# Patient Record
Sex: MALE | Race: WHITE | HISPANIC OR LATINO | Employment: OTHER | URBAN - METROPOLITAN AREA
[De-identification: names, ages, dates, MRNs, and addresses within clinical notes are randomized per-mention and may not be internally consistent; named-entity substitution may affect disease eponyms.]

---

## 2018-07-12 ENCOUNTER — RECORDS - HEALTHEAST (OUTPATIENT)
Dept: LAB | Facility: CLINIC | Age: 68
End: 2018-07-12

## 2018-07-13 LAB — INR PPP: 1.88 (ref 0.9–1.1)

## 2018-07-15 ENCOUNTER — RECORDS - HEALTHEAST (OUTPATIENT)
Dept: LAB | Facility: CLINIC | Age: 68
End: 2018-07-15

## 2018-07-16 LAB — INR PPP: 2.09 (ref 0.9–1.1)

## 2018-07-20 ENCOUNTER — RECORDS - HEALTHEAST (OUTPATIENT)
Dept: LAB | Facility: CLINIC | Age: 68
End: 2018-07-20

## 2018-07-20 LAB — INR PPP: 2.71 (ref 0.9–1.1)

## 2018-07-22 ENCOUNTER — RECORDS - HEALTHEAST (OUTPATIENT)
Dept: LAB | Facility: CLINIC | Age: 68
End: 2018-07-22

## 2018-07-23 LAB — INR PPP: 2.56 (ref 0.9–1.1)

## 2018-07-26 ENCOUNTER — RECORDS - HEALTHEAST (OUTPATIENT)
Dept: LAB | Facility: CLINIC | Age: 68
End: 2018-07-26

## 2018-07-26 LAB — INR PPP: 2.05 (ref 0.9–1.1)

## 2018-10-23 ENCOUNTER — TELEPHONE (OUTPATIENT)
Dept: DERMATOLOGY | Facility: CLINIC | Age: 68
End: 2018-10-23

## 2018-10-23 NOTE — TELEPHONE ENCOUNTER
I attempted to call Fer to get him scheduled for a appointment. The number that is listed is incorrect and home number does not work.    CANDIS PriceA

## 2018-10-23 NOTE — TELEPHONE ENCOUNTER
[facility] Call Center    Phone Message    May a detailed message be left on voicemail: yes    Reason for Call: Other: Pt and His wife have scapies and need to be seen as soon as possible. First available is 11/1/18 and PT's wife states they need to be seen sooner because the dsymptoms are quite severe.  Please call Pt's wife Ailin back.      Action Taken: Message routed to:  [facility] ([facility] DERM

## 2018-10-29 ENCOUNTER — PATIENT OUTREACH (OUTPATIENT)
Dept: CARE COORDINATION | Facility: CLINIC | Age: 68
End: 2018-10-29

## 2018-10-31 ENCOUNTER — OFFICE VISIT (OUTPATIENT)
Dept: DERMATOLOGY | Facility: CLINIC | Age: 68
End: 2018-10-31
Payer: MEDICARE

## 2018-10-31 DIAGNOSIS — L21.9 DERMATITIS, SEBORRHEIC: ICD-10-CM

## 2018-10-31 DIAGNOSIS — R21 RASH: Primary | ICD-10-CM

## 2018-10-31 PROCEDURE — 87186 SC STD MICRODIL/AGAR DIL: CPT | Performed by: PHYSICIAN ASSISTANT

## 2018-10-31 PROCEDURE — 87070 CULTURE OTHR SPECIMN AEROBIC: CPT | Performed by: PHYSICIAN ASSISTANT

## 2018-10-31 PROCEDURE — 87077 CULTURE AEROBIC IDENTIFY: CPT | Performed by: PHYSICIAN ASSISTANT

## 2018-10-31 PROCEDURE — 88305 TISSUE EXAM BY PATHOLOGIST: CPT | Mod: TC | Performed by: PHYSICIAN ASSISTANT

## 2018-10-31 RX ORDER — LIDOCAINE HYDROCHLORIDE AND EPINEPHRINE 10; 10 MG/ML; UG/ML
1 INJECTION, SOLUTION INFILTRATION; PERINEURAL ONCE
Qty: 1 ML | Refills: 0 | OUTPATIENT
Start: 2018-10-31 | End: 2018-10-31

## 2018-10-31 ASSESSMENT — PAIN SCALES - GENERAL
PAINLEVEL: NO PAIN (0)
PAINLEVEL: NO PAIN (0)

## 2018-10-31 NOTE — NURSING NOTE
Dermatology Rooming Note    Fer Latham's goals for this visit include:   Chief Complaint   Patient presents with     Derm Problem     Fer is here today to be seen for possible scabbies.      DENISE Granados

## 2018-10-31 NOTE — NURSING NOTE
Lidocaine-epinephrine 1-1:612515 % injection   1mL once for one use, starting 10/31/2018 ending 10/31/2018,  2mL disp, R-0, injection  Injected by DENISE Granados

## 2018-10-31 NOTE — MR AVS SNAPSHOT
After Visit Summary   10/31/2018    Fer Latham    MRN: [id number]           Patient Information     Date Of Birth          1950        Visit Information        Provider Department      10/31/2018 8:00 AM Nohemi Blanc PA-C [facility] Dermatology        Today's Diagnoses     Rash    -  1      Care Instructions    Wound Care After a Biopsy    What is a skin biopsy?  A skin biopsy allows the doctor to examine a very small piece of tissue under the microscope to determine the diagnosis and the best treatment for the skin condition. A local anesthetic (numbing medicine)  is injected with a very small needle into the skin area to be tested. A small piece of skin is taken from the area. Sometimes a suture (stitch) is used.     What are the risks of a skin biopsy?  I will experience scar, bleeding, swelling, pain, crusting and redness. I may experience incomplete removal or recurrence. Risks of this procedure are excessive bleeding, bruising, infection, nerve damage, numbness, thick (hypertrophic or keloidal) scar and non-diagnostic biopsy.    How should I care for my wound for the first 24 hours?    Keep the wound dry and covered for 24 hours    If it bleeds, hold direct pressure on the area for 15 minutes. If bleeding does not stop then go to the emergency room    Avoid strenuous exercise the first 1-2 days or as your doctor instructs you    How should I care for the wound after 24 hours?    After 24 hours, remove the bandage    You may bathe or shower as normal    If you had a scalp biopsy, you can shampoo as usual and can use shower water to clean the biopsy site daily    Clean the wound twice a day with gentle soap and water    Do not scrub, be gentle    Apply white petroleum/Vaseline after cleaning the wound with a cotton swab or a clean finger, and keep the site covered with a Bandaid /bandage. Bandages are not necessary with a scalp biopsy    If you are unable to cover the site with a  Bandaid /bandage, re-apply ointment 2-3 times a day to keep the site moist. Moisture will help with healing    Avoid strenuous activity for first 1-2 days    Avoid lakes, rivers, pools, and oceans until the stitches are removed or the site is healed    How do I clean my wound?    Wash hands thoroughly with soap or use hand  before all wound care    Clean the wound with gentle soap and water    Apply white petroleum/Vaseline  to wound after it is clean    Replace the Bandaid /bandage to keep the wound covered for the first few days or as instructed by your doctor    If you had a scalp biopsy, warm shower water to the area on a daily basis should suffice    What should I use to clean my wound?     Cotton-tipped applicators (Qtips )    White petroleum jelly (Vaseline ). Use a clean new container and use Q-tips to apply.    Bandaids   as needed    Gentle soap     How should I care for my wound long term?    Do not get your wound dirty    Keep up with wound care for one week or until the area is healed.    A small scab will form and fall off by itself when the area is completely healed. The area will be red and will become pink in color as it heals. Sun protection is very important for how your scar will turn out. Sunscreen with an SPF 30 or greater is recommended once the area is healed.    If you have stitches, stitches need to be removed in 10-14 days. You may return to our clinic for this or you may have it done locally at your doctor s office.    You should have some soreness but it should be mild and slowly go away over several days. Talk to your doctor about using tylenol for pain,    When should I call my doctor?  If you have increased:     Pain or swelling    Pus or drainage (clear or slightly yellow drainage is ok)    Temperature over 100F    Spreading redness or warmth around wound    When will I hear about my results?  The biopsy results can take 2-3 weeks to come back. The clinic will call you with  the results, send you a Nuvile[employer] message, or have you schedule a follow-up clinic or phone time to discuss the results. Contact our clinics if you do not hear from us in 3 weeks.     Who should I call with questions?    [facility]: [phone]     [facility]: [phone]    For urgent needs outside of business hours call the [facility] at [phone] and ask for the dermatology resident on call            Follow-ups after your visit        Who to contact     Please call your clinic at [phone] to:    Ask questions about your health    Make or cancel appointments    Discuss your medicines    Learn about your test results    Speak to your doctor            Additional Information About Your Visit        MyChart Information     [employer] is an electronic gateway that provides easy, online access to your medical records. With [employer], you can request a clinic appointment, read your test results, renew a prescription or communicate with your care team.     To sign up for [employer] visit the website at [email or web address]   You will be asked to enter the access code listed below, as well as some personal information. Please follow the directions to create your username and password.     Your access code is: [email or web address]  Expires: 2019  6:30 AM     Your access code will  in 90 days. If you need help or a new code, please contact your [facility] or call [phone] for assistance.        Care EveryWhere ID     This is your Care EveryWhere ID. This could be used by other organizations to access your [place] medical records  [id number]         Blood Pressure from Last 3 Encounters:   02/22/15 141/83   13 117/67    Weight from Last 3 Encounters:   13 (!) 154.2 kg (340 lb)              Today, you had the following     No orders found for display       Primary Care Provider Office Phone # Fat  #    Isrrael Ornelas [phone]       [facility] [address] [facility] [address]        Equal Access to Services     TREMAINE PEDRO : Hadii aad ku hadjaswant [facility], wamichda luqadaha, qaybta kaalmada dillon, amaris [place] laChivodarwin urbina. So [facility] [phone].    ATENCIÓN: Si habla español, tiene a scherer disposición servicios gratuitos de asistencia lingüística. Llame al [phone].    We comply with applicable federal civil rights laws and [place] laws. We do not discriminate on the basis of race, color, national origin, age, disability, sex, sexual orientation, or gender identity.            Thank you!     Thank you for choosing [facility] DERMATOLOGY  for your care. Our goal is always to provide you with excellent care. Hearing back from our patients is one way we can continue to improve our services. Please take a few minutes to complete the written survey that you may receive in the mail after your visit with us. Thank you!             Your Updated Medication List - Protect others around you: Learn how to safely use, store and throw away your medicines at www.disposemymeds.org.          This list is accurate as of 10/31/18  8:35 AM.  Always use your most recent med list.                   Brand Name Dispense Instructions for use Diagnosis    atenolol 50 MG tablet    TENORMIN     Take 50 mg by mouth daily.        diltiazem 240 MG 24 hr capsule      Take 240 mg by mouth daily.        glipiZIDE 5 MG 24 hr tablet    GLUCOTROL XL     Take 5 mg by mouth 2 times daily.        metFORMIN 1000 MG tablet    GLUCOPHAGE     Take 1,000 mg by mouth 2 times daily (with meals).        PRADAXA ANTICOAGULANT 150 MG capsule   Generic drug:  dabigatran ANTICOAGULANT     60 capsule    Take 1 capsule by mouth 2 times daily. Store in original 's bottle or blister pack; use within 120 days of opening.

## 2018-10-31 NOTE — LETTER
10/31/2018       RE: Fer Latham  [address]  [address]  [place] [address]     Dear Colleague,    Thank you for referring your patient, Fer Latham, to the [facility] DERMATOLOGY at [facility]. Please see a copy of my visit note below.    [facility] Dermatology Note      Dermatology Problem List:  1.History of diabetes  2.History of ESRD-2/2 sepsis- on dialysis currently - was hospitalized for nearly 2mo from June 2018-August 2018 and then discharged to an after care facility  3.Rash - s/p bx 10/31/18  - ddx: bacterial folliculitis, superinfected dermatitis vs arthropod bite (scabies) vs itching 2/2 renal disease which he was scratching and then subsequently got an infection  -bacterial culture pending  -bleach baths  4. Seb derm- scalp        Encounter Date: Oct 31, 2018    CC:  Chief Complaint   Patient presents with     Derm Problem     Fer is here today to be seen for possible scabbies.          History of Present Illness:  Mr. Fer Latham is a 68 year old male who is new to the dermatology clinic that is here for an evaluation for what he thinks is scabies. Today the patient is accompanied by his wife who is also experience similar symptoms. The patient reports that he was admitted for the hospital June 12, for sepsis and subsequently developed kidney failure for which he is now on dialysis. He was at [facility] for 51 days, and was discharged mid August 2018. The patient states that this rash started at the after care clinic following his discharge. It started on his trunk and legs. The patient has had this rash for roughly 2 months. The patient states that this rash is very itchy and he has been scratching intensely. It seems to be more itchy after bathing. The patient states that it is affecting his right lower leg and left thigh, (it was previously on his right thigh), abdomen, lower back, and arms. He reports that  his head feels itchy, but that he is unable to shower because of his dialysis port -so the patient can only take sponge baths which his wife has been givng him several times per week. Patient states that he has went to the [facility] dermatology clinic and he was prescribed gabapentin at first, but he only took 100 mg for ~ 8 days. He thinks this helped a bit with the itching, but by no means got rid of the rash. Two weeks after the gabapentin, he was given permethrin and he did complete 2 doses spaced 7 days apart (his wife also completed this treatment). Then after two weeks he was prescribed ivermectin (wife as well). Neither the permethrin or ivermectin changed his rash or itching.. He has also been using benadryl spray to treat his itching. This helps temporarily. The patient denies painful, itching, tingling or bleeding lesions unless otherwise noted.    Past Medical History:   Patient Active Problem List   Diagnosis     Knee pain     Other postprocedural status(V45.89)     Edema     Past Medical History:   Diagnosis Date     Arrhythmia     atrial fib     Arthritis     gout     Diabetes mellitus (H)     type 2     Past Surgical History:   Procedure Laterality Date     ARTHROSCOPY KNEE  6/27/2013    Procedure: ARTHROSCOPY KNEE;  Right Knee Arthroscopy, Debridment Of Medial Meniscal Tear.  ;  Surgeon: Tomás Wong MD;  Location:  OR     BACK SURGERY         Social History:   reports that he has quit smoking. He has never used smokeless tobacco. He reports that he drinks alcohol. He reports that he does not use illicit drugs.Present with wife today.     Family History:  History reviewed. No pertinent family history.    Medications:  Current Outpatient Prescriptions   Medication Sig Dispense Refill     atenolol (TENORMIN) 50 MG tablet Take 50 mg by mouth daily.       dabigatran ANTICOAGULANT (PRADAXA ANTICOAGULANT) 150 MG CAPS BLISTER PACK Take 1 capsule by mouth 2 times daily. Store in original  's bottle or blister pack; use within 120 days of opening. 60 capsule      diltiazem 240 MG 24 hr ER capsule Take 240 mg by mouth daily.       glipiZIDE (GLUCOTROL XL) 5 MG 24 hr tablet Take 5 mg by mouth 2 times daily.       metFORMIN (GLUCOPHAGE) 1000 MG tablet Take 1,000 mg by mouth 2 times daily (with meals).         Allergies   Allergen Reactions     Sulfa Drugs Hives       Review of Systems:  -Constitutional: The patient denies fatigue, fevers, chills, unintended weight loss, and night sweats.  -Skin: As above in HPI. No additional skin concerns.  -Heme/Lymph: no concerning bumps, no bleeding or bruising problems     Physical exam:  Vitals: There were no vitals taken for this visit.  GEN: This is a well developed, well-nourished male in no acute distress, in a pleasant mood. Patient is in a wheelchair.   SKIN: Total skin excluding the undergarment areas was performed. The exam included the head/face, neck, both arms, chest, back, abdomen, both legs, digits and/or nails.   -scalp with mild erythema, dandruff and scale throughout  -left anterior thigh, right lower leg, abdomen, chest, bilateral arms and lower back: numerous erythematous macules several of which are excoriated and crusted intermixed with PIH from previously healing lesions. There are linear excoriation marks on the legs and lower back as well  -face, neck, upper back and feet are spared   -No other lesions of concern on areas examined.     Impression/Plan:  1. Seborrheic dermatitis - scalp    Will plan to address at subsequent visit - will need to start ketoconazole shampoo - but because he cannot bathe at this time it will be difficult to utilize - will bring this up again when he comes back    We will clinically monitor this area.    2. Rash - ddx: bacterial folliculitis vs superinfected dermatitis vs scabies (less favored) vs itching 2/2 renal disease which he was scratching and then subsequently got an infection    Bacterial  culture was obtained - will treat pending results    Punch biopsy: After discussion of benefits and risks including but not limited to bleeding/bruising, pain/swelling, infection, scar, incomplete removal, nerve damage/numbness, recurrence, and non-diagnostic biopsy, written consent, verbal consent and photographs were obtained. Time-out was performed. The area was cleaned with isopropyl alcohol. 0.5mL of 1% lidocaine with 1:100,000 epinephrine was injected to obtain adequate anesthesia of the lesion on the right lower back. A 4 mm punch biopsy was performed.  4-0 prolene sutures were utilized to approximate the epidermal edges.  White petroleum jelly/VaselineTM and a bandage was applied to the wound.  Explicit verbal and written wound care instructions were provided.  The patient left the Dermatology Clinic in good condition. The patient was counseled to follow up for suture removal in approximately 10-14 days.    We will wait to prescribe antibiotics pending results     Start bleach baths, handout was provided  - wife will have to do this via a sponge bath if possible as he cannot be submerged in water due to port for dialysis     Hold Ivory soap     Start Cetaphil soap    Start cerave moisturizer BID, especially after bathing       CC Dr. Bey on close of this encounter.  Follow-up in 2 weeks, earlier for new or changing lesions.       Staff Involved:    Scribe Disclosure  I, Katarzyna Spencer, am serving as a scribe to document services personally performed by Nohemi Blanc PA-C, based on data collection and the provider's statements to me.     Provider Disclosure:   The documentation recorded by the scribe accurately reflects the services I personally performed and the decisions made by me.    All risks, benefits and alternatives were discussed with patient.  Patient is in agreement and understands the assessment and plan.  All questions were answered.    Nohemi Blanc PA-C  [facility]  [facility]: Phone: [phone], Fax: [phone]

## 2018-10-31 NOTE — PROGRESS NOTES
Formerly [facility] Dermatology Note      Dermatology Problem List:  1.History of diabetes  2.History of ESRD-2/2 sepsis- on dialysis currently - was hospitalized for nearly 2mo from June 2018-August 2018 and then discharged to an after care facility  3.Rash - s/p bx 10/31/18  - ddx: bacterial folliculitis, superinfected dermatitis vs arthropod bite (scabies) vs itching 2/2 renal disease which he was scratching and then subsequently got an infection  -bacterial culture pending  -bleach baths  4. Seb derm- scalp        Encounter Date: Oct 31, 2018    CC:  Chief Complaint   Patient presents with     Derm Problem     Fer is here today to be seen for possible scabbies.          History of Present Illness:  Mr. Fer Latham is a 68 year old male who is new to the dermatology clinic that is here for an evaluation for what he thinks is scabies. Today the patient is accompanied by his wife who is also experience similar symptoms. The patient reports that he was admitted for the hospital June 12, for sepsis and subsequently developed kidney failure for which he is now on dialysis. He was at [facility] for 51 days, and was discharged mid August 2018. The patient states that this rash started at the after care clinic following his discharge. It started on his trunk and legs. The patient has had this rash for roughly 2 months. The patient states that this rash is very itchy and he has been scratching intensely. It seems to be more itchy after bathing. The patient states that it is affecting his right lower leg and left thigh, (it was previously on his right thigh), abdomen, lower back, and arms. He reports that his head feels itchy, but that he is unable to shower because of his dialysis port -so the patient can only take sponge baths which his wife has been givng him several times per week. Patient states that he has went to the [facility] dermatology clinic and he was prescribed gabapentin  at first, but he only took 100 mg for ~ 8 days. He thinks this helped a bit with the itching, but by no means got rid of the rash. Two weeks after the gabapentin, he was given permethrin and he did complete 2 doses spaced 7 days apart (his wife also completed this treatment). Then after two weeks he was prescribed ivermectin (wife as well). Neither the permethrin or ivermectin changed his rash or itching.. He has also been using benadryl spray to treat his itching. This helps temporarily. The patient denies painful, itching, tingling or bleeding lesions unless otherwise noted.    Past Medical History:   Patient Active Problem List   Diagnosis     Knee pain     Other postprocedural status(V45.89)     Edema     Past Medical History:   Diagnosis Date     Arrhythmia     atrial fib     Arthritis     gout     Diabetes mellitus (H)     type 2     Past Surgical History:   Procedure Laterality Date     ARTHROSCOPY KNEE  6/27/2013    Procedure: ARTHROSCOPY KNEE;  Right Knee Arthroscopy, Debridment Of Medial Meniscal Tear.  ;  Surgeon: Tomás Wong MD;  Location:  OR     [facility] SURGERY         Social History:   reports that he has quit smoking. He has never used smokeless tobacco. He reports that he drinks alcohol. He reports that he does not use illicit drugs.Present with wife today.     Family History:  History reviewed. No pertinent family history.    Medications:  Current Outpatient Prescriptions   Medication Sig Dispense Refill     atenolol (TENORMIN) 50 MG tablet Take 50 mg by mouth daily.       dabigatran ANTICOAGULANT (PRADAXA ANTICOAGULANT) 150 MG CAPS BLISTER PACK Take 1 capsule by mouth 2 times daily. Store in original 's bottle or blister pack; use within 120 days of opening. 60 capsule      diltiazem 240 MG 24 hr ER capsule Take 240 mg by mouth daily.       glipiZIDE (GLUCOTROL XL) 5 MG 24 hr tablet Take 5 mg by mouth 2 times daily.       metFORMIN (GLUCOPHAGE) 1000 MG tablet Take 1,000 mg by  mouth 2 times daily (with meals).         Allergies   Allergen Reactions     Sulfa Drugs Hives       Review of Systems:  -Constitutional: The patient denies fatigue, fevers, chills, unintended weight loss, and night sweats.  -Skin: As above in HPI. No additional skin concerns.  -Heme/Lymph: no concerning bumps, no bleeding or bruising problems     Physical exam:  Vitals: There were no vitals taken for this visit.  GEN: This is a well developed, well-nourished male in no acute distress, in a pleasant mood. Patient is in a wheelchair.   SKIN: Total skin excluding the undergarment areas was performed. The exam included the head/face, neck, both arms, chest, back, abdomen, both legs, digits and/or nails.   -scalp with mild erythema, dandruff and scale throughout  -left anterior thigh, right lower leg, abdomen, chest, bilateral arms and lower back: numerous erythematous macules several of which are excoriated and crusted intermixed with PIH from previously healing lesions. There are linear excoriation marks on the legs and lower back as well  -face, neck, upper back and feet are spared   -No other lesions of concern on areas examined.     Impression/Plan:  1. Seborrheic dermatitis - scalp    Will plan to address at subsequent visit - will need to start ketoconazole shampoo - but because he cannot bathe at this time it will be difficult to utilize - will bring this up again when he comes back    We will clinically monitor this area.    2. Rash - ddx: bacterial folliculitis vs superinfected dermatitis vs scabies (less favored) vs itching 2/2 renal disease which he was scratching and then subsequently got an infection    Bacterial culture was obtained - will treat pending results    Punch biopsy: After discussion of benefits and risks including but not limited to bleeding/bruising, pain/swelling, infection, scar, incomplete removal, nerve damage/numbness, recurrence, and non-diagnostic biopsy, written consent, verbal  consent and photographs were obtained. Time-out was performed. The area was cleaned with isopropyl alcohol. 0.5mL of 1% lidocaine with 1:100,000 epinephrine was injected to obtain adequate anesthesia of the lesion on the right lower back. A 4 mm punch biopsy was performed.  4-0 prolene sutures were utilized to approximate the epidermal edges.  White petroleum jelly/VaselineTM and a bandage was applied to the wound.  Explicit verbal and written wound care instructions were provided.  The patient left the Dermatology Clinic in good condition. The patient was counseled to follow up for suture removal in approximately 10-14 days.    We will wait to prescribe antibiotics pending results     Start bleach baths, handout was provided  - wife will have to do this via a sponge bath if possible as he cannot be submerged in water due to port for dialysis     Hold Ivory soap     Start Cetaphil soap    Start cerave moisturizer BID, especially after bathing       CC Dr. Bey on close of this encounter.  Follow-up in 2 weeks, earlier for new or changing lesions.       Staff Involved:    Scribe Disclosure  I, Katarzyna Spencer, am serving as a scribe to document services personally performed by Nohemi Blanc PA-C, based on data collection and the provider's statements to me.     Provider Disclosure:   The documentation recorded by the scribe accurately reflects the services I personally performed and the decisions made by me.    All risks, benefits and alternatives were discussed with patient.  Patient is in agreement and understands the assessment and plan.  All questions were answered.    Nohemi Blanc PA-C  [facility]: Phone: [phone], Fax: [phone]

## 2018-11-01 LAB — COPATH REPORT: NORMAL

## 2018-11-02 LAB
BACTERIA SPEC CULT: ABNORMAL
BACTERIA SPEC CULT: ABNORMAL
Lab: ABNORMAL
SPECIMEN SOURCE: ABNORMAL

## 2018-11-06 DIAGNOSIS — A49.02 MRSA (METHICILLIN RESISTANT STAPHYLOCOCCUS AUREUS) INFECTION: Primary | ICD-10-CM

## 2018-11-06 RX ORDER — DOXYCYCLINE 100 MG/1
100 CAPSULE ORAL 2 TIMES DAILY
Qty: 20 CAPSULE | Refills: 0 | Status: ON HOLD | OUTPATIENT
Start: 2018-11-06 | End: 2019-05-10

## 2018-12-17 ENCOUNTER — PRE VISIT (OUTPATIENT)
Dept: UROLOGY | Facility: CLINIC | Age: 68
End: 2018-12-17

## 2018-12-17 NOTE — PROGRESS NOTES
Reason for visit: UTI     Relevant information: seen at [facility] for phimosis and buried penis.    Records/imaging/labs: all records available    Pt called: no need for a call    Rooming: regular

## 2019-02-01 ENCOUNTER — TRANSFERRED RECORDS (OUTPATIENT)
Dept: HEALTH INFORMATION MANAGEMENT | Facility: CLINIC | Age: 69
End: 2019-02-01

## 2019-02-08 ENCOUNTER — TRANSFERRED RECORDS (OUTPATIENT)
Dept: HEALTH INFORMATION MANAGEMENT | Facility: CLINIC | Age: 69
End: 2019-02-08

## 2019-02-22 ENCOUNTER — PRE VISIT (OUTPATIENT)
Dept: UROLOGY | Facility: CLINIC | Age: 69
End: 2019-02-22

## 2019-05-10 ENCOUNTER — HOSPITAL ENCOUNTER (INPATIENT)
Facility: CLINIC | Age: 69
LOS: 8 days | Discharge: HOME HEALTH | DRG: 871 | End: 2019-05-18
Attending: EMERGENCY MEDICINE | Admitting: INTERNAL MEDICINE
Payer: MEDICARE

## 2019-05-10 ENCOUNTER — APPOINTMENT (OUTPATIENT)
Dept: GENERAL RADIOLOGY | Facility: CLINIC | Age: 69
DRG: 871 | End: 2019-05-10
Attending: EMERGENCY MEDICINE
Payer: MEDICARE

## 2019-05-10 DIAGNOSIS — N18.6 ESRD (END STAGE RENAL DISEASE) ON DIALYSIS (H): Primary | ICD-10-CM

## 2019-05-10 DIAGNOSIS — R53.1 WEAKNESS: ICD-10-CM

## 2019-05-10 DIAGNOSIS — N39.0 URINARY TRACT INFECTION WITH HEMATURIA, SITE UNSPECIFIED: ICD-10-CM

## 2019-05-10 DIAGNOSIS — R31.9 HEMATURIA SYNDROME: ICD-10-CM

## 2019-05-10 DIAGNOSIS — R31.9 URINARY TRACT INFECTION WITH HEMATURIA, SITE UNSPECIFIED: ICD-10-CM

## 2019-05-10 DIAGNOSIS — Z99.2 ESRD (END STAGE RENAL DISEASE) ON DIALYSIS (H): Primary | ICD-10-CM

## 2019-05-10 DIAGNOSIS — M62.81 MUSCLE WEAKNESS (GENERALIZED): ICD-10-CM

## 2019-05-10 DIAGNOSIS — F10.10 ALCOHOL ABUSE: ICD-10-CM

## 2019-05-10 DIAGNOSIS — M62.81 GENERALIZED MUSCLE WEAKNESS: ICD-10-CM

## 2019-05-10 DIAGNOSIS — M25.569 ACUTE KNEE PAIN, UNSPECIFIED LATERALITY: ICD-10-CM

## 2019-05-10 DIAGNOSIS — N39.0 URINARY TRACT INFECTION WITHOUT HEMATURIA, SITE UNSPECIFIED: ICD-10-CM

## 2019-05-10 LAB
ALBUMIN UR-MCNC: 100 MG/DL
ANION GAP SERPL CALCULATED.3IONS-SCNC: 12 MMOL/L (ref 3–14)
ANION GAP SERPL CALCULATED.3IONS-SCNC: 13 MMOL/L (ref 3–14)
APPEARANCE UR: ABNORMAL
APTT PPP: 28 SEC (ref 22–37)
BACTERIA #/AREA URNS HPF: ABNORMAL /HPF
BASOPHILS # BLD AUTO: 0 10E9/L (ref 0–0.2)
BASOPHILS NFR BLD AUTO: 0.5 %
BILIRUB UR QL STRIP: NEGATIVE
BUN SERPL-MCNC: 26 MG/DL (ref 7–30)
BUN SERPL-MCNC: 27 MG/DL (ref 7–30)
CALCIUM SERPL-MCNC: 8.6 MG/DL (ref 8.5–10.1)
CALCIUM SERPL-MCNC: 8.7 MG/DL (ref 8.5–10.1)
CHLORIDE SERPL-SCNC: 97 MMOL/L (ref 94–109)
CHLORIDE SERPL-SCNC: 98 MMOL/L (ref 94–109)
CK SERPL-CCNC: NORMAL U/L (ref 30–300)
CK SERPL-CCNC: NORMAL U/L (ref 30–300)
CO2 SERPL-SCNC: 20 MMOL/L (ref 20–32)
CO2 SERPL-SCNC: 22 MMOL/L (ref 20–32)
COLOR UR AUTO: YELLOW
CREAT SERPL-MCNC: 3.63 MG/DL (ref 0.66–1.25)
CREAT SERPL-MCNC: 3.63 MG/DL (ref 0.66–1.25)
DIFFERENTIAL METHOD BLD: ABNORMAL
EOSINOPHIL # BLD AUTO: 0 10E9/L (ref 0–0.7)
EOSINOPHIL NFR BLD AUTO: 0 %
ERYTHROCYTE [DISTWIDTH] IN BLOOD BY AUTOMATED COUNT: 13.9 % (ref 10–15)
FOLATE SERPL-MCNC: NORMAL NG/ML
GFR SERPL CREATININE-BSD FRML MDRD: 16 ML/MIN/{1.73_M2}
GFR SERPL CREATININE-BSD FRML MDRD: 16 ML/MIN/{1.73_M2}
GLUCOSE BLDC GLUCOMTR-MCNC: 121 MG/DL (ref 70–99)
GLUCOSE BLDC GLUCOMTR-MCNC: 72 MG/DL (ref 70–99)
GLUCOSE SERPL-MCNC: 106 MG/DL (ref 70–99)
GLUCOSE SERPL-MCNC: 99 MG/DL (ref 70–99)
GLUCOSE UR STRIP-MCNC: NEGATIVE MG/DL
HBA1C MFR BLD: 5.8 % (ref 0–5.6)
HCT VFR BLD AUTO: 33.7 % (ref 40–53)
HGB BLD-MCNC: 11.4 G/DL (ref 13.3–17.7)
HGB UR QL STRIP: ABNORMAL
IMM GRANULOCYTES # BLD: 0 10E9/L (ref 0–0.4)
IMM GRANULOCYTES NFR BLD: 0.4 %
INR PPP: 1.14 (ref 0.86–1.14)
INTERPRETATION ECG - MUSE: NORMAL
KETONES UR STRIP-MCNC: NEGATIVE MG/DL
LACTATE BLD-SCNC: 1.3 MMOL/L (ref 0.7–2)
LACTATE BLD-SCNC: 2.5 MMOL/L (ref 0.7–2)
LEUKOCYTE ESTERASE UR QL STRIP: ABNORMAL
LYMPHOCYTES # BLD AUTO: 0.6 10E9/L (ref 0.8–5.3)
LYMPHOCYTES NFR BLD AUTO: 8.1 %
MCH RBC QN AUTO: 34.2 PG (ref 26.5–33)
MCHC RBC AUTO-ENTMCNC: 33.8 G/DL (ref 31.5–36.5)
MCV RBC AUTO: 101 FL (ref 78–100)
MONOCYTES # BLD AUTO: 0.6 10E9/L (ref 0–1.3)
MONOCYTES NFR BLD AUTO: 8 %
MUCOUS THREADS #/AREA URNS LPF: PRESENT /LPF
NEUTROPHILS # BLD AUTO: 6.2 10E9/L (ref 1.6–8.3)
NEUTROPHILS NFR BLD AUTO: 83 %
NITRATE UR QL: NEGATIVE
NRBC # BLD AUTO: 0 10*3/UL
NRBC BLD AUTO-RTO: 0 /100
PH UR STRIP: 5.5 PH (ref 5–7)
PLATELET # BLD AUTO: 153 10E9/L (ref 150–450)
POTASSIUM SERPL-SCNC: 4 MMOL/L (ref 3.4–5.3)
POTASSIUM SERPL-SCNC: 5.6 MMOL/L (ref 3.4–5.3)
RBC # BLD AUTO: 3.33 10E12/L (ref 4.4–5.9)
RBC #/AREA URNS AUTO: 13 /HPF (ref 0–2)
SODIUM SERPL-SCNC: 130 MMOL/L (ref 133–144)
SODIUM SERPL-SCNC: 132 MMOL/L (ref 133–144)
SOURCE: ABNORMAL
SP GR UR STRIP: 1.02 (ref 1–1.03)
SQUAMOUS #/AREA URNS AUTO: 3 /HPF (ref 0–1)
TRANS CELLS #/AREA URNS HPF: 2 /HPF (ref 0–1)
TROPONIN I SERPL-MCNC: 0.04 UG/L (ref 0–0.04)
UROBILINOGEN UR STRIP-MCNC: 2 MG/DL (ref 0–2)
VIT B12 SERPL-MCNC: 340 PG/ML (ref 193–986)
WBC # BLD AUTO: 7.5 10E9/L (ref 4–11)
WBC #/AREA URNS AUTO: >182 /HPF (ref 0–5)
WBC CLUMPS #/AREA URNS HPF: PRESENT /HPF

## 2019-05-10 PROCEDURE — 99285 EMERGENCY DEPT VISIT HI MDM: CPT | Mod: 25 | Performed by: EMERGENCY MEDICINE

## 2019-05-10 PROCEDURE — 82746 ASSAY OF FOLIC ACID SERUM: CPT | Performed by: EMERGENCY MEDICINE

## 2019-05-10 PROCEDURE — 93005 ELECTROCARDIOGRAM TRACING: CPT | Performed by: EMERGENCY MEDICINE

## 2019-05-10 PROCEDURE — 85610 PROTHROMBIN TIME: CPT | Performed by: EMERGENCY MEDICINE

## 2019-05-10 PROCEDURE — 25000132 ZZH RX MED GY IP 250 OP 250 PS 637: Performed by: EMERGENCY MEDICINE

## 2019-05-10 PROCEDURE — 87040 BLOOD CULTURE FOR BACTERIA: CPT | Performed by: EMERGENCY MEDICINE

## 2019-05-10 PROCEDURE — 83036 HEMOGLOBIN GLYCOSYLATED A1C: CPT | Performed by: EMERGENCY MEDICINE

## 2019-05-10 PROCEDURE — 80048 BASIC METABOLIC PNL TOTAL CA: CPT | Performed by: EMERGENCY MEDICINE

## 2019-05-10 PROCEDURE — 96361 HYDRATE IV INFUSION ADD-ON: CPT | Performed by: EMERGENCY MEDICINE

## 2019-05-10 PROCEDURE — 96365 THER/PROPH/DIAG IV INF INIT: CPT | Performed by: EMERGENCY MEDICINE

## 2019-05-10 PROCEDURE — 25000132 ZZH RX MED GY IP 250 OP 250 PS 637: Performed by: HOSPITALIST

## 2019-05-10 PROCEDURE — 82550 ASSAY OF CK (CPK): CPT | Performed by: EMERGENCY MEDICINE

## 2019-05-10 PROCEDURE — HZ2ZZZZ DETOXIFICATION SERVICES FOR SUBSTANCE ABUSE TREATMENT: ICD-10-PCS | Performed by: INTERNAL MEDICINE

## 2019-05-10 PROCEDURE — 83605 ASSAY OF LACTIC ACID: CPT | Performed by: EMERGENCY MEDICINE

## 2019-05-10 PROCEDURE — 87086 URINE CULTURE/COLONY COUNT: CPT | Performed by: EMERGENCY MEDICINE

## 2019-05-10 PROCEDURE — A9270 NON-COVERED ITEM OR SERVICE: HCPCS | Performed by: EMERGENCY MEDICINE

## 2019-05-10 PROCEDURE — 85730 THROMBOPLASTIN TIME PARTIAL: CPT | Performed by: EMERGENCY MEDICINE

## 2019-05-10 PROCEDURE — 83605 ASSAY OF LACTIC ACID: CPT | Performed by: HOSPITALIST

## 2019-05-10 PROCEDURE — 25000131 ZZH RX MED GY IP 250 OP 636 PS 637: Performed by: HOSPITALIST

## 2019-05-10 PROCEDURE — 00000146 ZZHCL STATISTIC GLUCOSE BY METER IP

## 2019-05-10 PROCEDURE — 25000128 H RX IP 250 OP 636: Performed by: EMERGENCY MEDICINE

## 2019-05-10 PROCEDURE — A9270 NON-COVERED ITEM OR SERVICE: HCPCS | Performed by: HOSPITALIST

## 2019-05-10 PROCEDURE — 85025 COMPLETE CBC W/AUTO DIFF WBC: CPT | Performed by: EMERGENCY MEDICINE

## 2019-05-10 PROCEDURE — 93010 ELECTROCARDIOGRAM REPORT: CPT | Mod: Z6 | Performed by: EMERGENCY MEDICINE

## 2019-05-10 PROCEDURE — 71046 X-RAY EXAM CHEST 2 VIEWS: CPT

## 2019-05-10 PROCEDURE — 99223 1ST HOSP IP/OBS HIGH 75: CPT | Mod: GC | Performed by: INTERNAL MEDICINE

## 2019-05-10 PROCEDURE — 82607 VITAMIN B-12: CPT | Performed by: EMERGENCY MEDICINE

## 2019-05-10 PROCEDURE — 36415 COLL VENOUS BLD VENIPUNCTURE: CPT | Performed by: HOSPITALIST

## 2019-05-10 PROCEDURE — 81003 URINALYSIS AUTO W/O SCOPE: CPT | Performed by: EMERGENCY MEDICINE

## 2019-05-10 PROCEDURE — 25000128 H RX IP 250 OP 636: Performed by: HOSPITALIST

## 2019-05-10 PROCEDURE — 12000001 ZZH R&B MED SURG/OB UMMC

## 2019-05-10 PROCEDURE — 84484 ASSAY OF TROPONIN QUANT: CPT | Performed by: EMERGENCY MEDICINE

## 2019-05-10 RX ORDER — AMOXICILLIN 250 MG
2 CAPSULE ORAL 2 TIMES DAILY PRN
Status: DISCONTINUED | OUTPATIENT
Start: 2019-05-10 | End: 2019-05-18 | Stop reason: HOSPADM

## 2019-05-10 RX ORDER — LANOLIN ALCOHOL/MO/W.PET/CERES
100 CREAM (GRAM) TOPICAL DAILY
Status: COMPLETED | OUTPATIENT
Start: 2019-05-10 | End: 2019-05-14

## 2019-05-10 RX ORDER — HEPARIN SODIUM 1000 [USP'U]/ML
5000 INJECTION, SOLUTION INTRAVENOUS; SUBCUTANEOUS 2 TIMES DAILY
Status: DISCONTINUED | OUTPATIENT
Start: 2019-05-10 | End: 2019-05-10

## 2019-05-10 RX ORDER — PIPERACILLIN SODIUM, TAZOBACTAM SODIUM 3; .375 G/15ML; G/15ML
3.38 INJECTION, POWDER, LYOPHILIZED, FOR SOLUTION INTRAVENOUS ONCE
Status: COMPLETED | OUTPATIENT
Start: 2019-05-10 | End: 2019-05-10

## 2019-05-10 RX ORDER — SERTRALINE HYDROCHLORIDE 25 MG/1
25 TABLET, FILM COATED ORAL DAILY
Status: DISCONTINUED | OUTPATIENT
Start: 2019-05-11 | End: 2019-05-11

## 2019-05-10 RX ORDER — AMOXICILLIN 250 MG
1 CAPSULE ORAL 2 TIMES DAILY PRN
Status: DISCONTINUED | OUTPATIENT
Start: 2019-05-10 | End: 2019-05-18 | Stop reason: HOSPADM

## 2019-05-10 RX ORDER — ONDANSETRON 4 MG/1
4 TABLET, ORALLY DISINTEGRATING ORAL EVERY 6 HOURS PRN
Status: DISCONTINUED | OUTPATIENT
Start: 2019-05-10 | End: 2019-05-18 | Stop reason: HOSPADM

## 2019-05-10 RX ORDER — ACETAMINOPHEN 325 MG/1
325-650 TABLET ORAL EVERY 6 HOURS PRN
Status: DISCONTINUED | OUTPATIENT
Start: 2019-05-10 | End: 2019-05-18 | Stop reason: HOSPADM

## 2019-05-10 RX ORDER — NICOTINE POLACRILEX 4 MG
15-30 LOZENGE BUCCAL
Status: DISCONTINUED | OUTPATIENT
Start: 2019-05-10 | End: 2019-05-18 | Stop reason: HOSPADM

## 2019-05-10 RX ORDER — TRAMADOL HYDROCHLORIDE 50 MG/1
50 TABLET ORAL ONCE
Status: COMPLETED | OUTPATIENT
Start: 2019-05-10 | End: 2019-05-10

## 2019-05-10 RX ORDER — INSULIN GLARGINE 100 [IU]/ML
10 INJECTION, SOLUTION SUBCUTANEOUS DAILY
Status: ON HOLD | COMMUNITY
End: 2019-05-16

## 2019-05-10 RX ORDER — PIPERACILLIN SODIUM, TAZOBACTAM SODIUM 2; .25 G/10ML; G/10ML
2.25 INJECTION, POWDER, LYOPHILIZED, FOR SOLUTION INTRAVENOUS EVERY 8 HOURS
Status: DISCONTINUED | OUTPATIENT
Start: 2019-05-10 | End: 2019-05-11

## 2019-05-10 RX ORDER — FOLIC ACID 1 MG/1
1 TABLET ORAL DAILY
Status: DISCONTINUED | OUTPATIENT
Start: 2019-05-10 | End: 2019-05-18 | Stop reason: HOSPADM

## 2019-05-10 RX ORDER — DEXTROSE MONOHYDRATE 25 G/50ML
25-50 INJECTION, SOLUTION INTRAVENOUS
Status: DISCONTINUED | OUTPATIENT
Start: 2019-05-10 | End: 2019-05-18 | Stop reason: HOSPADM

## 2019-05-10 RX ORDER — SERTRALINE HYDROCHLORIDE 25 MG/1
25 TABLET, FILM COATED ORAL DAILY
Status: ON HOLD | COMMUNITY
End: 2019-05-16

## 2019-05-10 RX ORDER — ASPIRIN 81 MG/1
81 TABLET ORAL DAILY
Status: DISCONTINUED | OUTPATIENT
Start: 2019-05-11 | End: 2019-05-18 | Stop reason: HOSPADM

## 2019-05-10 RX ORDER — HEPARIN SODIUM 5000 [USP'U]/.5ML
5000 INJECTION, SOLUTION INTRAVENOUS; SUBCUTANEOUS EVERY 12 HOURS
Status: DISCONTINUED | OUTPATIENT
Start: 2019-05-10 | End: 2019-05-18 | Stop reason: HOSPADM

## 2019-05-10 RX ORDER — DIAZEPAM 5 MG
5 TABLET ORAL EVERY 12 HOURS PRN
Status: ON HOLD | COMMUNITY
End: 2019-05-16

## 2019-05-10 RX ORDER — NALOXONE HYDROCHLORIDE 0.4 MG/ML
.1-.4 INJECTION, SOLUTION INTRAMUSCULAR; INTRAVENOUS; SUBCUTANEOUS
Status: DISCONTINUED | OUTPATIENT
Start: 2019-05-10 | End: 2019-05-18 | Stop reason: HOSPADM

## 2019-05-10 RX ORDER — ONDANSETRON 2 MG/ML
4 INJECTION INTRAMUSCULAR; INTRAVENOUS EVERY 6 HOURS PRN
Status: DISCONTINUED | OUTPATIENT
Start: 2019-05-10 | End: 2019-05-18 | Stop reason: HOSPADM

## 2019-05-10 RX ORDER — ACETAMINOPHEN 325 MG/1
975 TABLET ORAL ONCE
Status: COMPLETED | OUTPATIENT
Start: 2019-05-10 | End: 2019-05-10

## 2019-05-10 RX ADMIN — HEPARIN SODIUM 5000 UNITS: 5000 INJECTION, SOLUTION INTRAVENOUS; SUBCUTANEOUS at 16:10

## 2019-05-10 RX ADMIN — Medication 100 MG: at 16:10

## 2019-05-10 RX ADMIN — PIPERACILLIN AND TAZOBACTAM 3.38 G: 3; .375 INJECTION, POWDER, FOR SOLUTION INTRAVENOUS at 11:37

## 2019-05-10 RX ADMIN — ACETAMINOPHEN 975 MG: 325 TABLET, FILM COATED ORAL at 11:02

## 2019-05-10 RX ADMIN — TRAMADOL HYDROCHLORIDE 50 MG: 50 TABLET, COATED ORAL at 12:18

## 2019-05-10 RX ADMIN — SODIUM CHLORIDE 500 ML: 9 INJECTION, SOLUTION INTRAVENOUS at 12:19

## 2019-05-10 RX ADMIN — INSULIN GLARGINE 5 UNITS: 100 INJECTION, SOLUTION SUBCUTANEOUS at 21:45

## 2019-05-10 RX ADMIN — FOLIC ACID 1 MG: 1 TABLET ORAL at 16:10

## 2019-05-10 RX ADMIN — PIPERACILLIN SODIUM,TAZOBACTAM SODIUM 2.25 G: 2; .25 INJECTION, POWDER, FOR SOLUTION INTRAVENOUS at 19:10

## 2019-05-10 RX ADMIN — ACETAMINOPHEN 650 MG: 325 TABLET, FILM COATED ORAL at 16:16

## 2019-05-10 ASSESSMENT — MIFFLIN-ST. JEOR
SCORE: 2092.14
SCORE: 2139.77

## 2019-05-10 ASSESSMENT — ENCOUNTER SYMPTOMS
WEAKNESS: 1
BACK PAIN: 1
COUGH: 1
COLOR CHANGE: 1

## 2019-05-10 ASSESSMENT — ACTIVITIES OF DAILY LIVING (ADL)
ADLS_ACUITY_SCORE: 21
ADLS_ACUITY_SCORE: 22

## 2019-05-10 NOTE — PHARMACY-ADMISSION MEDICATION HISTORY
Admission medication history for the May 10, 2019 admission is complete.     Interview sources:  patient, [facility] [phone]    Reliability of source: good    Changes made to PTA medication list (reason)  Added:   - aspirin 81 mg by mouth daily (per patient)  - cholecalciferol 100 units by mouth daily (per patient)  - Basaglar 100 units/mL inject 10 units subcutaneously daily (per patient, [facility])  - sertraline 25 mg by mouth daily (per [facility])  Deleted (not taking, per pt):   - atenolol 50 mg daily  - Pradaxa 150 mg daily  - diltiazem 240 mg ER daily  - doxycycline 100 mg twice daily  - glipizide 5 mg XL once daily  - metformin 1000 mg twice daily  Changed: N/A    Additional medication history information:   - Per [facility], patient was previously taking citalopram, but switched to sertraline 25 mg daily (filled 4/30/19).    Per [place] :  - diazepam 5 mg tabs #40 for 20 days supply filled 4/30/19.  - diazepam 5 mg tabs #30 for 15 days supply filled 3/27/19.    Prior to Admission medications    Medication Sig Last Dose Taking? Auth Provider   aspirin 81 MG EC tablet Take 81 mg by mouth daily 5/9/2019 Yes Unknown, Entered By History   cholecalciferol 1000 units TABS Take 1 tablet by mouth daily 5/9/2019 Yes Unknown, Entered By History   insulin glargine (BASAGLAR KWIKPEN) 100 UNIT/ML pen Inject 10 Units Subcutaneous daily 5/9/2019 Yes Unknown, Entered By History   sertraline (ZOLOFT) 25 MG tablet Take 25 mg by mouth daily 5/9/2019 Yes Unknown, Entered By History       Time spent: 30 minutes    Medication history completed by:   Jackson Lindsay, PharmD  PGY-1 Pharmacy Practice Resident

## 2019-05-10 NOTE — LETTER
Health Information Management Services               Recipient:  Delisa Rosales        Sender:  LANETTE Do, [facility]  5th Floor   Phone [phone]          Date: May 16, 2019  Patient Name:  Fer Latham  Routing Message:    AVS from hospitalization        The documents accompanying this notice contain confidential information belonging to the sender.  This information is intended only for the use of the individual or entity named above.  The authorized recipient of this information is prohibited from disclosing this information to any other party and is required to destroy the information after its stated need has been fulfilled, unless otherwise required by state law.      If you are not the intended recipient, you are hereby notified that any disclosure, copy, distribution or action taken in reliance on the contents of these documents is strictly prohibited.  If you have received this document in error, please return it by fax to [phone] with a note on the cover sheet explaining why you are returning it (e.g. not your patient, not your provider, etc.).  If you need further assistance, please call [place] [facility]cription at [phone].  Documents may also be returned by mail to [employer], , [address], [place], [place] [address].

## 2019-05-10 NOTE — PLAN OF CARE
Patient admitted to unit 5A around 1445. VSS on RA. A/Ox4. Uses A1-2 + walker. Here for weakness + fall and UTI. Started on IV Zosyn. Voids small amounts of cloudy, nicole urine in urinal. Patient is on HD MWF. Did not receive dialysis today so will go tomorrow AM instead. Reports last BM was 5/8. Large hematoma on R hip/buttock noted that patient says is from fall. Bruising on R arm and scratches on R leg. Skin favian + blotchy with cool extremities. Numbness+tingling in BLEs at baseline d/t neuropathy. BG 72 before dinner. Juice given with dinner. RPIV SL. Tylenol given x1 for back and shoulder pain. PT/OT consult placed for tomorrow. Scored 2 on CIWA. Continue to monitor and follow POC.    Diane Costa RN on 5/10/2019 at 5:45 PM

## 2019-05-10 NOTE — PROVIDER NOTIFICATION
Provider notified of hypotension. BP 83/39. Re-check 95/42. HR 75. O2 Sat 100% on RA. Denies dizziness or SOB. Will continue to monitor.    Diane Costa RN on 5/10/2019 at 6:46 PM

## 2019-05-10 NOTE — ED NOTES
Bed: ED07  Expected date:   Expected time:   Means of arrival:   Comments:  H414 77 M fall, weakness, yellow

## 2019-05-10 NOTE — PROGRESS NOTES
"Social Work: Assessment with Discharge Plan    Patient Name:  Fer Latham  :  1950  Age:  68 year old  MRN:  [id number]  Risk/Complexity Score:     Completed assessment with:  patient    Presenting Information   Reason for Referral:  Discharge plan and Potential need for community services upon discharge  Date of Intake:  May 10, 2019  Referral Source:  Physician, Dr. Kolton Powell.  Pt states that he needs more help at home.  Decision Maker:  Self  Alternate Decision Maker:  Per [facility] next of kin policy, pt's alternate decision-maker would be his wife, Ailin.  Informed patient.  Health Care Directive:  Provided education  Living Situation:  Apartment.  Pt lives with his wife, Ailin.  Previous Functional Status:  Independent but pt states he walks with a cane and his wife has to bring him food from the kitchen.  He then states she helps him with \"many things and I'm ashamed of it\".  Patient and family understanding of hospitalization:  Pt did not comment when writer talked about pt being admitted to the hospital.  Cultural/Language/Spiritual Considerations:  Religion per speedy  Adjustment to Illness:  Pt states he is struggling with \"just existing\" and states his quality of life is poor since suffering from sepsis a year ago.  He states that his kidneys have recently failed and he now does dialysis otherwise spends his time at home watching TV.    Physical Health  Reason for Admission:    1. Urinary tract infection with hematuria, site unspecified    2. Generalized muscle weakness      Services Needed/Recommended:  Other:  would benefit from in-patient PT and OT consults to help determine discharge planning    Mental Health/Chemical Dependency  Diagnosis:  Depression and anxiety.  Pt states his mood is 'low' however denies suicidal ideation.  He feels his quality of life is poor.  Pt tells writer that he is thinking about seeing his wife's 'psychiatrist', though then questioned if this professional is a " "psychiatrist or psychologist.  SW informed pt that he would benefit from talk therapy in which pt thinks this professional does.  He was recently started on Valium by his PCP.  Informed pt that he can request a Psychiatry consult when in the hospital if needed.  We briefly discussed pt's alcohol use.  When asked if it is problematic, pt states \"I'd like to think it's not...\" Later states \"I probably should stop\".   Reminded pt that alcohol is a depressant and that it can worsen his depression symptoms.  Support/Services in Place:  Sought mental health care from his PCP recently.  Considering talk therapy.  Services Needed/Recommended:  Psychiatry, psychotherapy.    Support System  Significant relationship at present time:  Wife, Ailin.  Family of origin is available for support:  Yes, though pt's wife has her own medical problems.  He states she has problems with her foot and is starting out-pt therapy.  Other support available:  Pt has an adult son who lives in [place], [place], but is currently on a 40-day cruise around the world.  He states his son is busy with his own life.  Both pt and his wife use [employer] for transportation.  Gaps in support system:  No  Patient is caregiver to:  None     Provider Information   Primary Care Physician:  Isrrael Ornelas   [phone]   Clinic:  [facility] [address] [facility] [address]      :  None.    Financial   Income Source:  Social Security and a small pension.  He describes himself as retired though admits to being retired and fired in the same week.  This was quite upsetting to patient.  Pt's wife is on Social Security Disability.  Financial Concerns:  He states their finances are 'low' but that they can afford something if they really want it.  Insurance:    Payor/Plan Subscriber Name Rel Member # Group #   MEDICARE - MEDICARE MARIA E REESE  [id number]       ATTN CLAIMS, [address]   COMMERCIAL - MARIA E LOPEZ  [id number]       " [facility] CLAIM DIV,  [id number]       Discharge Plan   Patient and family discharge goal:  Did not discuss.  Would benefit from a PT consult while hospitalized to help determine discharge planning.  Provided education on discharge plan:  YES    A list of Medicare Certified Facilities was provided to the patient and/or family to encourage patient choice. Patient's choices for facility are:  No, SNF not indicated at this time.    Barriers to discharge:  Medical stability.    Discharge Recommendations   Anticipated Disposition:  to be determined.      Additional comments   SW consulted to assess pt's home needs.  Pt is going to be admitted to the hospital.    SW met with pt, introduced myself and reason for this visit.  Pt states he is looking for 'exercises to help my legs get stronger'.  He reports several falls in the past few months and unable to get up on his own.  When asked who helps him up, pt did not answer directly.  SW informed him that he may see a PT while hospitalized that will help determine discharge planning.  We briefly discussed therapy in the out-pt setting, in home care and TCU.    Pt's next greatest concern is his mental health and depressed mood.  He talks openly about his mood so seems like he would benefit from talk therapy.  I told him this and he will consider.  Lots of supportive counseling provided throughout the assessment.  We also talked briefly about his substance use.  Pt could benefit from further discussions about his use; it may also be beneficial to collaborate with pt's wife about pt's drinking.      SARMAD will be available to follow if needed.    GARRY Mauro  Social Work Services  Emergency Department   [phone] phone  [phone] pager  On-call pager, [phone], 1600 to midnight

## 2019-05-10 NOTE — ED NOTES
[facility], [place]   ED Nurse to Floor Handoff     Fer Latham is a 68 year old male who speaks English and lives with a spouse,  in a home  They arrived in the ED by car from home    ED Chief Complaint: Generalized Weakness    ED Dx;   Final diagnoses:   Urinary tract infection with hematuria, site unspecified   Generalized muscle weakness         Needed?: No    Allergies:   Allergies   Allergen Reactions     Cephalexin      Ciprofloxacin      Diagnostic X-Ray Materials      Sulfa Drugs Hives   .  Past Medical Hx:   Past Medical History:   Diagnosis Date     Anemia      Arrhythmia     atrial fib     Arthritis 1990's    gout     BPH (benign prostatic hyperplasia)      Depression      Diabetes mellitus (H)     Type 2  IDDM     Diverticulosis      ESRD (end stage renal disease) on dialysis (H)      Hematuria      HTN (hypertension)      HTN (hypertension)      IgA nephropathy      MI, old      Pneumonia      Rhabdomyolysis       Baseline Mental status: WDL  Current Mental Status changes: at basesline    Infection present or suspected this encounter: yes urinary  Sepsis suspected: No  Isolation type: Contact     Activity level - Baseline/Home:  Stand with Assist  Activity Level - Current:   Stand with Assist, walker    Bariatric equipment needed?: No    In the ED these meds were given:   Medications   acetaminophen (TYLENOL) tablet 975 mg (975 mg Oral Given 5/10/19 1102)   piperacillin-tazobactam (ZOSYN) 3.375 g vial to attach to  mL bag (3.375 g Intravenous New Bag 5/10/19 1137)   traMADol (ULTRAM) tablet 50 mg (50 mg Oral Given 5/10/19 1218)   0.9% sodium chloride BOLUS (500 mLs Intravenous New Bag 5/10/19 1219)       Drips running?  No    Home pump  No    Current LDAs  Peripheral IV 05/10/19 (Active)   Site Assessment WDL 5/10/2019  9:32 AM   Line Status Saline locked 5/10/2019  9:32 AM   Phlebitis Scale 0-->no symptoms 5/10/2019  9:32 AM   Infiltration Scale 0  5/10/2019  9:32 AM   Number of days: 0       Labs results:   Labs Ordered and Resulted from Time of ED Arrival Up to the Time of Departure from the ED   CBC WITH PLATELETS DIFFERENTIAL - Abnormal; Notable for the following components:       Result Value    RBC Count 3.33 (*)     Hemoglobin 11.4 (*)     Hematocrit 33.7 (*)      (*)     MCH 34.2 (*)     Absolute Lymphocytes 0.6 (*)     All other components within normal limits   BASIC METABOLIC PANEL - Abnormal; Notable for the following components:    Sodium 130 (*)     Potassium 5.6 (*)     Glucose 106 (*)     Creatinine 3.63 (*)     GFR Estimate 16 (*)     GFR Estimate If Black 19 (*)     All other components within normal limits   UA MACROSCOPIC WITH REFLEX TO MICRO AND CULTURE - Abnormal; Notable for the following components:    Blood Urine Moderate (*)     Protein Albumin Urine 100 (*)     Leukocyte Esterase Urine Large (*)     RBC Urine 13 (*)     WBC Urine 1,073 (*)     WBC Clumps Present (*)     Bacteria Urine Few (*)     Squamous Epithelial /HPF Urine 3 (*)     Transitional Epi 2 (*)     Mucous Urine Present (*)     All other components within normal limits   BASIC METABOLIC PANEL - Abnormal; Notable for the following components:    Sodium 132 (*)     Creatinine 3.63 (*)     GFR Estimate 16 (*)     GFR Estimate If Black 19 (*)     All other components within normal limits   LACTIC ACID WHOLE BLOOD - Abnormal; Notable for the following components:    Lactic Acid 2.5 (*)     All other components within normal limits   INR   PARTIAL THROMBOPLASTIN TIME   TROPONIN I   CK TOTAL   CK TOTAL   PERIPHERAL IV CATHETER   URINE CULTURE AEROBIC BACTERIAL   BLOOD CULTURE   BLOOD CULTURE       Imaging Studies:   Recent Results (from the past 24 hour(s))   XR Chest 2 Views    Narrative    Exam:  Chest X-ray 5/10/2019 10:37 AM    History: cough    Comparison: 2/22/2015    Findings: AP and lateral views of the chest. Persistent elevation of  the right hemidiaphragm,  "unchanged from 2/22/2015. Cardiac size within  normal limits. Pulmonary vasculature is distinct. No pleural effusion  or pneumothorax. No focal pulmonary opacities. No acute bony  abnormalities.      Impression    Impression: No acute cardiopulmonary abnormalities. Elevation of the  right hemidiaphragm, unchanged from 2/22/2015.    I have personally reviewed the examination and initial interpretation  and I agree with the findings.    JOSUÉ CANADA MD       Recent vital signs:   /54   Pulse 85   Temp 98.1  F (36.7  C) (Oral)   Resp 16   Ht 1.88 m (6' 2\")   Wt 124.7 kg (275 lb)   SpO2 100%   BMI 35.31 kg/m      [place] Coma Scale Score: 14 (05/10/19 0935)       Cardiac Rhythm: Normal Sinus  Pt needs tele? See epic orders  Skin/wound Issues: None    Code Status: no code on file    Pain control: good    Nausea control: fair    Abnormal labs/tests/findings requiring intervention: see epic    Family present during ED course? No   Family Comments/Social Situation comments: needs dialysis    Tasks needing completion: pt needs dialysis     Keren Kenyon RN  asc -- [place] [facility]  [phone] [place] ED  [phone] [facility] ED      "

## 2019-05-10 NOTE — H&P
[facility], [place]    History and Physical - [facility] 4 Service        Date of Admission:  5/10/2019    Assessment & Plan   Fer Latham is a 68 year old male with h/o ESRD on HD MWF chronic afib not on anticoagulation d/t prior refusal, DM2, HTN, depression, alcoholism, anemia of chronic disease, BPH, CAD, and h/o rhabdomyolysis who presents to the ED via EMS for fall, generalized weakness, and UTI.    #Sepsis 2/2 urinary source  #UTI  #Lactic acidosis  #BPH  Given patient reported history of multiple previous UTIs, generalized non-focal weakness, and dirty UA in the setting of a BPH dx, a UTI seems the most likely dx at this time. Started on pip-tazo in ED. Lactate 2.5 in ED with some decreased mental status.  - Continue pip-tazo, f/u UCx for sensitivities  - Repeat lactate, s/p 500ml IVNS in ED    #Generalized weakness  #Falls  Uses walker at baseline, very weak and unsteady with multiple previous falls. Progression likely 2/2 UTI.  - PT/OT, will likely benefit from TCU  - Falls risk  - CK check given time down following fall  - Tylenol and topicals prn for pain from fall (shoulder, back, arm), do not suspect fracture    #ESRD on HD MWF  - Nephrology consulted, no acute need for HD, will run on 5/11    #Chronic afib not on anticoagulation  Rate controlled, though not on any rate-controlling meds currently per patient.  - Pharmacy med rec re: current meds - prior med list includes diltiazem and atenolol, patient states not taking either  - Discuss warfarin with patient and pharmacy, had previously been on dabigatran but can't use DOAC d/t ESRD    #Alcohol dependence  4 drinks nightly on average, no h/o withdrawal or seizures. Fall may have been 2/2 intoxication as well.  - [facility] protocol  - Thiamine/folate supplementation    #DM2, last A1c 6.0% on 3/7/19  - Continue pta lantus 10U at bedtime at decreased dose of 5U  - Low dose sliding scale insulin, qid BG checks, hypoglycemia  protocol  - Prior med list includes metformin and glipizide, patient states not taking    #Depression  - Sertraline and diazepam on prior med list but patient states not taking. Sertraline last filled 4/30/19, will restart at 25mg qday. Avoid BZDs given some confusion/slowed mentation on presentation.    #HTN  Normotensive on admission. Not on antihypertensive medications at this time.    #Anemia of chronic disease  #Macrocytosis  Stable, 11.4, on admission. Monitor.  - B12/folate pending    #CAD  H/o MI per chart review. Troponin 0.035 (not elevated) on admission and no CP.  - Says he's on ASA 81 qday, will continue       Diet: Renal  Fluids: No mIVF  DVT Prophylaxis: Heparin SQ  Brown Catheter: not present  Code Status: FULL    Disposition Plan   Expected discharge: 4 - 7 days, recommended to TBD pending PT/OT once antibiotic plan established and safe disposition plan/ TCU bed available.  Entered: Teo Ambrose MD 05/10/2019, 2:21 PM       The patient's care was discussed with the Attending Physician, Dr. Ayala and Patient.    Teo Ambrose MD  [address], [place]  Pager: [phone]  Please see sticky note for cross cover information  ______________________________________________________________________    Chief Complaint   Generalized weakness, fall    History is obtained from the patient    History of Present Illness   Fer Latham is a 68 year old male with h/o ESRD on HD MWF chronic afib not on anticoagulation d/t prior refusal, DM2, HTN, depression, anemia of chronic disease, BPH, CAD, and h/o rhabdomyolysis who presents to the ED via EMS for fall and unable to get up for 4 hours.    He has developed progressive generalized weakness, upper and lower, symmetric, over the past week or so. He especially thinks his upper extremities are weaker. Chronically feels R leg is a bit weaker d/t prior meniscal injury. He had a fall last 1 week ago, slipped down from his  "living room chair and struggled to get up. Then again the evening pta, when he slipped out of bed and spent the next 4 hours lying on his side on the floor to weak to rise. He denies LOC or hitting his head. He had 4 whiskey drinks last night, about his average number, and did not feel inebriation contributed to his fall. He denies h/o withdrawal or seizures. He lives with his wife, though she was down the [place] of their apartment building with a friend. He uses a walker at baseline.    He has had numerous previous UTIs, with symptoms, and has taken antibiotics for them with relief. He voids 2-3x/day. However, at this time he denies any dysuria/hematuria, suprapubic or flank pain, or other urinary symptoms. He denies F/C. No CP, SOB, abdominal pain, N/V/D/C, melena or brbpr. Has been eating well.    The only meds he takes are a baby aspirin, vitamin d, and basal insulin. He does not take anticoagulation for his afib for fear of getting his blood \"too thin\". He denies h/o stroke.    ED course:  Afebrile and HDS, SBPs 90-110s, HR wnl.  Labs, including CBC/BMP, largely wnl aside from Cr 3.63 in the setting of ESRD, and lactic acid of 2.5.  He was started on iv zosyn and given 500cc IVF.    Review of Systems    The 10 point Review of Systems is negative other than noted in the HPI or here.     Past Medical History    I have reviewed this patient's medical history and updated it with pertinent information if needed.   Past Medical History:   Diagnosis Date     Anemia      Arrhythmia     atrial fib     Arthritis 1990's    gout     BPH (benign prostatic hyperplasia)      Depression      Diabetes mellitus (H)     Type 2  IDDM     Diverticulosis      ESRD (end stage renal disease) on dialysis (H)      Hematuria      HTN (hypertension)      HTN (hypertension)      IgA nephropathy      MI, old      Pneumonia      Rhabdomyolysis        Past Surgical History   I have reviewed this patient's surgical history and updated it with " pertinent information if needed.  Past Surgical History:   Procedure Laterality Date     ABDOMEN SURGERY       APPENDECTOMY  1970's     ARTHROSCOPY KNEE  6/27/2013    Procedure: ARTHROSCOPY KNEE;  Right Knee Arthroscopy, Debridment Of Medial Meniscal Tear.  ;  Surgeon: Tomás Wong MD;  [facility]     BACK SURGERY  1999    L5 S1 decompression     BIOPSY  2017    skin     COLONOSCOPY  2013    [facility][1]     CORONARY ANGIOGRAPHY ADULT ORDER  2018     EYE SURGERY Bilateral 2004    Lens replacement     VASCULAR SURGERY        Social History   I have reviewed this patient's social history and updated it with pertinent information if needed. Fer Latham  reports that he has quit smoking. He has never used smokeless tobacco. He reports that he drinks alcohol. He reports that he does not use drugs.  Quit smoking 35yrs ago  EtOH average 4 liquor drinks daily  Denies ilicits    Family History   I have reviewed this patient's family history and updated it with pertinent information if needed.   No family history on file. Denies h/o kidney disease    Prior to Admission Medications   Prior to Admission Medications   Prescriptions Last Dose Informant Patient Reported? Taking?   atenolol (TENORMIN) 50 MG tablet   Yes No   Sig: Take 50 mg by mouth daily.   dabigatran ANTICOAGULANT (PRADAXA ANTICOAGULANT) 150 MG CAPS BLISTER PACK   Yes No   Sig: Take 1 capsule by mouth 2 times daily. Store in original 's bottle or blister pack; use within 120 days of opening.   diltiazem 240 MG 24 hr ER capsule   Yes No   Sig: Take 240 mg by mouth daily.   doxycycline monohydrate 100 MG capsule   No No   Sig: Take 1 capsule (100 mg) by mouth 2 times daily   glipiZIDE (GLUCOTROL XL) 5 MG 24 hr tablet   Yes No   Sig: Take 5 mg by mouth 2 times daily.   metFORMIN (GLUCOPHAGE) 1000 MG tablet   Yes No   Sig: Take 1,000 mg by mouth 2 times daily (with meals).      Facility-Administered Medications: None     Allergies   Allergies    Allergen Reactions     Cephalexin      Ciprofloxacin      Diagnostic X-Ray Materials      Sulfa Drugs Hives       Physical Exam   Vital Signs: Temp: 98.1  F (36.7  C) Temp src: Oral BP: 113/66 Pulse: 77 Heart Rate: 94 Resp: 16 SpO2: 99 % O2 Device: None (Room air)    Weight: 275 lbs 0 oz    Gen: Elderly male lying flat in bed, appears somewhat unkempt, NAD  HEENT: NCAT, MMM, PERRL, anicteric  Neck: Supple, no JVD  Pulm: CTAB without wheezes or crackles, normal WOB on RA  CV: Irregularly irregular rhythm, rate regular, S1/S2, no m/r/g  ABD: Soft, ntnd, normal BS, no appreciable organomegaly  EXT: No LE edema, cool extremities, 2+ peripheral pulses b/l UE/LE  Skin: No rashes, lesions, or ulcerations  NEURO: AOx2 (person, place, year, month - not day/date), nonfocal  Psych: Appropriate    Data   Data reviewed today: I reviewed all medications, new labs and imaging results over the last 24 hours. I personally reviewed the EKG tracing showing afib with normal HR and no acute ST-T changes.    Recent Labs   Lab 05/10/19  1108 05/10/19  0955   WBC  --  7.5   HGB  --  11.4*   MCV  --  101*   PLT  --  153   INR  --  1.14   * 130*   POTASSIUM 4.0 5.6*   CHLORIDE 98 97   CO2 22 20   BUN 26 27   CR 3.63* 3.63*   ANIONGAP 12 13   COLUMBA 8.6 8.7   GLC 99 106*   TROPI  --  0.035     Color Urine (no units)   Date Value   05/10/2019 Yellow     Appearance Urine (no units)   Date Value   05/10/2019 Cloudy     Glucose Urine (mg/dL)   Date Value   05/10/2019 Negative     Bilirubin Urine (no units)   Date Value   05/10/2019 Negative     Ketones Urine (mg/dL)   Date Value   05/10/2019 Negative     Specific Gravity Urine (no units)   Date Value   05/10/2019 1.019     pH Urine (pH)   Date Value   05/10/2019 5.5     Protein Albumin Urine (mg/dL)   Date Value   05/10/2019 100 (A)     Nitrite Urine (no units)   Date Value   05/10/2019 Negative     Leukocyte Esterase Urine (no units)   Date Value   05/10/2019 Large (A)       Recent Results  (from the past 24 hour(s))   XR Chest 2 Views    Narrative    Exam:  Chest X-ray 5/10/2019 10:37 AM    History: cough    Comparison: 2/22/2015    Findings: AP and lateral views of the chest. Persistent elevation of  the right hemidiaphragm, unchanged from 2/22/2015. Cardiac size within  normal limits. Pulmonary vasculature is distinct. No pleural effusion  or pneumothorax. No focal pulmonary opacities. No acute bony  abnormalities.      Impression    Impression: No acute cardiopulmonary abnormalities. Elevation of the  right hemidiaphragm, unchanged from 2/22/2015.    I have personally reviewed the examination and initial interpretation  and I agree with the findings.    JOSUÉ CANADA MD

## 2019-05-10 NOTE — CONSULTS
Nephrology Initial Consult  May 10, 2019      Fer Latham MRN:[id number] YOB: 1950  Date of Admission:5/10/2019  Primary care provider: Isrrael Ornelas  Requesting physician: Teo Hernandez    ASSESSMENT AND RECOMMENDATIONS:   68 year old man with ESKD secondary to IgA nephropathy that additionally has multiple medical problems and on dialysis since a sepsis episode last year.  He is currently hospitalized with weakness and found to have a UTI and lactic acidosis.    1. End Stage Kidney Disease - the patient dialyzes at [facility] on MWF with Dr. Izaguirre.  He has expressed interest to change dialysis providers or clinics.  He has no indication for dialysis today and would recommend avoiding dialysis in the setting of hypotension and lactic acidosis.  -- Will schedule HD for tomorrow, 3.5 hours and 2-3 Kg UF.  .5 kg. Access is left AVF.  -- Recommend case management discussion with patient for change in dialysis provider or center if possible     2. Volume status / Blood pressure - patient is hypotensive with volume overload on exam.  Likely secondary to sepsis like presentation.  Will monitor for improvement in bp with abx.  If patient has improved bp will aim for closer to EDW of 125.5 kg.    3. Acid base - patient with mild lactic acidosis , likely type a with lower blood pressure readings.  Will monitor for resolution of lactic acidosis with treatment of uti, presumed sepsis.    4. Hyponatremia-  Hypervolemic related to excessive po fluid intake.  Recommend 2 L fluid restriction and will correct with dialysis.    5. Anemia - patient has an adequate hemoglobin and does not need MECCA at this time.    6. Macrocytosis - the patient has a mildly elevated MCV and is notably not on b complex vitamin.    -- I recommend checking folic acid, b12  -- I recommend starting daily b complex vitamin    7. CKD Bone mineral disease - I recommend checking a PTH, vitamin d screen, and serum phosphorus  level.    Recommendations were communicated to primary team via directly    Viral Alvaro Salguero MD   [phone]    REASON FOR CONSULT: History of end-stage kidney disease    HISTORY OF PRESENT ILLNESS:  Admitting provider and nursing notes reviewed  Fer Latham is a 68 year old man with history of hypertension, atrial fibrillation, diabetes type 2, that presents for evaluation for weakness.  The patient notes weakness this morning after falling off of his couch.  In the ED he had an elevated lactate and urinalysis concerning for infection and has been started on antibiotics.  The patient denies any complaints of dysuria, frequency, fevers.  The patient has a history of sepsis and was hospitalized a year ago at [facility].  At that time he was started on hemodialysis and remains on dialysis since then follow with Dr. Roverto Izaguirre.  The sepsis was secondary to pneumonia last year.  At this time the patient denies cough, shortness of breath, chest pain.  He does note difficulties with dialysis and feeling weaker towards the end of the week, or when additional fluid was pulled off.  He is noncompliant with several medications.  At this time he notes just taking aspirin and vitamin D.  He also voices issues with his dialysis unit.    PAST MEDICAL HISTORY:  Reviewed with patient on 05/10/2019     Past Medical History:   Diagnosis Date     Anemia      Arrhythmia     atrial fib     Arthritis 1990's    gout     BPH (benign prostatic hyperplasia)      Depression      Diabetes mellitus (H)     Type 2  IDDM     Diverticulosis      ESRD (end stage renal disease) on dialysis (H)      Hematuria      HTN (hypertension)      HTN (hypertension)      IgA nephropathy      MI, old      Pneumonia      Rhabdomyolysis        Past Surgical History:   Procedure Laterality Date     ABDOMEN SURGERY       APPENDECTOMY  1970's     ARTHROSCOPY KNEE  6/27/2013    Procedure: ARTHROSCOPY KNEE;  Right Knee Arthroscopy, Debridment Of Medial  Meniscal Tear.  ;  Surgeon: Tomás Wong MD;  [facility]     BACK SURGERY  1999    L5 S1 decompression     BIOPSY  2017    skin     COLONOSCOPY  2013    [facility]     CORONARY ANGIOGRAPHY ADULT ORDER  2018     EYE SURGERY Bilateral 2004    Lens replacement     VASCULAR SURGERY          MEDICATIONS:  PTA Meds  Prior to Admission medications    Medication Sig Last Dose Taking? Auth Provider   atenolol (TENORMIN) 50 MG tablet Take 50 mg by mouth daily.   Reported, Patient   dabigatran ANTICOAGULANT (PRADAXA ANTICOAGULANT) 150 MG CAPS BLISTER PACK Take 1 capsule by mouth 2 times daily. Store in original 's bottle or blister pack; use within 120 days of opening.   Tomás Wong MD   diltiazem 240 MG 24 hr ER capsule Take 240 mg by mouth daily.   Reported, Patient   doxycycline monohydrate 100 MG capsule Take 1 capsule (100 mg) by mouth 2 times daily   Nohemi Blanc PA-C   glipiZIDE (GLUCOTROL XL) 5 MG 24 hr tablet Take 5 mg by mouth 2 times daily.   Reported, Patient   metFORMIN (GLUCOPHAGE) 1000 MG tablet Take 1,000 mg by mouth 2 times daily (with meals).   Reported, Patient      Current Meds    Infusion Meds      ALLERGIES:    Allergies   Allergen Reactions     Cephalexin      Ciprofloxacin      Diagnostic X-Ray Materials      Sulfa Drugs Hives       REVIEW OF SYSTEMS:  A comprehensive of systems was negative except as noted above.    SOCIAL HISTORY:   Social History     Socioeconomic History     Marital status:      Spouse name: Not on file     Number of children: Not on file     Years of education: Not on file     Highest education level: Not on file   Occupational History     Not on file   Social Needs     Financial resource strain: Not on file     Food insecurity:     Worry: Not on file     Inability: Not on file     Transportation needs:     Medical: Not on file     Non-medical: Not on file   Tobacco Use     Smoking status: Former Smoker     Smokeless tobacco: Never Used      "Tobacco comment: quit 35 years ago   Substance and Sexual Activity     Alcohol use: Yes     Comment: 1 to 2 drinks a day (Occasional)     Drug use: No     Sexual activity: Not on file   Lifestyle     Physical activity:     Days per week: Not on file     Minutes per session: Not on file     Stress: Not on file   Relationships     Social connections:     Talks on phone: Not on file     Gets together: Not on file     Attends [employer] service: Not on file     Active member of club or organization: Not on file     Attends meetings of clubs or organizations: Not on file     Relationship status: Not on file     Intimate partner violence:     Fear of current or ex partner: Not on file     Emotionally abused: Not on file     Physically abused: Not on file     Forced sexual activity: Not on file   Other Topics Concern     Parent/sibling w/ CABG, MI or angioplasty before 65F 55M? Not Asked   Social History Narrative     Not on file     Reviewed with patient     FAMILY MEDICAL HISTORY:   Family History   Problem Relation Age of Onset     Cancer Father      Reviewed with patient     PHYSICAL EXAM:   Temp  Av.1  F (36.2  C)  Min: 96  F (35.6  C)  Max: 98.1  F (36.7  C)      Pulse  Av.8  Min: 72  Max: 89 Resp  Av  Min: 16  Max: 16  SpO2  Av.8 %  Min: 88 %  Max: 100 %       /53   Pulse 77   Temp 96  F (35.6  C) (Oral)   Resp 16   Ht 1.88 m (6' 2\")   Wt 129.5 kg (285 lb 8 oz)   SpO2 99%   BMI 36.66 kg/m     Date 05/10/19 07 - 19 0659   Shift 7443-2149 6875-7138 2409-3619 24 Hour Total   INTAKE   I.V. 100   100   Shift Total(mL/kg) 100(0.77)   100(0.77)   OUTPUT   Shift Total(mL/kg)       Weight (kg) 129.5 129.5 129.5 129.5      Admit Weight: 124.7 kg (275 lb)     GENERAL APPEARANCE: no distress,  awake  EYES: no scleral icterus, pupils equal  Endo: no goiter, no moon facies  Lymphatics: no cervical or supraclavicular LAD  Pulmonary: lungs clear to auscultation with equal breath sounds " bilaterally, no clubbing  CV: regular rhythm, normal rate, no rub   - JVD none   - Edema 1+  GI: soft, nontender, normal bowel sounds  MS: no evidence of inflammation in joints, no muscle tenderness  : no ferguson  SKIN: no rash, warm, dry, no cyanosis  NEURO: face symmetric, no asterixis     LABS:   CMP  Recent Labs   Lab 05/10/19  1108 05/10/19  0955   * 130*   POTASSIUM 4.0 5.6*   CHLORIDE 98 97   CO2 22 20   ANIONGAP 12 13   GLC 99 106*   BUN 26 27   CR 3.63* 3.63*   GFRESTIMATED 16* 16*   GFRESTBLACK 19* 19*   COLUMBA 8.6 8.7     CBC  Recent Labs   Lab 05/10/19  0955   HGB 11.4*   WBC 7.5   RBC 3.33*   HCT 33.7*   *   MCH 34.2*   MCHC 33.8   RDW 13.9        INR  Recent Labs   Lab 05/10/19  0955   INR 1.14   PTT 28     ABGNo lab results found in last 7 days.   URINE STUDIES  Recent Labs   Lab Test 05/10/19  1038 02/22/15  2152   COLOR Yellow Light Yellow   APPEARANCE Cloudy Clear   URINEGLC Negative Negative   URINEBILI Negative Negative   URINEKETONE Negative Negative   SG 1.019 1.008   UBLD Moderate* Small*   URINEPH 5.5 5.5   PROTEIN 100* 100*   NITRITE Negative Negative   LEUKEST Large* Negative   RBCU 13* 4*   WBCU >182* <1     No lab results found.  PTH  No lab results found.  IRON STUDIES  No lab results found.    IMAGING:  All imaging studies reviewed by me.     Viral Alvaro Salguero MD

## 2019-05-10 NOTE — ED PROVIDER NOTES
"    [facility] ([facility])  5/10/19   History     Chief Complaint   Patient presents with     Generalized Weakness     The history is provided by the patient and medical records.     Fer Latham is a 68 year old male who has a PMHx of atrial fibrillation, DM II (Basaglar 20 units daily), hypertension, depression (recently started Zoloft 25 mg daily), CKD on hemodialysis MWF who presents to the Emergency Department via EMS for evaluation of generalized weakness. The patient reports that he was drinking whiskey last night (4 drinks).  He states that he went to bed but slid off the bed onto the floor and was unable to get off the floor for multiple hours last night.  He denies hitting his head.  He states that he feels tired due to poor sleep from last night.  This morning the patient called EMS as he felt weak and unable to get off the floor.  EMS noted right forearm bruising.  The patient reports some soreness and back pain with movement due to laying on the floor for several hours.  The patient uses a walker to ambulate at baseline.  He does report ongoing weakness in his legs.  The patient reports that he has a chronic cough and is urinating normally.  The patient denies suicidal ideations but reports depression at \"the horrible change in the quality of my life since I had sepsis and pneumonia last year.\"  He states that this is led to his kidney failure and now states \"I feel like my life is over\" due to reduced quality of life.  He states that he lives with his wife at home.  He receives dialysis at [facility] and was scheduled for dialysis at noon today.    I have reviewed the Medications, Allergies, Past Medical and Surgical History, and Social History in the [employer] system.    Past Medical History:   Diagnosis Date     Anemia      Arrhythmia     atrial fib     Arthritis 1990's    gout     BPH (benign prostatic hyperplasia)      Depression      Diabetes mellitus (H)     Type 2  IDDM "     Diverticulosis      ESRD (end stage renal disease) on dialysis (H)      Hematuria      HTN (hypertension)      HTN (hypertension)      IgA nephropathy      MI, old      Pneumonia      Rhabdomyolysis        Past Surgical History:   Procedure Laterality Date     ABDOMEN SURGERY       APPENDECTOMY  1970's     ARTHROSCOPY KNEE  6/27/2013    Procedure: ARTHROSCOPY KNEE;  Right Knee Arthroscopy, Debridment Of Medial Meniscal Tear.  ;  Surgeon: Tomás Wong MD;  Location: [facility]     BACK SURGERY  1999    L5 S1 decompression     BIOPSY  2017    skin     COLONOSCOPY  2013    [facility]     CORONARY ANGIOGRAPHY ADULT ORDER  2018     EYE SURGERY Bilateral 2004    Lens replacement     VASCULAR SURGERY         No family history on file.    Social History     Tobacco Use     Smoking status: Former Smoker     Smokeless tobacco: Never Used     Tobacco comment: quit 35 years ago   Substance Use Topics     Alcohol use: Yes     Comment: 1 to 2 drinks a day (Occasional)       Current Facility-Administered Medications   Medication     0.9% sodium chloride BOLUS     piperacillin-tazobactam (ZOSYN) 3.375 g vial to attach to  mL bag     Current Outpatient Medications   Medication     atenolol (TENORMIN) 50 MG tablet     dabigatran ANTICOAGULANT (PRADAXA ANTICOAGULANT) 150 MG CAPS BLISTER PACK     diltiazem 240 MG 24 hr ER capsule     doxycycline monohydrate 100 MG capsule     glipiZIDE (GLUCOTROL XL) 5 MG 24 hr tablet     metFORMIN (GLUCOPHAGE) 1000 MG tablet        Allergies   Allergen Reactions     Cephalexin      Ciprofloxacin      Diagnostic X-Ray Materials      Sulfa Drugs Hives        Review of Systems   Respiratory: Positive for cough (chronic).    Musculoskeletal: Positive for back pain.   Skin: Positive for color change (R forearm bruising).   Neurological: Positive for weakness. Negative for syncope.   All other systems reviewed and are negative.    Physical Exam   BP: 117/72  Pulse: 89  Heart Rate: 94  Temp: 98.1  F  "(36.7  C)  Resp: 16  Height: 188 cm (6' 2\")  Weight: 124.7 kg (275 lb)  SpO2: 97 %      Physical Exam   Constitutional: No distress.   HENT:   Head: Atraumatic.   Mouth/Throat: Oropharynx is clear and moist. No oropharyngeal exudate.   Eyes: Pupils are equal, round, and reactive to light. No scleral icterus.   Cardiovascular: Normal heart sounds.   Irregularly irregular   Pulmonary/Chest: Breath sounds normal. No respiratory distress.   Abdominal: Soft. He exhibits no distension. There is no tenderness.   Musculoskeletal: He exhibits no edema or tenderness.   Left forearm contusion without tenderness   Neurological: He is alert.   Skin: Skin is warm. He is not diaphoretic.   Psychiatric: He has a normal mood and affect. His behavior is normal.       ED Course   9:41 AM  The patient was seen and examined by       Procedures             EKG Interpretation:      Interpreted by Marc Powell  Time reviewed: 1000  Symptoms at time of EKG: Weakness  Rhythm: Atrial fibrillation  Rate: normal 81  Axis: normal  Ectopy: none  Conduction: normal  ST Segments/ T Waves: No ST-T wave changes  Q Waves: none  Comparison to prior: Unchanged    Clinical Impression: Atrial fibrillation          The patient has signs of Severe Sepsis as evidenced by:    1. 2 SIRS criteria, AND  2. Suspected infection, AND   3. Organ dysfunction: Lactic Acid > 1.9    Time severe sepsis diagnosis confirmed = 1122 as this was the time when Lactate resulted, and the level was > 1.9      3 Hour Severe Sepsis Bundle Completion:  1. Initial Lactic Acid Result:   Recent Labs   Lab Test 05/10/19  1122   LACT 2.5*     2. Blood Cultures before Antibiotics: Yes  3. Broad Spectrum Antibiotics Administered: Yes     Anti-infectives (From now, onward)    Start     Dose/Rate Route Frequency Ordered Stop    05/10/19 1114  piperacillin-tazobactam (ZOSYN) 3.375 g vial to attach to  mL bag      3.375 g  over 1 Hours Intravenous ONCE 05/10/19 1119          4. " Full 30mL/kg bolus not administered due to ESRD  Ideal body weight: 82.2 kg (181 lb 3.5 oz)  Adjusted ideal body weight: 99.2 kg (218 lb 11.7 oz)            Results for orders placed or performed during the hospital encounter of 05/10/19   XR Chest 2 Views    Narrative    Exam:  Chest X-ray 5/10/2019 10:37 AM    History: cough    Comparison: 2/22/2015    Findings: AP and lateral views of the chest. Persistent elevation of  the right hemidiaphragm, unchanged from 2/22/2015. Cardiac size within  normal limits. Pulmonary vasculature is distinct. No pleural effusion  or pneumothorax. No focal pulmonary opacities. No acute bony  abnormalities.      Impression    Impression: No acute cardiopulmonary abnormalities. Elevation of the  right hemidiaphragm, unchanged from 2/22/2015.    I have personally reviewed the examination and initial interpretation  and I agree with the findings.    JOSUÉ CANADA MD   CBC with platelets differential   Result Value Ref Range    WBC 7.5 4.0 - 11.0 10e9/L    RBC Count 3.33 (L) 4.4 - 5.9 10e12/L    Hemoglobin 11.4 (L) 13.3 - 17.7 g/dL    Hematocrit 33.7 (L) 40.0 - 53.0 %     (H) 78 - 100 fl    MCH 34.2 (H) 26.5 - 33.0 pg    MCHC 33.8 31.5 - 36.5 g/dL    RDW 13.9 10.0 - 15.0 %    Platelet Count 153 150 - 450 10e9/L    Diff Method Automated Method     % Neutrophils 83.0 %    % Lymphocytes 8.1 %    % Monocytes 8.0 %    % Eosinophils 0.0 %    % Basophils 0.5 %    % Immature Granulocytes 0.4 %    Nucleated RBCs 0 0 /100    Absolute Neutrophil 6.2 1.6 - 8.3 10e9/L    Absolute Lymphocytes 0.6 (L) 0.8 - 5.3 10e9/L    Absolute Monocytes 0.6 0.0 - 1.3 10e9/L    Absolute Eosinophils 0.0 0.0 - 0.7 10e9/L    Absolute Basophils 0.0 0.0 - 0.2 10e9/L    Abs Immature Granulocytes 0.0 0 - 0.4 10e9/L    Absolute Nucleated RBC 0.0    INR   Result Value Ref Range    INR 1.14 0.86 - 1.14   Partial thromboplastin time   Result Value Ref Range    PTT 28 22 - 37 sec   Basic metabolic panel   Result Value Ref  Range    Sodium 130 (L) 133 - 144 mmol/L    Potassium 5.6 (H) 3.4 - 5.3 mmol/L    Chloride 97 94 - 109 mmol/L    Carbon Dioxide 20 20 - 32 mmol/L    Anion Gap 13 3 - 14 mmol/L    Glucose 106 (H) 70 - 99 mg/dL    Urea Nitrogen 27 7 - 30 mg/dL    Creatinine 3.63 (H) 0.66 - 1.25 mg/dL    GFR Estimate 16 (L) >60 mL/min/[1.73_m2]    GFR Estimate If Black 19 (L) >60 mL/min/[1.73_m2]    Calcium 8.7 8.5 - 10.1 mg/dL   Troponin I   Result Value Ref Range    Troponin I ES 0.035 0.000 - 0.045 ug/L   UA reflex to Microscopic and Culture   Result Value Ref Range    Color Urine Yellow     Appearance Urine Cloudy     Glucose Urine Negative NEG^Negative mg/dL    Bilirubin Urine Negative NEG^Negative    Ketones Urine Negative NEG^Negative mg/dL    Specific Gravity Urine 1.019 1.003 - 1.035    Blood Urine Moderate (A) NEG^Negative    pH Urine 5.5 5.0 - 7.0 pH    Protein Albumin Urine 100 (A) NEG^Negative mg/dL    Urobilinogen mg/dL 2.0 0.0 - 2.0 mg/dL    Nitrite Urine Negative NEG^Negative    Leukocyte Esterase Urine Large (A) NEG^Negative    Source Midstream Urine     RBC Urine 13 (H) 0 - 2 /HPF    WBC Urine 1,073 (H) 0 - 5 /HPF    WBC Clumps Present (A) NEG^Negative /HPF    Bacteria Urine Few (A) NEG^Negative /HPF    Squamous Epithelial /HPF Urine 3 (H) 0 - 1 /HPF    Transitional Epi 2 (H) 0 - 1 /HPF    Mucous Urine Present (A) NEG^Negative /LPF   CK total   Result Value Ref Range    CK Total Canceled, Test credited 30 - 300 U/L   Basic metabolic panel   Result Value Ref Range    Sodium 132 (L) 133 - 144 mmol/L    Potassium 4.0 3.4 - 5.3 mmol/L    Chloride 98 94 - 109 mmol/L    Carbon Dioxide 22 20 - 32 mmol/L    Anion Gap 12 3 - 14 mmol/L    Glucose 99 70 - 99 mg/dL    Urea Nitrogen 26 7 - 30 mg/dL    Creatinine 3.63 (H) 0.66 - 1.25 mg/dL    GFR Estimate 16 (L) >60 mL/min/[1.73_m2]    GFR Estimate If Black 19 (L) >60 mL/min/[1.73_m2]    Calcium 8.6 8.5 - 10.1 mg/dL   Lactic acid whole blood   Result Value Ref Range    Lactic Acid  2.5 (H) 0.7 - 2.0 mmol/L   EKG 12-lead, tracing only   Result Value Ref Range    Interpretation ECG Click View Image link to view waveform and result        Consults  Other (Comment): Responded (05/10/19 8612)    Assessments & Plan (with Medical Decision Making)   68-year-old male presents to us with a chief complaint of weakness.  He reported drinking alcohol last night and then overnight he slid out from his bed was able to get up from the floor.  He denied any specific pain related to the fall.  Differential includes but not limited to anemia, weakness, coronary artery disease with ACS, sepsis, UTI.  EKG was unchanged from his prior A. fib.  His rate is normal.  Patient has a slight elevation his troponin at 0.035.  This remains in the normal range and given patient's end-stage renal disease is not unexpected.  Patient is not having any chest pain or shortness of breath suggestive of active acute coronary syndrome.  Patient's BMP is normal with exception of expected elevation in creatinine.  Initial potassium was elevated however this was likely hemolyzed and the repeat was 4.0.  Urine is significant for an obvious urinary tract infection.  This is likely what is contributing to the patient's weakness.  He was given a small bolus of 500 cc IV fluid given his renal failure.  He was also given Zosyn for antibiotics.  We will admit the patient to the internal medicine service.  Nephrology was consulted and will follow with the patient for possible dialysis however given his labs I do not expect need to do this today.    I have reviewed the nursing notes.    I have reviewed the findings, diagnosis, plan and need for follow up with the patient.       Medication List      There are no discharge medications for this visit.         Final diagnoses:   Urinary tract infection with hematuria, site unspecified   Generalized muscle weakness     Arian KEITH, am serving as a trained medical scribe to document services  personally performed by Marc Powell DO, based on the provider's statements to me.   I, Marc Powell DO, was physically present and have reviewed and verified the accuracy of this note documented by Arian Rolle.     5/10/2019   [facility], [place], EMERGENCY DEPARTMENT     Marc Powell DO  05/10/19 2502

## 2019-05-10 NOTE — LETTER
Transition Communication Hand-off for Care Transitions to Next Level of Care Provider    Name: Fer Latham  : 1950  MRN #: [id number]  Primary Care Provider: Isrrael Ornelas     Primary Clinic: [facility] [address] [facility] [address]     Reason for Hospitalization:  Generalized muscle weakness [M62.81]  Urinary tract infection with hematuria, site unspecified [N39.0, R31.9]  Admit Date/Time: 5/10/2019  9:31 AM  Discharge Date: 19  Payor Source: Payor: MEDICARE / Plan: MEDICARE / Product Type: Medicare /     Readmission Assessment Measure (CANDI) Risk Score/category: Average    Reason for Communication Hand-off Referral: Multiple providers/specialties    Discharge Plan:  TCU:  [facility]   [address] [place] [address]   [phone]  Fax [phone]    [facility]  [address] [place] [address]   [phone]  Fax [phone]     Concern for non-adherence with plan of care:   Y/N No  Discharge Needs Assessment:  Needs      Most Recent Value   Equipment Currently Used at Home  shower chair, wheelchair, manual, walker, standard          Already enrolled in Tele-monitoring program and name of program:  Unknown  Follow-up specialty is recommended: Yes    Follow-up plan:  No future appointments.    Any outstanding tests or procedures:              Key Recommendations:      LANETTE Do, AVELINOSW  5th Floor   Pager [phone]  Phone [phone]      AVS/Discharge Summary is the source of truth; this is a helpful guide for improved communication of patient story

## 2019-05-10 NOTE — ED TRIAGE NOTES
"Pt BIBA with c/o generalized weakness. Denies pain to EMS or other complaints. Reports drinking last night, unknown if he fell but reports he slid off the bed onto the floor and was unable to up off the floor so called EMS. Pt is no acute distress.     Was scheduled for dialysis today at 12pm.    L arm fistula present. R forearm new bruising noted.     EMS:   115/64, HR 90, 12 lead SD/SR 90s, Sats 100% RA, Blood sugar 127, PIV started, no meds given    ED arrival:    Pt reports back pain with movement due to laying on floor for several hours. Pt denies SI but reports depression at \"the horrible change in the quality of my life since I had sepsis last year.\" Pt uses assistive cane and reports several falls in past year.   Pt reports need for more help at home and MD consulting SW.   "

## 2019-05-10 NOTE — LETTER
Health Information Management Services               Recipient:    Admissions - [facility]      Sender:    KANDY Boucher  Ph: [phone]      Date: May 16, 2019  Patient Name:  Fer Latham  Routing Message:      discharge orders        The documents accompanying this notice contain confidential information belonging to the sender.  This information is intended only for the use of the individual or entity named above.  The authorized recipient of this information is prohibited from disclosing this information to any other party and is required to destroy the information after its stated need has been fulfilled, unless otherwise required by state law.      If you are not the intended recipient, you are hereby notified that any disclosure, copy, distribution or action taken in reliance on the contents of these documents is strictly prohibited.  If you have received this document in error, please return it by fax to [phone] with a note on the cover sheet explaining why you are returning it (e.g. not your patient, not your provider, etc.).  If you need further assistance, please call [employer] Centralized Transcription at [phone].  Documents may also be returned by mail to [employer], , [address], [place] [address].

## 2019-05-11 ENCOUNTER — APPOINTMENT (OUTPATIENT)
Dept: OCCUPATIONAL THERAPY | Facility: CLINIC | Age: 69
DRG: 871 | End: 2019-05-11
Attending: HOSPITALIST
Payer: MEDICARE

## 2019-05-11 ENCOUNTER — APPOINTMENT (OUTPATIENT)
Dept: PHYSICAL THERAPY | Facility: CLINIC | Age: 69
DRG: 871 | End: 2019-05-11
Attending: HOSPITALIST
Payer: MEDICARE

## 2019-05-11 LAB
ANION GAP SERPL CALCULATED.3IONS-SCNC: 7 MMOL/L (ref 3–14)
BACTERIA SPEC CULT: NORMAL
BUN SERPL-MCNC: 22 MG/DL (ref 7–30)
CALCIUM SERPL-MCNC: 7.9 MG/DL (ref 8.5–10.1)
CHLORIDE SERPL-SCNC: 97 MMOL/L (ref 94–109)
CK SERPL-CCNC: 1952 U/L (ref 30–300)
CO2 SERPL-SCNC: 28 MMOL/L (ref 20–32)
CREAT SERPL-MCNC: 2.69 MG/DL (ref 0.66–1.25)
ERYTHROCYTE [DISTWIDTH] IN BLOOD BY AUTOMATED COUNT: 13.7 % (ref 10–15)
GFR SERPL CREATININE-BSD FRML MDRD: 23 ML/MIN/{1.73_M2}
GLUCOSE BLDC GLUCOMTR-MCNC: 194 MG/DL (ref 70–99)
GLUCOSE BLDC GLUCOMTR-MCNC: 76 MG/DL (ref 70–99)
GLUCOSE BLDC GLUCOMTR-MCNC: 78 MG/DL (ref 70–99)
GLUCOSE BLDC GLUCOMTR-MCNC: 91 MG/DL (ref 70–99)
GLUCOSE SERPL-MCNC: 117 MG/DL (ref 70–99)
HCT VFR BLD AUTO: 32 % (ref 40–53)
HGB BLD-MCNC: 10.6 G/DL (ref 13.3–17.7)
Lab: NORMAL
MAGNESIUM SERPL-MCNC: 1.7 MG/DL (ref 1.6–2.3)
MAGNESIUM SERPL-MCNC: 1.7 MG/DL (ref 1.6–2.3)
MCH RBC QN AUTO: 34.4 PG (ref 26.5–33)
MCHC RBC AUTO-ENTMCNC: 33.1 G/DL (ref 31.5–36.5)
MCV RBC AUTO: 104 FL (ref 78–100)
PHOSPHATE SERPL-MCNC: 2.3 MG/DL (ref 2.5–4.5)
PHOSPHATE SERPL-MCNC: 2.6 MG/DL (ref 2.5–4.5)
PLATELET # BLD AUTO: 168 10E9/L (ref 150–450)
POTASSIUM SERPL-SCNC: 3.1 MMOL/L (ref 3.4–5.3)
POTASSIUM SERPL-SCNC: 3.1 MMOL/L (ref 3.4–5.3)
RBC # BLD AUTO: 3.08 10E12/L (ref 4.4–5.9)
SODIUM SERPL-SCNC: 131 MMOL/L (ref 133–144)
SPECIMEN SOURCE: NORMAL
WBC # BLD AUTO: 5.8 10E9/L (ref 4–11)

## 2019-05-11 PROCEDURE — A9270 NON-COVERED ITEM OR SERVICE: HCPCS | Performed by: HOSPITALIST

## 2019-05-11 PROCEDURE — 84100 ASSAY OF PHOSPHORUS: CPT | Performed by: HOSPITALIST

## 2019-05-11 PROCEDURE — 83735 ASSAY OF MAGNESIUM: CPT | Performed by: HOSPITALIST

## 2019-05-11 PROCEDURE — 5A1D70Z PERFORMANCE OF URINARY FILTRATION, INTERMITTENT, LESS THAN 6 HOURS PER DAY: ICD-10-PCS | Performed by: INTERNAL MEDICINE

## 2019-05-11 PROCEDURE — 97165 OT EVAL LOW COMPLEX 30 MIN: CPT | Mod: GO

## 2019-05-11 PROCEDURE — 36415 COLL VENOUS BLD VENIPUNCTURE: CPT | Performed by: INTERNAL MEDICINE

## 2019-05-11 PROCEDURE — 00000146 ZZHCL STATISTIC GLUCOSE BY METER IP

## 2019-05-11 PROCEDURE — 90937 HEMODIALYSIS REPEATED EVAL: CPT

## 2019-05-11 PROCEDURE — 84132 ASSAY OF SERUM POTASSIUM: CPT | Performed by: INTERNAL MEDICINE

## 2019-05-11 PROCEDURE — 25000128 H RX IP 250 OP 636: Performed by: HOSPITALIST

## 2019-05-11 PROCEDURE — 25000128 H RX IP 250 OP 636: Performed by: INTERNAL MEDICINE

## 2019-05-11 PROCEDURE — 80048 BASIC METABOLIC PNL TOTAL CA: CPT | Performed by: HOSPITALIST

## 2019-05-11 PROCEDURE — 97162 PT EVAL MOD COMPLEX 30 MIN: CPT | Mod: GP

## 2019-05-11 PROCEDURE — 99232 SBSQ HOSP IP/OBS MODERATE 35: CPT | Mod: GC | Performed by: INTERNAL MEDICINE

## 2019-05-11 PROCEDURE — 25000131 ZZH RX MED GY IP 250 OP 636 PS 637: Performed by: HOSPITALIST

## 2019-05-11 PROCEDURE — 25000132 ZZH RX MED GY IP 250 OP 250 PS 637: Performed by: HOSPITALIST

## 2019-05-11 PROCEDURE — 82550 ASSAY OF CK (CPK): CPT | Performed by: HOSPITALIST

## 2019-05-11 PROCEDURE — 85027 COMPLETE CBC AUTOMATED: CPT | Performed by: INTERNAL MEDICINE

## 2019-05-11 PROCEDURE — G0499 HEPB SCREEN HIGH RISK INDIV: HCPCS | Performed by: HOSPITALIST

## 2019-05-11 PROCEDURE — 86706 HEP B SURFACE ANTIBODY: CPT | Performed by: HOSPITALIST

## 2019-05-11 PROCEDURE — 97530 THERAPEUTIC ACTIVITIES: CPT | Mod: GP

## 2019-05-11 PROCEDURE — 84100 ASSAY OF PHOSPHORUS: CPT | Performed by: INTERNAL MEDICINE

## 2019-05-11 PROCEDURE — 12000001 ZZH R&B MED SURG/OB UMMC

## 2019-05-11 PROCEDURE — 97535 SELF CARE MNGMENT TRAINING: CPT | Mod: GO

## 2019-05-11 PROCEDURE — 83735 ASSAY OF MAGNESIUM: CPT | Performed by: INTERNAL MEDICINE

## 2019-05-11 RX ORDER — LIDOCAINE HYDROCHLORIDE 10 MG/ML
INJECTION, SOLUTION EPIDURAL; INFILTRATION; INTRACAUDAL; PERINEURAL
Status: DISCONTINUED
Start: 2019-05-11 | End: 2019-05-11 | Stop reason: HOSPADM

## 2019-05-11 RX ORDER — LIDOCAINE 40 MG/G
CREAM TOPICAL
Status: DISCONTINUED | OUTPATIENT
Start: 2019-05-11 | End: 2019-05-18 | Stop reason: HOSPADM

## 2019-05-11 RX ORDER — POTASSIUM CHLORIDE 750 MG/1
40 TABLET, EXTENDED RELEASE ORAL ONCE
Status: COMPLETED | OUTPATIENT
Start: 2019-05-11 | End: 2019-05-11

## 2019-05-11 RX ORDER — MAGNESIUM SULFATE 1 G/100ML
1 INJECTION INTRAVENOUS ONCE
Status: COMPLETED | OUTPATIENT
Start: 2019-05-11 | End: 2019-05-11

## 2019-05-11 RX ADMIN — POTASSIUM CHLORIDE 40 MEQ: 750 TABLET, EXTENDED RELEASE ORAL at 14:34

## 2019-05-11 RX ADMIN — HEPARIN SODIUM 5000 UNITS: 5000 INJECTION, SOLUTION INTRAVENOUS; SUBCUTANEOUS at 15:30

## 2019-05-11 RX ADMIN — PIPERACILLIN SODIUM,TAZOBACTAM SODIUM 2.25 G: 2; .25 INJECTION, POWDER, FOR SOLUTION INTRAVENOUS at 11:33

## 2019-05-11 RX ADMIN — PIPERACILLIN SODIUM,TAZOBACTAM SODIUM 2.25 G: 2; .25 INJECTION, POWDER, FOR SOLUTION INTRAVENOUS at 02:29

## 2019-05-11 RX ADMIN — Medication: at 08:00

## 2019-05-11 RX ADMIN — SODIUM CHLORIDE 500 ML: 9 INJECTION, SOLUTION INTRAVENOUS at 11:34

## 2019-05-11 RX ADMIN — INSULIN ASPART 2 UNITS: 100 INJECTION, SOLUTION INTRAVENOUS; SUBCUTANEOUS at 16:48

## 2019-05-11 RX ADMIN — SODIUM CHLORIDE 250 ML: 9 INJECTION, SOLUTION INTRAVENOUS at 08:00

## 2019-05-11 RX ADMIN — HEPARIN SODIUM 5000 UNITS: 5000 INJECTION, SOLUTION INTRAVENOUS; SUBCUTANEOUS at 02:45

## 2019-05-11 RX ADMIN — FOLIC ACID 1 MG: 1 TABLET ORAL at 11:34

## 2019-05-11 RX ADMIN — MAGNESIUM SULFATE IN DEXTROSE 1 G: 10 INJECTION, SOLUTION INTRAVENOUS at 16:48

## 2019-05-11 RX ADMIN — ASPIRIN 81 MG: 81 TABLET, COATED ORAL at 11:34

## 2019-05-11 RX ADMIN — SODIUM CHLORIDE 300 ML: 9 INJECTION, SOLUTION INTRAVENOUS at 08:00

## 2019-05-11 RX ADMIN — Medication 100 MG: at 11:34

## 2019-05-11 ASSESSMENT — ACTIVITIES OF DAILY LIVING (ADL)
ADLS_ACUITY_SCORE: 21
ADLS_ACUITY_SCORE: 20
ADLS_ACUITY_SCORE: 20
ADLS_ACUITY_SCORE: 21

## 2019-05-11 ASSESSMENT — MIFFLIN-ST. JEOR: SCORE: 2137.5

## 2019-05-11 NOTE — PLAN OF CARE
Discharge Planner PT   Patient plan for discharge: Rehab  Current status: Mod/MaxAx2 for bed mobility for supine<>sit. Pt required CGA-minAx2 for STSx4 requires repeated cues to push with UE from chair vs FWW and to stand fully before reaching for walker. Performed weight shifting left<>[id number], standing [id number] with CGA at FWW. Required sitting break between sets 2/2 LE fatigue. Pt ambulates with FWW 2 ft to high back chair   Barriers to return to prior living situation: Requires A for transfers, ambulation, ADLs. Falls risk. Medical condition  Recommendations for discharge: TCU  Rationale for recommendations: Pt would benefit from skilled therapy to increase functional mobility, decrease falls risk before being able to discharge home safely.        Entered by: Ricky Simmons 05/11/2019 2:08 PM

## 2019-05-11 NOTE — PLAN OF CARE
Pt admitted for weakness and UTI. Pt A&Ox3, disoriented to time/ date. Pt up to the bathroom with the assist of two and a walker. Pt denied any pain. Tele a-fib. Pt on the CIWA and scored a 5. Pt had bruises on his body. HD tomorrow at 0800, fistula in the left arm. Bed alarm on for safety. Continue with plan of care.

## 2019-05-11 NOTE — PLAN OF CARE
Pt OX4, forgetful, VSS on RA. PT/OT to assess this afternoon. Received dialysis today, typically MWF schedule but missed run yesterday. K=3.1 and Mg=1.7 - MD ordered 1X replacement with recheck in am. Tele DC'ed. CIWA score 2. BG checked before meals/bedtime, no insulin coverage needed. Tolerating renal diet, fair appetite. No BM today, makes very little amt of urine. RPIV and AV fistula in place. Receiving IV abx for UTI. Will continue to monitor and follow POC.

## 2019-05-11 NOTE — PROGRESS NOTES
HEMODIALYSIS TREATMENT NOTE    Date: 5/11/2019  Time: 11:43 AM    Data:  Pre Wt:   129.3 kg  Desired Wt: 127.3 kg   Post Wt:    Weight gain:     Weight change:     Ultrafiltration - Post Run Net Total Removed (mL): 1800 mL  Ultrafiltration - Post Run Net Total Gain (mL): 0 mL  Vascular Access Status: Yes, secured and visible  Dialyzer Rinse: Heavy, Clotted  Total Blood Volume Processed: 55.2 L  Total Dialysis (Treatment) Time:   2 hours 50 minutes    Lab:   Yes - BMP, CBC w/platelets, Phosphorus, Hep B Surface Antigen & Antibody (per protocol)    Assessment/Interventions:  3 hour HD run via left forearm AVF on K4Ca3 bath. AVF cannulated with 16 gauge needles. Lidocaine administered prior to cannulation per patient preference. 350 BFR. Attempted to remove 2 L of fluid but ended treatment 10 minutes early d/t clotting, so only 1.8 L removed. In Afib intermittently throughout. Remained asymptomatic with all other VSS on room air. Slept for most of run. Hemostasis achieved in 3 minutes post-treatment. See MAR for medication details. Report given to primary RN on 5A.     Plan:    Further HD per renal team.

## 2019-05-11 NOTE — PLAN OF CARE
"Discharge Planner OT   Patient plan for discharge: none stated  Current status: OT henok completed, pt with fluctuating baseline information provided to therapist throughout session. Spouse reports via phone that she has concerns about patient \"having dementia\". Spouse also reports pt is primarily w/c level mobility. Will benefit from further clarification of PLOF. Currently pt requires A with ADLS and mobility, not safe for return to home  Barriers to return to prior living situation: need for assist, cognition, weakness, fall risk  Recommendations for discharge: TCU  Rationale for recommendations: ongoing skilled therapy for progression of IND with mobility and ADLS       Entered by: Sara Asif 05/11/2019 2:03 PM       "

## 2019-05-11 NOTE — PLAN OF CARE
"[id number]  Reason for admission:Weakness/UTI  Vitals: VSS on RA.   Activity:  A2 + walker.  Pain:Negligible ankle/leg pain, cannot move well independently in bed.   Neuro:  A/Ox3, disoriented to date. Forgetful at times. CIWAS 6, 5  Cardiac: Stable, Tele afib w BBB. extremities favian/blotchy with cool lower extremities.   Respiratory: Stable on RA   GI/: Oliguric, on HD, voids little amounts/time, intermittently incontinent.  Last BM per pt- 5/8 or \"a day or two ago\"   Diet: Renal/CHO counts.   Lines: RPIV SL.   Wounds: Hematoma on R hip, scattered bruising noted.   Labs/imaging: Lactic 1.3, Cr 3.63, Na 132,     New changes this shift: Pt called frequently to be repositioned. 0200 BG 76-CHO offered but declined by pt.     Plan: Patient usually HD MWF, missed run 5/10 so plan for 0800 run 5/11. PT/OT consult in place. Cont IV Antbx.  Continue to monitor and follow POC.          "

## 2019-05-11 NOTE — PROGRESS NOTES
[facility], [place]    Progress Note - X[facility] 4 Service        Date of Admission:  5/10/2019    Assessment & Plan   Fer Latham is a 68 year old male with h/o ESRD on HD MWF chronic afib not on anticoagulation d/t prior refusal, DM2, HTN, depression, alcoholism, anemia of chronic disease, BPH, CAD, and h/o rhabdomyolysis who presents to the ED via EMS for fall, generalized weakness, and UTI.     #Sepsis 2/2 urinary source  #UTI  #Lactic acidosis  #BPH  #Acute encephalopathy  Given patient reported history of multiple previous UTIs, generalized non-focal weakness, and dirty UA in the setting of a BPH dx, a UTI seems the most likely dx at this time. Started on pip-tazo in ED. Lactate 2.5 in ED with some decreased mental status. Repeat lactate 1.3 after 500ml IV NS in ED. Mental status improved morning after admission.  - Continue pip-tazo, f/u UCx for sensitivities     #Rhabdomyolysis  CK elevated to 1952 on 5/11 (prior samples cancelled x2). Received 500ml IV NS in ED. In setting of fall with prolonged (4h) immobilization Dialysis not effective therapy and he makes urine so able to give IVF as needed.  - Additional IVF, repeat CK in a.m.    #Generalized weakness  #Falls  Uses walker at baseline, very weak and unsteady with multiple previous falls. Acute on chronic progression likely 2/2 UTI.  - PT/OT, will likely benefit from TCU  - Falls risk  - Tylenol and topicals prn for pain from fall (shoulder, back, arm), do not suspect fracture     #ESRD on HD MWF  - Nephrology consulted, run on 5/11, then continue pta MWF schedule    #Chronic afib not on anticoagulation  Rate controlled, though not on any rate-controlling meds currently per patient.  - Not on any rate/rhythm controlling meds (prior med list includes diltiazem and atenolol, neither of which patient is taking)  - Discuss warfarin with patient and pharmacy, had previously been on dabigatran but can't use DOAC d/t ESRD, will  wait on what he and his wife decide before initiating.     #Alcohol dependence  4 drinks nightly on average, no h/o withdrawal or seizures. Fall may have been 2/2 intoxication as well.  - CIWA protocol  - Thiamine/folate supplementation     #DM2, last A1c 6.0% on 3/7/19  A1c 5.8% on admission.  - Hold pta lantus 10U at bedtime - had started at decreased dose of 5U 5/10 p.m. but BGs running low  - Low dose sliding scale insulin, qid BG checks, hypoglycemia protocol  - Prior med list includes metformin and glipizide, patient states not taking     #Depression  - Sertraline and diazepam on prior med list but patient states not taking. Sertraline last filled 4/30/19 but he never started. Was worried about long side effect list, including death. Avoid BZDs given some confusion/slowed mentation on presentation.     #HTN  Normotensive but with transient hypotension overnight on HD1. Not on antihypertensive medications at this time. Monitor. IVF as above.     #Anemia of chronic disease  #Macrocytosis  Stable, 10-11s. Monitor.  - B12/folate wnl    #CAD  H/o MI per chart review. Troponin 0.035 (not elevated) on admission and no CP.  - Will continue pta ASA 81 qday per patient report       Diet: Renal Diet (non-dialysis)    Fluids: No mIVF  Lines: PIV  DVT Prophylaxis: Heparin SQ  Brown Catheter: not present  Code Status: Full Code      Disposition Plan   Expected discharge: 2 - 3 days, recommended to TBD penting PT/OT once antibiotic plan established and safe disposition plan/ TCU bed available.  Entered: Teo Ambrose MD 05/11/2019, 6:35 AM       The patient's care was discussed with the Attending Physician, Dr. Ayala, Bedside Nurse and Patient.    Teo Ambrose MD  [place] [id number] Service  [facility], [place]  Pager: [phone]  Please see sticky note for cross cover information  ______________________________________________________________________    Interval History   Hypotensive to SBP 80s  yesterday evening, improved with recheck, no IVF given. Otherwise uneventful. Highest CIWA 5. Continued to be disoriented to day/date but otherwise unchanged per review of notes. Plan for HD this a.m. Much more clear this morning, quicker with responses. Feels better, still some R shoulder pain from fall, tylenol with minimal improvement. Denies CP, SOB, abdominal pain, dysuria/hematuria, N/V/D, focal neuro deficits.     Data reviewed today: I reviewed all medications, new labs and imaging results over the last 24 hours. I personally reviewed no images or EKG's today.    Physical Exam   Vital Signs: Temp: 97  F (36.1  C) Temp src: Axillary BP: 111/65 Pulse: 100 Heart Rate: 94 Resp: 16 SpO2: 100 % O2 Device: None (Room air)    Weight: 285 lbs 8 oz    Gen: Elderly male sitting up in bed receiving dialysis, appears comfortable, NAD  HEENT: NCAT, anicteric  Neck: Supple, no JVD  Pulm: CTAB without wheezes or crackles in ant/lat lung fields, normal WOB on RA  CV: Irregularly irregular rhythm, rate regular, S1/S2, no m/r/g  ABD: Soft, ntnd, normal BS, no organomegaly  EXT: No LE edema, wwp, 2+ peripheral pulses  Skin: No new skin lesions  NEURO: AOx2-3 (person, place, year, month - not day/date but only off by 1 day which is improved from admission), nonfocal  Psych: Appropriate, pleasant    Data   Recent Labs   Lab 05/11/19  0813 05/11/19  0810 05/10/19  1108 05/10/19  0955   WBC 5.8  --   --  7.5   HGB 10.6*  --   --  11.4*   *  --   --  101*     --   --  153   INR  --   --   --  1.14   NA  --  131* 132* 130*   POTASSIUM  --  3.1* 4.0 5.6*   CHLORIDE  --  97 98 97   CO2  --  28 22 20   BUN  --  22 26 27   CR  --  2.69* 3.63* 3.63*   ANIONGAP  --  7 12 13   COLUMBA  --  7.9* 8.6 8.7   GLC  --  117* 99 106*   TROPI  --   --   --  0.035

## 2019-05-11 NOTE — PROGRESS NOTES
05/11/19 1325   Quick Adds   Quick Adds Student Supervision-Eval Only   Student Supervision-Eval Only    Student Supervision Direct supervision provided;Direct patient contact provided;Therapy services provided with the co-signing licensed therapist guiding and directing the services, and providing the skilled judgement and assessment throughout the session   Living Environment   Lives With spouse   Living Arrangements apartment   Home Accessibility wheelchair accessible   Transportation Anticipated health plan transportation   Living Environment Comment lives in apt with elevator, spouse home during the day    Self-Care   Usual Activity Tolerance moderate   Current Activity Tolerance fair   Regular Exercise No   Equipment Currently Used at Home shower chair;wheelchair, manual;walker, standard   Activity/Exercise/Self-Care Comment pt not accurate historian at this time, reports he is typically IND with ADLS, uses walker in home for short distances ~5 ft, uses wc in apt for longer distances   Functional Level Prior   Ambulation 1-->assistive equipment   Transferring 1-->assistive equipment   Toileting 1-->assistive equipment   Bathing 1-->assistive equipment   Communication 0-->understands/communicates without difficulty   Cognition 1 - attention or memory deficits   Fall history within last six months yes   Number of times patient has fallen within last six months 5   Which of the above functional risks had a recent onset or change? ambulation;transferring;toileting;bathing;dressing;fall history;cognition   General Information   Onset of Illness/Injury or Date of Surgery - Date 05/10/19   Referring Physician Teo Ambrose MD   Patient/Family Goals Statement Return home, regain strength   Pertinent History of Current Problem (include personal factors and/or comorbidities that impact the POC) Fer Latham is a 68 year old male with h/o ESRD on HD MWF chronic afib not on anticoagulation d/t prior refusal,  DM2, HTN, depression, alcoholism, anemia of chronic disease, BPH, CAD, and h/o rhabdomyolysis who presents to the ED via EMS for fall, generalized weakness, and UTI.   Precautions/Limitations fall precautions   General Observations Pt is agreeable to therapy, but not motivated. Pt is worried about weakness/fatigue, does not trust LE strength   Cognitive Status Examination   Orientation orientation to person, place and time   Level of Consciousness lethargic/somnolent;alert   Follows Commands and Answers Questions 100% of the time   Personal Safety and Judgment intact   Memory impaired  (Pt has difficulty reporting circumstances of previous falls)   Pain Assessment   Patient Currently in Pain No   Posture    Posture Forward head position;Protracted shoulders   Range of Motion (ROM)   ROM Comment WFL   Strength   Strength Comments WFL  (Pt experiences fatigue quickly in weightbearing)   Bed Mobility   Bed Mobility Comments Mod/maxAx2 for supine<>sit. Pt able to scoot to EOB with SBA once feet off the bed.    Transfer Skills   Transfer Comments STS with Og-CGAx2 to FWW. Pt needs repeated cueing to push with UE from chair vs FWW and to stand fully before reaching for walker   Gait   Gait Comments Unable to assess   Balance   Balance Comments Pt unsteady when standing with walker. Heavy reliance on UE for support on walker   Sensory Examination   Sensory Perception Comments WFL   Coordination   Coordination Comments Not assessed   General Therapy Interventions   Planned Therapy Interventions home program guidelines;risk factor education;progressive activity/exercise;wheelchair management/propulsion training;stretching;strengthening;ROM;neuromuscular re-education;gait training;ADL retraining;IADL retraining;balance training;bed mobility training;fine motor coordination training   Clinical Impression   Criteria for Skilled Therapeutic Intervention yes, treatment indicated   PT Diagnosis impaired functional mobility  "  Influenced by the following impairments Deconditioning, weakness 2/2 infection, cognition impairments   Functional limitations due to impairments Transfers, ambulation require assistance. Unable to fulfill ADLs, IADLs   Clinical Presentation Evolving/Changing   Clinical Presentation Rationale clinical reasoning   Clinical Decision Making (Complexity) Moderate complexity   Therapy Frequency` 5 times/week   Predicted Duration of Therapy Intervention (days/wks) 1 week   Anticipated Equipment Needs at Discharge front wheeled walker   Anticipated Discharge Disposition Transitional Care Facility   Risk & Benefits of therapy have been explained Yes   Patient, Family & other staff in agreement with plan of care Yes   Clinical Impression Comments Pt would benefit from skilled therapy to increase functional mobility, decrease falls risk before being able to discharge home safely.    [facility] Novel [employer] TM \"6 Clicks\"   2016, Trustees of [facility], under license to [employer].  All rights reserved.   6 Clicks Short Forms Basic Mobility Inpatient Short Form   [facility] AM-PAC  \"6 Clicks\" V.2 Basic Mobility Inpatient Short Form   1. Turning from your back to your side while in a flat bed without using bedrails? 3 - A Little   2. Moving from lying on your back to sitting on the side of a flat bed without using bedrails? 2 - A Lot   3. Moving to and from a bed to a chair (including a wheelchair)? 2 - A Lot   4. Standing up from a chair using your arms (e.g., wheelchair, or bedside chair)? 3 - A Little   5. To walk in hospital room? 2 - A Lot   6. Climbing 3-5 steps with a railing? 1 - Total   Basic Mobility Raw Score (Score out of 24.Lower scores equate to lower levels of function) 13   Total Evaluation Time   Total Evaluation Time (Minutes) 5     "

## 2019-05-11 NOTE — PROGRESS NOTES
"   05/11/19 1200   Quick Adds   Type of Visit Initial Occupational Therapy Evaluation   Living Environment   Lives With spouse   Living Arrangements apartment   Home Accessibility wheelchair accessible   Transportation Anticipated health plan transportation   Living Environment Comment lives in apt with elevator, spouse home during the day    Self-Care   Usual Activity Tolerance moderate   Current Activity Tolerance fair   Regular Exercise No   Equipment Currently Used at Home shower chair;wheelchair, manual;walker, standard   Activity/Exercise/Self-Care Comment pt not accurate historian at this time, reports he is typically IND with ADLS, uses walker in home for short distances ~5 ft, uses wc in apt for longer distances   Functional Level   Ambulation 1-->assistive equipment   Transferring 1-->assistive equipment   Toileting 1-->assistive equipment   Bathing 1-->assistive equipment   Dressing 0-->independent   Eating 0-->independent   Communication 0-->understands/communicates without difficulty   Swallowing 0-->swallows foods/liquids without difficulty   Cognition 1 - attention or memory deficits   Fall history within last six months yes   Number of times patient has fallen within last six months 5   Which of the above functional risks had a recent onset or change? ambulation;transferring;toileting;bathing;dressing;fall history;cognition   Prior Functional Level Comment pt and spouse report conflicting levels, pt reports various levels of ind throughout session. wife reports pt w/c user primarily, pt reports walking and IND ADLS       Present no   General Information   Onset of Illness/Injury or Date of Surgery - Date 05/10/19   Referring Physician Teo Ambrose   Patient/Family Goals Statement \"to figure this out\"   Additional Occupational Profile Info/Pertinent History of Current Problem Fer Latham is a 68 year old male with h/o ESRD on HD MWF chronic afib not on anticoagulation d/t " "prior refusal, DM2, HTN, depression, alcoholism, anemia of chronic disease, BPH, CAD, and h/o rhabdomyolysis who presents to the ED via EMS for fall, generalized weakness, and UTI.   Precautions/Limitations fall precautions   Weight-Bearing Status - LUE full weight-bearing   Weight-Bearing Status - RUE full weight-bearing   Weight-Bearing Status - LLE full weight-bearing   Weight-Bearing Status - RLE full weight-bearing   General Observations up with assist   Cognitive Status Examination   Orientation person;place   Level of Consciousness alert   Follows Commands (Cognition) follows one step commands   Memory impaired   Attention Distractible during evaluation   Executive Function Cognitive flexibility impaired;Planning ability impaired;Self awareness/monitoring impaired   Cognitive Comment to be further assessed   Visual Perception   Visual Perception No deficits were identified   Sensory Examination   Sensory Quick Adds No deficits were identified   Pain Assessment   Patient Currently in Pain Yes, see Vital Sign flowsheet   Integumentary/Edema   Integumentary/Edema no deficits were identifed   Posture   Posture forward head position   Range of Motion (ROM)   ROM Quick Adds No deficits were identified  (B UE WFL)   Coordination   Upper Extremity Coordination No deficits were identified   Transfer Skill: Sit to Stand   Level of [place]: Sit/Stand minimum assist (75% patients effort)   Upper Body Dressing   Level of [place]: Dress Upper Body minimum assist (75% patients effort)   Lower Body Dressing   Level of [place]: Dress Lower Body maximum assist (25% patients effort)   Grooming   Level of [place]: Grooming minimum assist (75% patients effort)   Eating/Self Feeding   Level of [place]: Eating independent   Instrumental Activities of Daily Living (IADL)   Previous Responsibilities   (unknown)   IADL Comments pt reports IND, then states \"sometimes I guess she helps\"   Activities of Daily " "Living Analysis   Impairments Contributing to Impaired Activities of Daily Living balance impaired;cognition impaired;pain;strength decreased   General Therapy Interventions   Planned Therapy Interventions ADL retraining;IADL retraining;balance training;cognition;strengthening;transfer training;progressive activity/exercise   Clinical Impression   Criteria for Skilled Therapeutic Interventions Met yes, treatment indicated   OT Diagnosis ADL deficits   Influenced by the following impairments weakness, fall risk, cognition   Assessment of Occupational Performance 1-3 Performance Deficits   Identified Performance Deficits dressing, toileting, transfers to toilet   Clinical Decision Making (Complexity) Low complexity   Therapy Frequency 5 times/wk   Predicted Duration of Therapy Intervention (days/wks) 1 week   Anticipated Discharge Disposition Transitional Care Facility   Risks and Benefits of Treatment have been explained. Yes   Patient, Family & other staff in agreement with plan of care Yes   [facility] TM \"6 Clicks\"   2016, Trustees of [facility], under license to [employer].  All rights reserved.   6 Clicks Short Forms Daily Activity Inpatient Short Form   [facility]  \"6 Clicks\" Daily Activity Inpatient Short Form   1. Putting on and taking off regular lower body clothing? 2 - A Lot   2. Bathing (including washing, rinsing, drying)? 2 - A Lot   3. Toileting, which includes using toilet, bedpan or urinal? 2 - A Lot   4. Putting on and taking off regular upper body clothing? 2 - A Lot   5. Taking care of personal grooming such as brushing teeth? 3 - A Little   6. Eating meals? 4 - None   Daily Activity Raw Score (Score out of 24.Lower scores equate to lower levels of function) 15   Total Evaluation Time   Total Evaluation Time (Minutes) 10     "

## 2019-05-11 NOTE — PLAN OF CARE
OT/5A: OT eval orders received, pt at dialysis- will reattempt as able or reschedule to tomorrow.

## 2019-05-12 LAB
ANION GAP SERPL CALCULATED.3IONS-SCNC: 9 MMOL/L (ref 3–14)
BUN SERPL-MCNC: 34 MG/DL (ref 7–30)
CALCIUM SERPL-MCNC: 9 MG/DL (ref 8.5–10.1)
CHLORIDE SERPL-SCNC: 97 MMOL/L (ref 94–109)
CK SERPL-CCNC: 903 U/L (ref 30–300)
CO2 SERPL-SCNC: 26 MMOL/L (ref 20–32)
CREAT SERPL-MCNC: 3.46 MG/DL (ref 0.66–1.25)
ERYTHROCYTE [DISTWIDTH] IN BLOOD BY AUTOMATED COUNT: 13.8 % (ref 10–15)
GFR SERPL CREATININE-BSD FRML MDRD: 17 ML/MIN/{1.73_M2}
GLUCOSE BLDC GLUCOMTR-MCNC: 119 MG/DL (ref 70–99)
GLUCOSE BLDC GLUCOMTR-MCNC: 135 MG/DL (ref 70–99)
GLUCOSE BLDC GLUCOMTR-MCNC: 164 MG/DL (ref 70–99)
GLUCOSE BLDC GLUCOMTR-MCNC: 56 MG/DL (ref 70–99)
GLUCOSE BLDC GLUCOMTR-MCNC: 75 MG/DL (ref 70–99)
GLUCOSE BLDC GLUCOMTR-MCNC: 90 MG/DL (ref 70–99)
GLUCOSE BLDC GLUCOMTR-MCNC: 92 MG/DL (ref 70–99)
GLUCOSE SERPL-MCNC: 94 MG/DL (ref 70–99)
HCT VFR BLD AUTO: 33.3 % (ref 40–53)
HGB BLD-MCNC: 10.9 G/DL (ref 13.3–17.7)
MAGNESIUM SERPL-MCNC: 2.3 MG/DL (ref 1.6–2.3)
MCH RBC QN AUTO: 34.7 PG (ref 26.5–33)
MCHC RBC AUTO-ENTMCNC: 32.7 G/DL (ref 31.5–36.5)
MCV RBC AUTO: 106 FL (ref 78–100)
PLATELET # BLD AUTO: 169 10E9/L (ref 150–450)
POTASSIUM SERPL-SCNC: 3.3 MMOL/L (ref 3.4–5.3)
RBC # BLD AUTO: 3.14 10E12/L (ref 4.4–5.9)
SODIUM SERPL-SCNC: 132 MMOL/L (ref 133–144)
WBC # BLD AUTO: 5.6 10E9/L (ref 4–11)

## 2019-05-12 PROCEDURE — 12000001 ZZH R&B MED SURG/OB UMMC

## 2019-05-12 PROCEDURE — 80048 BASIC METABOLIC PNL TOTAL CA: CPT | Performed by: HOSPITALIST

## 2019-05-12 PROCEDURE — 25000128 H RX IP 250 OP 636: Performed by: HOSPITALIST

## 2019-05-12 PROCEDURE — 25000132 ZZH RX MED GY IP 250 OP 250 PS 637: Performed by: HOSPITALIST

## 2019-05-12 PROCEDURE — A9270 NON-COVERED ITEM OR SERVICE: HCPCS | Performed by: HOSPITALIST

## 2019-05-12 PROCEDURE — 85027 COMPLETE CBC AUTOMATED: CPT | Performed by: HOSPITALIST

## 2019-05-12 PROCEDURE — 00000146 ZZHCL STATISTIC GLUCOSE BY METER IP

## 2019-05-12 PROCEDURE — 83735 ASSAY OF MAGNESIUM: CPT | Performed by: HOSPITALIST

## 2019-05-12 PROCEDURE — 99233 SBSQ HOSP IP/OBS HIGH 50: CPT | Performed by: INTERNAL MEDICINE

## 2019-05-12 PROCEDURE — 36415 COLL VENOUS BLD VENIPUNCTURE: CPT | Performed by: HOSPITALIST

## 2019-05-12 PROCEDURE — 82550 ASSAY OF CK (CPK): CPT | Performed by: HOSPITALIST

## 2019-05-12 RX ADMIN — Medication 100 MG: at 08:45

## 2019-05-12 RX ADMIN — HEPARIN SODIUM 5000 UNITS: 5000 INJECTION, SOLUTION INTRAVENOUS; SUBCUTANEOUS at 16:25

## 2019-05-12 RX ADMIN — ASPIRIN 81 MG: 81 TABLET, COATED ORAL at 08:45

## 2019-05-12 RX ADMIN — HEPARIN SODIUM 5000 UNITS: 5000 INJECTION, SOLUTION INTRAVENOUS; SUBCUTANEOUS at 04:40

## 2019-05-12 RX ADMIN — FOLIC ACID 1 MG: 1 TABLET ORAL at 08:45

## 2019-05-12 ASSESSMENT — ACTIVITIES OF DAILY LIVING (ADL)
ADLS_ACUITY_SCORE: 20

## 2019-05-12 NOTE — PLAN OF CARE
[id number]  Reason for admission:Weakness/UTI  Vitals: VSS on RA.   Activity:  A2 + walker.  Pain:Negligible pain, cannot move well independently in bed.   Neuro:  A/Ox3, disoriented to date. Forgetful at times. CIWAS 1, 2  Cardiac: Stable, hx of a fib. favian/blotchy cool lower extremities.   Respiratory: Stable on RA   GI/: Oliguric, on HD, voids little amounts/time, intermittently incontinent.  Last BM 5/11  Diet: Renal/CHO counts.   Lines: RPIV SL.   Wounds: Hematoma on R hip, scattered bruising noted.   Labs/imaging:  K 3.1, CK total 1952, Cr 2.69 Na 131,      New changes this shift: Last HD 5/11, 1.8L off. IV Antbx discont bruce shift, awaiting plan  0200 BG 75.  Soft BP at 0615 MD aware.      Plan: Cont HD MWF. PT/OT following, rec TCU.  Continue to monitor and follow POC.

## 2019-05-12 NOTE — PLAN OF CARE
Pt admitted for weakness and UTI. Pt A&Ox4. Pt up to the bathroom with the assist of one-two and a walker. Pt had HD today, and only voided small amounts of urine. Fistula in the left arm. Pt denied any pain. Pt on the CIWA and scored 1, no intervention needed. Pt had bruises on his body. Magnesium replaced for 1.7. Continue with plan of care.

## 2019-05-12 NOTE — PLAN OF CARE
Last BP 98/53. Alert and Oriented. Denies any pain or nausea. BG this morning 56, was given apple juice and rechecked after 15 minutes Bg 90 then pt ate his breakfast and refused his BG to be taken. Potassium 3.3, Informed M4 Dr Ayala. BG this eves 135. CIWA scoring 1.NO BM today. Voided 20 ml via urinal. Has PIV right arm. Left arm fistula. Turned and repositioned per request. Mult bruises all over his body. PT/OT per scheduled. Pt requesting pulse ox to be taken off. Notify cross cover. Plan to dialyzed tomorrow. Pt is able to make his needs known. Cont plan of care.

## 2019-05-12 NOTE — PROGRESS NOTES
Nephrology Progress Note  05/11/2019         Assessment & Recommendations:   Fer Latham is a 68 year old year old male  with ESKD secondary to IgA nephropathy that additionally has multiple medical problems and on dialysis since a sepsis episode last year.  He is currently hospitalized with weakness and found to have a UTI and lactic acidosis.     1. End Stage Kidney Disease - the patient dialyzes at [facility] ([place], [place]) on MWF with Dr. Izaguirre.  He has expressed interest to change dialysis providers or clinics.  He has no indication for dialysis today and would recommend avoiding dialysis in the setting of hypotension and lactic acidosis.  -- Will do HD today for 3.5 hours and 2-3 Kg UF as tolerated.  .5 kg. Access is left AVF. We will plan next HD on Monday     2. Volume status / Blood pressure - patient is hypotensive with volume overload on exam.  Likely secondary to sepsis like presentation.    -will aim UF for closer to EDW of 126 kg as tolerated     3. Acid base - patient presented with mild lactic acidosis , likely type a with lower blood pressure readings.  Resolved.     4. Electrolytes:  -mild hypokalemia of 3.1-we will run with K of 4  -Mild Hyponatremia-  Hypervolemic related to excessive PO fluid intake.    -Recommend 2 L fluid restriction and will correct with dialysis.     5. Anemia - patient has an adequate hemoglobin 10.6 and does not need MECCA at this time.     6. Macrocytosis - the patient has a mildly elevated MCV and is notably not on b complex vitamin.    - recommend checking folic acid, b12     7. CKD Bone mineral disease   -normal serum calcium  -mild hypophosphotemia of 2.3  -please check PTH, vitamin d  level.       Recommendations were communicated to primary team     Seen and discussed with Dr.Riad Leslie Avila MD   [phone]    Interval History :   Nursing and provider notes from last 24 hours reviewed.  In the last 24 hours Fer Latham had no acute issues. Patient  "was seen during hemodialysis.   Review of Systems:   I reviewed the following systems:  GI: improved appetite. no nausea or vomiting or diarrhea.   Neuro:  no confusion  Constitutional:  no fever or chills  CV: no dyspnea or edema.  no chest pain.    Physical Exam:   I/O last 3 completed shifts:  In: 480 [P.O.:480]  Out: 1900 [Urine:100; Other:1800]   BP 93/45 (BP Location: Right arm)   Pulse 69   Temp 96.8  F (36  C) (Oral)   Resp 16   Ht 1.88 m (6' 2\")   Wt 129.3 kg (285 lb)   SpO2 99%   BMI 36.59 kg/m       GENERAL APPEARANCE: NAD  EYES:  no scleral icterus, pupils equal  HENT: mouth without ulcers or lesions  PULM: lungs clear to auscultation bilaterally, equal air movement, no clubbing  CV: regular rhythm, normal rate, no rub     -JVD not present     -trace LE edema    GI: soft, non tender, not distended, bowel sounds are normoactive  INTEGUMENT: no cyanosis, no rash  NEURO:  AAOx4, no asterixis   Access LUE AVF is cannulated    Labs:   All labs reviewed by me  Electrolytes/Renal -   Recent Labs   Lab Test 05/11/19  1253 05/11/19  0810 05/10/19  1108 05/10/19  0955   NA  --  131* 132* 130*   POTASSIUM 3.1* 3.1* 4.0 5.6*   CHLORIDE  --  97 98 97   CO2  --  28 22 20   BUN  --  22 26 27   CR  --  2.69* 3.63* 3.63*   GLC  --  117* 99 106*   COLUMBA  --  7.9* 8.6 8.7   MAG 1.7 1.7  --   --    PHOS 2.6 2.3*  --   --        CBC -   Recent Labs   Lab Test 05/11/19  0813 05/10/19  0955 02/22/15  2151   WBC 5.8 7.5 7.2   HGB 10.6* 11.4* 12.6*    153 214       LFTs -   Recent Labs   Lab Test 02/22/15  2151   ALKPHOS 68   BILITOTAL 0.5   ALT 18   AST 17   PROTTOTAL 8.2   ALBUMIN 3.4       Iron Panel - No lab results found.      Imaging:  All imaging studies reviewed by me.     Current Medications:    aspirin  81 mg Oral Daily     folic acid  1 mg Oral Daily     heparin  5,000 Units Subcutaneous Q12H     insulin aspart  1-7 Units Subcutaneous TID AC     insulin aspart  1-5 Units Subcutaneous At Bedtime     vitamin " B1  100 mg Oral Daily       - MEDICATION INSTRUCTIONS -       Leslie Avila MD   Patient was seen and evaluated by me, Ken Bashir MD. I have reviewed the note and agree with the the plan of care as documented by the fellow.

## 2019-05-12 NOTE — PROGRESS NOTES
"Brief nephrology note.  Patient's chart including nursing notes, VS and labs were reviewed  Patient was last dialyzed  05/11. We attempted to remove 2 L of fluid but ended treatment 10 minutes early d/t clotting, so only 1.8 L removed.   Patient is lying in bed, c/o generalized weakness.  BP 98/53 (BP Location: Right arm)   Pulse 71   Temp 96.1  F (35.6  C) (Oral)   Resp 16   Ht 1.88 m (6' 2\")   Wt 129.3 kg (285 lb)   SpO2 98%   BMI 36.59 kg/m     GENERAL APPEARANCE: NAD  EYES:  no scleral icterus, pupils equal  HENT: mouth without ulcers or lesions  PULM: lungs clear to auscultation bilaterally, equal air movement, no clubbing  CV: regular rhythm, normal rate, no rub     -JVD not present     -trace LE edema    GI: soft, non tender, not distended, bowel sounds are normoactive  INTEGUMENT: no cyanosis, no rash  NEURO:  AAOx3, forgetful, no asterixis     A/P:Fer Latham is a 68 year old year old male  with ESKD secondary to IgA nephropathy .He is currently hospitalized with weakness and found to have a UTI and lactic acidosis.     End Stage Kidney Disease - the patient dialyzes at [facility] on [facility] with Dr. Izaguirre.  Access is left AVF. Last HD 05/11. No indications for HD today. We will plan next HD on Monday as per usual patient's schedule.   Patient was seen and discussed with DR.Riad Leslie Avila MD  Nephrology Fellow  [facility]  Department of Medicine  Division of Renal Disease and Hypertension  [phone]    Patient was seen and evaluated by me, Ken Bashir MD. I have reviewed the note and agree with the the plan of care as documented by the fellow.        "

## 2019-05-12 NOTE — PROGRESS NOTES
[facility], [facility] Progress Note - Hospitalist Service, Xavi Ortiz       Date of Admission:  5/10/2019  Assessment & Plan     Fer Latham is a 68 year old male with h/o ESRD on HD MWF chronic afib not on anticoagulation d/t prior refusal, DM2, HTN, depression, alcoholism, anemia of chronic disease, BPH, CAD, and h/o rhabdomyolysis who presents to the ED via EMS for fall, generalized weakness, and UTI.     #Sepsis concerning for urinary source  #abnormal UA  #Lactic acidosis  #BPH  #Acute Toxic metabolic encephalopathy (resolved)   Given patient reported history of multiple previous UTIs, generalized non-focal weakness, and dirty UA in the setting of a BPH dx, a UTI seemed the most likely dx upon discharge. Started on pip-tazo in ED. Lactate 2.5 in ED with some decreased mental status. Repeat lactate 1.3 after 500ml IV NS in ED. Mental status improved morning after admission. Possibly due to dehydration or infection (ruled out)  - Urine culture without significant growth: stopped pip-casie 5/11     #Rhabdomyolysis  CK elevated to 1952 on 5/11 (prior samples cancelled x2). Received 500ml IV NS in ED. In setting of fall with prolonged (4h) immobilization Dialysis not effective therapy and he makes urine so able to give IVF as needed.  - Additional IVF 5/11, repeat CK 5/12 900s  - continue to trend CPK off of IVF today    # Soft BP  He has no hx of hypertension. Since admisison, has been having BP reads sBP  without any symptoms.  - continue to monitor. Less likely sepsis.     #Generalized weakness  #Falls  Uses walker at baseline, very weak and unsteady with multiple previous falls. Acute on chronic progression likely 2/2 UTI. Other concerns include alcohol use, depression. Noted hypoglycemia today, thus r/o endocrine issues including hypothyroidism and significant AI.  - PT/OT rec TCU  - Falls risk  - Tylenol and topicals prn for pain from fall (shoulder, back, arm), do  not suspect fracture  - checking am cortisol and TSH     #ESRD on HD MWF  - Nephrology consulted, run on 5/11, then continue pta MWF schedule     #Chronic afib not on anticoagulation  Rate controlled, though not on any rate-controlling meds currently per patient.  - Not on any rate/rhythm controlling meds (prior med list includes diltiazem and atenolol, neither of which patient is taking)  - Discuss warfarin with patient and pharmacy, had previously been on dabigatran but can't use DOAC d/t ESRD, will wait on what he and his wife decide before initiating. Called wife 5/12, no answer so left message     #Alcohol dependence  4 drinks nightly on average, no h/o withdrawal or seizures. Fall may have been 2/2 intoxication as well.  - [facility] protocol  - Thiamine/folate supplementation     #DM2, last A1c 6.0% on 3/7/19  #hypoglycemia overnight  A1c 5.8% on admission.  - Hold pta lantus 10U at bedtime - had started at decreased dose of 5U 5/10 p.m. but BGs running low so held now. Last dose 5/10  - Low dose sliding scale insulin, qid BG checks, hypoglycemia protocol  - Prior med list includes metformin and glipizide, patient states not taking     #Depression  Sertraline and diazepam on prior med list but patient states not taking. Sertraline last filled 4/30/19 but he never started. Was worried about long side effect list, including death. Avoid BZDs given some confusion/slowed mentation on presentation. He feels that he is depressed because he is on dialysis and also very weak and unable to do activities that he used to enjoy.  - encouraging to start taking zoloft as depression maybe playing significant role here. Patient would like to get wife's input before starting.     #HTN  Normotensive but with transient hypotension overnight on HD1. Not on antihypertensive medications at this time. Monitor. IVF as above.     #Anemia of chronic disease  #Macrocytosis  Stable, 10-11s. Monitor.  - B12/folate wnl     #CAD  H/o MI per  chart review. Troponin 0.035 (not elevated) on admission and no CP.  - Will continue pta ASA 81 qday per patient report      Diet: Renal Diet (non-dialysis)    DVT Prophylaxis: Pneumatic Compression Devices  Brown Catheter: not present  Code Status: Full Code      Disposition Plan   Expected discharge: 2 - 3 days, recommended to transitional care unit once work up completed.  Entered: Emelyn Ayala MD 05/12/2019, 7:29 AM       The patient's care was discussed with the Bedside Nurse and Patient.    Emelyn Ayala MD  Hospitalist Service, [address], [place]  Pager: [phone]  Please see sticky note for cross cover information  ______________________________________________________________________    Interval History   Feeling OK, not much change, still feeling overall weak. Denied pain but having numbness in toes (unchanged)    Data reviewed today: I reviewed all medications, new labs and imaging results over the last 24 hours.    Physical Exam   Vital Signs: Temp: 95.9  F (35.5  C) Temp src: Oral BP: (!) 91/35 Pulse: 74 Heart Rate: 78 Resp: 18 SpO2: 100 % O2 Device: None (Room air)    Weight: 285 lbs 0 oz  General Appearance: Alert and oriented, flat affect  Respiratory: clear anteriorly bilaterally  Cardiovascular: regular rate, has irregularity  GI: soft  Skin: stasis dermatitis in lower legs, lower extremity strength 4/5 bilaterally.

## 2019-05-13 ENCOUNTER — APPOINTMENT (OUTPATIENT)
Dept: OCCUPATIONAL THERAPY | Facility: CLINIC | Age: 69
DRG: 871 | End: 2019-05-13
Payer: MEDICARE

## 2019-05-13 LAB
ANION GAP SERPL CALCULATED.3IONS-SCNC: 10 MMOL/L (ref 3–14)
BUN SERPL-MCNC: 37 MG/DL (ref 7–30)
CALCIUM SERPL-MCNC: 9.2 MG/DL (ref 8.5–10.1)
CHLORIDE SERPL-SCNC: 99 MMOL/L (ref 94–109)
CK SERPL-CCNC: 420 U/L (ref 30–300)
CO2 SERPL-SCNC: 25 MMOL/L (ref 20–32)
CORTIS SERPL-MCNC: 8.5 UG/DL (ref 4–22)
CREAT SERPL-MCNC: 3.29 MG/DL (ref 0.66–1.25)
GFR SERPL CREATININE-BSD FRML MDRD: 18 ML/MIN/{1.73_M2}
GLUCOSE BLDC GLUCOMTR-MCNC: 101 MG/DL (ref 70–99)
GLUCOSE BLDC GLUCOMTR-MCNC: 125 MG/DL (ref 70–99)
GLUCOSE BLDC GLUCOMTR-MCNC: 151 MG/DL (ref 70–99)
GLUCOSE BLDC GLUCOMTR-MCNC: 154 MG/DL (ref 70–99)
GLUCOSE BLDC GLUCOMTR-MCNC: 164 MG/DL (ref 70–99)
GLUCOSE SERPL-MCNC: 124 MG/DL (ref 70–99)
HBV SURFACE AB SERPL IA-ACNC: 197.29 M[IU]/ML
HBV SURFACE AG SERPL QL IA: NONREACTIVE
PLATELET # BLD AUTO: 173 10E9/L (ref 150–450)
POTASSIUM SERPL-SCNC: 4.1 MMOL/L (ref 3.4–5.3)
SODIUM SERPL-SCNC: 134 MMOL/L (ref 133–144)
TSH SERPL DL<=0.005 MIU/L-ACNC: 3.29 MU/L (ref 0.4–4)

## 2019-05-13 PROCEDURE — 25000128 H RX IP 250 OP 636: Performed by: INTERNAL MEDICINE

## 2019-05-13 PROCEDURE — 85049 AUTOMATED PLATELET COUNT: CPT | Performed by: HOSPITALIST

## 2019-05-13 PROCEDURE — 99232 SBSQ HOSP IP/OBS MODERATE 35: CPT | Mod: GC | Performed by: INTERNAL MEDICINE

## 2019-05-13 PROCEDURE — 25000125 ZZHC RX 250: Performed by: INTERNAL MEDICINE

## 2019-05-13 PROCEDURE — 90937 HEMODIALYSIS REPEATED EVAL: CPT

## 2019-05-13 PROCEDURE — 84443 ASSAY THYROID STIM HORMONE: CPT | Performed by: HOSPITALIST

## 2019-05-13 PROCEDURE — 97110 THERAPEUTIC EXERCISES: CPT | Mod: GO

## 2019-05-13 PROCEDURE — 12000001 ZZH R&B MED SURG/OB UMMC

## 2019-05-13 PROCEDURE — A9270 NON-COVERED ITEM OR SERVICE: HCPCS | Performed by: HOSPITALIST

## 2019-05-13 PROCEDURE — 97530 THERAPEUTIC ACTIVITIES: CPT | Mod: GO

## 2019-05-13 PROCEDURE — 00000146 ZZHCL STATISTIC GLUCOSE BY METER IP

## 2019-05-13 PROCEDURE — 25000128 H RX IP 250 OP 636: Performed by: HOSPITALIST

## 2019-05-13 PROCEDURE — 25000132 ZZH RX MED GY IP 250 OP 250 PS 637: Performed by: HOSPITALIST

## 2019-05-13 PROCEDURE — 36415 COLL VENOUS BLD VENIPUNCTURE: CPT | Performed by: HOSPITALIST

## 2019-05-13 PROCEDURE — 82533 TOTAL CORTISOL: CPT | Performed by: INTERNAL MEDICINE

## 2019-05-13 PROCEDURE — 80048 BASIC METABOLIC PNL TOTAL CA: CPT | Performed by: HOSPITALIST

## 2019-05-13 PROCEDURE — 82550 ASSAY OF CK (CPK): CPT | Performed by: HOSPITALIST

## 2019-05-13 RX ORDER — LIDOCAINE HYDROCHLORIDE 10 MG/ML
INJECTION, SOLUTION EPIDURAL; INFILTRATION; INTRACAUDAL; PERINEURAL
Status: DISCONTINUED
Start: 2019-05-13 | End: 2019-05-13 | Stop reason: HOSPADM

## 2019-05-13 RX ORDER — LANOLIN ALCOHOL/MO/W.PET/CERES
100 CREAM (GRAM) TOPICAL DAILY
Status: CANCELLED | DISCHARGE
Start: 2019-05-14

## 2019-05-13 RX ADMIN — INSULIN ASPART 1 UNITS: 100 INJECTION, SOLUTION INTRAVENOUS; SUBCUTANEOUS at 18:19

## 2019-05-13 RX ADMIN — Medication: at 09:32

## 2019-05-13 RX ADMIN — SODIUM CHLORIDE 300 ML: 9 INJECTION, SOLUTION INTRAVENOUS at 09:19

## 2019-05-13 RX ADMIN — ASPIRIN 81 MG: 81 TABLET, COATED ORAL at 07:49

## 2019-05-13 RX ADMIN — FOLIC ACID 1 MG: 1 TABLET ORAL at 07:49

## 2019-05-13 RX ADMIN — LIDOCAINE HYDROCHLORIDE 0.5 ML: 10 INJECTION, SOLUTION EPIDURAL; INFILTRATION; INTRACAUDAL; PERINEURAL at 09:31

## 2019-05-13 RX ADMIN — ACETAMINOPHEN 650 MG: 325 TABLET, FILM COATED ORAL at 17:16

## 2019-05-13 RX ADMIN — INSULIN ASPART 1 UNITS: 100 INJECTION, SOLUTION INTRAVENOUS; SUBCUTANEOUS at 14:39

## 2019-05-13 RX ADMIN — SODIUM CHLORIDE 250 ML: 9 INJECTION, SOLUTION INTRAVENOUS at 09:19

## 2019-05-13 RX ADMIN — HEPARIN SODIUM 5000 UNITS: 5000 INJECTION, SOLUTION INTRAVENOUS; SUBCUTANEOUS at 17:16

## 2019-05-13 RX ADMIN — Medication 100 MG: at 07:49

## 2019-05-13 RX ADMIN — HEPARIN SODIUM 5000 UNITS: 5000 INJECTION, SOLUTION INTRAVENOUS; SUBCUTANEOUS at 04:52

## 2019-05-13 ASSESSMENT — ACTIVITIES OF DAILY LIVING (ADL)
ADLS_ACUITY_SCORE: 22

## 2019-05-13 ASSESSMENT — MIFFLIN-ST. JEOR
SCORE: 2104.75
SCORE: 2115.73

## 2019-05-13 NOTE — PLAN OF CARE
Discharge Planner OT   Patient plan for discharge: rehab  Current status: Pt requires A for mobility and ADLs.   Barriers to return to prior living situation: need for assist, cognition, weakness, fall risk  Recommendations for discharge: Per plan established by the OT, the recommendation for dc location is TCU  Rationale for recommendations: ongoing skilled therapy for progression of IND with mobility and ADLS

## 2019-05-13 NOTE — PROGRESS NOTES
"Social Work Services Progress Note    Hospital Day: 4  Date of Initial Social Work Evaluation:  5/10/19  Collaborated with:  Primary team Xavi Ortiz, patient, pt's spouse Ailin (ph [phone])    Data:  Pt is a 68-year-old male admitted to [facility] 5/10/19. TCU is recommended at discharge. Pt is medically stable for discharge.     Intervention:  Pt's spouse requesting communication regarding TCU referrals. Spoke with pt's spouse via phone and she requests referral to [facility], and states she has \"her heart set on [facility]\" and is not open to considering other TCUs at this time. Met with pt. Pt agreeable with referral to [facility] facilities. Pt states he was at [facility] in the past and had to transfer out for dialysis and thus did not spend much time in the rehab facility. Pt requesting a TCU with dialysis on-site- explained that TCUs do not have dialysis on-site and pt will need to go to his regular dialysis center while in TCU. Pt seems confused regarding staying overnight in TCU and going to his regular outpatient dialysis center- explained multiple times. Pt to discuss plan further with his spouse.     Faxed referrals to:  1) [facility] (ph [phone])  2) [facility] (ph [phone])    Assessment:  Pursuing TCU placement    Plan:    Anticipated Disposition:  Facility:  TCU    Barriers to d/c plan:  Placement    Follow Up:  SW to follow for discharge planning    LANETTE Do, LGSW  5th Floor   Pager [phone]  Phone [phone]        "

## 2019-05-13 NOTE — PROGRESS NOTES
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  He is currently hospitalized with weakness and found to have a UTI and lactic acidosis.     1. End Stage Kidney Disease   Dialyzes at [facility] on MWF with Dr. Izaguirre. He has expressed interest to change dialysis providers or clinics.  We will do HD today for 3.5hrs, 1L removal, .5kg, access is L AVF.   - Next HD Wednesday    2. Volume status / Blood pressure   Normotensive, not overloaded on exam. Will aim UF for closer to EDW of 126 kg as tolerated     3. Acid base   No issues noted     4. Electrolytes:  No acute issues     5. Anemia  Patient has an adequate hemoglobin 10.9 and does not need MECCA at this time.     6. Macrocytosis   Elevated MCV and is notably not on B complex vitamin.    - B12 normal, folate needs to be redrawn.     7. CKD Bone mineral disease   -normal serum calcium, normal phos. Get PTH, vitamin d level.     Recommendations were communicated to primary team     Seen and discussed with Dr. Rehana Rose MD   [phone]    Interval History :   Nursing and provider notes from last 24 hours reviewed.  Fer Latham was seen and examined during dialysis. He had no overnight issues.      Review of Systems:   I reviewed the following systems:  GI: improved appetite. no nausea or vomiting or diarrhea.   Neuro:  no confusion  Constitutional:  no fever or chills  CV: no dyspnea or edema.  no chest pain.    Physical Exam:   I/O last 3 completed shifts:  In: 890 [P.O.:890]  Out: 1165 [Urine:165; Other:1000]   /77   Pulse 71   Temp 95.7  F (35.4  C) (Oral)   Resp 16   Ht 1.88 m (6' 2\")   Wt 126 kg (277 lb 12.5 oz)   SpO2 95%   BMI 35.66 kg/m       GENERAL APPEARANCE: NAD  EYES:  no scleral icterus, pupils " equal  HENT: mouth without ulcers or lesions  PULM: lungs clear to auscultation bilaterally, equal air movement, no clubbing  CV: regular rhythm, normal rate, no rub     -JVD not present     -trace LE edema    GI: soft, non tender, not distended, bowel sounds are normoactive  INTEGUMENT: no cyanosis, no rash  NEURO:  AAOx4, no asterixis   Access LUE AVF is cannulated    Labs:   All labs reviewed by me  Electrolytes/Renal -   Recent Labs   Lab Test 05/13/19  0551 05/12/19  0728 05/11/19  1253 05/11/19  0810    132*  --  131*   POTASSIUM 4.1 3.3* 3.1* 3.1*   CHLORIDE 99 97  --  97   CO2 25 26  --  28   BUN 37* 34*  --  22   CR 3.29* 3.46*  --  2.69*   * 94  --  117*   COLUMBA 9.2 9.0  --  7.9*   MAG  --  2.3 1.7 1.7   PHOS  --   --  2.6 2.3*     CBC -   Recent Labs   Lab Test 05/13/19  0551 05/12/19  0728 05/11/19  0813 05/10/19  0955   WBC  --  5.6 5.8 7.5   HGB  --  10.9* 10.6* 11.4*    169 168 153     LFTs -   Recent Labs   Lab Test 02/22/15  2151   ALKPHOS 68   BILITOTAL 0.5   ALT 18   AST 17   PROTTOTAL 8.2   ALBUMIN 3.4     Iron Panel - No lab results found.    Imaging:  All imaging studies reviewed by me.     Current Medications:    aspirin  81 mg Oral Daily     folic acid  1 mg Oral Daily     heparin  5,000 Units Subcutaneous Q12H     insulin aspart  1-7 Units Subcutaneous TID AC     insulin aspart  1-5 Units Subcutaneous At Bedtime     vitamin B1  100 mg Oral Daily       - MEDICATION INSTRUCTIONS -       Mellisa Rose MD     I was present with the fellow during the history and exam.  I discussed the case with the fellow and agree with the findings as documented in the assessment and plan.  Astrid Kimball

## 2019-05-13 NOTE — PLAN OF CARE
Time 2088-9155     Reason for admission: Admitted with generalized weakness and a fall.    Vitals: VSS on RA.   Activity: Assist of 2 with walker. Pt shifting weight in bed.   Pain: Tylenol given for lower back pain.  Neuro: A&Ox4. Forgetful. Withdrawn  Cardiac: Chronic afib; not on tele.   Respiratory: Denies SOB. Lung sounds diminished   GI/: Oliguric. Pt on hemodialysis. No BM.   Diet: Renal diet. Good appetite   Lines: PIV saline locked. Dialysis AV fistula WNL   Wounds: Bruising on skin  Labs/imaging: , 151, and 154.Creat 3.29. Platelet 173      New changes this shift: Pt had dialysis this AM. Referrals sent to TCU. Worked with OT; plan for PT tomorrow 5/14. Scoring 1 on CIWA. Pt expressed frustration with how weak he is and not being able to get better.      Plan: Waiting for TCU placement.       Continue to monitor and follow POC

## 2019-05-13 NOTE — PROGRESS NOTES
HEMODIALYSIS TREATMENT NOTE    Date: 5/13/2019  Time: 1:17 PM    Data:  Pre Wt:   127.1  Desired Wt: 126 kg   Post Wt:  126  Weight gain:0   kg   Weight change: -1  kg  Ultrafiltration - Post Run Net Total Removed (mL): 1000 mL  Ultrafiltration - Post Run Net Total Gain (mL): 0 mL  Vascular Access Status: Yes, secured and visible  Dialyzer Rinse: Heavy  Total Blood Volume Processed: 77.7  Total Dialysis (Treatment) Time:3.5      Lab:   No    Interventions/Assessment: pt tolerated dialysis with no complications. Vitals signs stable through out. Goal removed. Fistula sites held by Tech. Handoff given to floor RN.       Plan:    Will continue to monitor and follow POC

## 2019-05-13 NOTE — PLAN OF CARE
"1900-0730  Reason for admission:Weakness/fall  Vitals: VSS on RA.   Activity:  A2 + walker.  Pain:Negligible pain, cannot move well in bed.   Neuro:  A/Ox3, disoriented to date. Forgetful at times. CIWAS 1, 2  Cardiac: Stable, hx of a fib. favian/blotchy cool lower extremities.   Respiratory: Stable on RA   GI/: Oliguric, on HD, voids little amounts/time. Last BM 5/12-formed.   Diet: Renal/CHO counts.   Lines: RPIV SL.   Wounds: Hematoma on R hip, scattered bruising noted.   Labs/imaging:  K 3.3, CK total 903 Cr 3.46 Na 132      New changes this shift:  BGs stable at 164, 125. Pt able to turn in bed better this shift. Pt declined shower but requested to clean up and change clothes \"later today\".   Plan: Plan for HD today 5/13. PT/OT following, nic for dc to TCU.  Continue to monitor and follow POC.    "

## 2019-05-13 NOTE — DISCHARGE SUMMARY
--Discharge delayed on 5/16 d/t lack of dialysis bed, see daily progress note from 5/16 for additional information--    [facility], [place]  Discharge Summary - Medicine & Pediatrics       Date of Admission:  5/10/2019  Date of Discharge:  5/16/2019  Discharging Provider: Teo Ambrose  Discharge Service: Xavi Ortiz    Discharge Diagnoses    Sepsis, uncertain etiology  Abnormal urinalysis  Lactic acidosis  BPH  Acute Toxic metabolic encephalopathy  Rhabdomyolysis  Generalized weakness  Falls  ESRD on HD MWF  Chronic afib not on anticoagulation  DM2, last A1c 6.0% on 3/7/19  Hypoglycemia overnight 5/12  Depression  Anemia of chronic disease  Macrocytosis  CAD  Alcohol dependence    Follow-ups Needed After Discharge    PCP within 2 weeks to discuss anticoagulation (warfarin) and depression (sertraline) - both refused during hospitalization    Discharge Disposition   Discharged to short-term care facility  Condition at discharge: Fair    Hospital Course   Fer Latham is a 68 year old male with h/o ESRD on HD MWF chronic afib not on anticoagulation d/t prior refusal, DM2, depression, alcoholism, anemia of chronic disease, BPH, CAD, and h/o rhabdomyolysis who presents to the ED via EMS for fall, generalized weakness, and UTI.     #Sepsis, uncertain etiology  #Abnormal urinalysis  #Lactic acidosis  #BPH  #Acute Toxic metabolic encephalopathy, resolved  Given patient reported history of multiple previous UTIs, generalized non-focal weakness, and dirty UA in the setting of a BPH dx, a UTI seemed the most likely dx upon discharge. Started on pip-tazo in ED. Lactate 2.5 in ED with some decreased mental status. Repeat lactate 1.3 after 500ml IV NS in ED. CT head negative for acute changes. Mental status improved morning after admission. Possibly due to dehydration or infection (ruled out) +/- chronic etoh use/acute intoxication.  - Urine culture without significant growth: stopped pip-tazo 5/11  - improved without abx     #Rhabdomyolysis, resolved  CK elevated to 1952 on 5/11 (prior samples cancelled x2). Received 500ml IV NS in ED. In setting of fall with prolonged (4h) immobilization Dialysis not effective therapy and he makes urine so able to give IVF as needed. Additional IVF 5/11, repeat CK 5/12 900s -> 420 5/13 without IVF.     #Soft BPs  He has no h/o HTN. Since admission, has been having SBP , asymptomatic. TSH/a.m. cortisol wnl. Tolerating dialysis. Avoid polypharmacy.     #Generalized weakness  #Falls  Uses walker at baseline, very weak and unsteady with multiple previous falls. Acute on chronic progression. Other concerns include alcohol use, depression. Noted hypoglycemia 5/12, thus r/o endocrine issues including hypothyroidism and significant AI. TSH/cortisol wnl.  - PT/OT rec TCU  - Falls risk  - Tylenol and topicals prn for pain from fall (shoulder, back, arm)     #ESRD on HD MWF  Nephrology consulted during admission, no acute indications for HD, continued pta MWF schedule.     #Chronic afib not on anticoagulation, rate controlled  - Not on any rate/rhythm controlling meds (prior med list includes diltiazem and atenolol, neither of which patient is taking)  - Discussed warfarin with patient and wife, they are concerned for bleeding risk and are refusing at this time. Not a DOAC candidate d/t ESRD. Will need to f/u with PCP to continue risk/benefit conversation.     #Alcohol dependence  4 drinks nightly on average, no h/o withdrawal or seizures. Fall may have been 2/2 intoxication as well. Scored consistently low on CIWA protocol and discontinued 5/14 with no ssx withdrawal  - Thiamine/folate supplementation started     #DM2, last A1c 6.0% on 3/7/19  #Hypoglycemia overnight 5/12  A1c 5.8% on admission.  - Hold pta lantus 10U at bedtime - had started at decreased dose of 5U 5/10 p.m. but BGs running low so held during admission. Last dose 5/10.   - Prior med list includes metformin and  glipizide, patient states not taking     #Depression  Sertraline and diazepam on prior med list but patient states not taking. Sertraline last filled 4/30/19 but he never started. Was worried about long side effect list, including death. Avoiding BZDs given some confusion/slowed mentation on presentation. He feels that he is depressed because he is on dialysis and also very weak and unable to do activities that he used to enjoy.  - Encouraged to start taking sertraline as depression maybe playing significant role here. He and wife declined. Address at PCP f/u.     #Anemia of chronic disease  #Macrocytosis  Stable, 10-11s. B12/folate wnl.     #CAD  H/o MI per chart review. Troponin 0.035 (not elevated) on admission and no CP.  - Continued pta ASA 81 qday    Consultations This Hospital Stay   NEPHROLOGY ESRD ADULT IP CONSULT  PHYSICAL THERAPY ADULT IP CONSULT  OCCUPATIONAL THERAPY ADULT IP CONSULT  SOCIAL WORK IP CONSULT  PHARMACY IP CONSULT  MEDICATION HISTORY IP PHARMACY CONSULT  PHYSICAL THERAPY ADULT IP CONSULT  OCCUPATIONAL THERAPY ADULT IP CONSULT    Code Status   Full Code       The patient was discussed with MD Alyse Sarmiento[address]  Pager: [phone]  ______________________________________________________________________    Physical Exam   Vital Signs: Temp: 95.7  F (35.4  C) Temp src: Oral BP: 107/77 Pulse: 71 Heart Rate: 72 Resp: 16 SpO2: 95 % O2 Device: None (Room air)    Weight: 277 lbs 12.47 oz    Gen: Elderly male, lying flat in bed resting, appears comfortable, NAD  HEENT: NCAT  Pulm: CTAB without wheezes or crackles, normal WOB on RA  CV: Regular rate, irregularly irregular rhythm, S1/S2, no m/r/g  ABD: Soft, ntnd, normal BS  EXT: No LE edema, wwp  NEURO: AOx3, nonfocal  Psych: Appropriate, flat affect      Primary Care Physician   Isrrael Ornelas    Discharge Orders      General info for SNF    Length of Stay Estimate: Short Term  Care: Estimated # of Days <30  Condition at Discharge: Improving  Level of care:skilled   Rehabilitation Potential: Fair  Admission H&P remains valid and up-to-date: Yes  Recent Chemotherapy: N/A  Use Nursing Home Standing Orders: Yes     Mantoux instructions    Give two-step Mantoux (PPD) Per Facility Policy Yes     Reason for your hospital stay    Acute metabolic encephalopathy  Weakness, falls  Alcohol dependence  Chronic non-valvular atrial fibrillation  ESRD on HD MWF     Activity - Up with assistive device     General info for SNF    Length of Stay Estimate: Short Term Care: Estimated # of Days <30  Condition at Discharge: Stable  Level of care:skilled   Rehabilitation Potential: Fair  Admission H&P remains valid and up-to-date: Yes  Recent Chemotherapy: N/A  Use Nursing Home Standing Orders: Yes     Mantoux instructions    Give two-step Mantoux (PPD) Per Facility Policy Yes     Reason for your hospital stay    Toxic metabolic encephalopathy  Abnormal urinalysis  ESRD on HD MWF     Additional Discharge Instructions    PCP followup 2-4 weeks - discuss anticoagulation (warfarin) and depression (sertraline) - both refused during admission     Activity - Up with assistive device     Follow Up and recommended labs and tests    Follow up with Nursing home physician within 1 week for routine hospital follow-up.  No follow up labs or test are needed.     Full Code     Physical Therapy Adult Consult    Evaluate and treat as clinically indicated.    Reason:  Continue cares initiated in hospital     Occupational Therapy Adult Consult    Evaluate and treat as clinically indicated.    Reason:  Continue cares initiated in hospital     Physical Therapy Adult Consult    Evaluate and treat as clinically indicated.    Reason:  Falls, generalized weakness, FTT     Occupational Therapy Adult Consult    Evaluate and treat as clinically indicated.    Reason:  Falls, weakness, FTT     Fall precautions     Fall precautions      Advance Diet as Tolerated    Follow this diet upon discharge: Orders Placed This Encounter      Renal Diet (non-dialysis)     Advance Diet as Tolerated    Follow this diet upon discharge: Orders Placed This Encounter      Renal Diet (non-dialysis)       Significant Results and Procedures    BMP s/f: Na 131 -> 134, K 3.1 -> 4.1, Cr 2.69 (on HD), Mg 1.7 -> 2.3, Phos 2.3 -> 2.6  CBC s/f: Hgb 10-11s, WBC wnl, plts wnl    Lactate 2.5 -> 1.3  CK 1952 -> 420  Cortisol a.m. 8.5  TSH 3.29    UA s/f: 100 protein, mod blood, lg LE, neg nitrate, 182 WBCs, 13 RBCs  UCx negative  BCx x2 ngtd    Results for orders placed or performed during the hospital encounter of 05/10/19   XR Chest 2 Views    Narrative    Exam:  Chest X-ray 5/10/2019 10:37 AM    History: cough    Comparison: 2/22/2015    Findings: AP and lateral views of the chest. Persistent elevation of  the right hemidiaphragm, unchanged from 2/22/2015. Cardiac size within  normal limits. Pulmonary vasculature is distinct. No pleural effusion  or pneumothorax. No focal pulmonary opacities. No acute bony  abnormalities.      Impression    Impression: No acute cardiopulmonary abnormalities. Elevation of the  right hemidiaphragm, unchanged from 2/22/2015.    I have personally reviewed the examination and initial interpretation  and I agree with the findings.    JOSUÉ CANADA MD       Discharge Medications   Current Discharge Medication List      START taking these medications    Details   acetaminophen (TYLENOL) 325 MG tablet Take 1-2 tablets (325-650 mg) by mouth every 6 hours as needed for mild pain    Associated Diagnoses: Acute knee pain, unspecified laterality      folic acid (FOLVITE) 1 MG tablet Take 1 tablet (1 mg) by mouth daily    Associated Diagnoses: Alcohol abuse      vitamin B1 (THIAMINE) 100 MG tablet Take 1 tablet (100 mg) by mouth daily  Qty: 90 tablet, Refills: 0    Associated Diagnoses: Alcohol abuse         CONTINUE these medications which have NOT  CHANGED    Details   aspirin 81 MG EC tablet Take 81 mg by mouth daily      cholecalciferol 1000 units TABS Take 1 tablet by mouth daily         STOP taking these medications       diazepam (VALIUM) 5 MG tablet Comments:   Reason for Stopping:         insulin glargine (BASAGLAR KWIKPEN) 100 UNIT/ML pen Comments:   Reason for Stopping:         sertraline (ZOLOFT) 25 MG tablet Comments:   Reason for Stopping:             Allergies   Allergies   Allergen Reactions     Cephalexin      Ciprofloxacin      Diagnostic X-Ray Materials      Sulfa Drugs Hives

## 2019-05-13 NOTE — PROGRESS NOTES
[facility] Progress Note - Hospitalist Service, Xavi 4       Date of Admission:  5/10/2019  Assessment & Plan     Fer Latham is a 68 year old male with h/o ESRD on HD MWF chronic afib not on anticoagulation d/t prior refusal, DM2, depression, alcoholism, anemia of chronic disease, BPH, CAD, and h/o rhabdomyolysis who presents to the ED via EMS for fall, generalized weakness, and UTI.     #Sepsis, uncertain etiology  #Abnormal urinalysis  #Lactic acidosis  #BPH  #Acute Toxic metabolic encephalopathy (resolved)   Given patient reported history of multiple previous UTIs, generalized non-focal weakness, and dirty UA in the setting of a BPH dx, a UTI seemed the most likely dx upon discharge. Started on pip-tazo in ED. Lactate 2.5 in ED with some decreased mental status. Repeat lactate 1.3 after 500ml IV NS in ED. Mental status improved morning after admission. Possibly due to dehydration or infection (ruled out) +/- chronic etoh use/acute intoxication.  - Urine culture without significant growth: stopped pip-tazo 5/11     #Rhabdomyolysis  CK elevated to 1952 on 5/11 (prior samples cancelled x2). Received 500ml IV NS in ED. In setting of fall with prolonged (4h) immobilization Dialysis not effective therapy and he makes urine so able to give IVF as needed.  - Additional IVF 5/11, repeat CK 5/12 900s -> 420 5/13 without IVF    # Soft BP  He has no hx of hypertension. Since admisison, has been having BP reads sBP  without any symptoms.  - continue to monitor. Unlikely sepsis. TSH/a.m. cortisol wnl.     #Generalized weakness  #Falls  Uses walker at baseline, very weak and unsteady with multiple previous falls. Acute on chronic progression. Other concerns include alcohol use, depression. Noted hypoglycemia 5/12, thus r/o endocrine issues including hypothyroidism and significant AI. TSH/cortisol wnl.  - PT/OT rec TCU  - Falls risk  - Tylenol and topicals prn for  pain from fall (shoulder, back, arm), do not suspect fracture     #ESRD on HD MWF  - Nephrology consulted, run on 5/11, then continue pta MWF schedule     #Chronic afib not on anticoagulation  Rate controlled, though not on any rate-controlling meds currently per patient.  - Not on any rate/rhythm controlling meds (prior med list includes diltiazem and atenolol, neither of which patient is taking)  - Discussed warfarin with patient and wife, they are concerned for bleeding risk and are refusing at this time. Not a DOAC candidate d/t ESRD. Will need to f/u with PCP to continue risk/benefit conversation.     #Alcohol dependence  4 drinks nightly on average, no h/o withdrawal or seizures. Fall may have been 2/2 intoxication as well.  - CIWA protocol  - Thiamine/folate supplementation started     #DM2, last A1c 6.0% on 3/7/19  #Hypoglycemia overnight 5/12  A1c 5.8% on admission.  - Hold pta lantus 10U at bedtime - had started at decreased dose of 5U 5/10 p.m. but BGs running low so held now. Last dose 5/10  - Low dose sliding scale insulin, qid BG checks, hypoglycemia protocol  - Prior med list includes metformin and glipizide, patient states not taking     #Depression  Sertraline and diazepam on prior med list but patient states not taking. Sertraline last filled 4/30/19 but he never started. Was worried about long side effect list, including death. Avoid BZDs given some confusion/slowed mentation on presentation. He feels that he is depressed because he is on dialysis and also very weak and unable to do activities that he used to enjoy.  - Encouraging to start taking zoloft as depression maybe playing significant role here. Patient would like to get wife's input before starting. Will need to address at f/u.     #Anemia of chronic disease  #Macrocytosis  Stable, 10-11s. Monitor.  - B12/folate wnl     #CAD  H/o MI per chart review. Troponin 0.035 (not elevated) on admission and no CP.  - Will continue pta ASA 81  qday      Diet: Renal Diet (non-dialysis)    DVT Prophylaxis: Pneumatic Compression Devices  Brown Catheter: not present  Code Status: Full Code      Disposition Plan   Expected discharge: ready today pending bed availability, recommended to transitional care unit once safe disposition plan/ TCU bed available.  Entered: Teo Ambrose MD 05/13/2019, 11:04 AM       The patient's care was discussed with the Attending Physician, Dr. Ayala, Bedside Nurse, Care Coordinator/ and Patient.    Teo Ambrose MD  [address], [place]  Pager: [phone]  Please see sticky note for cross cover information  ______________________________________________________________________    Interval History   Feels a bit better today, happy for TCU. No F/C, CP, SOB. Still weak.    Data reviewed today: I reviewed all medications, new labs and imaging results over the last 24 hours.    Physical Exam   Vital Signs: Temp: 97.7  F (36.5  C) Temp src: Oral BP: 91/58 Pulse: 71 Heart Rate: 63 Resp: 11 SpO2: 98 % O2 Device: None (Room air)    Weight: 280 lbs 3.2 oz    Gen: Elderly male, sitting up in bed, appears comfortable, NAD  HEENT: NCAT, anicteric  Pulm: CTAB without wheezes or crackles, normal WOB on RA  CV: Regular rate, irregularly irregular rhythm, S1/S2, no m/r/g  ABD: Soft, ntnd, normal BS  EXT: No LE edema, wwp  Skin: No new lesions  NEURO: AOx3, nonfocal  Psych: Appropriate, flat affect    Last Comprehensive Metabolic Panel:  Sodium   Date Value Ref Range Status   05/13/2019 134 133 - 144 mmol/L Final     Potassium   Date Value Ref Range Status   05/13/2019 4.1 3.4 - 5.3 mmol/L Final     Chloride   Date Value Ref Range Status   05/13/2019 99 94 - 109 mmol/L Final     Carbon Dioxide   Date Value Ref Range Status   05/13/2019 25 20 - 32 mmol/L Final     Anion Gap   Date Value Ref Range Status   05/13/2019 10 3 - 14 mmol/L Final     Glucose   Date Value Ref Range Status   05/13/2019 124 (H)  70 - 99 mg/dL Final     Urea Nitrogen   Date Value Ref Range Status   05/13/2019 37 (H) 7 - 30 mg/dL Final     Creatinine   Date Value Ref Range Status   05/13/2019 3.29 (H) 0.66 - 1.25 mg/dL Final     GFR Estimate   Date Value Ref Range Status   05/13/2019 18 (L) >60 mL/min/[1.73_m2] Final     Comment:     Non  GFR Calc  Starting 12/18/2018, serum creatinine based estimated GFR (eGFR) will be   calculated using the Chronic Kidney Disease Epidemiology Collaboration   (CKD-EPI) equation.       Calcium   Date Value Ref Range Status   05/13/2019 9.2 8.5 - 10.1 mg/dL Final     Bilirubin Total   Date Value Ref Range Status   02/22/2015 0.5 0.2 - 1.3 mg/dL Final     Alkaline Phosphatase   Date Value Ref Range Status   02/22/2015 68 40 - 150 U/L Final     ALT   Date Value Ref Range Status   02/22/2015 18 0 - 70 U/L Final     AST   Date Value Ref Range Status   02/22/2015 17 0 - 45 U/L Final     No new imaging

## 2019-05-14 ENCOUNTER — APPOINTMENT (OUTPATIENT)
Dept: OCCUPATIONAL THERAPY | Facility: CLINIC | Age: 69
DRG: 871 | End: 2019-05-14
Payer: MEDICARE

## 2019-05-14 LAB
GLUCOSE BLDC GLUCOMTR-MCNC: 105 MG/DL (ref 70–99)
GLUCOSE BLDC GLUCOMTR-MCNC: 142 MG/DL (ref 70–99)
GLUCOSE BLDC GLUCOMTR-MCNC: 146 MG/DL (ref 70–99)
GLUCOSE BLDC GLUCOMTR-MCNC: 152 MG/DL (ref 70–99)
GLUCOSE BLDC GLUCOMTR-MCNC: 175 MG/DL (ref 70–99)
PTH-INTACT SERPL-MCNC: 86 PG/ML (ref 18–80)

## 2019-05-14 PROCEDURE — 82306 VITAMIN D 25 HYDROXY: CPT | Performed by: HOSPITALIST

## 2019-05-14 PROCEDURE — 97110 THERAPEUTIC EXERCISES: CPT | Mod: GO

## 2019-05-14 PROCEDURE — 40000556 ZZH STATISTIC PERIPHERAL IV START W US GUIDANCE

## 2019-05-14 PROCEDURE — 25800030 ZZH RX IP 258 OP 636: Performed by: STUDENT IN AN ORGANIZED HEALTH CARE EDUCATION/TRAINING PROGRAM

## 2019-05-14 PROCEDURE — 83970 ASSAY OF PARATHORMONE: CPT | Performed by: HOSPITALIST

## 2019-05-14 PROCEDURE — 25000132 ZZH RX MED GY IP 250 OP 250 PS 637: Performed by: HOSPITALIST

## 2019-05-14 PROCEDURE — 36415 COLL VENOUS BLD VENIPUNCTURE: CPT | Performed by: HOSPITALIST

## 2019-05-14 PROCEDURE — 00000146 ZZHCL STATISTIC GLUCOSE BY METER IP

## 2019-05-14 PROCEDURE — 97535 SELF CARE MNGMENT TRAINING: CPT | Mod: GO

## 2019-05-14 PROCEDURE — 25000128 H RX IP 250 OP 636: Performed by: HOSPITALIST

## 2019-05-14 PROCEDURE — A9270 NON-COVERED ITEM OR SERVICE: HCPCS | Performed by: HOSPITALIST

## 2019-05-14 PROCEDURE — 12000001 ZZH R&B MED SURG/OB UMMC

## 2019-05-14 PROCEDURE — 99232 SBSQ HOSP IP/OBS MODERATE 35: CPT | Mod: GC | Performed by: INTERNAL MEDICINE

## 2019-05-14 RX ORDER — SERTRALINE HYDROCHLORIDE 25 MG/1
25 TABLET, FILM COATED ORAL DAILY
Status: DISCONTINUED | OUTPATIENT
Start: 2019-05-14 | End: 2019-05-14

## 2019-05-14 RX ORDER — POLYETHYLENE GLYCOL 3350 17 G/17G
17 POWDER, FOR SOLUTION ORAL ONCE
Status: COMPLETED | OUTPATIENT
Start: 2019-05-14 | End: 2019-05-14

## 2019-05-14 RX ORDER — LANOLIN ALCOHOL/MO/W.PET/CERES
100 CREAM (GRAM) TOPICAL DAILY
Status: DISCONTINUED | OUTPATIENT
Start: 2019-05-14 | End: 2019-05-18 | Stop reason: HOSPADM

## 2019-05-14 RX ADMIN — Medication 100 MG: at 20:22

## 2019-05-14 RX ADMIN — Medication 100 MG: at 08:43

## 2019-05-14 RX ADMIN — INSULIN ASPART 1 UNITS: 100 INJECTION, SOLUTION INTRAVENOUS; SUBCUTANEOUS at 18:09

## 2019-05-14 RX ADMIN — SERTRALINE HYDROCHLORIDE 25 MG: 25 TABLET ORAL at 08:43

## 2019-05-14 RX ADMIN — ASPIRIN 81 MG: 81 TABLET, COATED ORAL at 08:43

## 2019-05-14 RX ADMIN — SODIUM CHLORIDE, POTASSIUM CHLORIDE, SODIUM LACTATE AND CALCIUM CHLORIDE 125 ML: 600; 310; 30; 20 INJECTION, SOLUTION INTRAVENOUS at 03:07

## 2019-05-14 RX ADMIN — SENNOSIDES AND DOCUSATE SODIUM 2 TABLET: 8.6; 5 TABLET ORAL at 17:28

## 2019-05-14 RX ADMIN — HEPARIN SODIUM 5000 UNITS: 5000 INJECTION, SOLUTION INTRAVENOUS; SUBCUTANEOUS at 17:28

## 2019-05-14 RX ADMIN — INSULIN ASPART 1 UNITS: 100 INJECTION, SOLUTION INTRAVENOUS; SUBCUTANEOUS at 13:11

## 2019-05-14 RX ADMIN — SENNOSIDES AND DOCUSATE SODIUM 1 TABLET: 8.6; 5 TABLET ORAL at 12:58

## 2019-05-14 RX ADMIN — FOLIC ACID 1 MG: 1 TABLET ORAL at 08:43

## 2019-05-14 RX ADMIN — POLYETHYLENE GLYCOL 3350 17 G: 17 POWDER, FOR SOLUTION ORAL at 17:28

## 2019-05-14 ASSESSMENT — ACTIVITIES OF DAILY LIVING (ADL)
ADLS_ACUITY_SCORE: 20
ADLS_ACUITY_SCORE: 20
ADLS_ACUITY_SCORE: 22

## 2019-05-14 NOTE — PLAN OF CARE
"5A PT - Cancel. Pt refused therapy (in an agitated manner) stating that he had some paperwork to complete. Pt stated: \"not now.\" PT's schedule does not permit return in later PM. Will schedule forward.  "

## 2019-05-14 NOTE — PROGRESS NOTES
"Social Work Services Progress Note     Hospital Day: 5  Date of Initial Social Work Evaluation:  5/10/19  Collaborated with:  Primary team Xavi Ortiz, patient, pt's spouse Ailin (ph [phone])     Data:  Pt is a 68-year-old male admitted to [facility] 5/10/19. TCU is recommended at discharge. Pt is medically stable for discharge.      Intervention:  Pt declined at [facility] facilities. Met with pt and provided update. Pt disappointed he is declined from the [facility] facilities and distressed that one facility declined him d/t alcohol use. He states the medical team has not expressed that his alcohol use is a concern. Pt does not feel his alcohol use is an issue and is not currently interested in substance use treatment. Pt states his priority is to \"get back on his feet.\" Provided pt a list of TCU facilities near his home. Pt spent time looking at list and states he cannot make a decision regarding facility options. Pt requests SW return tomorrow so that he can discuss options with his spouse.     Received call from Kinga at [facility] stating that they are in process of transferring pt to [facility] for dialysis d/t pt request. Kinga states [facility] is waiting on a chest x-ray to set up pt at their location. Spoke with Tresa in admissions at [facility] (ph [phone] ext [phone]) who requests facesheet, H&P, and chest x-ray. Faxed information to [facility] with pt's consent.     Faxed referrals to:  1) [facility] [place] (ph [phone])- declined, no beds available   2) [facility] [place] (ph [phone])- declined d/t documentation of alcohol use     Assessment:  Pursuing TCU placement     Plan:    Anticipated Disposition:  Facility:  TCU    Barriers to d/c plan:  Placement    Follow Up:  SW to follow for discharge planning     LANETTE Do, AVELINOSW  5th Floor   Pager [phone]  Phone [phone]      "

## 2019-05-14 NOTE — PLAN OF CARE
VSS. Alert and Oriented . Denies any pain, nausea or emesis. Complained of constipation. Last BM 5/12. Given Senna per MD ord. Enc patient to ordered prune juice and increased fluid intake and act level. Refused to get up. BG'S 105,153. SEE sliding scale insulin ord. PT/OT per scheduled. TCU placement. Cont plan of care.

## 2019-05-14 NOTE — PLAN OF CARE
Pt A&O. VSS on RA except for soft BPs. Refused AM vitals.  125 mL bolus given. Pt gets irritated with cares. Up w A2 and walker. Denies pain. Scored 0 on CIWA. L fistula. No BM. Oliguric. R PIV-SL. BGs 164 and 142. Waiting for placement.

## 2019-05-14 NOTE — PROGRESS NOTES
[facility], [facility] Progress Note - Hospitalist Service, Xavi Ortiz       Date of Admission:  5/10/2019  Assessment & Plan     Fer Latham is a 68 year old male with h/o ESRD on HD MWF chronic afib not on anticoagulation d/t prior refusal, DM2, depression, alcoholism, anemia of chronic disease, BPH, CAD, and h/o rhabdomyolysis who presents to the ED via EMS for fall, generalized weakness, and UTI.     #Sepsis, uncertain etiology  #Abnormal urinalysis  #Lactic acidosis  #BPH  #Acute Toxic metabolic encephalopathy (resolved)   Given patient reported history of multiple previous UTIs, generalized non-focal weakness, and dirty UA in the setting of a BPH dx, a UTI seemed the most likely dx upon discharge. Started on pip-tazo in ED. Lactate 2.5 in ED with some decreased mental status. Repeat lactate 1.3 after 500ml IV NS in ED. Mental status improved morning after admission. Possibly due to dehydration or infection (ruled out) +/- chronic etoh use/acute intoxication.  - Urine culture without significant growth: stopped pip-tazo 5/11     #Rhabdomyolysis  CK elevated to 1952 on 5/11 (prior samples cancelled x2). Received 500ml IV NS in ED. In setting of fall with prolonged (4h) immobilization Dialysis not effective therapy and he makes urine so able to give IVF as needed.  - Additional IVF 5/11, repeat CK 5/12 900s -> 420 5/13 without IVF    # Soft BP  He has no hx of hypertension. Since admission, has been having BP reads sBP  without any symptoms.  - Continue to monitor. Unlikely sepsis. TSH/a.m. cortisol wnl.     #Generalized weakness  #Falls  Uses walker at baseline, very weak and unsteady with multiple previous falls. Acute on chronic progression. Other concerns include alcohol use, depression. Noted hypoglycemia 5/12, thus r/o endocrine issues including hypothyroidism and significant AI. TSH/cortisol wnl.  - PT/OT rec TCU  - Falls risk  - Tylenol and topicals prn for  pain from fall (shoulder, back, arm), do not suspect fracture     #ESRD on HD MWF  - Nephrology consulted, continue pta MWF schedule     #Chronic afib not on anticoagulation  Rate controlled, though not on any rate-controlling meds currently per patient.  - Not on any rate/rhythm controlling meds (prior med list includes diltiazem and atenolol, neither of which patient is taking)  - Discussed warfarin with patient and wife, they are concerned for bleeding risk and are refusing at this time. Not a DOAC candidate d/t ESRD. Will need to f/u with PCP to continue risk/benefit conversation.     #Alcohol dependence  4 drinks nightly on average, no h/o withdrawal or seizures. Fall may have been 2/2 intoxication as well.  - Discontinue CIWA protocol 5/14, no ssx withdrawal  - Thiamine/folate supplementation started     #DM2, last A1c 6.0% on 3/7/19  #Hypoglycemia overnight 5/12  A1c 5.8% on admission.  - Hold pta lantus 10U at bedtime - had started at decreased dose of 5U 5/10 p.m. but BGs running low so held now. Last dose 5/10  - Low dose sliding scale insulin, qid BG checks, hypoglycemia protocol  - Prior med list includes metformin and glipizide, patient states not taking     #Depression  Sertraline and diazepam on prior med list but patient states not taking. Sertraline last filled 4/30/19 but he never started. Was worried about long side effect list, including death. Avoid BZDs given some confusion/slowed mentation on presentation. He feels that he is depressed because he is on dialysis and also very weak and unable to do activities that he used to enjoy.  - Encouraging to start taking zoloft as depression maybe playing significant role here. Patient would like to get wife's input before starting. Will need to address at f/u.     #Anemia of chronic disease  #Macrocytosis  Stable, 10-11s. Monitor.  - B12/folate wnl     #CAD  H/o MI per chart review. Troponin 0.035 (not elevated) on admission and no CP.  - Will continue  pta ASA 81 qday      Diet: Renal Diet (non-dialysis)    DVT Prophylaxis: Pneumatic Compression Devices  Brown Catheter: not present  Code Status: Full Code      Disposition Plan   Expected discharge: ready today pending bed availability, recommended to transitional care unit once safe disposition plan/ TCU bed available.  Entered: Teo Ambrose MD 05/14/2019, 11:29 AM       The patient's care was discussed with the Attending Physician, Dr. Cameron, Bedside Nurse, Care Coordinator/ and Patient.    Teo Ambrose MD  [address]  Pager: [phone]  Please see sticky note for cross cover information  ______________________________________________________________________    Interval History   No acute events overnight, didn't sleep well, woken up by construction and staff. Awaiting discharge to TCU. Denies new complaints. No F/C, CP, SOB, abdominal pain, N/V/D.    Data reviewed today: I reviewed all medications, new labs and imaging results over the last 24 hours.    Physical Exam   Vital Signs: Temp: 96.3  F (35.7  C) Temp src: Oral BP: 93/51 Pulse: 70 Heart Rate: 70 Resp: 18 SpO2: 99 % O2 Device: None (Room air)    Weight: 277 lbs 12.47 oz    Gen: Elderly male, sitting up in bed, appears comfortable, NAD  HEENT: NCAT, anicteric  Pulm: CTAB without wheezes or crackles, normal WOB on RA  CV: Regular rate, irregularly irregular rhythm, S1/S2, no m/r/g  ABD: Soft, ntnd, normal BS  EXT: No LE edema, wwp  Skin: No new lesions  NEURO: AOx3, nonfocal  Psych: Appropriate, flat affect    Last Comprehensive Metabolic Panel:  Sodium   Date Value Ref Range Status   05/13/2019 134 133 - 144 mmol/L Final     Potassium   Date Value Ref Range Status   05/13/2019 4.1 3.4 - 5.3 mmol/L Final     Chloride   Date Value Ref Range Status   05/13/2019 99 94 - 109 mmol/L Final     Carbon Dioxide   Date Value Ref Range Status   05/13/2019 25 20 - 32 mmol/L Final     Anion Gap   Date Value  Ref Range Status   05/13/2019 10 3 - 14 mmol/L Final     Glucose   Date Value Ref Range Status   05/13/2019 124 (H) 70 - 99 mg/dL Final     Urea Nitrogen   Date Value Ref Range Status   05/13/2019 37 (H) 7 - 30 mg/dL Final     Creatinine   Date Value Ref Range Status   05/13/2019 3.29 (H) 0.66 - 1.25 mg/dL Final     GFR Estimate   Date Value Ref Range Status   05/13/2019 18 (L) >60 mL/min/[1.73_m2] Final     Comment:     Non  GFR Calc  Starting 12/18/2018, serum creatinine based estimated GFR (eGFR) will be   calculated using the Chronic Kidney Disease Epidemiology Collaboration   (CKD-EPI) equation.       Calcium   Date Value Ref Range Status   05/13/2019 9.2 8.5 - 10.1 mg/dL Final     Bilirubin Total   Date Value Ref Range Status   02/22/2015 0.5 0.2 - 1.3 mg/dL Final     Alkaline Phosphatase   Date Value Ref Range Status   02/22/2015 68 40 - 150 U/L Final     ALT   Date Value Ref Range Status   02/22/2015 18 0 - 70 U/L Final     AST   Date Value Ref Range Status   02/22/2015 17 0 - 45 U/L Final     No new imaging

## 2019-05-14 NOTE — PLAN OF CARE
OT 5A:  Discharge Planner OT   Patient plan for discharge: not stated this date  Current status: Pt declining sit<>stand this am for OOB.  Pt tolerates UB exercises well.  Pt expressing desire to shower but declines during session.  Barriers to return to prior living situation: medical status, weakness, cognition, unclear baseline, level of A for ADL/mobility  Recommendations for discharge: TCU  Rationale for recommendations: continued skilled therapy to maximize safety and I with ADL       Entered by: Elzbieta Duran 05/14/2019 4:07 PM

## 2019-05-15 ENCOUNTER — APPOINTMENT (OUTPATIENT)
Dept: PHYSICAL THERAPY | Facility: CLINIC | Age: 69
DRG: 871 | End: 2019-05-15
Payer: MEDICARE

## 2019-05-15 LAB
ANION GAP SERPL CALCULATED.3IONS-SCNC: 9 MMOL/L (ref 3–14)
BUN SERPL-MCNC: 38 MG/DL (ref 7–30)
CALCIUM SERPL-MCNC: 9 MG/DL (ref 8.5–10.1)
CHLORIDE SERPL-SCNC: 97 MMOL/L (ref 94–109)
CO2 SERPL-SCNC: 25 MMOL/L (ref 20–32)
CREAT SERPL-MCNC: 2.98 MG/DL (ref 0.66–1.25)
DEPRECATED CALCIDIOL+CALCIFEROL SERPL-MC: 66 UG/L (ref 20–75)
ERYTHROCYTE [DISTWIDTH] IN BLOOD BY AUTOMATED COUNT: 13.6 % (ref 10–15)
GFR SERPL CREATININE-BSD FRML MDRD: 21 ML/MIN/{1.73_M2}
GLUCOSE BLDC GLUCOMTR-MCNC: 105 MG/DL (ref 70–99)
GLUCOSE BLDC GLUCOMTR-MCNC: 132 MG/DL (ref 70–99)
GLUCOSE BLDC GLUCOMTR-MCNC: 135 MG/DL (ref 70–99)
GLUCOSE BLDC GLUCOMTR-MCNC: 194 MG/DL (ref 70–99)
GLUCOSE SERPL-MCNC: 192 MG/DL (ref 70–99)
HCT VFR BLD AUTO: 32 % (ref 40–53)
HGB BLD-MCNC: 10.9 G/DL (ref 13.3–17.7)
MCH RBC QN AUTO: 34.8 PG (ref 26.5–33)
MCHC RBC AUTO-ENTMCNC: 34.1 G/DL (ref 31.5–36.5)
MCV RBC AUTO: 102 FL (ref 78–100)
PLATELET # BLD AUTO: 172 10E9/L (ref 150–450)
POTASSIUM SERPL-SCNC: 3.8 MMOL/L (ref 3.4–5.3)
RBC # BLD AUTO: 3.13 10E12/L (ref 4.4–5.9)
SODIUM SERPL-SCNC: 130 MMOL/L (ref 133–144)
WBC # BLD AUTO: 5.1 10E9/L (ref 4–11)

## 2019-05-15 PROCEDURE — 00000146 ZZHCL STATISTIC GLUCOSE BY METER IP

## 2019-05-15 PROCEDURE — 25000132 ZZH RX MED GY IP 250 OP 250 PS 637: Performed by: HOSPITALIST

## 2019-05-15 PROCEDURE — 12000001 ZZH R&B MED SURG/OB UMMC

## 2019-05-15 PROCEDURE — 97530 THERAPEUTIC ACTIVITIES: CPT | Mod: GP

## 2019-05-15 PROCEDURE — 25000128 H RX IP 250 OP 636: Performed by: HOSPITALIST

## 2019-05-15 PROCEDURE — 25000128 H RX IP 250 OP 636: Performed by: STUDENT IN AN ORGANIZED HEALTH CARE EDUCATION/TRAINING PROGRAM

## 2019-05-15 PROCEDURE — 97110 THERAPEUTIC EXERCISES: CPT | Mod: GP

## 2019-05-15 PROCEDURE — 25000125 ZZHC RX 250: Performed by: STUDENT IN AN ORGANIZED HEALTH CARE EDUCATION/TRAINING PROGRAM

## 2019-05-15 PROCEDURE — 99232 SBSQ HOSP IP/OBS MODERATE 35: CPT | Mod: GC | Performed by: INTERNAL MEDICINE

## 2019-05-15 PROCEDURE — 80048 BASIC METABOLIC PNL TOTAL CA: CPT | Performed by: STUDENT IN AN ORGANIZED HEALTH CARE EDUCATION/TRAINING PROGRAM

## 2019-05-15 PROCEDURE — 97116 GAIT TRAINING THERAPY: CPT | Mod: GP

## 2019-05-15 PROCEDURE — 25000125 ZZHC RX 250

## 2019-05-15 PROCEDURE — 90937 HEMODIALYSIS REPEATED EVAL: CPT

## 2019-05-15 PROCEDURE — A9270 NON-COVERED ITEM OR SERVICE: HCPCS | Performed by: HOSPITALIST

## 2019-05-15 PROCEDURE — 85027 COMPLETE CBC AUTOMATED: CPT | Performed by: STUDENT IN AN ORGANIZED HEALTH CARE EDUCATION/TRAINING PROGRAM

## 2019-05-15 RX ORDER — LIDOCAINE HYDROCHLORIDE 10 MG/ML
INJECTION, SOLUTION EPIDURAL; INFILTRATION; INTRACAUDAL; PERINEURAL
Status: COMPLETED
Start: 2019-05-15 | End: 2019-05-15

## 2019-05-15 RX ADMIN — LIDOCAINE HYDROCHLORIDE 0.5 ML: 10 INJECTION, SOLUTION EPIDURAL; INFILTRATION; INTRACAUDAL; PERINEURAL at 11:45

## 2019-05-15 RX ADMIN — Medication 100 MG: at 07:48

## 2019-05-15 RX ADMIN — ASPIRIN 81 MG: 81 TABLET, COATED ORAL at 07:48

## 2019-05-15 RX ADMIN — SODIUM CHLORIDE 250 ML: 9 INJECTION, SOLUTION INTRAVENOUS at 11:45

## 2019-05-15 RX ADMIN — Medication: at 11:48

## 2019-05-15 RX ADMIN — FOLIC ACID 1 MG: 1 TABLET ORAL at 07:48

## 2019-05-15 RX ADMIN — HEPARIN SODIUM 5000 UNITS: 5000 INJECTION, SOLUTION INTRAVENOUS; SUBCUTANEOUS at 17:29

## 2019-05-15 RX ADMIN — SODIUM CHLORIDE 300 ML: 9 INJECTION, SOLUTION INTRAVENOUS at 11:45

## 2019-05-15 ASSESSMENT — ACTIVITIES OF DAILY LIVING (ADL)
ADLS_ACUITY_SCORE: 20

## 2019-05-15 ASSESSMENT — MIFFLIN-ST. JEOR
SCORE: 2121.17
SCORE: 2105.75
SCORE: 2102.75

## 2019-05-15 NOTE — PLAN OF CARE
BP 86/40, 87/40  on the right arm and pt complained of feeling dizziness with act level, informed Dr. Cameron and Dr. Ambrose.Will rechecked again later . Cont plan of care.

## 2019-05-15 NOTE — PLAN OF CARE
BP soft. MD aware. Pt is alert and oriented. Denies any pain or nausea. Up in chair with assist of one to transfer using walker. Bg 105. Report given to Christopher  Dialysis RN. Informed pt that he will be going to dialysis soon.Cont plan of care.

## 2019-05-15 NOTE — PROGRESS NOTES
[facility], [facility] Progress Note - Hospitalist Service, Xavi 4       Date of Admission:  5/10/2019  Assessment & Plan     Fer Latham is a 68 year old male with h/o ESRD on HD MWF chronic afib not on anticoagulation d/t prior refusal, DM2, depression, alcoholism, anemia of chronic disease, BPH, CAD, and h/o rhabdomyolysis who presents to the ED via EMS for fall, generalized weakness, and UTI.     #Sepsis, uncertain etiology  #Abnormal urinalysis  #Lactic acidosis  #BPH  #Acute Toxic metabolic encephalopathy, resolved  Given patient reported history of multiple previous UTIs, generalized non-focal weakness, and dirty UA in the setting of a BPH dx, a UTI seemed the most likely dx upon discharge. Started on pip-tazo in ED. Lactate 2.5 in ED with some decreased mental status. Repeat lactate 1.3 after 500ml IV NS in ED. Mental status improved morning after admission. Possibly due to dehydration or infection (ruled out) +/- chronic etoh use/acute intoxication.  - Urine culture without significant growth: stopped pip-tazo 5/11 - has improved without abx     #Rhabdomyolysis, resolved  CK elevated to 1952 on 5/11 (prior samples cancelled x2). Received 500ml IV NS in ED. In setting of fall with prolonged (4h) immobilization Dialysis not effective therapy and he makes urine so able to give IVF as needed.  - Additional IVF 5/11, repeat CK 5/12 900s -> 420 5/13 without IVF    # Soft BPs  He has no hx of hypertension. Since admission, has been having BP reads sBP  without any symptoms.  - Continue to monitor. Unlikely sepsis. TSH/a.m. cortisol wnl.      #Generalized weakness  #Falls  Uses walker at baseline, very weak and unsteady with multiple previous falls. Acute on chronic progression. Other concerns include alcohol use, depression. Noted hypoglycemia 5/12, thus r/o endocrine issues including hypothyroidism and significant AI. TSH/cortisol wnl.  - PT/OT rec TCU  - Falls  risk  - Tylenol and topicals prn for pain from fall (shoulder, back, arm), do not suspect fracture     #ESRD on HD MWF  - Nephrology consulted, continue pta MWF schedule     #Chronic afib not on anticoagulation  Rate controlled, though not on any rate-controlling meds currently per patient.  - Not on any rate/rhythm controlling meds (prior med list includes diltiazem and atenolol, neither of which patient is taking)  - Discussed warfarin with patient and wife, they are concerned for bleeding risk and are refusing at this time. Not a DOAC candidate d/t ESRD. Will need to f/u with PCP to continue risk/benefit conversation.     #Alcohol dependence  4 drinks nightly on average, no h/o withdrawal or seizures. Fall may have been 2/2 intoxication as well.  - Discontinue CIWA protocol 5/14, no ssx withdrawal  - Thiamine/folate supplementation started     #DM2, last A1c 6.0% on 3/7/19  #Hypoglycemia overnight 5/12  A1c 5.8% on admission.  - Hold pta lantus 10U at bedtime - had started at decreased dose of 5U 5/10 p.m. but BGs running low so held now. Last dose 5/10  - Low dose sliding scale insulin, qid BG checks, hypoglycemia protocol  - Prior med list includes metformin and glipizide, patient states not taking     #Depression  Sertraline and diazepam on prior med list but patient states not taking. Sertraline last filled 4/30/19 but he never started. Was worried about long side effect list, including death. Avoid BZDs given some confusion/slowed mentation on presentation. He feels that he is depressed because he is on dialysis and also very weak and unable to do activities that he used to enjoy.  - Encouraged to start taking zoloft as depression maybe playing significant role here. Patient would like to get wife's input before starting. Will need to address at f/u.     #Anemia of chronic disease  #Macrocytosis  Stable, 10-11s. Monitor.  - B12/folate wnl     #CAD  H/o MI per chart review. Troponin 0.035 (not elevated) on  admission and no CP.  - Will continue pta ASA 81 qday      Diet: Renal Diet (non-dialysis)    DVT Prophylaxis: Pneumatic Compression Devices  Brown Catheter: not present  Code Status: Full Code      Disposition Plan   Expected discharge: ready today pending bed availability, recommended to transitional care unit once safe disposition plan/ TCU bed available.  Entered: Teo Ambrose MD 05/15/2019, 11:50 AM       The patient's care was discussed with the Attending Physician, Dr. Cameron, Bedside Nurse, Care Coordinator/ and Patient.    Teo Ambrose MD  [address], [place]  Pager: [phone]  Please see sticky note for cross cover information  ______________________________________________________________________    Interval History   No acute events overnight. Feeling a bit better this morning. Some soft BPs this morning when working with PT (SBP 80s), felt tired. Improved and asx when sitting in [place]. Otherwise feeling well, and better than admission. No F/C, CP, SOB, abdominal pain, N/V, dysuria.    Data reviewed today: I reviewed all medications, new labs and imaging results over the last 24 hours.    Physical Exam   Vital Signs: Temp: 97.6  F (36.4  C) Temp src: Oral BP: 115/78 Pulse: 87 Heart Rate: 64 Resp: 16 SpO2: 98 % O2 Device: None (Room air)    Weight: 277 lbs 5.42 oz    Gen: Elderly male, sitting up in bedside chair, appears comfortable, NAD  HEENT: NCAT, anicteric  Pulm: CTAB without wheezes or crackles, normal WOB on RA  CV: Regular rate, irregularly irregular rhythm, S1/S2, no m/r/g  ABD: Soft, ntnd, normal BS  EXT: No LE edema, wwp  Skin: No new lesions  NEURO: AOx3, nonfocal  Psych: Appropriate, more participative today with more affect    Last Comprehensive Metabolic Panel:  Sodium   Date Value Ref Range Status   05/13/2019 134 133 - 144 mmol/L Final     Potassium   Date Value Ref Range Status   05/13/2019 4.1 3.4 - 5.3 mmol/L Final     Chloride    Date Value Ref Range Status   05/13/2019 99 94 - 109 mmol/L Final     Carbon Dioxide   Date Value Ref Range Status   05/13/2019 25 20 - 32 mmol/L Final     Anion Gap   Date Value Ref Range Status   05/13/2019 10 3 - 14 mmol/L Final     Glucose   Date Value Ref Range Status   05/13/2019 124 (H) 70 - 99 mg/dL Final     Urea Nitrogen   Date Value Ref Range Status   05/13/2019 37 (H) 7 - 30 mg/dL Final     Creatinine   Date Value Ref Range Status   05/13/2019 3.29 (H) 0.66 - 1.25 mg/dL Final     GFR Estimate   Date Value Ref Range Status   05/13/2019 18 (L) >60 mL/min/[1.73_m2] Final     Comment:     Non  GFR Calc  Starting 12/18/2018, serum creatinine based estimated GFR (eGFR) will be   calculated using the Chronic Kidney Disease Epidemiology Collaboration   (CKD-EPI) equation.       Calcium   Date Value Ref Range Status   05/13/2019 9.2 8.5 - 10.1 mg/dL Final     Bilirubin Total   Date Value Ref Range Status   02/22/2015 0.5 0.2 - 1.3 mg/dL Final     Alkaline Phosphatase   Date Value Ref Range Status   02/22/2015 68 40 - 150 U/L Final     ALT   Date Value Ref Range Status   02/22/2015 18 0 - 70 U/L Final     AST   Date Value Ref Range Status   02/22/2015 17 0 - 45 U/L Final     No new imaging

## 2019-05-15 NOTE — PROGRESS NOTES
HEMODIALYSIS TREATMENT NOTE    Date: 5/15/2019  Time: 3:47 PM    Data:  Pre Wt:   125.8 kg  Desired Wt: 125 kg   Post Wt:    Weight gain:      Weight change:     Ultrafiltration - Post Run Net Total Removed (mL): 200 mL  Ultrafiltration - Post Run Net Total Gain (mL): 0 mL  Vascular Access Status: Yes, secured and visible  Dialyzer Rinse: Light, Streaked  Total Blood Volume Processed: 72.1 L  Total Dialysis (Treatment) Time:   3.5 hours    Lab:   Yes - BMP & CBC w/platelets    Assessment/Interventions:  3.5 hours of HD via left forearm AVF on K3Ca2.25 bath. 400 BFR. Orders to keep SBP>100. UF goal set for 0.8 L net in attempt to reach desired post-run weight of 125 kg but reduced when SBP dropped into low 90s. In Afib intermittently throughout. Patient remained asymptomatic with all other VSS on room air. 0.2 L total removed. Denied any pain. Slept for most of run. Hemostasis achieved in 5 minutes post-treatment. See MAR for medication details. Report given to primary RN on 5A.     Plan:    Further HD per renal team.

## 2019-05-15 NOTE — PROGRESS NOTES
Social Work Services Progress Note     Hospital Day: 6  Date of Initial Social Work Evaluation:  5/10/19  Collaborated with:  Primary team Xavi Ortiz, TCU facilities     Data:  Pt is a 68-year-old male admitted to [facility] 5/10/19. TCU is recommended at discharge. Pt is medically stable for discharge.      Intervention:  Pt spoke with spouse and requests referrals to  TCU as 1st choice as well as [facility] and [facility].      Faxed referrals to:  1) [facility] (ph [phone])- declined, no beds available   2) [facility] (ph [phone])- declined d/t documentation of alcohol use  [facility] Roselia states pt could go to  TCU if shows that he will participate in nursing assessments and therapies  4) [facility] (ph [phone])  5) [facility] (ph [phone])     Assessment:  Pursuing TCU placement     Plan:    Anticipated Disposition:  Facility:  TCU    Barriers to d/c plan:  Placement    Follow Up:  SW to follow for discharge planning     LANETTE Do, SW  5th Floor   Pager [phone]  Phone [phone]    Addendum 11:52am: Pt declined at  TCU d/t refusing some nursing assessments and therapy sessions. Pt accepted at [facility]- no bed available today but can accept pt tomorrow. Left VM for [facility]. Updated pt. Updated pt's spouse per pt request. Pt's spouse unsure if she prefers [facility] or [facility]. Discussed pt's dialysis transportation and pt's spouse states she sets up pt's [employer] rides to/from dialysis and can continue to do so while in TCU ([facility] requests pt or spouse set up rides). Pt's spouse hoping pt can transfer to [facility], explained that this will likely not be set up and confirmed prior to discharge to pt will need to return to [facility].    Addendum 3:23pm: Received call from [facility] who would like to do on-site assessment before deciding if they will  accept pt. Discussed with pt's spouse and she would like pt to d/c to [facility]. Planning for discharge tomorrow. Updated pt, pt agreeable with plan.

## 2019-05-15 NOTE — PLAN OF CARE
Pt A&O. VSS on RA. Refused full assessment. Denies pain. L arm fistula. R PIV-SL. No BM. Plan for dialysis today. Waiting for TCU placement.

## 2019-05-15 NOTE — PLAN OF CARE
Pt. A/Ox4, VSS on RA.  Denies pain/discomfort.  Pt calling appropriately for assistance. 1 Large BM this evening.  Pt oliguric and on HD.  Pt mood labile, but cooperative.  Thiamine iniated this evening.  Pt tolerated PO intake of medication. PIV currently SL. PT to work w/ pt, and possible TCU placement in the future. Nursing will continue to monitor and follow POC.

## 2019-05-15 NOTE — PLAN OF CARE
PT 5A: Up with Ax1 using FWW and gait belt.     Discharge Planner PT   Patient plan for discharge: Rehab  Current status: Engaged pt in bed mobility at min-mod A, sit <> stand transfers at [facility] to min A pending surface height + FWW, gait with FWW for ~15ft x 2, and LE strengthening exercises. Pt limited most today by hypotension and dizziness.   Barriers to return to prior living situation: medical status  Recommendations for discharge: TCU  Rationale for recommendations: Pt is below his baseline for mobility. Pt reporting his baseline is gait household and some community distances with FWW. He reports generalized weakness since hospitalization a few months ago. He is motivated to participate and regain his strength and IND. Pt is limited by impairments to balance, strength, and functional endurance and would benefit from rehab to return to community at [facility].        Entered by: Adeola Georges 05/15/2019 10:12 AM

## 2019-05-16 ENCOUNTER — DOCUMENTATION ONLY (OUTPATIENT)
Dept: TRANSPLANT | Facility: CLINIC | Age: 69
End: 2019-05-16

## 2019-05-16 LAB
BACTERIA SPEC CULT: NO GROWTH
BACTERIA SPEC CULT: NO GROWTH
GLUCOSE BLDC GLUCOMTR-MCNC: 107 MG/DL (ref 70–99)
GLUCOSE BLDC GLUCOMTR-MCNC: 109 MG/DL (ref 70–99)
GLUCOSE BLDC GLUCOMTR-MCNC: 136 MG/DL (ref 70–99)
GLUCOSE BLDC GLUCOMTR-MCNC: 151 MG/DL (ref 70–99)
GLUCOSE BLDC GLUCOMTR-MCNC: 177 MG/DL (ref 70–99)
Lab: NORMAL
Lab: NORMAL
PLATELET # BLD AUTO: 169 10E9/L (ref 150–450)
SPECIMEN SOURCE: NORMAL
SPECIMEN SOURCE: NORMAL

## 2019-05-16 PROCEDURE — 25000128 H RX IP 250 OP 636: Performed by: HOSPITALIST

## 2019-05-16 PROCEDURE — 25000132 ZZH RX MED GY IP 250 OP 250 PS 637: Performed by: HOSPITALIST

## 2019-05-16 PROCEDURE — 99233 SBSQ HOSP IP/OBS HIGH 50: CPT | Mod: GC | Performed by: INTERNAL MEDICINE

## 2019-05-16 PROCEDURE — A9270 NON-COVERED ITEM OR SERVICE: HCPCS | Performed by: HOSPITALIST

## 2019-05-16 PROCEDURE — 85049 AUTOMATED PLATELET COUNT: CPT | Performed by: HOSPITALIST

## 2019-05-16 PROCEDURE — 36415 COLL VENOUS BLD VENIPUNCTURE: CPT | Performed by: HOSPITALIST

## 2019-05-16 PROCEDURE — 99222 1ST HOSP IP/OBS MODERATE 55: CPT | Performed by: PSYCHIATRY & NEUROLOGY

## 2019-05-16 PROCEDURE — 25000131 ZZH RX MED GY IP 250 OP 636 PS 637: Performed by: HOSPITALIST

## 2019-05-16 PROCEDURE — 12000001 ZZH R&B MED SURG/OB UMMC

## 2019-05-16 PROCEDURE — 00000146 ZZHCL STATISTIC GLUCOSE BY METER IP

## 2019-05-16 RX ORDER — FOLIC ACID 1 MG/1
1 TABLET ORAL DAILY
DISCHARGE
Start: 2019-05-16 | End: 2019-05-17

## 2019-05-16 RX ORDER — LANOLIN ALCOHOL/MO/W.PET/CERES
100 CREAM (GRAM) TOPICAL DAILY
Qty: 90 TABLET | Refills: 0 | DISCHARGE
Start: 2019-05-16 | End: 2019-05-17

## 2019-05-16 RX ORDER — ACETAMINOPHEN 325 MG/1
325-650 TABLET ORAL EVERY 6 HOURS PRN
DISCHARGE
Start: 2019-05-16 | End: 2019-05-17

## 2019-05-16 RX ADMIN — FOLIC ACID 1 MG: 1 TABLET ORAL at 09:01

## 2019-05-16 RX ADMIN — HEPARIN SODIUM 5000 UNITS: 5000 INJECTION, SOLUTION INTRAVENOUS; SUBCUTANEOUS at 18:47

## 2019-05-16 RX ADMIN — ASPIRIN 81 MG: 81 TABLET, COATED ORAL at 09:01

## 2019-05-16 RX ADMIN — HEPARIN SODIUM 5000 UNITS: 5000 INJECTION, SOLUTION INTRAVENOUS; SUBCUTANEOUS at 05:14

## 2019-05-16 RX ADMIN — INSULIN ASPART 1 UNITS: 100 INJECTION, SOLUTION INTRAVENOUS; SUBCUTANEOUS at 18:46

## 2019-05-16 RX ADMIN — Medication 100 MG: at 09:01

## 2019-05-16 ASSESSMENT — ACTIVITIES OF DAILY LIVING (ADL)
ADLS_ACUITY_SCORE: 20

## 2019-05-16 NOTE — PROGRESS NOTES
Closed pre kidney transplant referral due to patient request for this. Generated letter - routed to .

## 2019-05-16 NOTE — PROGRESS NOTES
[facility], [facility] Progress Note - Hospitalist Service, Xavi 4       Date of Admission:  5/10/2019  Assessment & Plan     Fer Latham is a 68 year old male with h/o ESRD on HD MWF chronic afib not on anticoagulation d/t prior refusal, DM2, depression, alcoholism, anemia of chronic disease, BPH, CAD, and h/o rhabdomyolysis who presents to the ED via EMS for fall, generalized weakness, and UTI.     #Sepsis, uncertain etiology  #Abnormal urinalysis  #Lactic acidosis  #BPH  #Acute Toxic metabolic encephalopathy, resolved  Given patient reported history of multiple previous UTIs, generalized non-focal weakness, and dirty UA in the setting of a BPH dx, a UTI seemed the most likely dx upon discharge. Started on pip-tazo in ED. Lactate 2.5 in ED with some decreased mental status. Repeat lactate 1.3 after 500ml IV NS in ED. CT head negative for acute changes. Mental status improved morning after admission. Possibly due to dehydration or infection (ruled out) +/- chronic etoh use/acute intoxication.  - Urine culture without significant growth: stopped pip-tazo 5/11 - improved without abx     #Rhabdomyolysis, resolved  CK elevated to 1952 on 5/11 (prior samples cancelled x2). Received 500ml IV NS in ED. In setting of fall with prolonged (4h) immobilization Dialysis not effective therapy and he makes urine so able to give IVF as needed. Additional IVF 5/11, repeat CK 5/12 900s -> 420 5/13 without IVF.     #Soft BPs  He has no h/o HTN. Since admission, has been having SBP , asymptomatic. TSH/a.m. cortisol wnl. Tolerating dialysis. Avoid polypharmacy.     #Generalized weakness  #Falls  Uses walker at baseline, very weak and unsteady with multiple previous falls. Acute on chronic progression. Other concerns include alcohol use, depression. Noted hypoglycemia 5/12, thus r/o endocrine issues including hypothyroidism and significant AI. TSH/cortisol wnl.  - PT/OT rec TCU  - Falls  risk  - Tylenol and topicals prn for pain from fall (shoulder, back, arm)     #ESRD on HD MWF  Nephrology consulted during admission, no acute indications for HD, continued pta MWF schedule.  - Awaiting new outpatient dialysis center, likely [facility], no new patients until 5/21 for TThS schedule, will dialyze here next likely 5/18 in anticipation of schedule change     #Chronic afib not on anticoagulation, rate controlled  - Not on any rate/rhythm controlling meds (prior med list includes diltiazem and atenolol, neither of which patient is taking)  - Discussed warfarin with patient and wife, they are concerned for bleeding risk and are refusing at this time. Not a DOAC candidate d/t ESRD. Will need to f/u with PCP to continue risk/benefit conversation.     #Alcohol dependence  4 drinks nightly on average, no h/o withdrawal or seizures. Fall may have been 2/2 intoxication as well. Scored consistently low on CIWA protocol and discontinued 5/14 with no ssx withdrawal  - Thiamine/folate supplementation started     #DM2, last A1c 6.0% on 3/7/19  #Hypoglycemia overnight 5/12  A1c 5.8% on admission.  - Hold pta lantus 10U at bedtime - had started at decreased dose of 5U 5/10 p.m. but BGs running low so held during admission. Last dose 5/10.   - Prior med list includes metformin and glipizide, patient states not taking     #Depression  Sertraline and diazepam on prior med list but patient states not taking. Sertraline last filled 4/30/19 but he never started. Was worried about long side effect list, including death. Avoiding BZDs given some confusion/slowed mentation on presentation. He feels that he is depressed because he is on dialysis and also very weak and unable to do activities that he used to enjoy.  - Encouraged to start taking sertraline as depression maybe playing significant role here. He and wife declined. Address at PCP f/u.     #Anemia of chronic disease  #Macrocytosis  Stable, 10-11s. B12/folate  wnl.     #CAD  H/o MI per chart review. Troponin 0.035 (not elevated) on admission and no CP.  - Continued pta ASA 81 qday      Diet: Renal Diet (non-dialysis)  Advance Diet as Tolerated  Advance Diet as Tolerated    DVT Prophylaxis: Pneumatic Compression Devices  Brown Catheter: not present  Code Status: Full Code      Disposition Plan   Expected discharge: 4 - 7 days, recommended to transitional care unit once safe disposition plan/ TCU bed available - need to arrange outpatient dialysis center as he is unable to dialyze at prior place. May get HD on 5/18 then discharge with plans for new [facility] center dialysis to start on or around 5/21.  Entered: Teo Ambrose MD 05/16/2019, 1:11 PM       The patient's care was discussed with the Attending Physician, Dr. Cameron, Bedside Nurse, Care Coordinator/ and Patient.    Teo Ambrose MD  [address], [place]  Pager: [phone]  Please see sticky note for cross cover information  ______________________________________________________________________    Interval History   No acute events overnight. Feeling a bit better this morning. Some soft BPs this morning when working with PT (SBP 80s), felt tired. Improved and asx when sitting in [place]. Otherwise feeling well, and better than admission. No F/C, CP, SOB, abdominal pain, N/V, dysuria.    Data reviewed today: I reviewed all medications, new labs and imaging results over the last 24 hours.    Physical Exam   Vital Signs: Temp: 97.5  F (36.4  C) Temp src: Oral BP: 97/62   Heart Rate: 77 Resp: 18 SpO2: 100 % O2 Device: None (Room air)    Weight: 277 lbs 5.42 oz    Gen: Elderly male, sitting up in bedside chair, appears comfortable, NAD  HEENT: NCAT, anicteric  Pulm: CTAB without wheezes or crackles, normal WOB on RA  CV: Regular rate, irregularly irregular rhythm, S1/S2, no m/r/g  ABD: Soft, ntnd, normal BS  EXT: No LE edema, wwp  Skin: No new lesions  NEURO: AOx3,  nonfocal  Psych: Appropriate, more participative today with more affect    Last Comprehensive Metabolic Panel:  Sodium   Date Value Ref Range Status   05/15/2019 130 (L) 133 - 144 mmol/L Final     Potassium   Date Value Ref Range Status   05/15/2019 3.8 3.4 - 5.3 mmol/L Final     Chloride   Date Value Ref Range Status   05/15/2019 97 94 - 109 mmol/L Final     Carbon Dioxide   Date Value Ref Range Status   05/15/2019 25 20 - 32 mmol/L Final     Anion Gap   Date Value Ref Range Status   05/15/2019 9 3 - 14 mmol/L Final     Glucose   Date Value Ref Range Status   05/15/2019 192 (H) 70 - 99 mg/dL Final     Urea Nitrogen   Date Value Ref Range Status   05/15/2019 38 (H) 7 - 30 mg/dL Final     Creatinine   Date Value Ref Range Status   05/15/2019 2.98 (H) 0.66 - 1.25 mg/dL Final     GFR Estimate   Date Value Ref Range Status   05/15/2019 21 (L) >60 mL/min/[1.73_m2] Final     Comment:     Non  GFR Calc  Starting 12/18/2018, serum creatinine based estimated GFR (eGFR) will be   calculated using the Chronic Kidney Disease Epidemiology Collaboration   (CKD-EPI) equation.       Calcium   Date Value Ref Range Status   05/15/2019 9.0 8.5 - 10.1 mg/dL Final     Bilirubin Total   Date Value Ref Range Status   02/22/2015 0.5 0.2 - 1.3 mg/dL Final     Alkaline Phosphatase   Date Value Ref Range Status   02/22/2015 68 40 - 150 U/L Final     ALT   Date Value Ref Range Status   02/22/2015 18 0 - 70 U/L Final     AST   Date Value Ref Range Status   02/22/2015 17 0 - 45 U/L Final     No new imaging

## 2019-05-16 NOTE — PLAN OF CARE
"Time 2049-0568     Reason for admission: Generalized muscle weakness, UTI    Vitals: VSS on RA, except soft BPs    BP 97/62 (BP Location: Right arm)   Pulse 87   Temp 97.5  F (36.4  C) (Oral)   Resp 18   Ht 1.88 m (6' 2\")   Wt 125.8 kg (277 lb 5.4 oz)   SpO2 100%   BMI 35.61 kg/m      Activity: A1 + walker  Pain: Denies  Neuro: A&Ox4  Cardiac: WDL  Respiratory: WDL on RA  GI/: Voiding spontaneously with urinal. No BM on shift. Denies nausea.  Diet: Renal diet, tolerating well  Lines: PIV, fistula  New this shift: Pt rested most of shift. Pt eager to discharge and discouraged that it is likely not to happen today. Pt withdrawn and states that he is sad; primary noted; psych consult ordered.    Plan: Discharge to TCU when outpt dialysis set up.      Continue to monitor and follow POC  "

## 2019-05-16 NOTE — PROGRESS NOTES
Social Work Services Progress Note     Hospital Day: 7  Date of Initial Social Work Evaluation:  5/10/19  Collaborated with:  Primary team Xavi Ortiz, pt, pt's spouse, [facility], [facility], [facility]     Data:  Pt is a 68-year-old male admitted to [facility] 5/10/19. TCU is recommended at discharge. Pt is medically stable for discharge.      Intervention: Pt was scheduled to discharge today at 3pm to [facility]. Pt's spouse displeased with discharge plan and not in agreement with pt returning to [facility] for dialysis at discharge. Cancelled discharge for today as no safe dialysis plan in plan. Working with [facility] to transfer pt to a [facility] center.     Updated [facility] admissions- they will need to assess bed availability when pt ready for discharge.     Faxed referrals to:  1) [facility] (ph [phone])- declined, no beds available   2) [facility] (ph [phone])- declined d/t documentation of alcohol use  3) [facility]- rehab liaison Roselia states pt could go to  TCU if shows that he will participate in nursing assessments and therapies  4) [facility] (ph [phone])- would need to do on-site assessment  5) [facility] (ph [phone])- accepted pending bed availability     Assessment:  Pursuing dialysis center transfer     Plan:    Anticipated Disposition:  Facility:  TCU    Barriers to d/c plan:  Placement- dialysis center issues    Follow Up:  SW to follow for discharge planning     LANETTE Do, LGSW  5th Floor   Pager [phone]  Phone [phone]    Addendum 3:53pm: Spoke with Tresa with [facility] admissions (ph [phone], ext [phone]), who states that the medical director at [facility] has declined pt. Tresa unsure of reason why and advised SW to call the center directly. Called [facility] (ph [phone]) and was directed to a different number (ph [phone]) and did not ever reach a party who could answer question. Tresa now  pursuing placement at [facility] on a T/Th/Sat schedule. Updated pt.

## 2019-05-16 NOTE — CONSULTS
"Consult Date:  05/16/2019      PSYCHIATRY CONSULTATION      REQUESTING SOURCE:  Dr. Marc Lr.      IDENTIFYING DATA:  This is a 68-year-old man seen today for psychiatric consultation regarding treatment for depression.      HISTORY OF PRESENT ILLNESS:  Mr. Latham has a history of end-stage renal disease on hemodialysis, along with chronic atrial fibrillation, type 2 diabetes, hypertension, and a number of other medical conditions. He presented with a fall, and was unable to get up for almost 4 hours.  He identifies that he was quite healthy up until about a year ago when he collapsed and ended up admitted to [facility] for 3-4 months.  He feels that since that with loss of his health, his life has really fallen apart.  He feels essentially worthless.  Associated with this, he does have a depressed mood, some loss of interest in usual activities, intermittent sleep difficulties, feelings of hopelessness at times, but he is not suicidal and has no passive death wish.  He has some worry about his health, but no panic attacks.  He does not have any significant mood cycling.  No manic symptoms and no history of delirium symptoms        PAST PSYCHIATRIC AND CHEMICAL USE HISTORY:  He has been prescribed Zoloft but had elected not to take it because his wife said after reading patient information, she was concerned that, \"it would kill me.\"  He does not actually have a problem with taking the medication.  No psychotherapy but he is open to this.        In chart, as mentioned, he is a heavy drinker and he had 4 cocktails the night before admission.  However, during this admission, he did not score significantly on the CIWA protocol.  When asked about alcohol use, he stated, \"Why does everybody asked me about that?\"  Admitted to drinking on a regular basis but would not otherwise discuss it.  Apparently, he has not been in chemical dependency treatment.  He quit smoking cigarettes many years.  No use of " cannabis, etc.       PAST MEDICAL HISTORY AND SURGICAL HISTORY:  Reviewed and are summarized in admission note from Dr. Ambrose for 05/10.        ALLERGIES:  CEPHALEXIN, CIPRO AND SULFA DRUGS.      REVIEW OF SYSTEMS:  A 10-point review of systems  today is negative except for some feelings of generalized malaise and weakness        CURRENT PSYCHIATRIC MEDICATIONS:  None.      FAMILY HISTORY:  He said was positive for some depression and substance use.      SOCIAL HISTORY:  He is a native of [place], I believe.  He has been in [place] for a number of years.  He has worked as a  and his wife was working for [employer].  They have been  for over 45 years.  They have a grown child but no grandchildren.  They live in [place].  He says he oes collects books and has some other hobbies, but he feels pretty worthless and inactive at this point due to his health problems.      MENTAL STATUS EXAMINATION:  Sitting up in his bedside chair, has a long finn and his hair is a bit unkempt.  He is pleasant, cooperative during my interview, but clearly has a restricted affect.  Moves both his upper extremities without difficulties.  Speech is fluent but soft.  Thought process directed.  Associations tight.  Denies delusions or hallucinations.  His mood is slightly depressed.  He is oriented to place, day of the week but not certain of the date.  Recent and remote memory has been attention span and concentration.  Slightly impaired.  Use of language, fund of knowledge grossly within normal limits.  Insight and judgment fair.      VITAL SIGNS:  Blood pressure is 97/59, pulse 88, temperature 97.2.      DIAGNOSES:  Depression secondary to a general medical condition.      ASSESSMENT:  I think he would benefit from some antidepressants but unfortunately his wife is has weighed in against him taking them.  She also complicated his discharge by making some threats to his dialysis center So at this point, his  disposition is a bit uncertain.  He personally is open to taking antidepressants, but he would like to discuss it further with his wife.  He has had a lot of losses related to his health, so I think he would benefit from some psychotherapy.      RECOMMENDATIONS:     1.  If he agrees to it, I would start him on Zoloft, either 25-50 mg with the aim to increase to 100 mg as tolerated at minimum.   2.  He is to speak with his primary care provider through [facility] about being set up for some psychotherapy.   3.  Reconsult Psychiatry as needed.         NATALIE CASTANEDA MD             D: 2019   T: 2019   MT: ROMARIO      Name:     MARIA E REESE   MRN:      [id number]        Account:       [id number]   :      1950           Consult Date:  2019      Document: [id number]

## 2019-05-16 NOTE — PLAN OF CARE
Patient alert ans oriented. Vitals stable on room air except for soft BP's 90/47. Agitated at times. Patient denies, no nausea or vomiting.. BG's 135 and 107.  Slept between cares.

## 2019-05-16 NOTE — PROGRESS NOTES
Nephrology Progress Note  05/16/2019         Assessment & Recommendations:   Fer Latham is a 68 year old year old male  with ESKD secondary to IgA nephropathy that additionally has multiple medical problems and on dialysis since a sepsis episode last year.  He is currently hospitalized with weakness and found to have a UTI and lactic acidosis.     1. End Stage Kidney Disease   Dialyzes at [facility] on MWF with Dr. Izaguirre. He has expressed interest to change dialysis providers or clinics.    - We will do HD MWF for 3.5hrs, .5kg, access is L AVF.   - Next HD Friday  - Plan to discharge to [facility], working on [place] location  - Unsafe discharge to [facility], see social work note for details.    2. Volume status / Blood pressure   Normotensive, not overloaded on exam. Will aim UF for closer to EDW of 125.5 kg as tolerated     3. Acid base   No issues noted     4. Electrolytes:  No acute issues     5. Anemia  Patient has an adequate hemoglobin 10.9 and does not need MECCA at this time.     6. Macrocytosis   Elevated MCV and is notably not on B complex vitamin.    - B12 normal, folate needs to be redrawn.     7. CKD Bone mineral disease   -normal serum calcium, normal phos. PTH 86, vitamin d 66     Recommendations were communicated to primary team     Seen and discussed with Dr. Rehana Rose MD   [phone]    Interval History :   Nursing and provider notes from last 24 hours reviewed.    Fer Latham was seen and examined this moring. He had no overnight issues.  Discharge cancelled due to unsafe conditions. Will plan for [facility] outpatient dialysis    Review of Systems:   I reviewed the following systems:  GI: improved appetite. no nausea or vomiting or diarrhea.   Neuro:  no confusion  Constitutional:  no fever or chills  CV: no dyspnea or edema.  no chest pain.    Physical Exam:   I/O last 3 completed shifts:  In: 240 [P.O.:240]  Out: 275 [Urine:75; Other:200]   BP 92/55 (BP  "Location: Right arm)   Pulse 87   Temp 97.2  F (36.2  C) (Oral)   Resp 18   Ht 1.88 m (6' 2\")   Wt 125.8 kg (277 lb 5.4 oz)   SpO2 98%   BMI 35.61 kg/m       GENERAL APPEARANCE: NAD  EYES:  no scleral icterus, pupils equal  HENT: mouth without ulcers or lesions  PULM: lungs clear to auscultation bilaterally, equal air movement, no clubbing  CV: regular rhythm, normal rate, no rub     -JVD not present     -trace LE edema    GI: soft, non tender, not distended, bowel sounds are normoactive  INTEGUMENT: no cyanosis, no rash  NEURO:  AAOx4, no asterixis   Access LUE AVF is cannulated    Labs:   All labs reviewed by me  Electrolytes/Renal -   Recent Labs   Lab Test 05/15/19  1143 05/13/19  0551 05/12/19  0728 05/11/19  1253 05/11/19  0810   * 134 132*  --  131*   POTASSIUM 3.8 4.1 3.3* 3.1* 3.1*   CHLORIDE 97 99 97  --  97   CO2 25 25 26  --  28   BUN 38* 37* 34*  --  22   CR 2.98* 3.29* 3.46*  --  2.69*   * 124* 94  --  117*   COLUMBA 9.0 9.2 9.0  --  7.9*   MAG  --   --  2.3 1.7 1.7   PHOS  --   --   --  2.6 2.3*     CBC -   Recent Labs   Lab Test 05/16/19  0611 05/15/19  1113 05/13/19  0551 05/12/19  0728 05/11/19  0813   WBC  --  5.1  --  5.6 5.8   HGB  --  10.9*  --  10.9* 10.6*    172 173 169 168     LFTs -   Recent Labs   Lab Test 02/22/15  2151   ALKPHOS 68   BILITOTAL 0.5   ALT 18   AST 17   PROTTOTAL 8.2   ALBUMIN 3.4     Iron Panel - No lab results found.    Imaging:  All imaging studies reviewed by me.     Current Medications:    aspirin  81 mg Oral Daily     folic acid  1 mg Oral Daily     heparin  5,000 Units Subcutaneous Q12H     insulin aspart  1-7 Units Subcutaneous TID AC     insulin aspart  1-5 Units Subcutaneous At Bedtime     vitamin B1  100 mg Oral Daily       - MEDICATION INSTRUCTIONS -       Mellisa Rose MD     I was present with the fellow during the history and exam.  I discussed the case with the fellow and agree with the findings as documented in the assessment and " plan.  Astrid Kimball

## 2019-05-16 NOTE — CONSULTS
Patient seen and chart reviewed. Note dictated (initial)   He remains ambivalent about taking antidepressant, but Zoloft in his situation is a good choice. Start either at 25 or 50 mg, but should go up to at least 100 mg   Psychotherapy suggested; to talk to his PCP about a referral for this.   Re-consult psychiatry as needed.

## 2019-05-16 NOTE — LETTER
05/16/19  Fer Latham  [address]  [address]  [facility] [address]      Dear Fer,    You were successfully registered at our Transplant Program on 02/13/2019 as a pre kidney transplant candidate. We will not be proceeding with scheduling a pre-kidney transplant evaluation at this time per your request. Please feel free to call our Office in the future if you would like to schedule a pre-kidney transplant evaluation.     For any questions, please contact the Transplant Office at [phone].      Sincerely,      Solid Organ Transplant  [facility]    Enclosure: UNOS Letter     CC's: Isrrael Ornelas (PCP), Nohemi Blanc PA-C, Roverto Izaguirre MD, [facility] [place]

## 2019-05-16 NOTE — PROGRESS NOTES
Social Work Services Discharge Note      Patient Name:  Fer Latham     Anticipated Discharge Date:  5/16/19    Discharge Disposition:   TCU:  [facility]   [address] [place] [address]   [phone]  Fax [phone]    [facility]  [address] [place] [address]    [phone]  Fax [phone]    Following MD:  Facility assignment     Pre-Admission Screening (PAS) online form has been completed.  The Level of Care (LOC) is:  Determined  Confirmation Code is:  [id number]  Patient/caregiver informed of referral to [facility] for Pre-Admission Screening for skilled nursing facility (SNF) placement and to expect a phone call post discharge from SNF.     Additional Services/Equipment Arranged:  [employer] (ph [phone]) wc van arranged for 3pm.     Pt's spouse to arrange rides to and from dialysis with [employer].      Patient / Family response to discharge plan:  Pt and pt's spouse in agreement with plan.      Persons notified of above discharge plan:  Pt, pt's spouse Ailin (ph [phone]- left VM, awaiting return call), Kinga- nurse manager at [facility] (ph [phone]), Susan- admissions at [facility] (ph [phone]), primary team Xavi Ortiz, COREY Cloud, Nephrologist Dr. Rose    Staff Discharge Instructions:  RN to call report to [phone].  SW to fax discharge orders and signed hard scripts for any controlled substances.  Please print a packet and send with patient.     CTS Handoff completed:  YES    LANETTE Do, LGSW  5th Floor   Pager [phone]  Phone [phone]

## 2019-05-16 NOTE — PROGRESS NOTES
Brief Nephrology Note    Patient was seen and examined on dialysis. No acute concerns. Tolerating well.    Physical Exam   Constitutional: He is oriented to person, place, and time. He appears well-developed. No distress.   HENT:   Head: Normocephalic and atraumatic.   Mouth/Throat: Oropharynx is clear and moist. No oropharyngeal exudate.   Eyes: Pupils are equal, round, and reactive to light. Conjunctivae and EOM are normal. No scleral icterus.   Neck: Neck supple. No JVD present.   Cardiovascular: Normal rate, regular rhythm, normal heart sounds and intact distal pulses.   No murmur heard.  Pulmonary/Chest: Effort normal and breath sounds normal. No respiratory distress. He has no wheezes.   Abdominal: Soft. Bowel sounds are normal. He exhibits no distension. There is no tenderness.   Musculoskeletal: Normal range of motion. He exhibits no edema.   Neurological: He is alert and oriented to person, place, and time.   Skin: Skin is warm. No erythema.   Psychiatric: He has a normal mood and affect.     Plan  [facility]    Mellisa Rose  10:09 AM    I was present with the fellow during the history and exam.  I discussed the case with the fellow and agree with the findings as documented in the assessment and plan.  Astrid Kimball

## 2019-05-16 NOTE — DISCHARGE SUMMARY
Division of Renal Diseases and HTN  Phone: [phone]  Fax: [phone]    Fer Latham  MRN: [id number]  YOB: 1950    [facility]: [facility]  Primary Nephrologist:     Date of Admission: 5/10/2019  Date of Discharge: 5/16/2019  Discharge Diagnosis: End Stage Kidney Disease    [   ] New initiation, new dialysis orders will be faxed.    [ X ] Resume all previous dialysis orders with exception as noted below    New Orders (if not applicable put NA):  Estimated Dry Weight  126kg   Dialysis Duration  3.5hrs   Dialysis Access  Left AVF   Antibiotics (dose per dialysis, end date) None           Labs to be drawn at dialysis Per protocol     Other major changes to dialysis prescription (e.g. Dialysate bath, heparin, blood flow rate, etc)   /   No heparin   Medication changes (also fax the unit a copy of the discharge summary)              Current Discharge Medication List      CONTINUE these medications which have NOT CHANGED    Details   aspirin 81 MG EC tablet Take 81 mg by mouth daily      cholecalciferol 1000 units TABS Take 1 tablet by mouth daily      diazepam (VALIUM) 5 MG tablet Take 5 mg by mouth every 12 hours as needed      insulin glargine (BASAGLAR KWIKPEN) 100 UNIT/ML pen Inject 10 Units Subcutaneous daily      sertraline (ZOLOFT) 25 MG tablet Take 25 mg by mouth daily              Mellisa Rose  Nephrology Fellow    [facility] Osvaldo Kimball

## 2019-05-17 ENCOUNTER — APPOINTMENT (OUTPATIENT)
Dept: PHYSICAL THERAPY | Facility: CLINIC | Age: 69
DRG: 871 | End: 2019-05-17
Payer: MEDICARE

## 2019-05-17 LAB
ALBUMIN SERPL-MCNC: 2.9 G/DL (ref 3.4–5)
ANION GAP SERPL CALCULATED.3IONS-SCNC: 7 MMOL/L (ref 3–14)
BUN SERPL-MCNC: 35 MG/DL (ref 7–30)
CALCIUM SERPL-MCNC: 9 MG/DL (ref 8.5–10.1)
CHLORIDE SERPL-SCNC: 96 MMOL/L (ref 94–109)
CO2 SERPL-SCNC: 28 MMOL/L (ref 20–32)
CREAT SERPL-MCNC: 3.1 MG/DL (ref 0.66–1.25)
ERYTHROCYTE [DISTWIDTH] IN BLOOD BY AUTOMATED COUNT: 13.3 % (ref 10–15)
GFR SERPL CREATININE-BSD FRML MDRD: 20 ML/MIN/{1.73_M2}
GLUCOSE BLDC GLUCOMTR-MCNC: 115 MG/DL (ref 70–99)
GLUCOSE BLDC GLUCOMTR-MCNC: 156 MG/DL (ref 70–99)
GLUCOSE BLDC GLUCOMTR-MCNC: 164 MG/DL (ref 70–99)
GLUCOSE BLDC GLUCOMTR-MCNC: 168 MG/DL (ref 70–99)
GLUCOSE BLDC GLUCOMTR-MCNC: 181 MG/DL (ref 70–99)
GLUCOSE SERPL-MCNC: 159 MG/DL (ref 70–99)
HCT VFR BLD AUTO: 34.5 % (ref 40–53)
HGB BLD-MCNC: 11.3 G/DL (ref 13.3–17.7)
MCH RBC QN AUTO: 34.8 PG (ref 26.5–33)
MCHC RBC AUTO-ENTMCNC: 32.8 G/DL (ref 31.5–36.5)
MCV RBC AUTO: 106 FL (ref 78–100)
PHOSPHATE SERPL-MCNC: 3 MG/DL (ref 2.5–4.5)
PLATELET # BLD AUTO: 185 10E9/L (ref 150–450)
POTASSIUM SERPL-SCNC: 3.8 MMOL/L (ref 3.4–5.3)
RBC # BLD AUTO: 3.25 10E12/L (ref 4.4–5.9)
SODIUM SERPL-SCNC: 131 MMOL/L (ref 133–144)
WBC # BLD AUTO: 5.1 10E9/L (ref 4–11)

## 2019-05-17 PROCEDURE — 80069 RENAL FUNCTION PANEL: CPT | Performed by: STUDENT IN AN ORGANIZED HEALTH CARE EDUCATION/TRAINING PROGRAM

## 2019-05-17 PROCEDURE — 00000146 ZZHCL STATISTIC GLUCOSE BY METER IP

## 2019-05-17 PROCEDURE — 25000132 ZZH RX MED GY IP 250 OP 250 PS 637: Performed by: HOSPITALIST

## 2019-05-17 PROCEDURE — 36415 COLL VENOUS BLD VENIPUNCTURE: CPT | Performed by: STUDENT IN AN ORGANIZED HEALTH CARE EDUCATION/TRAINING PROGRAM

## 2019-05-17 PROCEDURE — 25000128 H RX IP 250 OP 636: Performed by: STUDENT IN AN ORGANIZED HEALTH CARE EDUCATION/TRAINING PROGRAM

## 2019-05-17 PROCEDURE — 99232 SBSQ HOSP IP/OBS MODERATE 35: CPT | Mod: GC | Performed by: INTERNAL MEDICINE

## 2019-05-17 PROCEDURE — 85027 COMPLETE CBC AUTOMATED: CPT | Performed by: STUDENT IN AN ORGANIZED HEALTH CARE EDUCATION/TRAINING PROGRAM

## 2019-05-17 PROCEDURE — 12000001 ZZH R&B MED SURG/OB UMMC

## 2019-05-17 PROCEDURE — 25000125 ZZHC RX 250: Performed by: STUDENT IN AN ORGANIZED HEALTH CARE EDUCATION/TRAINING PROGRAM

## 2019-05-17 PROCEDURE — 97116 GAIT TRAINING THERAPY: CPT | Mod: GP

## 2019-05-17 PROCEDURE — A9270 NON-COVERED ITEM OR SERVICE: HCPCS | Performed by: HOSPITALIST

## 2019-05-17 PROCEDURE — 90937 HEMODIALYSIS REPEATED EVAL: CPT

## 2019-05-17 PROCEDURE — 97530 THERAPEUTIC ACTIVITIES: CPT | Mod: GP

## 2019-05-17 PROCEDURE — 25000128 H RX IP 250 OP 636: Performed by: HOSPITALIST

## 2019-05-17 PROCEDURE — 97110 THERAPEUTIC EXERCISES: CPT | Mod: GP

## 2019-05-17 RX ORDER — FOLIC ACID 1 MG/1
1 TABLET ORAL DAILY
Qty: 90 TABLET | Refills: 0 | Status: SHIPPED | OUTPATIENT
Start: 2019-05-17 | End: 2019-09-11

## 2019-05-17 RX ORDER — LANOLIN ALCOHOL/MO/W.PET/CERES
100 CREAM (GRAM) TOPICAL DAILY
Qty: 90 TABLET | Refills: 0 | Status: SHIPPED | OUTPATIENT
Start: 2019-05-17 | End: 2019-09-11

## 2019-05-17 RX ORDER — LIDOCAINE HYDROCHLORIDE 10 MG/ML
INJECTION, SOLUTION EPIDURAL; INFILTRATION; INTRACAUDAL; PERINEURAL
Status: DISPENSED
Start: 2019-05-17 | End: 2019-05-18

## 2019-05-17 RX ADMIN — HEPARIN SODIUM 5000 UNITS: 5000 INJECTION, SOLUTION INTRAVENOUS; SUBCUTANEOUS at 08:50

## 2019-05-17 RX ADMIN — Medication: at 16:27

## 2019-05-17 RX ADMIN — LIDOCAINE HYDROCHLORIDE 0.5 ML: 10 INJECTION, SOLUTION EPIDURAL; INFILTRATION; INTRACAUDAL; PERINEURAL at 13:57

## 2019-05-17 RX ADMIN — SODIUM CHLORIDE 300 ML: 9 INJECTION, SOLUTION INTRAVENOUS at 13:54

## 2019-05-17 RX ADMIN — INSULIN ASPART 1 UNITS: 100 INJECTION, SOLUTION INTRAVENOUS; SUBCUTANEOUS at 18:50

## 2019-05-17 RX ADMIN — FOLIC ACID 1 MG: 1 TABLET ORAL at 08:50

## 2019-05-17 RX ADMIN — INSULIN ASPART 1 UNITS: 100 INJECTION, SOLUTION INTRAVENOUS; SUBCUTANEOUS at 14:04

## 2019-05-17 RX ADMIN — Medication 100 MG: at 08:50

## 2019-05-17 RX ADMIN — SODIUM CHLORIDE 250 ML: 9 INJECTION, SOLUTION INTRAVENOUS at 13:55

## 2019-05-17 RX ADMIN — HEPARIN SODIUM 5000 UNITS: 5000 INJECTION, SOLUTION INTRAVENOUS; SUBCUTANEOUS at 18:51

## 2019-05-17 RX ADMIN — ASPIRIN 81 MG: 81 TABLET, COATED ORAL at 08:50

## 2019-05-17 ASSESSMENT — ACTIVITIES OF DAILY LIVING (ADL)
ADLS_ACUITY_SCORE: 21
ADLS_ACUITY_SCORE: 21
ADLS_ACUITY_SCORE: 20
ADLS_ACUITY_SCORE: 21

## 2019-05-17 ASSESSMENT — MIFFLIN-ST. JEOR
SCORE: 2090.75
SCORE: 2128.43

## 2019-05-17 NOTE — PLAN OF CARE
At morning bedside report, pt wanted to be left alone to sleep. Pt called nursing staffs before 9 am. Med given. VSS with BP values being on the soft side. Pt ordered breakfast and currently working with PT. Per dialysis nurse, pt will be taken to dialysis today around 1030. Pt has been alert and oriented, cooperative but can be agitated at times.

## 2019-05-17 NOTE — PROGRESS NOTES
Brief Care Coordination Note:     Writer met with patient and SW at the bedside. Patient has stated that he would like to go home tonight. Patient initially stated that he would take the bus home w/his spouse, RNCC and SW do not believe that is a safe plan and voiced concern. Patient then stated that his wife will bring his wheel chair and they will take the bus or a cab home. Patient stated he is just sitting here waiting for TCU bed and when he gets there the only thing they will do is walk him in the halls. Writer and SW encouraged patient to call his wife to discuss the discharge plan and confirm that she was in agreement. Patient attempted to call his wife, a voicemail was left. Patient stated that he had spoken with his wife earlier in the day and that they had a plan to get him home.     Patient has requested a referral be sent to [facility] for home care services, order placed for RN/PT/OT at this time, F2F done. SW has confirmed that OP HD has been arranged at [facility] 12:30pm, must arrive at 12pm on the first day (Monday 5/20).     Writer updated patient's bedside nurse of potential discharge to home. Medicine team aware, they have attempted to contact patient's wife as well, no .     Liya Thomas, COREYCC, BSN    [facility]    Medicine Group  [address] [place] [id number]    [email or web address] by [email or web address]    Office: [phone] Pager: [phone]  To contact weekend RNCC, dial [phone] and enter pager number [phone] at prompt. This pager can not be contacted by text page or outside line.

## 2019-05-17 NOTE — PLAN OF CARE
5A: Cxl - Per RN, pt wanting to sleep in this AM due to difficult night, pt at dialysis later today.

## 2019-05-17 NOTE — PROGRESS NOTES
[facility] Progress Note - Hospitalist Service, Xavi Ortiz       Date of Admission:  5/10/2019  Assessment & Plan     Fer Latham is a 68 year old male with h/o ESRD on HD MWF chronic afib not on anticoagulation d/t prior refusal, DM2, depression, alcoholism, anemia of chronic disease, BPH, CAD, and h/o rhabdomyolysis who presents to the ED via EMS for fall, generalized weakness, and UTI.     #Sepsis, uncertain etiology, resolved  #Abnormal urinalysis  #Lactic acidosis  #BPH  #Acute Toxic metabolic encephalopathy, resolved  Given patient reported history of multiple previous UTIs, generalized non-focal weakness, and dirty UA in the setting of a BPH dx, a UTI seemed the most likely dx upon discharge. Started on pip-tazo in ED. Lactate 2.5 in ED with some decreased mental status. Repeat lactate 1.3 after 500ml IV NS in ED. CT head negative for acute changes. Mental status improved morning after admission. Possibly due to dehydration or infection (ruled out) +/- chronic etoh use/acute intoxication.  - Urine culture without significant growth: stopped pip-tazo 5/11 - improved without abx     #Rhabdomyolysis, resolved  CK elevated to 1952 on 5/11 (prior samples cancelled x2). Received 500ml IV NS in ED. In setting of fall with prolonged (4h) immobilization Dialysis not effective therapy and he makes urine so able to give IVF as needed. Additional IVF 5/11, repeat CK 5/12 900s -> 420 5/13 without IVF.     #Soft BPs, ongoing  He has no h/o HTN. Since admission, has been having SBP , asymptomatic. TSH/a.m. cortisol wnl. Tolerating dialysis. Avoiding polypharmacy.     #Generalized weakness  #Falls  Uses walker at baseline, very weak and unsteady with multiple previous falls. Acute on chronic progression. Other concerns include alcohol use, depression. Noted hypoglycemia 5/12, thus r/o endocrine issues including hypothyroidism and significant AI. TSH/cortisol wnl.  -  PT/OT rec TCU  - Falls risk  - Tylenol and topicals prn for pain from fall (shoulder, back, arm)     #ESRD on HD MWF  Nephrology consulted during admission, no acute indications for HD, continued pta MWF schedule.  - Awaiting new outpatient dialysis center, likely [facility], no new patients until 5/21 for TThS schedule, will continue to dialyze here until discharge plan established     #Chronic afib not on anticoagulation, rate controlled  - Not on any rate/rhythm controlling meds (prior med list includes diltiazem and atenolol, neither of which patient is taking)  - Discussed warfarin with patient and wife, they are concerned for bleeding risk and are refusing at this time. Not a DOAC candidate d/t ESRD. Will need to f/u with PCP to continue risk/benefit conversation.     #Alcohol dependence  4 drinks nightly on average, no h/o withdrawal or seizures. Fall may have been 2/2 intoxication as well. Scored consistently low on CIWA protocol and discontinued 5/14 with no ssx withdrawal  - Thiamine/folate supplementation started     #DM2, last A1c 6.0% on 3/7/19  #Hypoglycemia overnight 5/12  A1c 5.8% on admission.  - Hold pta lantus 10U at bedtime - had started at decreased dose of 5U 5/10 p.m. but BGs running low so held during admission. Last dose 5/10 and BG has been well controlled.  - Prior med list includes metformin and glipizide, patient states not taking     #Depression  Sertraline and diazepam on prior med list but patient states not taking. Sertraline last filled 4/30/19 but he never started. Was worried about long side effect list, including death. Avoiding BZDs given some confusion/slowed mentation on presentation. He feels that he is depressed because he is on dialysis and also very weak and unable to do activities that he used to enjoy.  - Psych consulted on 5/16 due to his worsening mood after failed discharge and interactions with wife and staff. He appreciated the consult, however is not interested in  starting any medications at this time.  - Encouraged to start taking sertraline as depression maybe playing significant role here. He continues to decline. Address at PCP f/u.     #Anemia of chronic disease  #Macrocytosis  Stable, 10-11s. B12/folate wnl.     #CAD  H/o MI per chart review. Troponin 0.035 (not elevated) on admission and no CP.  - Continued pta ASA 81 qday      Diet: Renal Diet (non-dialysis)  Advance Diet as Tolerated  Advance Diet as Tolerated    DVT Prophylaxis: Pneumatic Compression Devices  Brown Catheter: not present  Code Status: Full Code      Disposition Plan   Expected discharge: 4 - 7 days, recommended to transitional care unit once safe disposition plan/ TCU bed available - need to arrange outpatient dialysis center as he is unable to dialyze at prior place, also secure TCU bed.  Entered: Teo Ambrose MD 05/17/2019, 6:35 AM       The patient's care was discussed with the Attending Physician, Dr. Cameron, Bedside Nurse, Care Coordinator/ and Patient.    Teo Ambrose MD  [address], [place]  Pager: [phone]  Please see sticky note for cross cover information  ______________________________________________________________________    Interval History   No acute events overnight. Feels the same. Not happy with the situation re: no dialysis unit and concerned about losing TCU bed due to delay. Denies new or concerning symptoms, no CP, SOB, abdominal pain, F/C. Eating/drinking well, voiding and adequate BMs.    Data reviewed today: I reviewed all medications, new labs and imaging results over the last 24 hours.    Physical Exam   Vital Signs: Temp: 97.4  F (36.3  C) Temp src: Oral BP: 105/44   Heart Rate: 70 Resp: 18 SpO2: 100 % O2 Device: None (Room air)    Weight: 277 lbs 5.42 oz    Gen: Elderly male, sitting up in bedside chair, appears comfortable, NAD  HEENT: NCAT  Pulm: CTAB without wheezes or crackles, normal WOB on RA  CV: Regular rate,  irregularly irregular rhythm, S1/S2, no m/r/g  ABD: Soft, ntnd, normal BS  EXT: No LE edema, wwp  NEURO: AOx3, nonfocal  Psych: Depressed mood and affect    No new labs/imaging.

## 2019-05-17 NOTE — PLAN OF CARE
but pt is being transported to dialysis. Will send insulin pen with for dialysis nurse to cover insulin when pt orders lunch.

## 2019-05-17 NOTE — PROGRESS NOTES
Nephrology Progress Note  05/17/2019         Assessment & Recommendations:   Fer Latham is a 68 year old year old male  with ESKD secondary to IgA nephropathy that additionally has multiple medical problems and on dialysis since a sepsis episode last year.  He is currently hospitalized with weakness and found to have a UTI and lactic acidosis.     1. End Stage Kidney Disease   Dialyzes at [facility] on MWF with Dr. Izaguirre. He has expressed interest to change dialysis providers or clinics.    - We will do HD MWF for 3.5hrs, .5kg, access is L AVF.   - HD today, tolerating well.  - Plan to discharge to "[facility] working on [facility] location  - [facility], see social work note for details.    2. Volume status / Blood pressure   Normotensive, not overloaded on exam. Will aim UF for closer to EDW of 125.5 kg as tolerated     3. Acid base   No issues noted     4. Electrolytes:  No acute issues     5. Anemia  Patient has an adequate hemoglobin 11.3 and does not need MECCA at this time.     6. Macrocytosis   Elevated MCV and is notably not on B complex vitamin.    - B12 normal, folate needs to be redrawn.     7. CKD Bone mineral disease   -normal serum calcium, normal phos. PTH 86, vitamin d 66     Recommendations were communicated to primary team     Seen and discussed with Dr. Rehana Rose MD   [phone]    Interval History :   Nursing and provider notes from last 24 hours reviewed.    Fer Latham was seen and examined this morning on hemodialysis. He had no overnight issues. Plan for discharge over the weekend.     Review of Systems:   I reviewed the following systems:  GI: improved appetite. no nausea or vomiting or diarrhea.   Neuro:  no confusion  Constitutional:  no fever or chills  CV: no dyspnea or edema.  no chest pain.    Physical Exam:   I/O last 3 completed shifts:  In: -   Out: 100 [Urine:100]   BP 94/52   Pulse 87   Temp 97.5  F (36.4  C) (Oral)   Resp  "18   Ht 1.88 m (6' 2\")   Wt 128.4 kg (283 lb)   SpO2 92%   BMI 36.34 kg/m       GENERAL APPEARANCE: NAD  EYES:  no scleral icterus, pupils equal  HENT: mouth without ulcers or lesions  PULM: lungs clear to auscultation bilaterally, equal air movement, no clubbing  CV: regular rhythm, normal rate, no rub     -JVD not present     -trace LE edema    GI: soft, non tender, not distended, bowel sounds are normoactive  INTEGUMENT: no cyanosis, no rash  NEURO:  AAOx4, no asterixis   Access LUE AVF is cannulated    Labs:   All labs reviewed by me  Electrolytes/Renal -   Recent Labs   Lab Test 05/17/19  1231 05/15/19  1143 05/13/19  0551 05/12/19  0728 05/11/19  1253 05/11/19  0810   * 130* 134 132*  --  131*   POTASSIUM 3.8 3.8 4.1 3.3* 3.1* 3.1*   CHLORIDE 96 97 99 97  --  97   CO2 28 25 25 26  --  28   BUN 35* 38* 37* 34*  --  22   CR 3.10* 2.98* 3.29* 3.46*  --  2.69*   * 192* 124* 94  --  117*   COLUMBA 9.0 9.0 9.2 9.0  --  7.9*   MAG  --   --   --  2.3 1.7 1.7   PHOS 3.0  --   --   --  2.6 2.3*     CBC -   Recent Labs   Lab Test 05/17/19  1231 05/16/19  0611 05/15/19  1113  05/12/19  0728   WBC 5.1  --  5.1  --  5.6   HGB 11.3*  --  10.9*  --  10.9*    169 172   < > 169    < > = values in this interval not displayed.     LFTs -   Recent Labs   Lab Test 05/17/19  1231 02/22/15  2151   ALKPHOS  --  68   BILITOTAL  --  0.5   ALT  --  18   AST  --  17   PROTTOTAL  --  8.2   ALBUMIN 2.9* 3.4     Iron Panel - No lab results found.    Imaging:  All imaging studies reviewed by me.     Current Medications:    lidocaine (PF)         aspirin  81 mg Oral Daily     folic acid  1 mg Oral Daily     gelatin absorbable  1 each Topical During Hemodialysis (from stock)     heparin  5,000 Units Subcutaneous Q12H     insulin aspart  1-7 Units Subcutaneous TID AC     insulin aspart  1-5 Units Subcutaneous At Bedtime     - MEDICATION INSTRUCTIONS -   Does not apply Once     vitamin B1  100 mg Oral Daily       - MEDICATION " INSTRUCTIONS -       - MEDICATION INSTRUCTIONS -       Mellisa Rose MD

## 2019-05-17 NOTE — PLAN OF CARE
PT 5A: Up with Ax1, please assist pt to ambulate short distances 3x/day with FWW and gait belt.     Discharge Planner PT   Patient plan for discharge: Frustrated with his discharge planning - reporting he might as well go home if he is just going to sit around waiting for his discharge to TCU  Current status: Engaged pt in bed mobility at [facility], sit <> stand transfers at [facility] progressing to SBA today from multiple surface heights, gait with FWW for ~120ft at [facility], and standing B LE strengthening exercises.   Barriers to return to prior living situation: medical status, recent falls  Recommendations for discharge: TCU  Rationale for recommendations: Pt is below his baseline and would benefit from continued rehab to return to PLOF. Pt with history of falls and would continue to be at an increased fall risk if he were to return home. He would benefit from strengthening, endurance training, and balance training.        Entered by: Adeola Georges 05/17/2019 10:00 AM

## 2019-05-17 NOTE — PROGRESS NOTES
"Social Work Services Progress Note     Hospital Day: 8  Date of Initial Social Work Evaluation:  5/10/19  Collaborated with:  Primary team Xavi Ortiz, pt, [facility]     Data:  Pt is a 68-year-old male admitted to [facility] 5/10/19. TCU is recommended at discharge. Pt is medically stable for discharge.      Intervention: Spoke with Gregg at [facility] dialysis- they state they cannot accept pt d/t staffing issues. New HD placement pending at [facility].    Pt requested to meet. Pt continues to be upset about discharge plan. Pt upset that he is \"just sitting here\" and states he wants to go home. Explained to pt that if he wants to go home rather than to TCU that is his choice, but that he currently does not have a dialysis center. Pt states he will plan to do dialysis on an \"emergency basis.\" Explained to pt that that is not an appropriate plan. Explained to pt that he could try to [facility] things out with [facility] if he would like to return there, otherwise placement at [facility] is being pursued and it is unknown how long it will take for that to be set up. Encouraged pt to discuss alternative discharge plans with his spouse- he states he has, and that he will not tell SW what his spouse is thinking about his plan because SW \"knows too much already.\" He states he and his spouse want him to \"get out of here.\" Explained to pt that SW working with [facility] on new HD placement and will update pt if there are updates.    Received call from [facility] stating that they had to give up pt's bed and now do not have a bed available. Will need to see if a bed is available when pt's dialysis center confirmed.     Faxed referrals to:  1) [facility] (ph [phone])- declined, no beds available   2) [facility] (ph [phone])- declined d/t documentation of alcohol use  3) [facility]- [facility] states pt could go to  TCU if shows that he will participate in nursing assessments and therapies  4) [facility] (ph [phone])- would need to do on-site assessment  5) [facility] (ph [phone])- clinically accepted but no beds currently available     Assessment:  Pursuing dialysis center transfer     Plan:    Anticipated Disposition:  Facility:  TCU    Barriers to d/c plan:  Placement- dialysis center issues    Follow Up:  SW to follow for discharge planning     LANETTE Do, LGSW  5th Floor   Pager [phone]  Phone [phone]    Addendum 1:36pm: Left VM for Tresa at [facility] regarding status of referral.    Addendum 3:31pm: Tresa from [facility] confirms that pt has a start date of Monday, 5/20 at [facility] at 12:30 pm (must arrive at noon on first day). Confirmed with [facility] that they do not anticipate a bed being open this weekend or Monday.    Met with pt and RNCC to discuss discharge plan. Updated pt on [facility] acceptance and provided print out of location and schedule. Discussed that if pt wants to d/c to TCU he will need to choose additional facilities for referral and more referrals need to be made. Also discussed option of returning home with home care. Pt stated he would like to discharge home tonight. Pt initially stated he would take the bus home but SW and RNCC do not feel this would be safe. Offered to set up [facility] ride and pt declined, stating that his wife will bring his wheelchair to the hospital and they will set up a cab ride home. Pt called his wife from dialysis but she did not answer. Pt states he walked by himself in his room today and is safe to d/c home. Updated primary team on pt's plan and they will discuss with pt. Encouraged primary team to update pt's spouse as well- they called with no answer. Updated nephrology that pt will likely be started at [facility] on Monday.    Pt not returning to [facility] for dialysis d/t concerning statements made by pt's spouse to SW yesterday- [facility] was notified of these statements  yesterday per duty to kiesha.     Pt's new dialysis schedule is as follows:  [facility]- MWF 12:30pm  [address] [place] [address]  Phone: [phone]  Fax: [phone]    Confirmed with [facility] that pt has start date Monday. Faxed updated dialysis notes from hospitalization to [facility].

## 2019-05-17 NOTE — PLAN OF CARE
Pt alert and oriented. On room air. Ongoing soft BP since admission. Asymptomatic. AVF on left arm. C/D/I. Oliguric. Refusing full skin assessment and cares -- has flat affect with sarcastic tone in communication. Refused q4h vitals and BG checks. No acute events noted overnight. Plan is to have hemodialysis inpatient until appropriate facility can continue with pts hemodialysis schedule. Will continue to monitor as noted and follow plan of care.        Gelacio Vincent RN [phone]

## 2019-05-17 NOTE — PLAN OF CARE
"Time: 3484-0106  Reason for admission/Dx:   Generalized muscle weakness [M62.81]  Urinary tract infection with hematuria, site unspecified [N39.0, R31.9]     [Vitals]: /43   Pulse 87   Temp 97.4  F (36.3  C) (Oral)   Resp 18   Ht 1.88 m (6' 2\")   Wt 125.8 kg (277 lb 5.4 oz)   SpO2 100%   BMI 35.61 kg/m    [Labs/Lactic]: Cr 2.98   [BG]:   Glucose Values Bedside Glucose (mg/dl )  GLUCOSE   Latest Ref Rng & Units - 70 - 99 mg/dL   5/16/2019 -- 136(H)   5/16/2019 -- 177(H)   Some recent data might be hidden   Pt had insulin coverage at dinner.  No bedtime insulin       [Electrolytes]:   Potassium (mmol/L)   Date Value   05/15/2019 3.8     Magnesium (mg/dL)   Date Value   05/12/2019 2.3     Phosphorus (mg/dL)   Date Value   05/11/2019 2.6          [Cardiac]: WDL  [Pain/PRN/s]: WDL, denies pain    [Respiratory]: WDL  [Lines]:  R PIV SL   [Infusions]: N/A    [Neuros]: Pt A/Ox4, but withdrawn w/ labile mood.         [GI]:Orders Placed This Encounter      Renal Diet (non-dialysis)      Advance Diet as Tolerated      Advance Diet as Tolerated    -Tolerated PO intake with fair appetite.  -Had one large BM this evening.    [Activity]: Pt up w/ assist 1 w/walker    []: Pt has L arm fistula for dialysis. Pt oliguric and on MWF dialysis schedule.       [Skin/Wounds]: WDL           Shift changes: Pt frustrated d/t delay in discharge today.  Psychiatry consult completed, and pt to possible start on zoloft in AM.  Pt also frustrated w/ lack of updates on dialysis schedule. Pt isn't sure if he will have dialysis tomorrow or on Saturday or both.  Crosscover notified, and writer called dialysis. Pt currently not on schedule for dialysis tomorrow.   Plan: Plan for team to follow up w/ further discharge plans.    "

## 2019-05-18 ENCOUNTER — APPOINTMENT (OUTPATIENT)
Dept: PHYSICAL THERAPY | Facility: CLINIC | Age: 69
DRG: 871 | End: 2019-05-18
Payer: MEDICARE

## 2019-05-18 VITALS
WEIGHT: 274.69 LBS | BODY MASS INDEX: 35.25 KG/M2 | SYSTOLIC BLOOD PRESSURE: 86 MMHG | HEIGHT: 74 IN | OXYGEN SATURATION: 97 % | TEMPERATURE: 97.5 F | HEART RATE: 87 BPM | DIASTOLIC BLOOD PRESSURE: 45 MMHG | RESPIRATION RATE: 16 BRPM

## 2019-05-18 LAB — GLUCOSE BLDC GLUCOMTR-MCNC: 118 MG/DL (ref 70–99)

## 2019-05-18 PROCEDURE — 99239 HOSP IP/OBS DSCHRG MGMT >30: CPT | Performed by: INTERNAL MEDICINE

## 2019-05-18 PROCEDURE — 97116 GAIT TRAINING THERAPY: CPT | Mod: GP

## 2019-05-18 PROCEDURE — 25000128 H RX IP 250 OP 636: Performed by: HOSPITALIST

## 2019-05-18 PROCEDURE — 25000132 ZZH RX MED GY IP 250 OP 250 PS 637: Performed by: HOSPITALIST

## 2019-05-18 PROCEDURE — 97110 THERAPEUTIC EXERCISES: CPT | Mod: GP

## 2019-05-18 PROCEDURE — 00000146 ZZHCL STATISTIC GLUCOSE BY METER IP

## 2019-05-18 PROCEDURE — A9270 NON-COVERED ITEM OR SERVICE: HCPCS | Performed by: HOSPITALIST

## 2019-05-18 PROCEDURE — 97530 THERAPEUTIC ACTIVITIES: CPT | Mod: GP

## 2019-05-18 RX ADMIN — Medication 100 MG: at 09:07

## 2019-05-18 RX ADMIN — ASPIRIN 81 MG: 81 TABLET, COATED ORAL at 09:07

## 2019-05-18 RX ADMIN — HEPARIN SODIUM 5000 UNITS: 5000 INJECTION, SOLUTION INTRAVENOUS; SUBCUTANEOUS at 09:07

## 2019-05-18 RX ADMIN — FOLIC ACID 1 MG: 1 TABLET ORAL at 09:07

## 2019-05-18 ASSESSMENT — ACTIVITIES OF DAILY LIVING (ADL)
ADLS_ACUITY_SCORE: 21

## 2019-05-18 NOTE — DISCHARGE SUMMARY
[facility], [place]  Discharge Summary - Medicine & Pediatrics       Date of Admission:  5/10/2019  Date of Discharge:  5/18/2019  Discharging Provider: Kolton Cameron MD  Discharge Service: Xavi Ortiz    Discharge Diagnoses   Sepsis, uncertain etiology  Abnormal urinalysis  Lactic acidosis  BPH  Acute Toxic metabolic encephalopathy  Rhabdomyolysis  Generalized weakness  Falls  ESRD on HD MWF  Chronic afib not on anticoagulation  DM2, last A1c 6.0% on 3/7/19  Hypoglycemia overnight 5/12  Depression  Anemia of chronic disease  Macrocytosis  CAD  Alcohol dependence    Follow-ups Needed After Discharge   Follow-up Appointments     Adult [facility] Follow-up and recommended labs and tests      Follow up with primary care provider, Isrrael Ornelas, within 7 days for   hospital follow- up, discuss initiation of warfarin for afib, and starting   sertraline (recommended to restart by Psych).  No follow up labs or test   are needed.      Appointments on [place] and/or [facility] (with [facility] or [facility]   provider or service). Call [phone] if you haven't heard regarding   these appointments within 7 days of discharge.             Unresulted Labs Ordered in the Past 30 Days of this Admission     No orders found from 3/11/2019 to 5/11/2019.      These results will be followed up by NA    Discharge Disposition   Discharged to home  Condition at discharge: Stable    Hospital Course   Fer Latham is a 68 year old male with h/o ESRD on HD MWF chronic afib not on anticoagulation d/t prior refusal, DM2, depression, alcoholism, anemia of chronic disease, BPH, CAD, and h/o rhabdomyolysis who presents to the ED via EMS for fall, generalized weakness, and UTI.    # Deconditioning and Weakness  Patient evaluated by Physical and Occupational Therapy who recommended TCU stay. Patient was referred to TCUs and initially accepted to [facility]. Unfortunately, on the day of discharge, patient's wife refused to  "take patient to [facility] and made threats against the dialysis center. [facility] made aware. Patient's dialysis center was transitioned to [facility], however, a bed was not available at that time. Bed at [facility] was lost. Throughout hospital stay had multiple conversations with Fer about TCU recommendation. Patient was upset about extended wait for a bed and decided to discharge home. Then writer called patient's wife Ailin who stated she was concerned he would not be safe at home and that he was not strong enough for home. I told her he could await bed availability in the hospital and her response was \"You can't keep him there. Who is going to pay for that?\". I then informed her Home PT and OT were ordered and she also stated \"I won't pay for that\". Discussed options, home vs TCU, with Fer and Fer ultimately decided on discharge home. Hospital Care Coordinator will set patient up with cab voucher for home.      #Sepsis, uncertain etiology  #Abnormal urinalysis  #Lactic acidosis  #BPH  #Acute Toxic metabolic encephalopathy, resolved  Given patient reported history of multiple previous UTIs, generalized non-focal weakness, and dirty UA in the setting of a BPH dx, a UTI seemed the most likely dx upon discharge. Started on pip-tazo in ED. Lactate 2.5 in ED with some decreased mental status. Repeat lactate 1.3 after 500ml IV NS in ED. CT head negative for acute changes. Mental status improved morning after admission. Possibly due to dehydration or infection (ruled out) +/- chronic etoh use/acute intoxication.  - Urine culture without significant growth: stopped pip-tazo 5/11 - improved without abx     #Rhabdomyolysis, resolved  CK elevated to 1952 on 5/11 (prior samples cancelled x2). Received 500ml IV NS in ED. In setting of fall with prolonged (4h) immobilization Dialysis not effective therapy and he makes urine so able to give IVF as needed. Additional IVF 5/11, repeat CK 5/12 900s -> 420 " 5/13 without IVF.     #Soft BPs  He has no h/o HTN. Since admission, has been having SBP , asymptomatic. TSH/a.m. cortisol wnl. Tolerating dialysis. Avoid polypharmacy.     #Generalized weakness  #Falls  Uses walker at baseline, very weak and unsteady with multiple previous falls. Acute on chronic progression. Other concerns include alcohol use, depression. Noted hypoglycemia 5/12, thus r/o endocrine issues including hypothyroidism and significant AI. TSH/cortisol wnl.  - PT/OT rec TCU (see above for discussion)  - Falls risk  - Tylenol and topicals prn for pain from fall (shoulder, back, arm)     #ESRD on HD MWF  Nephrology consulted during admission, no acute indications for HD, continued pta MWF schedule. Initially dialyzed at [facility] but transitioned to [facility] on MWF.     #Chronic afib not on anticoagulation, rate controlled  - Not on any rate/rhythm controlling meds (prior med list includes diltiazem and atenolol, neither of which patient is taking)  - Discussed warfarin with patient and wife, they are concerned for bleeding risk and are refusing at this time. Not a DOAC candidate d/t ESRD. Will need to f/u with PCP to continue risk/benefit conversation.     #Alcohol dependence  4 drinks nightly on average, no h/o withdrawal or seizures. Fall may have been 2/2 intoxication as well. Scored consistently low on CIWA protocol and discontinued 5/14 with no ssx withdrawal  - Thiamine/folate supplementation started     #DM2, last A1c 6.0% on 3/7/19  #Hypoglycemia overnight 5/12  A1c 5.8% on admission.  - Hold pta lantus 10U at bedtime - had started at decreased dose of 5U 5/10 p.m. but BGs running low so held during admission. Last dose 5/10.   - Prior med list includes metformin and glipizide, patient states not taking     #Depression  Sertraline and diazepam on prior med list but patient states not taking. Sertraline last filled 4/30/19 but he never started. Was worried about long side effect  list, including death. Avoiding BZDs given some confusion/slowed mentation on presentation. He feels that he is depressed because he is on dialysis and also very weak and unable to do activities that he used to enjoy.  - Encouraged to start taking sertraline as depression maybe playing significant role here. He and wife declined. Address at PCP f/u.     #Anemia of chronic disease  #Macrocytosis  Stable, 10-11s. B12/folate wnl.     #CAD  H/o MI per chart review. Troponin 0.035 (not elevated) on admission and no CP.  - Continued pta ASA 81 qday    Consultations This Hospital Stay   NEPHROLOGY ESRD ADULT IP CONSULT  PHYSICAL THERAPY ADULT IP CONSULT  OCCUPATIONAL THERAPY ADULT IP CONSULT  SOCIAL WORK IP CONSULT  PHARMACY IP CONSULT  MEDICATION HISTORY IP PHARMACY CONSULT  PHYSICAL THERAPY ADULT IP CONSULT  OCCUPATIONAL THERAPY ADULT IP CONSULT  VASCULAR ACCESS CARE ADULT IP CONSULT  PHYSICAL THERAPY ADULT IP CONSULT  OCCUPATIONAL THERAPY ADULT IP CONSULT  PSYCHIATRY IP CONSULT    Code Status   Full Code     I, Marc Cameron, saw and evaluated this patient prior to discharge.  I personally reviewed vital signs, medications, labs and imaging.    I personally spent 45 minutes on discharge activities.      Marc Cameron MD  [address]  Pager: [phone]  ______________________________________________________________________    Physical Exam   Vital Signs: Temp: 97.5  F (36.4  C) Temp src: Oral BP: (!) 86/45   Heart Rate: 78 Resp: 16 SpO2: 97 % O2 Device: None (Room air)    Weight: 274 lbs 11.09 oz   General: AAOx3, well appearing man in NAD  Skin: no rashes or bruising  CV: RRR, normal S1S2, no murmur  Resp: CTAB, no wheeze, rhonchi   Abd: Soft, non-tender, non distended, BS+, no masses appreciated  Extremities: warm and well perfused, mild edema        Primary Care Physician   Isrrael Ornelas    Discharge Orders      Home Care PT Referral for Hospital Discharge       Home Care OT Referral for Hospital Discharge      Home care nursing referral      Activity - Up with assistive device     Additional Discharge Instructions    PCP followup 2-4 weeks - discuss anticoagulation (warfarin) and depression (sertraline) - both refused during admission     Activity - Up with assistive device     Adult [facility] Follow-up and recommended labs and tests    Follow up with primary care provider, Isrrael Ornelas, within 7 days for hospital follow- up, discuss initiation of warfarin for afib, and starting sertraline (recommended to restart by Psych).  No follow up labs or test are needed.      Appointments on [place] and/or [facility] (with [facility] or [facility] provider or service). Call [phone] if you haven't heard regarding these appointments within 7 days of discharge.     Activity    Your activity upon discharge: activity as tolerated     Discharge Instructions    You were admitted with concern for sepsis from a bacterial infection, thought urinary tract infection. However, the culture did not grow an organism and you continued to improve despite stopping antibiotics. Your symptoms, weakness, and falls were likely secondary to deconditioning, poor oral intake, and contribution from alcohol use. You also had something called mild rhabdomyolysis from your time down on the ground. Throughout your time in the hospital you improved in strength and your mentation returned back to baseline. It was recommended by Physical and Occupational Therapy that you go to a TCU for rehab prior to returning home. You declined this, however, so we will assist with setting up home cares, home PT and home OT. Your dialysis center was changed and your next session will be Monday, 5/20/19.     Reason for your hospital stay    You were hospitalized for altered mental status and sepsis. You were started on antibiotics and your condition improved. TCu was recommended at discharge but you declined.     Full Code      Fall precautions     Fall precautions     Diet    Follow this diet upon discharge: Orders Placed This Encounter      Renal Diet (non-dialysis)      Advance Diet as Tolerated      Advance Diet as Tolerated       Significant Results and Procedures   Most Recent 3 CBC's:  Recent Labs   Lab Test 05/17/19  1231 05/16/19  0611 05/15/19  1113  05/12/19  0728   WBC 5.1  --  5.1  --  5.6   HGB 11.3*  --  10.9*  --  10.9*   *  --  102*  --  106*    169 172   < > 169    < > = values in this interval not displayed.     Most Recent 3 BMP's:  Recent Labs   Lab Test 05/17/19  1231 05/15/19  1143 05/13/19  0551   * 130* 134   POTASSIUM 3.8 3.8 4.1   CHLORIDE 96 97 99   CO2 28 25 25   BUN 35* 38* 37*   CR 3.10* 2.98* 3.29*   ANIONGAP 7 9 10   COLUMBA 9.0 9.0 9.2   * 192* 124*       Discharge Medications   Current Discharge Medication List      START taking these medications    Details   folic acid (FOLVITE) 1 MG tablet Take 1 tablet (1 mg) by mouth daily  Qty: 90 tablet, Refills: 0    Associated Diagnoses: Alcohol abuse      vitamin B1 (THIAMINE) 100 MG tablet Take 1 tablet (100 mg) by mouth daily  Qty: 90 tablet, Refills: 0    Associated Diagnoses: Alcohol abuse         CONTINUE these medications which have NOT CHANGED    Details   aspirin 81 MG EC tablet Take 81 mg by mouth daily      cholecalciferol 1000 units TABS Take 1 tablet by mouth daily         STOP taking these medications       diazepam (VALIUM) 5 MG tablet Comments:   Reason for Stopping:         insulin glargine (BASAGLAR KWIKPEN) 100 UNIT/ML pen Comments:   Reason for Stopping:         sertraline (ZOLOFT) 25 MG tablet Comments:   Reason for Stopping:             Allergies   Allergies   Allergen Reactions     Cephalexin      Ciprofloxacin      Diagnostic X-Ray Materials      Sulfa Drugs Hives

## 2019-05-18 NOTE — PLAN OF CARE
"Time 1098-7191     Reason for admission: Generalized muscle weakness, UTI     Vitals: VSS on RA, except soft BPs     BP 99/41 (BP Location: Right arm)   Pulse 87   Temp 96.9  F (36.1  C) (Oral)   Resp 16   Ht 1.88 m (6' 2\")   Wt 124.6 kg (274 lb 11.1 oz)   SpO2 98%   BMI 35.27 kg/m       Activity: A1 + walker  Pain: Denies  Neuro: A&Ox4  Cardiac: WDL  Respiratory: WDL on RA  GI/: Voiding spontaneously with urinal. No BM on shift. Denies nausea.  Diet: Renal diet, tolerating well  Lines: PIV, fistula  New this shift: Pt in dialysis until 1700. Rested most of shift. Pt was possibly going to discharge home during shift however this RN spoke with pt's wife via phone at pt's request; states she is not able to pick pt up. Pt and spouse requesting to talk with care coordinator re: plan for discharge.      Plan: Discharge when stable, possibly TCU or home.      Continue to monitor and follow POC  "

## 2019-05-18 NOTE — PLAN OF CARE
Time: 9270-5934     Reason for admission: Generalized weakness, UTI  Vitals: VSS on RA, pt refused 0200 vitals   Activity: Assistx1 with walker   Pain: Denies   Neuro:  A&Ox4, calls appropriately   Cardiac: WDL  Respiratory: WDL  GI/: Voiding spontaneously without difficulty. No BM this shift.   Diet:  Renal diet   Lines: PIV saline locked, L fistula   Labs: Pt refused 0200 BG check      Plan: Discharge to TCU once bed available      Continue to monitor and follow POC

## 2019-05-18 NOTE — PLAN OF CARE
Discharge Planner PT 5A  Patient plan for discharge: home vs TCU  Current status: pt with flat affect throughout much of session; needs encouragement for initiation of all mobility. Min A to complete bed mobility with HOB elevated; sit <> Stand transfers with CGA up to FWW; ambulates x ~100' with FWW and CGA; declines additional gait 2/2 fatigue. VSS on RA.   Barriers to return to prior living situation: mobility status, safety concerns, weakness, fatigue  Recommendations for discharge: TCU  Rationale for recommendations: to improve functional strength and activity tolerance       Entered by: Emiliano Dave 05/18/2019 11:09 AM

## 2019-05-18 NOTE — PROGRESS NOTES
Vitals stable,blood glucose stable,patient seen by provider,unable to find  opening at TCU this weekend,pt would be discharging home,discharge order written,discharge order and instructions  explained to patient.Discharge medications explained to patient,discharge ,medication obtained from pharmacy and delivered to patient.Follow up appointment explained to patient.Patient verbalized understanding of informations given,denied questions.Care coordinator arranged for taxi ride home for patient.[facility] at 1500[facility] .

## 2019-05-19 ENCOUNTER — PATIENT OUTREACH (OUTPATIENT)
Dept: CARE COORDINATION | Facility: CLINIC | Age: 69
End: 2019-05-19

## 2019-05-19 NOTE — PLAN OF CARE
Physical Therapy Discharge Summary    Reason for therapy discharge:    Discharged to recommended to TCU however pt discharged to home with HHPT/OT     Progress towards therapy goal(s). See goals on Care Plan in [facility] electronic health record for goal details.  Goals not met.  Barriers to achieving goals:   discharge from facility.    Therapy recommendation(s):    Continued therapy is recommended.  Rationale/Recommendations:  to improve functional strength and safety with mobility.

## 2019-06-06 ENCOUNTER — APPOINTMENT (OUTPATIENT)
Dept: PHYSICAL THERAPY | Facility: CLINIC | Age: 69
End: 2019-06-06
Attending: NURSE PRACTITIONER
Payer: MEDICARE

## 2019-06-06 ENCOUNTER — HOSPITAL ENCOUNTER (OUTPATIENT)
Facility: CLINIC | Age: 69
Setting detail: OBSERVATION
Discharge: HOME | End: 2019-06-06
Attending: EMERGENCY MEDICINE | Admitting: EMERGENCY MEDICINE
Payer: MEDICARE

## 2019-06-06 ENCOUNTER — RECORDS - HEALTHEAST (OUTPATIENT)
Dept: LAB | Facility: CLINIC | Age: 69
End: 2019-06-06

## 2019-06-06 VITALS
RESPIRATION RATE: 16 BRPM | HEART RATE: 68 BPM | SYSTOLIC BLOOD PRESSURE: 106 MMHG | TEMPERATURE: 99 F | HEIGHT: 74 IN | DIASTOLIC BLOOD PRESSURE: 49 MMHG | WEIGHT: 284.39 LBS | OXYGEN SATURATION: 97 % | BODY MASS INDEX: 36.5 KG/M2

## 2019-06-06 DIAGNOSIS — N18.6 END STAGE RENAL DISEASE (H): ICD-10-CM

## 2019-06-06 DIAGNOSIS — I48.91 ATRIAL FIBRILLATION, UNSPECIFIED TYPE (H): ICD-10-CM

## 2019-06-06 DIAGNOSIS — I12.0 MALIGNANT HYPERTENSIVE KIDNEY DISEASE WITH CHRONIC KIDNEY DISEASE STAGE V OR END STAGE RENAL DISEASE (H): ICD-10-CM

## 2019-06-06 DIAGNOSIS — Z99.2 ENCOUNTER FOR EXTRACORPOREAL DIALYSIS (H): ICD-10-CM

## 2019-06-06 DIAGNOSIS — R53.1 WEAKNESS: ICD-10-CM

## 2019-06-06 DIAGNOSIS — E11.22 TYPE 2 DIABETES MELLITUS WITH CHRONIC KIDNEY DISEASE, WITHOUT LONG-TERM CURRENT USE OF INSULIN, UNSPECIFIED CKD STAGE (H): ICD-10-CM

## 2019-06-06 LAB
ALBUMIN SERPL-MCNC: 2.8 G/DL (ref 3.4–5)
ALBUMIN UR-MCNC: 30 MG/DL
ALP SERPL-CCNC: 81 U/L (ref 40–150)
ALT SERPL W P-5'-P-CCNC: 9 U/L (ref 0–70)
ANION GAP SERPL CALCULATED.3IONS-SCNC: 11 MMOL/L (ref 3–14)
APPEARANCE UR: ABNORMAL
AST SERPL W P-5'-P-CCNC: 19 U/L (ref 0–45)
BASOPHILS # BLD AUTO: 0.1 10E9/L (ref 0–0.2)
BASOPHILS NFR BLD AUTO: 0.9 %
BILIRUB SERPL-MCNC: 0.5 MG/DL (ref 0.2–1.3)
BILIRUB UR QL STRIP: NEGATIVE
BUN SERPL-MCNC: 23 MG/DL (ref 7–30)
CALCIUM SERPL-MCNC: 8.4 MG/DL (ref 8.5–10.1)
CHLORIDE SERPL-SCNC: 103 MMOL/L (ref 94–109)
CO2 SERPL-SCNC: 20 MMOL/L (ref 20–32)
COLOR UR AUTO: YELLOW
CREAT SERPL-MCNC: 2.47 MG/DL (ref 0.66–1.25)
DIFFERENTIAL METHOD BLD: ABNORMAL
EOSINOPHIL # BLD AUTO: 0.1 10E9/L (ref 0–0.7)
EOSINOPHIL NFR BLD AUTO: 0.8 %
ERYTHROCYTE [DISTWIDTH] IN BLOOD BY AUTOMATED COUNT: 13.3 % (ref 10–15)
GFR SERPL CREATININE-BSD FRML MDRD: 26 ML/MIN/{1.73_M2}
GLUCOSE BLDC GLUCOMTR-MCNC: 135 MG/DL (ref 70–99)
GLUCOSE BLDC GLUCOMTR-MCNC: 77 MG/DL (ref 70–99)
GLUCOSE BLDC GLUCOMTR-MCNC: 83 MG/DL (ref 70–99)
GLUCOSE SERPL-MCNC: 98 MG/DL (ref 70–99)
GLUCOSE UR STRIP-MCNC: NEGATIVE MG/DL
HCT VFR BLD AUTO: 30.5 % (ref 40–53)
HGB BLD-MCNC: 10.1 G/DL (ref 13.3–17.7)
HGB UR QL STRIP: NEGATIVE
IMM GRANULOCYTES # BLD: 0 10E9/L (ref 0–0.4)
IMM GRANULOCYTES NFR BLD: 0.3 %
INTERPRETATION ECG - MUSE: NORMAL
KETONES UR STRIP-MCNC: NEGATIVE MG/DL
LEUKOCYTE ESTERASE UR QL STRIP: ABNORMAL
LYMPHOCYTES # BLD AUTO: 0.7 10E9/L (ref 0.8–5.3)
LYMPHOCYTES NFR BLD AUTO: 9.1 %
MCH RBC QN AUTO: 34 PG (ref 26.5–33)
MCHC RBC AUTO-ENTMCNC: 33.1 G/DL (ref 31.5–36.5)
MCV RBC AUTO: 103 FL (ref 78–100)
MONOCYTES # BLD AUTO: 0.5 10E9/L (ref 0–1.3)
MONOCYTES NFR BLD AUTO: 6.4 %
NEUTROPHILS # BLD AUTO: 6.1 10E9/L (ref 1.6–8.3)
NEUTROPHILS NFR BLD AUTO: 82.5 %
NITRATE UR QL: NEGATIVE
NRBC # BLD AUTO: 0 10*3/UL
NRBC BLD AUTO-RTO: 0 /100
PH UR STRIP: 5 PH (ref 5–7)
PLATELET # BLD AUTO: 183 10E9/L (ref 150–450)
POTASSIUM SERPL-SCNC: 3.5 MMOL/L (ref 3.4–5.3)
PROT SERPL-MCNC: 7.1 G/DL (ref 6.8–8.8)
RBC # BLD AUTO: 2.97 10E12/L (ref 4.4–5.9)
RBC #/AREA URNS AUTO: <1 /HPF (ref 0–2)
SODIUM SERPL-SCNC: 134 MMOL/L (ref 133–144)
SOURCE: ABNORMAL
SP GR UR STRIP: 1.01 (ref 1–1.03)
SQUAMOUS #/AREA URNS AUTO: 8 /HPF (ref 0–1)
TRANS CELLS #/AREA URNS HPF: <1 /HPF (ref 0–1)
UROBILINOGEN UR STRIP-MCNC: NORMAL MG/DL (ref 0–2)
WBC # BLD AUTO: 7.4 10E9/L (ref 4–11)
WBC #/AREA URNS AUTO: 18 /HPF (ref 0–5)

## 2019-06-06 PROCEDURE — 40000894 ZZH STATISTIC OT IP EVAL DEFER

## 2019-06-06 PROCEDURE — 00000146 ZZHCL STATISTIC GLUCOSE BY METER IP

## 2019-06-06 PROCEDURE — A9270 NON-COVERED ITEM OR SERVICE: HCPCS | Mod: GY | Performed by: NURSE PRACTITIONER

## 2019-06-06 PROCEDURE — 87086 URINE CULTURE/COLONY COUNT: CPT | Performed by: EMERGENCY MEDICINE

## 2019-06-06 PROCEDURE — 25000132 ZZH RX MED GY IP 250 OP 250 PS 637: Mod: GY | Performed by: NURSE PRACTITIONER

## 2019-06-06 PROCEDURE — 97161 PT EVAL LOW COMPLEX 20 MIN: CPT | Mod: GP

## 2019-06-06 PROCEDURE — 81003 URINALYSIS AUTO W/O SCOPE: CPT | Performed by: EMERGENCY MEDICINE

## 2019-06-06 PROCEDURE — 93010 ELECTROCARDIOGRAM REPORT: CPT | Mod: Z6 | Performed by: EMERGENCY MEDICINE

## 2019-06-06 PROCEDURE — 85025 COMPLETE CBC W/AUTO DIFF WBC: CPT | Performed by: EMERGENCY MEDICINE

## 2019-06-06 PROCEDURE — 80053 COMPREHEN METABOLIC PANEL: CPT | Performed by: EMERGENCY MEDICINE

## 2019-06-06 PROCEDURE — 93005 ELECTROCARDIOGRAM TRACING: CPT | Performed by: EMERGENCY MEDICINE

## 2019-06-06 PROCEDURE — 99285 EMERGENCY DEPT VISIT HI MDM: CPT | Performed by: EMERGENCY MEDICINE

## 2019-06-06 PROCEDURE — G0378 HOSPITAL OBSERVATION PER HR: HCPCS

## 2019-06-06 PROCEDURE — 99235 HOSP IP/OBS SAME DATE MOD 70: CPT | Mod: 25 | Performed by: NURSE PRACTITIONER

## 2019-06-06 RX ORDER — ONDANSETRON 4 MG/1
4 TABLET, ORALLY DISINTEGRATING ORAL EVERY 6 HOURS PRN
Status: DISCONTINUED | OUTPATIENT
Start: 2019-06-06 | End: 2019-06-06 | Stop reason: HOSPADM

## 2019-06-06 RX ORDER — NALOXONE HYDROCHLORIDE 0.4 MG/ML
.1-.4 INJECTION, SOLUTION INTRAMUSCULAR; INTRAVENOUS; SUBCUTANEOUS
Status: DISCONTINUED | OUTPATIENT
Start: 2019-06-06 | End: 2019-06-06 | Stop reason: HOSPADM

## 2019-06-06 RX ORDER — DEXTROSE MONOHYDRATE 25 G/50ML
25-50 INJECTION, SOLUTION INTRAVENOUS
Status: DISCONTINUED | OUTPATIENT
Start: 2019-06-06 | End: 2019-06-06 | Stop reason: HOSPADM

## 2019-06-06 RX ORDER — ACETAMINOPHEN 325 MG/1
650 TABLET ORAL EVERY 4 HOURS PRN
Status: DISCONTINUED | OUTPATIENT
Start: 2019-06-06 | End: 2019-06-06 | Stop reason: HOSPADM

## 2019-06-06 RX ORDER — ASPIRIN 81 MG/1
81 TABLET ORAL DAILY
Status: DISCONTINUED | OUTPATIENT
Start: 2019-06-06 | End: 2019-06-06 | Stop reason: HOSPADM

## 2019-06-06 RX ORDER — ACETAMINOPHEN 650 MG/1
650 SUPPOSITORY RECTAL EVERY 4 HOURS PRN
Status: DISCONTINUED | OUTPATIENT
Start: 2019-06-06 | End: 2019-06-06 | Stop reason: HOSPADM

## 2019-06-06 RX ORDER — FOLIC ACID 1 MG/1
1 TABLET ORAL DAILY
Status: DISCONTINUED | OUTPATIENT
Start: 2019-06-06 | End: 2019-06-06 | Stop reason: HOSPADM

## 2019-06-06 RX ORDER — LIDOCAINE 40 MG/G
CREAM TOPICAL
Status: DISCONTINUED | OUTPATIENT
Start: 2019-06-06 | End: 2019-06-06 | Stop reason: HOSPADM

## 2019-06-06 RX ORDER — LANOLIN ALCOHOL/MO/W.PET/CERES
100 CREAM (GRAM) TOPICAL DAILY
Status: DISCONTINUED | OUTPATIENT
Start: 2019-06-06 | End: 2019-06-06 | Stop reason: HOSPADM

## 2019-06-06 RX ORDER — NICOTINE POLACRILEX 4 MG
15-30 LOZENGE BUCCAL
Status: DISCONTINUED | OUTPATIENT
Start: 2019-06-06 | End: 2019-06-06 | Stop reason: HOSPADM

## 2019-06-06 RX ORDER — ONDANSETRON 2 MG/ML
4 INJECTION INTRAMUSCULAR; INTRAVENOUS EVERY 6 HOURS PRN
Status: DISCONTINUED | OUTPATIENT
Start: 2019-06-06 | End: 2019-06-06 | Stop reason: HOSPADM

## 2019-06-06 RX ADMIN — ASPIRIN 81 MG: 81 TABLET, COATED ORAL at 09:00

## 2019-06-06 RX ADMIN — MELATONIN 1000 UNITS: at 09:01

## 2019-06-06 RX ADMIN — Medication 100 MG: at 09:01

## 2019-06-06 RX ADMIN — FOLIC ACID 1 MG: 1 TABLET ORAL at 09:00

## 2019-06-06 ASSESSMENT — ENCOUNTER SYMPTOMS
CONFUSION: 0
WEAKNESS: 1
HEADACHES: 0
SHORTNESS OF BREATH: 0
VOMITING: 0
DIFFICULTY URINATING: 0
COUGH: 0
EYE REDNESS: 0
NECK STIFFNESS: 0
DIARRHEA: 0
ABDOMINAL PAIN: 0
ARTHRALGIAS: 0
COLOR CHANGE: 0
NAUSEA: 0
FEVER: 0

## 2019-06-06 ASSESSMENT — MIFFLIN-ST. JEOR: SCORE: 2129.75

## 2019-06-06 NOTE — PLAN OF CARE
"/61 (BP Location: Right arm)   Pulse 68   Temp 98.4  F (36.9  C) (Oral)   Resp 16   Ht 1.88 m (6' 2\")   Wt 129 kg (284 lb 6.3 oz)   SpO2 100%   BMI 36.51 kg/m       PRIOR TO DISCHARGE    Comments: -diagnostic tests and consults completed and resulted : No  -vital signs normal or at patient baseline : Yes  -safe disposition plan has been identified : No  Nurse to notify provider when observation goals have been met and patient is ready for discharge.        "

## 2019-06-06 NOTE — PROGRESS NOTES
Writer gave report to Vianey [facility] nurse, pt's transport will come at 1545 per report. He is all dressed, clothes packed(Red T shirt and black underwear) and ready to go.

## 2019-06-06 NOTE — ED PROVIDER NOTES
"  History     Chief Complaint   Patient presents with     Fall     HPI  Fer Latham is a 69 year old male with a history of ESRD on hemodialysis (M/W/F), chronic atrial fibrillation, DM-2, depression, alcoholism, CAD, rhabdomyolysis who presents to the Emergency Department today for evaluation following a fall and generalized weakness.  Per chart review, the patient was admitted from 5/10-5/18 for fall, generalized weakness and a UTI.    Today, the patient reports that he was walking to his bedroom when he did not fell to the ground.  He describes this as a \"melted\" to the ground.  He denies any loss of consciousness and denies hitting his head.  He did not sustain any injuries from the fall.  The patient reports that his walking has been affected due to sepsis in the past. The patient does use a walker to get around his house and uses a wheelchair for longer distances. The patient denies having any fever, cough, cold, chest pain, abdominal pain, nausea, vomiting, diarrhea, or shortness of breath.  Patient c/o of generalized weakness that has been getting worse. No other medical complaints.     I have reviewed the Medications, Allergies, Past Medical and Surgical History, and Social History in the [employer] system.    Past Medical History:   Diagnosis Date     Anemia      Arrhythmia     atrial fib     Arthritis 1990's    gout     BPH (benign prostatic hyperplasia)      Depression      Diabetes mellitus (H)     Type 2  IDDM     Diverticulosis      ESRD (end stage renal disease) on dialysis (H)      Hematuria      HTN (hypertension)      HTN (hypertension)      IgA nephropathy      MI, old      Pneumonia      Rhabdomyolysis      Weakness 5/10/2019     Past Surgical History:   Procedure Laterality Date     ABDOMEN SURGERY       APPENDECTOMY  1970's     ARTHROSCOPY KNEE  6/27/2013    Procedure: ARTHROSCOPY KNEE;  Right Knee Arthroscopy, Debridment Of Medial Meniscal Tear.  ;  Surgeon: Tomás Wong MD;  [facility] " [facility]     BACK SURGERY  1999    L5 S1 decompression     BIOPSY  2017    skin     COLONOSCOPY  2013    [facility]     CORONARY ANGIOGRAPHY ADULT ORDER  2018     EYE SURGERY Bilateral 2004    Lens replacement     VASCULAR SURGERY       Family History   Problem Relation Age of Onset     Cancer Father      Social History     Tobacco Use     Smoking status: Former Smoker     Smokeless tobacco: Never Used     Tobacco comment: quit 35 years ago   Substance Use Topics     Alcohol use: Yes     Comment: 1 to 2 drinks a day (Occasional)     No current facility-administered medications for this encounter.      Current Outpatient Medications   Medication     aspirin 81 MG EC tablet     cholecalciferol 1000 units TABS     folic acid (FOLVITE) 1 MG tablet     vitamin B1 (THIAMINE) 100 MG tablet     Allergies   Allergen Reactions     Cephalexin      Ciprofloxacin      Diagnostic X-Ray Materials      Sulfa Drugs Hives      Review of Systems   Constitutional: Negative for fever.   HENT: Negative for congestion.    Eyes: Negative for redness.   Respiratory: Negative for cough and shortness of breath.    Cardiovascular: Negative for chest pain.   Gastrointestinal: Negative for abdominal pain, diarrhea, nausea and vomiting.   Genitourinary: Negative for difficulty urinating.   Musculoskeletal: Negative for arthralgias and neck stiffness.   Skin: Negative for color change.   Neurological: Positive for weakness. Negative for syncope and headaches.   Psychiatric/Behavioral: Negative for confusion.     Physical Exam   BP: 103/61  Pulse: 72  Temp: 97.9  F (36.6  C)  Resp: 16  Height: 188 cm (6' 2\")  SpO2: 98 %    Physical Exam   Constitutional: He is oriented to person, place, and time.   Appears weak but otherwise no distress   HENT:   Head: Normocephalic and atraumatic.   Mouth/Throat: Oropharynx is clear and moist.   Eyes: Conjunctivae are normal.   Neck: Normal range of motion. Neck supple.   Cardiovascular: Normal rate and normal heart " sounds.   Atrial fibrillation   Pulmonary/Chest: Effort normal and breath sounds normal. No respiratory distress.   Abdominal: Soft. There is no tenderness.   Musculoskeletal: He exhibits no tenderness or deformity.   Neurological: He is alert and oriented to person, place, and time. No cranial nerve deficit.   Skin: Skin is warm and dry. No rash noted.   Psychiatric: He has a normal mood and affect. His behavior is normal.       ED Course   2:00 AM  The patient was seen and examined by Dr. Juarez in Room 06.       Procedures             EKG Interpretation:      Interpreted by Samra Juarez  Time reviewed: 0143  Symptoms at time of EKG: weakness   Rhythm: atrial fibrillation - controlled  Rate: Normal  Axis: Normal  Ectopy: none  Conduction: normal  ST Segments/ T Waves: No acute ischemic changes  Comparison to prior: Unchanged    Clinical Impression: atrial fibrillation (chronic, rate controlled) with no acute ischemic change    .  Results for orders placed or performed during the hospital encounter of 06/06/19   CBC with platelets differential   Result Value Ref Range    WBC 7.4 4.0 - 11.0 10e9/L    RBC Count 2.97 (L) 4.4 - 5.9 10e12/L    Hemoglobin 10.1 (L) 13.3 - 17.7 g/dL    Hematocrit 30.5 (L) 40.0 - 53.0 %     (H) 78 - 100 fl    MCH 34.0 (H) 26.5 - 33.0 pg    MCHC 33.1 31.5 - 36.5 g/dL    RDW 13.3 10.0 - 15.0 %    Platelet Count 183 150 - 450 10e9/L    Diff Method Automated Method     % Neutrophils 82.5 %    % Lymphocytes 9.1 %    % Monocytes 6.4 %    % Eosinophils 0.8 %    % Basophils 0.9 %    % Immature Granulocytes 0.3 %    Nucleated RBCs 0 0 /100    Absolute Neutrophil 6.1 1.6 - 8.3 10e9/L    Absolute Lymphocytes 0.7 (L) 0.8 - 5.3 10e9/L    Absolute Monocytes 0.5 0.0 - 1.3 10e9/L    Absolute Eosinophils 0.1 0.0 - 0.7 10e9/L    Absolute Basophils 0.1 0.0 - 0.2 10e9/L    Abs Immature Granulocytes 0.0 0 - 0.4 10e9/L    Absolute Nucleated RBC 0.0    Comprehensive metabolic panel   Result Value  Ref Range    Sodium 134 133 - 144 mmol/L    Potassium 3.5 3.4 - 5.3 mmol/L    Chloride 103 94 - 109 mmol/L    Carbon Dioxide 20 20 - 32 mmol/L    Anion Gap 11 3 - 14 mmol/L    Glucose 98 70 - 99 mg/dL    Urea Nitrogen 23 7 - 30 mg/dL    Creatinine 2.47 (H) 0.66 - 1.25 mg/dL    GFR Estimate 26 (L) >60 mL/min/[1.73_m2]    GFR Estimate If Black 30 (L) >60 mL/min/[1.73_m2]    Calcium 8.4 (L) 8.5 - 10.1 mg/dL    Bilirubin Total 0.5 0.2 - 1.3 mg/dL    Albumin 2.8 (L) 3.4 - 5.0 g/dL    Protein Total 7.1 6.8 - 8.8 g/dL    Alkaline Phosphatase 81 40 - 150 U/L    ALT 9 0 - 70 U/L    AST 19 0 - 45 U/L   EKG 12-lead, tracing only   Result Value Ref Range    Interpretation ECG Click View Image link to view waveform and result        Assessments & Plan (with Medical Decision Making)   Fer Latham is a 69 year old male with a history of ESRD on hemodialysis (M/W/F), chronic atrial fibrillation, DM-2, depression, alcoholism, CAD, rhabdomyolysis who presents to the Emergency Department today for generalized weakness.  Upon arrival patient appears fatigued, weak but is otherwise in no distress.  Patient reports worsening weakness with an episode this evening in which he melted to the ground.  Patient denies any falling, reports he did not obtain any injuries or hit his head.  I reviewed EKG which demonstrates atrial fibrillation rate controlled with ventricular rate of 72 bpm with no acute ischemic change, I reviewed comprehensive labs which are unremarkable with white blood cell count 7.4, hemoglobin 10.1, no acute electrolyte abnormality, creatinine 2.47 which is near patient's baseline, patient completed his entire run of hemodialysis yesterday (M,W,F).  Patient has no signs of traumatic injuries, no signs of acute infection.  I discussed results with patient and discussed with him option to discharge home versus coming in for further evaluation of his increasing weakness and plan for higher level care such as a transitional  care unit or assisted living.  Patient would prefer to come in for weakness as it is difficult for his wife to help take care of him at home.  Would be interested in placement.  I discussed with nurse practitioner and in the observation unit and will plan on ED observation admission for generalized weakness and likely placement.  Patient understands and agrees to the plan.    I have reviewed the nursing notes.    I have reviewed the findings, diagnosis, plan and need for follow up with the patient.     Medication List      There are no discharge medications for this visit.       Final diagnoses:   Weakness   Rik KEITH, am serving as a trained medical scribe to document services personally performed by Leticia Juarez MD, based on the provider's statements to me.   Leticia KEITH MD, was physically present and have reviewed and verified the accuracy of this note documented by Rik Tapia.     6/6/2019   [facility], [place], EMERGENCY DEPARTMENT     Samra Juarez MD  06/06/19 0352

## 2019-06-06 NOTE — ED TRIAGE NOTES
"Patient BIBA. \"Feeling more weak than normal\" and fell when trying to get into to bed. Hx afib, sepsis, and dialysis. BS 98.  "

## 2019-06-06 NOTE — PLAN OF CARE
"/49 (BP Location: Right arm)   Pulse 68   Temp 99  F (37.2  C) (Oral)   Resp 16   Ht 1.88 m (6' 2\")   Wt 129 kg (284 lb 6.3 oz)   SpO2 97%   BMI 36.51 kg/m      PRIOR TO DISCHARGE    Comments: -diagnostic tests and consults completed and resulted : Yes.  -vital signs normal or at patient baseline : Yes  -safe disposition plan has been identified : Yes, to a TCU at 1545.  Nurse to notify provider when observation goals have been met and patient is ready for discharge.       "

## 2019-06-06 NOTE — PROGRESS NOTES
Pt arrived from ED via cart and denied pain and nausea.He was asking for his shoes and socks but didn't come with them and called ED and thy said he didn't come to hospital with them. On isolation for MRSA.Had last dialysis Wednesday. Lungs clear.

## 2019-06-06 NOTE — LETTER
Health Information Management Services               Recipient:          Sender:          Date: June 6, 2019  Patient Name:  Fer Latham  Routing Message:            The documents accompanying this notice contain confidential information belonging to the sender.  This information is intended only for the use of the individual or entity named above.  The authorized recipient of this information is prohibited from disclosing this information to any other party and is required to destroy the information after its stated need has been fulfilled, unless otherwise required by state law.      If you are not the intended recipient, you are hereby notified that any disclosure, copy, distribution or action taken in reliance on the contents of these documents is strictly prohibited.  If you have received this document in error, please return it by fax to [phone] with a note on the cover sheet explaining why you are returning it (e.g. not your patient, not your provider, etc.).  If you need further assistance, please call [place] Centralized Transcription at [phone].  Documents may also be returned by mail to [place], , [facility] [address], [place], [place] [address].

## 2019-06-06 NOTE — H&P
[facility], [place]    History and Physical - ED Observation       Date of Admission:  6/6/2019    Assessment & Plan   Fer Latham is a 69 year old Fer Latham is a 69 year old male with a history of ESRD on hemodialysis (M/W/F), chronic atrial fibrillation, DM-2, depression, alcoholism, CAD, rhabdomyolysis who presents to the Emergency Department today for generalized weakness.Pt was recently admitted 5/10-5/18 for fall, generalized weakness, and a UTI.     1. Generalized Weakness: Pt reports worsening weakness since his discharge to home on 5/18 with an episode tonight of melting to the ground. He denies falling, or any injuries or hitting his head. In the ED, /61, HR 72, Temp 97.9, RR 16, SPO2 98% on RA. EKG shows atrial fibrillation- rate controlled with no acute ischemic changes. Labs were drawn and fairly unremarkable- Na 134, K+ 3.5, Cr 2.47, BUN 23. Creatinine is near his baseline. WBC 7.4, Hgb 10.1, hematocrit 30.5. Pt has no signs of acute infection or traumatic injury. He does still make minimal urine but only once per day or so, so UA has not yet been collected. Pt would like to be evaluated for a possible TCU placement, which was offered at the time of his discharge but he opted to go home rather than wait for a bed. Plan for admission to ED Observation for evaluation by PT/OT and placement.   -Admit to ED Observation  -VS Q4hrs  -UA pending, pt makes very little urine  -PT/OT  -SW for placement  -Walker ordered    2. Afib, rate controlled but not on anticoagulation as pt refused.  -Telemetry    3. DM2: Pt was hypoglycemic during last admission even on only 5 units, half of his old Lantus dose. Pt not taking anything.   -Moderate carb diet  -Glucose checks 4x daily    4. ESRD on hemodialysis. Pt got full run of dialysis at  [facility] during his F dialysis on Wednesday.    Chronic Medical Problems  #Depression: Pt has declined sertraline and diazepam. Admits to  "being depressed about being on hemodialysis and doesn't enjoy things he once used to, but declines medications.   #Anemia of chronic disease: Baseline 10-11, today Hgb 10.1  #CAD: Hx of MI per chart review. Continue daily ASA 81mg daily  #Alcohol dependence: 4 drinks/night on average, no hx of withdrawal or seizures. Thiamine and folate supplement started on discharge but pt has not been taking.        Diet: Moderate carbohydrate  DVT Prophylaxis: Ambulatory with no VTE prophylaxis indicated  Brown Catheter: not present  Code Status: full    Disposition Plan   Expected discharge: Today, recommended to transitional care unit once safe disposition plan/ TCU bed available.  Entered: Rosalinda Dee CNP 06/06/2019, 4:06 AM     The patient's care was discussed with the ED MD, Samra Juarez.    Rosalinda Dee CNP  [facility], [place]  ED Observation, 6D   Ascom: [id number]  ______________________________________________________________________    Chief Complaint   Weakness    History is obtained from the patient    History of Present Illness   Per ED,\"Fer Latham is a 69 year old male with a history of ESRD on hemodialysis (M/W/F), chronic atrial fibrillation, DM-2, depression, alcoholism, CAD, rhabdomyolysis who presents to the Emergency Department today for evaluation following a fall and generalized weakness.  Per chart review, the patient was admitted from 5/10-5/18 for fall, generalized weakness and a UTI.     Today, the patient reports that he was walking to his bedroom when he did not fell to the ground.  He describes this as a \"melted\" to the ground.  He denies any loss of consciousness and denies hitting his head.  He did not sustain any injuries from the fall.  The patient reports that his walking has been affected due to sepsis in the past. The patient does use a walker to get around his house and uses a wheelchair for longer distances. The patient denies having any fever, cough, cold, " "chest pain, abdominal pain, nausea, vomiting, diarrhea, or shortness of breath.  Patient c/o of generalized weakness that has been getting worse. No other medical complaints.\"    Review of Systems    The 10 point Review of Systems is negative other than noted in the HPI or here. Weakness    Past Medical History    I have reviewed this patient's medical history and updated it with pertinent information if needed.   Past Medical History:   Diagnosis Date     Anemia      Arrhythmia     atrial fib     Arthritis 1990's    gout     BPH (benign prostatic hyperplasia)      Depression      Diabetes mellitus (H)     Type 2  IDDM     Diverticulosis      ESRD (end stage renal disease) on dialysis (H)      Hematuria      HTN (hypertension)      HTN (hypertension)      IgA nephropathy      MI, old      Pneumonia      Rhabdomyolysis      Weakness 5/10/2019       Past Surgical History   I have reviewed this patient's surgical history and updated it with pertinent information if needed.  Past Surgical History:   Procedure Laterality Date     ABDOMEN SURGERY       APPENDECTOMY  1970's     ARTHROSCOPY KNEE  6/27/2013    Procedure: ARTHROSCOPY KNEE;  Right Knee Arthroscopy, Debridment Of Medial Meniscal Tear.  ;  Surgeon: Tomás Wong MD;  [facility]     BACK SURGERY  1999    L5 S1 decompression     BIOPSY  2017    skin     COLONOSCOPY  2013    [facility]     CORONARY ANGIOGRAPHY ADULT ORDER  2018     EYE SURGERY Bilateral 2004    Lens replacement     VASCULAR SURGERY         Social History   I have reviewed this patient's social history and updated it with pertinent information if needed.  Social History     Tobacco Use     Smoking status: Former Smoker     Smokeless tobacco: Never Used     Tobacco comment: quit 35 years ago   Substance Use Topics     Alcohol use: Yes     Comment: 1 to 2 drinks a day (Occasional)     Drug use: No       Family History   I have reviewed this patient's family history and updated it with pertinent " information if needed.   Family History   Problem Relation Age of Onset     Cancer Father        Prior to Admission Medications   Prior to Admission Medications   Prescriptions Last Dose Informant Patient Reported? Taking?   aspirin 81 MG EC tablet   Yes No   Sig: Take 81 mg by mouth daily   cholecalciferol 1000 units TABS   Yes No   Sig: Take 1 tablet by mouth daily   folic acid (FOLVITE) 1 MG tablet   No No   Sig: Take 1 tablet (1 mg) by mouth daily   vitamin B1 (THIAMINE) 100 MG tablet   No No   Sig: Take 1 tablet (100 mg) by mouth daily      Facility-Administered Medications: None     Allergies   Allergies   Allergen Reactions     Cephalexin      Ciprofloxacin      Diagnostic X-Ray Materials      Sulfa Drugs Hives       Physical Exam   Vital Signs: Temp: 97.9  F (36.6  C) Temp src: Oral BP: 102/62 Pulse: 69   Resp: 16 SpO2: 99 % O2 Device: None (Room air)    Weight: 0 lbs 0 oz    Constitutional: awake, alert, cooperative, no apparent distress, and appears stated age and weak  Eyes: Lids and lashes normal, pupils equal, round and reactive to light, extra ocular muscles intact, sclera clear, conjunctiva normal  ENT: Normocephalic, without obvious abnormality, atraumatic, sinuses nontender on palpation, external ears without lesions, oral pharynx with moist mucous membranes, tonsils without erythema or exudates, gums normal and good dentition.  Respiratory: No increased work of breathing, good air exchange, clear to auscultation bilaterally, no crackles or wheezing  Cardiovascular: Normal apical impulse, regular rate and rhythm, normal S1 and S2, no S3 or S4, and no murmur noted  GI: No scars, normal bowel sounds, soft, non-distended, non-tender, no masses palpated, no hepatosplenomegally  Skin: normal skin color, texture, turgor  Musculoskeletal: There is no redness, warmth, or swelling of the joints.  Full range of motion noted.  Motor strength is 5 out of 5 all extremities bilaterally.  Tone is  normal.  Neurologic: Awake, alert, oriented to name, place and time.  Cranial nerves II-XII are grossly intact.  Motor is 5 out of 5 bilaterally.  Cerebellar finger to nose, heel to shin intact.  Sensory is intact.  Babinski down going, Romberg negative, and gait is normal.  Neuropsychiatric: General: normal, calm and normal eye contact  Level of consciousness: alert / normal  Orientation: oriented to self, place, time and situation    Data   Data reviewed today: I reviewed all medications, new labs and imaging results over the last 24 hours. I personally reviewed the EKG tracing showing afib, stable without acute changes.    Recent Labs   Lab 06/06/19  0136   WBC 7.4   HGB 10.1*   *         POTASSIUM 3.5   CHLORIDE 103   CO2 20   BUN 23   CR 2.47*   ANIONGAP 11   COLUMBA 8.4*   GLC 98   ALBUMIN 2.8*   PROTTOTAL 7.1   BILITOTAL 0.5   ALKPHOS 81   ALT 9   AST 19

## 2019-06-06 NOTE — PLAN OF CARE
" Observation goals PRIOR TO DISCHARGE     Comments: -diagnostic tests and consults completed and resulted : No  -vital signs normal or at patient baseline : Yes  -safe disposition plan has been identified : No  Nurse to notify provider when observation goals have been met and patient is ready for discharge./86 (BP Location: Left arm)   Pulse 68   Temp 98.6  F (37  C) (Oral)   Resp 16   Ht 1.88 m (6' 2\")   Wt 129 kg (284 lb 6.3 oz)   SpO2 99%   BMI 36.51 kg/m   RA.        "

## 2019-06-06 NOTE — CONSULTS
Social Work: Assessment with Discharge Plan    Patient Name:  Fer Latham  :  1950  Age:  69 year old  MRN:  [id number]  Risk/Complexity Score:   N/A  Completed assessment with:  Chart review, medical team, therapy, pt interview at bedside, pt wife via phone, admissions liaison     Presenting Information   Reason for Referral:  Discharge plan  Date of Intake:  2019  Referral Source:  Physician  Decision Maker:  Self   Alternate Decision Maker:  Pt wife JUAN LUIS Parisi PH: [phone]  Health Care Directive:  Health Care Directive Agent (if patient not able to make decisions)- NO HCD in EMR  Living Situation:  House/apt with pt wife  Previous Functional Status:  Pt requires assistance. Did not discuss with pt in detail. Pt used walker and cane for ambulation. Pt has hx of falls. Pt wife unable to provide a lot of assistance as well due to 2 knee replacements and ankle surgery. No other assistance from family/friends.   Patient and family understanding of hospitalization:  Pt and pt wife focused on observation status. When speaking with pt wife, SWer expressed 'he came to the hospital to go to a rehab facility' and pt wife expressed 'don't put words in my mouth'. Pt and pt wife appear to have limited insight RE: safe discharge plans due to other stressors. Pt was able to acknowledge what caused pt to be admitted this admission. Pt also frustrated that the EMS brought pt without pants of shoes.    Cultural/Language/Spiritual Considerations:  Mu-ism shawn, english-speaking   Adjustment to Illness:  Pt expressed that the recent falls and increased weakness at home is 'new'. Pt appears to be in a low mood. Documented that pt has depression and ETOH use. Per SW note, pt has expressed increased depression due to need for HD and feeling hopeless. Pt has been encouraged to follow up with psychiatrist or counseling.     Physical Health  Reason for Admission:    1. Weakness      Services Needed/Recommended:   TCU    Mental Health/Chemical Dependency  Diagnosis:  Depression and ETOH use    Support/Services in Place:  No services or support in place at this time   Services Needed/Recommended:  See above, pt recommended to follow up with psychiatrist or mental health services in the community.     Support System  Significant relationship at present time:  Pt lives in a home with his wife. Wife requires min assistance as well.   Family of origin is available for support:  Pt has a son, per previous SW note, pt son is on a cruise traveling the world but isn't very involved or able to support. When asked, pt denied any additional assistance at home from any friends or family.    Other support available:  See above  Gaps in support system:  Possible CM at HD center but pt would benefit from more connection to services in the community (physical and emotional)   Patient is caregiver to:  None     Provider Information   Primary Care Physician:  Isrrael Ornelas   [phone]   Clinic:  [facility] [address] [facility]*      :  Unknown     Financial   Income Source:  nursing home, small pension and fixed income, no financial support    Financial Concerns:  Small pension, pt and pt wife expressed that HD has causes many financial problems. SWer inquired with pt wife over the phone if they have looked into applying for MA. Pt wife appeared frustrated by this questions and expressed that they don't qualify.   Insurance:    Payor/Plan Subscriber Name Rel Member # Group #   MEDICARE - MEDICARE MARIA E REESE  [id number]       ATT[employer] CLAIMS, [address]   COMMERCIAL - MARIA E LOPEZ  [id number]       [facility] CLAIM DIV, [address]       Discharge Plan   Patient and family discharge goal:  TCU due to pt not being safe or having assistance at home   Provided education on discharge plan:  YES  Patient agreeable to discharge plan:  YES  A list of Medicare Certified Facilities was provided  to the patient and/or family to encourage patient choice. Patient's choices for facility are:  See below   [facility] [place] --PH: [phone], F: [phone]  [facility] --PH: [phone], F: [phone]  [facility]-- PH: [phone], F: [phone]  [facility]-- PH: [phone], F: [phone]  [facility]-- PH: [phone], F: [phone]  [facility]-- PH: [phone], F: [phone]  [facility]-- PH: [phone], F: [phone]  [facility] --PH: [phone], F: [phone]  Will NH provide Skilled rehabilitation or complex medical:  YES  General information regarding anticipated insurance coverage and possible out of pocket cost was discussed. Patient and patient's family are aware patient may incur the cost of transportation to the facility, pending insurance payment: YES- pt met inpt criteria during previous admission 5/10-5/18  Barriers to discharge:  Bed acceptance and TCU placement     Discharge Recommendations   Anticipated Disposition:  Facility:  [facility]  Transportation Needs:  Medical:  Wheelchair  Name of Transportation Company and Phone: [employer]. Pt notified of OOP cost, pt agreeable. Pt wife also notified and agreeable. Scheduled for 2:45pm TODAY 6/6.      Additional comments   A&P-Fer Latham is a 69 year old Fer Latham is a 69 year old male with a history of ESRD on hemodialysis (M/W/F), chronic atrial fibrillation, DM-2, depression, alcoholism, CAD, rhabdomyolysis who presents to the Emergency Department today for generalized weakness.Pt was recently admitted 5/10-5/18 for fall, generalized weakness, and a UTI.   -------------------------------------------    Pt readmitted after recent discharge from [facility] on 5/18. SWer consulted to address and assist with TCU placement. Per SW note from previous admission, pt was only agreeable to TCU referrals to  [facility], [facility], [facility] and [facility]. Per chart review, pt met inpt criteria from previous admissions and has TCU coverage under Medicare at this time.     SWer met with pt at bedside to discuss referrals. Upon entering the room pt appeared frustrated and irritated. Pt expressed that he just received the notification that he was under obs and stated that he does not have the money to pay OOP for hospitalization. SWer offered support and validation. SWer discussed TCU coverage and working on placement ASAP to reduce the potential OOP cost. Pt expressed agreement. SWer offered pt a list of TCUs. Pt requested SWer fax referral to the same TCUs as last admission. SWer left pt with list and suggested pt select alternatives.     Shortly after leaving pt room, Charge RN notified SWer that pt wife is angry on the phone about pt being under obs and requested pt leave AMA. SWer called pt wife Ailin Ph: [phone], pt wife immediately expressed her frustration and demanded that pt leave AMA. SWer discussed discharge option (HHC) if pt decides to leave AMA. Pt wife then stated that the pt is not safe to return home. SWer and wife discussed at length about discharge options for home with HHC vs TCU, pt wife ultimately agreeable to SWer pursuing TCU placement.     SWer went back to visit with pt and reiterate discussion from earlier. SWer suggested that if pt is adament on D/Cing today that SWer faxes additional referrals. Pt in agreement. Pt denied preference. SWer faxed additional referrals (see below) via Epic.     SWer received call from Vianey Harris PH: [phone], F: [phone] and spoke with Day in admissions. Pt is clinically and financially accepted with a private room available TODAY. SWer called pt's HD center ([facility] PH: [phone]) and confirmed pt is on MWF schedule at 12:30 chair time. HD information passed along to admissions  at TCU. SWer met with pt at bedside to updated. Pt agreeable to discharge plan. SWer discussed OOP cost for HE w/c gloria, pt expressed understanding and agreement. USMAN castro PH: [phone] scheduled for 3:45pm today.  SWer called and updated pt wife, pt wife expressed appreciation and agreement with plan. Medical team, RN, admissions at TCU. pt and pt wife all updated on final discharge plans. PAS screen completed ([id number]) and orders faxed to the facility. No additional concerns or SW needs.     TCU Referrals:   [facility]-- PH: [phone], F: [phone]  [facility] -Still reviewing, requesting PT notes  [facility] NOT [facility]  [facility]-- Can NOT accept due to bed availability   [facility]-- PH: [phone], F: [phone]  [facility]-- PH: [phone], F: [phone]  [facility] --PH: [phone], F: [phone]    Audrey Fischer, [facility], [facility]  Acute Care Inpatient Clinical   6D-Observation Unit  Phone: [phone]  I   Pager: [phone]

## 2019-06-06 NOTE — PLAN OF CARE
PT 6D: Evaluation completed.   Discharge Planner PT   Patient plan for discharge: Does not feel strong enough for home  Current status: Limited by proximal LE weakness, impaired balance, significant fatigue/endurance deficits.  Amb x15ft with FWW, CGA then became too fatigued to walk further, also endorsed RLE instability at this time.  Difficulty rising sit<stand.  Barriers to return to prior living situation: Difficulty ambulating  Recommendations for discharge: TCU  Rationale for recommendations: Pt failed home discharge after last hospital admission when TCU had been recommended.  Pt is at falls risk, and needing increased caregiver assist.  Will benefit from skilled PT/OT in TCU setting to increase strength, balance, gait safety. ADL independence.  PT to sign off.  The discharge plan is now established, and no further services are indicated while this patient is on observation status.         Entered by: Shamika Diaz 06/06/2019 11:31 AM

## 2019-06-06 NOTE — ED NOTES
[facility] [place]   ED Nurse to Floor Handoff     Fer Latham is a 69 year old male who speaks English and lives with a spouse,  in a home  They arrived in the ED by ambulance from home    ED Chief Complaint: Fall    ED Dx;   Final diagnoses:   Weakness         Needed?: No    Allergies:   Allergies   Allergen Reactions     Cephalexin      Ciprofloxacin      Diagnostic X-Ray Materials      Sulfa Drugs Hives   .  Past Medical Hx:   Past Medical History:   Diagnosis Date     Anemia      Arrhythmia     atrial fib     Arthritis 1990's    gout     BPH (benign prostatic hyperplasia)      Depression      Diabetes mellitus (H)     Type 2  IDDM     Diverticulosis      ESRD (end stage renal disease) on dialysis (H)      Hematuria      HTN (hypertension)      HTN (hypertension)      IgA nephropathy      MI, old      Pneumonia      Rhabdomyolysis      Weakness 5/10/2019      Baseline Mental status: WDL  Current Mental Status changes: at basesline    Infection present or suspected this encounter: no  Sepsis suspected: No  Isolation type: Contact     Activity level - Baseline/Home:  Independent  Activity Level - Current:   Stand with Assist    Bariatric equipment needed?: No    In the ED these meds were given: Medications - No data to display    Drips running?  No    Home pump  No    Current LDAs  Peripheral IV 05/14/19 Right Upper forearm (Active)   Number of days: 23       Hemodialysis Vascular Access Arteriovenous fistula Left Forearm (Active)   Number of days:        Labs results:   Labs Ordered and Resulted from Time of ED Arrival Up to the Time of Departure from the ED   CBC WITH PLATELETS DIFFERENTIAL - Abnormal; Notable for the following components:       Result Value    RBC Count 2.97 (*)     Hemoglobin 10.1 (*)     Hematocrit 30.5 (*)      (*)     MCH 34.0 (*)     Absolute Lymphocytes 0.7 (*)     All other components within normal limits   COMPREHENSIVE METABOLIC PANEL  "- Abnormal; Notable for the following components:    Creatinine 2.47 (*)     GFR Estimate 26 (*)     GFR Estimate If Black 30 (*)     Calcium 8.4 (*)     Albumin 2.8 (*)     All other components within normal limits   UA MACROSCOPIC WITH REFLEX TO MICRO AND CULTURE       Imaging Studies: No results found for this or any previous visit (from the past 24 hour(s)).    Recent vital signs:   /61   Pulse 72   Temp 97.9  F (36.6  C) (Oral)   Resp 16   Ht 1.88 m (6' 2\")   SpO2 98%   BMI 35.27 kg/m      Jorge A Coma Scale Score: 15 (06/06/19 0141)       Cardiac Rhythm: Normal Sinus  Pt needs tele? No  Skin/wound Issues: pre-existing abrasion to right foot, missing toenail on left toe.    Code Status: Full Code    Pain control: pt had none    Nausea control: pt had none    Abnormal labs/tests/findings requiring intervention: see epic    Family present during ED course? No   Family Comments/Social Situation comments: none    Tasks needing completion: None    Nena Lopez, RN    [phone] [facility]    "

## 2019-06-06 NOTE — PLAN OF CARE
OT/6D:  Defer - OT orders received and acknowledged.  For utilization purposes, OT is not indicated during IP hospital admission.  PT performed evaluation and determined pt to be appropriate for TCU.  Pt and family in agreement with plan.  Per chart review and discussion with interdisciplinary team, OT agrees with this assessment.  Pt has been accepted to TCU and tentatively scheduled to discharge today.  OT to complete orders.  Please re-order should new needs arise.  Thank you for the referral.

## 2019-06-06 NOTE — DISCHARGE SUMMARY
Discharge Summary    Fer Latham MRN# [id number]   YOB: 1950 Age: 69 year old     Date of Admission:  6/6/2019  Date of Discharge:  6/6/2019  Admitting Physician:  Samra Juarez MD  Discharge Physician:  Michaela Pineda  Discharging Service:  Emergency Medicine     Primary Provider: Isrrael Ornelas          Discharge Diagnosis:     Weakness    * No resolved hospital problems. *               Discharge Disposition:   Discharged to TCU           Condition on Discharge:   Discharge condition: Stable   Code status on discharge: Full Code           Procedures:   No procedures performed during this admission          Discharge Medications:     Current Discharge Medication List      CONTINUE these medications which have NOT CHANGED    Details   aspirin 81 MG EC tablet Take 81 mg by mouth daily      cholecalciferol 1000 units TABS Take 1 tablet by mouth daily      folic acid (FOLVITE) 1 MG tablet Take 1 tablet (1 mg) by mouth daily  Qty: 90 tablet, Refills: 0    Associated Diagnoses: Alcohol abuse      vitamin B1 (THIAMINE) 100 MG tablet Take 1 tablet (100 mg) by mouth daily  Qty: 90 tablet, Refills: 0    Associated Diagnoses: Alcohol abuse                   Consultations:   No consultations were requested during this admission             Brief History of Illness:   Please see detailed H&P from 6/6/19, in brief: Fer Latham is a 69 year old Fer Latham is a 69 year old male with a history of ESRD on hemodialysis (M/W/F), chronic atrial fibrillation, DM-2, depression, alcoholism, CAD, rhabdomyolysis who presents to the Emergency Department today for generalized weakness.Pt was recently admitted 5/10-5/18 for fall, generalized weakness, and a UTI.           Hospital Course:   1. Generalized Weakness: Pt reports worsening weakness since his discharge to home on 5/18 with an episode tonight of melting to the ground. He denies falling, or any injuries or hitting his head. In the ED, BP  "103/61, HR 72, Temp 97.9, RR 16, SPO2 98% on RA. EKG shows atrial fibrillation- rate controlled with no acute ischemic changes. Labs were drawn and fairly unremarkable- Na 134, K+ 3.5, Cr 2.47, BUN 23. Creatinine is near his baseline. WBC 7.4, Hgb 10.1, hematocrit 30.5. Pt has no signs of acute infection or traumatic injury. He does still make minimal urine but only once per day or so, so UA has not yet been collected. Pt would like to be evaluated for a possible TCU placement, which was offered at the time of his discharge but he opted to go home rather than wait for a bed. Plan for admission to ED Observation for evaluation by PT/OT and placement. Pt consulted and recommended TCU, this was arranged this social work. UA showed 18 WBCs, large LE, nitrite negative.  UC pending.  Will await UC prior to treating as last UC had no growth.  He has a history of MRSA, but no active MRSA at this time.     2. Afib, rate controlled but not on anticoagulation as pt refused.       3. DM2: Pt was hypoglycemic during last admission even on only 5 units, half of his old Lantus dose. Pt not taking anything currently.  Glucose .  -Glucose checks 4x daily at TCU     4. ESRD on hemodialysis. Pt got full run of dialysis at [facility] during his MWF dialysis on Wednesday.     Chronic Medical Problems  #Depression: Pt has declined sertraline and diazepam. Admits to being depressed about being on hemodialysis and doesn't enjoy things he once used to, but declines medications.   #Anemia of chronic disease: Baseline 10-11, today Hgb 10.1  #CAD: Hx of MI per chart review. Continue daily ASA 81mg daily  #Alcohol dependence: 4 drinks/night on average, no hx of withdrawal or seizures. cotninue PTA Thiamine and folate supplement                 Final Day of Progress before Discharge:       Physical Exam:  Blood pressure 106/49, pulse 68, temperature 99  F (37.2  C), temperature source Oral, resp. rate 16, height 1.88 m (6' 2\"), weight 129 kg " "(284 lb 6.3 oz), SpO2 97 %.    EXAM:  Exam:  Constitutional: healthy, alert and no distress  Cardiovascular: No lifts, heaves, or thrills. RRR. No murmurs, clicks gallops or rub  Respiratory: Good diaphragmatic excursion. Lungs clear  Gastrointestinal: Abdomen soft, non-tender. BS normal. No masses, organomegaly  Musculoskeletal: extremities normal- no gross deformities noted, gait normal and normal muscle tone  Skin: no suspicious lesions or rashes  Neurologic: Gait normal. Alert and oriented.   Psychiatric: mentation appears normal and affect normal/bright    /49 (BP Location: Right arm)   Pulse 68   Temp 99  F (37.2  C) (Oral)   Resp 16   Ht 1.88 m (6' 2\")   Wt 129 kg (284 lb 6.3 oz)   SpO2 97%   BMI 36.51 kg/m               Data:  All laboratory data reviewed             Significant Results:     Results for orders placed or performed during the hospital encounter of 06/06/19   CBC with platelets differential   Result Value Ref Range    WBC 7.4 4.0 - 11.0 10e9/L    RBC Count 2.97 (L) 4.4 - 5.9 10e12/L    Hemoglobin 10.1 (L) 13.3 - 17.7 g/dL    Hematocrit 30.5 (L) 40.0 - 53.0 %     (H) 78 - 100 fl    MCH 34.0 (H) 26.5 - 33.0 pg    MCHC 33.1 31.5 - 36.5 g/dL    RDW 13.3 10.0 - 15.0 %    Platelet Count 183 150 - 450 10e9/L    Diff Method Automated Method     % Neutrophils 82.5 %    % Lymphocytes 9.1 %    % Monocytes 6.4 %    % Eosinophils 0.8 %    % Basophils 0.9 %    % Immature Granulocytes 0.3 %    Nucleated RBCs 0 0 /100    Absolute Neutrophil 6.1 1.6 - 8.3 10e9/L    Absolute Lymphocytes 0.7 (L) 0.8 - 5.3 10e9/L    Absolute Monocytes 0.5 0.0 - 1.3 10e9/L    Absolute Eosinophils 0.1 0.0 - 0.7 10e9/L    Absolute Basophils 0.1 0.0 - 0.2 10e9/L    Abs Immature Granulocytes 0.0 0 - 0.4 10e9/L    Absolute Nucleated RBC 0.0    Comprehensive metabolic panel   Result Value Ref Range    Sodium 134 133 - 144 mmol/L    Potassium 3.5 3.4 - 5.3 mmol/L    Chloride 103 94 - 109 mmol/L    Carbon Dioxide 20 20 " - 32 mmol/L    Anion Gap 11 3 - 14 mmol/L    Glucose 98 70 - 99 mg/dL    Urea Nitrogen 23 7 - 30 mg/dL    Creatinine 2.47 (H) 0.66 - 1.25 mg/dL    GFR Estimate 26 (L) >60 mL/min/[1.73_m2]    GFR Estimate If Black 30 (L) >60 mL/min/[1.73_m2]    Calcium 8.4 (L) 8.5 - 10.1 mg/dL    Bilirubin Total 0.5 0.2 - 1.3 mg/dL    Albumin 2.8 (L) 3.4 - 5.0 g/dL    Protein Total 7.1 6.8 - 8.8 g/dL    Alkaline Phosphatase 81 40 - 150 U/L    ALT 9 0 - 70 U/L    AST 19 0 - 45 U/L   UA reflex to Microscopic and Culture   Result Value Ref Range    Color Urine Yellow     Appearance Urine Slightly Cloudy     Glucose Urine Negative NEG^Negative mg/dL    Bilirubin Urine Negative NEG^Negative    Ketones Urine Negative NEG^Negative mg/dL    Specific Gravity Urine 1.014 1.003 - 1.035    Blood Urine Negative NEG^Negative    pH Urine 5.0 5.0 - 7.0 pH    Protein Albumin Urine 30 (A) NEG^Negative mg/dL    Urobilinogen mg/dL Normal 0.0 - 2.0 mg/dL    Nitrite Urine Negative NEG^Negative    Leukocyte Esterase Urine Large (A) NEG^Negative    Source Midstream Urine     WBC Urine 18 (H) 0 - 5 /HPF    RBC Urine <1 0 - 2 /HPF    Squamous Epithelial /HPF Urine 8 (H) 0 - 1 /HPF    Transitional Epi <1 0 - 1 /HPF   Glucose by meter   Result Value Ref Range    Glucose 83 70 - 99 mg/dL   Glucose by meter   Result Value Ref Range    Glucose 77 70 - 99 mg/dL   Glucose by meter   Result Value Ref Range    Glucose 135 (H) 70 - 99 mg/dL   EKG 12-lead, tracing only   Result Value Ref Range    Interpretation ECG Click View Image link to view waveform and result    Social Work IP Consult    Narrative    Audrey Fischer, LICSW     2019  1:03 PM  Social Work: Assessment with Discharge Plan    Patient Name:  Fer Latham  :  1950  Age:  69 year old  MRN:  [id number]  Risk/Complexity Score:   N/A  Completed assessment with:  Chart review, medical team, therapy,   pt interview at bedside, pt wife via phone, admissions liaison     Presenting Information    Reason for Referral:  Discharge plan  Date of Intake:  June 6, 2019  Referral Source:  Physician  Decision Maker:  Self   Alternate Decision Maker:  Pt wife JUAN LUIS Parisi PH: [phone]  Health Care Directive:  Health Care Directive Agent (if patient   not able to make decisions)- NO HCD in EMR  Living Situation:  House/apt with pt wife  Previous Functional Status:  Pt requires assistance. Did not   discuss with pt in detail. Pt used walker and cane for   ambulation. Pt has hx of falls. Pt wife unable to provide a lot   of assistance as well due to 2 knee replacements and ankle   surgery. No other assistance from family/friends.   Patient and family understanding of hospitalization:  Pt and pt   wife focused on observation status. When speaking with pt wife,   SWer expressed 'he came to the hospital to go to a rehab   facility' and pt wife expressed 'don't put words in my mouth'. Pt   and pt wife appear to have limited insight RE: safe discharge   plans due to other stressors. Pt was able to acknowledge what   caused pt to be admitted this admission. Pt also frustrated that   the EMS brought pt without pants of shoes.    Cultural/Language/Spiritual Considerations:  Rastafari shawn,   english-speaking   Adjustment to Illness:  Pt expressed that the recent falls and   increased weakness at home is 'new'. Pt appears to be in a low   mood. Documented that pt has depression and ETOH use. Per SW   note, pt has expressed increased depression due to need for HD   and feeling hopeless. Pt has been encouraged to follow up with   psychiatrist or counseling.     Physical Health  Reason for Admission:    1. Weakness      Services Needed/Recommended:  TCU    Mental Health/Chemical Dependency  Diagnosis:  Depression and ETOH use    Support/Services in Place:  No services or support in place at   this time   Services Needed/Recommended:  See above, pt recommended to follow   up with psychiatrist or mental health services in the  community.     Support System  Significant relationship at present time:  Pt lives in a home   with his wife. Wife requires min assistance as well.   Family of origin is available for support:  Pt has a son, per   previous SW note, pt son is on a cruise traveling the world but   isn't very involved or able to support. When asked, pt denied any   additional assistance at home from any friends or family.    Other support available:  See above  Gaps in support system:  Possible CM at HD center but pt would   benefit from more connection to services in the community   (physical and emotional)   Patient is caregiver to:  None     Provider Information   Primary Care Physician:  Isrrael Ornelas   [phone]   Clinic:  [facility] [address] [facility]*      :  Unknown     Financial   Income Source:  detention, small pension and fixed income, no   financial support    Financial Concerns:  Small pension, pt and pt wife expressed that   HD has causes many financial problems. SWer inquired with pt wife   over the phone if they have looked into applying for MA. Pt wife   appeared frustrated by this questions and expressed that they   don't qualify.   Insurance:    Payor/Plan Subscriber Name Rel Member # Group #   MEDICARE - MEDICARE MARIA E REESE  [id number]       ATTN CLAIMS, [address]   COMMERCIAL - TEETEE MARIA E REESE  [id number]       [facility] CLAIM DIV, [address]       Discharge Plan Patient and family discharge goal:  TCU due to pt   not being safe or having assistance at home   Provided education on discharge plan:  YES  Patient agreeable to discharge plan:  YES  A list of Medicare Certified Facilities was provided to the   patient and/or family to encourage patient choice. Patient's   choices for facility are:  See below   [facility] --PH: [phone], F: [phone]  [facility] --PH: [phone], F: [phone]  [facility]  [place]-- PH: [phone], F: [phone]  [facility] TCU-- PH: [phone], F: [phone]  [facility]-- PH: [phone], F: [phone]  [facility]-- PH: [phone], F: [phone]  [facility]-- PH: [phone], F: [phone]  [facility] --PH: [phone], F: [phone]  Will NH provide Skilled rehabilitation or complex medical:  YES  General information regarding anticipated insurance coverage and   possible out of pocket cost was discussed. Patient and patient's   family are aware patient may incur the cost of transportation to   the facility, pending insurance payment: YES- pt met inpt   criteria during previous admission 5/10-5/18  Barriers to discharge:  Bed acceptance and TCU placement     Discharge Recommendations Anticipated Disposition:  Facility:    [facility]  Transportation Needs:  Medical:  Wheelchair  Name of Transportation Company and Phone: [employer]. Pt   notified of OOP cost, pt agreeable. Pt wife also notified and   agreeable. Scheduled for 2:45pm TODAY 6/6.      Additional comments   A&P-Fer Latham is a 69 year old Fer Latham is a 69 year   old male with a history of ESRD on hemodialysis (M/W/F), chronic   atrial fibrillation, DM-2, depression, alcoholism,   CAD, rhabdomyolysis who presents to the Emergency Department   today for generalized weakness.Pt was recently admitted 5/10-5/18   for fall, generalized weakness, and a UTI.   -------------------------------------------    Pt readmitted after recent discharge from [facility] on 5/18. SWer   consulted to address and assist with TCU placement. Per SW note   from previous admission, pt was only agreeable to TCU referrals   to [facility], [facility]   TCU and [facility]. Per chart review, pt met inpt   criteria from previous admissions and has TCU coverage under   Medicare at this  time.     SWer met with pt at bedside to discuss referrals. Upon entering   the room pt appeared frustrated and irritated. Pt expressed that   he just received the notification that he was under obs and   stated that he does not have the money to pay OOP for   hospitalization. SWer offered support and validation. SWer   discussed TCU coverage and working on placement ASAP to reduce   the potential OOP cost. Pt expressed agreement. SWer offered pt a   list of TCUs. Pt requested SWer fax referral to the same TCUs as   last admission. SWer left pt with list and suggested pt select   alternatives.     Shortly after leaving pt room, Charge RN notified SWer that pt   wife is angry on the phone about pt being under obs and requested   pt leave AMA. SWer called pt wife Ailin Ph: [phone], pt wife   immediately expressed her frustration and demanded that pt leave   AMA. SWer discussed discharge option (HHC) if pt decides to leave   AMA. Pt wife then stated that the pt is not safe to return home.   SWer and wife discussed at length about discharge options for   home with HHC vs TCU, pt wife ultimately agreeable to SWer   pursuing TCU placement.     SWer went back to visit with pt and reiterate discussion from   earlier. SWer suggested that if pt is adament on D/Cing today   that SWer faxes additional referrals. Pt in agreement. Pt denied   preference. SWer faxed additional referrals (see below) via Epic.       SWer received call from [facility] PH: [phone],   F: [phone] and spoke with Day in admissions. Pt is   clinically and financially accepted with a private room available   TODAY. SWer called pt's HD center ([facility] PH: [phone]) and confirmed pt is on MWF schedule at 12:30 chair time. HD   information passed along to admissions at TCU. SWer met with pt   at bedside to updated. Pt agreeable to discharge plan. SWer   discussed OOP cost for HE w/c van, pt expressed understanding  and   agreement. USMAN gloria PH: [phone] scheduled for 3:45pm today.    SWer called and updated pt wife, pt wife expressed appreciation   and agreement with plan. Medical team, RN, admissions at TCU. pt   and pt wife all updated on final discharge plans. PAS screen   completed ([id number]) and orders faxed to the facility. No   additional concerns or SW needs.     TCU Referrals:   [facility]-- PH: [phone], F: [phone]  [facility] -Still reviewing, requesting PT notes  [facility]  [facility] NOT [facility] due to [facility]   availability   [facility]-- PH: [phone], F: [phone]  [facility]-- PH: [phone], F: [phone]  [facility] --PH: [phone], F: [phone]    Audrey Fischer, [facility], [facility]  Acute Care Inpatient Clinical   6D-Observation Unit  Phone: [phone]  I   Pager: [phone]         Urine Culture Aerobic Bacterial   Result Value Ref Range    Specimen Description Unspecified Urine     Special Requests Specimen received in preservative     Culture Micro PENDING       No results found for this or any previous visit (from the past 48 hour(s)).             Pending Results:   Unresulted Labs Ordered in the Past 30 Days of this Admission     Date and Time Order Name Status Description    6/6/2019 0840 Urine Culture Aerobic Bacterial Preliminary                   Discharge Instructions and Follow-Up:     Discharge Procedure Orders   General info for SNF   Order Comments: Length of Stay Estimate: Short Term Care: Estimated # of Days <30  Condition at Discharge: Stable  Level of care:skilled   Rehabilitation Potential: Good  Admission H&P remains valid and up-to-date: Yes  Recent Chemotherapy: N/A  Use Nursing Home Standing Orders: Yes     Mantoux instructions   Order Comments: Give two-step Mantoux (PPD) Per Facility Policy Yes     Reason for your  hospital stay   Order Comments: Weakness, falls at home. PT consulted and recommended discharge to TCU, SW arranged this.  Please continue to have dialysis M, W, F.     Glucose monitor nursing POCT   Order Comments: Before meals and at bedtime     Daily weights   Order Comments: Call Provider for weight gain of more than 2 pounds per day or 5 pounds per week.     Follow Up and recommended labs and tests   Order Comments: Follow up with Nursing home physician.  No follow up labs or test are needed.     Activity - Up with nursing assistance     Order Specific Question Answer Comments   Is discharge order? Yes      Full Code     Order Specific Question Answer Comments   Code status determined by: Discussion with patient/legal decision maker      Physical Therapy Adult Consult   Order Comments: Evaluate and treat as clinically indicated.    Reason:  weakness     Occupational Therapy Adult Consult   Order Comments: Evaluate and treat as clinically indicated.    Reason:  weakness     Contact Isolation     Fall precautions     Advance Diet as Tolerated   Order Comments: Follow this diet upon discharge: Orders Placed This Encounter  Regular diet     Order Specific Question Answer Comments   Is discharge order? Yes           Attestation:  Darlene Ferrer.  APRN, CNP

## 2019-06-06 NOTE — PROGRESS NOTES
06/06/19 1118   Quick Adds   Type of Visit Initial PT Evaluation   Living Environment   Lives With spouse   Living Arrangements apartment   Home Accessibility wheelchair accessible   Transportation Anticipated health plan transportation  ([employer])   Living Environment Comment pt has dialysis m/w/f   Self-Care   Usual Activity Tolerance moderate   Current Activity Tolerance fair   Regular Exercise No   Equipment Currently Used at Home shower chair;wheelchair, manual;walker, standard   Functional Level Prior   Ambulation 1-->assistive equipment   Transferring 1-->assistive equipment   Toileting 1-->assistive equipment   Bathing 1-->assistive equipment   Communication 0-->understands/communicates without difficulty   Swallowing 0-->swallows foods/liquids without difficulty   Cognition 0 - no cognition issues reported   Fall history within last six months yes   Number of times patient has fallen within last six months 2   Prior Functional Level Comment Fell when running 1 walker into another in the kitchen, also fell out of bed.  Pt cannot get up from floor without assist.   General Information   Onset of Illness/Injury or Date of Surgery - Date 06/06/19   Referring Physician Rosalinda Dee CNP   Patient/Family Goals Statement get stronger, feels very weak   Pertinent History of Current Problem (include personal factors and/or comorbidities that impact the POC)  69 year old male with a history of ESRD on hemodialysis (M/W/F), chronic atrial fibrillation, DM-2, depression, alcoholism, CAD, rhabdomyolysis who presents to the Emergency Department today for generalized weakness.Pt was recently admitted 5/10-5/18 for fall, generalized weakness, and a UTI.    Precautions/Limitations fall precautions   Cognitive Status Examination   Orientation orientation to person, place and time   Level of Consciousness alert   Follows Commands and Answers Questions 100% of the time   Personal Safety and Judgment impaired    Memory impaired   Cognitive Comment Pt had difficulty entering the full phone number to call his wife, forgot last few digits; difficulty problem solving bed mobility on the side of bed that is atypical for him to use   Pain Assessment   Patient Currently in Pain No   Integumentary/Edema   Integumentary/Edema Comments bruising R patella, L upper shin.  scrapes on B shins.  skin changes B lower LE from chronic venous insufficency   Posture    Posture Forward head position;Protracted shoulders   Range of Motion (ROM)   ROM Quick Adds No deficits were identified   Strength   Strength Comments generalized proximal weakness.  must use UE's for sit<stand   Bed Mobility   Bed Mobility Comments requires bed rail and increased time for sit<>supine   Transfer Skills   Transfer Comments heavy UE use sit<stand with FWW   Gait   Gait Comments x15ft total. pt then reports significant fatigue and refuses further distance.  FWW, CGA, short step lengths, kyphosis.  pt reports RLE instability in gait   Balance   Balance Comments impaired static and dynamic.  trunk sway without UE support in standing with normal JACKI   Clinical Impression   Criteria for Skilled Therapeutic Intervention evaluation only   PT Diagnosis impaired functional mobility   Influenced by the following impairments strength, balance, posture, integument   Functional limitations due to impairments transfers, gait, ADL   Clinical Presentation Stable/Uncomplicated   Clinical Presentation Rationale clinical judgment   Clinical Decision Making (Complexity) Low complexity   Anticipated Discharge Disposition Transitional Care Facility   Risk & Benefits of therapy have been explained Yes   Patient, Family & other staff in agreement with plan of care Yes   Clinical Impression Comments Pt failed home discharge after last hospital admission when TCU had been recommended.  Pt is at falls risk, and needing increased caregiver assist.  Will benefit from skilled PT/OT in TCU  "setting to increase strength, balance, gait safety. ADL independence.   [facility] AM-PAC  \"6 Clicks\" V.2 Basic Mobility Inpatient Short Form   1. Turning from your back to your side while in a flat bed without using bedrails? 4 - None   2. Moving from lying on your back to sitting on the side of a flat bed without using bedrails? 4 - None   3. Moving to and from a bed to a chair (including a wheelchair)? 4 - None   4. Standing up from a chair using your arms (e.g., wheelchair, or bedside chair)? 4 - None   5. To walk in hospital room? 3 - A Little   6. Climbing 3-5 steps with a railing? 2 - A Lot   Basic Mobility Raw Score (Score out of 24.Lower scores equate to lower levels of function) 21   Total Evaluation Time   Total Evaluation Time (Minutes) 15     "

## 2019-06-07 LAB
BACTERIA SPEC CULT: NORMAL
Lab: NORMAL
SPECIMEN SOURCE: NORMAL

## 2019-06-10 LAB
GAMMA INTERFERON BACKGROUND BLD IA-ACNC: 0.03 IU/ML
M TB IFN-G BLD-IMP: NEGATIVE
MITOGEN IGNF BCKGRD COR BLD-ACNC: 0 IU/ML
MITOGEN IGNF BCKGRD COR BLD-ACNC: 0 IU/ML
QTF INTERPRETATION: NORMAL
QTF MITOGEN - NIL: 8.59 IU/ML

## 2019-06-18 ENCOUNTER — RECORDS - HEALTHEAST (OUTPATIENT)
Dept: LAB | Facility: CLINIC | Age: 69
End: 2019-06-18

## 2019-06-18 LAB
ANION GAP SERPL CALCULATED.3IONS-SCNC: 9 MMOL/L (ref 5–18)
BUN SERPL-MCNC: 27 MG/DL (ref 8–22)
CALCIUM SERPL-MCNC: 9.6 MG/DL (ref 8.5–10.5)
CHLORIDE BLD-SCNC: 104 MMOL/L (ref 98–107)
CO2 SERPL-SCNC: 23 MMOL/L (ref 22–31)
CREAT SERPL-MCNC: 2.92 MG/DL (ref 0.7–1.3)
ERYTHROCYTE [DISTWIDTH] IN BLOOD BY AUTOMATED COUNT: 13 % (ref 11–14.5)
GFR SERPL CREATININE-BSD FRML MDRD: 22 ML/MIN/1.73M2
GLUCOSE BLD-MCNC: 90 MG/DL (ref 70–125)
HCT VFR BLD AUTO: 34.4 % (ref 40–54)
HGB BLD-MCNC: 11.3 G/DL (ref 14–18)
MCH RBC QN AUTO: 34.3 PG (ref 27–34)
MCHC RBC AUTO-ENTMCNC: 32.8 G/DL (ref 32–36)
MCV RBC AUTO: 105 FL (ref 80–100)
PLATELET # BLD AUTO: 232 THOU/UL (ref 140–440)
PMV BLD AUTO: 10.2 FL (ref 8.5–12.5)
POTASSIUM BLD-SCNC: 4.3 MMOL/L (ref 3.5–5)
RBC # BLD AUTO: 3.29 MILL/UL (ref 4.4–6.2)
SODIUM SERPL-SCNC: 136 MMOL/L (ref 136–145)
WBC: 5.2 THOU/UL (ref 4–11)

## 2019-09-10 ENCOUNTER — APPOINTMENT (OUTPATIENT)
Dept: GENERAL RADIOLOGY | Facility: CLINIC | Age: 69
End: 2019-09-10
Attending: EMERGENCY MEDICINE
Payer: MEDICARE

## 2019-09-10 ENCOUNTER — APPOINTMENT (OUTPATIENT)
Dept: CT IMAGING | Facility: CLINIC | Age: 69
End: 2019-09-10
Attending: EMERGENCY MEDICINE
Payer: MEDICARE

## 2019-09-10 ENCOUNTER — HOSPITAL ENCOUNTER (OUTPATIENT)
Facility: CLINIC | Age: 69
Setting detail: OBSERVATION
Discharge: HOME | End: 2019-09-13
Attending: EMERGENCY MEDICINE | Admitting: INTERNAL MEDICINE
Payer: MEDICARE

## 2019-09-10 DIAGNOSIS — F10.129 ALCOHOL ABUSE WITH INTOXICATION (H): ICD-10-CM

## 2019-09-10 DIAGNOSIS — E87.6 HYPOKALEMIA: ICD-10-CM

## 2019-09-10 DIAGNOSIS — M62.81 GENERALIZED MUSCLE WEAKNESS: ICD-10-CM

## 2019-09-10 DIAGNOSIS — N39.0 URINARY TRACT INFECTION WITHOUT HEMATURIA, SITE UNSPECIFIED: ICD-10-CM

## 2019-09-10 DIAGNOSIS — N30.01 ACUTE CYSTITIS WITH HEMATURIA: ICD-10-CM

## 2019-09-10 DIAGNOSIS — R41.82 ALTERED MENTAL STATUS, UNSPECIFIED ALTERED MENTAL STATUS TYPE: ICD-10-CM

## 2019-09-10 DIAGNOSIS — I48.20 CHRONIC ATRIAL FIBRILLATION (H): ICD-10-CM

## 2019-09-10 LAB
ALBUMIN SERPL-MCNC: 3 G/DL (ref 3.4–5)
ALCOHOL BREATH TEST: 0.05 (ref 0–0.01)
ALP SERPL-CCNC: 67 U/L (ref 40–150)
ALT SERPL W P-5'-P-CCNC: 10 U/L (ref 0–70)
AMMONIA PLAS-SCNC: 27 UMOL/L (ref 10–50)
ANION GAP SERPL CALCULATED.3IONS-SCNC: 14 MMOL/L (ref 3–14)
APAP SERPL-MCNC: <2 MG/L (ref 10–20)
APTT PPP: 30 SEC (ref 22–37)
AST SERPL W P-5'-P-CCNC: 14 U/L (ref 0–45)
BASOPHILS # BLD AUTO: 0.1 10E9/L (ref 0–0.2)
BASOPHILS NFR BLD AUTO: 1.2 %
BILIRUB SERPL-MCNC: 0.2 MG/DL (ref 0.2–1.3)
BUN SERPL-MCNC: 21 MG/DL (ref 7–30)
CA-I BLD-SCNC: 4.6 MG/DL (ref 4.4–5.2)
CALCIUM SERPL-MCNC: 8.1 MG/DL (ref 8.5–10.1)
CHLORIDE SERPL-SCNC: 101 MMOL/L (ref 94–109)
CO2 BLDCOV-SCNC: 18 MMOL/L (ref 21–28)
CO2 BLDCOV-SCNC: 19 MMOL/L (ref 21–28)
CO2 SERPL-SCNC: 20 MMOL/L (ref 20–32)
CREAT SERPL-MCNC: 2.46 MG/DL (ref 0.66–1.25)
DIFFERENTIAL METHOD BLD: ABNORMAL
EOSINOPHIL # BLD AUTO: 0.2 10E9/L (ref 0–0.7)
EOSINOPHIL NFR BLD AUTO: 3.9 %
ERYTHROCYTE [DISTWIDTH] IN BLOOD BY AUTOMATED COUNT: 14.2 % (ref 10–15)
ETHANOL SERPL-MCNC: 0.2 G/DL
GFR SERPL CREATININE-BSD FRML MDRD: 26 ML/MIN/{1.73_M2}
GLUCOSE BLD-MCNC: 102 MG/DL (ref 70–99)
GLUCOSE SERPL-MCNC: 98 MG/DL (ref 70–99)
HCT VFR BLD AUTO: 28.8 % (ref 40–53)
HCT VFR BLD CALC: 29 %PCV (ref 40–53)
HGB BLD CALC-MCNC: 9.9 G/DL (ref 13.3–17.7)
HGB BLD-MCNC: 9.7 G/DL (ref 13.3–17.7)
IMM GRANULOCYTES # BLD: 0 10E9/L (ref 0–0.4)
IMM GRANULOCYTES NFR BLD: 0.4 %
INR PPP: 1.21 (ref 0.86–1.14)
LACTATE BLD-SCNC: 1.9 MMOL/L (ref 0.7–2)
LACTATE BLD-SCNC: 2 MMOL/L (ref 0.7–2.1)
LIPASE SERPL-CCNC: 315 U/L (ref 73–393)
LYMPHOCYTES # BLD AUTO: 1.4 10E9/L (ref 0.8–5.3)
LYMPHOCYTES NFR BLD AUTO: 26.9 %
MCH RBC QN AUTO: 33 PG (ref 26.5–33)
MCHC RBC AUTO-ENTMCNC: 33.7 G/DL (ref 31.5–36.5)
MCV RBC AUTO: 98 FL (ref 78–100)
MONOCYTES # BLD AUTO: 0.5 10E9/L (ref 0–1.3)
MONOCYTES NFR BLD AUTO: 10 %
NEUTROPHILS # BLD AUTO: 2.9 10E9/L (ref 1.6–8.3)
NEUTROPHILS NFR BLD AUTO: 57.6 %
NRBC # BLD AUTO: 0 10*3/UL
NRBC BLD AUTO-RTO: 0 /100
PCO2 BLDV: 30 MM HG (ref 40–50)
PCO2 BLDV: 32 MM HG (ref 40–50)
PH BLDV: 7.38 PH (ref 7.32–7.43)
PH BLDV: 7.38 PH (ref 7.32–7.43)
PLATELET # BLD AUTO: 166 10E9/L (ref 150–450)
PO2 BLDV: 94 MM HG (ref 25–47)
PO2 BLDV: 98 MM HG (ref 25–47)
POTASSIUM BLD-SCNC: 3.2 MMOL/L (ref 3.4–5.3)
POTASSIUM SERPL-SCNC: 3.2 MMOL/L (ref 3.4–5.3)
PROT SERPL-MCNC: 7.2 G/DL (ref 6.8–8.8)
RBC # BLD AUTO: 2.94 10E12/L (ref 4.4–5.9)
SALICYLATES SERPL-MCNC: <2 MG/DL
SAO2 % BLDV FROM PO2: 97 %
SAO2 % BLDV FROM PO2: 98 %
SODIUM BLD-SCNC: 133 MMOL/L (ref 133–144)
SODIUM SERPL-SCNC: 134 MMOL/L (ref 133–144)
WBC # BLD AUTO: 5.1 10E9/L (ref 4–11)

## 2019-09-10 PROCEDURE — 99285 EMERGENCY DEPT VISIT HI MDM: CPT | Mod: 25 | Performed by: EMERGENCY MEDICINE

## 2019-09-10 PROCEDURE — 25000128 H RX IP 250 OP 636: Performed by: EMERGENCY MEDICINE

## 2019-09-10 PROCEDURE — 40000498 ZZHCL STATISTIC POTASSIUM ED POCT

## 2019-09-10 PROCEDURE — 85730 THROMBOPLASTIN TIME PARTIAL: CPT | Performed by: EMERGENCY MEDICINE

## 2019-09-10 PROCEDURE — 82803 BLOOD GASES ANY COMBINATION: CPT

## 2019-09-10 PROCEDURE — 93005 ELECTROCARDIOGRAM TRACING: CPT | Performed by: EMERGENCY MEDICINE

## 2019-09-10 PROCEDURE — 80320 DRUG SCREEN QUANTALCOHOLS: CPT | Performed by: EMERGENCY MEDICINE

## 2019-09-10 PROCEDURE — 82140 ASSAY OF AMMONIA: CPT | Performed by: EMERGENCY MEDICINE

## 2019-09-10 PROCEDURE — 83921 ORGANIC ACID SINGLE QUANT: CPT | Performed by: EMERGENCY MEDICINE

## 2019-09-10 PROCEDURE — 83605 ASSAY OF LACTIC ACID: CPT | Performed by: EMERGENCY MEDICINE

## 2019-09-10 PROCEDURE — 83690 ASSAY OF LIPASE: CPT | Performed by: EMERGENCY MEDICINE

## 2019-09-10 PROCEDURE — 83605 ASSAY OF LACTIC ACID: CPT

## 2019-09-10 PROCEDURE — 82330 ASSAY OF CALCIUM: CPT

## 2019-09-10 PROCEDURE — 80053 COMPREHEN METABOLIC PANEL: CPT | Performed by: EMERGENCY MEDICINE

## 2019-09-10 PROCEDURE — 93010 ELECTROCARDIOGRAM REPORT: CPT | Mod: Z6 | Performed by: EMERGENCY MEDICINE

## 2019-09-10 PROCEDURE — 85025 COMPLETE CBC W/AUTO DIFF WBC: CPT | Performed by: EMERGENCY MEDICINE

## 2019-09-10 PROCEDURE — 40000502 ZZHCL STATISTIC GLUCOSE ED POCT

## 2019-09-10 PROCEDURE — 40000501 ZZHCL STATISTIC HEMATOCRIT ED POCT

## 2019-09-10 PROCEDURE — 96361 HYDRATE IV INFUSION ADD-ON: CPT | Performed by: EMERGENCY MEDICINE

## 2019-09-10 PROCEDURE — 87040 BLOOD CULTURE FOR BACTERIA: CPT | Performed by: EMERGENCY MEDICINE

## 2019-09-10 PROCEDURE — 85610 PROTHROMBIN TIME: CPT | Performed by: EMERGENCY MEDICINE

## 2019-09-10 PROCEDURE — 80329 ANALGESICS NON-OPIOID 1 OR 2: CPT | Mod: 91 | Performed by: EMERGENCY MEDICINE

## 2019-09-10 PROCEDURE — 82075 ASSAY OF BREATH ETHANOL: CPT | Performed by: EMERGENCY MEDICINE

## 2019-09-10 PROCEDURE — 40000497 ZZHCL STATISTIC SODIUM ED POCT

## 2019-09-10 PROCEDURE — 70450 CT HEAD/BRAIN W/O DYE: CPT

## 2019-09-10 PROCEDURE — 82607 VITAMIN B-12: CPT | Performed by: EMERGENCY MEDICINE

## 2019-09-10 PROCEDURE — 84425 ASSAY OF VITAMIN B-1: CPT | Performed by: EMERGENCY MEDICINE

## 2019-09-10 PROCEDURE — 80329 ANALGESICS NON-OPIOID 1 OR 2: CPT | Performed by: EMERGENCY MEDICINE

## 2019-09-10 PROCEDURE — 71046 X-RAY EXAM CHEST 2 VIEWS: CPT

## 2019-09-10 RX ADMIN — SODIUM CHLORIDE 250 ML: 9 INJECTION, SOLUTION INTRAVENOUS at 22:04

## 2019-09-11 ENCOUNTER — APPOINTMENT (OUTPATIENT)
Dept: PHYSICAL THERAPY | Facility: CLINIC | Age: 69
End: 2019-09-11
Attending: PHYSICIAN ASSISTANT
Payer: MEDICARE

## 2019-09-11 LAB
ALBUMIN UR-MCNC: 30 MG/DL
AMPHETAMINES UR QL SCN: NEGATIVE
ANION GAP SERPL CALCULATED.3IONS-SCNC: 9 MMOL/L (ref 3–14)
APPEARANCE UR: ABNORMAL
BACTERIA #/AREA URNS HPF: ABNORMAL /HPF
BARBITURATES UR QL: NEGATIVE
BENZODIAZ UR QL: POSITIVE
BILIRUB UR QL STRIP: NEGATIVE
BUN SERPL-MCNC: 26 MG/DL (ref 7–30)
CALCIUM SERPL-MCNC: 8.3 MG/DL (ref 8.5–10.1)
CANNABINOIDS UR QL SCN: NEGATIVE
CHLORIDE SERPL-SCNC: 104 MMOL/L (ref 94–109)
CO2 SERPL-SCNC: 22 MMOL/L (ref 20–32)
COCAINE UR QL: NEGATIVE
COLOR UR AUTO: YELLOW
CREAT SERPL-MCNC: 2.63 MG/DL (ref 0.66–1.25)
ETHANOL SERPL-MCNC: 0.07 G/DL
ETHANOL SERPL-MCNC: 0.14 G/DL
ETHANOL UR QL SCN: POSITIVE
GFR SERPL CREATININE-BSD FRML MDRD: 24 ML/MIN/{1.73_M2}
GLUCOSE BLDC GLUCOMTR-MCNC: 128 MG/DL (ref 70–99)
GLUCOSE BLDC GLUCOMTR-MCNC: 98 MG/DL (ref 70–99)
GLUCOSE SERPL-MCNC: 134 MG/DL (ref 70–99)
GLUCOSE UR STRIP-MCNC: NEGATIVE MG/DL
HGB UR QL STRIP: ABNORMAL
HYALINE CASTS #/AREA URNS LPF: 7 /LPF (ref 0–2)
INTERPRETATION ECG - MUSE: NORMAL
KETONES UR STRIP-MCNC: NEGATIVE MG/DL
LEUKOCYTE ESTERASE UR QL STRIP: ABNORMAL
NITRATE UR QL: NEGATIVE
OPIATES UR QL SCN: NEGATIVE
PH UR STRIP: 5.5 PH (ref 5–7)
POTASSIUM SERPL-SCNC: 3.8 MMOL/L (ref 3.4–5.3)
POTASSIUM SERPL-SCNC: 3.8 MMOL/L (ref 3.4–5.3)
RBC #/AREA URNS AUTO: 9 /HPF (ref 0–2)
SODIUM SERPL-SCNC: 135 MMOL/L (ref 133–144)
SOURCE: ABNORMAL
SP GR UR STRIP: 1.01 (ref 1–1.03)
SQUAMOUS #/AREA URNS AUTO: 10 /HPF (ref 0–1)
UROBILINOGEN UR STRIP-MCNC: NORMAL MG/DL (ref 0–2)
VIT B12 SERPL-MCNC: 334 PG/ML (ref 193–986)
WBC #/AREA URNS AUTO: 250 /HPF (ref 0–5)
WBC CLUMPS #/AREA URNS HPF: PRESENT /HPF

## 2019-09-11 PROCEDURE — 80048 BASIC METABOLIC PNL TOTAL CA: CPT | Performed by: PHYSICIAN ASSISTANT

## 2019-09-11 PROCEDURE — 96365 THER/PROPH/DIAG IV INF INIT: CPT | Performed by: EMERGENCY MEDICINE

## 2019-09-11 PROCEDURE — 36415 COLL VENOUS BLD VENIPUNCTURE: CPT | Performed by: PHYSICIAN ASSISTANT

## 2019-09-11 PROCEDURE — 25000132 ZZH RX MED GY IP 250 OP 250 PS 637: Mod: GY | Performed by: EMERGENCY MEDICINE

## 2019-09-11 PROCEDURE — 96361 HYDRATE IV INFUSION ADD-ON: CPT

## 2019-09-11 PROCEDURE — 00000146 ZZHCL STATISTIC GLUCOSE BY METER IP

## 2019-09-11 PROCEDURE — 25000125 ZZHC RX 250: Performed by: EMERGENCY MEDICINE

## 2019-09-11 PROCEDURE — 84132 ASSAY OF SERUM POTASSIUM: CPT | Performed by: PHYSICIAN ASSISTANT

## 2019-09-11 PROCEDURE — 81003 URINALYSIS AUTO W/O SCOPE: CPT | Mod: XU | Performed by: EMERGENCY MEDICINE

## 2019-09-11 PROCEDURE — 99220 ZZC INITIAL OBSERVATION CARE,LEVL III: CPT | Mod: Z6 | Performed by: INTERNAL MEDICINE

## 2019-09-11 PROCEDURE — 25800030 ZZH RX IP 258 OP 636: Performed by: PHYSICIAN ASSISTANT

## 2019-09-11 PROCEDURE — 25000128 H RX IP 250 OP 636: Performed by: EMERGENCY MEDICINE

## 2019-09-11 PROCEDURE — 96375 TX/PRO/DX INJ NEW DRUG ADDON: CPT | Performed by: EMERGENCY MEDICINE

## 2019-09-11 PROCEDURE — 25000132 ZZH RX MED GY IP 250 OP 250 PS 637: Mod: GY | Performed by: PHYSICIAN ASSISTANT

## 2019-09-11 PROCEDURE — 80320 DRUG SCREEN QUANTALCOHOLS: CPT | Performed by: EMERGENCY MEDICINE

## 2019-09-11 PROCEDURE — 87086 URINE CULTURE/COLONY COUNT: CPT | Performed by: EMERGENCY MEDICINE

## 2019-09-11 PROCEDURE — G0378 HOSPITAL OBSERVATION PER HR: HCPCS

## 2019-09-11 PROCEDURE — 80307 DRUG TEST PRSMV CHEM ANLYZR: CPT | Performed by: EMERGENCY MEDICINE

## 2019-09-11 PROCEDURE — 97161 PT EVAL LOW COMPLEX 20 MIN: CPT | Mod: GP

## 2019-09-11 PROCEDURE — 97530 THERAPEUTIC ACTIVITIES: CPT | Mod: GP

## 2019-09-11 RX ORDER — THIAMINE HYDROCHLORIDE 100 MG/ML
100 INJECTION, SOLUTION INTRAMUSCULAR; INTRAVENOUS ONCE
Status: COMPLETED | OUTPATIENT
Start: 2019-09-11 | End: 2019-09-11

## 2019-09-11 RX ORDER — ONDANSETRON 4 MG/1
4 TABLET, ORALLY DISINTEGRATING ORAL EVERY 6 HOURS PRN
Status: DISCONTINUED | OUTPATIENT
Start: 2019-09-11 | End: 2019-09-14 | Stop reason: HOSPADM

## 2019-09-11 RX ORDER — DIAZEPAM 5 MG
5 TABLET ORAL EVERY 12 HOURS PRN
Status: DISCONTINUED | OUTPATIENT
Start: 2019-09-11 | End: 2019-09-14 | Stop reason: HOSPADM

## 2019-09-11 RX ORDER — POTASSIUM CHLORIDE 29.8 MG/ML
20 INJECTION INTRAVENOUS
Status: DISCONTINUED | OUTPATIENT
Start: 2019-09-11 | End: 2019-09-14 | Stop reason: HOSPADM

## 2019-09-11 RX ORDER — POTASSIUM CHLORIDE 7.45 MG/ML
10 INJECTION INTRAVENOUS
Status: DISCONTINUED | OUTPATIENT
Start: 2019-09-11 | End: 2019-09-14 | Stop reason: HOSPADM

## 2019-09-11 RX ORDER — POTASSIUM CHLORIDE 750 MG/1
20-40 TABLET, EXTENDED RELEASE ORAL
Status: DISCONTINUED | OUTPATIENT
Start: 2019-09-11 | End: 2019-09-14 | Stop reason: HOSPADM

## 2019-09-11 RX ORDER — ACETAMINOPHEN 325 MG/1
650 TABLET ORAL EVERY 4 HOURS PRN
Status: DISCONTINUED | OUTPATIENT
Start: 2019-09-11 | End: 2019-09-14 | Stop reason: HOSPADM

## 2019-09-11 RX ORDER — SODIUM CHLORIDE 9 MG/ML
INJECTION, SOLUTION INTRAVENOUS CONTINUOUS
Status: DISCONTINUED | OUTPATIENT
Start: 2019-09-11 | End: 2019-09-12

## 2019-09-11 RX ORDER — POTASSIUM CL/LIDO/0.9 % NACL 10MEQ/0.1L
10 INTRAVENOUS SOLUTION, PIGGYBACK (ML) INTRAVENOUS
Status: DISCONTINUED | OUTPATIENT
Start: 2019-09-11 | End: 2019-09-14 | Stop reason: HOSPADM

## 2019-09-11 RX ORDER — CEFTRIAXONE 1 G/1
1 INJECTION, POWDER, FOR SOLUTION INTRAMUSCULAR; INTRAVENOUS ONCE
Status: COMPLETED | OUTPATIENT
Start: 2019-09-12 | End: 2019-09-12

## 2019-09-11 RX ORDER — NALOXONE HYDROCHLORIDE 0.4 MG/ML
.1-.4 INJECTION, SOLUTION INTRAMUSCULAR; INTRAVENOUS; SUBCUTANEOUS
Status: DISCONTINUED | OUTPATIENT
Start: 2019-09-11 | End: 2019-09-14 | Stop reason: HOSPADM

## 2019-09-11 RX ORDER — POTASSIUM CHLORIDE 1.5 G/1.58G
40 POWDER, FOR SOLUTION ORAL ONCE
Status: COMPLETED | OUTPATIENT
Start: 2019-09-11 | End: 2019-09-11

## 2019-09-11 RX ORDER — DIAZEPAM 5 MG
5 TABLET ORAL EVERY 12 HOURS PRN
Status: ON HOLD | COMMUNITY
End: 2022-02-08

## 2019-09-11 RX ORDER — CEFTRIAXONE 1 G/1
1 INJECTION, POWDER, FOR SOLUTION INTRAMUSCULAR; INTRAVENOUS ONCE
Status: COMPLETED | OUTPATIENT
Start: 2019-09-11 | End: 2019-09-11

## 2019-09-11 RX ORDER — POTASSIUM CHLORIDE 1.5 G/1.58G
20-40 POWDER, FOR SOLUTION ORAL
Status: DISCONTINUED | OUTPATIENT
Start: 2019-09-11 | End: 2019-09-14 | Stop reason: HOSPADM

## 2019-09-11 RX ORDER — FOLIC ACID 5 MG/ML
1 INJECTION, SOLUTION INTRAMUSCULAR; INTRAVENOUS; SUBCUTANEOUS ONCE
Status: COMPLETED | OUTPATIENT
Start: 2019-09-11 | End: 2019-09-11

## 2019-09-11 RX ORDER — ONDANSETRON 2 MG/ML
4 INJECTION INTRAMUSCULAR; INTRAVENOUS EVERY 6 HOURS PRN
Status: DISCONTINUED | OUTPATIENT
Start: 2019-09-11 | End: 2019-09-14 | Stop reason: HOSPADM

## 2019-09-11 RX ADMIN — MELATONIN 2000 UNITS: at 20:00

## 2019-09-11 RX ADMIN — SODIUM CHLORIDE: 9 INJECTION, SOLUTION INTRAVENOUS at 16:02

## 2019-09-11 RX ADMIN — ASPIRIN 325 MG: 325 TABLET, DELAYED RELEASE ORAL at 20:00

## 2019-09-11 RX ADMIN — POTASSIUM CHLORIDE 20 MEQ: 750 TABLET, EXTENDED RELEASE ORAL at 17:09

## 2019-09-11 RX ADMIN — CEFTRIAXONE 1 G: 1 INJECTION, POWDER, FOR SOLUTION INTRAMUSCULAR; INTRAVENOUS at 07:01

## 2019-09-11 RX ADMIN — POTASSIUM CHLORIDE 40 MEQ: 1.5 POWDER, FOR SOLUTION ORAL at 10:21

## 2019-09-11 RX ADMIN — DIAZEPAM 5 MG: 5 TABLET ORAL at 22:51

## 2019-09-11 RX ADMIN — FOLIC ACID 1 MG: 5 INJECTION, SOLUTION INTRAMUSCULAR; INTRAVENOUS; SUBCUTANEOUS at 06:02

## 2019-09-11 RX ADMIN — THIAMINE HYDROCHLORIDE 100 MG: 100 INJECTION, SOLUTION INTRAMUSCULAR; INTRAVENOUS at 06:02

## 2019-09-11 ASSESSMENT — ENCOUNTER SYMPTOMS
ACTIVITY IMPAIRMENT: NORMAL
DIETARY ISSUES: ADEQUATE INTAKE
NO PATIENT REPORTED PAIN: 1
WEAKNESS: 1

## 2019-09-11 NOTE — ED NOTES
Emergency Department Patient Sign-out       Brief HPI:  This is a 69 year old male signed out to me by Dr. Juarez .  See initial ED Provider note for details of the presentation.          Significant Events prior to my assuming care: Laboratory studies and CT head were performed.  Patient was given intravenous ceftriaxone for urinary tract infection.  Neurology consult was requested for questionable normal pressure hydrocephalus.      Exam:   Patient Vitals for the past 24 hrs:   BP Temp Temp src Pulse Heart Rate Resp SpO2   09/11/19 0145 106/70 -- -- 71 -- -- 100 %   09/11/19 0130 99/63 -- -- 79 -- -- 97 %   09/11/19 0115 106/68 -- -- 80 -- -- 100 %   09/11/19 0100 104/58 -- -- 66 -- -- 98 %   09/11/19 0045 106/63 -- -- 74 -- -- 100 %   09/11/19 0030 108/68 -- -- 71 -- -- (!) 89 %   09/10/19 2300 123/71 -- -- 56 56 11 --   09/10/19 2245 107/66 -- -- 58 65 9 --   09/10/19 2230 112/65 -- -- 63 -- -- --   09/10/19 2200 110/73 -- -- 63 56 17 100 %   09/10/19 2133 (!) 114/102 97.1  F (36.2  C) Oral -- 67 14 --           ED RESULTS:   Results for orders placed or performed during the hospital encounter of 09/10/19 (from the past 24 hour(s))   Blood culture     Status: None (Preliminary result)    Collection Time: 09/10/19  9:35 PM   Result Value Ref Range    Specimen Description Blood RARM     Special Requests Aerobic and anaerobic bottles received     Culture Micro No growth after 7 hours    Blood culture     Status: None (Preliminary result)    Collection Time: 09/10/19  9:35 PM   Result Value Ref Range    Specimen Description Blood LARM     Special Requests Aerobic and anaerobic bottles received     Culture Micro No growth after 8 hours    CBC with platelets differential     Status: Abnormal    Collection Time: 09/10/19  9:36 PM   Result Value Ref Range    WBC 5.1 4.0 - 11.0 10e9/L    RBC Count 2.94 (L) 4.4 - 5.9 10e12/L    Hemoglobin 9.7 (L) 13.3 - 17.7 g/dL    Hematocrit 28.8 (L) 40.0 - 53.0 %    MCV 98 78 -  100 fl    MCH 33.0 26.5 - 33.0 pg    MCHC 33.7 31.5 - 36.5 g/dL    RDW 14.2 10.0 - 15.0 %    Platelet Count 166 150 - 450 10e9/L    Diff Method Automated Method     % Neutrophils 57.6 %    % Lymphocytes 26.9 %    % Monocytes 10.0 %    % Eosinophils 3.9 %    % Basophils 1.2 %    % Immature Granulocytes 0.4 %    Nucleated RBCs 0 0 /100    Absolute Neutrophil 2.9 1.6 - 8.3 10e9/L    Absolute Lymphocytes 1.4 0.8 - 5.3 10e9/L    Absolute Monocytes 0.5 0.0 - 1.3 10e9/L    Absolute Eosinophils 0.2 0.0 - 0.7 10e9/L    Absolute Basophils 0.1 0.0 - 0.2 10e9/L    Abs Immature Granulocytes 0.0 0 - 0.4 10e9/L    Absolute Nucleated RBC 0.0    Comprehensive metabolic panel     Status: Abnormal    Collection Time: 09/10/19  9:36 PM   Result Value Ref Range    Sodium 134 133 - 144 mmol/L    Potassium 3.2 (L) 3.4 - 5.3 mmol/L    Chloride 101 94 - 109 mmol/L    Carbon Dioxide 20 20 - 32 mmol/L    Anion Gap 14 3 - 14 mmol/L    Glucose 98 70 - 99 mg/dL    Urea Nitrogen 21 7 - 30 mg/dL    Creatinine 2.46 (H) 0.66 - 1.25 mg/dL    GFR Estimate 26 (L) >60 mL/min/[1.73_m2]    GFR Estimate If Black 30 (L) >60 mL/min/[1.73_m2]    Calcium 8.1 (L) 8.5 - 10.1 mg/dL    Bilirubin Total 0.2 0.2 - 1.3 mg/dL    Albumin 3.0 (L) 3.4 - 5.0 g/dL    Protein Total 7.2 6.8 - 8.8 g/dL    Alkaline Phosphatase 67 40 - 150 U/L    ALT 10 0 - 70 U/L    AST 14 0 - 45 U/L   INR     Status: Abnormal    Collection Time: 09/10/19  9:36 PM   Result Value Ref Range    INR 1.21 (H) 0.86 - 1.14   Lipase     Status: None    Collection Time: 09/10/19  9:36 PM   Result Value Ref Range    Lipase 315 73 - 393 U/L   PTT     Status: None    Collection Time: 09/10/19  9:36 PM   Result Value Ref Range    PTT 30 22 - 37 sec   Alcohol ethyl     Status: Abnormal    Collection Time: 09/10/19  9:36 PM   Result Value Ref Range    Ethanol g/dL 0.20 (H) <0.01 g/dL   Acetaminophen level     Status: None    Collection Time: 09/10/19  9:36 PM   Result Value Ref Range    Acetaminophen Level <2  mg/L   Salicylate level     Status: None    Collection Time: 09/10/19  9:36 PM   Result Value Ref Range    Salicylate Level <2 mg/dL   ISTAT gases lactate murtaza POCT     Status: Abnormal    Collection Time: 09/10/19  9:41 PM   Result Value Ref Range    Ph Venous 7.38 7.32 - 7.43 pH    PCO2 Venous 30 (L) 40 - 50 mm Hg    PO2 Venous 94 (H) 25 - 47 mm Hg    Bicarbonate Venous 18 (L) 21 - 28 mmol/L    O2 Sat Venous 97 %    Lactic Acid 2.0 0.7 - 2.1 mmol/L   EKG 12 lead     Status: None    Collection Time: 09/10/19  9:45 PM   Result Value Ref Range    Interpretation ECG Click View Image link to view waveform and result    ISTAT gases elec ica gluc murtaza POCT     Status: Abnormal    Collection Time: 09/10/19  9:46 PM   Result Value Ref Range    Ph Venous 7.38 7.32 - 7.43 pH    PCO2 Venous 32 (L) 40 - 50 mm Hg    PO2 Venous 98 (H) 25 - 47 mm Hg    Bicarbonate Venous 19 (L) 21 - 28 mmol/L    O2 Sat Venous 98 %    Sodium 133 133 - 144 mmol/L    Potassium 3.2 (L) 3.4 - 5.3 mmol/L    Glucose 102 (H) 70 - 99 mg/dL    Calcium Ionized 4.6 4.4 - 5.2 mg/dL    Hemoglobin 9.9 (L) 13.3 - 17.7 g/dL    Hematocrit - POCT 29 (L) 40.0 - 53.0 %PCV   Ammonia     Status: None    Collection Time: 09/10/19  9:50 PM   Result Value Ref Range    Ammonia 27 10 - 50 umol/L   Lactic acid whole blood     Status: None    Collection Time: 09/10/19  9:50 PM   Result Value Ref Range    Lactic Acid 1.9 0.7 - 2.0 mmol/L   Alcohol breath test POCT     Status: Abnormal    Collection Time: 09/10/19 10:00 PM   Result Value Ref Range    Alcohol Breath Test 0.05 (A) 0.00 - 0.01   XR Chest 2 Views     Status: None (Preliminary result)    Collection Time: 09/10/19 10:24 PM    Narrative    EXAM: XR CHEST 2 VW  9/10/2019 10:24 PM      HISTORY: AMS    COMPARISON: 5/10/2019    FINDINGS: AP and lateral radiograph of the chest. Right IJ central  line tip projects over the low SVC. The trachea is midline. The  cardiac silhouette is enlarged. Mild right hemidiaphragm  elevation. No  pleural effusion or pneumothorax. Patchy left basilar opacities,  better visualized on lateral radiograph. Upper abdomen is  unremarkable.       Impression    IMPRESSION: Patchy left basilar opacity suggesting atelectasis.     Blood culture     Status: None (Preliminary result)    Collection Time: 09/10/19 10:39 PM   Result Value Ref Range    Specimen Description Blood Left Arm     Special Requests Received in aerobic bottle only     Culture Micro No growth after 7 hours    Head CT w/o contrast     Status: None    Collection Time: 09/10/19 10:43 PM    Narrative    CT HEAD W/O CONTRAST 9/10/2019 10:43 PM    Provided History: AMS    Comparison: None available.    Technique: Using multidetector thin collimation helical acquisition  technique, axial, coronal and sagittal CT images from the skull base  to the vertex were obtained without intravenous contrast.     Findings:    No intracranial hemorrhage, mass effect, or midline shift. Moderate  global cerebral volume loss exvacuodilatation of the ventricles are  system. No crowding of the sulci of the high convexities.  Extensive  periventricular and subcortical hypoattenuation. No definite acute  loss of the gray-white differentiation. The basal cisterns are patent.    Mild polypoid mucosal thickening in the left maxillary sinus. Muscle  air cells are clear.      Impression    Impression:  1. No acute intracranial pathology.  2. Moderate generalized cerebral volume loss and chronic small vessel  ischemic disease. Ventricular dilatation is thought to be secondary to  volume loss.  3. Prominent white matter hypoattenuation, nonspecific but     I have personally reviewed the examination and initial interpretation  and I agree with the findings.    MANJEET CRAMER MD   Alcohol     Status: Abnormal    Collection Time: 09/11/19  1:28 AM   Result Value Ref Range    Ethanol g/dL 0.14 (H) <0.01 g/dL   UA reflex to Microscopic and Culture     Status: Abnormal     Collection Time: 09/11/19  4:46 AM   Result Value Ref Range    Color Urine Yellow     Appearance Urine Slightly Cloudy     Glucose Urine Negative NEG^Negative mg/dL    Bilirubin Urine Negative NEG^Negative    Ketones Urine Negative NEG^Negative mg/dL    Specific Gravity Urine 1.014 1.003 - 1.035    Blood Urine Moderate (A) NEG^Negative    pH Urine 5.5 5.0 - 7.0 pH    Protein Albumin Urine 30 (A) NEG^Negative mg/dL    Urobilinogen mg/dL Normal 0.0 - 2.0 mg/dL    Nitrite Urine Negative NEG^Negative    Leukocyte Esterase Urine Large (A) NEG^Negative    Source Midstream Urine     RBC Urine 9 (H) 0 - 2 /HPF    WBC Urine 250 (H) 0 - 5 /HPF    WBC Clumps Present (A) NEG^Negative /HPF    Bacteria Urine Few (A) NEG^Negative /HPF    Squamous Epithelial /HPF Urine 10 (H) 0 - 1 /HPF    Hyaline Casts 7 (H) 0 - 2 /LPF   Drug abuse screen 6 urine (chem dep)     Status: Abnormal    Collection Time: 09/11/19  4:46 AM   Result Value Ref Range    Amphetamine Qual Urine Negative NEG^Negative    Barbiturates Qual Urine Negative NEG^Negative    Benzodiazepine Qual Urine Positive (A) NEG^Negative    Cannabinoids Qual Urine Negative NEG^Negative    Cocaine Qual Urine Negative NEG^Negative    Ethanol Qual Urine Positive (A) NEG^Negative    Opiates Qualitative Urine Negative NEG^Negative   Urine Culture Aerobic Bacterial     Status: None (Preliminary result)    Collection Time: 09/11/19  4:46 AM   Result Value Ref Range    Specimen Description Unspecified Urine     Special Requests Specimen received in preservative     Culture Micro PENDING    Alcohol ethyl     Status: Abnormal    Collection Time: 09/11/19  5:54 AM   Result Value Ref Range    Ethanol g/dL 0.07 (H) <0.01 g/dL       ED MEDICATIONS:   Medications   potassium chloride (KLOR-CON) Packet 40 mEq (has no administration in time range)   0.9% sodium chloride BOLUS (0 mLs Intravenous Stopped 9/11/19 0135)   folic acid injection 1 mg (1 mg Intravenous Given 9/11/19 0602)   thiamine  (B-1) injection 100 mg (100 mg Intravenous Given 9/11/19 0602)   cefTRIAXone (ROCEPHIN) 1 g vial to attach to  mL bag for ADULTS or NS 50 mL bag for PEDS (0 g Intravenous Stopped 9/11/19 0814)         Impression:    ICD-10-CM    1. Altered mental status, unspecified altered mental status type R41.82 UA reflex to Microscopic and Culture     Drug abuse screen 6 urine (chem dep)     Blood culture     Blood culture     Blood culture     Alcohol ethyl     Urine Culture Aerobic Bacterial     Urine Culture Aerobic Bacterial   2. Generalized muscle weakness M62.81    3. Acute cystitis with hematuria N30.01    4. Hypokalemia E87.6        Plan:    Pending studies include neurology consult and disposition.      Patient was seen in eval by neurology team and they do not believe the patient has normal pressure hydrocephalus or any other acute neurologic issue.  I reevaluated the patient I do not believe that he is safe for discharge home.  He will be admitted to emergency department observation unit for PT/OT consultation and potential placement.  Social work services were informed as well.    MD Prabhjot Gore Nikola, MD  09/11/19 [id number]

## 2019-09-11 NOTE — ED NOTES
Bed: ED03  Expected date:   Expected time:   Means of arrival:   Comments:  Antoine 432 / 69 M / sepsis, confused, ETA 2127

## 2019-09-11 NOTE — CONSULTS
"General acute hospital  Neurology Consultation    Patient Name:  Fer Latham  MRN:  [id number]    :  1950  Date of Service:  2019  Primary care provider:  Isrrael Ornelas      Neurology consultation service was asked to see Fer Latham by Dr. Zamarripa to evaluate large ventricles on CT.    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 Patient presents w/ a JESSENIA 0.2 and benzos in his urine; at time of initial neurology eval, patient had a JESSENIA 0.07, offered questionable/vague history, defers to wife for most answers. Wife provides collateral information, but history provided contradicts previous reports.    Patient and wife report him feeling in his normal state of health yesterday up until the evening. He was on the couch watching television, went up to go to the bathroom, got to the bathroom and felt unsteady - like \"couldn't control my body\"  And slid down the bathroom wall. Wife provides alternative history, she saw patient get up from the couchand he seemed to be taking small/short steps and having to balance using his walker and the wall. She followed him into the bathroom where he seemed to just zone out and look blankly at the wall, frozen. She said his name multiple times, ended up yelling at him without him responding. She looked at his face, thought he had good color but his eyes were wide and blank/staring, then he slumped back in slow motion against the bathroom wall and slid to the ground. At this time, wife asked if she should call 911 when he finally responded \"yes.\" EMS called, per their report he was reporting that he was about to \"lose it\" and potentially pass out. JESSENIA 0.2 upon arrival.     Related to this episode, patient denies headache, lightheadedness, dizziness, world spinning, focal weakness but he said his legs felt like they " "were melting/giving out. Wife denies any obvious focal weakness/asymmetries or subjective complaints. Denies new numbness/tingling, has chronic neuropathy of BLE. Patient and wife deny recent fevers, chills, night sweats, nausea, vomiting, chest pain, SOB, dysuria or other new symptom. No reported history of seizure. HE reports drinking a pint of rum and coke; wife says that he drinks 12oz hard liquor per day. They deny recent BZD use - more than a week initially but when told that he had BZDs in his urine, wife says that he might have taken one yesterday but he only takes them infrequently and never abuses them.     Related to his medical history, patient defers to wife to answer questions. Wife says that patient has never been the same since June 13, 2018 when he was admitted to [facility] for confusion, found to be septic. Patient went to rehabilitation afterwards and has been unable to walk without the aid of a walker or a wheelchair since. Has had multiple admissions related to generalized weakness, some of which related to UTI/concern for sepsis. Has previously presented with BLE weakness and similar \"melting\" sensation. Wife denies shuffling or magnetic gait; this current time was the first time that she had ever seen shortened steps. They deny freezing events (other than today). Wife says that prior to June 2018, he was still working, and independent; Since then, she notes that patient has had frequent periods of confusion, all of which she reports have been related to urinary tract infections or other infections; denies periods of confusion or altered mental status outside of this. They deny issues of urinary incontinence. No tremor, infrequent constipation, no hallucinations, chronically bad sense of smell.      Of note, patient has had multiple images over the years that have shown enlarged ventricles and diffuse cerebral volume loss.     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  - Has not driven since June 2018; prior to that, no tickets or accidents  - Does not do the cooking but eats unassisted  - Dresses and bathes himself  - Wife does the bills and chores around the house  - Wife manages medications, thinks he would forget / be unable to manage them on his own  - Wife is nervous to ever leave him alone because she thinks he couldn't survive / manage his own affairs.    ROS:  A 10-point ROS was performed. Negative, other than stated per HPI.     PMH  Past Medical History:   Diagnosis Date     Anemia      Arrhythmia     atrial fib     Arthritis 1990's    gout     BPH (benign prostatic hyperplasia)      Depression      Diabetes mellitus (H)     Type 2  IDDM     Diverticulosis      ESRD (end stage renal disease) on dialysis (H)      Hematuria      HTN (hypertension)      HTN (hypertension)      IgA nephropathy      MI, old      Pneumonia      Rhabdomyolysis      Weakness 5/10/2019       PSH  Past Surgical History:   Procedure Laterality Date     ABDOMEN SURGERY       APPENDECTOMY  1970's     ARTHROSCOPY KNEE  6/27/2013    Procedure: ARTHROSCOPY KNEE;  Right Knee Arthroscopy, Debridment Of Medial Meniscal Tear.  ;  Surgeon: Tomás Wong MD;  Location: [facility]     BACK SURGERY  1999    L5 S1 decompression     BIOPSY  2017    skin     COLONOSCOPY  2013    [facility]     CORONARY ANGIOGRAPHY ADULT ORDER  2018     EYE SURGERY Bilateral 2004    Lens replacement     VASCULAR SURGERY         Medications   Current Facility-Administered Medications   Medication     cefTRIAXone (ROCEPHIN) 1 g vial to attach to  mL bag for ADULTS or NS 50 mL bag for PEDS     Current Outpatient Medications   Medication     aspirin 81 MG EC tablet     cholecalciferol 1000 units TABS       Allergies  Allergies   Allergen Reactions     Cephalexin      Ciprofloxacin      Diagnostic X-Ray Materials      Sulfa Drugs Hives      Social History  Drinks 12oz hard liquor per day. Denies other recreational drug use.    Family History    Family History   Problem Relation Age of Onset     Cancer Father      Physical Examination   Vitals: /71   Pulse 56   Temp 97.1  F (36.2  C) (Oral)   Resp 11   SpO2 100%   General: Adult, in NAD, cooperative  HEENT: NC/AT, no scleral icterus, op pink and moist.  Cardiac: RRR no M.  Chest: CTAB, no increased work of breathing  Abdomen: S/NT/ND  Extremities: Trace-1+ LE edema bilaterally   Skin: No worrisome lesions   Psych: Flat affect  Neuro:  Mental status: Awake, oriented to place with prompting, knows [employer]'s president, not oriented to time (thinks it's 2001). Fund of knowledge is sufficient. Recent and remote memory appear intact. Speech is fluent, comprehension, naming and repetition intact. No dysarthria.  Cranial nerves: CN2-12 tested and no significant findings appreciated. PERRL. Eyes conjugate, EOMI without nystagmus, tongue/uvula midline.   Motor: Tone normal. 3+/5 left foot dorsifexion, 5/5 on right side. 3+/5 dorsiflexion on bilateral great toes. 5/5 strength throughout otherwise  Reflexes: 2+ Left and right patellar, no achilles reflexes elicited on either side. No ankle clonus. Toes down-going.  Sensory: Decreased sensation to light touch from feet to knees bilaterally, intact proprioception at bilateral great toes  Coordination: FNF w/ dysmetria   Gait: unable to stand without the assistance of his arms from hospital bed. Gait not further assessed as deemed unsafe    Investigations   CT head w/o contrast  Impression:  1. No acute intracranial pathology.  2. Moderate generalized cerebral volume loss and chronic small vessel  ischemic disease. Ventricular dilatation is thought to be secondary to  volume loss.  3. 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  Neurology consulted due to concern for normal pressure hydrocephalus, based on enlarged ventricles on CT.  Upon review of imaging, ventricular dilated Tatian is thought to be secondary to volume loss.  There was some concern about urinary incontinence but this was refuted upon further discussions with the wife.  Patient's encephalopathy, imbalance and ataxia noted on exam are likely multifactorial in etiology; acute intoxication, vitamin deficiency, cerebellar atrophy in the setting of alcohol misuse versus neurocognitive disorder.  Presentation is unlikely due to normal pressure hydrocephalus.     Recommendations  -No indication at this time to workup further for normal pressure hydrocephalus  -Treat underlying urinary tract infection  -Already received vitamin B-1 and folate in the ED  -Work-up for left foot drop may be completed as outpatient.  Unilateral nature argues against alcohol-induced nephropathy  -May follow up with general and/or behavioral neurology for further evaluation of cognitive concerns    Thank you for involving neurology in the care of Fer Latham.  Please do not hesitate to call with questions/concerns (consult pager [phone]).      Patient was seen and discussed with Dr. Llamas.      David Washington MD  Internal Medicine, PGY-2  [id number]

## 2019-09-11 NOTE — H&P
"[facility] ED Observation Admission Note    Chief Complaint   Patient presents with     Altered Mental Status       Assessment/Plan:  Fer Latham is a 69 year old male with a history of ESRD on hemodialysis (M/W/F), chronic atrial fibrillation, DM-2, depression, alcoholism, CAD, rhabdomyolysis who presents to the Emergency Department today for altered metal status.      1. Altered mental status: Per ED note, pt was brought into the ED by his wife after he was confused. Pt's wife reports that he had been at baseline health up until last evening. Reports he seemed to be \"zoned out\". At one point, patient went to bathroom and felt very week subsequently sliding down the bathroom wall and onto the floor. Reports no LOC or dizziness. Denies trauma to the head.  He was recently admitted in 6/2019 for a UTI with generalized weakness. He does have hx of recurrent UTI's. Visiting with him in the ED, pt is oriented to self and place but not to time. He does have hx of alcohol abuse. Alcohol level was elevated at 0.20 initially, recheck this morning at 0.07, most likely attributing to his episode of altered mental status. He admits to drinking a pint of rum and Coke earlier in the day.  He does not admit how often he drinks however wife states that he does typically drink a small amount daily. In the ED, /102, HR 67, Temp 97.1, RR 14, SPO2 100% on RA. EKG shows atrial fibrillation- rate controlled with no acute ischemic changes. Labs were drawn and fairly unremarkable- Na 134, K+ 3.2, Cr 2.47 in the setting of ESRD oh HD MWF.  BUN 21. Creatinine is near his baseline. WBC 5.1, Hgb 9.7 (at baseline), hematocrit 28.8. INR 1.21. PTT 30. Lactic acid 2.0. Improved to 1.9 after hydration. Lipase normal. Vitamin B 12 normal. UA shows large LE, 250 wbc's, 9 rbc's, few bacteria, and 10 Epi cells. UCx is pending. Ceftriaxone 1 g IV x 1 was given in the ED as well as vitamin B-1 and folate. CXR shows patchy left basilar opacity " "suggesting atelectasis. CT head obtained and no acute intracranial pathology. There was some suspicion of pressure hydrocephalus on CT. Neurosurgery consulted in the ED, no indication at this time to workup further for normal pressure hydrocephalus. Recommend treating underlying urinary tract infection. Plan is to admit the pt to ED obs for PT/OT and SW for potential placement to TCU. He was seen by PT. Recommend TCU. Referral sent to TCU today by SW. Potentially can discharge tomorrow pending TCU acceptance.    -Admit to ED Observation  -VS Q4hrs  -Rocephin 1 q 24 hr   - ml/hr  -Follow UC   -PT/OT  -SW for placement      2. Afib: rate controlled but not on anticoagulation as pt refused.  -Telemetry     3. DM2: Pt currently not on hyperglycemic agents. BG 90s-100s. Will closely monitor   -Moderate carb diet  -Glucose checks 4x daily  -Assess for hypoglycemic agent      4. ESRD on hemodialysis: Cr. 2.46 (at baseline) Pt got full run of dialysis at  [facility] during his MWF dialysis on Monday. Discussed with Nephrology who put him on their list to dialize today or tomorrow.   -Nephrology consult      Chronic Medical Problems  #Depression: Pt has been on sertraline in the past, currently denies taking it.  Reports taking diazepam PRN.     #Anemia of chronic disease: Baseline 10-11, today Hgb 9.7  #CAD: Hx of MI per chart review. Continue daily ASA 81mg daily        HPI:    Per ED note \"Fer Latham is a 69 year old male with history of end-stage renal disease on hemodialysis Friday, chronic atrial fibrillation, type 2 diabetes, IgA nephropathy, alcohol abuse, recurrent UTI who presents after an episode of altered mental status.  I did discuss the events with the patient's wife on the phone for further information on history.  Patient's wife states the patient had been at his baseline state of health until this evening when he appeared to be more \"out of it while watching TV\".  She states that he got up to go to " the bathroom and she followed him.  She states that in the bathroom he still appeared to be somewhat zoned out and was not responding to his name.  She states he did not lose consciousness or fall.  She denies any known recent falls.  She did not notice any focal weakness.  Patient denies any weakness, numbness, or tingling.  Wife states that baseline the patient typically is alert and oriented appropriately.  She does state however that for the past year he has been having intermittent episodes where he becomes confused.  She states that he also has intermittent incontinence.  She did not note this tonight.  She denies any prior history of seizures and did not note any seizure activity tonight.  He is on a baby aspirin only no other anticoagulation.  She states that at times he does have difficulty with his gait with a wide-based gait.  She states this has been going on for approximately a year and that tonight he was shuffling to the bathroom.  She states he has been in a wheelchair due to this gait for the past year.  She states he was recently admitted in June for a UTI with generalized weakness.  She states that no diagnosis has been known for these episodes.  She denies any prior history of stroke.  She states the patient was not recently ill and was not having any complaints prior to this episode tonight.  Here, the patient is oriented to self as well as place but not time or president.  He states he feels back at his baseline.  He states that in terms of the event earlier tonight he felt out of it.  He again denies any recent falls.  He denies any headache, nausea, vomiting, diarrhea, chest pain, shortness of breath, abdominal pain, cough, dysuria, hematuria, numbness, tingling, or weakness.  He admits to drinking a pint of rum and Coke today.  He does not admit how often he drinks however wife states that he does typically drink a small amount daily.  Denies any prior history of withdrawal symptoms or  "withdrawal seizures.  He denies any illicit drug use\".    In the ED, /102, HR 67, Temp 97.1, RR 14, SPO2 100% on RA. EKG shows atrial fibrillation- rate controlled with no acute ischemic changes. Labs were drawn and fairly unremarkable- Na 134, K+ 3.2, Cr 2.47 in the setting of ESRD oh HD MWF.  BUN 21. Creatinine is near his baseline. WBC 5.1, Hgb 9.7 (at baseline), hematocrit 28.8. INR 1.21. PTT 30. Lactic acid 2.0. Improved to 1.9 after hydration. Lipase normal. Vitamin B 12 normal. UA shows large LE, 250 wbc's, 9 rbc's, few bacteria, and 10 Epi cells. UCx is pending. Ceftriaxone 1 g IV x 1 was given in the ED as well as vitamin B-1 and folate. CXR shows patchy left basilar opacity suggesting atelectasis. CT head obtained and no acute intracranial pathology. There was some suspicion of pressure hydrocephalus on CT. Neurosurgery consulted in the ED, no indication at this time to workup further for normal pressure hydrocephalus. Recommend treating underlying urinary tract infection. Plan is to admit the pt to ED obs for PT/OT and SW for potential placement to TCU. He was seen by PT. Recommend TCU. Referral sent to TCU today by SW. Potentially can discharge tomorrow pending TCU acceptance.      On admission to the observation unit the patient was stable.     History:    Past Medical History:   Diagnosis Date     Anemia      Arrhythmia     atrial fib     Arthritis 1990's    gout     BPH (benign prostatic hyperplasia)      Depression      Diabetes mellitus (H)     Type 2  IDDM     Diverticulosis      ESRD (end stage renal disease) on dialysis (H)      Hematuria      HTN (hypertension)      HTN (hypertension)      IgA nephropathy      MI, old      Pneumonia      Rhabdomyolysis      Weakness 5/10/2019       Past Surgical History:   Procedure Laterality Date     ABDOMEN SURGERY       APPENDECTOMY  1970's     ARTHROSCOPY KNEE  6/27/2013    Procedure: ARTHROSCOPY KNEE;  Right Knee Arthroscopy, Debridment Of Medial " Meniscal Tear.  ;  Surgeon: Tomás Wong MD;  Location: [facility]     BACK SURGERY  1999    L5 S1 decompression     BIOPSY  2017    skin     COLONOSCOPY  2013    [facility]     CORONARY ANGIOGRAPHY ADULT ORDER  2018     EYE SURGERY Bilateral 2004    Lens replacement     VASCULAR SURGERY         Family History   Problem Relation Age of Onset     Cancer Father        Social History     Socioeconomic History     Marital status:      Spouse name: Not on file     Number of children: Not on file     Years of education: Not on file     Highest education level: Not on file   Occupational History     Not on file   Social Needs     Financial resource strain: Not on file     Food insecurity:     Worry: Not on file     Inability: Not on file     Transportation needs:     Medical: Not on file     Non-medical: Not on file   Tobacco Use     Smoking status: Former Smoker     Smokeless tobacco: Never Used     Tobacco comment: quit 35 years ago   Substance and Sexual Activity     Alcohol use: Yes     Comment: 1 to 2 drinks a day (Occasional)     Drug use: No     Sexual activity: Not on file   Lifestyle     Physical activity:     Days per week: Not on file     Minutes per session: Not on file     Stress: Not on file   Relationships     Social connections:     Talks on phone: Not on file     Gets together: Not on file     Attends Orthodox service: Not on file     Active member of club or organization: Not on file     Attends meetings of clubs or organizations: Not on file     Relationship status: Not on file     Intimate partner violence:     Fear of current or ex partner: Not on file     Emotionally abused: Not on file     Physically abused: Not on file     Forced sexual activity: Not on file   Other Topics Concern     Parent/sibling w/ CABG, MI or angioplasty before 65F 55M? Not Asked   Social History Narrative     Not on file         No current facility-administered medications on file prior to encounter.   Current Outpatient  Medications on File Prior to Encounter:  aspirin (ASA) 325 MG EC tablet Take 325 mg by mouth daily    cholecalciferol 1000 units TABS Take 2 tablets by mouth daily    diazepam (VALIUM) 5 MG tablet Take 5 mg by mouth every 12 hours as needed for anxiety       Data:    Results for orders placed or performed during the hospital encounter of 09/10/19   XR Chest 2 Views    Narrative    EXAM: XR CHEST 2 VW  9/10/2019 10:24 PM      HISTORY: AMS    COMPARISON: 5/10/2019    FINDINGS: AP and lateral radiograph of the chest. Right IJ central  line tip projects over the low SVC. The trachea is midline. The  cardiac silhouette is enlarged. Mild right hemidiaphragm elevation. No  pleural effusion or pneumothorax. Patchy left basilar opacities,  better visualized on lateral radiograph. Upper abdomen is  unremarkable.       Impression    IMPRESSION: Patchy left basilar opacity suggesting atelectasis.    I have personally reviewed the examination and initial interpretation  and I agree with the findings.    GETACHEW AYALA MD   Head CT w/o contrast    Narrative    CT HEAD W/O CONTRAST 9/10/2019 10:43 PM    Provided History: AMS    Comparison: None available.    Technique: Using multidetector thin collimation helical acquisition  technique, axial, coronal and sagittal CT images from the skull base  to the vertex were obtained without intravenous contrast.     Findings:    No intracranial hemorrhage, mass effect, or midline shift. Moderate  global cerebral volume loss exvacuodilatation of the ventricles are  system. No crowding of the sulci of the high convexities.  Extensive  periventricular and subcortical hypoattenuation. No definite acute  loss of the gray-white differentiation. The basal cisterns are patent.    Mild polypoid mucosal thickening in the left maxillary sinus. Muscle  air cells are clear.      Impression    Impression:  1. No acute intracranial pathology.  2. Moderate generalized cerebral volume loss and chronic small  vessel  ischemic disease. Ventricular dilatation is thought to be secondary to  volume loss.  3. Prominent white matter hypoattenuation, nonspecific but     I have personally reviewed the examination and initial interpretation  and I agree with the findings.    MANJEET CRAMER MD   CBC with platelets differential   Result Value Ref Range    WBC 5.1 4.0 - 11.0 10e9/L    RBC Count 2.94 (L) 4.4 - 5.9 10e12/L    Hemoglobin 9.7 (L) 13.3 - 17.7 g/dL    Hematocrit 28.8 (L) 40.0 - 53.0 %    MCV 98 78 - 100 fl    MCH 33.0 26.5 - 33.0 pg    MCHC 33.7 31.5 - 36.5 g/dL    RDW 14.2 10.0 - 15.0 %    Platelet Count 166 150 - 450 10e9/L    Diff Method Automated Method     % Neutrophils 57.6 %    % Lymphocytes 26.9 %    % Monocytes 10.0 %    % Eosinophils 3.9 %    % Basophils 1.2 %    % Immature Granulocytes 0.4 %    Nucleated RBCs 0 0 /100    Absolute Neutrophil 2.9 1.6 - 8.3 10e9/L    Absolute Lymphocytes 1.4 0.8 - 5.3 10e9/L    Absolute Monocytes 0.5 0.0 - 1.3 10e9/L    Absolute Eosinophils 0.2 0.0 - 0.7 10e9/L    Absolute Basophils 0.1 0.0 - 0.2 10e9/L    Abs Immature Granulocytes 0.0 0 - 0.4 10e9/L    Absolute Nucleated RBC 0.0    Comprehensive metabolic panel   Result Value Ref Range    Sodium 134 133 - 144 mmol/L    Potassium 3.2 (L) 3.4 - 5.3 mmol/L    Chloride 101 94 - 109 mmol/L    Carbon Dioxide 20 20 - 32 mmol/L    Anion Gap 14 3 - 14 mmol/L    Glucose 98 70 - 99 mg/dL    Urea Nitrogen 21 7 - 30 mg/dL    Creatinine 2.46 (H) 0.66 - 1.25 mg/dL    GFR Estimate 26 (L) >60 mL/min/[1.73_m2]    GFR Estimate If Black 30 (L) >60 mL/min/[1.73_m2]    Calcium 8.1 (L) 8.5 - 10.1 mg/dL    Bilirubin Total 0.2 0.2 - 1.3 mg/dL    Albumin 3.0 (L) 3.4 - 5.0 g/dL    Protein Total 7.2 6.8 - 8.8 g/dL    Alkaline Phosphatase 67 40 - 150 U/L    ALT 10 0 - 70 U/L    AST 14 0 - 45 U/L   UA reflex to Microscopic and Culture   Result Value Ref Range    Color Urine Yellow     Appearance Urine Slightly Cloudy     Glucose Urine Negative NEG^Negative  mg/dL    Bilirubin Urine Negative NEG^Negative    Ketones Urine Negative NEG^Negative mg/dL    Specific Gravity Urine 1.014 1.003 - 1.035    Blood Urine Moderate (A) NEG^Negative    pH Urine 5.5 5.0 - 7.0 pH    Protein Albumin Urine 30 (A) NEG^Negative mg/dL    Urobilinogen mg/dL Normal 0.0 - 2.0 mg/dL    Nitrite Urine Negative NEG^Negative    Leukocyte Esterase Urine Large (A) NEG^Negative    Source Midstream Urine     RBC Urine 9 (H) 0 - 2 /HPF    WBC Urine 250 (H) 0 - 5 /HPF    WBC Clumps Present (A) NEG^Negative /HPF    Bacteria Urine Few (A) NEG^Negative /HPF    Squamous Epithelial /HPF Urine 10 (H) 0 - 1 /HPF    Hyaline Casts 7 (H) 0 - 2 /LPF   INR   Result Value Ref Range    INR 1.21 (H) 0.86 - 1.14   Lipase   Result Value Ref Range    Lipase 315 73 - 393 U/L   PTT   Result Value Ref Range    PTT 30 22 - 37 sec   Ammonia   Result Value Ref Range    Ammonia 27 10 - 50 umol/L   Lactic acid whole blood   Result Value Ref Range    Lactic Acid 1.9 0.7 - 2.0 mmol/L   Drug abuse screen 6 urine (chem dep)   Result Value Ref Range    Amphetamine Qual Urine Negative NEG^Negative    Barbiturates Qual Urine Negative NEG^Negative    Benzodiazepine Qual Urine Positive (A) NEG^Negative    Cannabinoids Qual Urine Negative NEG^Negative    Cocaine Qual Urine Negative NEG^Negative    Ethanol Qual Urine Positive (A) NEG^Negative    Opiates Qualitative Urine Negative NEG^Negative   Alcohol ethyl   Result Value Ref Range    Ethanol g/dL 0.20 (H) <0.01 g/dL   Acetaminophen level   Result Value Ref Range    Acetaminophen Level <2 mg/L   Salicylate level   Result Value Ref Range    Salicylate Level <2 mg/dL   Alcohol   Result Value Ref Range    Ethanol g/dL 0.14 (H) <0.01 g/dL   Alcohol ethyl   Result Value Ref Range    Ethanol g/dL 0.07 (H) <0.01 g/dL   Vitamin B12   Result Value Ref Range    Vitamin B12 334 193 - 986 pg/mL   EKG 12 lead   Result Value Ref Range    Interpretation ECG Click View Image link to view waveform and result     Alcohol breath test POCT   Result Value Ref Range    Alcohol Breath Test 0.05 (A) 0.00 - 0.01   ISTAT gases elec ica gluc murtaza POCT   Result Value Ref Range    Ph Venous 7.38 7.32 - 7.43 pH    PCO2 Venous 32 (L) 40 - 50 mm Hg    PO2 Venous 98 (H) 25 - 47 mm Hg    Bicarbonate Venous 19 (L) 21 - 28 mmol/L    O2 Sat Venous 98 %    Sodium 133 133 - 144 mmol/L    Potassium 3.2 (L) 3.4 - 5.3 mmol/L    Glucose 102 (H) 70 - 99 mg/dL    Calcium Ionized 4.6 4.4 - 5.2 mg/dL    Hemoglobin 9.9 (L) 13.3 - 17.7 g/dL    Hematocrit - POCT 29 (L) 40.0 - 53.0 %PCV   ISTAT gases lactate murtaza POCT   Result Value Ref Range    Ph Venous 7.38 7.32 - 7.43 pH    PCO2 Venous 30 (L) 40 - 50 mm Hg    PO2 Venous 94 (H) 25 - 47 mm Hg    Bicarbonate Venous 18 (L) 21 - 28 mmol/L    O2 Sat Venous 97 %    Lactic Acid 2.0 0.7 - 2.1 mmol/L   Blood culture   Result Value Ref Range    Specimen Description Blood RARM     Special Requests Aerobic and anaerobic bottles received     Culture Micro No growth after 7 hours    Blood culture   Result Value Ref Range    Specimen Description Blood Left Arm     Special Requests Received in aerobic bottle only     Culture Micro No growth after 7 hours    Blood culture   Result Value Ref Range    Specimen Description Blood LARM     Special Requests Aerobic and anaerobic bottles received     Culture Micro No growth after 8 hours    Urine Culture Aerobic Bacterial   Result Value Ref Range    Specimen Description Unspecified Urine     Special Requests Specimen received in preservative     Culture Micro PENDING              EKG Interpretation:         Interpreted by Jacinta Arthur MD  Time reviewed: 2200  Symptoms at time of EKG: AMS   Rhythm: atrial fibrillation - controlled  Rate: Normal  Axis: Normal  Ectopy: none  Conduction: normal  ST Segments/ T Waves: No ST-T wave changes  Q Waves: none  Comparison to prior: Unchanged from 6/6/19     Clinical Impression: no acute changes    ROS:    REVIEW OF SYSTEMS:    General: fatigue   EYES: Negative for vision changes or eye problems   ENT: No problems with ears, nose or throat. No difficulty swallowing.   RESP: No coughing, wheezing or shortness of breath   CV: No chest pains or palpitations   GI: No nausea, vomiting, heartburn, abdominal pain, diarrhea, constipation or change in bowel habits   : No urinary frequency or dysuria, bladder or kidney problems   MUSCULOSKELETAL: No significant muscle or joint pains   NEUROLOGIC: No headaches, numbness, tingling, weakness, problems with balance or coordination   PSYCHIATRIC: No problems with anxiety, depression or mental health   HEME/IMMUNE/ALLERGY: No history of bleeding or clotting problems or anemia. No allergies or immune system problems   ENDOCRINE: No history of thyroid disease, diabetes or other endocrine disorders   SKIN: No rashes,worrisome lesions or skin problems      PCP: DENISE WORLEY  CARDIAC RISK: CAD, A. fib    10 point ROS negative other than the symptoms noted above.      Exam:    Vitals:  B/P: 102/55, T: 97.9, P: 60, R: 20    Physical Exam   Constitutional: Pt is oriented to person, place, and time.Pt appears well-developed and well-nourished.   HENT:   Head: Normocephalic and atraumatic.   Eyes: Conjunctivae are normal. Pupils are equal, round, and reactive to light.   Neck: Normal range of motion. Neck supple.   Cardiovascular: Normal rate, regular rhythm, normal heart sounds and intact distal pulses.    Pulmonary/Chest: Effort normal and breath sounds normal. No respiratory distress. Pt has no wheezes. Pt has no rales  Abdominal: Soft. Bowel sounds are normal. Pt exhibits no distension and no mass. No tenderness. Pt has no rebound and no guarding.   Musculoskeletal: Normal range of motion. Pt exhibits no edema.   Neurological: Pt is alert and oriented to person, place, and time. Normal reflexes.   Skin: Skin is warm and dry. No rash noted.   Psychiatric: Pt has a normal mood and affect. Behavior is normal.  Judgment and thought content normal.     Consults: Nephrology, Neurology   FEN: Regular   DVT prophylaxis: Aspirin and Early ambulation  Code Status: Full  Disposition: Stable vital signs. Patient return to baseline.  All labs/images reviewed    Signed:  Naomi Bui PA-C  September 11, 2019 at 2:28 PM

## 2019-09-11 NOTE — ED NOTES
[facility], [place]   ED Nurse to Floor Handoff     Fer Latham is a 69 year old male who speaks English and lives with a spouse,  in a home  They arrived in the ED by ambulance from home    ED Chief Complaint: Altered Mental Status    ED Dx;   Final diagnoses:   Altered mental status, unspecified altered mental status type   Generalized muscle weakness   Acute cystitis with hematuria   Hypokalemia         Needed?: No    Allergies:   Allergies   Allergen Reactions     Cephalexin      Ciprofloxacin      Diagnostic X-Ray Materials      Sulfa Drugs Hives   .  Past Medical Hx:   Past Medical History:   Diagnosis Date     Anemia      Arrhythmia     atrial fib     Arthritis 1990's    gout     BPH (benign prostatic hyperplasia)      Depression      Diabetes mellitus (H)     Type 2  IDDM     Diverticulosis      ESRD (end stage renal disease) on dialysis (H)      Hematuria      HTN (hypertension)      HTN (hypertension)      IgA nephropathy      MI, old      Pneumonia      Rhabdomyolysis      Weakness 5/10/2019      Baseline Mental status: Pt normally is alert and oriented x 4. Here in the ER, pt confused to date and time  Current Mental Status changes: Pt a little disoriented to time and date  Infection present or suspected this encounter: yes urinary  Sepsis suspected: No  Isolation type: Contact     Activity level - Baseline/Home:  Stand with Assist of 2  Activity Level - Current:   Stand with Assist of 2    Bariatric equipment needed?: No    In the ED these meds were given:   Medications   0.9% sodium chloride BOLUS (0 mLs Intravenous Stopped 9/11/19 0135)   folic acid injection 1 mg (1 mg Intravenous Given 9/11/19 0602)   thiamine (B-1) injection 100 mg (100 mg Intravenous Given 9/11/19 0602)   cefTRIAXone (ROCEPHIN) 1 g vial to attach to  mL bag for ADULTS or NS 50 mL bag for PEDS (0 g Intravenous Stopped 9/11/19 0814)   potassium chloride (KLOR-CON) Packet 40 mEq (40  mEq Oral Given 9/11/19 1021)       Drips running?  No    Home pump  No    Current LDAs  Hemodialysis Vascular Access Arteriovenous fistula Left Forearm (Active)   Number of days:        Labs results:   Labs Ordered and Resulted from Time of ED Arrival Up to the Time of Departure from the ED   CBC WITH PLATELETS DIFFERENTIAL - Abnormal; Notable for the following components:       Result Value    RBC Count 2.94 (*)     Hemoglobin 9.7 (*)     Hematocrit 28.8 (*)     All other components within normal limits   COMPREHENSIVE METABOLIC PANEL - Abnormal; Notable for the following components:    Potassium 3.2 (*)     Creatinine 2.46 (*)     GFR Estimate 26 (*)     GFR Estimate If Black 30 (*)     Calcium 8.1 (*)     Albumin 3.0 (*)     All other components within normal limits   UA MACROSCOPIC WITH REFLEX TO MICRO AND CULTURE - Abnormal; Notable for the following components:    Blood Urine Moderate (*)     Protein Albumin Urine 30 (*)     Leukocyte Esterase Urine Large (*)     RBC Urine 9 (*)     WBC Urine 250 (*)     WBC Clumps Present (*)     Bacteria Urine Few (*)     Squamous Epithelial /HPF Urine 10 (*)     Hyaline Casts 7 (*)     All other components within normal limits   INR - Abnormal; Notable for the following components:    INR 1.21 (*)     All other components within normal limits   DRUG ABUSE SCREEN 6 CHEM DEP URINE ([facility]) - Abnormal; Notable for the following components:    Benzodiazepine Qual Urine Positive (*)     Ethanol Qual Urine Positive (*)     All other components within normal limits   ALCOHOL ETHYL - Abnormal; Notable for the following components:    Ethanol g/dL 0.20 (*)     All other components within normal limits   ALCOHOL ETHYL - Abnormal; Notable for the following components:    Ethanol g/dL 0.14 (*)     All other components within normal limits   ALCOHOL ETHYL - Abnormal; Notable for the following components:    Ethanol g/dL 0.07 (*)     All other components within normal limits   ALCOHOL  BREATH TEST POCT - Abnormal; Notable for the following components:    Alcohol Breath Test 0.05 (*)     All other components within normal limits   ISTAT GASES ELEC ICA GLUC YULIANA POCT - Abnormal; Notable for the following components:    PCO2 Venous 32 (*)     PO2 Venous 98 (*)     Bicarbonate Venous 19 (*)     Potassium 3.2 (*)     Glucose 102 (*)     Hemoglobin 9.9 (*)     Hematocrit - POCT 29 (*)     All other components within normal limits   ISTAT  GASES LACTATE YULIANA POCT - Abnormal; Notable for the following components:    PCO2 Venous 30 (*)     PO2 Venous 94 (*)     Bicarbonate Venous 18 (*)     All other components within normal limits   LIPASE   PARTIAL THROMBOPLASTIN TIME   AMMONIA   LACTIC ACID WHOLE BLOOD   ACETAMINOPHEN LEVEL   SALICYLATE LEVEL   METHYLMALONIC ACID   VITAMIN B12   VITAMIN B1 WHOLE BLOOD   PERIPHERAL IV CATHETER   ISTAT CG4 GASES LACTATE YULIANA NURSING POCT   ISTAT GAS OR ELECTROLYTE NURSING POCT   CARDIAC CONTINUOUS MONITORING   BLOOD CULTURE   BLOOD CULTURE   BLOOD CULTURE   URINE CULTURE AEROBIC BACTERIAL       Imaging Studies:   Recent Results (from the past 24 hour(s))   XR Chest 2 Views    Narrative    EXAM: XR CHEST 2 VW  9/10/2019 10:24 PM      HISTORY: AMS    COMPARISON: 5/10/2019    FINDINGS: AP and lateral radiograph of the chest. Right IJ central  line tip projects over the low SVC. The trachea is midline. The  cardiac silhouette is enlarged. Mild right hemidiaphragm elevation. No  pleural effusion or pneumothorax. Patchy left basilar opacities,  better visualized on lateral radiograph. Upper abdomen is  unremarkable.       Impression    IMPRESSION: Patchy left basilar opacity suggesting atelectasis.    I have personally reviewed the examination and initial interpretation  and I agree with the findings.    GETACHEW AYALA MD   Head CT w/o contrast    Narrative    CT HEAD W/O CONTRAST 9/10/2019 10:43 PM    Provided History: AMS    Comparison: None available.    Technique: Using  multidetector thin collimation helical acquisition  technique, axial, coronal and sagittal CT images from the skull base  to the vertex were obtained without intravenous contrast.     Findings:    No intracranial hemorrhage, mass effect, or midline shift. Moderate  global cerebral volume loss exvacuodilatation of the ventricles are  system. No crowding of the sulci of the high convexities.  Extensive  periventricular and subcortical hypoattenuation. No definite acute  loss of the gray-white differentiation. The basal cisterns are patent.    Mild polypoid mucosal thickening in the left maxillary sinus. Muscle  air cells are clear.      Impression    Impression:  1. No acute intracranial pathology.  2. Moderate generalized cerebral volume loss and chronic small vessel  ischemic disease. Ventricular dilatation is thought to be secondary to  volume loss.  3. Prominent white matter hypoattenuation, nonspecific but     I have personally reviewed the examination and initial interpretation  and I agree with the findings.    MANJEET CRAMER MD       Recent vital signs:   /55   Pulse 60   Temp 97.9  F (36.6  C) (Oral)   Resp 20   SpO2 99%     Jorge A Coma Scale Score: 14 (09/11/19 1015)  Assessment Qualifiers: no eye obstruction present    Cardiac Rhythm: Normal Sinus  Pt needs tele? No  Skin/wound Issues: None    Code Status: Full Code    Pain control: good    Nausea control: good    Abnormal labs/tests/findings requiring intervention: UTI    Family present during ED course? No   Family Comments/Social Situation comments: wife went home, but was with PT this AM    Tasks needing completion: None    Caity Owen, RN    [phone] [facility] ED

## 2019-09-11 NOTE — PHARMACY-ADMISSION MEDICATION HISTORY
Admission medication history interview status for the 9/10/2019 admission is complete. See Epic admission navigator for allergy information, pharmacy, prior to admission medications and immunization status.     Medication history interview sources:  Pt, Pt's wife Ailin, [facility] pharmacy, chart review, dispense history.     Changes made to PTA medication list (reason)  Added:  -diazepam    Deleted:   -folic acid: Pt does not want to take  -vitamin B1: Pt does not want to take    Changed:   -aspirin: Pt takes 325 mg daily, not 81 mg  -cholecalciferol: Pt takes 2000 units daily, not 1000 units    Additional medication history information (including reliability of information, actions taken by pharmacist):    -Pt is a poor historian and instructed writer to call his wife for medication information. Pt was able to confirm sulfa allergy (hives), but did not recall others listed in chart. Pt's wife knew all medications well, but was easily frustrated by questions. [facility] pharmacy was called to verify fill dates.    -diazepam: Per Ailin, Pt takes PRN but does not think the medication does anything. Per Discharge note on 5/18/19 this medication was to be discontinued. Per [facility] this medication was filled on 8/12/19.    -cyclobenzaprine: Ailin did not recognize this medication and does not think the patient is taking it so it was not added to the medication list. Per [facility], this medication was filled on 7/9/19 for a 30 day supply (Cyclobenzaprine 10 mg tab- take 1 tab before bed as needed).      Prior to Admission medications    Medication Sig Last Dose Taking? Auth Provider   aspirin (ASA) 325 MG EC tablet Take 325 mg by mouth daily  9/10/2019 at PM Yes Unknown, Entered By History   cholecalciferol 1000 units TABS Take 2 tablets by mouth daily  9/10/2019 at PM Yes Unknown, Entered By History   diazepam (VALIUM) 5 MG tablet Take 5 mg by mouth every 12 hours as needed for anxiety Past Month at Unknown time Yes Unknown, Entered By History        Medication history completed by:   Pina Hayes, Student Pharmacist  September 11, 2019

## 2019-09-11 NOTE — PLAN OF CARE
Discharge Planner PT   Patient plan for discharge: Unknown; pt doesn't respond when asked  Current status: Confused and disoriented. Follows 100% commands and is cooperative/calm during session. Assisted supine>sit with mod A and then sit<>stand x 3 with mod A/walker. Min A for bed>w/c>toilet>w/c pivot transfers. Cues for safety. Moderate weakness noted.  Barriers to return to prior living situation: Weakness/deconditioning, cognition  Recommendations for discharge: TCU; if pt refuses must have as much assist as possible at home and home RN/PT/OT  Rationale for recommendations: Current level of function       Entered by: Joseph Alfaro 09/11/2019 3:46 PM

## 2019-09-11 NOTE — ED TRIAGE NOTES
"Arrives via EMS, concern for confusion and possible sepsis. Per EMS pt felt like he \"was gonna lose it,\" and possibly pass out. No LOC per pt and wife. Oriented to self. Delayed responses, difficulty tracking in conversation. Dialyzes MWF.  "

## 2019-09-11 NOTE — PROGRESS NOTES
09/11/19 1500   Quick Adds   Type of Visit Initial PT Evaluation   Living Environment   Lives With spouse   Living Arrangements apartment   Home Accessibility   (chair lift to enter building)   Transportation Anticipated family or friend will provide   Living Environment Comment Pt lives with spouse. Unclear if they can provide assist or not.   Self-Care   Usual Activity Tolerance moderate   Current Activity Tolerance fair   Regular Exercise No   Equipment Currently Used at Home walker, rolling;wheelchair, manual   Activity/Exercise/Self-Care Comment Pt watches TV and is retired   Functional Level Prior   Ambulation 1-->assistive equipment   Transferring 1-->assistive equipment   Fall history within last six months yes   Number of times patient has fallen within last six months 5   Which of the above functional risks had a recent onset or change? ambulation;transferring   Prior Functional Level Comment Indep with mobility prior to admission.   General Information   Onset of Illness/Injury or Date of Surgery - Date 09/10/19   Patient/Family Goals Statement Needs to go to the bathroom   Pertinent History of Current Problem (include personal factors and/or comorbidities that impact the POC) 69 year old male h/o ESRD 2/2 IgA nephropathy on HD, alcohol abuse, recurrent UTIs who presents with episode of altered mental status. Patient presents w/ a JESSENIA 0.2 and benzos in his urine; at time of initial neurology eval, patient had a JESSENIA 0.07, offered questionable/vague history, defers to wife for most answers. Wife provides collateral information, but history provided contradicts previous reports.   Cognitive Status Examination   Orientation person   Level of Consciousness alert;lethargic/somnolent;confused   Follows Commands and Answers Questions 100% of the time   Personal Safety and Judgment impaired   Memory impaired   Pain Assessment   Patient Currently in Pain No   Integumentary/Edema   Integumentary/Edema no deficits  "were identifed   Posture    Posture Forward head position;Protracted shoulders;Kyphosis   Range of Motion (ROM)   ROM Comment Mildly limited AROM due to weakness   Strength   Strength Comments B LE strength grossly 4/5   Bed Mobility   Bed Mobility Comments Supine>sit: mod A   Transfer Skills   Transfer Comments Sit>stand: mod A up to standard walker   Gait   Gait Comments Not formally assessed this date.   Balance   Balance Comments CGA/SW for standing static/dynamic balance   Sensory Examination   Sensory Perception no deficits were identified   General Therapy Interventions   Planned Therapy Interventions balance training;bed mobility training;cognition;gait training;neuromuscular re-education;strengthening;stretching;transfer training;home program guidelines;progressive activity/exercise   Clinical Impression   Criteria for Skilled Therapeutic Intervention yes, treatment indicated   PT Diagnosis Impaired functional mobility   Influenced by the following impairments Weakness, cognition, poor posture   Functional limitations due to impairments Bed mobility, transfers, gait, balance, endurance   Clinical Presentation Stable/Uncomplicated   Clinical Presentation Rationale Clinical judgement   Clinical Decision Making (Complexity) Low complexity   Therapy Frequency 3x/week   Predicted Duration of Therapy Intervention (days/wks) 3 weeks   Anticipated Discharge Disposition Transitional Care Facility   Risk & Benefits of therapy have been explained Yes   Patient, Family & other staff in agreement with plan of care Yes   [facility] AM-PAC  \"6 Clicks\" V.2 Basic Mobility Inpatient Short Form   1. Turning from your back to your side while in a flat bed without using bedrails? 2 - A Lot   2. Moving from lying on your back to sitting on the side of a flat bed without using bedrails? 2 - A Lot   3. Moving to and from a bed to a chair (including a wheelchair)? 2 - A Lot   4. Standing up from a chair using your arms " (e.g., wheelchair, or bedside chair)? 2 - A Lot   5. To walk in hospital room? 2 - A Lot   6. Climbing 3-5 steps with a railing? 1 - Total   Basic Mobility Raw Score (Score out of 24.Lower scores equate to lower levels of function) 11   Total Evaluation Time   Total Evaluation Time (Minutes) 10

## 2019-09-11 NOTE — ED PROVIDER NOTES
"  History     Chief Complaint   Patient presents with     Altered Mental Status     HPI  Fer Latham is a 69 year old male with history of end-stage renal disease on hemodialysis Friday, chronic atrial fibrillation, type 2 diabetes, IgA nephropathy, alcohol abuse, recurrent UTI who presents after an episode of altered mental status.  I did discuss the events with the patient's wife on the phone for further information on history.  Patient's wife states the patient had been at his baseline state of health until this evening when he appeared to be more \"out of it while watching TV\".  She states that he got up to go to the bathroom and she followed him.  She states that in the bathroom he still appeared to be somewhat zoned out and was not responding to his name.  She states he did not lose consciousness or fall.  She denies any known recent falls.  She did not notice any focal weakness.  Patient denies any weakness, numbness, or tingling.  Wife states that baseline the patient typically is alert and oriented appropriately.  She does state however that for the past year he has been having intermittent episodes where he becomes confused.  She states that he also has intermittent incontinence.  She did not note this tonight.  She denies any prior history of seizures and did not note any seizure activity tonight.  He is on a baby aspirin only no other anticoagulation.  She states that at times he does have difficulty with his gait with a wide-based gait.  She states this has been going on for approximately a year and that tonight he was shuffling to the bathroom.  She states he has been in a wheelchair due to this gait for the past year.  She states he was recently admitted in June for a UTI with generalized weakness.  She states that no diagnosis has been known for these episodes.  She denies any prior history of stroke.  She states the patient was not recently ill and was not having any complaints prior to this " episode tonight.  Here, the patient is oriented to self as well as place but not time or president.  He states he feels back at his baseline.  He states that in terms of the event earlier tonight he felt out of it.  He again denies any recent falls.  He denies any headache, nausea, vomiting, diarrhea, chest pain, shortness of breath, abdominal pain, cough, dysuria, hematuria, numbness, tingling, or weakness.  He admits to drinking a pint of rum and Coke today.  He does not admit how often he drinks however wife states that he does typically drink a small amount daily.  Denies any prior history of withdrawal symptoms or withdrawal seizures.  He denies any illicit drug use.    I have reviewed the Medications, Allergies, Past Medical and Surgical History, and Social History in the Epic system.    Review of Systems  Review of systems is limited due to the patient's altered mental status and alcohol intoxication.  Physical Exam   BP: (!) 114/102  Pulse: 63  Heart Rate: 67  Temp: 97.1  F (36.2  C)  Resp: 14  SpO2: 100 %      Physical Exam  GEN:  Alert, well developed, no acute distress, mils of alcohol.  Slightly slowed verbal response.  No slurred speech.  HEENT:  PERRL, EOMI, Mucous membranes are moist.  No nystagmus noted.  Head is atraumatic.  No scalp hematoma.  No hemotympanum or septal hematoma bilaterally.  No intraoral trauma.  Cardio:  RRR, no murmur, radial and DP pulses equal bilaterally, dialysis catheter in place without surrounding erythema or drainage.  PULM:  Lungs clear, good air movement, no wheezes, rales   Abd:  Soft, normal bowel sounds, no focal tenderness  Back exam:  No CVA tenderness  Musculoskeletal:  normal range of motion, no lower extremity swelling or calf tenderness  Neuro: Patient is alert and oriented to self and place.  He is unable to state the day or the month and believes that it is 2001.  He also is unable to name the president.  He is appropriately answering questions otherwise.  No  slurred speech.  No facial droop noted.  Cranial nerves II through XII are intact bilaterally.  Sensation is intact light touch in all 4 extremities including the face bilaterally.  Strength is 5 out of 5 in all 4 extremities bilaterally.  Finger-to-nose intact bilaterally.  Vision is grossly normal.  GCS 14 due to confusion.  Skin:  Warm, dry, no rash    ED Course        Procedures             EKG Interpretation:      Interpreted by Jacinta Arthur MD  Time reviewed: 2200  Symptoms at time of EKG: AMS   Rhythm: atrial fibrillation - controlled  Rate: Normal  Axis: Normal  Ectopy: none  Conduction: normal  ST Segments/ T Waves: No ST-T wave changes  Q Waves: none  Comparison to prior: Unchanged from 6/6/19    Clinical Impression: no acute changes                Critical Care time:  none    The Lactic acid level is elevated due to alcohol use, at this time there is no sign of severe sepsis or septic shock. Formal lactic acid is within normal limits at 1.9.        Labs Ordered and Resulted from Time of ED Arrival Up to the Time of Departure from the ED   CBC WITH PLATELETS DIFFERENTIAL - Abnormal; Notable for the following components:       Result Value    RBC Count 2.94 (*)     Hemoglobin 9.7 (*)     Hematocrit 28.8 (*)     All other components within normal limits   COMPREHENSIVE METABOLIC PANEL - Abnormal; Notable for the following components:    Potassium 3.2 (*)     Creatinine 2.46 (*)     GFR Estimate 26 (*)     GFR Estimate If Black 30 (*)     Calcium 8.1 (*)     Albumin 3.0 (*)     All other components within normal limits   INR - Abnormal; Notable for the following components:    INR 1.21 (*)     All other components within normal limits   ALCOHOL ETHYL - Abnormal; Notable for the following components:    Ethanol g/dL 0.20 (*)     All other components within normal limits   ALCOHOL BREATH TEST POCT - Abnormal; Notable for the following components:    Alcohol Breath Test 0.05 (*)     All other components  within normal limits   ISTAT GASES ELEC ICA GLUC YULIANA POCT - Abnormal; Notable for the following components:    PCO2 Venous 32 (*)     PO2 Venous 98 (*)     Bicarbonate Venous 19 (*)     Potassium 3.2 (*)     Glucose 102 (*)     Hemoglobin 9.9 (*)     Hematocrit - POCT 29 (*)     All other components within normal limits   ISTAT  GASES LACTATE YULIANA POCT - Abnormal; Notable for the following components:    PCO2 Venous 30 (*)     PO2 Venous 94 (*)     Bicarbonate Venous 18 (*)     All other components within normal limits   LIPASE   PARTIAL THROMBOPLASTIN TIME   AMMONIA   LACTIC ACID WHOLE BLOOD   ACETAMINOPHEN LEVEL   SALICYLATE LEVEL   UA MACROSCOPIC WITH REFLEX TO MICRO AND CULTURE   DRUG ABUSE SCREEN 6 CHEM DEP URINE ([facility])   PERIPHERAL IV CATHETER   ISTAT CG4 GASES LACTATE YULIANA NURSING POCT   ISTAT GAS OR ELECTROLYTE NURSING POCT   CARDIAC CONTINUOUS MONITORING   BLOOD CULTURE   BLOOD CULTURE   BLOOD CULTURE     Head CT w/o contrast   Preliminary Result   Impression:   1. No evidence of acute intracranial pathology.   2. Enlarged ventricles are favored to be ex vacuo effect given   moderate generalized cerebral atrophy. Other etiologies such as normal   pressure hydrocephalus not excluded but felt less likely given lack of   secondary signs.   3. Prominent white matter hypoattenuation, nonspecific but suggesting   chronic small vessel ischemic disease.      XR Chest 2 Views   Preliminary Result   IMPRESSION: Patchy left basilar opacity suggesting atelectasis.                  Assessments & Plan (with Medical Decision Making)   On exam, patient overall is without acute distress.  His vital signs are within normal limits and he is afebrile.  He is denying any current symptoms.  EKG was obtained which revealed atrial fibrillation without evidence of acute ischemia and normal intervals.  He is in chronic atrial fibrillation and is rate controlled here.  I-STAT was obtained which revealed lactic acid of 2.  I did suspect  this was likely related to his alcohol use but we did repeat a formal lactic acid which was within normal limits at 1.9.  I have low suspicion for infection at this time as the patient is denying any sort of infectious symptoms.  Broad laboratory work-up was obtained.  Alcohol level was elevated at 0.20.  We did feel this may be attributing to his episode of altered mental status.  I did call his wife to further discuss the events tonight and she is confirming that he did not have any evidence of seizure activity and does not have any prior history of seizures.  She states he also has not had any prior history of alcohol withdrawal or alcohol withdrawal seizures and the patient also states that this is true.  She reports that for the past year he has been having these episodes where he becomes confused.  She states that he at times has gait difficulty with a wide-based gait and is in a wheelchair related to this.  She states he will intermittently have some urinary incontinence but did not have this tonight and this does not correlate with the confusion episodes necessarily.  As a result, we felt that seizure was less likely.  He does not have any intraoral trauma or bowel or bladder incontinence with this episode to suggest this at this time.  I-STAT potassium was 3.2.  Sodium was 133.  Formal CMP revealed a sodium of 134 with potassium 3.2.  His creatinine was 2.46 with a BUN of 21 and is due for dialysis later this morning.  LFTs were within normal limits.  With a normal white blood cell count of 5.1.  Hemoglobin is stable at 9.7.  Lipase was within normal limits.  Ammonia was also within normal limits.  Blood cultures were obtained however I have very low suspicion for infectious etiology as the patient has a normal white blood cell count, is afebrile, does not have any tachycardia or other vital sign abnormality, and has no infectious symptoms.  At this time UA is pending and wife does report he has had some  recurrent issues with UTIs.  Chest x-ray was obtained which did not reveal any evidence of pneumonia with only some mild atelectasis.  I did discuss this area with the radiology resident who confirmed that he felt this was likely atelectasis.  CT of the head was also obtained which did reveal some ventriculomegaly with some atrophy as well.  I spoke with the neurology resident about this due to his ongoing chronic symptoms suggestive of possible normal pressure hydrocephalus.  They did agree to come evaluate the patient but at this point we will wait to the patient is sober for reliable evaluation by the neurology team.  He has remained hemodynamically stable and without complaint.  He was given a 250 mL bolus of normal saline for some rehydration carefully as he is due for dialysis later this morning.  He was also given IV folate and IV thiamine.  At this time I do not see signs of Warnicke's encephalopathy or Korsakoff syndrome as he is not confabulating and does not have any ocular involvement.  He does not have any signs of focal neurologic deficit on my exam and I feel that CVA would be very unlikely to present this way.  UTI is still a possibility and the urine is pending and I did discuss this with my colleague who I transferred care to Dr. Juarez who will follow up on this result.  Patient currently is pending sobriety with last alcohol level of 0.14 at 1:30 AM.  After sobriety, the patient will be evaluated by the neurology team.  I did discuss the plan of admission if he did in fact have evidence of UTI on urinalysis.  The urine was significantly delayed due to the patient refusing catheterization low bladder scan volume.  Urine has now been elected and is pending.  Again, I have low suspicion for infection or sepsis as a cause of his confusion due to his normal white blood cell count, normal vital signs, lack of fever, and lack of infectious symptoms.  At this time, patient is pending sobriety and neuro  evaluation and my colleague Dr. Juarez will follow up neurology's recommendations and the urinalysis.  Patient was in agreement with the plan.  Patient's wife was also in agreement with the plan. Please see Dr. Juarez's note for further management, UA results, and final disposition. Patient was stable upon my last interaction.     I have reviewed the nursing notes.    I have reviewed the findings, diagnosis, plan and need for follow up with the patient.    New Prescriptions    No medications on file       Final diagnoses:   Altered mental status, unspecified altered mental status type       9/10/2019   [facility], [place], EMERGENCY DEPARTMENT     Jacinta Arthur MD  09/11/19 0511

## 2019-09-11 NOTE — PROGRESS NOTES
"ADDENDUM 2:52PM  Amber from [facility] stated that they were still open to review pt. [phone].     Social Work Services Progress Note    Hospital Day: 2  Date of Initial Social Work Evaluation:  Not completed  Collaborated with:  MD, PT    Data:  Pt is a 69 year old male with history of end-stage renal disease on hemodialysis Friday, chronic atrial fibrillation, type 2 diabetes, IgA nephropathy, alcohol abuse, recurrent UTI who presents after an episode of altered mental status.    SW consulted for TCU placement. PT consult in place. Per chart review, pt has stayed in other TCU settings. MD anticipates TCU will be the recommendation.    Intervention:  SW met with pt to discuss discharge planning. SW stated that a PT consult was in place and they would assess him for discharge recommendations. SW inquired about TCU preferences in anticipation that the recommendation will be TCU. Pt provided preferences (same as previous preferences in  notes)    [facility] [phone] -\" not appropriate\"  [facility] [phone] Requested PT notes and state that pt would have to agree to private $40/day fee (Amber stated that pt and wife not agreeable to this last time).   [facility] [phone]  [facility] [phone]    Per MD, pt will be TCU ready by this afternoon.     Pt has Medicare and AARP. Pt has not had a qualifying 3 night stay. SW unsure if pt will have insurance authorization for TCU. Pt stated that he is NOT comfortable or able to privately pay for a TCU if insurance does not cover it.     SW to Follow-up with MD, PT and facility referrals.     SW called wife (2:30PM) , Ailin [phone], to discuss TCU preferences. VM was left. ED SW contact was left, as well as the Obs SW desk number.     Assessment:  Pt stated that he was tired. Pt made appropriate eye contact, answered SW questions. Pt was able to conversate. SW anticipates that PT will recommend TCU. Unsure if PT " will have insurance coverage for TCU.    Plan:    Anticipated Disposition:  Facility:  TBD    Barriers to d/c plan:  Insurance authorization    Follow Up:  SW to continue following for TCU placement, pending PT recs.    RADHA Bentley  [facility]   Emergency Department  [phone]

## 2019-09-11 NOTE — PROGRESS NOTES
Fer Latham is a 69 year old male patient.  1. Altered mental status, unspecified altered mental status type    2. Generalized muscle weakness    3. Acute cystitis with hematuria    4. Hypokalemia      Past Medical History:   Diagnosis Date     Anemia      Arrhythmia     atrial fib     Arthritis 1990's    gout     BPH (benign prostatic hyperplasia)      Depression      Diabetes mellitus (H)     Type 2  IDDM     Diverticulosis      ESRD (end stage renal disease) on dialysis (H)      Hematuria      HTN (hypertension)      HTN (hypertension)      IgA nephropathy      MI, old      Pneumonia      Rhabdomyolysis      Weakness 5/10/2019     Current Outpatient Medications   Medication Sig Dispense Refill     aspirin 81 MG EC tablet Take 81 mg by mouth daily       cholecalciferol 1000 units TABS Take 1 tablet by mouth daily       Allergies   Allergen Reactions     Cephalexin      Ciprofloxacin      Diagnostic X-Ray Materials      Sulfa Drugs Hives     Active Problems:    * No active hospital problems. *    Blood pressure 118/68, pulse 60, temperature 97.9  F (36.6  C), temperature source Oral, resp. rate 16, SpO2 99 %.    Subjective:  Symptoms:  Stable.  He reports weakness.  (?confusion with ETOH).    Diet:  Adequate intake.    Activity level: Normal.    Pain:  He reports no pain.      Objective:  General Appearance:  Comfortable.    Vital signs: (most recent): Blood pressure 118/68, pulse 60, temperature 97.9  F (36.6  C), temperature source Oral, resp. rate 16, SpO2 99 %.  Vital signs are normal.    Output: Producing urine.    HEENT: Normal HEENT exam.    Lungs:  Normal effort and normal respiratory rate.  Breath sounds clear to auscultation.    Heart: Normal rate.  Irregular rhythm.  S1 normal and S2 normal.    Abdomen: Abdomen is soft.  Bowel sounds are normal.   There is no abdominal tenderness.     Extremities: Normal range of motion.    Pulses: Distal pulses are intact.    Neurological: Patient is alert.     Pupils:  Pupils are equal, round, and reactive to light.    Skin:  Warm.      Assessment:    Condition: In stable condition.  Improving.   (69 yom with ESRd/HD chronic a fib but no AC presents with confusion, noted to have UTI and ETOH-no h/o DT or Sz or shaking with withdraw per pt. Started rocephin for UTI, Neuro input appreciated-no CVA.  ).     Plan:   (Await PT SE input for possible TCU.).       Harjeet Ivy MD, MD  9/11/2019\    The pt was seen and examined by myself. The case was reviewed and the plan was discussed with the KELI.

## 2019-09-12 LAB
ANION GAP SERPL CALCULATED.3IONS-SCNC: 9 MMOL/L (ref 3–14)
BACTERIA SPEC CULT: NORMAL
BUN SERPL-MCNC: 26 MG/DL (ref 7–30)
CALCIUM SERPL-MCNC: 8.1 MG/DL (ref 8.5–10.1)
CHLORIDE SERPL-SCNC: 109 MMOL/L (ref 94–109)
CO2 SERPL-SCNC: 19 MMOL/L (ref 20–32)
CREAT SERPL-MCNC: 2.54 MG/DL (ref 0.66–1.25)
ERYTHROCYTE [DISTWIDTH] IN BLOOD BY AUTOMATED COUNT: 14.1 % (ref 10–15)
GFR SERPL CREATININE-BSD FRML MDRD: 25 ML/MIN/{1.73_M2}
GLUCOSE BLDC GLUCOMTR-MCNC: 129 MG/DL (ref 70–99)
GLUCOSE BLDC GLUCOMTR-MCNC: 85 MG/DL (ref 70–99)
GLUCOSE BLDC GLUCOMTR-MCNC: 90 MG/DL (ref 70–99)
GLUCOSE SERPL-MCNC: 90 MG/DL (ref 70–99)
HCT VFR BLD AUTO: 28.1 % (ref 40–53)
HGB BLD-MCNC: 9.1 G/DL (ref 13.3–17.7)
LACTATE BLD-SCNC: 1.5 MMOL/L (ref 0.7–2)
Lab: NORMAL
MCH RBC QN AUTO: 32.2 PG (ref 26.5–33)
MCHC RBC AUTO-ENTMCNC: 32.4 G/DL (ref 31.5–36.5)
MCV RBC AUTO: 99 FL (ref 78–100)
PLATELET # BLD AUTO: 149 10E9/L (ref 150–450)
POTASSIUM SERPL-SCNC: 3.7 MMOL/L (ref 3.4–5.3)
RBC # BLD AUTO: 2.83 10E12/L (ref 4.4–5.9)
SODIUM SERPL-SCNC: 137 MMOL/L (ref 133–144)
SPECIMEN SOURCE: NORMAL
WBC # BLD AUTO: 4.5 10E9/L (ref 4–11)

## 2019-09-12 PROCEDURE — 25000128 H RX IP 250 OP 636: Performed by: INTERNAL MEDICINE

## 2019-09-12 PROCEDURE — G0257 UNSCHED DIALYSIS ESRD PT HOS: HCPCS

## 2019-09-12 PROCEDURE — 83605 ASSAY OF LACTIC ACID: CPT | Performed by: NURSE PRACTITIONER

## 2019-09-12 PROCEDURE — 25800030 ZZH RX IP 258 OP 636: Performed by: PHYSICIAN ASSISTANT

## 2019-09-12 PROCEDURE — 25000132 ZZH RX MED GY IP 250 OP 250 PS 637: Mod: GY | Performed by: PHYSICIAN ASSISTANT

## 2019-09-12 PROCEDURE — 36415 COLL VENOUS BLD VENIPUNCTURE: CPT | Performed by: NURSE PRACTITIONER

## 2019-09-12 PROCEDURE — 25000128 H RX IP 250 OP 636: Performed by: PHYSICIAN ASSISTANT

## 2019-09-12 PROCEDURE — 00000146 ZZHCL STATISTIC GLUCOSE BY METER IP

## 2019-09-12 PROCEDURE — 85027 COMPLETE CBC AUTOMATED: CPT | Performed by: PHYSICIAN ASSISTANT

## 2019-09-12 PROCEDURE — 36415 COLL VENOUS BLD VENIPUNCTURE: CPT | Mod: 91 | Performed by: NURSE PRACTITIONER

## 2019-09-12 PROCEDURE — G0378 HOSPITAL OBSERVATION PER HR: HCPCS

## 2019-09-12 PROCEDURE — 36415 COLL VENOUS BLD VENIPUNCTURE: CPT | Performed by: PHYSICIAN ASSISTANT

## 2019-09-12 PROCEDURE — 90937 HEMODIALYSIS REPEATED EVAL: CPT

## 2019-09-12 PROCEDURE — 93010 ELECTROCARDIOGRAM REPORT: CPT | Performed by: INTERNAL MEDICINE

## 2019-09-12 PROCEDURE — 99224 ZZC SUBSEQUENT OBSERVATION CARE,LEVEL I: CPT | Mod: Z6 | Performed by: INTERNAL MEDICINE

## 2019-09-12 PROCEDURE — G0499 HEPB SCREEN HIGH RISK INDIV: HCPCS | Performed by: NURSE PRACTITIONER

## 2019-09-12 PROCEDURE — 93005 ELECTROCARDIOGRAM TRACING: CPT

## 2019-09-12 PROCEDURE — 96361 HYDRATE IV INFUSION ADD-ON: CPT

## 2019-09-12 PROCEDURE — 86706 HEP B SURFACE ANTIBODY: CPT | Performed by: NURSE PRACTITIONER

## 2019-09-12 PROCEDURE — 80048 BASIC METABOLIC PNL TOTAL CA: CPT | Performed by: PHYSICIAN ASSISTANT

## 2019-09-12 PROCEDURE — 96376 TX/PRO/DX INJ SAME DRUG ADON: CPT | Mod: 59

## 2019-09-12 RX ADMIN — SODIUM CHLORIDE 250 ML: 9 INJECTION, SOLUTION INTRAVENOUS at 11:55

## 2019-09-12 RX ADMIN — CEFTRIAXONE 1 G: 1 INJECTION, POWDER, FOR SOLUTION INTRAMUSCULAR; INTRAVENOUS at 06:50

## 2019-09-12 RX ADMIN — SODIUM CHLORIDE: 9 INJECTION, SOLUTION INTRAVENOUS at 09:25

## 2019-09-12 RX ADMIN — SODIUM CHLORIDE 300 ML: 9 INJECTION, SOLUTION INTRAVENOUS at 11:55

## 2019-09-12 RX ADMIN — ASPIRIN 325 MG: 325 TABLET, DELAYED RELEASE ORAL at 20:43

## 2019-09-12 RX ADMIN — ACETAMINOPHEN 650 MG: 325 TABLET, FILM COATED ORAL at 16:56

## 2019-09-12 RX ADMIN — MELATONIN 2000 UNITS: at 20:43

## 2019-09-12 ASSESSMENT — ENCOUNTER SYMPTOMS
NO PATIENT REPORTED PAIN: 1
DIETARY ISSUES: ADEQUATE INTAKE
ACTIVITY IMPAIRMENT: NORMAL

## 2019-09-12 NOTE — PROGRESS NOTES
[facility]  Daily Progress Note          Assessment & Plan:   Fer Latham is a 69 year old male with a history of ESRD on hemodialysis (M/W/F), chronic atrial fibrillation, DM-2, depression, alcoholism, CAD, rhabdomyolysis who presents to the Emergency Department today for altered metal status.      1. Altered mental status, UTI: Alcohol level was elevated at 0.20 in the ED, most likely attributing to his episode of altered mental status. He admits to drinking a pint of rum and Coke earlier in the day.  He does not admit how often he drinks however wife states that he does typically drink a small amount daily.  UA shows large LE, 250 wbc's, 9 rbc's, few bacteria, and 10 Epi cells. UCx is pending. Ceftriaxone 1 g IV x 1 was given in the ED as well as vitamin B-1 and folate. CXR shows patchy left basilar opacity suggesting atelectasis. CT head obtained and no acute intracranial pathology. There was some suspicion of pressure hydrocephalus on CT. Neurology consulted in the ED, no indication at this time to workup further for normal pressure hydrocephalus. Recommend treating underlying urinary tract infection. Plan is to admit the pt to ED obs for PT/OT and SW for potential placement to TCU. He was seen by PT. Recommend TCU. Referral sent to TCU today by SW. UC showing 10-50K mixed urogenital jerzy. VSS, afebrile today.  No leukocytosis.  -Rocephin 1 q 24 hr   -discontinue IVF  -SW for placement      2. Afib: rate controlled but not on anticoagulation as pt refused.  Today, while at HD he went into a possible aflutter with AVB with HR as low as 40 with PACs.  EP cards consulted, recs pending.  He did not have dialysis because of the bradycardia.   -EP consult  -Telemetry     3. DM2: Pt currently not on hyperglycemic agents. BG 90s-100s. Will closely monitor   -Moderate carb diet  -Glucose checks BID  -Assess for hypoglycemic agent      4. ESRD on hemodialysis: Cr. 2.46 (at  baseline) Pt got full run of dialysis at [facility] during his MWF dialysis on Monday. Attempted HD today, but this was  Not started due to bradycardia.   -Nephrology consult   -HD when HR stable     Chronic Medical Problems  #Depression: Pt has been on sertraline in the past, currently denies taking it.  Reports taking diazepam PRN.     #Anemia of chronic disease: Baseline 10-11, today Hgb 9.1.  #CAD: Hx of MI per chart review. Continue daily ASA 81mg daily         FEN: dialysis diet  Lines: PIV, tunneled right IJ  Prophylaxis: SCDs         Consults:   cardiology         Discharge Planning:   Pending cardiology consult and TCU placement        Interval History:   Fer is doing ok this am.  Reports some left knee pain, thinks he has arthritis.  No dysuria.  He reports that he makes urine-urinates 3-4 times per day.  Denies lightheadedness, chest pain, shortness of breath.    ROS:   Constitutional: No fevers/chills. Tolerating diet.   Cardiovascular: No chest pain or palpitations.   Respiratory: No cough or SOB.   GI: No abdominal pain. No N/V.      : No urinary complaints.   Musculoskeletal: +left knee pain         Physical Exam:   /89 (BP Location: Left arm)   Pulse 74   Temp 97.3  F (36.3  C) (Oral)   Resp 16   Wt 127.6 kg (281 lb 4.8 oz)   SpO2 100%   BMI 36.12 kg/m       GENERAL: Alert and oriented x 2. NAD.   HEENT: Anicteric sclera. Mucous membranes moist.   CV: RRR. S1, S2. No murmurs appreciated.   RESPIRATORY: Effort normal. Lungs CTAB with no wheezing, rales, rhonchi.   GI: Abdomen soft and non distended with normoactive bowel sounds present in all quadrants. No tenderness, rebound, guarding.   NEUROLOGICAL: No focal deficits. Moves all extremities.    EXTREMITIES: No peripheral edema. Intact bilateral pedal pulses.   SKIN: No jaundice. No rashes.     Medication list reviewed.   Labs and imaging were reviewed.     OCTAVIO Rogers, CNP  Ascom #[id number]

## 2019-09-12 NOTE — PLAN OF CARE
OT/6D: OT holding. See care plan below.     Discharge Planner OT   Patient plan for discharge: TCU  Current status: PT evaluating on 9/11 and recommending TCU. Per chart review, patient planning to pursue this. While patient likely has OT needs/cognitive deficits, these needs can be met at next level of care. As safe discharge plan has been established and patient is OBS status, OT will monitor peripherally and initiate should discharge disposition change.   Barriers to return to prior living situation: Medical Status, Weakness, Cognition   Recommendations for discharge: PT recommending TCU.   Rationale for recommendations: See above.        Entered by: Lianne Fletcher 09/12/2019 3:13 PM

## 2019-09-12 NOTE — PLAN OF CARE
Outpatient/Observation goals to be met before discharge home:     -diagnostic tests and consults completed and resulted -not met  -vital signs normal or at patient baseline -met  -tolerating oral intake to maintain hydration -met, IV fluids also running  -tolerating oral antibiotics or has plans for home infusion setup -not met, IV abx ordered  -returns to baseline functional status -not met  -safe disposition plan has been identified -in progress

## 2019-09-12 NOTE — PROVIDER NOTIFICATION
"KELI called re: Pt reporting increasing pain with urination and at rest described as \"stabbing\" and causing \"shaking.\"  Pt given PRN tylenol.         "

## 2019-09-12 NOTE — PROGRESS NOTES
Fer Latham is a 69 year old male patient.  1. Altered mental status, unspecified altered mental status type    2. Generalized muscle weakness    3. Acute cystitis with hematuria    4. Hypokalemia      Past Medical History:   Diagnosis Date     Anemia      Arrhythmia     atrial fib     Arthritis 1990's    gout     BPH (benign prostatic hyperplasia)      Depression      Diabetes mellitus (H)     Type 2  IDDM     Diverticulosis      ESRD (end stage renal disease) on dialysis (H)      Hematuria      HTN (hypertension)      HTN (hypertension)      IgA nephropathy      MI, old      Pneumonia      Rhabdomyolysis      Weakness 5/10/2019     No current outpatient medications on file.     Allergies   Allergen Reactions     Cephalexin      Ciprofloxacin      Diagnostic X-Ray Materials      Sulfa Drugs Hives     Active Problems:    UTI (urinary tract infection)    Blood pressure 124/84, pulse 97, temperature 98.3  F (36.8  C), temperature source Oral, resp. rate 16, SpO2 100 %.    Subjective:  Symptoms:  Improved.  (Confusion).    Diet:  Adequate intake.    Activity level: Normal.    Pain:  He reports no pain.      Objective:  General Appearance:  Comfortable.    Vital signs: (most recent): Blood pressure 124/84, pulse 97, temperature 98.3  F (36.8  C), temperature source Oral, resp. rate 16, SpO2 100 %.  Vital signs are normal.    HEENT: Normal HEENT exam.    Lungs:  Normal effort and normal respiratory rate.  Breath sounds clear to auscultation.    Heart: Normal rate.  Regular rhythm.  S1 normal and S2 normal.    Abdomen: Abdomen is soft.  Bowel sounds are normal.   There is no abdominal tenderness.     Extremities: Normal range of motion.    Pulses: Distal pulses are intact.    Neurological: Patient is alert.    Pupils:  Pupils are equal, round, and reactive to light.    Skin:  Warm.      Assessment:    Condition: In stable condition.  Improving.   (69 yom with ESRD HD chronic a fib not on AC ETOH presents with  confusion, noted to have UTI and ETOH.     Improving confusion with rocephin.    PT SW input appreciated.).     Plan:   (Awaiting TCU.).       Harjeet Ivy MD, MD  9/12/2019    The pt was seen and examined by myself. The case was reviewed and the plan was discussed with the KELI.

## 2019-09-12 NOTE — PLAN OF CARE
"Outpatient/Observation goals to be met before discharge home:     -diagnostic tests and consults completed and resulted -not met  -vital signs normal or at patient baseline -met  -tolerating oral intake to maintain hydration -met, IV fluids also running  -tolerating oral antibiotics or has plans for home infusion setup -not met, IV abx ordered  -returns to baseline functional status -not met  -safe disposition plan has been identified -in progress   -Pt oriented to self, place, and situation, disoriented to time. Denies pain, sleeping throughout the night. RN encouraged pt to call to use the bathroom or urinal, pt seemed frustrated stating \"I know\" \"Why are you asking me this\" when RN asked if pt needed to be changed. On tele, hr ranging from 60's-80's, occasionally has short runs up to the 140's. Not out of bed this shift. VSS. Will continue to monitor.         "

## 2019-09-12 NOTE — PROGRESS NOTES
RN tried calling pt's wife (Ailin) four times throughout the day. Nurse was unable to reach wife.

## 2019-09-12 NOTE — PROGRESS NOTES
Fer Latham is a 69 year old male patient.  1. Altered mental status, unspecified altered mental status type    2. Generalized muscle weakness    3. Acute cystitis with hematuria    4. Hypokalemia      Past Medical History:   Diagnosis Date     Anemia      Arrhythmia     atrial fib     Arthritis 1990's    gout     BPH (benign prostatic hyperplasia)      Depression      Diabetes mellitus (H)     Type 2  IDDM     Diverticulosis      ESRD (end stage renal disease) on dialysis (H)      Hematuria      HTN (hypertension)      HTN (hypertension)      IgA nephropathy      MI, old      Pneumonia      Rhabdomyolysis      Weakness 5/10/2019     No current outpatient medications on file.     Allergies   Allergen Reactions     Cephalexin      Ciprofloxacin      Diagnostic X-Ray Materials      Sulfa Drugs Hives     Active Problems:    UTI (urinary tract infection)    Blood pressure 113/72, pulse 71, temperature 98  F (36.7  C), temperature source Oral, resp. rate 20, SpO2 100 %.    Subjective:  Symptoms:  Improved.  (Confusion).    Diet:  Adequate intake.    Activity level: Normal.    Pain:  He reports no pain.      Objective:  General Appearance:  Comfortable.    Vital signs: (most recent): Blood pressure 113/72, pulse 71, temperature 98  F (36.7  C), temperature source Oral, resp. rate 20, SpO2 100 %.  Vital signs are normal.    Output: Producing urine.    HEENT: Normal HEENT exam.    Lungs:  Normal effort and normal respiratory rate.  Breath sounds clear to auscultation.    Heart: Normal rate.  Regular rhythm.  S1 normal and S2 normal.    Abdomen: Abdomen is soft.  Bowel sounds are normal.   There is no abdominal tenderness.     Extremities: Normal range of motion.    Pulses: Distal pulses are intact.    Neurological: Patient is alert.    Pupils:  Pupils are equal, round, and reactive to light.    Skin:  Warm.      Assessment:    Condition: In stable condition.  Improving.   (69 yom with ESRD/HD chronic a fib not on AC,  ETOH presents with  Confusion and noted to have UTI and ETOH-improving with rocephin.     PT SW input appreciated.).     Plan:   (Possible TCU in am.).       Harjeet Ivy MD, MD  9/11/2019    The pt was seen and examined by myself. The case was reviewed and the plan was discussed with the KELI.

## 2019-09-12 NOTE — PLAN OF CARE
Observation goals PRIOR TO DISCHARGE    Comments: -diagnostic tests and consults completed and resulted- no, consults pending.   -vital signs normal or at patient baseline- yes  /69   Pulse 71   Temp 98.5  F (36.9  C) (Oral)   Resp 18   SpO2 98%     -tolerating oral intake to maintain hydration- no, IVF running at 125 ml/hr   -tolerating oral antibiotics or has plans for home infusion setup- no   -returns to baseline functional status- pendining  -safe disposition plan has been identified- no, possible TCU.     Patient alert to self, place, and to situation. Disoriented to time and forgetful of some things. Denies any pain. Ambulating with walker and assist of two. Tolerating oral diet, no n/v. Will continue to monitor.         Nurse to notify provider when observation goals have been met and patient is ready for discharge.

## 2019-09-12 NOTE — PLAN OF CARE
Observation goals PRIOR TO DISCHARGE     Comments:      -diagnostic tests and consults completed and resulted: Not met  -vital signs normal or at patient baseline: not met, refer to previous note   -tolerating oral intake to maintain hydration: met, tolerating oral intake  -tolerating oral antibiotics or has plans for home infusion setup: not met   -returns to baseline functional status: not met  -safe disposition plan has been identified: Pending, possible TCU

## 2019-09-12 NOTE — PLAN OF CARE
Observation goals PRIOR TO DISCHARGE     Comments:     -diagnostic tests and consults completed and resulted: Not met  -vital signs normal or at patient baseline: met   -tolerating oral intake to maintain hydration: met tolerating oral intake, IVF infusing   -tolerating oral antibiotics or has plans for home infusion setup: not met   -returns to baseline functional status: not met  -safe disposition plan has been identified: Pending, possible TCU

## 2019-09-12 NOTE — CONSULTS
Nephrology Initial Consult  September 12, 2019      Fre Latham MRN:[id number] YOB: 1950  Date of Admission:9/10/2019  Primary care provider: Isrrael Ornelas  Requesting physician: No att. providers found    ASSESSMENT AND RECOMMENDATIONS:   Fer Latham is a 69 yr old male with PMH of ESRD, chronic afib, DMII, depression, alcoholism, and CAD, admitted with AMS found to likely be due to ETOH and UTI.    ESKD: dialyzes MWF at [facility] with Dr. Puente. Run time: 4 hrs. EDW: 124 kg. Access: tunneled RIJ (failed LUE AVF, hoping to switch to PD)  - Dialyzed today, shortened due to very low HR's; will likely attempt again tomorrow    Volume:  kg.  - UF to EDW    Chronic afib: per chart, pt declines AC  - HR varying between low 40's and low 50's; cardiology has been consulted  BP: usual pre HD pressures 110-120's, post pressures 100-110's, lowest pressure ~ 95. Does not appear to be on BP meds    Anemia: hgb 9.1 g/dL; recent labs with ferritin 57, iron 38, IS 13, hgb 10's. On epogen 6200 units per HD  - Continue epogen via HD    BMD: Ca 8.1, alb 3.0, (iCa 4.6); recent PTH 97; on cholecalciferol 2000 units qday; does not appear to be on phos binder    AMS: due to ETOH and possibly UTI    UTI: on ceftriaxone    Recommendations were communicated to primary team via this note and phone       TIFFANIE Conway  [phone]      REASON FOR CONSULT: ESKD and management of dialysis    HISTORY OF PRESENT ILLNESS:  Fer Latham is a 69 yr old male with PMH of ESRD, chronic afib, DMII, depression, alcoholism, and CAD, admitted with AMS found to likely be due to ETOH and UTI. Was brought to ED when spouse noticed him seeming 'off'. Head CT unremarkable. Blood alcohol level 0.2. UA with large LE, was given ceftriaxone in ED, urine cx pending. Was seen on dialysis today, pt is in chronic afib and HR's are upper 40's to lower 50's which I discussed with primary team. Pt's HR then dropped into low 40's  with more rhthym abnormalities so run was stopped and EKG ordered. Pt remains asymptomatic. Pt denies CP, SOB, chills, n/v.      PAST MEDICAL HISTORY:  Reviewed with patient on 09/12/2019     Past Medical History:   Diagnosis Date     Anemia      Arrhythmia     atrial fib     Arthritis 1990's    gout     BPH (benign prostatic hyperplasia)      Depression      Diabetes mellitus (H)     Type 2  IDDM     Diverticulosis      ESRD (end stage renal disease) on dialysis (H)      Hematuria      HTN (hypertension)      HTN (hypertension)      IgA nephropathy      MI, old      Pneumonia      Rhabdomyolysis      Weakness 5/10/2019       Past Surgical History:   Procedure Laterality Date     ABDOMEN SURGERY       APPENDECTOMY  1970's     ARTHROSCOPY KNEE  6/27/2013    Procedure: ARTHROSCOPY KNEE;  Right Knee Arthroscopy, Debridment Of Medial Meniscal Tear.  ;  Surgeon: Tomás Wong MD;  Location: [facility]     BACK SURGERY  1999    L5 S1 decompression     BIOPSY  2017    skin     COLONOSCOPY  2013    [facility]     CORONARY ANGIOGRAPHY ADULT ORDER  2018     EYE SURGERY Bilateral 2004    Lens replacement     VASCULAR SURGERY          MEDICATIONS:  PTA Meds  Prior to Admission medications    Medication Sig Last Dose Taking? Auth Provider   aspirin (ASA) 325 MG EC tablet Take 325 mg by mouth daily  9/10/2019 at PM Yes Unknown, Entered By History   cholecalciferol 1000 units TABS Take 2 tablets by mouth daily  9/10/2019 at PM Yes Unknown, Entered By History   diazepam (VALIUM) 5 MG tablet Take 5 mg by mouth every 12 hours as needed for anxiety Past Month at Unknown time Yes Unknown, Entered By History      Current Meds    aspirin  325 mg Oral QPM     cefTRIAXone  1 g Intravenous Once     vitamin D3  2,000 Units Oral QPM     Infusion Meds    sodium chloride 125 mL/hr at 09/11/19 1708       ALLERGIES:    Allergies   Allergen Reactions     Cephalexin      Ciprofloxacin      Diagnostic X-Ray Materials      Sulfa Drugs Hives       REVIEW  OF SYSTEMS:  A comprehensive of systems was negative except as noted above.    SOCIAL HISTORY:   Social History     Socioeconomic History     Marital status:      Spouse name: Not on file     Number of children: Not on file     Years of education: Not on file     Highest education level: Not on file   Occupational History     Not on file   Social Needs     Financial resource strain: Not on file     Food insecurity:     Worry: Not on file     Inability: Not on file     Transportation needs:     Medical: Not on file     Non-medical: Not on file   Tobacco Use     Smoking status: Former Smoker     Smokeless tobacco: Never Used     Tobacco comment: quit 35 years ago   Substance and Sexual Activity     Alcohol use: Yes     Comment: 1 to 2 drinks a day (Occasional)     Drug use: No     Sexual activity: Not on file   Lifestyle     Physical activity:     Days per week: Not on file     Minutes per session: Not on file     Stress: Not on file   Relationships     Social connections:     Talks on phone: Not on file     Gets together: Not on file     Attends Orthodoxy service: Not on file     Active member of club or organization: Not on file     Attends meetings of clubs or organizations: Not on file     Relationship status: Not on file     Intimate partner violence:     Fear of current or ex partner: Not on file     Emotionally abused: Not on file     Physically abused: Not on file     Forced sexual activity: Not on file   Other Topics Concern     Parent/sibling w/ CABG, MI or angioplasty before 65F 55M? Not Asked   Social History Narrative     Not on file     Reviewed with patient     FAMILY MEDICAL HISTORY:   Family History   Problem Relation Age of Onset     Cancer Father      Reviewed with patient     PHYSICAL EXAM:   Temp  Av  F (36.7  C)  Min: 97.1  F (36.2  C)  Max: 98.5  F (36.9  C)      Pulse  Av.3  Min: 56  Max: 97 Resp  Avg: 15.8  Min: 9  Max: 20  SpO2  Av.5 %  Min: 89 %  Max: 100 %       BP  124/84 (BP Location: Right arm)   Pulse 97   Temp 98.3  F (36.8  C) (Oral)   Resp 16   SpO2 100%       Admit       GENERAL APPEARANCE: drowsy, NAD  EYES: no scleral icterus, pupils equal  Pulmonary: CTA  CV: irregular rhythm and rate   - Edema trace  GI: soft, nontender, normal bowel sounds  MS: no evidence of inflammation in joints, no muscle tenderness  : no ferguson  SKIN: no rash, warm, dry, no cyanosis  NEURO: face symmetric    Access: tunneled RIJ    LABS:   CMP  Recent Labs   Lab 09/12/19  0559 09/11/19  1957 09/10/19  2146 09/10/19  2136    135 133 134   POTASSIUM 3.7 3.8  3.8 3.2* 3.2*   CHLORIDE 109 104  --  101   CO2 19* 22  --  20   ANIONGAP 9 9  --  14   GLC 90 134* 102* 98   BUN 26 26  --  21   CR 2.54* 2.63*  --  2.46*   GFRESTIMATED 25* 24*  --  26*   GFRESTBLACK 29* 28*  --  30*   COLUMBA 8.1* 8.3*  --  8.1*   PROTTOTAL  --   --   --  7.2   ALBUMIN  --   --   --  3.0*   BILITOTAL  --   --   --  0.2   ALKPHOS  --   --   --  67   AST  --   --   --  14   ALT  --   --   --  10     CBC  Recent Labs   Lab 09/12/19  0559 09/10/19  2146 09/10/19  2136   HGB 9.1* 9.9* 9.7*   WBC 4.5  --  5.1   RBC 2.83*  --  2.94*   HCT 28.1*  --  28.8*   MCV 99  --  98   MCH 32.2  --  33.0   MCHC 32.4  --  33.7   RDW 14.1  --  14.2   *  --  166     INR  Recent Labs   Lab 09/10/19  2136   INR 1.21*   PTT 30     ABGNo lab results found in last 7 days.   URINE STUDIES  Recent Labs   Lab Test 09/11/19  0446 06/06/19  0840 05/10/19  1038 02/22/15  2152   COLOR Yellow Yellow Yellow Light Yellow   APPEARANCE Slightly Cloudy Slightly Cloudy Cloudy Clear   URINEGLC Negative Negative Negative Negative   URINEBILI Negative Negative Negative Negative   URINEKETONE Negative Negative Negative Negative   SG 1.014 1.014 1.019 1.008   UBLD Moderate* Negative Moderate* Small*   URINEPH 5.5 5.0 5.5 5.5   PROTEIN 30* 30* 100* 100*   NITRITE Negative Negative Negative Negative   LEUKEST Large* Large* Large* Negative   RBCU 9* <1  13* 4*   WBCU 250* 18* >182* <1     No lab results found.  PTH  Recent Labs   Lab Test 05/14/19  0939   PTHI 86*     IRON STUDIES  No lab results found.    IMAGING:  Reviewed    Lizbeth Clements PA-C

## 2019-09-12 NOTE — PROGRESS NOTES
HEMODIALYSIS TREATMENT NOTE    Date: 9/12/2019  Time: 12:45 PM    Data:  Pre Wt:   127.6 kg  Desired Wt: 125.6 kg   Post Wt:  undetermined  Weight change:   undetermined  Ultrafiltration - Post Run Net Total Removed (mL):  -200  Vascular Access Status: unable to aspirate venous, but functioned with  with dialysis  Dialyzer Rinse:  clear  Total Blood Volume Processed: 6.24 L Liters  Total Dialysis (Treatment) Time:   15 minutes    Lab:   No    Interventions:  Upon arriving to dialysis was in A. Fib with rate of 55-65. Nephrolpgy PA aware. Upon initiating dialysis patient remained in A. Fib and HR decreased to low 40's, EKG showed possible PAC's. Nephrology notified. Changed to K4 bath, 2L O2 per NC, UF off, patient aroused. HR improved to 60's, patient asymptomatic. HR remained 40's-50's despite interventions. Spoke with primary nurse who reported lowest HR to be 62 this admission and no previous history of activity other than baseline A Fib. Patient rinsed back. HR increased to 48-64, no further activity other than remaining in A Fib. Primary notified, stat EKG ordered. Dressing changed, saline locked CVC. Hand off given to primary RN. Remained on EKG monitor until transportation arrived.      Plan:    Per renal team

## 2019-09-12 NOTE — PLAN OF CARE
-diagnostic tests and consults completed and resulted: No  -vital signs normal or at patient baseline: Yes  /51 (BP Location: Right arm)   Pulse 65   Temp 97.9  F (36.6  C) (Oral)   Resp 16   Wt 127.6 kg (281 lb 4.8 oz)   SpO2 97%   BMI 36.12 kg/m    -tolerating oral intake to maintain hydration: Yes  -tolerating oral antibiotics or has plans for home infusion setup: No  -returns to baseline functional status: No  -safe disposition plan has been identified: Pending, possible TCU placement

## 2019-09-12 NOTE — PROGRESS NOTES
Pt in a-fib with HR in low 40's frequent PAC's , unable to do dialysis at this moment. Provider aware and spoke with Dialysis RN.

## 2019-09-12 NOTE — CONSULTS
Electrophysiology Consultation Note   EP Attending: .   Reason for consultation: AF.   Provider requesting consultation: MsFreda Ferrer, [facility] ED Observation Service.  Date of Service: 9/12/2019      HPI:   Mr. Latham is a 69 year old male who has a past medical history significant for HTN, IGA nephropathy ESRD on HD, permanent AF (CHADSVASC 4), DM2, MI, CAD, alcoholism, and depression now admitted with AMS found to likely be due to ETOH and UTI. He was brought to ER when spouse noticed him seeming 'off'. Head CT with no acute intracranial pathology, some suspicion of normal pressure hydrocephalus. Blood alcohol level 0.2. UA with large LE, was given ceftriaxone in ER. His ECG shows AF with CVR and no acute ischemic changes. He was admitted to ED Obs for further evaluation. He went to HD run today and upon initiating dialysis patient HR decreased to low 40's, He was changed to K4 bath with UF off and patient was aroused. HR improved to 60's, but then remained 40's-50's and he was rinsed back. ECG showed AF with PVCs. He was asymptomatic throughout. Telemetry shows HR 50-60's no significant pauses/bradycardia. VSS. He is only on ASA.    Past Medical History:   Past Medical History:   Diagnosis Date     Anemia      Arrhythmia     atrial fib     Arthritis 1990's    gout     BPH (benign prostatic hyperplasia)      Depression      Diabetes mellitus (H)     Type 2  IDDM     Diverticulosis      ESRD (end stage renal disease) on dialysis (H)      Hematuria      HTN (hypertension)      HTN (hypertension)      IgA nephropathy      MI, old      Pneumonia      Rhabdomyolysis      Weakness 5/10/2019     Past Surgical History:   Past Surgical History:   Procedure Laterality Date     ABDOMEN SURGERY       APPENDECTOMY  1970's     ARTHROSCOPY KNEE  6/27/2013    Procedure: ARTHROSCOPY KNEE;  Right Knee Arthroscopy, Debridment Of Medial Meniscal Tear.  ;  Surgeon: Tomás Wong MD;  Location:  OR     BACK SURGERY  1999     L5 S1 decompression     BIOPSY  2017    skin     COLONOSCOPY  2013    [facility]     CORONARY ANGIOGRAPHY ADULT ORDER  2018     EYE SURGERY Bilateral 2004    Lens replacement     VASCULAR SURGERY       Allergies: Per MAR     Allergies   Allergen Reactions     Cephalexin      Ciprofloxacin      Diagnostic X-Ray Materials      Sulfa Drugs Hives     Medications:   Per MAR current outpatient cardiovascular medications include:   Medications Prior to Admission   Medication Sig Dispense Refill Last Dose     aspirin (ASA) 325 MG EC tablet Take 325 mg by mouth daily    9/10/2019 at PM     cholecalciferol 1000 units TABS Take 2 tablets by mouth daily    9/10/2019 at PM     diazepam (VALIUM) 5 MG tablet Take 5 mg by mouth every 12 hours as needed for anxiety   Past Month at Unknown time     No current outpatient medications on file.     Current Facility-Administered Medications   Medication Dose Route Frequency     aspirin  325 mg Oral QPM     gelatin absorbable  1 each Topical During Hemodialysis (from stock)     - MEDICATION INSTRUCTIONS -   Does not apply Once     vitamin D3  2,000 Units Oral QPM     Family History:   Family History   Problem Relation Age of Onset     Cancer Father      Social History:   Social History     Tobacco Use     Smoking status: Former Smoker     Smokeless tobacco: Never Used     Tobacco comment: quit 35 years ago   Substance Use Topics     Alcohol use: Yes     Comment: 1 to 2 drinks a day (Occasional)       ROS:   A comprehensive 10 point ROS was negative other than as mentioned in HPI.    Physical Examination:   VITALS: /66   Pulse 60   Temp (P) 97.3  F (36.3  C) (Oral)   Resp 16   Wt 127.6 kg (281 lb 4.8 oz)   SpO2 100%   BMI 36.12 kg/m    GENERAL APPEARANCE:  NAD   HEENT: NCAT, EOMI, MMM. PERRLA.   NECK: Supple.   CHEST: CTAB   CARDIOVASCULAR: irregularly irregular.   ABDOMEN: BS+, soft, NT, ND.   EXTREMITIES: No pedal edema. Distal pulses intact.   PSYCH: Normal affect.  SKIN: Warm  and dry.   Data:   Labs:  BMP  Recent Labs   Lab 19  0559 197 09/10/19  2146 09/10/19  2136    135 133 134   POTASSIUM 3.7 3.8  3.8 3.2* 3.2*   CHLORIDE 109 104  --  101   COLUMBA 8.1* 8.3*  --  8.1*   CO2 19* 22  --  20   BUN 26 26  --  21   CR 2.54* 2.63*  --  2.46*   GLC 90 134* 102* 98     CBC  Recent Labs   Lab 09/12/19  0559 09/10/19  2146 09/10/19  2136   WBC 4.5  --  5.1   RBC 2.83*  --  2.94*   HGB 9.1* 9.9* 9.7*   HCT 28.1*  --  28.8*   MCV 99  --  98   MCH 32.2  --  33.0   MCHC 32.4  --  33.7   RDW 14.1  --  14.2   *  --  166     INR  Recent Labs   Lab 09/10/19  2136   INR 1.21*     EKG:         Assessment:   Mr. Latham is a 69 year old male who has a past medical history significant for HTN, IGA nephropathy ESRD on HD, permanent AF (CHADSVASC 4), DM2, MI, CAD, alcoholism, and depression now admitted with AMS found to likely be due to ETOH and UTI. He was brought to ER when spouse noticed him seeming 'off'. Head CT with no acute intracranial pathology, some suspicion of normal pressure hydrocephalus. Blood alcohol level 0.2. UA with large LE, was given ceftriaxone in ER. His ECG shows AF with CVR and no acute ischemic changes. He was admitted to ED Obs for further evaluation. He went to HD run today and upon initiating dialysis patient HR decreased to low 40's, He was changed to K4 bath with UF off and patient was aroused. HR improved to 60's, but then remained 40's-50's and he was rinsed back. ECG showed AF with PVCs. He was asymptomatic throughout. Telemetry shows HR 50-60's no significant pauses/bradycardia. VSS. He is only on ASA.    EP Recommendations:  Permanent Atrial Fibrillation:   We discussed in detail with the patient management/treatment options for A.álvaro includin. Stroke Prophylaxis:  CHADSVASC=+age, +DM, +CAD, +HTN  4, corresponding to a 4.0% annual stroke / systemic emolism event rate. indicating need for long term oral anticoagulation.  Discussed  recommendation for anticoagulation in detail with him. He is only currently on ASA. He states anticoagulation has been discussed with him before and he does not want to be on blood thinners. He states he understands the risk.   2. Rate Control: Well rate controlled, does not require AVN agents at this time. No significant pauses or bradycardia. Lower HRs likely just from PVCs and are without concerns, can proceed with HD.   3. Rhythm Control: He is in permanent AF which is well rate controlled and asymptomatic. No compelling indications for rhythm control measures at this time.   4. Risk Factor Management: maintain tight BP control, and SAGE evaluation as indicated.    Recommend getting baseline echo.     The patient states understanding and is agreeable with plan.   Thank you for allowing us to participate in the care of this patient.     OCTAVIO Michel CNP  Electrophysiology Consult Service  Pager: [phone]

## 2019-09-13 ENCOUNTER — APPOINTMENT (OUTPATIENT)
Dept: CARDIOLOGY | Facility: CLINIC | Age: 69
End: 2019-09-13
Attending: NURSE PRACTITIONER
Payer: MEDICARE

## 2019-09-13 ENCOUNTER — APPOINTMENT (OUTPATIENT)
Dept: PHYSICAL THERAPY | Facility: CLINIC | Age: 69
End: 2019-09-13
Payer: MEDICARE

## 2019-09-13 VITALS
TEMPERATURE: 98.1 F | SYSTOLIC BLOOD PRESSURE: 126 MMHG | BODY MASS INDEX: 34.76 KG/M2 | WEIGHT: 270.73 LBS | OXYGEN SATURATION: 100 % | RESPIRATION RATE: 17 BRPM | HEART RATE: 58 BPM | DIASTOLIC BLOOD PRESSURE: 66 MMHG

## 2019-09-13 LAB
ANION GAP SERPL CALCULATED.3IONS-SCNC: 6 MMOL/L (ref 3–14)
BUN SERPL-MCNC: 25 MG/DL (ref 7–30)
CALCIUM SERPL-MCNC: 8.9 MG/DL (ref 8.5–10.1)
CHLORIDE SERPL-SCNC: 109 MMOL/L (ref 94–109)
CO2 SERPL-SCNC: 23 MMOL/L (ref 20–32)
CREAT SERPL-MCNC: 2.3 MG/DL (ref 0.66–1.25)
ERYTHROCYTE [DISTWIDTH] IN BLOOD BY AUTOMATED COUNT: 14.4 % (ref 10–15)
GFR SERPL CREATININE-BSD FRML MDRD: 28 ML/MIN/{1.73_M2}
GLUCOSE BLDC GLUCOMTR-MCNC: 81 MG/DL (ref 70–99)
GLUCOSE SERPL-MCNC: 114 MG/DL (ref 70–99)
HBV SURFACE AB SERPL IA-ACNC: 27.26 M[IU]/ML
HBV SURFACE AG SERPL QL IA: NONREACTIVE
HCT VFR BLD AUTO: 31.9 % (ref 40–53)
HGB BLD-MCNC: 10 G/DL (ref 13.3–17.7)
INTERPRETATION ECG - MUSE: NORMAL
MCH RBC QN AUTO: 32.1 PG (ref 26.5–33)
MCHC RBC AUTO-ENTMCNC: 31.3 G/DL (ref 31.5–36.5)
MCV RBC AUTO: 102 FL (ref 78–100)
PLATELET # BLD AUTO: 154 10E9/L (ref 150–450)
POTASSIUM SERPL-SCNC: 4.2 MMOL/L (ref 3.4–5.3)
RBC # BLD AUTO: 3.12 10E12/L (ref 4.4–5.9)
SODIUM SERPL-SCNC: 138 MMOL/L (ref 133–144)
VIT B1 BLD-MCNC: 74 NMOL/L (ref 70–180)
WBC # BLD AUTO: 4.3 10E9/L (ref 4–11)

## 2019-09-13 PROCEDURE — 97530 THERAPEUTIC ACTIVITIES: CPT | Mod: GP | Performed by: PHYSICAL THERAPIST

## 2019-09-13 PROCEDURE — 63400005 ZZH RX 634: Performed by: INTERNAL MEDICINE

## 2019-09-13 PROCEDURE — 25000132 ZZH RX MED GY IP 250 OP 250 PS 637: Mod: GY | Performed by: PHYSICIAN ASSISTANT

## 2019-09-13 PROCEDURE — 85027 COMPLETE CBC AUTOMATED: CPT | Performed by: INTERNAL MEDICINE

## 2019-09-13 PROCEDURE — 99217 ZZC OBSERVATION CARE DISCHARGE: CPT | Mod: Z6 | Performed by: EMERGENCY MEDICINE

## 2019-09-13 PROCEDURE — 36415 COLL VENOUS BLD VENIPUNCTURE: CPT | Mod: 91 | Performed by: INTERNAL MEDICINE

## 2019-09-13 PROCEDURE — 80048 BASIC METABOLIC PNL TOTAL CA: CPT | Performed by: INTERNAL MEDICINE

## 2019-09-13 PROCEDURE — 00000146 ZZHCL STATISTIC GLUCOSE BY METER IP

## 2019-09-13 PROCEDURE — 25000128 H RX IP 250 OP 636: Performed by: INTERNAL MEDICINE

## 2019-09-13 PROCEDURE — G0257 UNSCHED DIALYSIS ESRD PT HOS: HCPCS

## 2019-09-13 PROCEDURE — 90937 HEMODIALYSIS REPEATED EVAL: CPT

## 2019-09-13 PROCEDURE — G0378 HOSPITAL OBSERVATION PER HR: HCPCS

## 2019-09-13 PROCEDURE — 93306 TTE W/DOPPLER COMPLETE: CPT | Mod: 26 | Performed by: INTERNAL MEDICINE

## 2019-09-13 PROCEDURE — 93306 TTE W/DOPPLER COMPLETE: CPT

## 2019-09-13 RX ADMIN — Medication: at 15:10

## 2019-09-13 RX ADMIN — SODIUM CHLORIDE 250 ML: 9 INJECTION, SOLUTION INTRAVENOUS at 12:45

## 2019-09-13 RX ADMIN — ASPIRIN 325 MG: 325 TABLET, DELAYED RELEASE ORAL at 19:52

## 2019-09-13 RX ADMIN — SODIUM CHLORIDE 300 ML: 9 INJECTION, SOLUTION INTRAVENOUS at 12:45

## 2019-09-13 RX ADMIN — MELATONIN 2000 UNITS: at 19:52

## 2019-09-13 RX ADMIN — EPOETIN ALFA 6200 UNITS: 10000 SOLUTION INTRAVENOUS; SUBCUTANEOUS at 15:09

## 2019-09-13 RX ADMIN — ALTEPLASE 2 MG: 2.2 INJECTION, POWDER, LYOPHILIZED, FOR SOLUTION INTRAVENOUS at 16:57

## 2019-09-13 NOTE — PROGRESS NOTES
HEMODIALYSIS TREATMENT NOTE    Date: 9/13/2019  Time: 1650    Data:  Pre Wt: 122.8 kg   Desired Wt: 120.8 kg   Post Wt:  (unable to stand)  Weight change:     Ultrafiltration - Post Run Net Total Removed (mL): 1700 mL  Vascular Access Status: patent  Dialyzer Rinse: Light  Total Blood Volume Processed: 60 L  Total Dialysis (Treatment) Time: 4 hours     Lab: No    Assessment/Interventions: 4 hour HD shortened by 7 minutes 2/2 potential for system clotting. HD run  via right internal jugular tunneled catheter.  Blood flow decreased 2/2 alarms.  Blood flow 250 mL/min.  1.7 L removed.  Tolerated well.  Report called.       Plan:  Continue with plan of care.  Possible discharge this evening.

## 2019-09-13 NOTE — PLAN OF CARE
OT/6D: Attempting to check-in with patient, OOR upon attempt. Per chart review and collaboration with PT, patient to discharge home with home services today - TCU not an option. While patient likely does have OT needs, these can be met at next level of care. If patient discharges home, would benefit from home health occupational therapy services to facilitate increased ADL independence and safety/provide caregiver training.

## 2019-09-13 NOTE — PROGRESS NOTES
Fer Latham is a 69 year old male patient.  1. Altered mental status, unspecified altered mental status type    2. Generalized muscle weakness    3. Acute cystitis with hematuria    4. Hypokalemia      Past Medical History:   Diagnosis Date     Anemia      Arrhythmia     atrial fib     Arthritis 1990's    gout     BPH (benign prostatic hyperplasia)      Depression      Diabetes mellitus (H)     Type 2  IDDM     Diverticulosis      ESRD (end stage renal disease) on dialysis (H)      Hematuria      HTN (hypertension)      HTN (hypertension)      IgA nephropathy      MI, old      Pneumonia      Rhabdomyolysis      Weakness 5/10/2019     No current outpatient medications on file.     Allergies   Allergen Reactions     Cephalexin      Ciprofloxacin      Diagnostic X-Ray Materials      Sulfa Drugs Hives     Active Problems:    UTI (urinary tract infection)    Blood pressure 106/51, pulse 65, temperature 97.9  F (36.6  C), temperature source Oral, resp. rate 16, weight 127.6 kg (281 lb 4.8 oz), SpO2 97 %.    Subjective:  Symptoms:  Worsening.  (Somewhat more confused, wondering where his wife is).    Diet:  Adequate intake.    Activity level: Normal.    Pain:  He reports no pain.      Objective:  General Appearance:  Comfortable.    Vital signs: (most recent): Blood pressure 106/51, pulse 65, temperature 97.9  F (36.6  C), temperature source Oral, resp. rate 16, weight 127.6 kg (281 lb 4.8 oz), SpO2 97 %.  Vital signs are normal.    Output: Producing urine.    HEENT: Normal HEENT exam.    Lungs:  Normal effort and normal respiratory rate.  Breath sounds clear to auscultation.    Heart: Normal rate.  Regular rhythm.  S1 normal and S2 normal.    Abdomen: Abdomen is soft.  Bowel sounds are normal.   There is no abdominal tenderness.     Extremities: Normal range of motion.    Pulses: Distal pulses are intact.    Neurological: Patient is alert.    Pupils:  Pupils are equal, round, and reactive to light.    Skin:  Warm.       Assessment:    Condition: In stable condition.  Unchanged.   (69 yom with ESRD HD DM, chronic a fib on no AC, ETOH presents with confusion, found to have ETOH and UTI-on rocpehin.    Chavez episode noted, EP cards input appreciated, try HD again in am.).     Plan:   (Awaiting TCU.).       Harjeet Ivy MD, MD  9/12/2019    The pt was seen and examined by myself. The case was reviewed and the plan was discussed with the KELI.

## 2019-09-13 NOTE — PLAN OF CARE
OBSERVATION GOALS:    -diagnostic tests and consults completed and resulted - NOT MET, Echo scheduled for this AM.  -vital signs normal or at patient baseline - MET  -tolerating oral intake to maintain hydration - MET  -tolerating oral antibiotics or has plans for home infusion setup - NOT MET, no abx prescribed.  -returns to baseline functional status - NOT MET  -safe disposition plan has been identified - NOT MET

## 2019-09-13 NOTE — DISCHARGE SUMMARY
Discharge Summary    Fer Latham MRN# [id number]   YOB: 1950 Age: 69 year old     Date of Admission:  9/10/2019  Date of Discharge:  9/13/2019  Admitting Physician:  Harjeet Ivy MD  Discharge Physician:  Emergency Department Observation Unit  Discharging Service:  Internal Medicine     Primary Provider: Isrrael Ornelas          Discharge Diagnosis:   Altered mental status  Alcohol intoxication  Hemodialysis              Discharge Disposition:   Discharged to home           Condition on Discharge:   Discharge condition: Stable   Code status on discharge: Full Code           Procedures:   Imaging performed:   Head CT             Discharge Medications:     Current Discharge Medication List      CONTINUE these medications which have NOT CHANGED    Details   aspirin (ASA) 325 MG EC tablet Take 325 mg by mouth daily       cholecalciferol 1000 units TABS Take 2 tablets by mouth daily       diazepam (VALIUM) 5 MG tablet Take 5 mg by mouth every 12 hours as needed for anxiety                   Consultations:   Consultation during this admission received from cardiology EP and Nephrology              Brief History of Illness:   Fer Latham is a 69 year old male with a history of ESRD on hemodialysis (M/W/F), chronic atrial fibrillation, DM-2, depression, alcoholism, CAD, rhabdomyolysis who presents to the Emergency Department today for altered metal status.           Hospital Course:   1. Altered mental status, UTI: Alcohol level was elevated at 0.20 in the ED, most likely attributing to his episode of altered mental status. He admits to drinking a pint of rum and Coke earlier in the day.  He does not admit how often he drinks however wife states that he does typically drink a small amount daily.  UA shows large LE, 250 wbc's, 9 rbc's, few bacteria, and 10 Epi cells. He was started on Ceftriaxone. UC now showing 10-50K mixed urogenital jerzy. Ceftriaxone discontinued.  CXR shows patchy left basilar  "opacity suggesting atelectasis. CT head obtained with no acute intracranial pathology. There was some suspicion of pressure hydrocephalus.  Neurology consulted in the ED, no indication at this time to workup further for normal pressure hydrocephalus. He was seen by PT/OT. They both recommend TCU. SW was consulted for safe disposition. Unfortunately patient has no insurance coverage for TCU. Per SW and care coordinator, they discussed this with the patient and regarding other option including out of pocket payment for TCU and home care services including home PT and nursing visits. Patient declined the private pay option for financial reasons. He agreed to home care services. Care coordinator placed home care orders. Refer to care coordinator note for more details. This was notified to both patient and his wife. Patient's wife will be able to transport him home this evening after patient is done with his dialysis. She will be able to come between 6 and 7 p.m.     2. Afib: rate controlled but not on anticoagulation as pt refused.  Today, while at HD he went into a possible aflutter with AVB with HR as low as 40 with PACs.  EP cards consulted. He did not have dialysis because of the bradycardia. Per EP Consult,  well rate controlled, does not require AVN agents at this time. No significant pauses or bradycardia. Lower HRs likely just from PVCs and are without concerns, can proceed with HD.  Management: maintain tight BP control, and SAGE evaluation as indicated. They also recommended getting baseline echo. This was completed. Report as follow: \"Technically difficult study. Global and regional left ventricular function is probably normal with an EF of 55% (visual estimate, no IV contrast used to enhance endocardial border  definition\"      3. DM2: Pt currently not on hyperglycemic agents. BG 90s-100s. Will closely monitor   -Moderate carb diet  -Glucose checks BID  -Assess for hypoglycemic agent      4. ESRD on " hemodialysis: Cr. 2.46 (at baseline) Pt got full run of dialysis [facility] during his MWF dialysis on Monday. Attempted HD yesterday, but this was not started due to bradycardia. EP cardiology was consulted and cleared patient to proceed with HD today which was completed successfully. Next run on Monday 9/16 at [facility] with Dr. Puente.   -Continue with HD on MWF.       Chronic Medical Problems  #Depression: Pt has been on sertraline in the past, currently denies taking it.  Reports taking diazepam PRN.     #Anemia of chronic disease: Baseline 10-11, today Hgb 9.1.  #CAD: Hx of MI per chart review. Continue daily ASA 81mg daily                     Final Day of Progress before Discharge:       Physical Exam:  Blood pressure 126/66, pulse 58, temperature 98.1  F (36.7  C), temperature source Oral, resp. rate 17, weight 122.8 kg (270 lb 11.6 oz), SpO2 100 %.    EXAM:  Physical Exam   Constitutional: Pt is oriented to person, place, and time.Pt appears well-developed and well-nourished.   HENT:   Head: Normocephalic and atraumatic.   Eyes: Conjunctivae are normal. Pupils are equal, round, and reactive to light.   Neck: Normal range of motion. Neck supple.   Cardiovascular: Normal rate, regular rhythm, normal heart sounds and intact distal pulses.    Pulmonary/Chest: Effort normal and breath sounds normal. No respiratory distress. Pt has no wheezes. Pt has no rales  Abdominal: Soft. Bowel sounds are normal. Pt exhibits no distension and no mass. No tenderness. Pt has no rebound and no guarding.   Musculoskeletal: Normal range of motion. Pt exhibits no edema.   Neurological: Pt is alert and oriented to person, place, and time. Normal reflexes.   Skin: Skin is warm and dry. No rash noted.   Psychiatric: Pt has a normal mood and affect. Behavior is normal. Judgment and thought content normal.             Data:  All laboratory data reviewed             Significant Results:   None  Results for orders placed or  performed during the hospital encounter of 09/10/19   XR Chest 2 Views    Narrative    EXAM: XR CHEST 2 VW  9/10/2019 10:24 PM      HISTORY: AMS    COMPARISON: 5/10/2019    FINDINGS: AP and lateral radiograph of the chest. Right IJ central  line tip projects over the low SVC. The trachea is midline. The  cardiac silhouette is enlarged. Mild right hemidiaphragm elevation. No  pleural effusion or pneumothorax. Patchy left basilar opacities,  better visualized on lateral radiograph. Upper abdomen is  unremarkable.       Impression    IMPRESSION: Patchy left basilar opacity suggesting atelectasis.    I have personally reviewed the examination and initial interpretation  and I agree with the findings.    GETACHEW AYALA MD   Head CT w/o contrast    Narrative    CT HEAD W/O CONTRAST 9/10/2019 10:43 PM    Provided History: AMS    Comparison: None available.    Technique: Using multidetector thin collimation helical acquisition  technique, axial, coronal and sagittal CT images from the skull base  to the vertex were obtained without intravenous contrast.     Findings:    No intracranial hemorrhage, mass effect, or midline shift. Moderate  global cerebral volume loss exvacuodilatation of the ventricles are  system. No crowding of the sulci of the high convexities.  Extensive  periventricular and subcortical hypoattenuation. No definite acute  loss of the gray-white differentiation. The basal cisterns are patent.    Mild polypoid mucosal thickening in the left maxillary sinus. Muscle  air cells are clear.      Impression    Impression:  1. No acute intracranial pathology.  2. Moderate generalized cerebral volume loss and chronic small vessel  ischemic disease. Ventricular dilatation is thought to be secondary to  volume loss.  3. Prominent white matter hypoattenuation, nonspecific but     I have personally reviewed the examination and initial interpretation  and I agree with the findings.    MANJEET CRAMER MD   CBC with  platelets differential   Result Value Ref Range    WBC 5.1 4.0 - 11.0 10e9/L    RBC Count 2.94 (L) 4.4 - 5.9 10e12/L    Hemoglobin 9.7 (L) 13.3 - 17.7 g/dL    Hematocrit 28.8 (L) 40.0 - 53.0 %    MCV 98 78 - 100 fl    MCH 33.0 26.5 - 33.0 pg    MCHC 33.7 31.5 - 36.5 g/dL    RDW 14.2 10.0 - 15.0 %    Platelet Count 166 150 - 450 10e9/L    Diff Method Automated Method     % Neutrophils 57.6 %    % Lymphocytes 26.9 %    % Monocytes 10.0 %    % Eosinophils 3.9 %    % Basophils 1.2 %    % Immature Granulocytes 0.4 %    Nucleated RBCs 0 0 /100    Absolute Neutrophil 2.9 1.6 - 8.3 10e9/L    Absolute Lymphocytes 1.4 0.8 - 5.3 10e9/L    Absolute Monocytes 0.5 0.0 - 1.3 10e9/L    Absolute Eosinophils 0.2 0.0 - 0.7 10e9/L    Absolute Basophils 0.1 0.0 - 0.2 10e9/L    Abs Immature Granulocytes 0.0 0 - 0.4 10e9/L    Absolute Nucleated RBC 0.0    Comprehensive metabolic panel   Result Value Ref Range    Sodium 134 133 - 144 mmol/L    Potassium 3.2 (L) 3.4 - 5.3 mmol/L    Chloride 101 94 - 109 mmol/L    Carbon Dioxide 20 20 - 32 mmol/L    Anion Gap 14 3 - 14 mmol/L    Glucose 98 70 - 99 mg/dL    Urea Nitrogen 21 7 - 30 mg/dL    Creatinine 2.46 (H) 0.66 - 1.25 mg/dL    GFR Estimate 26 (L) >60 mL/min/[1.73_m2]    GFR Estimate If Black 30 (L) >60 mL/min/[1.73_m2]    Calcium 8.1 (L) 8.5 - 10.1 mg/dL    Bilirubin Total 0.2 0.2 - 1.3 mg/dL    Albumin 3.0 (L) 3.4 - 5.0 g/dL    Protein Total 7.2 6.8 - 8.8 g/dL    Alkaline Phosphatase 67 40 - 150 U/L    ALT 10 0 - 70 U/L    AST 14 0 - 45 U/L   UA reflex to Microscopic and Culture   Result Value Ref Range    Color Urine Yellow     Appearance Urine Slightly Cloudy     Glucose Urine Negative NEG^Negative mg/dL    Bilirubin Urine Negative NEG^Negative    Ketones Urine Negative NEG^Negative mg/dL    Specific Gravity Urine 1.014 1.003 - 1.035    Blood Urine Moderate (A) NEG^Negative    pH Urine 5.5 5.0 - 7.0 pH    Protein Albumin Urine 30 (A) NEG^Negative mg/dL    Urobilinogen mg/dL Normal 0.0 -  2.0 mg/dL    Nitrite Urine Negative NEG^Negative    Leukocyte Esterase Urine Large (A) NEG^Negative    Source Midstream Urine     RBC Urine 9 (H) 0 - 2 /HPF    WBC Urine 250 (H) 0 - 5 /HPF    WBC Clumps Present (A) NEG^Negative /HPF    Bacteria Urine Few (A) NEG^Negative /HPF    Squamous Epithelial /HPF Urine 10 (H) 0 - 1 /HPF    Hyaline Casts 7 (H) 0 - 2 /LPF   INR   Result Value Ref Range    INR 1.21 (H) 0.86 - 1.14   Lipase   Result Value Ref Range    Lipase 315 73 - 393 U/L   PTT   Result Value Ref Range    PTT 30 22 - 37 sec   Ammonia   Result Value Ref Range    Ammonia 27 10 - 50 umol/L   Lactic acid whole blood   Result Value Ref Range    Lactic Acid 1.9 0.7 - 2.0 mmol/L   Drug abuse screen 6 urine (chem dep)   Result Value Ref Range    Amphetamine Qual Urine Negative NEG^Negative    Barbiturates Qual Urine Negative NEG^Negative    Benzodiazepine Qual Urine Positive (A) NEG^Negative    Cannabinoids Qual Urine Negative NEG^Negative    Cocaine Qual Urine Negative NEG^Negative    Ethanol Qual Urine Positive (A) NEG^Negative    Opiates Qualitative Urine Negative NEG^Negative   Alcohol ethyl   Result Value Ref Range    Ethanol g/dL 0.20 (H) <0.01 g/dL   Acetaminophen level   Result Value Ref Range    Acetaminophen Level <2 mg/L   Salicylate level   Result Value Ref Range    Salicylate Level <2 mg/dL   Alcohol   Result Value Ref Range    Ethanol g/dL 0.14 (H) <0.01 g/dL   Alcohol ethyl   Result Value Ref Range    Ethanol g/dL 0.07 (H) <0.01 g/dL   Vitamin B12   Result Value Ref Range    Vitamin B12 334 193 - 986 pg/mL   Potassium   Result Value Ref Range    Potassium 3.8 3.4 - 5.3 mmol/L   Basic metabolic panel   Result Value Ref Range    Sodium 135 133 - 144 mmol/L    Potassium 3.8 3.4 - 5.3 mmol/L    Chloride 104 94 - 109 mmol/L    Carbon Dioxide 22 20 - 32 mmol/L    Anion Gap 9 3 - 14 mmol/L    Glucose 134 (H) 70 - 99 mg/dL    Urea Nitrogen 26 7 - 30 mg/dL    Creatinine 2.63 (H) 0.66 - 1.25 mg/dL    GFR Estimate  24 (L) >60 mL/min/[1.73_m2]    GFR Estimate If Black 28 (L) >60 mL/min/[1.73_m2]    Calcium 8.3 (L) 8.5 - 10.1 mg/dL   Glucose by meter   Result Value Ref Range    Glucose 98 70 - 99 mg/dL   Glucose by meter   Result Value Ref Range    Glucose 128 (H) 70 - 99 mg/dL   Glucose by meter   Result Value Ref Range    Glucose 85 70 - 99 mg/dL   Basic metabolic panel   Result Value Ref Range    Sodium 137 133 - 144 mmol/L    Potassium 3.7 3.4 - 5.3 mmol/L    Chloride 109 94 - 109 mmol/L    Carbon Dioxide 19 (L) 20 - 32 mmol/L    Anion Gap 9 3 - 14 mmol/L    Glucose 90 70 - 99 mg/dL    Urea Nitrogen 26 7 - 30 mg/dL    Creatinine 2.54 (H) 0.66 - 1.25 mg/dL    GFR Estimate 25 (L) >60 mL/min/[1.73_m2]    GFR Estimate If Black 29 (L) >60 mL/min/[1.73_m2]    Calcium 8.1 (L) 8.5 - 10.1 mg/dL   CBC with platelets   Result Value Ref Range    WBC 4.5 4.0 - 11.0 10e9/L    RBC Count 2.83 (L) 4.4 - 5.9 10e12/L    Hemoglobin 9.1 (L) 13.3 - 17.7 g/dL    Hematocrit 28.1 (L) 40.0 - 53.0 %    MCV 99 78 - 100 fl    MCH 32.2 26.5 - 33.0 pg    MCHC 32.4 31.5 - 36.5 g/dL    RDW 14.1 10.0 - 15.0 %    Platelet Count 149 (L) 150 - 450 10e9/L   Hepatitis B Surface Antibody   Result Value Ref Range    Hepatitis B Surface Antibody 27.26 (H) <8.00 m[IU]/mL   Hepatitis B surface antigen   Result Value Ref Range    Hep B Surface Agn Nonreactive NR^Nonreactive   Glucose by meter   Result Value Ref Range    Glucose 90 70 - 99 mg/dL   Lactic acid whole blood   Result Value Ref Range    Lactic Acid 1.5 0.7 - 2.0 mmol/L   Glucose by meter   Result Value Ref Range    Glucose 129 (H) 70 - 99 mg/dL   Glucose by meter   Result Value Ref Range    Glucose 81 70 - 99 mg/dL   CBC with platelets   Result Value Ref Range    WBC 4.3 4.0 - 11.0 10e9/L    RBC Count 3.12 (L) 4.4 - 5.9 10e12/L    Hemoglobin 10.0 (L) 13.3 - 17.7 g/dL    Hematocrit 31.9 (L) 40.0 - 53.0 %     (H) 78 - 100 fl    MCH 32.1 26.5 - 33.0 pg    MCHC 31.3 (L) 31.5 - 36.5 g/dL    RDW 14.4 10.0 -  15.0 %    Platelet Count 154 150 - 450 10e9/L   Basic metabolic panel   Result Value Ref Range    Sodium 138 133 - 144 mmol/L    Potassium 4.2 3.4 - 5.3 mmol/L    Chloride 109 94 - 109 mmol/L    Carbon Dioxide 23 20 - 32 mmol/L    Anion Gap 6 3 - 14 mmol/L    Glucose 114 (H) 70 - 99 mg/dL    Urea Nitrogen 25 7 - 30 mg/dL    Creatinine 2.30 (H) 0.66 - 1.25 mg/dL    GFR Estimate 28 (L) >60 mL/min/[1.73_m2]    GFR Estimate If Black 33 (L) >60 mL/min/[1.73_m2]    Calcium 8.9 8.5 - 10.1 mg/dL   EKG 12 lead   Result Value Ref Range    Interpretation ECG Click View Image link to view waveform and result    EKG 12-lead, tracing only   Result Value Ref Range    Interpretation ECG Click View Image link to view waveform and result    Cardiology Electrophysiology (EP) IP Consult: Patient to be seen: Routine within 24 hrs; Call back #: [id number]; afib with HR in low 40's, frequent PVCs and runs of 4 PVCs in a row, unable to do dialysis due to low HR; Consultant may enter orders: Yes...    Narrative    Leena Rainey APRN CNP     9/12/2019  9:26 PM    Electrophysiology Consultation Note   EP Attending: .   Reason for consultation: AF.   Provider requesting consultation: Ms. Nabil CNP ED Observation   Service.  Date of Service: 9/12/2019      HPI:   Mr. Latham is a 69 year old male who has a past medical history   significant for HTN, IGA nephropathy ESRD on HD, permanent AF   (CHADSVASC 4), DM2, MI, CAD, alcoholism, and depression now   admitted with AMS found to likely be due to ETOH and UTI. He was   brought to ER when spouse noticed him seeming 'off'. Head CT with   no acute intracranial pathology, some suspicion of normal   pressure hydrocephalus. Blood alcohol level 0.2. UA with large   LE, was given ceftriaxone in ER. His ECG shows AF with CVR and no   acute ischemic changes. He was admitted to ED Obs for further   evaluation. He went to HD run today and upon initiating dialysis   patient HR decreased to low  40's, He was changed to K4 bath with   UF off and patient was aroused. HR improved to 60's, but then   remained 40's-50's and he was rinsed back. ECG showed AF with   PVCs. He was asymptomatic throughout. Telemetry shows HR 50-60's   no significant pauses/bradycardia. VSS. He is only on ASA.    Past Medical History:   Past Medical History:   Diagnosis Date     Anemia      Arrhythmia     atrial fib     Arthritis 1990's    gout     BPH (benign prostatic hyperplasia)      Depression      Diabetes mellitus (H)     Type 2  IDDM     Diverticulosis      ESRD (end stage renal disease) on dialysis (H)      Hematuria      HTN (hypertension)      HTN (hypertension)      IgA nephropathy      MI, old      Pneumonia      Rhabdomyolysis      Weakness 5/10/2019     Past Surgical History:   Past Surgical History:   Procedure Laterality Date     ABDOMEN SURGERY       APPENDECTOMY  1970's     ARTHROSCOPY KNEE  6/27/2013    Procedure: ARTHROSCOPY KNEE;  Right Knee Arthroscopy, Debridment   Of Medial Meniscal Tear.  ;  Surgeon: Tomás Wong MD;    Location: [facility]     BACK SURGERY  1999    L5 S1 decompression     BIOPSY  2017    skin     COLONOSCOPY  2013    [facility]     CORONARY ANGIOGRAPHY ADULT ORDER  2018     EYE SURGERY Bilateral 2004    Lens replacement     VASCULAR SURGERY       Allergies: Per MAR     Allergies   Allergen Reactions     Cephalexin      Ciprofloxacin      Diagnostic X-Ray Materials      Sulfa Drugs Hives     Medications:   Per MAR current outpatient cardiovascular medications include:   Medications Prior to Admission   Medication Sig Dispense Refill Last Dose     aspirin (ASA) 325 MG EC tablet Take 325 mg by mouth daily      9/10/2019 at PM     cholecalciferol 1000 units TABS Take 2 tablets by mouth daily      9/10/2019 at PM     diazepam (VALIUM) 5 MG tablet Take 5 mg by mouth every 12 hours   as needed for anxiety   Past Month at Unknown time     No current outpatient medications on file.     Current  Facility-Administered Medications   Medication Dose Route Frequency     aspirin  325 mg Oral QPM     gelatin absorbable  1 each Topical During Hemodialysis (from   stock)     - MEDICATION INSTRUCTIONS -   Does not apply Once     vitamin D3  2,000 Units Oral QPM     Family History:   Family History   Problem Relation Age of Onset     Cancer Father      Social History:   Social History     Tobacco Use     Smoking status: Former Smoker     Smokeless tobacco: Never Used     Tobacco comment: quit 35 years ago   Substance Use Topics     Alcohol use: Yes     Comment: 1 to 2 drinks a day (Occasional)       ROS:   A comprehensive 10 point ROS was negative other than as mentioned   in HPI.    Physical Examination:   VITALS: /66   Pulse 60   Temp (P) 97.3  F (36.3  C)   (Oral)   Resp 16   Wt 127.6 kg (281 lb 4.8 oz)   SpO2 100%     BMI 36.12 kg/m    GENERAL APPEARANCE:  NAD   HEENT: NCAT, EOMI, MMM. PERRLA.   NECK: Supple.   CHEST: CTAB   CARDIOVASCULAR: irregularly irregular.   ABDOMEN: BS+, soft, NT, ND.   EXTREMITIES: No pedal edema. Distal pulses intact.   PSYCH: Normal affect.  SKIN: Warm and dry.   Data:   Labs:  BMP  Recent Labs   Lab 09/12/19  0559 09/11/19  1957 09/10/19  2146 09/10/19  2136    135 133 134   POTASSIUM 3.7 3.8  3.8 3.2* 3.2*   CHLORIDE 109 104  --  101   COLUMBA 8.1* 8.3*  --  8.1*   CO2 19* 22  --  20   BUN 26 26  --  21   CR 2.54* 2.63*  --  2.46*   GLC 90 134* 102* 98     CBC  Recent Labs   Lab 09/12/19  0559 09/10/19  2146 09/10/19  2136   WBC 4.5  --  5.1   RBC 2.83*  --  2.94*   HGB 9.1* 9.9* 9.7*   HCT 28.1*  --  28.8*   MCV 99  --  98   MCH 32.2  --  33.0   MCHC 32.4  --  33.7   RDW 14.1  --  14.2   *  --  166     INR  Recent Labs   Lab 09/10/19  2136   INR 1.21*     EKG:         Assessment:   Mr. Latham is a 69 year old male who has a past medical history   significant for HTN, IGA nephropathy ESRD on HD, permanent AF   (CHADSVASC 4), DM2, MI, CAD, alcoholism, and  depression now   admitted with AMS found to likely be due to ETOH and UTI. He was   brought to ER when spouse noticed him seeming 'off'. Head CT with   no acute intracranial pathology, some suspicion of normal   pressure hydrocephalus. Blood alcohol level 0.2. UA with large   LE, was given ceftriaxone in ER. His ECG shows AF with CVR and no   acute ischemic changes. He was admitted to ED Obs for further   evaluation. He went to HD run today and upon initiating dialysis   patient HR decreased to low 40's, He was changed to K4 bath with   UF off and patient was aroused. HR improved to 60's, but then   remained 40's-50's and he was rinsed back. ECG showed AF with   PVCs. He was asymptomatic throughout. Telemetry shows HR 50-60's   no significant pauses/bradycardia. VSS. He is only on ASA.    EP Recommendations:  Permanent Atrial Fibrillation:   We discussed in detail with the patient management/treatment   options for Nadya includin. Stroke Prophylaxis:  CHADSVASC=+age, +DM, +CAD, +HTN  4,   corresponding to a 4.0% annual stroke / systemic emolism event   rate. indicating need for long term oral anticoagulation.    Discussed recommendation for anticoagulation in detail with him.   He is only currently on ASA. He states anticoagulation has been   discussed with him before and he does not want to be on blood   thinners. He states he understands the risk.   2. Rate Control: Well rate controlled, does not require AVN   agents at this time. No significant pauses or bradycardia. Lower   HRs likely just from PVCs and are without concerns, can proceed   with HD.   3. Rhythm Control: He is in permanent AF which is well rate   controlled and asymptomatic. No compelling indications for rhythm   control measures at this time.   4. Risk Factor Management: maintain tight BP control, and SAGE   evaluation as indicated.    Recommend getting baseline echo.     The patient states understanding and is agreeable with plan.   Thank you  for allowing us to participate in the care of this   patient.     OCTAVIO Michel CNP  Electrophysiology Consult Service  Pager: [phone]       Alcohol breath test POCT   Result Value Ref Range    Alcohol Breath Test 0.05 (A) 0.00 - 0.01   ISTAT gases elec ica gluc murtaza POCT   Result Value Ref Range    Ph Venous 7.38 7.32 - 7.43 pH    PCO2 Venous 32 (L) 40 - 50 mm Hg    PO2 Venous 98 (H) 25 - 47 mm Hg    Bicarbonate Venous 19 (L) 21 - 28 mmol/L    O2 Sat Venous 98 %    Sodium 133 133 - 144 mmol/L    Potassium 3.2 (L) 3.4 - 5.3 mmol/L    Glucose 102 (H) 70 - 99 mg/dL    Calcium Ionized 4.6 4.4 - 5.2 mg/dL    Hemoglobin 9.9 (L) 13.3 - 17.7 g/dL    Hematocrit - POCT 29 (L) 40.0 - 53.0 %PCV   ISTAT gases lactate murtaza POCT   Result Value Ref Range    Ph Venous 7.38 7.32 - 7.43 pH    PCO2 Venous 30 (L) 40 - 50 mm Hg    PO2 Venous 94 (H) 25 - 47 mm Hg    Bicarbonate Venous 18 (L) 21 - 28 mmol/L    O2 Sat Venous 97 %    Lactic Acid 2.0 0.7 - 2.1 mmol/L   Echocardiogram Complete    Narrative    [id number]  [id number]  [id number]  [id number]^[id number]^R           [facility]  Echocardiography Laboratory  [address] [place] [id number]     Name: MARIA E REESE  MRN: [id number]  : 1950  Study Date: 2019 09:50 AM  Age: 69 yrs  Gender: Male  Patient Location: [facility]  Reason For Study: Afib  Ordering Physician: ARTI SYLVESTER  Performed By: Moe Ortiz RDCS     BSA: 2.5 m2  Height: 74 in  Weight: 281 lb  HR: 60  BP: 124/57 mmHg  _____________________________________________________________________________  __        Procedure  Complete Portable Echo Adult. Technically difficult study.  _____________________________________________________________________________  __        Interpretation Summary  Technically difficult study.  Global and regional left ventricular function is probably normal with an EF of  55% (visual estimate, no IV contrast used to enhance endocardial  border  definition).  The right ventricle is normal size.  Global right ventricular function is normal.  Severe biatrial enlargement is present.  No significant valvular abnormalities were noted.  No pericardial effusion is present.     Previous study not available for comparison.        _____________________________________________________________________________  __        Left Ventricle  Left ventricular size is normal. Left ventricular wall thickness cannot  evaluate. Global and regional left ventricular function is normal with an EF  of 55-60%. Diastolic function not assessed due to atrial fibrillation.     Right Ventricle  The right ventricle is normal size. Global right ventricular function is  normal.     Atria  Severe biatrial enlargement is present.     Mitral Valve  The mitral valve is normal. Trace mitral insufficiency is present.        Aortic Valve  The valve leaflets are not well visualized. No stenosis. Trace aortic  insufficiency is present.     Tricuspid Valve  The tricuspid valve is normal. Trace tricuspid insufficiency is present. The  right ventricular systolic pressure is approximated at 35.0 mmHg plus the  right atrial pressure.     Pulmonic Valve  The pulmonic valve is normal. Trace pulmonic insufficiency is present.     Vessels  The aorta root is normal. The inferior vena cava cannot be assessed.     Pericardium  No pericardial effusion is present.        Compared to Previous Study  Previous study not available for comparison.  _____________________________________________________________________________  __  MMode/2D Measurements & Calculations     IVSd: 0.90 cm  LVIDd: 5.2 cm  LVPWd: 0.94 cm  LV mass(C)d: 175.0 grams  LV mass(C)dI: 69.7 grams/m2  asc Aorta Diam: 3.4 cm  LVOT diam: 2.3 cm  LVOT area: 4.1 cm2  LA Volume Index (BP): 50.4 ml/m2  RWT: 0.36  TAPSE: 1.9 cm           Doppler Measurements & Calculations  MV E max jen: 101.2 cm/sec  MV A max jen: 29.2 cm/sec  MV E/A: 3.5  MV dec  slope: 669.2 cm/sec2  MV dec time: 0.15 sec  TV max P.0 mmHg  PA acc time: 0.10 sec  TR max jen: 295.8 cm/sec  TR max P.0 mmHg  Lateral E/e': 6.8     _____________________________________________________________________________  __           Report approved by: Adam ANGULO 2019 11:56 AM      Blood culture   Result Value Ref Range    Specimen Description Blood RARM     Special Requests Aerobic and anaerobic bottles received     Culture Micro No growth after 3 days      *Note: Due to a large number of results and/or encounters for the requested time period, some results have not been displayed. A complete set of results can be found in Results Review.      No results found for this or any previous visit (from the past 48 hour(s)).             Pending Results:   Unresulted Labs Ordered in the Past 30 Days of this Admission     Date and Time Order Name Status Description    9/10/2019 2215 Blood culture Preliminary     9/10/2019 2215 Blood culture Preliminary     9/10/2019 2136 Vitamin B1 whole blood In process     9/10/2019 2136 Methylmalonic Acid In process     9/10/2019 2135 Blood culture Preliminary                   Discharge Instructions and Follow-Up:     Discharge Procedure Orders   Home care nursing referral   Referral Priority: Routine Referral Type: Home Health Therapies & Aides   Number of Visits Requested: 1     Home Care PT Referral for Hospital Discharge   Referral Priority: Routine Referral Type: Home Health Therapies & Aides   Number of Visits Requested: 1     Home Care Social Service Referral for Hospital Discharge   Referral Priority: Routine   Number of Visits Requested: 1     MD face to face encounter   Order Comments: Documentation of Face to Face and Certification for Home Health Services    I certify that patient: Fer Latham is under my care and that I, or a nurse practitioner or physician's assistant working with me, had a face-to-face encounter that meets the physician  face-to-face encounter requirements with this patient on: September 13, 2019.    This encounter with the patient was in whole, or in part, for the following medical condition, which is the primary reason for home health care:ESRD dialysis dependent with frequent emergency department and hospitalizations likely related to ongoing alcohol use deconditioning and poor social situation .    I certify that, based on my findings, the following services are medically necessary home health services: Nursing, Physical Therapy and Social Work.    My clinical findings support the need for the above services because: Nurse is needed: To assess medications, home safety after changes in medications or other medical regimen.. and Physical Therapy Services are needed to assess and treat the following functional impairments:  Dependence, deconditioning and poor endurance/strength.    Further, I certify that my clinical findings support that this patient is homebound (i.e. absences from home require considerable and taxing effort and are for medical reasons or Jehovah's witness services or infrequently or of short duration when for other reasons) because: Requires assistance of another person or specialized equipment to access medical services because patient: Requires supervision of another for safe transfer...    Based on the above findings. I certify that this patient is confined to the home and needs intermittent skilled nursing care, physical therapy and/or speech therapy.  The patient is under my care, and I have initiated the establishment of the plan of care.  This patient will be followed by a physician who will periodically review the plan of care.  Physician/Provider to provide follow up care: Isrrael Ornelas    Attending hospital physician (the Medicare certified [facility] provider): Harjeet Ivy MD  Physician Signature: See electronic signature associated with these discharge orders.  Date: 9/13/2019          Attestation:  Darlene  Nabil, APRN, CNP

## 2019-09-13 NOTE — PLAN OF CARE
PT 6D OBS    Discharge Planner PT   Patient plan for discharge: home  Current status: patient required min assist for sit > stand with FWW.  Ambulated limited to 40' with FWW and min assist. Mobility limited by fatigue and impaired balance.   Barriers to return to prior living situation: assistance needed for safe mobility, falls risk.   Recommendations for discharge: TCU, however this is not an option due to insurance coverage. If patient discharged to home setting, recommend home PT.   Rationale for recommendations: dependence with functional mobility.        Entered by: Charli Vanegas 09/13/2019 12:45 PM

## 2019-09-13 NOTE — PROGRESS NOTES
Care Coordinator - Discharge Planning    Admission Date/Time:  9/10/2019  Attending MD:  Harjeet Ivy MD     Data  Chart reviewed, discussed with interdisciplinary team.   Patient was admitted for:   1. Altered mental status, unspecified altered mental status type    2. Generalized muscle weakness    3. Acute cystitis with hematuria    4. Hypokalemia         Assessment   Concerns with insurance coverage for discharge needs: Pt Medicare primary.  D/t observation status does not have a payor source for TCU placement.  Per SARMAD pt is not able to afford placement.  Current Living Situation: Patient lives with spouse.  Support System: Supportive  Services Involved: Dialysis Services  Transportation at Discharge: Possibly patient wife vs HE transport  Transportation to Medical Appointments:    - Name of caregiver: Self  Barriers to Discharge: Medical stability     Pt status reviewed during care team rounds.  Pt medically stable for discharge.  Concerns noted with pt functional status.  PT has reassessed and is recommending TCU however d/t lack of payor source pt has declined this recommendation.  Reviewed with pt home PT option.   Agreed to check and see if home care can send a PT out first vs having RN go out to see patient first.    SARMAD is working on talking with pt wife regarding pt situation.    Met with pt.  Per discussion with pt he would be open to having home health services.  Per pt he has had [facility] in the past.   Discussed discharge transportation options.  Pt would be open to [facility] transport home however he does not have his keys or wallet with him.   Agreed to try and connect with pt wife.       Email sent to [facility] inquiring about RN and PT services and if PT can see pt first.   Discharge orders updated.   VM left for patient wife.     Per [facility] Liaison, Angela [facility] home RN visit is required before home PT.  It is possible for home RN and PT to see the patient the same day.      Spoke with pt spouse  regarding anticipated plan.  Pt wife confirmed that they do not have the resources to pay privately for TCU.  She is able to transport the patient home this evening after pt is done with his dialysis.  Pt wife will be here between 6 and 7 p.m transport home.     1340: Reviewed above with TIFFANIE Salgado.  Per discussion with Naomi she has reached out to Medicine Team for review for possible transition to inpatient.     Reviewed with SARMAD Clark.  Recommending home health SW f/u.  Discharge orders updated.   Updated [facility]    Coordination of Care and Referrals: Provided patient/family with options for Dialysis Services.      Plan  Anticipated Discharge Date:  9/13/19  Anticipated Discharge Plan:  Home with home health care    Nancy Peters RN BSN, PHN RN Care Coordinator  Internal Medicine   [phone]  Pager: [phone]  Weekend RN Care Coordinator job code * * * [id number]  9/13/2019 1:31 PM

## 2019-09-13 NOTE — PROGRESS NOTES
Short Note:    I got paged to call back his wife, she is asking about missing hemodialysis earlier today.   I called the wife Mrs. Parisi and I explained to her that hemodialysis had to stop today due to severe low hear rate, bradycardia, and current labs show that patient does not need emergent or urgent hemodialysis now, plan to re-evaluate him tomorrow morning and to discuss with his team regarding his rhythm abnormalities, atrial fibrillation and if this issue has solved and no other contraindication for hemodialysis then we could run hemodialysis for him. Patient asked me a lot of questions including question not related to kidney point so I defer these questions to the patient's primary team tomorrow to be addressed with his wife.     Glory Olivas M.D.  Nephrology Fellow  Pager: [phone]

## 2019-09-13 NOTE — PLAN OF CARE
-diagnostic tests and consults completed and resulted: No  -vital signs normal or at patient baseline: Yes  /69 (BP Location: Right arm)   Pulse 65   Temp 98  F (36.7  C) (Oral)   Resp 16   Wt 127.6 kg (281 lb 4.8 oz)   SpO2 99%   BMI 36.12 kg/m    -tolerating oral intake to maintain hydration: Yes  -tolerating oral antibiotics or has plans for home infusion setup: No  -returns to baseline functional status: No  -safe disposition plan has been identified: Pending, possible TCU placement    Pt HR running 60s-80s with occasional HR in 130-140s, provider aware.

## 2019-09-13 NOTE — PROGRESS NOTES
Per EP Consult: Rate Control: Well rate controlled, does not require AVN agents at this time. No significant pauses or bradycardia. Lower HRs likely just from PVCs and are without concerns, can proceed with HD. Management: maintain tight BP control, and SAGE evaluation as indicated.  Recommend getting baseline echo. This is ordered.

## 2019-09-13 NOTE — PROGRESS NOTES
Social Work Services Progress Note    Hospital Day: 4  Date of Initial Social Work Evaluation:  Completed on last admission, 6/6/19  Collaborated with:  Observation provider    Data:  Writer is covering Pt and was informed yesterday that he has referrals to TCU and may soon be ready for discharge.     Intervention:  On further chart review, Pt does not meet Medicare criteria for TCU placement. Writer discussed this with provider this morning. Though he did get placed from observation status on his last admission in June '19, it was because he'd had a qualifying Medicare stay within the previous 30 days. On this current admission, Pt does not have any qualifying stay within the last 30 days.    Assessment:  Pt reportedly requires TCU. He lives with his wife who also requires some minimal assistance.    Plan:    Anticipated Disposition:  pending discussion of options    Barriers to d/c plan:  Observation status, lack of necessary supports    Follow Up:  SW will meet with Pt and wife to discuss their situation and options for discharge, including returning home or paying privately for TCU. Per history, Pt is not likely to be able to agree to pay privately.      Amber Johnston, [facility]  ICU   Pager: [phone]        Addendum, 12:10pm:    Writer met with Pt at bedside to discuss options for discharge. Pt was sitting up and working on dressing for dialysis. He was able to understand the situation and stated that he will not be able to pay privately for TCU. He was a little frustrated, but calm. He tried to call his wife three times, but she did not answer and he handed Writer the phone on the last try so that Writer could leave a message with contact information. Pt stated that he typically can get around the house on his own. He has a stair lift. He does not have a hospital bed or other DME. He is open to having home health RN and PT for re-evaluation of his home environment for safety and DME needs. If Pt  "goes home with home health, SW should be included in the referral for further life planning discussions with Pt and wife as his health and mobility have been challenging for some time now.      Addendum, 4:15pm:    Writer was informed that Pt is requesting to speak with SW. Writer met with Pt in dialysis. He requested a reminder about the plan for this evening, stating, \"I'm confused.\" Writer provided a reminder and clarified Pt's understanding and ongoing agreement with the plan. Pt appeared to have some anxiety about returning home and Writer spent some time in counseling about his anxieties. Pt reflected on the situation that he and his wife are in, with limited incomes, not qualifying for medical or county assistance, and both having ongoing medical needs. Writer explored options of moving to assisted living and having to pay down assets to qualify for assistance. Pt is worried about those options, but willing to explore further. Writer informed him that the home health SW will follow-up for further discussion.   "

## 2019-09-13 NOTE — PLAN OF CARE
OBSERVATION GOALS:     -diagnostic tests and consults completed and resulted - NOT MET, Echo scheduled for this AM.  -vital signs normal or at patient baseline - MET  -tolerating oral intake to maintain hydration - MET  -tolerating oral antibiotics or has plans for home infusion setup - N/A, no abx prescribed. Rocephin IV given x1 on 9/12.  -returns to baseline functional status - NOT MET  -safe disposition plan has been identified - NOT MET    VSS on RA. Afib HR ranging from 60-130s. KELI aware, echo is scheduled for today. Disoriented to time and situation, waxes and wanes. Forgetful. Tolerating dialysis diet. Incontinent of urine x1. Voiding in small amounts in urinal. Bladder scanned for 160 ml. Denies dysuria. L PIV SL. R CVC for HD. Up w/ AxTerri/MAYLIN/walker. Placement pending. Potential dialysis run today pending on nephrology's recommendations. Continue to monitor and follow POC.

## 2019-09-13 NOTE — PROGRESS NOTES
The patient was seen and examined by me and the case was discussed with the KELI. We are in agreement with the assessment and plan.  This is a 69-year-old gentleman who is dialysis dependent with frequent emergency department and hospitalizations likely related to ongoing alcohol use deconditioning and poor social situation.  This is hospital day for primarily because of lack of disposition.  At this point he needs 2 people to safely ambulate.  It does not appear that his wife is willing to take him back.  Social work will need to explore disposition to a TCU.  He has no active issues at this time other than his regular dialysis.    Past Medical History:   Diagnosis Date     Anemia      Arrhythmia     atrial fib     Arthritis 1990's    gout     BPH (benign prostatic hyperplasia)      Depression      Diabetes mellitus (H)     Type 2  IDDM     Diverticulosis      ESRD (end stage renal disease) on dialysis (H)      Hematuria      HTN (hypertension)      HTN (hypertension)      IgA nephropathy      MI, old      Pneumonia      Rhabdomyolysis      Weakness 5/10/2019     Social History     Socioeconomic History     Marital status:      Spouse name: Not on file     Number of children: Not on file     Years of education: Not on file     Highest education level: Not on file   Occupational History     Not on file   Social Needs     Financial resource strain: Not on file     Food insecurity:     Worry: Not on file     Inability: Not on file     Transportation needs:     Medical: Not on file     Non-medical: Not on file   Tobacco Use     Smoking status: Former Smoker     Smokeless tobacco: Never Used     Tobacco comment: quit 35 years ago   Substance and Sexual Activity     Alcohol use: Yes     Comment: 1 to 2 drinks a day (Occasional)     Drug use: No     Sexual activity: Not on file   Lifestyle     Physical activity:     Days per week: Not on file     Minutes per session: Not on file     Stress: Not on file    Relationships     Social connections:     Talks on phone: Not on file     Gets together: Not on file     Attends Yazdanism service: Not on file     Active member of club or organization: Not on file     Attends meetings of clubs or organizations: Not on file     Relationship status: Not on file     Intimate partner violence:     Fear of current or ex partner: Not on file     Emotionally abused: Not on file     Physically abused: Not on file     Forced sexual activity: Not on file   Other Topics Concern     Parent/sibling w/ CABG, MI or angioplasty before 65F 55M? Not Asked   Social History Narrative     Not on file     Physical exam:  General: Awake alert in no distress  HEENT oropharynx is clear and  Cardiac: Regular rate and rhythm no murmurs rubs gallops  Respiratory: Lungs are clear  Abdomen: Obese nontender  Neurologic: Cranial nerves II through XII are grossly intact.  I think he does have an element of delirium or dementia as he is slow and deliberate to answer questions.  Moves all extremities to command.    Assessment and plan: 69-year-old male here at this time essentially for disposition to a TCU.  He has been getting his dialysis.  No indication for antibiotics.  He is getting an echocardiogram today.  Social work will need to work on disposition.  Hopefully social work will meet with his wife to assess her ability to assist with discharge back home.

## 2019-09-14 ENCOUNTER — DOCUMENTATION ONLY (OUTPATIENT)
Dept: EMERGENCY MEDICINE | Facility: CLINIC | Age: 69
End: 2019-09-14

## 2019-09-14 NOTE — PROGRESS NOTES
"Pt's wife, Ailin Latham, has refused any home care services on pt's behalf.    This PT called the pt s home at 12:30 this afternoon (9/14/19) to let them know that the SN admission assessment could not be done until this evening so PT could not come out until early in the a.m. on 9/15/19.  The pt s wife, Ailin, answered and stated they did not want any PT.  She said, \"we've had them in here before and they had to weigh him and did 5 min. of PT and charged us $168. [They would not have gotten any bill from us for skilled care.]  Do not call again.  We do not want any of you in here.  I'm going to take him to a place I know he will get good care.\"  PT asked to speak to pt and she said, \"Why, he thinks the same as me.\"  PT asked if they did not want nursing and she said, \"Why would we want nursing?\"  PT said because he just got out of the hospital and she said, \"And now he's better, so why would we need them.  Do not call again, any of you!     The home care orders have been DCed and a [facility] report was made in case the pt s wife is neglecting the patient by not allowing him to receive the services he needs.  I am informing you and the PCP (Dr. Isrrael Ornelas) that Fer is not receiving the services that were recommended/ ordered.    Cruz Alexander PT  [phone]  [email or web address]  "

## 2019-09-14 NOTE — PLAN OF CARE
Physical Therapy Discharge Summary    Reason for therapy discharge:    Discharged to home with home therapy.    Progress towards therapy goal(s). See goals on Care Plan in [facility] electronic health record for goal details.  Goals partially met.  Barriers to achieving goals:   discharge from facility.    Therapy recommendation(s):    Continued therapy is recommended.  Rationale/Recommendations:  continue to work on transfers/gait/endurance.

## 2019-09-14 NOTE — PROGRESS NOTES
Discharge instruction reviewed.  Patient verbalized understanding. PIV removed, patient assisted in wheel chair and  Family awaiting at main lobby. Patient discharged

## 2019-09-14 NOTE — DISCHARGE SUMMARY
Dialysis Discharge Summary Brief    [facility]  Division of Nephrology  Nephrology Discharge Dialysis Orders  Ph: [phone]  Fax: [phone]    Fer Latham  MRN: [id number]  YOB: 1950    [facility] Unit: [facility]  Primary Nephrologist: Dr. Puente/Nadine Reyes NP    Date of Admission: 9/10/2019  Date of Discharge: 9/13/2019    Last dialyzed 9/13. Please page me at  with any questions. Thanks much. Lizbeth Clements PA-C    Fer Latham is a 69 yr old male with PMH of ESRD, chronic afib, DMII, depression, alcoholism, and CAD, admitted with AMS found to likely be due to ETOH and possible UTI.     ESKD: dialyzes MWF at [facility] with Dr. Puente. Run time: 4 hrs. EDW: 124 kg. Access: tunneled RIJ (failed LUE AVF, hoping to switch to PD)  - Last dialyzed 9/13     Volume:  kg.     Chronic afib: per chart, pt declines AC  - HR varying between low 40's and low 50's; cardiology consulted; EKG's reviewed and low HR's thought likely to be from PVC's. Pt continues to decline AC.    BP: usual pre HD pressures 110-120's, post pressures 100-110's, lowest pressure ~ 95.      Anemia: hgb 10.0 g/dL; recent labs with ferritin 57, iron 38, IS 13, hgb 10's. On epogen 6200 units per HD       BMD: Ca 8.9, alb 3.0, (iCa 4.6); recent PTH 97; on cholecalciferol 2000 units qday; does not appear to be on phos binder     AMS: due to ETOH and possibly UTI though normal mixed jerzy on UC    [x] Resume all previous dialysis orders with exception as noted below    New Orders (if not applicable put NA):  Estimated Dry Weight No change   Dialysis Duration    Dialysis Access    Antibiotics (dose per dialysis, end date)            Labs to be drawn at dialysis    Other major changes to dialysis prescription (e.g. Dialysate bath, heparin, blood flow rate, etc)      Medication changes (also fax the unit a copy of the discharge summary)              Name of physician  completing this form: Lzibeth Clements PA-C

## 2019-09-16 LAB
BACTERIA SPEC CULT: NO GROWTH
Lab: NORMAL
SPECIMEN SOURCE: NORMAL

## 2019-09-23 LAB — METHYLMALONATE SERPL-SCNC: 0.65 UMOL/L (ref 0–0.4)

## 2019-10-02 ENCOUNTER — HOSPITAL ENCOUNTER (INPATIENT)
Facility: CLINIC | Age: 69
LOS: 2 days | Discharge: HOME | DRG: 896 | End: 2019-10-04
Attending: EMERGENCY MEDICINE | Admitting: HOSPITALIST
Payer: MEDICARE

## 2019-10-02 ENCOUNTER — APPOINTMENT (OUTPATIENT)
Dept: CT IMAGING | Facility: CLINIC | Age: 69
DRG: 896 | End: 2019-10-02
Attending: HOSPITALIST
Payer: MEDICARE

## 2019-10-02 DIAGNOSIS — R53.1 WEAKNESS: ICD-10-CM

## 2019-10-02 DIAGNOSIS — E87.1 HYPONATREMIA: ICD-10-CM

## 2019-10-02 DIAGNOSIS — N18.6 END STAGE RENAL DISEASE (H): ICD-10-CM

## 2019-10-02 DIAGNOSIS — F10.929 ALCOHOLIC INTOXICATION WITH COMPLICATION (H): ICD-10-CM

## 2019-10-02 DIAGNOSIS — R82.90 ABNORMAL URINALYSIS: ICD-10-CM

## 2019-10-02 LAB
ALBUMIN SERPL-MCNC: 3.1 G/DL (ref 3.4–5)
ALBUMIN UR-MCNC: 100 MG/DL
ALP SERPL-CCNC: 82 U/L (ref 40–150)
ALT SERPL W P-5'-P-CCNC: 12 U/L (ref 0–70)
ANION GAP SERPL CALCULATED.3IONS-SCNC: 9 MMOL/L (ref 3–14)
APPEARANCE UR: ABNORMAL
AST SERPL W P-5'-P-CCNC: 18 U/L (ref 0–45)
BACTERIA #/AREA URNS HPF: ABNORMAL /HPF
BASOPHILS # BLD AUTO: 0.1 10E9/L (ref 0–0.2)
BASOPHILS NFR BLD AUTO: 1.5 %
BILIRUB SERPL-MCNC: 0.3 MG/DL (ref 0.2–1.3)
BILIRUB UR QL STRIP: NEGATIVE
BUN SERPL-MCNC: 21 MG/DL (ref 7–30)
CALCIUM SERPL-MCNC: 8.3 MG/DL (ref 8.5–10.1)
CHLORIDE SERPL-SCNC: 97 MMOL/L (ref 94–109)
CO2 SERPL-SCNC: 21 MMOL/L (ref 20–32)
COLOR UR AUTO: YELLOW
CREAT SERPL-MCNC: 2.23 MG/DL (ref 0.66–1.25)
DIFFERENTIAL METHOD BLD: ABNORMAL
EOSINOPHIL # BLD AUTO: 0.4 10E9/L (ref 0–0.7)
EOSINOPHIL NFR BLD AUTO: 8.8 %
ERYTHROCYTE [DISTWIDTH] IN BLOOD BY AUTOMATED COUNT: 13.3 % (ref 10–15)
ETHANOL SERPL-MCNC: 0.3 G/DL
GFR SERPL CREATININE-BSD FRML MDRD: 29 ML/MIN/{1.73_M2}
GLUCOSE BLDC GLUCOMTR-MCNC: 123 MG/DL (ref 70–99)
GLUCOSE SERPL-MCNC: 99 MG/DL (ref 70–99)
GLUCOSE UR STRIP-MCNC: NEGATIVE MG/DL
HCT VFR BLD AUTO: 33 % (ref 40–53)
HGB BLD-MCNC: 11.4 G/DL (ref 13.3–17.7)
HGB UR QL STRIP: ABNORMAL
HYALINE CASTS #/AREA URNS LPF: 16 /LPF (ref 0–2)
IMM GRANULOCYTES # BLD: 0 10E9/L (ref 0–0.4)
IMM GRANULOCYTES NFR BLD: 0.4 %
INTERPRETATION ECG - MUSE: NORMAL
KETONES UR STRIP-MCNC: NEGATIVE MG/DL
LEUKOCYTE ESTERASE UR QL STRIP: ABNORMAL
LYMPHOCYTES # BLD AUTO: 1.6 10E9/L (ref 0.8–5.3)
LYMPHOCYTES NFR BLD AUTO: 35.7 %
MCH RBC QN AUTO: 32.9 PG (ref 26.5–33)
MCHC RBC AUTO-ENTMCNC: 34.5 G/DL (ref 31.5–36.5)
MCV RBC AUTO: 95 FL (ref 78–100)
MONOCYTES # BLD AUTO: 0.5 10E9/L (ref 0–1.3)
MONOCYTES NFR BLD AUTO: 10.4 %
NEUTROPHILS # BLD AUTO: 2 10E9/L (ref 1.6–8.3)
NEUTROPHILS NFR BLD AUTO: 43.2 %
NITRATE UR QL: NEGATIVE
NRBC # BLD AUTO: 0 10*3/UL
NRBC BLD AUTO-RTO: 0 /100
PH UR STRIP: 5.5 PH (ref 5–7)
PLATELET # BLD AUTO: 171 10E9/L (ref 150–450)
POTASSIUM SERPL-SCNC: 3.1 MMOL/L (ref 3.4–5.3)
PROT SERPL-MCNC: 7.5 G/DL (ref 6.8–8.8)
RBC # BLD AUTO: 3.47 10E12/L (ref 4.4–5.9)
RBC #/AREA URNS AUTO: 7 /HPF (ref 0–2)
SODIUM SERPL-SCNC: 127 MMOL/L (ref 133–144)
SOURCE: ABNORMAL
SP GR UR STRIP: 1.01 (ref 1–1.03)
SQUAMOUS #/AREA URNS AUTO: 6 /HPF (ref 0–1)
UROBILINOGEN UR STRIP-MCNC: NORMAL MG/DL (ref 0–2)
WBC # BLD AUTO: 4.5 10E9/L (ref 4–11)
WBC #/AREA URNS AUTO: >182 /HPF (ref 0–5)
WBC CLUMPS #/AREA URNS HPF: PRESENT /HPF

## 2019-10-02 PROCEDURE — 85025 COMPLETE CBC W/AUTO DIFF WBC: CPT | Performed by: EMERGENCY MEDICINE

## 2019-10-02 PROCEDURE — 25000132 ZZH RX MED GY IP 250 OP 250 PS 637: Mod: GY | Performed by: HOSPITALIST

## 2019-10-02 PROCEDURE — 87186 SC STD MICRODIL/AGAR DIL: CPT | Performed by: EMERGENCY MEDICINE

## 2019-10-02 PROCEDURE — 70450 CT HEAD/BRAIN W/O DYE: CPT

## 2019-10-02 PROCEDURE — 25000125 ZZHC RX 250: Performed by: HOSPITALIST

## 2019-10-02 PROCEDURE — 00000146 ZZHCL STATISTIC GLUCOSE BY METER IP

## 2019-10-02 PROCEDURE — 80053 COMPREHEN METABOLIC PANEL: CPT | Performed by: EMERGENCY MEDICINE

## 2019-10-02 PROCEDURE — 93005 ELECTROCARDIOGRAM TRACING: CPT

## 2019-10-02 PROCEDURE — 99285 EMERGENCY DEPT VISIT HI MDM: CPT | Mod: 25

## 2019-10-02 PROCEDURE — 80320 DRUG SCREEN QUANTALCOHOLS: CPT | Performed by: EMERGENCY MEDICINE

## 2019-10-02 PROCEDURE — 87086 URINE CULTURE/COLONY COUNT: CPT | Performed by: EMERGENCY MEDICINE

## 2019-10-02 PROCEDURE — 25800030 ZZH RX IP 258 OP 636: Performed by: HOSPITALIST

## 2019-10-02 PROCEDURE — HZ2ZZZZ DETOXIFICATION SERVICES FOR SUBSTANCE ABUSE TREATMENT: ICD-10-PCS | Performed by: HOSPITALIST

## 2019-10-02 PROCEDURE — 87088 URINE BACTERIA CULTURE: CPT | Performed by: EMERGENCY MEDICINE

## 2019-10-02 PROCEDURE — 81001 URINALYSIS AUTO W/SCOPE: CPT | Performed by: EMERGENCY MEDICINE

## 2019-10-02 PROCEDURE — 25000132 ZZH RX MED GY IP 250 OP 250 PS 637: Mod: GY | Performed by: INTERNAL MEDICINE

## 2019-10-02 PROCEDURE — 99223 1ST HOSP IP/OBS HIGH 75: CPT | Mod: AI | Performed by: HOSPITALIST

## 2019-10-02 PROCEDURE — 12000000 ZZH R&B MED SURG/OB

## 2019-10-02 PROCEDURE — 25000128 H RX IP 250 OP 636: Performed by: HOSPITALIST

## 2019-10-02 RX ORDER — BISACODYL 10 MG
10 SUPPOSITORY, RECTAL RECTAL DAILY PRN
Status: DISCONTINUED | OUTPATIENT
Start: 2019-10-02 | End: 2019-10-04 | Stop reason: HOSPADM

## 2019-10-02 RX ORDER — AMOXICILLIN 250 MG
2 CAPSULE ORAL 2 TIMES DAILY PRN
Status: DISCONTINUED | OUTPATIENT
Start: 2019-10-02 | End: 2019-10-04 | Stop reason: HOSPADM

## 2019-10-02 RX ORDER — ACETAMINOPHEN 650 MG/1
650 SUPPOSITORY RECTAL EVERY 4 HOURS PRN
Status: DISCONTINUED | OUTPATIENT
Start: 2019-10-02 | End: 2019-10-04 | Stop reason: HOSPADM

## 2019-10-02 RX ORDER — ONDANSETRON 2 MG/ML
4 INJECTION INTRAMUSCULAR; INTRAVENOUS EVERY 6 HOURS PRN
Status: DISCONTINUED | OUTPATIENT
Start: 2019-10-02 | End: 2019-10-04 | Stop reason: HOSPADM

## 2019-10-02 RX ORDER — POTASSIUM CHLORIDE 1500 MG/1
40 TABLET, EXTENDED RELEASE ORAL ONCE
Status: COMPLETED | OUTPATIENT
Start: 2019-10-02 | End: 2019-10-02

## 2019-10-02 RX ORDER — POLYETHYLENE GLYCOL 3350 17 G/17G
17 POWDER, FOR SOLUTION ORAL DAILY PRN
Status: DISCONTINUED | OUTPATIENT
Start: 2019-10-02 | End: 2019-10-04 | Stop reason: HOSPADM

## 2019-10-02 RX ORDER — LABETALOL HYDROCHLORIDE 5 MG/ML
10 INJECTION, SOLUTION INTRAVENOUS
Status: DISCONTINUED | OUTPATIENT
Start: 2019-10-02 | End: 2019-10-04 | Stop reason: HOSPADM

## 2019-10-02 RX ORDER — LIDOCAINE 40 MG/G
CREAM TOPICAL
Status: DISCONTINUED | OUTPATIENT
Start: 2019-10-02 | End: 2019-10-04 | Stop reason: HOSPADM

## 2019-10-02 RX ORDER — ACETAMINOPHEN 325 MG/1
650 TABLET ORAL EVERY 4 HOURS PRN
Status: DISCONTINUED | OUTPATIENT
Start: 2019-10-02 | End: 2019-10-04 | Stop reason: HOSPADM

## 2019-10-02 RX ORDER — ONDANSETRON 4 MG/1
4 TABLET, ORALLY DISINTEGRATING ORAL EVERY 6 HOURS PRN
Status: DISCONTINUED | OUTPATIENT
Start: 2019-10-02 | End: 2019-10-04 | Stop reason: HOSPADM

## 2019-10-02 RX ORDER — METOPROLOL TARTRATE 1 MG/ML
2.5-5 INJECTION, SOLUTION INTRAVENOUS EVERY 4 HOURS PRN
Status: DISCONTINUED | OUTPATIENT
Start: 2019-10-02 | End: 2019-10-04 | Stop reason: HOSPADM

## 2019-10-02 RX ORDER — NALOXONE HYDROCHLORIDE 0.4 MG/ML
.1-.4 INJECTION, SOLUTION INTRAMUSCULAR; INTRAVENOUS; SUBCUTANEOUS
Status: DISCONTINUED | OUTPATIENT
Start: 2019-10-02 | End: 2019-10-04 | Stop reason: HOSPADM

## 2019-10-02 RX ORDER — FOLIC ACID 1 MG/1
1 TABLET ORAL DAILY
Status: DISCONTINUED | OUTPATIENT
Start: 2019-10-03 | End: 2019-10-04 | Stop reason: HOSPADM

## 2019-10-02 RX ORDER — LORAZEPAM 2 MG/ML
1-2 INJECTION INTRAMUSCULAR EVERY 30 MIN PRN
Status: DISCONTINUED | OUTPATIENT
Start: 2019-10-02 | End: 2019-10-04 | Stop reason: HOSPADM

## 2019-10-02 RX ORDER — AMOXICILLIN 250 MG
1 CAPSULE ORAL 2 TIMES DAILY PRN
Status: DISCONTINUED | OUTPATIENT
Start: 2019-10-02 | End: 2019-10-04 | Stop reason: HOSPADM

## 2019-10-02 RX ORDER — LORAZEPAM 1 MG/1
1-2 TABLET ORAL EVERY 30 MIN PRN
Status: DISCONTINUED | OUTPATIENT
Start: 2019-10-02 | End: 2019-10-04 | Stop reason: HOSPADM

## 2019-10-02 RX ORDER — MULTIPLE VITAMINS W/ MINERALS TAB 9MG-400MCG
1 TAB ORAL DAILY
Status: DISCONTINUED | OUTPATIENT
Start: 2019-10-03 | End: 2019-10-04 | Stop reason: HOSPADM

## 2019-10-02 RX ORDER — LANOLIN ALCOHOL/MO/W.PET/CERES
100 CREAM (GRAM) TOPICAL DAILY
Status: DISCONTINUED | OUTPATIENT
Start: 2019-10-03 | End: 2019-10-04 | Stop reason: HOSPADM

## 2019-10-02 RX ADMIN — ASPIRIN 325 MG: 325 TABLET, DELAYED RELEASE ORAL at 10:22

## 2019-10-02 RX ADMIN — FOLIC ACID: 5 INJECTION, SOLUTION INTRAMUSCULAR; INTRAVENOUS; SUBCUTANEOUS at 10:27

## 2019-10-02 RX ADMIN — POTASSIUM CHLORIDE 40 MEQ: 1500 TABLET, EXTENDED RELEASE ORAL at 10:22

## 2019-10-02 ASSESSMENT — ACTIVITIES OF DAILY LIVING (ADL)
WHICH_OF_THE_ABOVE_FUNCTIONAL_RISKS_HAD_A_RECENT_ONSET_OR_CHANGE?: AMBULATION
FALL_HISTORY_WITHIN_LAST_SIX_MONTHS: YES
DRESS: 2-->ASSISTIVE PERSON
ADLS_ACUITY_SCORE: 20
RETIRED_EATING: 0-->INDEPENDENT
ADLS_ACUITY_SCORE: 22
ADLS_ACUITY_SCORE: 22
BATHING: 2-->ASSISTIVE PERSON
TOILETING: 1-->ASSISTIVE EQUIPMENT
AMBULATION: 1-->ASSISTIVE EQUIPMENT
COGNITION: 0 - NO COGNITION ISSUES REPORTED
NUMBER_OF_TIMES_PATIENT_HAS_FALLEN_WITHIN_LAST_SIX_MONTHS: 3
SWALLOWING: 0-->SWALLOWS FOODS/LIQUIDS WITHOUT DIFFICULTY
RETIRED_COMMUNICATION: 0-->UNDERSTANDS/COMMUNICATES WITHOUT DIFFICULTY
TRANSFERRING: 1-->ASSISTIVE EQUIPMENT

## 2019-10-02 ASSESSMENT — MIFFLIN-ST. JEOR: SCORE: 2064.46

## 2019-10-02 NOTE — PLAN OF CARE
OT and PT:  Orders rec'd however ethanol level is 0.30. Patient agitated and uncooperative on admission per notes. Holding evals today to allow for medical progress.

## 2019-10-02 NOTE — ED NOTES
"[facility]  ED Nurse Handoff Report    ED Chief complaint: Fall and Generalized Weakness      ED Diagnosis:   Final diagnoses:   Weakness   Alcoholic intoxication with complication (H)   End stage renal disease (H)   Hyponatremia       Code Status: not addressed in ED     Allergies:   Allergies   Allergen Reactions     Cephalexin      Ciprofloxacin      Diagnostic X-Ray Materials      Sulfa Drugs Hives       Activity level - Baseline/Home:  Independent  Activity Level - Current:   Unable to Assess intoxicated     Patient's Preferred language: english    Needed?: No    Isolation: Yes  Infection: Not Applicable  MRSA  Bariatric?: No    Vital Signs:   Vitals:    10/02/19 0642 10/02/19 0728   BP: 102/65    Pulse: 56    Resp: 20    Temp:  98.1  F (36.7  C)   TempSrc:  Oral   SpO2: 99%    Weight: 122.5 kg (270 lb)    Height: 1.88 m (6' 2\")        Cardiac Rhythm: ,   Cardiac  Cardiac Rhythm: Sinus bradycardia    Pain level:      Is this patient confused?: Yes intoxicated   Does this patient have a guardian?  No         If yes, is there guardianship documents in the Epic \"Code/ACP\" activity?  No         Guardian Notified?  No  [place] - Suicide Severity Rating Scale Completed?  Yes  If yes, what color did the patient score?  White    Patient Report: Initial Complaint: fell in BR found on floor by wife, intoxicated, whiskey , dialysis pt due today   Focused Assessment: intoxicated, uncooperative with giving history or answering questions   Tests Performed: labs   Abnormal Results: see results   Treatments provided: awaiting results     Family Comments: wife here     OBS brochure/video discussed/provided to patient/family: N/A              Name of person given brochure if not patient: na               Relationship to patient: na    ED Medications: Medications - No data to display    Drips infusing?:  No    For the majority of the shift this patient was Yellow.   Interventions performed were " redirected, hold .    Severe Sepsis OR Septic Shock Diagnosis Present: No    To be done/followed up on inpatient unit:  unknown on hold     ED NURSE PHONE NUMBER: [phone]

## 2019-10-02 NOTE — ED PROVIDER NOTES
History     Chief Complaint:  Fall and Generalized Weakness    The history is provided by the patient and the EMS personnel.   History limited as patient is a poor historian, and supplement by electronic chart review.    Fer Latham is a 69 year old, type II diabetic male with end stage renal disease who is currently undergoing dialysis who presents to the emergency department today via EMS for evaluation of generalized weakness and after a fall. Per EMS, the patient fell this morning in his living room; his wife called EMS as she was unable to get him up. They report he smelled like alcohol and would not tell them how much he has had to drink. The patient undergoes dialysis on Mondays, Wednesdays, and Fridays; his last appointment was Monday which he attended (2 days ago). On examination the patient denies any new pain or headache. Per chart review- the patient was discharged on 09/13/2019 from the [facility] where he was stayed for three days for evaluation of an altered mental status, having been using alcohol at that time as well.  Disposition have been recommended to a TCU though he was instead sent home as there were funding issues.    Allergies:  Cephalexin  Ciprofloxacin  Diagnostic x-ray materials  Sulfa drugs    Medications:    Aspirin  Cholecalciferol  Diazepam    Past Medical History:    Anemia  Arrhythmia  Osteoarthritis   Rhabdomyolysis  Diverticulosis  Depression  Benign prostatic hyperplasia   Myocardia Infarction, old  End stage renal disease  Diabetes, type II  Hypertension  Nephropathy  Atrial flutter  Obesity  CKD, Stage III  Atrial fibrillation     Past Surgical History:    Abdominal surgery  Appendectomy  Arthroscopy, right knee  Atrial ablation  Decompression of L5-S1  Skin biopsy  Coronary angiography  Lens replacement, bilateral  Vascular surgery     Family History:    Cancer  Lung disease  Mesothelioma     Social History:  Smoking Status: Former Smoker  Smokeless  "Tobacco: Never Used  Alcohol Use: Positive  Drug Use: Negative    Marital Status:       Review of Systems   Unable to perform ROS: Mental status change       Physical Exam     Patient Vitals for the past 24 hrs:   BP Temp Temp src Pulse Heart Rate Resp SpO2 Height Weight   10/02/19 0728 -- 98.1  F (36.7  C) Oral -- -- -- -- -- --   10/02/19 0642 102/65 -- -- 56 56 20 99 % 1.88 m (6' 2\") 122.5 kg (270 lb)      Physical Exam  General: Male sitting upright in room 19  HENT: face nontender with full painless ROM mandible, no bony deformity, OP clear, no difficulty controlling secretions, skull nontender  Eyes: PERRL without proptosis  CV: slightly bradycardic with minimally irregular rhythm, cap refill normal in all extremities, R chest catheter in place  Resp: CTAB, normal effort, no crackles or wheezing  GI: abdomen soft,  nontender, no guarding  MSK:  Cervical spine:  no midline tenderness, FROM  Thoracic spine: no midline tenderness, no CVAT  Lumbar spine: no midline tenderness  Chest wall: nontender without crepitus  Pelvis stable  Extremities: nontender  Skin:   No abrasion  No ecchymosis  No laceration  Neuro: awake, alert, GCS 14 (confused about day), responds appropriately to commands,  equal, face symmetric, lifts both legs off bed without difficulty, no nuchal rigidity  Psych: cooperative      Emergency Department Course     ECG:  ECG taken at 0646, ECG read at 0650  Atrial fibrillation with slow ventricular response  Low voltage QRS  Rate 56 bpm. RI interval *. QRS duration 92. QT/QTc 428/413. P-R-T axes * 53 72.       Laboratory:  Laboratory findings were communicated with the patient and family who voiced understanding of the findings.    CBC: WNL (WBC 4.5, HGB 11.4, )    CMP: Sodium 127 (H) Potassium 3.1 (L), GFR estimate 29 (L), Calcium 8.3 (L), Albumin 3.1 (L). O/w WNL (Creatinine 2.23)    Alcohol ethyl: 0.30 (H)    UA with Microscopic: Cloudy, yellow urine with a small amount of " blood, protein albumin of 100, large Leukocyte esterase, WBC > 182, RBC of 7 (H), WBC clumps present, moderate bacteria present, Squamous Epithelial of 6 (H), and hyaline casts of 16 (H). O/w WNL.       Emergency Department Course:    0636 Nursing notes and vitals reviewed.    0646 An ECG was performed, results above.     0656 IV was inserted and blood was drawn for laboratory testing, results above.    I performed electronic chart review in [facility].  The patient was placed on continuous cardiac and pulse ox monitoring.    0726 The patient provided a urine sample here in the emergency department. This was sent for laboratory testing, findings above.    0747 I performed an exam of the patient as documented above.     0817 I spoke with Dr. Lindsay of the Hospitalist service from [facility] regarding patient's presentation, findings, and plan of care.        Findings and plan explained to the Patient who consents to admission. Discussed the patient with Dr. Lindsay, who will admit the patient to a Medical bed for further monitoring, evaluation, and treatment.     I personally reviewed the laboratory results with the Patient and answered all related questions prior to admission.    Impression & Plan      Medical Decision Making:  I think his presentation is most immediately related to his alcohol intoxication.  Fortunately, he has no signs or symptoms of serious injury.  He is not on blood thinners and has no external evidence of head trauma no headache, so I do not think that head CT is emergently indicated.  Similarly, no neck or spine discomfort, chest discomfort, abdominal discomfort, or other symptoms are worrisome exam findings to suggest the need for additional emergent work-up.  Urinalysis was sent by nurse; no focal urinary signs or symptoms and I note that his urinalysis has been abnormal in the past in the absence of infection, likely related to his oliguric state from end-stage renal disease.  Culture was sent  but I do not think he requires emergent antibiotics.  He does not have fever or leukocytosis.  I called his wife in an effort to obtain more information and coordinate his care but was only able to leave a message.  He is clearly unable to care for himself, and given his medical complexity including need for routine dialysis today, I think he should be hospitalized for further multidisciplinary care.  His living environment and level of assistance available can be reconsidered prior to hospital discharge.        Diagnosis:    ICD-10-CM    1. Weakness R53.1    2. Alcoholic intoxication with complication (H) F10.929    3. End stage renal disease (H) N18.6    4. Hyponatremia E87.1    5. Abnormal urinalysis R82.90        Disposition:  The patient is admitted into the care of Dr. Lindsay.     This record was created at least in part using electronic voice recognition software, so please excuse any typographical errors.     Scribe Disclosure:  I, Emelyn Andre, am serving as a scribe at 6:46 AM on 10/2/2019 to document services personally performed by Fili Loo, based on my observations and the provider's statements to me.    10/2/2019    EMERGENCY DEPARTMENT       Fili Loo MD  10/02/19 4622

## 2019-10-02 NOTE — PLAN OF CARE
DATE & TIME: 10/2/19 7-3pm   Cognitive Concerns/ Orientation : A&OX4   BEHAVIOR & AGGRESSION TOOL COLOR: Green  CIWA SCORE: 1/1   ABNL VS/O2: Vss, on RA 93%  MOBILITY: Sba/belt/walker  PAIN MANAGMENT: Denies  DIET: Low fat/no Caffeine  BOWEL/BLADDER: Frequency with urination/up multiple times.  No pain with urination.  ABNL LAB/BG: K+ 3.1/gave 40 meq of K per orders. Creat 2.23.  Has Dialysis M-W-F  DRAIN/DEVICES: Right chest port for dialysis  TELEMETRY RHYTHM: Afib with CVR  SKIN: Bruised  TESTS/PROCEDURES: Possible dialysis, waiting for nephrology to see  D/C DAY/GOALS/PLACE: Pending  OTHER IMPORTANT INFO: Has trace edema to LE.  Gets irritated if you ask to many questions.  Tolerating diet. Contact isolation for MRSA. Refused PCD's.

## 2019-10-02 NOTE — PHARMACY-ADMISSION MEDICATION HISTORY
Admission medication history interview status for the 10/2/2019  admission is complete. See EPIC admission navigator for prior to admission medications     Medication history source reliability:Moderate    Actions taken by pharmacist (provider contacted, etc): patient interview      Additional medication history information not noted on PTA med list : patient denies taking any insulin or depression medications    Medication reconciliation/reorder completed by provider prior to medication history? No    Time spent in this activity: hours - pt wasn't cooperative in the morning     Prior to Admission medications    Medication Sig Last Dose Taking? Auth Provider   aspirin (ASA) 325 MG EC tablet Take 325 mg by mouth daily  10/1/2019 at am Yes Unknown, Entered By History   cholecalciferol 1000 units TABS Take 2 tablets by mouth daily  10/1/2019 at am - 1 tab (1000 mg) Yes Unknown, Entered By History   diazepam (VALIUM) 5 MG tablet Take 5 mg by mouth every 12 hours as needed for anxiety takes ocassionally Yes Unknown, Entered By History

## 2019-10-02 NOTE — PROGRESS NOTES
Admission    Patient arrives to room 625 via cart from ER  Care plan note: Patient alert/orient X4, up with sba/belt/walker.  Lungs clear on RA.  Up frequently to bathroom/denies pain at urination, just frequency.  CIWA score 1, agitated at times.  Tele afib with CVR. , K+ 3.1/gave 40 Meq of K per orders.    Inpatient nursing criteria listed below were met:    PCD's Documented: NA  Skin issues/needs documented :NA  Isolation education started/completed Yes  Patient allergies verified with patient: Yes  Verified completion of [place] Risk Assessment Tool:  Yes  Verified completion of Guardianship screening tool: Yes  Fall Prevention: Care plan updated, Education given and documented Yes  Care Plan initiated: Yes  Home medications documented in belongings flowsheet: NA  Patient belongings documented in belongings flowsheet: Yes  Reminder note (belongings/ medications) placed in discharge instructions:Yes  Admission profile/ required documentation complete: Yes  Bedside Report Letter given and explained to patient NA

## 2019-10-02 NOTE — H&P
[facility]    History and Physical  Hospitalist       Date of Admission:  10/2/2019  Date of Service (when I saw the patient): 10/02/19    Assessment & Plan   Fer Latham is a 69 year old male who presents with weakness. Admitted for further evaluation and treatment.     Alcohol intoxication, physical deconditioning: Ethanol level is 0.30. Patient agitated and uncooperative on admission.   - IV MVT  - CIWA  - Consider psych eval.   - Consider CD consult.   - Consider head CT.   - Hold PTA Valium.   - PT, OT, SW, CM.     Hyponatremia, Hypokalemia: Sodium is 127 on admission.   - Monitor.   - Electrolyte replacement.   - Will need dialysis.     ESRD, on dialysis: Patient on regular dialysis schedule.   - Renal consulted.     Rule out urinary tract infection: U/a with squamous cells and large LE.   - UC pending.   - Rocephin empirically.     Cardiac, Chronic Afib: Patient previously declined anticoagulation per report.   - Monitor.   - PRN antihypertensives.   - PTA ASA 325mg    DM2: Patient with history of DM.   - Monitor.   - Consider ISS.     DVT Prophylaxis: Pneumatic Compression Devices    Code: Full Code    Disposition: Inpatient.    Dr. Mayank Lindsay D.O.  [facility] Hospitalist  Pager [phone]    Primary Care Physician   Isrrael Ornelas    Chief Complaint   Weakness.     History is obtained from the patient and medical records.     History of Present Illness   Fer Latham is a 69 year old male who presents with weakness. Admitted for further evaluation and treatment. Ethanol level is 0.30 on admission. Patient with acute alcohol intoxication therefore unable to obtain complete history of present illness or review of systems.     Past Medical History    I have reviewed this patient's medical history and updated it with pertinent information if needed.   Past Medical History:   Diagnosis Date     Anemia      Arrhythmia     atrial fib     Arthritis 1990's    gout     BPH (benign  prostatic hyperplasia)      Depression      Diabetes mellitus (H)     Type 2  IDDM     Diverticulosis      ESRD (end stage renal disease) on dialysis (H)      Hematuria      HTN (hypertension)      HTN (hypertension)      IgA nephropathy      MI, old      Pneumonia      Rhabdomyolysis      Weakness 5/10/2019       Past Surgical History   I have reviewed this patient's surgical history and updated it with pertinent information if needed.  Past Surgical History:   Procedure Laterality Date     ABDOMEN SURGERY       APPENDECTOMY  1970's     ARTHROSCOPY KNEE  6/27/2013    Procedure: ARTHROSCOPY KNEE;  Right Knee Arthroscopy, Debridment Of Medial Meniscal Tear.  ;  Surgeon: Tomás Wong MD;  [facility]     BACK SURGERY  1999    L5 S1 decompression     BIOPSY  2017    skin     COLONOSCOPY  2013    [facility]     CORONARY ANGIOGRAPHY ADULT ORDER  2018     EYE SURGERY Bilateral 2004    Lens replacement     VASCULAR SURGERY         Prior to Admission Medications   Prior to Admission Medications   Prescriptions Last Dose Informant Patient Reported? Taking?   aspirin (ASA) 325 MG EC tablet   Yes No   Sig: Take 325 mg by mouth daily    cholecalciferol 1000 units TABS   Yes No   Sig: Take 2 tablets by mouth daily    diazepam (VALIUM) 5 MG tablet   Yes No   Sig: Take 5 mg by mouth every 12 hours as needed for anxiety      Facility-Administered Medications: None     Allergies   Allergies   Allergen Reactions     Cephalexin      Ciprofloxacin      Diagnostic X-Ray Materials      Sulfa Drugs Hives       Social History   I have reviewed this patient's social history and updated it with pertinent information if needed. Fer MCCORMACK Yeison  reports that he has quit smoking. He has never used smokeless tobacco. He reports current alcohol use. He reports that he does not use drugs.    Family History   I have reviewed this patient's family history and updated it with pertinent information if needed.   Family History   Problem Relation  Age of Onset     Cancer Father        Review of Systems   The 10 point Review of Systems is negative other than noted in the HPI or here. Patient with acute alcohol intoxication therefore unable to obtain complete history of present illness or review of systems.     Physical Exam   Temp: 98.1  F (36.7  C) Temp src: Oral BP: 102/65 Pulse: 56 Heart Rate: 56 Resp: 20 SpO2: 99 %      Vital Signs with Ranges  Temp:  [98.1  F (36.7  C)] 98.1  F (36.7  C)  Pulse:  [56] 56  Heart Rate:  [56] 56  Resp:  [20] 20  BP: (102)/(65) 102/65  SpO2:  [99 %] 99 %  270 lbs 0 oz    GENERAL: Alert and confused. NAD. Conversational, agitated.   HEENT: Normocephalic. EOMI. No icterus or injection. Nares normal.   LUNGS: Clear to auscultation. No dyspnea at rest.   HEART: Extremities perfused.   ABDOMEN: Soft, nontender, and nondistended. Positive bowel sounds.   EXTREMITIES: No LE edema noted.   NEUROLOGIC: Moves extremities x4, follows commands.     Data   Data reviewed today:  I personally reviewed both laboratory and imaging data.   Recent Labs   Lab 10/02/19  0656   WBC 4.5   HGB 11.4*   MCV 95      *   POTASSIUM 3.1*   CHLORIDE 97   CO2 21   BUN 21   CR 2.23*   ANIONGAP 9   COLUMBA 8.3*   GLC 99   ALBUMIN 3.1*   PROTTOTAL 7.5   BILITOTAL 0.3   ALKPHOS 82   ALT 12   AST 18       No results found for this or any previous visit (from the past 24 hour(s)).

## 2019-10-02 NOTE — ED NOTES
Bed: ED19  Expected date: 10/2/19  Expected time: 6:35 AM  Means of arrival: Ambulance  Comments:  HEMS 414 69M weak, confused, intoxicated

## 2019-10-02 NOTE — CONSULTS
[facility]    Nephrology Consultation     Date of Admission:  10/2/2019    Assessment & Plan      Fer Latham is a 69 year old male with ESRD who was admitted on 10/2/2019.     1) ETOH Intoxication and Abuse    2) ESKD:  [facility]  R internal jugular CVC.  4 H.  kg.  I chose not to run him today as he was intoxicated and I also did not want to precipitate etoh withdrawal.      3) Anemia: HGB 11.  On EPO 6200 units.     4) MBD:  No IV Vit D    5) Hypokalemia - KCl given.      Plan:    HD tomorrow.      Los Lipscomb MD  [facility] - Nephrology  [phone]    Reason for Consult      I was asked to see the patient for ESKD.    Primary Care Physician      Isrrael Ornelas    Chief Complaint      Bored.     History is obtained from the patient and chart review.      History of Present Illness      Fer Latham is a 69 year old male with ESKD who presents with etoh intoxication.      He was admitted earlier today after presenting to ED with weakness and a fall.    His wife called EMS after he fell at home and could not get up.  He was clearly intoxicated.    In ED his etoh was 0.30.    He could not discharge home so he is admitted.      He has been on maintenance hemodialysis at the [facility] under Dr. Puente.    He runs MWF.  He did run on Monday.  I did not run him today as there was not urgency and he had an etoh level of 0.30.      Past Medical History   I have reviewed this patient's medical history and updated it with pertinent information if needed.   Past Medical History:   Diagnosis Date     Anemia      Arrhythmia     atrial fib     Arthritis 1990's    gout     BPH (benign prostatic hyperplasia)      Depression      Diabetes mellitus (H)     Type 2  IDDM     Diverticulosis      ESRD (end stage renal disease) on dialysis (H)      Hematuria      HTN (hypertension)      HTN (hypertension)      IgA nephropathy      MI, old      Pneumonia       Rhabdomyolysis      Weakness 5/10/2019       Past Surgical History   I have reviewed this patient's surgical history and updated it with pertinent information if needed.  Past Surgical History:   Procedure Laterality Date     ABDOMEN SURGERY       APPENDECTOMY  1970's     ARTHROSCOPY KNEE  6/27/2013    Procedure: ARTHROSCOPY KNEE;  Right Knee Arthroscopy, Debridment Of Medial Meniscal Tear.  ;  Surgeon: Tomás Wong MD;  [facility]     BACK SURGERY  1999    L5 S1 decompression     BIOPSY  2017    skin     COLONOSCOPY  2013    [facility]     CORONARY ANGIOGRAPHY ADULT ORDER  2018     EYE SURGERY Bilateral 2004    Lens replacement     VASCULAR SURGERY         Prior to Admission Medications   Prior to Admission Medications   Prescriptions Last Dose Informant Patient Reported? Taking?   aspirin (ASA) 325 MG EC tablet 10/1/2019 at am Self Yes Yes   Sig: Take 325 mg by mouth daily    cholecalciferol 1000 units TABS 10/1/2019 at am - 1 tab (1000 mg) Self Yes Yes   Sig: Take 2 tablets by mouth daily    diazepam (VALIUM) 5 MG tablet takes ocassionally Self Yes Yes   Sig: Take 5 mg by mouth every 12 hours as needed for anxiety      Facility-Administered Medications: None     Allergies   Allergies   Allergen Reactions     Cephalexin      Ciprofloxacin      Diagnostic X-Ray Materials      Sulfa Drugs Hives       Social History   I have reviewed this patient's social history and updated it with pertinent information if needed. Fer Latham  reports that he has quit smoking. He has never used smokeless tobacco. He reports current alcohol use. He reports that he does not use drugs.    Family History   I have reviewed this patient's family history and updated it with pertinent information if needed.   Family History   Problem Relation Age of Onset     Cancer Father        Review of Systems   The 10 point Review of Systems is negative other than noted in the HPI.    Physical Exam   Temp: 97.4  F (36.3  C) Temp src: Oral BP:  138/78 Pulse: 56 Heart Rate: 85 Resp: 18 SpO2: 98 % O2 Device: None (Room air)    Vital Signs with Ranges  Temp:  [97.4  F (36.3  C)-98.1  F (36.7  C)] 97.4  F (36.3  C)  Pulse:  [56] 56  Heart Rate:  [56-89] 85  Resp:  [18-20] 18  BP: (102-145)/(65-80) 138/78  SpO2:  [98 %-100 %] 98 %  270 lbs 0 oz    GENERAL: lying in bed, disheveled.    HEENT:  Normocephalic. No gross abnormalities.  Pupils equal.  MMM.  Dentition is poor.  CV: RRR, no murmurs, no clicks, gallops, or rubs, no edema, no carotid bruits  RESP: Clear bilaterally with good efforts, CVC R IJ  GI: Abdomen soft/nt/nd, BS normal. No masses, organomegaly  MUSCULOSKELETAL: extremities nl - no gross deformities noted  SKIN: no suspicious lesions or rashes, dry to touch  NEURO:  Strength normal and symmetric.   PSYCH: sleepy, bored  LYMPH: No palpable ant/post cervical and supraclavicular adenopathy    Data   BMP  Recent Labs   Lab 10/02/19  0656   *   POTASSIUM 3.1*   CHLORIDE 97   COLUMBA 8.3*   CO2 21   BUN 21   CR 2.23*   GLC 99     [email or web address](phos:4)  CBC)  Recent Labs   Lab 10/02/19  0656   WBC 4.5   HGB 11.4*   HCT 33.0*   MCV 95        Recent Labs   Lab 10/02/19  0656   AST 18   ALT 12   ALKPHOS 82   BILITOTAL 0.3     No lab results found in last 7 days.  No results found for: D2VIT, D3VIT, DTOT  Recent Labs   Lab 10/02/19  0656   HGB 11.4*   HCT 33.0*   MCV 95     No results for input(s): PTHI in the last 168 hours.

## 2019-10-02 NOTE — ED TRIAGE NOTES
Per EMS pt's wife called 911 after finding patient on the bathroom floor, he was to week to stand up and it took 4 EMS personnel to getting him up. He uses a wheelchair around his house but it does not fit thru the bathroom door. Pt states he drank some whiskey tonight but does not know how much. Hx. of ESRD M-W-F last dialysis on Monday.

## 2019-10-02 NOTE — PROGRESS NOTES
SARMAD  D/I: SARMAD consult acknowledged and completed pending PT/OT recommendations.  Awaiting PT/OT evaluations.  P: Will continue to monitor and follow and assist once evaluations have been completed.

## 2019-10-03 ENCOUNTER — APPOINTMENT (OUTPATIENT)
Dept: PHYSICAL THERAPY | Facility: CLINIC | Age: 69
DRG: 896 | End: 2019-10-03
Attending: HOSPITALIST
Payer: MEDICARE

## 2019-10-03 LAB
ANION GAP SERPL CALCULATED.3IONS-SCNC: 7 MMOL/L (ref 3–14)
BUN SERPL-MCNC: 29 MG/DL (ref 7–30)
CALCIUM SERPL-MCNC: 8 MG/DL (ref 8.5–10.1)
CHLORIDE SERPL-SCNC: 104 MMOL/L (ref 94–109)
CO2 SERPL-SCNC: 23 MMOL/L (ref 20–32)
CREAT SERPL-MCNC: 2.67 MG/DL (ref 0.66–1.25)
ERYTHROCYTE [DISTWIDTH] IN BLOOD BY AUTOMATED COUNT: 13.3 % (ref 10–15)
GFR SERPL CREATININE-BSD FRML MDRD: 23 ML/MIN/{1.73_M2}
GLUCOSE SERPL-MCNC: 111 MG/DL (ref 70–99)
HCT VFR BLD AUTO: 29.9 % (ref 40–53)
HGB BLD-MCNC: 10.3 G/DL (ref 13.3–17.7)
MCH RBC QN AUTO: 32.9 PG (ref 26.5–33)
MCHC RBC AUTO-ENTMCNC: 34.4 G/DL (ref 31.5–36.5)
MCV RBC AUTO: 96 FL (ref 78–100)
PLATELET # BLD AUTO: 160 10E9/L (ref 150–450)
POTASSIUM SERPL-SCNC: 4.3 MMOL/L (ref 3.4–5.3)
RBC # BLD AUTO: 3.13 10E12/L (ref 4.4–5.9)
SODIUM SERPL-SCNC: 134 MMOL/L (ref 133–144)
WBC # BLD AUTO: 5.4 10E9/L (ref 4–11)

## 2019-10-03 PROCEDURE — 85027 COMPLETE CBC AUTOMATED: CPT | Performed by: HOSPITALIST

## 2019-10-03 PROCEDURE — 12000000 ZZH R&B MED SURG/OB

## 2019-10-03 PROCEDURE — 25000128 H RX IP 250 OP 636: Performed by: INTERNAL MEDICINE

## 2019-10-03 PROCEDURE — 80048 BASIC METABOLIC PNL TOTAL CA: CPT | Performed by: HOSPITALIST

## 2019-10-03 PROCEDURE — 97530 THERAPEUTIC ACTIVITIES: CPT | Mod: GP

## 2019-10-03 PROCEDURE — 99232 SBSQ HOSP IP/OBS MODERATE 35: CPT | Performed by: HOSPITALIST

## 2019-10-03 PROCEDURE — 97116 GAIT TRAINING THERAPY: CPT | Mod: GP

## 2019-10-03 PROCEDURE — 36415 COLL VENOUS BLD VENIPUNCTURE: CPT | Performed by: HOSPITALIST

## 2019-10-03 PROCEDURE — 97161 PT EVAL LOW COMPLEX 20 MIN: CPT | Mod: GP

## 2019-10-03 PROCEDURE — 90937 HEMODIALYSIS REPEATED EVAL: CPT

## 2019-10-03 PROCEDURE — 63400005 ZZH RX 634: Performed by: INTERNAL MEDICINE

## 2019-10-03 PROCEDURE — 5A1D70Z PERFORMANCE OF URINARY FILTRATION, INTERMITTENT, LESS THAN 6 HOURS PER DAY: ICD-10-PCS | Performed by: INTERNAL MEDICINE

## 2019-10-03 PROCEDURE — 25000132 ZZH RX MED GY IP 250 OP 250 PS 637: Mod: GY | Performed by: HOSPITALIST

## 2019-10-03 PROCEDURE — 25000132 ZZH RX MED GY IP 250 OP 250 PS 637: Mod: GY | Performed by: INTERNAL MEDICINE

## 2019-10-03 RX ORDER — DIAZEPAM 5 MG
5 TABLET ORAL EVERY 12 HOURS PRN
Status: DISCONTINUED | OUTPATIENT
Start: 2019-10-03 | End: 2019-10-04 | Stop reason: HOSPADM

## 2019-10-03 RX ADMIN — SODIUM CHLORIDE 250 ML: 9 INJECTION, SOLUTION INTRAVENOUS at 11:52

## 2019-10-03 RX ADMIN — MULTIPLE VITAMINS W/ MINERALS TAB 1 TABLET: TAB at 10:47

## 2019-10-03 RX ADMIN — ASPIRIN 325 MG: 325 TABLET, DELAYED RELEASE ORAL at 10:47

## 2019-10-03 RX ADMIN — Medication 100 MG: at 10:48

## 2019-10-03 RX ADMIN — EPOETIN ALFA 6000 UNITS: 3000 SOLUTION INTRAVENOUS; SUBCUTANEOUS at 11:49

## 2019-10-03 RX ADMIN — DIAZEPAM 5 MG: 5 TABLET ORAL at 22:49

## 2019-10-03 RX ADMIN — SODIUM CHLORIDE 300 ML: 9 INJECTION, SOLUTION INTRAVENOUS at 11:52

## 2019-10-03 RX ADMIN — FOLIC ACID 1 MG: 1 TABLET ORAL at 10:47

## 2019-10-03 ASSESSMENT — ACTIVITIES OF DAILY LIVING (ADL)
ADLS_ACUITY_SCORE: 14
ADLS_ACUITY_SCORE: 17
ADLS_ACUITY_SCORE: 14.5
ADLS_ACUITY_SCORE: 14
ADLS_ACUITY_SCORE: 14
ADLS_ACUITY_SCORE: 14.5

## 2019-10-03 NOTE — PLAN OF CARE
Discharge Planner PT   Patient plan for discharge: Home  Current status: Order received, chart reviewed, evaluation completed. Pt is a 69 year old male admitted s/p fall at home with ETOH abuse and generalized weakness. At baseline, pt lives with his wife in an apartment with elevator access. Pt ambulatory with FWW at home, intermittently uses a manual w/c for mobility, multiple falls at home.  This date, pt requires mod assist for bed mobility. Pt performs sit to/from stand with min assist. Pt ambulates 100' with FWW and min assist.  Barriers to return to prior living situation: Fall risk, fall history, decreased activity tolerance, assist with all mobility, generalized weakness  Recommendations for discharge: TCU  Rationale for recommendations: Pt will benefit from continued therapy to address impairments and increase mobility and functional independence prior to returning home. Pt currently requires assist with all mobility.       Entered by: Darlene Caro 10/03/2019 2:51 PM

## 2019-10-03 NOTE — PROGRESS NOTES
Pt alert to self only, confused with date and time, understands he is at hospital.  CIWA completed at 2000, q4, scored 3.  Pt incontinent of urine at times, pt urinated into tissues on bedside table and caused pants to be wet as he thought someone was in the bathroom.  Sitter at bedside.  hy of dialysis, scheduled for Thursday 10/2.  ETOH.  IV SL after vitamin bag given.  BLE 2-3+ edema, non pitting. Continue to monitor and POC

## 2019-10-03 NOTE — PLAN OF CARE
DATE & TIME: 10/3/19 8003-1128  Cognitive Concerns/ Orientation : A&Ox2, disoriented to time, situation.   BEHAVIOR & AGGRESSION TOOL COLOR: Green, cooperative but gets agitated at times   CIWA SCORE: 3, 2  ABNL VS/O2: VSS on RA  MOBILITY: SBA, walker, GB   PAIN MANAGMENT: Denies pain   DIET: Dialysis  BOWEL/BLADDER: Up to bathroom   ABNL LAB/BG: Hgb 10.3 hematocrit 29.9 Creat 2.67 Ca 8.0  DRAIN/DEVICES:  R internal jugular for dialysis   TELEMETRY RHYTHM: Afib with CVR  SKIN: Bruised  TESTS/PROCEDURES: Dialysis this am tolerated well.  D/C DAY/GOALS/PLACE: Pending  OTHER IMPORTANT INFO: BLE edema.

## 2019-10-03 NOTE — PROGRESS NOTES
Potassium   Date Value Ref Range Status   10/03/2019 4.3 3.4 - 5.3 mmol/L Final     Hemoglobin   Date Value Ref Range Status   10/03/2019 10.3 (L) 13.3 - 17.7 g/dL Final     Creatinine   Date Value Ref Range Status   10/03/2019 2.67 (H) 0.66 - 1.25 mg/dL Final     Urea Nitrogen   Date Value Ref Range Status   10/03/2019 29 7 - 30 mg/dL Final     Sodium   Date Value Ref Range Status   10/03/2019 134 133 - 144 mmol/L Final     INR   Date Value Ref Range Status   09/10/2019 1.21 (H) 0.86 - 1.14 Final       DIALYSIS PROCEDURE NOTE  Hepatitis status of previous patient on machine log was checked and verified ok to use with this patients hepatitis status.  Patient dialyzed for 4 hrs. on a 4 K bath with a net fluid removal of  1 L.  A BFR of 325 ml/min was obtained via a RIJ using.  The arterial and venous pressures increased to an unsuitable level when trying to increase the BFR to 400.  Dr. Lipscomb was notified.  The patient was seen by Dr. Lipscomb during the treatment.  Total heparin received during the treatment: 0 units.   Line flushed, clamped and capped with heparin 1:1000 2.0 mL (2000 units) per lumen  Meds  Given: epogen Complications: none.  Procedural education provided verbally to patient and patient verbalized understanding  ICEBOAT? Timeout performed pre-treatment  I: Patient was identified using 2 identifiers  C: Consent obtained/verified current before treatment  E: Equipment preventative maintenance is current and dialysis delivery system OK to use  B: Hepatitis B Surface Antigen: Negative; Draw Date: 9/12/2019      Hepatitis B Surface Antibody: IMMUNE; Draw Date: 9/12/2019  O: Dialysis orders present and complete prior to treatment  A: Vascular access verified and assessed prior to treatment  T: Treatment was performed at a clinically appropriate time  ?: Patient was allowed to ask questions and address concerns prior to treatment  See flowsheet in EPIC for further details and post assessment.  Machine  water alarm in place and functioning. Transducer pods intact and checked every 15min.  Pt dialyzed at bedside.  Report received from: CHARLEE Hines RN  Report given to: CHARLEE Hines RN  Chlorine/Chloramine water system checked every 4 hours.  Outpatient Dialysis at [facility].    Signed,   Amariah Schoener, RN

## 2019-10-03 NOTE — PLAN OF CARE
DATE & TIME: 10/3/19 9511-1903      Cognitive Concerns/ Orientation : A&Ox2, disoriented to time, situation.   BEHAVIOR & AGGRESSION TOOL COLOR: Green, cooperative but gets agitated/fusterated at times   CIWA SCORE: 4, 3  ABNL VS/O2: VSS on RA, BP soft 99/55  MOBILITY: SBA, walker, GB   PAIN MANAGMENT: Denies pain   DIET: Cardiac  BOWEL/BLADDER: up to bathroom   ABNL LAB/BG: K+ 3.1 yesterday and replaced, recheck in am.   DRAIN/DEVICES: PIV, SL; R internal jugular for dialysis   TELEMETRY RHYTHM: Afib with CVR  SKIN: Bruised  TESTS/PROCEDURES: Dialysis today   D/C DAY/GOALS/PLACE: Pending  OTHER IMPORTANT INFO: +2 BLE edema.

## 2019-10-03 NOTE — PROGRESS NOTES
10/03/19 1400   Quick Adds   Type of Visit Initial PT Evaluation   Living Environment   Lives With spouse   Living Arrangements apartment   Home Accessibility no concerns   Transportation Anticipated family or friend will provide   Self-Care   Usual Activity Tolerance moderate   Current Activity Tolerance fair   Regular Exercise No   Equipment Currently Used at Home walker, rolling;wheelchair, manual   Functional Level Prior   Ambulation 1-->assistive equipment   Transferring 1-->assistive equipment   Fall history within last six months yes   Number of times patient has fallen within last six months 5   Which of the above functional risks had a recent onset or change? ambulation;transferring;fall history   Prior Functional Level Comment Pt reports independence with mobility and ADLs prior to admit.   General Information   Onset of Illness/Injury or Date of Surgery - Date 10/02/19   Referring Physician Dr. Lindsay   Patient/Family Goals Statement To go home   Pertinent History of Current Problem (include personal factors and/or comorbidities that impact the POC) Pt is a 69 year old male admitted s/p fall with generalized weakness, ETOH abuse.   Precautions/Limitations fall precautions   Cognitive Status Examination   Level of Consciousness alert   Pain Assessment   Patient Currently in Pain No   Range of Motion (ROM)   ROM Quick Adds No deficits were identified   Strength   Strength Comments Gross LE strength 4/5 bilaterally   Bed Mobility   Bed Mobility Comments Supine to sit mod assist   Transfer Skills   Transfer Comments Sit to/from stand min assist   Gait   Gait Comments 10' FWW min assist, forward bent posture   Balance   Balance Comments Good in sitting, fair in standing   General Therapy Interventions   Planned Therapy Interventions balance training;bed mobility training;gait training;strengthening;transfer training;progressive activity/exercise;home program guidelines   Clinical Impression   Criteria for  "Skilled Therapeutic Intervention yes, treatment indicated   PT Diagnosis Impaired ambulation   Influenced by the following impairments Decreased strength, decreased endurance, decreased balance   Functional limitations due to impairments Difficulty with bed mobility, transfers, ambulation   Clinical Presentation Stable/Uncomplicated   Clinical Presentation Rationale VSS, pain management   Clinical Decision Making (Complexity) Low complexity   Therapy Frequency 5x/week   Predicted Duration of Therapy Intervention (days/wks) 1 week   Anticipated Equipment Needs at Discharge walker   Anticipated Discharge Disposition Transitional Care Facility   Risk & Benefits of therapy have been explained Yes   Patient, Family & other staff in agreement with plan of care Yes   [facility] TM \"6 Clicks\"   2016, Trustees of [facility], under license to [employer].  All rights reserved.   6 Clicks Short Forms Basic Mobility Inpatient Short Form   [facility] AM-PAC  \"6 Clicks\" V.2 Basic Mobility Inpatient Short Form   1. Turning from your back to your side while in a flat bed without using bedrails? 3 - A Little   2. Moving from lying on your back to sitting on the side of a flat bed without using bedrails? 3 - A Little   3. Moving to and from a bed to a chair (including a wheelchair)? 3 - A Little   4. Standing up from a chair using your arms (e.g., wheelchair, or bedside chair)? 3 - A Little   5. To walk in hospital room? 3 - A Little   6. Climbing 3-5 steps with a railing? 2 - A Lot   Basic Mobility Raw Score (Score out of 24.Lower scores equate to lower levels of function) 17   Total Evaluation Time   Total Evaluation Time (Minutes) 12     "

## 2019-10-03 NOTE — PROGRESS NOTES
" Inpatient Dialysis Progress Note            Assessment and Plan:     Fer Latham is a 69 year old male with ESRD who was admitted on 10/2/2019.      1) ETOH Intoxication and Abuse     2) ESKD:  He runs MWF at the [facility].  R internal jugular CVC.  4 H.  kg.       3) Anemia: HGB 11.  On EPO 6200 units.      4) MBD:  No IV Vit D     5) Hypokalemia - Better.        Plan:     HD again tomorrow to get him back on schedule.               Interval History:     \"I just woke up.\"    He has told the RN staff that he does not want to answer any more questions.          Dialysis Parameters:     Wt Readings from Last 4 Encounters:   10/02/19 122.5 kg (270 lb)   09/13/19 122.8 kg (270 lb 11.6 oz)   06/06/19 129 kg (284 lb 6.3 oz)   05/17/19 124.6 kg (274 lb 11.1 oz)     I/O last 3 completed shifts:  In: 1000 [P.O.:1000]  Out: 100 [Urine:100]  BP Readings from Last 3 Encounters:   10/03/19 117/60   09/13/19 126/66   06/06/19 106/49       Routine, ONE TIME, Starting today For 1 Occurrences  Weight Loss (kg): 0-1  Dialysis Temp: 36.5  C  Access Device: CVC  Access Site: R IJ  Dialyzer: Revaclear  Dialysis Bath: K 4  Blood Flow Rate (mL/min): 350  Total Treatment Time (hrs): 4  Heparin: no         Medications and Allergies:   Reviewed in EPIC      - MEDICATION INSTRUCTIONS for Dialysis Patients -   Does not apply See Admin Instructions     sodium chloride 0.9%  250 mL Intravenous Once in dialysis     sodium chloride 0.9%  300 mL Hemodialysis Machine Once     aspirin  325 mg Oral Daily     epoetin azalea (EPOGEN,PROCRIT) inj ESRD  6,000 Units Intravenous Once     folic acid  1 mg Oral Daily     heparin  3 mL Intracatheter During Hemodialysis (from stock)     heparin  3 mL Intracatheter During Hemodialysis (from stock)     multivitamin w/minerals  1 tablet Oral Daily     - MEDICATION INSTRUCTIONS -   Does not apply Once     sodium chloride (PF)  3 mL Intracatheter Q8H     vitamin B1  100 mg Oral Daily     sodium " chloride 0.9%, acetaminophen, acetaminophen, bisacodyl, labetalol, lidocaine 4%, lidocaine (buffered or not buffered), LORazepam **OR** LORazepam, melatonin, metoprolol, naloxone, ondansetron **OR** ondansetron, polyethylene glycol, senna-docusate **OR** senna-docusate, sodium chloride (PF)     Allergies   Allergen Reactions     Cephalexin      Ciprofloxacin      Diagnostic X-Ray Materials      Sulfa Drugs Hives              Labs:     BMP  Recent Labs   Lab 10/03/19  0802 10/02/19  0656    127*   POTASSIUM 4.3 3.1*   CHLORIDE 104 97   COLUMBA 8.0* 8.3*   CO2 23 21   BUN 29 21   CR 2.67* 2.23*   * 99     CBC  Recent Labs   Lab 10/03/19  0802 10/02/19  0656   WBC 5.4 4.5   HGB 10.3* 11.4*   HCT 29.9* 33.0*   MCV 96 95    171     Lab Results   Component Value Date    AST 18 10/02/2019    ALT 12 10/02/2019    ALKPHOS 82 10/02/2019    BILITOTAL 0.3 10/02/2019    ALICIA 27 09/10/2019            Physical Exam:   Vitals were reviewed in [facility]    Wt Readings from Last 3 Encounters:   10/02/19 122.5 kg (270 lb)   09/13/19 122.8 kg (270 lb 11.6 oz)   06/06/19 129 kg (284 lb 6.3 oz)       Intake/Output Summary (Last 24 hours) at 10/3/2019 1108  Last data filed at 10/3/2019 0630  Gross per 24 hour   Intake 600 ml   Output 50 ml   Net 550 ml       GENERAL APPEARANCE: awake, answers some questions.    HEENT:  Eyes/ears/nose grossly normal, neck supple  RESP: lungs clear to auscultation with good efforts, no crackles, rhonchi or wheezes  CV: regular rate and rhythm, normal S1 S2, no murmur, click or rub   ABDOMEN: soft, nontender, bowel sounds normal  EXTREMITIES/SKIN: no edema, no rashes or lesions     Pt seen on dialysis.  Stable run.  Good BFR.      Attestation:  I have reviewed today's vital signs, notes, medications, labs and imaging.     Los Lipscomb MD  [facility] - Nephrology  [phone]

## 2019-10-03 NOTE — PROGRESS NOTES
"[facility]  Hospitalist Progress Note        Mayankgavino Lindsay, DO  10/03/2019        Interval History:      Patient states he is feeling better today. Discussed plan of care, denies questions, verbalized understanding.          Assessment and Plan:        Fer Latham is a 69 year old male who presents with weakness. Admitted for further evaluation and treatment.      Alcohol intoxication, physical deconditioning: Ethanol level is 0.30. Patient agitated and uncooperative on admission.   - MVT  - CIWA  - Hold PTA Valium.   - PT, OT, SW, CM.      Hyponatremia, Hypokalemia: Sodium is 127 on admission.   - Monitor.   - Electrolyte replacement.      ESRD, on dialysis: Patient on regular dialysis schedule.   - Renal consulted.      Rule out urinary tract infection: U/a with squamous cells and large LE.   - UC pending.   - Rocephin empirically.      Cardiac, Chronic Afib: Patient previously declined anticoagulation per report.   - Monitor.   - PRN antihypertensives.   - PTA ASA 325mg     DM2: Patient with history of DM.   - Monitor.   - Consider ISS.      DVT Prophylaxis: Pneumatic Compression Devices     Code: Full Code     Disposition: Likely 1-2 days pending clinical stability and therapy recommendations.                   Physical Exam:      Heart Rate: 76    Blood pressure 117/64, pulse 56, temperature 98.6  F (37  C), temperature source Oral, resp. rate 18, height 1.88 m (6' 2\"), weight 122.5 kg (270 lb), SpO2 97 %.    Vitals:    10/02/19 0642   Weight: 122.5 kg (270 lb)       Vital Sign Ranges  Temperature Temp  Av.1  F (36.7  C)  Min: 97.4  F (36.3  C)  Max: 98.6  F (37  C)   Blood pressure Systolic (24hrs), Av , Min:99 , Max:145        Diastolic (24hrs), Av, Min:55, Max:80      Pulse No data recorded   Respirations Resp  Av  Min: 16  Max: 20   Pulse oximetry SpO2  Av.3 %  Min: 96 %  Max: 98 %     Vital Signs with Ranges  Temp:  [97.4  F (36.3  C)-98.6  F (37  C)] 98.6 "  F (37  C)  Heart Rate:  [76-96] 76  Resp:  [16-20] 18  BP: ()/(55-80) 117/64  SpO2:  [96 %-98 %] 97 %    I/O Last 3 Shifts:   I/O last 3 completed shifts:  In: 1000 [P.O.:1000]  Out: 100 [Urine:100]    I/O past 24 hours:     Intake/Output Summary (Last 24 hours) at 10/3/2019 0851  Last data filed at 10/3/2019 0630  Gross per 24 hour   Intake 1000 ml   Output 100 ml   Net 900 ml     GENERAL: Alert and confused. NAD. Conversational. Pleasant.    HEENT: Normocephalic. EOMI. No icterus or injection. Nares normal.   LUNGS: Clear to auscultation. No dyspnea at rest.   HEART: Extremities perfused.   ABDOMEN: Soft, nontender, and nondistended. Positive bowel sounds.   EXTREMITIES: No LE edema noted.   NEUROLOGIC: Moves extremities x4, follows commands.          Prior to Admission Medications:        Medications Prior to Admission   Medication Sig Dispense Refill Last Dose     aspirin (ASA) 325 MG EC tablet Take 325 mg by mouth daily    10/1/2019 at am     cholecalciferol 1000 units TABS Take 2 tablets by mouth daily    10/1/2019 at am - 1 tab (1000 mg)     diazepam (VALIUM) 5 MG tablet Take 5 mg by mouth every 12 hours as needed for anxiety   takes ocassionally            Medications:        Current Facility-Administered Medications   Medication Last Rate     Current Facility-Administered Medications   Medication Dose Route Frequency     - MEDICATION INSTRUCTIONS for Dialysis Patients -   Does not apply See Admin Instructions     sodium chloride 0.9%  250 mL Intravenous Once in dialysis     sodium chloride 0.9%  300 mL Hemodialysis Machine Once     aspirin  325 mg Oral Daily     epoetin azalea (EPOGEN,PROCRIT) inj ESRD  6,000 Units Intravenous Once     folic acid  1 mg Oral Daily     heparin  3 mL Intracatheter During Hemodialysis (from stock)     heparin  3 mL Intracatheter During Hemodialysis (from stock)     multivitamin w/minerals  1 tablet Oral Daily     - MEDICATION INSTRUCTIONS -   Does not apply Once     sodium  chloride (PF)  3 mL Intracatheter Q8H     vitamin B1  100 mg Oral Daily     Current Facility-Administered Medications   Medication Dose Route Frequency     sodium chloride 0.9%  100-150 mL Intravenous Q15 Min PRN     acetaminophen  650 mg Rectal Q4H PRN     acetaminophen  650 mg Oral Q4H PRN     bisacodyl  10 mg Rectal Daily PRN     labetalol  10 mg Intravenous Q2H PRN     lidocaine 4%   Topical Q1H PRN     lidocaine (buffered or not buffered)  0.1-1 mL Other Q1H PRN     LORazepam  1-2 mg Oral Q30 Min PRN    Or     LORazepam  1-2 mg Intravenous Q30 Min PRN     melatonin  1 mg Oral At Bedtime PRN     metoprolol  2.5-5 mg Intravenous Q4H PRN     naloxone  0.1-0.4 mg Intravenous Q2 Min PRN     ondansetron  4 mg Oral Q6H PRN    Or     ondansetron  4 mg Intravenous Q6H PRN     polyethylene glycol  17 g Oral Daily PRN     senna-docusate  1 tablet Oral BID PRN    Or     senna-docusate  2 tablet Oral BID PRN     sodium chloride (PF)  3 mL Intracatheter q1 min prn            Data:      Lab data reviewed.   Recent Labs   Lab 10/03/19  0802 10/02/19  0656   HGB 10.3* 11.4*   MCV 96 95    171    127*   POTASSIUM 4.3 3.1*   CHLORIDE 104 97   CO2 23 21   BUN 29 21   CR 2.67* 2.23*   ANIONGAP 7 9   COLUMBA 8.0* 8.3*   * 99           Imaging:      Imaging data reviewed.     Dr. Mayank Lindsay D.O.  [facility]  Pager [phone]

## 2019-10-03 NOTE — PROGRESS NOTES
Care Transition Initial Assessment - SW     Met with: Patient    Active Problems:    Weakness       DATA  Lives With: spouse   Living Arrangements: apartment  Quality of Family Relationships: involved  Description of Support System: Involved  Who is your support system?: Wife  Support Assessment: Adequate family and caregiver support.   Identified issues/concerns regarding health management: PT recommends TCU. Pt reports he has been to [facility] in the past. He is agreeable to SW sending a referral but is under the impression we will have a hard time finding an open bed. If so, we can discuss other facilities. SW asked pt if he was familiar with Rule 25 or interested in resources for a chemical dependency assessment. He agreed to accept information, but nothing else at this time. SW will drop off a CD resources brochure during the next meeting.     Quality of Family Relationships: involved  Transportation Anticipated: family or friend will provide    ASSESSMENT  Cognitive Status: Disoriented, per nursing.   Concerns to be addressed: On-going discharge planning.     PLAN  Financial costs for the patient includes: None.  Patient given options and choices for discharge: Yes.  Patient/family is agreeable to the plan? YES  Patient Goals and Preferences: TCU.  Patient anticipates discharging to: TCU.    LANETTE Madsen, LGSW  [phone]

## 2019-10-03 NOTE — PLAN OF CARE
A&O x3. Calm and follows commands. IVF infusing. CIWA=0. States wants to go home in AM. Attendant in room. Labs Na & K recheck in AM. Tele. A-fib. SBA.

## 2019-10-03 NOTE — PLAN OF CARE
Pt A&Ox4. Sitter at bedside. CIWA 0 per shift. Pt had HD today. Pt family refusing HD run tomorrow. Family requesting HD run begins 10/5/19 then restarted on their MWF schedule. Pt and family requesting DNR/DNI status. MD notified and placed palliative consult. PT/OT/SW. PT recommending TCU.

## 2019-10-04 ENCOUNTER — APPOINTMENT (OUTPATIENT)
Dept: OCCUPATIONAL THERAPY | Facility: CLINIC | Age: 69
DRG: 896 | End: 2019-10-04
Attending: HOSPITALIST
Payer: MEDICARE

## 2019-10-04 VITALS
HEIGHT: 74 IN | SYSTOLIC BLOOD PRESSURE: 115 MMHG | BODY MASS INDEX: 34.65 KG/M2 | DIASTOLIC BLOOD PRESSURE: 59 MMHG | TEMPERATURE: 98.2 F | WEIGHT: 270 LBS | OXYGEN SATURATION: 100 % | RESPIRATION RATE: 15 BRPM | HEART RATE: 61 BPM

## 2019-10-04 LAB
ANION GAP SERPL CALCULATED.3IONS-SCNC: 3 MMOL/L (ref 3–14)
BACTERIA SPEC CULT: ABNORMAL
BACTERIA SPEC CULT: ABNORMAL
BUN SERPL-MCNC: 14 MG/DL (ref 7–30)
CALCIUM SERPL-MCNC: 9 MG/DL (ref 8.5–10.1)
CHLORIDE SERPL-SCNC: 106 MMOL/L (ref 94–109)
CO2 SERPL-SCNC: 28 MMOL/L (ref 20–32)
CREAT SERPL-MCNC: 1.9 MG/DL (ref 0.66–1.25)
ERYTHROCYTE [DISTWIDTH] IN BLOOD BY AUTOMATED COUNT: 13.8 % (ref 10–15)
GFR SERPL CREATININE-BSD FRML MDRD: 35 ML/MIN/{1.73_M2}
GLUCOSE SERPL-MCNC: 96 MG/DL (ref 70–99)
HCT VFR BLD AUTO: 34.1 % (ref 40–53)
HGB BLD-MCNC: 11.1 G/DL (ref 13.3–17.7)
Lab: ABNORMAL
MCH RBC QN AUTO: 32.3 PG (ref 26.5–33)
MCHC RBC AUTO-ENTMCNC: 32.6 G/DL (ref 31.5–36.5)
MCV RBC AUTO: 99 FL (ref 78–100)
PLATELET # BLD AUTO: 164 10E9/L (ref 150–450)
POTASSIUM SERPL-SCNC: 4.2 MMOL/L (ref 3.4–5.3)
RBC # BLD AUTO: 3.44 10E12/L (ref 4.4–5.9)
SODIUM SERPL-SCNC: 137 MMOL/L (ref 133–144)
SPECIMEN SOURCE: ABNORMAL
WBC # BLD AUTO: 3.7 10E9/L (ref 4–11)

## 2019-10-04 PROCEDURE — 36415 COLL VENOUS BLD VENIPUNCTURE: CPT | Performed by: HOSPITALIST

## 2019-10-04 PROCEDURE — 85027 COMPLETE CBC AUTOMATED: CPT | Performed by: HOSPITALIST

## 2019-10-04 PROCEDURE — 25000132 ZZH RX MED GY IP 250 OP 250 PS 637: Mod: GY | Performed by: HOSPITALIST

## 2019-10-04 PROCEDURE — 25000128 H RX IP 250 OP 636: Performed by: INTERNAL MEDICINE

## 2019-10-04 PROCEDURE — 99207 ZZC CDG-CODE INCORRECT PER BILLING BASED ON TIME: CPT | Performed by: HOSPITALIST

## 2019-10-04 PROCEDURE — 90937 HEMODIALYSIS REPEATED EVAL: CPT

## 2019-10-04 PROCEDURE — 97535 SELF CARE MNGMENT TRAINING: CPT | Mod: GO | Performed by: OCCUPATIONAL THERAPIST

## 2019-10-04 PROCEDURE — 80048 BASIC METABOLIC PNL TOTAL CA: CPT | Performed by: HOSPITALIST

## 2019-10-04 PROCEDURE — 99239 HOSP IP/OBS DSCHRG MGMT >30: CPT | Performed by: HOSPITALIST

## 2019-10-04 PROCEDURE — 97530 THERAPEUTIC ACTIVITIES: CPT | Mod: GO | Performed by: OCCUPATIONAL THERAPIST

## 2019-10-04 PROCEDURE — 97166 OT EVAL MOD COMPLEX 45 MIN: CPT | Mod: GO | Performed by: OCCUPATIONAL THERAPIST

## 2019-10-04 RX ORDER — HEPARIN SODIUM 1000 [USP'U]/ML
500 INJECTION, SOLUTION INTRAVENOUS; SUBCUTANEOUS
Status: COMPLETED | OUTPATIENT
Start: 2019-10-04 | End: 2019-10-04

## 2019-10-04 RX ORDER — HEPARIN SODIUM 1000 [USP'U]/ML
500 INJECTION, SOLUTION INTRAVENOUS; SUBCUTANEOUS CONTINUOUS
Status: DISCONTINUED | OUTPATIENT
Start: 2019-10-04 | End: 2019-10-04

## 2019-10-04 RX ADMIN — ASPIRIN 325 MG: 325 TABLET, DELAYED RELEASE ORAL at 09:00

## 2019-10-04 RX ADMIN — Medication 100 MG: at 09:00

## 2019-10-04 RX ADMIN — FOLIC ACID 1 MG: 1 TABLET ORAL at 09:00

## 2019-10-04 RX ADMIN — HEPARIN SODIUM 500 UNITS: 1000 INJECTION, SOLUTION INTRAVENOUS; SUBCUTANEOUS at 11:33

## 2019-10-04 RX ADMIN — MULTIPLE VITAMINS W/ MINERALS TAB 1 TABLET: TAB at 09:00

## 2019-10-04 RX ADMIN — SODIUM CHLORIDE 250 ML: 9 INJECTION, SOLUTION INTRAVENOUS at 11:33

## 2019-10-04 RX ADMIN — SODIUM CHLORIDE 200 ML: 9 INJECTION, SOLUTION INTRAVENOUS at 11:33

## 2019-10-04 ASSESSMENT — ACTIVITIES OF DAILY LIVING (ADL)
ADLS_ACUITY_SCORE: 22
ADLS_ACUITY_SCORE: 19
ADLS_ACUITY_SCORE: 19
ADLS_ACUITY_SCORE: 22
ADLS_ACUITY_SCORE: 22

## 2019-10-04 NOTE — PROGRESS NOTES
Inpatient Dialysis Progress Note            Assessment and Plan:       Fer Latham is a 69 year old male with ESRD who was admitted on 10/2/2019.      1) ETOH Intoxication and Abuse     2) ESKD:  [facility]  R internal jugular CVC.  4 H.  kg.       3) Anemia: HGB 11.  On EPO 6200 units.      4) MBD:  No IV Vit D     5) Hypokalemia - Better.        Plan:     He finally agrees to proceed with HD.  He can be discharged post HD.  [facility] run [facility].            Interval History:     Eating ok.  Denies n/v/f/c.    No dizziness/lightheadedness/cramping.    No abd pain/cp/sob.        Dialysis Parameters:     Wt Readings from Last 4 Encounters:   10/02/19 122.5 kg (270 lb)   09/13/19 122.8 kg (270 lb 11.6 oz)   06/06/19 129 kg (284 lb 6.3 oz)   05/17/19 124.6 kg (274 lb 11.1 oz)     I/O last 3 completed shifts:  In: 480 [P.O.:480]  Out: 1100 [Urine:100; Other:1000]  BP Readings from Last 3 Encounters:   10/04/19 113/58   09/13/19 126/66   06/06/19 106/49       Routine, ONE TIME, Starting today For 1 Occurrences  Weight Loss (kg): 0-1  Dialysis Temp: 36.5  C  Access Device: CVC  Access Site: R   Dialyzer: Revaclear  Dialysis Bath: K 3  Blood Flow Rate (mL/min): 400  Total Treatment Time (hrs): 3  Heparin: low dose         Medications and Allergies:   Reviewed in EPIC      - MEDICATION INSTRUCTIONS for Dialysis Patients -   Does not apply See Admin Instructions     sodium chloride 0.9%  250 mL Intravenous Once in dialysis     sodium chloride 0.9%  300 mL Hemodialysis Machine Once     aspirin  325 mg Oral Daily     folic acid  1 mg Oral Daily     heparin (porcine)  500 Units Hemodialysis Machine Once in dialysis     heparin  3 mL Intracatheter During Hemodialysis (from stock)     heparin  3 mL Intracatheter During Hemodialysis (from stock)     multivitamin w/minerals  1 tablet Oral Daily     sodium chloride (PF)  3 mL Intracatheter Q8H     vitamin B1  100 mg Oral Daily     sodium  chloride 0.9%, acetaminophen, acetaminophen, bisacodyl, diazepam, labetalol, lidocaine 4%, lidocaine (buffered or not buffered), LORazepam **OR** LORazepam, melatonin, metoprolol, naloxone, ondansetron **OR** ondansetron, polyethylene glycol, senna-docusate **OR** senna-docusate, sodium chloride (PF)     Allergies   Allergen Reactions     Cephalexin      Ciprofloxacin      Diagnostic X-Ray Materials      Sulfa Drugs Hives              Labs:     BMP  Recent Labs   Lab 10/04/19  0926 10/03/19  0802 10/02/19  0656    134 127*   POTASSIUM 4.2 4.3 3.1*   CHLORIDE 106 104 97   COLUMBA 9.0 8.0* 8.3*   CO2 28 23 21   BUN 14 29 21   CR 1.90* 2.67* 2.23*   GLC 96 111* 99     CBC  Recent Labs   Lab 10/04/19  0926 10/03/19  0802 10/02/19  0656   WBC 3.7* 5.4 4.5   HGB 11.1* 10.3* 11.4*   HCT 34.1* 29.9* 33.0*   MCV 99 96 95    160 171     Lab Results   Component Value Date    AST 18 10/02/2019    ALT 12 10/02/2019    ALKPHOS 82 10/02/2019    BILITOTAL 0.3 10/02/2019    ALICIA 27 09/10/2019            Physical Exam:   Vitals were reviewed in [facility]    Wt Readings from Last 3 Encounters:   10/02/19 122.5 kg (270 lb)   09/13/19 122.8 kg (270 lb 11.6 oz)   06/06/19 129 kg (284 lb 6.3 oz)       Intake/Output Summary (Last 24 hours) at 10/4/2019 1207  Last data filed at 10/4/2019 0210  Gross per 24 hour   Intake 480 ml   Output 1050 ml   Net -570 ml       GENERAL APPEARANCE: pleasant, no distress, a & o  HEENT:  Eyes/ears/nose grossly normal, neck supple  RESP: lungs clear to auscultation with good efforts, no crackles, rhonchi or wheezes  CV: regular rate and rhythm, normal S1 S2, no murmur, click or rub   ABDOMEN: soft, nontender, bowel sounds normal  EXTREMITIES/SKIN: no edema, no rashes or lesions       Pt seen on dialysis.  Stable run.  Good BFR.      Attestation:  I have reviewed today's vital signs, notes, medications, labs and imaging.     Los Lipscomb MD  [facility] - Nephrology  [phone]

## 2019-10-04 NOTE — PLAN OF CARE
"DATE & TIME: 10/4/19 4244-5619   Cognitive Concerns/ Orientation : A&Ox1, pt refused to answer questions appropriately, attention seeking behavior noted  BEHAVIOR & AGGRESSION TOOL COLOR: YELLOW, irritable & agitated at times   CIWA SCORE: 5 (for aggression)   ABNL VS/O2: VSS on RA  MOBILITY: SBA, walker, GB   PAIN MANAGMENT: Denies pain   DIET: Dialysis diet, good appetite  BOWEL/BLADDER: incontinent at times  ABNL LAB/BG: Creat 1.9, improving  DRAIN/DEVICES:  R internal jugular for dialysis, PIV SL  TELEMETRY RHYTHM: Afib with CVR  SKIN: Bruised  TESTS/PROCEDURES: Pt at dialysis at 1115  D/C DAY/GOALS/PLACE: Plan for discharge today when returns from dialysis  OTHER IMPORTANT INFO: Sitter discontinued at shift change this morning. Contact iso maintained.     Discharge at 1610    Pt refused education on discharge instructions, \"I don't care, just get me the hell out of here.\" PIV removed by pt.     Patient discharged to home, transportation provided by wife      Listed belongings gathered and returned to patient. Yes  Care Plan and Patient education resolved: Yes  Prescriptions if needed, hard copies sent with patient  NA  Home and hospital acquired medications returned to patient: NA  Medication Bin checked and emptied on discharge Yes  Follow up appointment made for patient: No    "

## 2019-10-04 NOTE — PLAN OF CARE
DATE & TIME: 10/3/19 Night   Cognitive Concerns/ Orientation : A&Ox3, disoriented to time  BEHAVIOR & AGGRESSION TOOL COLOR: Green, cooperative but gets agitated at times   CIWA SCORE: 0, 3, 0   ABNL VS/O2: VSS on RA  MOBILITY: SBA, walker, GB   PAIN MANAGMENT: Denies pain   DIET: Dialysis  BOWEL/BLADDER: Up to bathroom   ABNL LAB/BG: Creat 2.67, Calcium 8.0, Hgb 10.3  DRAIN/DEVICES:  R internal jugular for dialysis, PIV SL  TELEMETRY RHYTHM: Afib with CVR  SKIN: Bruised  TESTS/PROCEDURES: Dialysis yesterday tolerated well. Pt refusing dialysis for today  D/C DAY/GOALS/PLACE: Pending  OTHER IMPORTANT INFO: BLE edema. Sitter and VPM present. Palliative consult for today, wants to change code status from Full to DNR/DNI. During shift pt requested PTA med valium, MD paged and Valium ordered prn given X1.

## 2019-10-04 NOTE — PROGRESS NOTES
Potassium   Date Value Ref Range Status   10/04/2019 4.2 3.4 - 5.3 mmol/L Final     Hemoglobin   Date Value Ref Range Status   10/04/2019 11.1 (L) 13.3 - 17.7 g/dL Final     Creatinine   Date Value Ref Range Status   10/04/2019 1.90 (H) 0.66 - 1.25 mg/dL Final     Urea Nitrogen   Date Value Ref Range Status   10/04/2019 14 7 - 30 mg/dL Final     Sodium   Date Value Ref Range Status   10/04/2019 137 133 - 144 mmol/L Final     INR   Date Value Ref Range Status   09/10/2019 1.21 (H) 0.86 - 1.14 Final       DIALYSIS PROCEDURE NOTE  Hepatitis status of previous patient on machine log was checked and verified ok to use with this patients hepatitis status.  Patient dialyzed for 3 hrs. on a 3 K bath with a net fluid removal of  1L.  A BFR of 400 ml/min was obtained via a RIJ. The patient was seen by Dr. Lipscomb during the treatment.  Total heparin received during the treatment: 0 units.   Line flushed, clamped and capped with heparin 1:1000 2.1 mL (2100 units) per lumen  Meds  given:NONE Complications: NONE. Procedure education provided visually to patient, patientverbalized understanding  ICEBOAT? Timeout performed pre-treatment  I: Patient was identified using 2 identifiers  C: Consent obtained/verified current before treatment  E: Equipment preventative maintenance is current and dialysis delivery system OK to use  B: Hepatitis B Surface Antigen: neg ; Draw Date: 9/12/19      Hepatitis B Surface Antibody: Immune ; Draw Date:  9/12/19  O: Dialysis orders present and complete prior to treatment  A: Vascular access verified and assessed prior to treatment  T: Treatment was performed at a clinically appropriate time  ?: Patient was allowed to ask questions and address concerns prior to treatment  See flowsheet in EPIC for further details and post assessment.  Machine water alarm in place and functioning. Transducer pods intact and checked every 15min.  Pt returned via wheelchair  Report received from: Estefania  Schoener,R.N.  Report given to: Naina Moeller R.N.  Chlorine/Chloramine water system checked every 4 hours.  Outpatient Dialysis at [facility]

## 2019-10-04 NOTE — PROGRESS NOTES
"[facility]  Hospitalist Progress Note        Mayank Tana Neftali, DO  10/04/2019        Interval History:      Patient adamantly wants dialysis today then discharge. Discussed at length risks and benefits, patient verbalized understanding. Wife discussed on phone with this writer, used many expletives, very disrespectful and denigrating towards everyone and everything despite efforts to have a constructive positive discussion.          Assessment and Plan:        Fer Latham is a 69 year old male who presents with weakness. Admitted for further evaluation and treatment.      Alcohol intoxication, physical deconditioning: Ethanol level is 0.30. Patient agitated and uncooperative on admission.   - MVT  - CIWA  - PTA Valium.   - PT, OT, SW, CM.      Hyponatremia, Hypokalemia: Sodium is 127 on admission.   - Monitor.   - Electrolyte replacement.      ESRD, on dialysis: Patient on regular dialysis schedule.   - Renal consulted.      Rule out urinary tract infection: U/a with squamous cells and large LE.   - UC pending.   - Rocephin empirically.      Cardiac, Chronic Afib: Patient previously declined anticoagulation per report.   - Monitor.   - PRN antihypertensives.   - PTA ASA 325mg     DM2: Patient with history of DM.   - Monitor.   - Consider ISS.      DVT Prophylaxis: Pneumatic Compression Devices     Code: Full Code     Disposition: Discharge following dialysis later today.                    Physical Exam:      Heart Rate: 89    Blood pressure 117/73, pulse 56, temperature 98.2  F (36.8  C), temperature source Oral, resp. rate 18, height 1.88 m (6' 2\"), weight 122.5 kg (270 lb), SpO2 100 %.    Vitals:    10/02/19 0642   Weight: 122.5 kg (270 lb)       Vital Sign Ranges  Temperature Temp  Av.1  F (36.7  C)  Min: 97.9  F (36.6  C)  Max: 98.4  F (36.9  C)   Blood pressure Systolic (24hrs), Av , Min:95 , Max:119        Diastolic (24hrs), Av, Min:45, Max:74      Pulse No data recorded "   Respirations Resp  Av.4  Min: 16  Max: 18   Pulse oximetry SpO2  Av %  Min: 95 %  Max: 100 %     Vital Signs with Ranges  Temp:  [97.9  F (36.6  C)-98.4  F (36.9  C)] 98.2  F (36.8  C)  Heart Rate:  [61-95] 89  Resp:  [16-18] 18  BP: ()/(45-74) 117/73  SpO2:  [95 %-100 %] 100 %    I/O Last 3 Shifts:   I/O last 3 completed shifts:  In: 480 [P.O.:480]  Out: 1100 [Urine:100; Other:1000]    I/O past 24 hours:     Intake/Output Summary (Last 24 hours) at 10/4/2019 0910  Last data filed at 10/4/2019 0210  Gross per 24 hour   Intake 480 ml   Output 1100 ml   Net -620 ml     GENERAL: Alert. NAD. Conversational. Adversarial.    HEENT: Normocephalic. EOMI. No icterus or injection. Nares normal.   LUNGS: Clear to auscultation. No dyspnea at rest.   HEART: Extremities perfused.   ABDOMEN: Soft, nontender, and nondistended. Positive bowel sounds.   EXTREMITIES: No LE edema noted.   NEUROLOGIC: Moves extremities x4, follows commands.          Prior to Admission Medications:        Medications Prior to Admission   Medication Sig Dispense Refill Last Dose     aspirin (ASA) 325 MG EC tablet Take 325 mg by mouth daily    10/1/2019 at am     cholecalciferol 1000 units TABS Take 2 tablets by mouth daily    10/1/2019 at am - 1 tab (1000 mg)     diazepam (VALIUM) 5 MG tablet Take 5 mg by mouth every 12 hours as needed for anxiety   takes ocassionally            Medications:        Current Facility-Administered Medications   Medication Last Rate     Current Facility-Administered Medications   Medication Dose Route Frequency     - MEDICATION INSTRUCTIONS for Dialysis Patients -   Does not apply See Admin Instructions     sodium chloride 0.9%  250 mL Intravenous Once in dialysis     sodium chloride 0.9%  300 mL Hemodialysis Machine Once     aspirin  325 mg Oral Daily     epoetin azalea (EPOGEN,PROCRIT) inj ESRD  6,000 Units Intravenous Once     folic acid  1 mg Oral Daily     heparin  3 mL Intracatheter During Hemodialysis  (from stock)     heparin  3 mL Intracatheter During Hemodialysis (from stock)     multivitamin w/minerals  1 tablet Oral Daily     - MEDICATION INSTRUCTIONS -   Does not apply Once     sodium chloride (PF)  3 mL Intracatheter Q8H     vitamin B1  100 mg Oral Daily     Current Facility-Administered Medications   Medication Dose Route Frequency     sodium chloride 0.9%  100-150 mL Intravenous Q15 Min PRN     acetaminophen  650 mg Rectal Q4H PRN     acetaminophen  650 mg Oral Q4H PRN     bisacodyl  10 mg Rectal Daily PRN     diazepam  5 mg Oral Q12H PRN     labetalol  10 mg Intravenous Q2H PRN     lidocaine 4%   Topical Q1H PRN     lidocaine (buffered or not buffered)  0.1-1 mL Other Q1H PRN     LORazepam  1-2 mg Oral Q30 Min PRN    Or     LORazepam  1-2 mg Intravenous Q30 Min PRN     melatonin  1 mg Oral At Bedtime PRN     metoprolol  2.5-5 mg Intravenous Q4H PRN     naloxone  0.1-0.4 mg Intravenous Q2 Min PRN     ondansetron  4 mg Oral Q6H PRN    Or     ondansetron  4 mg Intravenous Q6H PRN     polyethylene glycol  17 g Oral Daily PRN     senna-docusate  1 tablet Oral BID PRN    Or     senna-docusate  2 tablet Oral BID PRN     sodium chloride (PF)  3 mL Intracatheter q1 min prn            Data:      Lab data reviewed.   Recent Labs   Lab 10/03/19  0802 10/02/19  0656   HGB 10.3* 11.4*   MCV 96 95    171    127*   POTASSIUM 4.3 3.1*   CHLORIDE 104 97   CO2 23 21   BUN 29 21   CR 2.67* 2.23*   ANIONGAP 7 9   COLUMBA 8.0* 8.3*   * 99           Imaging:      Imaging data reviewed.     Dr. Mayank Lindsay D.O.  [facility]  Pager [phone]

## 2019-10-04 NOTE — DISCHARGE SUMMARY
[facility]    Discharge Summary  Hospitalist    Date of Admission:  10/2/2019  Date of Discharge:  10/4/2019  Discharging Provider: Mayank Lindsay  Date of Service (when I saw the patient): 10/04/19    History of Present Illness   Fer Latham is a 69 year old male who presents with weakness. Admitted for further evaluation and treatment. Ethanol level is 0.30 on admission. Patient with acute alcohol intoxication therefore unable to obtain complete history of present illness or review of systems.     Hospital Course   Fer Latham was admitted on 10/2/2019.  The following problems were addressed during his hospitalization:    Fer Latham is a 69 year old male who presents with weakness. Admitted for further evaluation and treatment.      Alcohol intoxication, physical deconditioning: Ethanol level is 0.30. Patient agitated and uncooperative on admission.   - Patient refusing TCU or home health, discussed risks and benefits at length with patient and wife via telephone, they both verbalized understanding and continue to refuse.   - Encouraged close outpatient follow up at length.      Hyponatremia, Hypokalemia: Sodium is 127 on admission.   - Improved, close outpatient follow up.      ESRD, on dialysis: Patient on regular dialysis schedule.   - Continue dialysis as outpatient.      Rule out urinary tract infection: U/a with squamous cells and large LE.   - UC negative, patient denies any symptoms of UTI and refuses antibiotic therapy at discharge, verbalized understanding of risks and benefits, including but not limited to death and disability.      Cardiac, Chronic Afib: Patient previously declined anticoagulation per report.   - Close outpatient follow up.   - PTA ASA      DM2: Patient with history of DM.   - Monitor as outpatient.      Pending Results   These results will be followed up by PCP  Unresulted Labs Ordered in the Past 30 Days of this Admission     Date and Time Order Name  Status Description    10/2/2019 0819 Urine Culture Aerobic Bacterial Preliminary           Code Status   Full Code       Primary Care Physician   Isrrael Ornelas    Physical Exam   Temp: 98.5  F (36.9  C) Temp src: Oral BP: 129/74 Pulse: 61 Heart Rate: 66 Resp: 12 SpO2: 100 % O2 Device: None (Room air)    Vitals:    10/02/19 0642   Weight: 122.5 kg (270 lb)     Vital Signs with Ranges  Temp:  [97.9  F (36.6  C)-98.5  F (36.9  C)] 98.5  F (36.9  C)  Pulse:  [61-76] 61  Heart Rate:  [60-89] 66  Resp:  [12-18] 12  BP: (109-129)/(55-74) 129/74  SpO2:  [95 %-100 %] 100 %  I/O last 3 completed shifts:  In: 480 [P.O.:480]  Out: 1100 [Urine:100; Other:1000]    GENERAL: Alert. NAD. Conversational. Adversarial.    HEENT: Normocephalic. EOMI. No icterus or injection. Nares normal.   LUNGS: Clear to auscultation. No dyspnea at rest.   HEART: Extremities perfused.   ABDOMEN: Soft, nontender, and nondistended. Positive bowel sounds.   EXTREMITIES: No LE edema noted.   NEUROLOGIC: Moves extremities x4, follows commands.     Discharge Disposition   Discharged to home  Condition at discharge: Stable    Consultations This Hospital Stay   PHYSICAL THERAPY ADULT IP CONSULT  OCCUPATIONAL THERAPY ADULT IP CONSULT  SOCIAL WORK IP CONSULT  NEPHROLOGY IP CONSULT  PALLIATIVE CARE ADULT IP CONSULT    Time Spent on this Encounter   IMayank DO, personally saw the patient today and spent greater than 30 minutes discharging this patient.    Discharge Orders      Reason for your hospital stay    Alcohol intoxication     Follow-up and recommended labs and tests     Follow up with primary care provider, Isrrael Ornelas, within 7 days   Follow up with Nephrology as directed.     Activity    Your activity upon discharge: activity as tolerated     Full Code     Diet    Follow this diet upon discharge: Orders Placed This Encounter      Dialysis Diet     Discharge Medications   Current Discharge Medication List      CONTINUE these medications  which have NOT CHANGED    Details   aspirin (ASA) 325 MG EC tablet Take 325 mg by mouth daily       cholecalciferol 1000 units TABS Take 2 tablets by mouth daily       diazepam (VALIUM) 5 MG tablet Take 5 mg by mouth every 12 hours as needed for anxiety           Allergies   Allergies   Allergen Reactions     Cephalexin      Ciprofloxacin      Diagnostic X-Ray Materials      Sulfa Drugs Hives     Data   Most Recent 3 CBC's:  Recent Labs   Lab Test 10/04/19  0926 10/03/19  0802 10/02/19  0656   WBC 3.7* 5.4 4.5   HGB 11.1* 10.3* 11.4*   MCV 99 96 95    160 171      Most Recent 3 BMP's:  Recent Labs   Lab Test 10/04/19  0926 10/03/19  0802 10/02/19  0656    134 127*   POTASSIUM 4.2 4.3 3.1*   CHLORIDE 106 104 97   CO2 28 23 21   BUN 14 29 21   CR 1.90* 2.67* 2.23*   ANIONGAP 3 7 9   COLUMBA 9.0 8.0* 8.3*   GLC 96 111* 99     Most Recent 2 LFT's:  Recent Labs   Lab Test 10/02/19  0656 09/10/19  2136   AST 18 14   ALT 12 10   ALKPHOS 82 67   BILITOTAL 0.3 0.2     Most Recent INR's and Anticoagulation Dosing History:  Anticoagulation Dose History     Recent Dosing and Labs Latest Ref Rng & Units 5/10/2019 9/10/2019    INR 0.86 - 1.14 1.14 1.21(H)        Most Recent 3 Troponin's:  Recent Labs   Lab Test 05/10/19  0955   TROPI 0.035     Most Recent Cholesterol Panel:No lab results found.  Most Recent 6 Bacteria Isolates From Any Culture (See EPIC Reports for Culture Details):  Recent Labs   Lab Test 10/02/19  0900 09/11/19  0446 09/10/19  2239 09/10/19  2135 06/06/19  0840 05/10/19  1122   CULT 10,000 to 50,000 colonies/mL  Enterococcus faecalis  Susceptibility testing in progress  *  <10,000 colonies/mL  urogenital jerzy   10,000 to 50,000 colonies/mL  mixed urogenital jerzy   No growth No growth  No growth 10,000 to 50,000 colonies/mL  mixed urogenital jerzy   No growth  No growth     Most Recent TSH, T4 and A1c Labs:  Recent Labs   Lab Test 05/13/19  0551 05/10/19  0955   TSH 3.29  --    A1C  --  5.8*      Results for orders placed or performed during the hospital encounter of 10/02/19   CT Head w/o Contrast    Narrative    CT OF THE HEAD WITHOUT CONTRAST 10/2/2019 9:39 AM     COMPARISON: Head CT 9/10/2019.    HISTORY: Altered level of consciousness (LOC), unexplained.    TECHNIQUE: 5 mm thick axial CT images of the head were acquired  without IV contrast material.    FINDINGS:  There is moderate diffuse cerebral volume loss. There are  subtle patchy areas of decreased density in the cerebral white matter  bilaterally that are consistent with sequela of chronic small vessel  ischemic disease.     The ventricles and basal cisterns are within normal limits in  configuration given the degree of cerebral volume loss.  There is no  midline shift. There are no extra-axial fluid collections.     No intracranial hemorrhage, mass or recent infarct.    The visualized paranasal sinuses are well aerated. There is no  mastoiditis. There are no fractures of the visualized bones.       Impression    IMPRESSION:  Diffuse cerebral volume loss and cerebral white matter  changes consistent with chronic small vessel ischemic disease. No  evidence for acute intracranial pathology.      Radiation dose for this scan was reduced using automated exposure  control, adjustment of the mA and/or kV according to patient size, or  iterative reconstruction technique.    JOHN GUALLPA MD

## 2019-10-04 NOTE — PROGRESS NOTES
Per the floor nurse Naina Palafox, the patient Fer Latham is refusing his dialysis run today 10/4/2019.  He also refused per the overnight nurse Pete Walsh.  The spouse was contacted; the spouse supports the decision.  Nephrologist Los Lipscomb Notified at 0743.     Signed,   Amariah Schoener, RN

## 2019-10-04 NOTE — PROGRESS NOTES
"D: SW following for discharge planning.   I: Pt declined at [facility] due to ETOH use. SW updated pt. At first he didn't understand why the facility would decline him then admitted to possible problems during his previous stay. Discussed referrals to other facilities but was honest with pt that his ETOH use may be a barrier at some facilities. Pt not willing to agree to more referrals at this time. Pt states he is leaning towards going home anyway and doesn't feel TCU would be beneficial. Discussed home care, however, noted pt's wife turned away home care in September when they called to schedule a time to come out. Pointed this out to Fer. He stated he would probably do the same thing again if home care was arranged.   P: Pt planning on dialyzing this afternoon. He states he is going home after that. SW overheard him updating his wife on the phone. SW offered to call and update her, but he declined. SW asked him to let her know if he changed his mind and he said \"why would I do that?\"    LANETTE Madsne, [facility]  [id number]     "

## 2019-10-04 NOTE — PROGRESS NOTES
10/04/19 0829   Quick Adds   Type of Visit Initial Occupational Therapy Evaluation   Living Environment   Lives With spouse   Living Arrangements apartment   Home Accessibility no concerns  (elevator )   Transportation Anticipated family or friend will provide   Self-Care   Usual Activity Tolerance moderate   Current Activity Tolerance fair   Regular Exercise Yes   Activity/Exercise Type walking   Exercise Amount/Frequency other (see comments)  (couple times a month)   Equipment Currently Used at Home walker, rolling;wheelchair, manual;grab bar, tub/shower   Functional Level   Ambulation 1-->assistive equipment  (has a FWW and wheelchair in community self propel )   Transferring 1-->assistive equipment   Toileting 0-->independent   Bathing 1-->assistive equipment  (tub/shower combo, has shower chair, grab bars, toilet riser )   Dressing 0-->independent   Eating 0-->independent   Communication 0-->understands/communicates without difficulty   Swallowing 0-->swallows foods/liquids without difficulty   Cognition 1 - attention or memory deficits   Fall history within last six months yes   Number of times patient has fallen within last six months 5   Prior Functional Level Comment able to shop, drive prior to hospitalization    General Information   Onset of Illness/Injury or Date of Surgery - Date 10/02/19   Referring Physician Mayank Lindsay   Patient/Family Goals Statement discharge to TCU    Additional Occupational Profile Info/Pertinent History of Current Problem Fer Latham is a 69 year old male who presents with weakness. Admitted for further evaluation and treatment. Ethanol level is 0.30 on admission.    Precautions/Limitations fall precautions   Cognitive Status Examination   Orientation orientation to person, place and time   Level of Consciousness alert   Follows Commands (Cognition) delayed response/completion   Cognitive Comment pt. oriented, answers were delayed. Pt. was not confident with  answers.   Visual Perception   Visual Perception Wears glasses   Pain Assessment   Patient Currently in Pain No   Range of Motion (ROM)   ROM Quick Adds No deficits were identified   ROM Comment UE AROM measured    Strength   Manual Muscle Testing Quick Adds No deficits were identified   Strength Comments 5/5 UE strength   Coordination   Upper Extremity Coordination No deficits were identified   Mobility   Bed Mobility Bed mobility skill: Supine to sit   Bed Mobility Skill: Supine to Sit   Level of [place]: Supine/Sit contact guard   Assistive Device: Supine/Sit bedrail   Transfer Skill: Bed to Chair/Chair to Bed   Level of [place]: Bed to Chair minimum assist (75% patients effort)   Physical Assist/Nonphysical Assist: Bed to Chair 1 person assist   Assistive Device - Transfer Skill Bed to Chair Chair to Bed Rehab Eval rolling walker   Transfer Skill: Sit to Stand   Level of [place]: Sit/Stand minimum assist (75% patients effort)   Physical Assist/Nonphysical Assist: Sit/Stand 1 person assist   Assistive Device for Transfer: Sit/Stand rolling walker   Transfer Skill: Toilet Transfer   Level of [place]: Toilet minimum assist (75% patients effort)   Physical Assist/Nonphysical Assist: Toilet 1 person assist   Assistive Device rolling walker;seat riser   Upper Body Dressing   Level of [place]: Dress Upper Body stand-by assist   Lower Body Dressing   Level of [place]: Dress Lower Body contact guard   Grooming   Level of [place]: Grooming stand-by assist   Eating/Self Feeding   Level of [place]: Eating independent   Instrumental Activities of Daily Living (IADL)   Previous Responsibilities shopping;medication management;finances;driving;housekeeping   IADL Comments wife does laundry and cooking. No yard work due to apartment    Activities of Daily Living Analysis   Impairments Contributing to Impaired Activities of Daily Living balance impaired;cognition impaired;strength decreased  "  General Therapy Interventions   Planned Therapy Interventions ADL retraining;IADL retraining;transfer training;cognition;progressive activity/exercise   Intervention Comments Continue to work on ADL training back up to PLOF    Clinical Impression   Criteria for Skilled Therapeutic Interventions Met yes, treatment indicated   OT Diagnosis decreased ADL and functional mobility/independence    Influenced by the following impairments balance, coordination, cognition, strength    Assessment of Occupational Performance 3-5 Performance Deficits   Identified Performance Deficits dressing, toileting, tub transfer, driving, med managment    Clinical Decision Making (Complexity) Moderate complexity   Therapy Frequency Daily   Predicted Duration of Therapy Intervention (days/wks) 3 days    Anticipated Equipment Needs at Discharge   (defer to TCU )   Anticipated Discharge Disposition Transitional Care Facility   Risks and Benefits of Treatment have been explained. Yes   Patient, Family & other staff in agreement with plan of care Yes   Clinical Impression Comments Due to decreased strenght, balance, and activity tolerance, there is a decrease in independence with ADLs such as toilet transfers, bathing,    [facility] AM-PAC  \"6 Clicks\" Daily Activity Inpatient Short Form   1. Putting on and taking off regular lower body clothing? 3 - A Little   2. Bathing (including washing, rinsing, drying)? 2 - A Lot   3. Toileting, which includes using toilet, bedpan or urinal? 3 - A Little   4. Putting on and taking off regular upper body clothing? 3 - A Little   5. Taking care of personal grooming such as brushing teeth? 3 - A Little   6. Eating meals? 4 - None   Daily Activity Raw Score (Score out of 24.Lower scores equate to lower levels of function) 18   Total Evaluation Time   Total Evaluation Time (Minutes) 10     "

## 2019-10-04 NOTE — PROGRESS NOTES
D: SW following for discharge planning.   I: [facility]30, as planned. Orders faxed and facility updated. Nursing aware. Dtr Pat here.   P: Discharge.     LANETTE Madsen, LGSW  [phone]    PAS-RR    D: Per DHS regulation, SW completed and submitted PAS-RR to [place] [employer] Direct Connect via the Senior LinkAge Line.  PAS-RR confirmation # is : [id number]    I: SW spoke with dtr and they are aware a PAS-RR has been submitted.  SW reviewed with dtr that they may be contacted for a follow up appointment within 10 days of hospital discharge if their SNF stay is < 30 days.  Contact information for [facility] LinkAge Line was also provided.    A: Dtr verbalized understanding.    P: Further questions may be directed to [facility] LinkAge Line at #[phone], option #4 for PAS-RR staff.

## 2019-10-04 NOTE — PLAN OF CARE
Physical Therapy Discharge Summary    Reason for therapy discharge:    Discharged to home.    Progress towards therapy goal(s). See goals on Care Plan in [facility] electronic health record for goal details.  Goals not met.  Barriers to achieving goals:   discharge from facility.    Therapy recommendation(s):    Continued therapy is recommended.  Rationale/Recommendations:  TCU was recommended however pt chose to return home. Pt declined home care. Pt would benefit from continued skilled PT services to progress functional strength, activity tolerance, balance, safety and IND with functional mobility prior to returning home as pt currently requires Ax1 for all mobility.

## 2019-10-05 NOTE — PLAN OF CARE
Occupational Therapy Discharge Summary    Reason for therapy discharge:    Discharged to home.    Progress towards therapy goal(s). See goals on Care Plan in [facility] electronic health record for goal details.  Goals not met.  Barriers to achieving goals:   discharge from facility.    Therapy recommendation(s):    Continued therapy is recommended.  Rationale/Recommendations:  Recommended TCU, however, pt discharged to home, recommend home OT for ADL's and cognition and A with IADL's med mgmt, etc.

## 2019-11-25 ENCOUNTER — TELEPHONE (OUTPATIENT)
Dept: INTERVENTIONAL RADIOLOGY/VASCULAR | Facility: CLINIC | Age: 69
End: 2019-11-25

## 2019-11-25 DIAGNOSIS — N18.6 ESRD (END STAGE RENAL DISEASE) (H): Primary | ICD-10-CM

## 2019-11-26 ENCOUNTER — APPOINTMENT (OUTPATIENT)
Dept: MEDSURG UNIT | Facility: CLINIC | Age: 69
End: 2019-11-26
Attending: INTERNAL MEDICINE
Payer: MEDICARE

## 2019-11-26 ENCOUNTER — APPOINTMENT (OUTPATIENT)
Dept: INTERVENTIONAL RADIOLOGY/VASCULAR | Facility: CLINIC | Age: 69
End: 2019-11-26
Attending: INTERNAL MEDICINE
Payer: MEDICARE

## 2019-11-26 ENCOUNTER — HOSPITAL ENCOUNTER (OUTPATIENT)
Facility: CLINIC | Age: 69
Discharge: HOME | End: 2019-11-26
Attending: INTERNAL MEDICINE | Admitting: INTERNAL MEDICINE
Payer: MEDICARE

## 2019-11-26 VITALS
HEIGHT: 74 IN | SYSTOLIC BLOOD PRESSURE: 122 MMHG | HEART RATE: 82 BPM | OXYGEN SATURATION: 100 % | WEIGHT: 270.06 LBS | BODY MASS INDEX: 34.66 KG/M2 | DIASTOLIC BLOOD PRESSURE: 77 MMHG | TEMPERATURE: 99.4 F | RESPIRATION RATE: 20 BRPM

## 2019-11-26 DIAGNOSIS — N18.6 ESRD (END STAGE RENAL DISEASE) (H): ICD-10-CM

## 2019-11-26 LAB
ANION GAP SERPL CALCULATED.3IONS-SCNC: 9 MMOL/L (ref 3–14)
APTT PPP: 30 SEC (ref 22–37)
BUN SERPL-MCNC: 33 MG/DL (ref 7–30)
CALCIUM SERPL-MCNC: 9.4 MG/DL (ref 8.5–10.1)
CHLORIDE SERPL-SCNC: 102 MMOL/L (ref 94–109)
CO2 SERPL-SCNC: 22 MMOL/L (ref 20–32)
CREAT SERPL-MCNC: 2.35 MG/DL (ref 0.66–1.25)
ERYTHROCYTE [DISTWIDTH] IN BLOOD BY AUTOMATED COUNT: 13.5 % (ref 10–15)
GFR SERPL CREATININE-BSD FRML MDRD: 27 ML/MIN/{1.73_M2}
GLUCOSE SERPL-MCNC: 110 MG/DL (ref 70–99)
HCT VFR BLD AUTO: 34.3 % (ref 40–53)
HGB BLD-MCNC: 11.4 G/DL (ref 13.3–17.7)
INR PPP: 1.16 (ref 0.86–1.14)
MCH RBC QN AUTO: 32.8 PG (ref 26.5–33)
MCHC RBC AUTO-ENTMCNC: 33.2 G/DL (ref 31.5–36.5)
MCV RBC AUTO: 99 FL (ref 78–100)
PLATELET # BLD AUTO: 188 10E9/L (ref 150–450)
POTASSIUM SERPL-SCNC: 4.2 MMOL/L (ref 3.4–5.3)
RBC # BLD AUTO: 3.48 10E12/L (ref 4.4–5.9)
SODIUM SERPL-SCNC: 134 MMOL/L (ref 133–144)
WBC # BLD AUTO: 5.7 10E9/L (ref 4–11)

## 2019-11-26 PROCEDURE — 27211193 ZZ H WOUND GLUE CR1

## 2019-11-26 PROCEDURE — C1769 GUIDE WIRE: HCPCS

## 2019-11-26 PROCEDURE — 27210732 ZZH ACCESSORY CR1

## 2019-11-26 PROCEDURE — 80048 BASIC METABOLIC PNL TOTAL CA: CPT | Performed by: NURSE PRACTITIONER

## 2019-11-26 PROCEDURE — 25000125 ZZHC RX 250: Performed by: PHYSICIAN ASSISTANT

## 2019-11-26 PROCEDURE — 85027 COMPLETE CBC AUTOMATED: CPT | Performed by: NURSE PRACTITIONER

## 2019-11-26 PROCEDURE — 25000125 ZZHC RX 250: Performed by: NURSE PRACTITIONER

## 2019-11-26 PROCEDURE — 36589 REMOVAL TUNNELED CV CATH: CPT | Mod: XS

## 2019-11-26 PROCEDURE — C1750 CATH, HEMODIALYSIS,LONG-TERM: HCPCS

## 2019-11-26 PROCEDURE — 27210908 ZZH NEEDLE CR4

## 2019-11-26 PROCEDURE — 85610 PROTHROMBIN TIME: CPT | Performed by: NURSE PRACTITIONER

## 2019-11-26 PROCEDURE — 25800030 ZZH RX IP 258 OP 636: Performed by: NURSE PRACTITIONER

## 2019-11-26 PROCEDURE — 40000166 ZZH STATISTIC PP CARE STAGE 1

## 2019-11-26 PROCEDURE — 25000128 H RX IP 250 OP 636: Performed by: PHYSICIAN ASSISTANT

## 2019-11-26 PROCEDURE — 85730 THROMBOPLASTIN TIME PARTIAL: CPT | Performed by: NURSE PRACTITIONER

## 2019-11-26 PROCEDURE — 36558 INSERT TUNNELED CV CATH: CPT | Mod: LT

## 2019-11-26 RX ORDER — DEXTROSE MONOHYDRATE 25 G/50ML
25-50 INJECTION, SOLUTION INTRAVENOUS
Status: DISCONTINUED | OUTPATIENT
Start: 2019-11-26 | End: 2019-11-26 | Stop reason: HOSPADM

## 2019-11-26 RX ORDER — FLUMAZENIL 0.1 MG/ML
0.2 INJECTION, SOLUTION INTRAVENOUS
Status: DISCONTINUED | OUTPATIENT
Start: 2019-11-26 | End: 2019-11-26 | Stop reason: HOSPADM

## 2019-11-26 RX ORDER — NALOXONE HYDROCHLORIDE 0.4 MG/ML
.1-.4 INJECTION, SOLUTION INTRAMUSCULAR; INTRAVENOUS; SUBCUTANEOUS
Status: DISCONTINUED | OUTPATIENT
Start: 2019-11-26 | End: 2019-11-26 | Stop reason: HOSPADM

## 2019-11-26 RX ORDER — HEPARIN SODIUM 1000 [USP'U]/ML
3 INJECTION, SOLUTION INTRAVENOUS; SUBCUTANEOUS ONCE
Status: COMPLETED | OUTPATIENT
Start: 2019-11-26 | End: 2019-11-26

## 2019-11-26 RX ORDER — CLINDAMYCIN PHOSPHATE 900 MG/50ML
900 INJECTION, SOLUTION INTRAVENOUS
Status: COMPLETED | OUTPATIENT
Start: 2019-11-26 | End: 2019-11-26

## 2019-11-26 RX ORDER — HEPARIN SODIUM 1000 [USP'U]/ML
3 INJECTION, SOLUTION INTRAVENOUS; SUBCUTANEOUS ONCE
Status: DISCONTINUED | OUTPATIENT
Start: 2019-11-26 | End: 2019-11-26 | Stop reason: HOSPADM

## 2019-11-26 RX ORDER — NICOTINE POLACRILEX 4 MG
15-30 LOZENGE BUCCAL
Status: DISCONTINUED | OUTPATIENT
Start: 2019-11-26 | End: 2019-11-26 | Stop reason: HOSPADM

## 2019-11-26 RX ORDER — FENTANYL CITRATE 50 UG/ML
25-50 INJECTION, SOLUTION INTRAMUSCULAR; INTRAVENOUS EVERY 5 MIN PRN
Status: DISCONTINUED | OUTPATIENT
Start: 2019-11-26 | End: 2019-11-26 | Stop reason: HOSPADM

## 2019-11-26 RX ORDER — SODIUM CHLORIDE 9 MG/ML
INJECTION, SOLUTION INTRAVENOUS CONTINUOUS
Status: DISCONTINUED | OUTPATIENT
Start: 2019-11-26 | End: 2019-11-26 | Stop reason: HOSPADM

## 2019-11-26 RX ADMIN — HEPARIN SODIUM: 1000 INJECTION, SOLUTION INTRAVENOUS; SUBCUTANEOUS at 13:06

## 2019-11-26 RX ADMIN — HEPARIN SODIUM 3000 UNITS: 1000 INJECTION, SOLUTION INTRAVENOUS; SUBCUTANEOUS at 13:07

## 2019-11-26 RX ADMIN — MIDAZOLAM HYDROCHLORIDE 1 MG: 2 INJECTION, SOLUTION INTRAMUSCULAR; INTRAVENOUS at 12:36

## 2019-11-26 RX ADMIN — FENTANYL CITRATE 50 MCG: 50 INJECTION, SOLUTION INTRAMUSCULAR; INTRAVENOUS at 12:37

## 2019-11-26 RX ADMIN — CLINDAMYCIN PHOSPHATE 900 MG: 900 INJECTION, SOLUTION INTRAVENOUS at 11:12

## 2019-11-26 RX ADMIN — SODIUM CHLORIDE: 9 INJECTION, SOLUTION INTRAVENOUS at 11:13

## 2019-11-26 RX ADMIN — LIDOCAINE HYDROCHLORIDE 6 ML: 10 INJECTION, SOLUTION EPIDURAL; INFILTRATION; INTRACAUDAL; PERINEURAL at 12:40

## 2019-11-26 RX ADMIN — MIDAZOLAM HYDROCHLORIDE 1 MG: 2 INJECTION, SOLUTION INTRAMUSCULAR; INTRAVENOUS at 12:41

## 2019-11-26 RX ADMIN — FENTANYL CITRATE 50 MCG: 50 INJECTION, SOLUTION INTRAMUSCULAR; INTRAVENOUS at 12:42

## 2019-11-26 ASSESSMENT — MIFFLIN-ST. JEOR: SCORE: 2064.75

## 2019-11-26 NOTE — PROGRESS NOTES
Returned to unit following dialysis cath change. Right neck site is dry and intact. Received sedation. Alert and orient. No complaint of nausea or discomfort. Respiratory status stable. Vital signs within normal limits.

## 2019-11-26 NOTE — PROCEDURES
[facility], [place]    Procedure: IR Procedure Note  Date/Time: 11/26/2019 1:07 PM  Performed by: Rik Tijerina PA-C  Authorized by: Rik Tijerina PA-C     UNIVERSAL PROTOCOL   Site Marked: NA  Prior Images Obtained and Reviewed:  Yes  Required items: Required blood products, implants, devices and special equipment available    Patient identity confirmed:  Verbally with patient and arm band  Patient was reevaluated immediately before administering moderate or deep sedation or anesthesia  Confirmation Checklist:  Patient's identity using two indicators, relevant allergies, procedure was appropriate and matched the consent or emergent situation and correct equipment/implants were available  Time out: Immediately prior to the procedure a time out was called    Preparation: Patient was prepped and draped in usual sterile fashion    ESBL (mL):  3     ANESTHESIA    Anesthesia: Local infiltration  Local Anesthetic:  Lidocaine 1% without epinephrine  Anesthetic Total (mL):  15      SEDATION    Patient Sedated: Yes    Sedation Type:  Moderate (conscious) sedation  Sedation:  Fentanyl and midazolam  Vital signs: Vital signs monitored during sedation    See dictated procedure note for full details.  Findings: Sedation medications administered: 2 mg Versed, 100 mcg Fentanyl.   Total sedation time:  31 minutes    Specimens: none    Complications: None    Condition: Stable    PROCEDURE   Patient Tolerance:  Patient tolerated the procedure well with no immediate complications  Describe Procedure: Fer Latham  MRN: [id number]    Patient with questionable contrast allergy. Reports of difficulty with dialysis runs. One lumen unable to aspirate in IR.    Completed removal of dual lumen tunneled central venous access catheter via RIGHT chest.     Completed placement of 14.5 Canadian 23 cm dual lumen, Palindrome brand, tunneled central venous access catheter via RIJV.  Tip lying  in the right atrium.  Catheter okay to use immediately.  Dx:  Hemodialysis status.  Lilliana.    Sedation medications administered: 2 mg Versed, 100 mcg Fentanyl.   Total sedation time:  31 minutes  Length of time physician/provider present for 1:1 monitoring during sedation: 30

## 2019-11-26 NOTE — IP AVS SNAPSHOT
MRN:[id number]                      After Visit Summary   11/26/2019    Fer Latham    MRN: [id number]           Visit Information        Department      11/26/2019 10:01 AM [address] [facility] [place]          Review of your medicines      UNREVIEWED medicines. Ask your doctor about these medicines       Dose / Directions   aspirin 325 MG EC tablet  Commonly known as:  ASA      Dose:  325 mg  Take 325 mg by mouth daily  Refills:  0     cholecalciferol 25 MCG (1000 UT) Tabs      Dose:  2 tablet  Take 2 tablets by mouth daily  Refills:  0     diazepam 5 MG tablet  Commonly known as:  VALIUM      Dose:  5 mg  Take 5 mg by mouth every 12 hours as needed for anxiety  Refills:  0              Protect others around you: Learn how to safely use, store and throw away your medicines at www.disposemymeds.org.       Follow-ups after your visit       Care Instructions       Further instructions from your care team                                                                        Interventional Radiology                                                                   Discharge Instructions                                                                Following Tunneled Catheter Placement    Your site(s) has been closed with:     The Catheter is sutured  to skin at  insertion site    Derma Jo (Skin Glue) on neck incision    Do not apply any ointments over site    This thin layer will slough off in 7-10 days               May gently remove Derma Jo in 10 days if still present         Tunneled catheter is always covered with a Sterile Transparent dressing    Keep site clean and dry     Cover with an occlusive dressing for showering    Weekly transparent dressing changes or when it becomes wet or dirty     If there is any oozing or bleeding from the site, apply direct pressure for 5-10 minutes with a gauze pad.  If bleeding continues after 10 minutes call your primary doctor.  If bleeding cannot be  "controlled with direct pressure, call 911.    Call your Doctor if:    Bleeding as above    Swelling in your neck or arm    Sudden onset of shortness of breath, lightheadedness, or heart palpitations..    Fever greater than 100.5  F    Other signs of infection such as, redness, tenderness, or drainage from the wound.    If you were given sedation:    We recommend an adult stay with you for the first 24 hours.    No driving or alcoholic beverages for 24 hours.    ADDITIONAL INSTRUCTIONS:          If you are on Coumadin you may restart tonight.  Follow up Coumadin Clinic or Primary Care MD         To have your INR rechecked.           No heavy lifting greater than 10 lbs for 3 days.           May use ice pack for pain or minor swelling for 15 min 3-4 times per day.    [facility] Interventional Radiology Department    Physician: Jose Francisco Tijerina PA-C               Date:November 26, 2019    Telephone Numbers:  [phone].....8 am to 4:30 pm   Monday-Friday  Hospital : [phone]....After hours, Weekends, & Holidays.  Ask for the Interventional Radiologist on call.  Someone is on call 24 hours a day.   Emergency Department:  [phone]                                                                                                                                                              Additional Information About Your Visit       [employer] Information    [employer] lets you send messages to your doctor, view your test results, renew your prescriptions, schedule appointments and more. To sign up, go to [email or web address] . Click on \"Log in\" on the left side of the screen, which will take you to the Welcome page. Then click on \"Sign up Now\" on the right side of the page.     You will be asked to enter the access code listed below, as well as some personal information. Please follow the directions to create your username and password.     Your access code is: [email or web address]  Expires: 1/25/2020  2:29 PM     Your " "access code will  in 60 days. If you need help or a new code, please call your [facility] clinic or [phone].       Care EveryWhere ID    This is your Care EveryWhere ID. This could be used by other organizations to access your [facility] medical records  [id number]       Your Vitals Were  Most recent update: 2019  2:22 PM    Blood Pressure   137/79 (BP Location: Left arm)          Pulse   73          Temperature   99.4  F (37.4  C) (Oral)          Respirations   20          Height   1.88 m (6' 2\")             Weight   122.5 kg (270 lb 1 oz)    Pulse Oximetry   98%    BMI (Body Mass Index)   34.67 kg/m           Primary Care Provider Office Phone # Fax #    Isrrael Ornelas [phone]      Equal Access to Services    TREMAINE PEDRO : Hadii mariann prakash hadasho Soomaali, waaxda luqadaha, qaybta kaalmada adeegyada, amaris merida . So [facility] [phone].    ATENCIÓN: Si habla español, tiene a scherer disposición servicios gratuitos de asistencia lingüística. Eduin al [phone].    We comply with applicable federal civil rights laws and [place] laws. We do not discriminate on the basis of race, color, national origin, age, disability, sex, sexual orientation, or gender identity.           Thank you!    Thank you for choosing [facility] for your care. Our goal is always to provide you with excellent care. Hearing back from our patients is one way we can continue to improve our services. Please take a few minutes to complete the written survey that you may receive in the mail after you visit with us. Thank you!            Medication List      ASK your doctor about these medications          Morning Afternoon Evening Bedtime As Needed    aspirin 325 MG EC tablet  Also known as:  ASA  INSTRUCTIONS:  Take 325 mg by mouth daily                     cholecalciferol 25 MCG (1000 UT) Tabs  INSTRUCTIONS:  Take 2 tablets by mouth daily                     diazepam 5 MG tablet  Also known as: "  VALIUM  INSTRUCTIONS:  Take 5 mg by mouth every 12 hours as needed for anxiety

## 2019-11-26 NOTE — IP AVS SNAPSHOT
[address] [address]                                    After Visit Summary   11/26/2019    Fer Latham    MRN: [id number]           After Visit Summary Signature Page    I have received my discharge instructions, and my questions have been answered. I have discussed any challenges I see with this plan with the nurse or doctor.    ..........................................................................................................................................  Patient/Patient Representative Signature      ..........................................................................................................................................  Patient Representative Print Name and Relationship to Patient    ..................................................               ................................................  Date                                   Time    ..........................................................................................................................................  Reviewed by Signature/Title    ...................................................              ..............................................  Date                                               Time          22EPIC Rev 08/18

## 2019-11-26 NOTE — DISCHARGE INSTRUCTIONS
Interventional Radiology                                                                   Discharge Instructions                                                                Following Tunneled Catheter Placement    Your site(s) has been closed with:     The Catheter is sutured  to skin at  insertion site    Derma Jo (Skin Glue) on neck incision    Do not apply any ointments over site    This thin layer will slough off in 7-10 days               May gently remove Derma Jo in 10 days if still present         Tunneled catheter is always covered with a Sterile Transparent dressing    Keep site clean and dry     Cover with an occlusive dressing for showering    Weekly transparent dressing changes or when it becomes wet or dirty     If there is any oozing or bleeding from the site, apply direct pressure for 5-10 minutes with a gauze pad.  If bleeding continues after 10 minutes call your primary doctor.  If bleeding cannot be controlled with direct pressure, call 911.    Call your Doctor if:    Bleeding as above    Swelling in your neck or arm    Sudden onset of shortness of breath, lightheadedness, or heart palpitations..    Fever greater than 100.5  F    Other signs of infection such as, redness, tenderness, or drainage from the wound.    If you were given sedation:    We recommend an adult stay with you for the first 24 hours.    No driving or alcoholic beverages for 24 hours.    ADDITIONAL INSTRUCTIONS:          If you are on Coumadin you may restart tonight.  Follow up Coumadin Clinic or Primary Care MD         To have your INR rechecked.           No heavy lifting greater than 10 lbs for 3 days.           May use ice pack for pain or minor swelling for 15 min 3-4 times per day.    [facility] Interventional Radiology Department    Physician: Jose Francisco Tijerina PA-C               Date:November 26, 2019    Telephone Numbers:  [phone].....8 am to 4:30 pm    Monday-Friday  [facility] : [phone]....After hours, Weekends, & Holidays.  Ask for the Interventional Radiologist on call.  Someone is on call 24 hours a day.   Emergency Department:  [phone]

## 2019-11-26 NOTE — PRE-PROCEDURE
GENERAL PRE-PROCEDURE:   Procedure:  Line check, possible replacement  Date/Time:  11/26/2019 10:55 AM    Written consent obtained?: Yes    Risks and benefits: Risks, benefits and alternatives were discussed    Consent given by:  Patient  Patient states understanding of procedure being performed: Yes    Patient's understanding of procedure matches consent: Yes    Procedure consent matches procedure scheduled: Yes    Expected level of sedation:  Moderate  Appropriately NPO:  Yes  ASA Class:  Class 2- mild systemic disease, no acute problems, no functional limitations  Mallampati  :  Grade 2- soft palate, base of uvula, tonsillar pillars, and portion of posterior pharyngeal wall visible  Lungs:  Lungs clear with good breath sounds bilaterally  Heart:  Normal heart sounds and rate  History & Physical reviewed:  History and physical reviewed and no updates needed  Statement of review:  I have reviewed the lab findings, diagnostic data, medications, and the plan for sedation

## 2019-11-26 NOTE — PROGRESS NOTES
Patient Name: Fer Latham  Medical Record Number: [id number]  Today's Date: 11/26/2019    Procedure: Tunneled Central dialysis catheter exchange  Proceduralist: Jose Francisco Tijerina PA-C.    Sedation start time: 1236  Sedation end time: 1307  Sedation medications administered: 2 mg Versed, 100 mcg Fentanyl.   Total sedation time:  31 minutes      Patient in room: 1210  Procedure start time: 1235  Puncture time: 1240  Procedure end time: 1307  Patient out of room:  1314    Report given to: 2.A. R.N.    Other Notes: Pt arrived to IR room 2 from 2A. Consent reviewed. Pt denies any questions or concerns regarding procedure. Pt positioned supine and monitored per protocol. Pt tolerated procedure without any noted complications. Pt transferred back to 2.A.

## 2019-11-26 NOTE — PROGRESS NOTES
Discharge: Discharge to home with family. Discharge instructions given and patient voiced understanding.   Patient status upon discharge: Neuro status intact. Rational and systematic thought process in place. Hemodynamically stable.   Up walking in bathroom. Ate well for meals. No complaint of discomfort.    Skin assessment: Right chest site dry and flat. No hematoma.   Patient belongings: Gathered and pack per family and patient.

## 2019-12-27 ENCOUNTER — DOCUMENTATION ONLY (OUTPATIENT)
Dept: RADIOLOGY | Facility: CLINIC | Age: 69
End: 2019-12-27

## 2019-12-27 DIAGNOSIS — T82.49XA: ICD-10-CM

## 2019-12-27 DIAGNOSIS — T82.49XD: ICD-10-CM

## 2019-12-27 DIAGNOSIS — N18.6 ESRD (END STAGE RENAL DISEASE) ON DIALYSIS (H): Primary | ICD-10-CM

## 2019-12-27 DIAGNOSIS — Z99.2 ESRD ON DIALYSIS (H): Primary | ICD-10-CM

## 2019-12-27 DIAGNOSIS — Z99.2 ESRD (END STAGE RENAL DISEASE) ON DIALYSIS (H): Primary | ICD-10-CM

## 2019-12-27 DIAGNOSIS — N18.6 ESRD ON DIALYSIS (H): Primary | ICD-10-CM

## 2019-12-27 NOTE — PROGRESS NOTES
Patient to be placed on Interventional  Radiology schedule for a RIJ Double lumen large bore hemodialysis catheter check with possible revision.   11/26/2019 a  14.5 [place] 23 cm dual lumen, Palindrome brand central line was placed , with Tip lying in the right atrium    Kesha Gibbs IR RPA  [phone] Call pager  [phone] pager

## 2019-12-30 ENCOUNTER — TELEPHONE (OUTPATIENT)
Dept: INTERVENTIONAL RADIOLOGY/VASCULAR | Facility: CLINIC | Age: 69
End: 2019-12-30

## 2020-01-09 ENCOUNTER — TELEPHONE (OUTPATIENT)
Dept: NEPHROLOGY | Facility: CLINIC | Age: 70
End: 2020-01-09

## 2020-01-09 DIAGNOSIS — Z01.818 ENCOUNTER FOR OTHER PREPROCEDURAL EXAMINATION: ICD-10-CM

## 2020-01-09 DIAGNOSIS — Z99.2 ESRD ON DIALYSIS (H): Primary | ICD-10-CM

## 2020-01-09 DIAGNOSIS — N18.6 ESRD ON DIALYSIS (H): Primary | ICD-10-CM

## 2020-01-09 DIAGNOSIS — Z45.2 ENCOUNTER FOR ADJUSTMENT AND MANAGEMENT OF VASCULAR ACCESS DEVICE: ICD-10-CM

## 2020-01-10 ENCOUNTER — TELEPHONE (OUTPATIENT)
Dept: NEPHROLOGY | Facility: CLINIC | Age: 70
End: 2020-01-10

## 2020-01-10 NOTE — TELEPHONE ENCOUNTER
Patient's wife called , requesting to speak with provider on call   She is voicingconcern that somebody in the Dialysis unit ( [facility]) told her , that he should not come anymore for dialysis as it is only working 50%   Wife clarified that patient is asymptomatic now . She is looking forward to talk to someone including provider  I advised her to communicate with her 's dialysis unit tomorrow morning and I advised her that her  should continue his HD as scheduled and not miss it . She agreed to the plan the plans to go with her  tomorrow to the dialysis unit to discuss the issues .  Meanwhile I advised her that she should seek immediate medical attention /call 911 if her  has any symptoms including but not limited to lethargy,fatigue, SOB,CP,confusion,or any acute symptoms . She verbalized understanding and agreed to do so .  Jagjit Ramey MD, FACP  Nephrology Fellow   [facility]   Pager [phone]

## 2020-01-10 NOTE — TELEPHONE ENCOUNTER
"I received the following email Suraj 10, 2020, 12:23 AM:    This is Fer Latham's wife, Ailin.      I truly do not understand how you can call yourself a doctor.  Doctors are suppose to care about their patients.  Apparently, you do not.  You were to call Fer today, but of course, you didn't bother.  I don't know if you are aware that Fer is only running at 50% or so and not getting his blood properly cleaned, but it seems like nobody at [facility].  Especially YOU, his so-called doctor.  Your excuse is always that you are so busy.  A phone call doesn't take that long.  He has also had his catheter changed out 9 times.  A nice little money maker for all of you involved.  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    It seems as if your vacation was much more important than your patients were.  You could have at least had another doctor step in while you were gone, but that didn't happen, either.    I believe that Fer is being discriminated against, and if things don't change in short order, I will be having some legal help to get things on track.  Last Friday, one of your \"techs\" also told Fer that he shouldn't even bother to come in.  I am furious about that.  That person should be fired on the spot.  She has no right to say that to anybody.  Nobody!    I will be coming Friday to Fer's appointment with him.  I have several other issues that need to be resolved before we leave.  In your best interest, I think that you should make some time to come and meet with us so that we can get some answers.  I think it may be in your best interest.  If you cannot meet with us, do you think you could spare a few minutes with a phone call?  #[phone].  I will have the phone with me at all times.  "

## 2020-01-10 NOTE — TELEPHONE ENCOUNTER
"I called Fer at 12:26 PM.  He was on the road on the way to dialysis.  I told him I am available to talk again at 1:30 pm.  He agrees to phone call at that time.    Lin Puente MD     1:33 PM  I called and spoke with Ailin.  She reported that she has heard from the PD nurse at the [place] unit, Ailin Sheridan (whom I have been in contact with) and they have a home visit set up for next week.  She also reported to me that she understands Fer's dialysis catheter is positional (nurses had also reported this to me) and that if he lies still in a specific position they are able to maintain blood flow rate of 350 ml/minute.    She also requested explanation for regular hepatitis B blood tests and I told her rationale of monitoring immunity and monitoring for new infection.    Ailin then went on to ask me why someone at [facility] gets pain $140 for giving a pneumonia vaccine.  I relayed that I am not involved in the [facility] billing and suggested that [facility] Staff should be able to help her find out this information.  She also expressed concern over not being able to get a copy of his bill and that \"no wonder medicare is going bankrupt\" and that the $400+ per month should be enough money for [facility].  I again told her I would be happy to answer questions about his medical care and that I am not involved in billing.  She told me she is getting irritated and that I cannot answer the questions she is asking and that the conversation is over.  She hung up the phone.    We did not have an opportunity to further discuss his dialysis access or PD plan.    Lin Puente MD  [facility]  Department of Medicine  Division of Renal Disease and Hypertension  "

## 2020-02-05 NOTE — PATIENT INSTRUCTIONS
Huey Wiley is a 49 y.o. male  Chief Complaint   Patient presents with    Follow-up     rectal cancer      1. Have you been to the ER, urgent care clinic since your last visit? Hospitalized since your last visit? No  2. Have you seen or consulted any other health care providers outside of the [address] [place] [address] since your last visit? Include any pap smears or colon screening.  No Wound Care After a Biopsy    What is a skin biopsy?  A skin biopsy allows the doctor to examine a very small piece of tissue under the microscope to determine the diagnosis and the best treatment for the skin condition. A local anesthetic (numbing medicine)  is injected with a very small needle into the skin area to be tested. A small piece of skin is taken from the area. Sometimes a suture (stitch) is used.     What are the risks of a skin biopsy?  I will experience scar, bleeding, swelling, pain, crusting and redness. I may experience incomplete removal or recurrence. Risks of this procedure are excessive bleeding, bruising, infection, nerve damage, numbness, thick (hypertrophic or keloidal) scar and non-diagnostic biopsy.    How should I care for my wound for the first 24 hours?    Keep the wound dry and covered for 24 hours    If it bleeds, hold direct pressure on the area for 15 minutes. If bleeding does not stop then go to the emergency room    Avoid strenuous exercise the first 1-2 days or as your doctor instructs you    How should I care for the wound after 24 hours?    After 24 hours, remove the bandage    You may bathe or shower as normal    If you had a scalp biopsy, you can shampoo as usual and can use shower water to clean the biopsy site daily    Clean the wound twice a day with gentle soap and water    Do not scrub, be gentle    Apply white petroleum/Vaseline after cleaning the wound with a cotton swab or a clean finger, and keep the site covered with a Bandaid /bandage. Bandages are not necessary with a scalp biopsy    If you are unable to cover the site with a Bandaid /bandage, re-apply ointment 2-3 times a day to keep the site moist. Moisture will help with healing    Avoid strenuous activity for first 1-2 days    Avoid lakes, rivers, pools, and oceans until the stitches are removed or the site is healed    How do I clean my wound?    Wash hands thoroughly with soap or use hand  before all  wound care    Clean the wound with gentle soap and water    Apply white petroleum/Vaseline  to wound after it is clean    Replace the Bandaid /bandage to keep the wound covered for the first few days or as instructed by your doctor    If you had a scalp biopsy, warm shower water to the area on a daily basis should suffice    What should I use to clean my wound?     Cotton-tipped applicators (Qtips )    White petroleum jelly (Vaseline ). Use a clean new container and use Q-tips to apply.    Bandaids   as needed    Gentle soap     How should I care for my wound long term?    Do not get your wound dirty    Keep up with wound care for one week or until the area is healed.    A small scab will form and fall off by itself when the area is completely healed. The area will be red and will become pink in color as it heals. Sun protection is very important for how your scar will turn out. Sunscreen with an SPF 30 or greater is recommended once the area is healed.    If you have stitches, stitches need to be removed in 10-14 days. You may return to our clinic for this or you may have it done locally at your doctor s office.    You should have some soreness but it should be mild and slowly go away over several days. Talk to your doctor about using tylenol for pain,    When should I call my doctor?  If you have increased:     Pain or swelling    Pus or drainage (clear or slightly yellow drainage is ok)    Temperature over 100F    Spreading redness or warmth around wound    When will I hear about my results?  The biopsy results can take 2-3 weeks to come back. The clinic will call you with the results, send you a RECUPYL message, or have you schedule a follow-up clinic or phone time to discuss the results. Contact our clinics if you do not hear from us in 3 weeks.     Who should I call with questions?    [facility]: [phone]     [facility]: [phone]    For  urgent needs outside of business hours call the [facility] at [phone] and ask for the dermatology resident on call

## 2020-02-07 ENCOUNTER — TELEPHONE (OUTPATIENT)
Dept: NEPHROLOGY | Facility: CLINIC | Age: 70
End: 2020-02-07

## 2020-02-07 DIAGNOSIS — T82.898A PROBLEM WITH DIALYSIS ACCESS, INITIAL ENCOUNTER (H): Primary | ICD-10-CM

## 2020-02-07 NOTE — TELEPHONE ENCOUNTER
I received the following via [employer] secure messaging isaiah:    \    I placed order as soon as I logged into EMR    I received this from the nurse

## 2020-02-10 ENCOUNTER — HOSPITAL ENCOUNTER (OUTPATIENT)
Facility: CLINIC | Age: 70
Discharge: HOME | End: 2020-02-10
Attending: INTERNAL MEDICINE | Admitting: INTERNAL MEDICINE
Payer: MEDICARE

## 2020-02-10 ENCOUNTER — APPOINTMENT (OUTPATIENT)
Dept: INTERVENTIONAL RADIOLOGY/VASCULAR | Facility: CLINIC | Age: 70
End: 2020-02-10
Attending: INTERNAL MEDICINE
Payer: MEDICARE

## 2020-02-10 ENCOUNTER — APPOINTMENT (OUTPATIENT)
Dept: MEDSURG UNIT | Facility: CLINIC | Age: 70
End: 2020-02-10
Attending: INTERNAL MEDICINE
Payer: MEDICARE

## 2020-02-10 VITALS
WEIGHT: 270 LBS | RESPIRATION RATE: 18 BRPM | BODY MASS INDEX: 34.65 KG/M2 | OXYGEN SATURATION: 98 % | TEMPERATURE: 98.1 F | HEART RATE: 80 BPM | SYSTOLIC BLOOD PRESSURE: 138 MMHG | HEIGHT: 74 IN | DIASTOLIC BLOOD PRESSURE: 70 MMHG

## 2020-02-10 DIAGNOSIS — T82.898A PROBLEM WITH DIALYSIS ACCESS, INITIAL ENCOUNTER (H): ICD-10-CM

## 2020-02-10 LAB
ANION GAP SERPL CALCULATED.3IONS-SCNC: 7 MMOL/L (ref 3–14)
BUN SERPL-MCNC: 39 MG/DL (ref 7–30)
CALCIUM SERPL-MCNC: 9 MG/DL (ref 8.5–10.1)
CHLORIDE SERPL-SCNC: 106 MMOL/L (ref 94–109)
CO2 SERPL-SCNC: 24 MMOL/L (ref 20–32)
CREAT SERPL-MCNC: 2.6 MG/DL (ref 0.66–1.25)
ERYTHROCYTE [DISTWIDTH] IN BLOOD BY AUTOMATED COUNT: 13.2 % (ref 10–15)
GFR SERPL CREATININE-BSD FRML MDRD: 24 ML/MIN/{1.73_M2}
GLUCOSE SERPL-MCNC: 116 MG/DL (ref 70–99)
HCT VFR BLD AUTO: 33.7 % (ref 40–53)
HGB BLD-MCNC: 11 G/DL (ref 13.3–17.7)
INR PPP: 1.21 (ref 0.86–1.14)
MCH RBC QN AUTO: 32.4 PG (ref 26.5–33)
MCHC RBC AUTO-ENTMCNC: 32.6 G/DL (ref 31.5–36.5)
MCV RBC AUTO: 99 FL (ref 78–100)
PLATELET # BLD AUTO: 177 10E9/L (ref 150–450)
POTASSIUM SERPL-SCNC: 4.5 MMOL/L (ref 3.4–5.3)
RBC # BLD AUTO: 3.4 10E12/L (ref 4.4–5.9)
SODIUM SERPL-SCNC: 137 MMOL/L (ref 133–144)
WBC # BLD AUTO: 5.8 10E9/L (ref 4–11)

## 2020-02-10 PROCEDURE — 85610 PROTHROMBIN TIME: CPT | Performed by: RADIOLOGY

## 2020-02-10 PROCEDURE — 25000125 ZZHC RX 250: Performed by: PHYSICIAN ASSISTANT

## 2020-02-10 PROCEDURE — 99153 MOD SED SAME PHYS/QHP EA: CPT

## 2020-02-10 PROCEDURE — C1769 GUIDE WIRE: HCPCS

## 2020-02-10 PROCEDURE — 85027 COMPLETE CBC AUTOMATED: CPT | Performed by: RADIOLOGY

## 2020-02-10 PROCEDURE — 36581 REPLACE TUNNELED CV CATH: CPT

## 2020-02-10 PROCEDURE — 40000167 ZZH STATISTIC PP CARE STAGE 2

## 2020-02-10 PROCEDURE — 80048 BASIC METABOLIC PNL TOTAL CA: CPT | Performed by: RADIOLOGY

## 2020-02-10 PROCEDURE — 99152 MOD SED SAME PHYS/QHP 5/>YRS: CPT

## 2020-02-10 PROCEDURE — 25000128 H RX IP 250 OP 636: Performed by: PHYSICIAN ASSISTANT

## 2020-02-10 PROCEDURE — 25500064 ZZH RX 255 OP 636: Performed by: PHYSICIAN ASSISTANT

## 2020-02-10 PROCEDURE — C1750 CATH, HEMODIALYSIS,LONG-TERM: HCPCS

## 2020-02-10 PROCEDURE — A9585 GADOBUTROL INJECTION: HCPCS | Performed by: PHYSICIAN ASSISTANT

## 2020-02-10 RX ORDER — NICOTINE POLACRILEX 4 MG
15-30 LOZENGE BUCCAL
Status: DISCONTINUED | OUTPATIENT
Start: 2020-02-10 | End: 2020-02-10 | Stop reason: HOSPADM

## 2020-02-10 RX ORDER — CLINDAMYCIN PHOSPHATE 900 MG/50ML
900 INJECTION, SOLUTION INTRAVENOUS
Status: COMPLETED | OUTPATIENT
Start: 2020-02-10 | End: 2020-02-10

## 2020-02-10 RX ORDER — NALOXONE HYDROCHLORIDE 0.4 MG/ML
.1-.4 INJECTION, SOLUTION INTRAMUSCULAR; INTRAVENOUS; SUBCUTANEOUS
Status: DISCONTINUED | OUTPATIENT
Start: 2020-02-10 | End: 2020-02-10 | Stop reason: HOSPADM

## 2020-02-10 RX ORDER — LIDOCAINE 40 MG/G
CREAM TOPICAL
Status: DISCONTINUED | OUTPATIENT
Start: 2020-02-10 | End: 2020-02-10 | Stop reason: HOSPADM

## 2020-02-10 RX ORDER — HEPARIN SODIUM 1000 [USP'U]/ML
3 INJECTION, SOLUTION INTRAVENOUS; SUBCUTANEOUS ONCE
Status: COMPLETED | OUTPATIENT
Start: 2020-02-10 | End: 2020-02-10

## 2020-02-10 RX ORDER — GADOBUTROL 604.72 MG/ML
0.1 INJECTION INTRAVENOUS ONCE
Status: COMPLETED | OUTPATIENT
Start: 2020-02-10 | End: 2020-02-10

## 2020-02-10 RX ORDER — SODIUM CHLORIDE 9 MG/ML
INJECTION, SOLUTION INTRAVENOUS CONTINUOUS
Status: DISCONTINUED | OUTPATIENT
Start: 2020-02-10 | End: 2020-02-10 | Stop reason: HOSPADM

## 2020-02-10 RX ORDER — DEXTROSE MONOHYDRATE 25 G/50ML
25-50 INJECTION, SOLUTION INTRAVENOUS
Status: DISCONTINUED | OUTPATIENT
Start: 2020-02-10 | End: 2020-02-10 | Stop reason: HOSPADM

## 2020-02-10 RX ORDER — HEPARIN SODIUM 1000 [USP'U]/ML
3 INJECTION, SOLUTION INTRAVENOUS; SUBCUTANEOUS ONCE
Status: DISCONTINUED | OUTPATIENT
Start: 2020-02-10 | End: 2020-02-10 | Stop reason: HOSPADM

## 2020-02-10 RX ORDER — FLUMAZENIL 0.1 MG/ML
0.2 INJECTION, SOLUTION INTRAVENOUS
Status: DISCONTINUED | OUTPATIENT
Start: 2020-02-10 | End: 2020-02-10 | Stop reason: HOSPADM

## 2020-02-10 RX ORDER — FENTANYL CITRATE 50 UG/ML
25-50 INJECTION, SOLUTION INTRAMUSCULAR; INTRAVENOUS EVERY 5 MIN PRN
Status: DISCONTINUED | OUTPATIENT
Start: 2020-02-10 | End: 2020-02-10 | Stop reason: HOSPADM

## 2020-02-10 RX ADMIN — HEPARIN SODIUM 3000 UNITS: 1000 INJECTION, SOLUTION INTRAVENOUS; SUBCUTANEOUS at 11:20

## 2020-02-10 RX ADMIN — LIDOCAINE HYDROCHLORIDE 10 ML: 10 INJECTION, SOLUTION EPIDURAL; INFILTRATION; INTRACAUDAL; PERINEURAL at 11:16

## 2020-02-10 RX ADMIN — CLINDAMYCIN PHOSPHATE 900 MG: 900 INJECTION, SOLUTION INTRAVENOUS at 11:01

## 2020-02-10 RX ADMIN — MIDAZOLAM 0.5 MG: 1 INJECTION INTRAMUSCULAR; INTRAVENOUS at 10:55

## 2020-02-10 RX ADMIN — GADOBUTROL 15 ML: 604.72 INJECTION INTRAVENOUS at 11:17

## 2020-02-10 RX ADMIN — FENTANYL CITRATE 50 MCG: 50 INJECTION, SOLUTION INTRAMUSCULAR; INTRAVENOUS at 10:55

## 2020-02-10 ASSESSMENT — MIFFLIN-ST. JEOR: SCORE: 2064.46

## 2020-02-10 NOTE — PRE-PROCEDURE
GENERAL PRE-PROCEDURE:   Procedure:  IR CVC REVISION  Date/Time:  2/10/2020 9:27 AM    Written consent obtained?: Yes    Risks and benefits: Risks, benefits and alternatives were discussed    DC Plan: Appropriate discharge home plan in place for patients who are going home after procedure   Consent given by:  Patient  Patient states understanding of procedure being performed: Yes    Patient's understanding of procedure matches consent: Yes    Procedure consent matches procedure scheduled: Yes    Expected level of sedation:  Moderate  Appropriately NPO:  Yes  ASA Class:  Class 2- mild systemic disease, no acute problems, no functional limitations  Mallampati  :  Grade 2- soft palate, base of uvula, tonsillar pillars, and portion of posterior pharyngeal wall visible  Lungs:  Lungs clear with good breath sounds bilaterally  Heart:  Normal heart sounds and rate  History & Physical reviewed:  History and physical reviewed and no updates needed  Statement of review:  I have reviewed the lab findings, diagnostic data, medications, and the plan for sedation

## 2020-02-10 NOTE — DISCHARGE INSTRUCTIONS
Interventional Radiology                                                                   Discharge Instructions                                                                Following Tunneled Catheter Placement    Your site(s) has been closed with:     The Catheter is sutured  to skin at  insertion site    Derma Jo (Skin Glue) on neck incision    Do not apply any ointments over site    This thin layer will slough off in 7-10 days               May gently remove Derma Jo in 10 days if still present         Tunneled catheter is always covered with a Sterile Transparent dressing    Keep site clean and dry     Cover with an occlusive dressing for showering    Weekly transparent dressing changes or when it becomes wet or dirty     If there is any oozing or bleeding from the site, apply direct pressure for 5-10 minutes with a gauze pad.  If bleeding continues after 10 minutes call your primary doctor.  If bleeding cannot be controlled with direct pressure, call 911.    Call your Doctor if:    Bleeding as above    Swelling in your neck or arm    Sudden onset of shortness of breath, lightheadedness, or heart palpitations..    Fever greater than 100.5  F    Other signs of infection such as, redness, tenderness, or drainage from the wound.    If you were given sedation:    We recommend an adult stay with you for the first 24 hours.    No driving or alcoholic beverages for 24 hours.    ADDITIONAL INSTRUCTIONS:          If you are on Coumadin you may restart tonight.  Follow up Coumadin Clinic or Primary Care MD         To have your INR rechecked.           No heavy lifting greater than 10 lbs for 3 days.           May use ice pack for pain or minor swelling for 15 min 3-4 times per day.    [facility] Interventional Radiology Department    Physician:   Dr. Mae and Roberto Carlos Copeland PA-C                                 Date:February 10, 2020  Telephone Numbers:   [phone].....8 am to 4:30 pm   Monday-Friday  Hospital : [phone]....After hours, Weekends, & Holidays.  Ask for the Interventional Radiologist on call.  Someone is on call 24 hours a day.   Emergency Department:  [phone]

## 2020-02-10 NOTE — IP AVS SNAPSHOT
MRN:[id number]                      After Visit Summary   2/10/2020    Fer Latham    MRN: [id number]           Visit Information        Department      2/10/2020  8:24 AM [address] [facility]          Review of your medicines      UNREVIEWED medicines. Ask your doctor about these medicines       Dose / Directions   aspirin 325 MG EC tablet  Commonly known as:  ASA      Dose:  325 mg  Take 325 mg by mouth daily  Refills:  0     cholecalciferol 25 MCG (1000 UT) Tabs      Dose:  2 tablet  Take 2 tablets by mouth daily  Refills:  0     diazepam 5 MG tablet  Commonly known as:  VALIUM      Dose:  5 mg  Take 5 mg by mouth every 12 hours as needed for anxiety  Refills:  0              Protect others around you: Learn how to safely use, store and throw away your medicines at www.disposemymeds.org.       Follow-ups after your visit       Care Instructions       Further instructions from your care team                                                                        Interventional Radiology                                                                   Discharge Instructions                                                                Following Tunneled Catheter Placement    Your site(s) has been closed with:     The Catheter is sutured  to skin at  insertion site    Derma Jo (Skin Glue) on neck incision    Do not apply any ointments over site    This thin layer will slough off in 7-10 days               May gently remove Derma Jo in 10 days if still present         Tunneled catheter is always covered with a Sterile Transparent dressing    Keep site clean and dry     Cover with an occlusive dressing for showering    Weekly transparent dressing changes or when it becomes wet or dirty     If there is any oozing or bleeding from the site, apply direct pressure for 5-10 minutes with a gauze pad.  If bleeding continues after 10 minutes call your primary doctor.  If bleeding cannot be  "controlled with direct pressure, call 911.    Call your Doctor if:    Bleeding as above    Swelling in your neck or arm    Sudden onset of shortness of breath, lightheadedness, or heart palpitations..    Fever greater than 100.5  F    Other signs of infection such as, redness, tenderness, or drainage from the wound.    If you were given sedation:    We recommend an adult stay with you for the first 24 hours.    No driving or alcoholic beverages for 24 hours.    ADDITIONAL INSTRUCTIONS:          If you are on Coumadin you may restart tonight.  Follow up Coumadin Clinic or Primary Care MD         To have your INR rechecked.           No heavy lifting greater than 10 lbs for 3 days.           May use ice pack for pain or minor swelling for 15 min 3-4 times per day.    [facility] Interventional Radiology Department    Physician:   Dr. Mae and Roberto Carlos Copeland PA-C                                 Date:February 10, 2020  Telephone Numbers:  [phone].....8 am to 4:30 pm   Monday-Friday  Hospital : [phone]....After hours, Weekends, & Holidays.  Ask for the Interventional Radiologist on call.  Someone is on call 24 hours a day.   Emergency Department:  [phone]                                                                                                                                                              Additional Information About Your Visit       [employer] Information    [employer] lets you send messages to your doctor, view your test results, renew your prescriptions, schedule appointments and more. To sign up, go to [email or web address] . Click on \"Log in\" on the left side of the screen, which will take you to the Welcome page. Then click on \"Sign up Now\" on the right side of the page.     You will be asked to enter the access code listed below, as well as some personal information. Please follow the directions to create your username and password.     Your access code is: " "[email or web address]  Expires: 3/28/2020  5:16 AM     Your access code will  in 60 days. If you need help or a new code, please call your [facility] clinic or [phone].       Care EveryWhere ID    This is your Care EveryWhere ID. This could be used by other organizations to access your [facility] medical records  [id number]       Your Vitals Were  Most recent update: 2/10/2020 11:48 AM    Blood Pressure   151/81            Pulse   78          Temperature   98.1  F (36.7  C) (Oral)          Respirations   16          Height   1.88 m (6' 2\")             Weight   122.5 kg (270 lb)    Pulse Oximetry   98%    BMI (Body Mass Index)   34.67 kg/m           Primary Care Provider Office Phone # Fax #    Isrrael Ornelas [phone]      Equal Access to Services    [facility] : Hadii mariann prakash hadasho Soomaali, waaxda luqadaha, qaybta kaalmada adeegyada, waxay lillianain haymanishan mayur merida . So [facility] [place] [phone].    ATENCIÓN: Si habla español, tiene a scherer disposición servicios gratuitos de asistencia lingüística. Eduin al [phone].    We comply with applicable federal and state civil rights laws, including the [place] Human Rights Act. We do not discriminate on the basis of race, color, creed, Adventism, national origin, marital status, age, disability, sex, sexual orientation, or gender identity.       Thank you!    Thank you for choosing [facility] for your care. Our goal is always to provide you with excellent care. Hearing back from our patients is one way we can continue to improve our services. Please take a few minutes to complete the written survey that you may receive in the mail after you visit with us. Thank you!            Medication List      ASK your doctor about these medications          Morning Afternoon Evening Bedtime As Needed    aspirin 325 MG EC tablet  Also known as:  ASA  INSTRUCTIONS:  Take 325 mg by mouth daily                     cholecalciferol 25 MCG (1000 UT) " Tabs  INSTRUCTIONS:  Take 2 tablets by mouth daily                     diazepam 5 MG tablet  Also known as:  VALIUM  INSTRUCTIONS:  Take 5 mg by mouth every 12 hours as needed for anxiety

## 2020-02-10 NOTE — PROGRESS NOTES
Arrived from home for tunneled line replacement.  VSS.  C/O chronic low back pain.  PIV placed and labs sent.  H&P current.  Labs sent and PIV placed.  Will be ready for procedure when labs resulted.

## 2020-02-10 NOTE — PROGRESS NOTES
Returned from IR s/p tunneled line replacement.  VSS.  Denies pain.  Right chest tunneled line site CDI, covered with a clear dressing.  Resting in bed.

## 2020-02-10 NOTE — IP AVS SNAPSHOT
[address] [address]                                    After Visit Summary   2/10/2020    Fer Latham    MRN: [id number]           After Visit Summary Signature Page    I have received my discharge instructions, and my questions have been answered. I have discussed any challenges I see with this plan with the nurse or doctor.    ..........................................................................................................................................  Patient/Patient Representative Signature      ..........................................................................................................................................  Patient Representative Print Name and Relationship to Patient    ..................................................               ................................................  Date                                   Time    ..........................................................................................................................................  Reviewed by Signature/Title    ...................................................              ..............................................  Date                                               Time          22EPIC Rev 08/18

## 2020-02-10 NOTE — PROCEDURES
General acute hospital, [place]    Procedure: IR Procedure Note  Date/Time: 2/10/2020 11:26 AM  Performed by: Pete Copeland PA-C  Authorized by: Pete Copeland PA-C   IR Fellow Physician:  Other(s) attending procedure: Mahesh Suero MD    UNIVERSAL PROTOCOL   Site Marked: No  Prior Images Obtained and Reviewed:  Yes  Required items: Required blood products, implants, devices and special equipment available    Patient identity confirmed:  Verbally with patient, arm band, provided demographic data and hospital-assigned identification number  Patient was reevaluated immediately before administering moderate or deep sedation or anesthesia  Confirmation Checklist:  Patient's identity using two indicators, relevant allergies, procedure was appropriate and matched the consent or emergent situation and correct equipment/implants were available  Time out: Immediately prior to the procedure a time out was called    Universal Protocol: the Joint Commission Universal Protocol was followed    Preparation: Patient was prepped and draped in usual sterile fashion           ANESTHESIA    Anesthesia: Local infiltration  Local Anesthetic:  Lidocaine 1% without epinephrine  Anesthetic Total (mL):  10      SEDATION    Patient Sedated: Yes    Sedation Type:  Moderate (conscious) sedation  Sedation:  Fentanyl and midazolam  Vital signs: Vital signs monitored during sedation    Fluoroscopy Time: 2 minute(s)  See dictated procedure note for full details.  Findings: 50 mcg and 0.5 mg, 25 minutes, tolerated well    Specimens: none    Complications: None    Condition: Stable    Plan: TCVC ready for immediate use. 1 hour monitored recovery.    PROCEDURE   Patient Tolerance:  Patient tolerated the procedure well with no immediate complications  Describe Procedure: Existing right IJ 15.5 Fr 28 cm (23 tip to cuff) Angiodynamics Dura Flow 2 with inadequate aspiration from both lumens. Evidence of fibrin sheath on  TCVC check. Catheter tip in high right atrium. Removed existing catheter and performed balloon obliteration of fibrin sheath. New 16 Fr 32 cm (27 cm tip to cuff) MedComp split cath placed with catheter tip 2 cm deeper in right atrium. Catheter functions well, heparin locked and ready for immediate use.  Length of time physician/provider present for 1:1 monitoring during sedation: 25

## 2020-02-10 NOTE — PROGRESS NOTES
Tolerated bedrest without issues.  Tolerated food, fluids, ambulation and urination without issues.  PIV removed.  Right chest tunneled line site CDI.  Reviewed discharge instructions with patient.  Discharged to home with wife.

## 2020-02-10 NOTE — IR NOTE
Patient Name: Fer Latham  Medical Record Number: [id number]  Today's Date: 2/10/2020    Procedure: central venous catheter tunneled line revision  Proceduralist: Pete Copeland PA-C, Dr. Mahesh Suero    Sedation start time: 1055  Sedation end time: 1125  Sedation medications administered: 50mcg fentanyl, 0.5mg versed   Total sedation time: 30 minutes  Sedation Notes: No complications     Procedure start time: 1042, 1058 with Dr. Suero  Puncture time: 1047  Procedure end time: 1130    Report given to: COREY Macdonald   : n/a    Other Notes: Pt arrived to IR room 2 from . Consent reviewed. Pt denies any questions or concerns regarding procedure. Pt positioned supine and monitored per protocol.     Area prepped, line accessed and checked, images obtained, Plasty completed. New line placed. Each line heparin locked with 1000u/ml heparin. Central line dressing applied.    Upon departure- alert, oriented, calm. Respirations eupneic on room air. Pt tolerated procedure without any noted complications. Pt transferred back to  for recovery.

## 2020-02-10 NOTE — TELEPHONE ENCOUNTER
"02/10/20   2:36 PM     Call made to Nirav at [phone] (twice to make sure I had dialed correctly) and got automated message that \"subscriber you have dialed is not in service.\"    Lin Puente MD   "

## 2020-02-17 ENCOUNTER — TELEPHONE (OUTPATIENT)
Dept: NEPHROLOGY | Facility: CLINIC | Age: 70
End: 2020-02-17

## 2020-02-17 NOTE — TELEPHONE ENCOUNTER
email received this morning - 3am - This is Ailin Latham, Fer Latham's wife.  Could you please call me this afternoon, while Fer is at dialysis?  I just have a quick few questions to ask you and would really appreciate it if you could spare a few minutes for me.  Thanking you in advance and looking forward to hearing from you.    email received this morning 3:08 am    This is Ailin Latham,again.  I forgot to give you my phone number.  It is [phone].  Thank you.    02/17/20  3:48 PM  I called the above number - voicemail said it was Ailin Latham so I left message stating that I am returning her call.  Lin Puente MD     02/17/20  4:32 PM  Ailin is concerned that Fer is checking out - maybe depressed.  He doesn't want to do anything.  He doesn't get good sleep because he gets up every 2 hours to empty his bladder.  I will have dialysis social worker do assessment.  He has been prescribed sertraline in the past but refuses to take.  Discussed Fer's symptoms and path forward for about 30 minutes.  She stated she is glad I called and said she feels better.    Lin Puente MD

## 2020-02-18 ENCOUNTER — MEDICAL CORRESPONDENCE (OUTPATIENT)
Dept: HEALTH INFORMATION MANAGEMENT | Facility: CLINIC | Age: 70
End: 2020-02-18

## 2020-03-27 ENCOUNTER — TELEPHONE (OUTPATIENT)
Dept: NEPHROLOGY | Facility: CLINIC | Age: 70
End: 2020-03-27

## 2020-03-27 NOTE — TELEPHONE ENCOUNTER
I notified Fer and Ailin that [facility] has accepted him for PD contingent on follow up home visit.  Lin Puente MD

## 2020-04-17 ENCOUNTER — TELEPHONE (OUTPATIENT)
Dept: TRANSPLANT | Facility: CLINIC | Age: 70
End: 2020-04-17

## 2020-04-17 NOTE — TELEPHONE ENCOUNTER
A F/U call to Mike Sánchez PD RN Shahana regarding patient's home visit status to move forward for PD cath surgical consult. Shahana PD RN reports that patient's wife would not allow PD RN to enter home for home assessment  D/T COVID 19 outbreak. Wife would like to wait after COVID 19 outbreak resolved to reschedule home visit. Shahana PD RN will call writer later. Writer verbalized acceptance and understanding of plan.

## 2020-05-14 ENCOUNTER — APPOINTMENT (OUTPATIENT)
Dept: CT IMAGING | Facility: CLINIC | Age: 70
End: 2020-05-14
Attending: EMERGENCY MEDICINE
Payer: MEDICARE

## 2020-05-14 ENCOUNTER — HOSPITAL ENCOUNTER (EMERGENCY)
Facility: CLINIC | Age: 70
Discharge: HOME | End: 2020-05-14
Attending: EMERGENCY MEDICINE | Admitting: EMERGENCY MEDICINE
Payer: MEDICARE

## 2020-05-14 ENCOUNTER — APPOINTMENT (OUTPATIENT)
Dept: GENERAL RADIOLOGY | Facility: CLINIC | Age: 70
End: 2020-05-14
Attending: EMERGENCY MEDICINE
Payer: MEDICARE

## 2020-05-14 VITALS
WEIGHT: 298 LBS | TEMPERATURE: 97.7 F | BODY MASS INDEX: 38.26 KG/M2 | HEART RATE: 61 BPM | DIASTOLIC BLOOD PRESSURE: 74 MMHG | RESPIRATION RATE: 19 BRPM | SYSTOLIC BLOOD PRESSURE: 122 MMHG | OXYGEN SATURATION: 98 %

## 2020-05-14 DIAGNOSIS — F10.129 HANGOVER (H): ICD-10-CM

## 2020-05-14 DIAGNOSIS — E87.1 HYPONATREMIA: ICD-10-CM

## 2020-05-14 DIAGNOSIS — N39.0 ACUTE UTI: ICD-10-CM

## 2020-05-14 DIAGNOSIS — F10.10 ALCOHOL ABUSE: ICD-10-CM

## 2020-05-14 DIAGNOSIS — F10.929 ALCOHOLIC INTOXICATION WITH COMPLICATION (H): ICD-10-CM
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
## 2020-05-14 DIAGNOSIS — F10.220 ALCOHOL DEPENDENCE WITH UNCOMPLICATED INTOXICATION (H): ICD-10-CM

## 2020-05-14 LAB
ALBUMIN SERPL-MCNC: 2.8 G/DL (ref 3.4–5)
ALBUMIN UR-MCNC: 100 MG/DL
ALP SERPL-CCNC: 85 U/L (ref 40–150)
ALT SERPL W P-5'-P-CCNC: 15 U/L (ref 0–70)
ANION GAP SERPL CALCULATED.3IONS-SCNC: 10 MMOL/L (ref 3–14)
APPEARANCE UR: ABNORMAL
AST SERPL W P-5'-P-CCNC: 16 U/L (ref 0–45)
BASOPHILS # BLD AUTO: 0.1 10E9/L (ref 0–0.2)
BASOPHILS NFR BLD AUTO: 1.3 %
BILIRUB SERPL-MCNC: 0.4 MG/DL (ref 0.2–1.3)
BILIRUB UR QL STRIP: NEGATIVE
BUN SERPL-MCNC: 21 MG/DL (ref 7–30)
CALCIUM SERPL-MCNC: 7.9 MG/DL (ref 8.5–10.1)
CHLORIDE SERPL-SCNC: 98 MMOL/L (ref 94–109)
CO2 SERPL-SCNC: 21 MMOL/L (ref 20–32)
COLOR UR AUTO: YELLOW
CREAT SERPL-MCNC: 2.42 MG/DL (ref 0.66–1.25)
DIFFERENTIAL METHOD BLD: ABNORMAL
EOSINOPHIL # BLD AUTO: 0.2 10E9/L (ref 0–0.7)
EOSINOPHIL NFR BLD AUTO: 6.1 %
ERYTHROCYTE [DISTWIDTH] IN BLOOD BY AUTOMATED COUNT: 13.5 % (ref 10–15)
ETHANOL SERPL-MCNC: 0.24 G/DL
GFR SERPL CREATININE-BSD FRML MDRD: 26 ML/MIN/{1.73_M2}
GLUCOSE BLDC GLUCOMTR-MCNC: 103 MG/DL (ref 70–99)
GLUCOSE SERPL-MCNC: 100 MG/DL (ref 70–99)
GLUCOSE UR STRIP-MCNC: NEGATIVE MG/DL
HCT VFR BLD AUTO: 31.4 % (ref 40–53)
HGB BLD-MCNC: 10.4 G/DL (ref 13.3–17.7)
HGB UR QL STRIP: ABNORMAL
IMM GRANULOCYTES # BLD: 0 10E9/L (ref 0–0.4)
IMM GRANULOCYTES NFR BLD: 0.5 %
KETONES UR STRIP-MCNC: NEGATIVE MG/DL
LEUKOCYTE ESTERASE UR QL STRIP: ABNORMAL
LYMPHOCYTES # BLD AUTO: 1 10E9/L (ref 0.8–5.3)
LYMPHOCYTES NFR BLD AUTO: 24.4 %
MAGNESIUM SERPL-MCNC: 1.8 MG/DL (ref 1.6–2.3)
MCH RBC QN AUTO: 31.7 PG (ref 26.5–33)
MCHC RBC AUTO-ENTMCNC: 33.1 G/DL (ref 31.5–36.5)
MCV RBC AUTO: 96 FL (ref 78–100)
MONOCYTES # BLD AUTO: 0.4 10E9/L (ref 0–1.3)
MONOCYTES NFR BLD AUTO: 9.4 %
MUCOUS THREADS #/AREA URNS LPF: PRESENT /LPF
NEUTROPHILS # BLD AUTO: 2.3 10E9/L (ref 1.6–8.3)
NEUTROPHILS NFR BLD AUTO: 58.3 %
NITRATE UR QL: NEGATIVE
NRBC # BLD AUTO: 0 10*3/UL
NRBC BLD AUTO-RTO: 0 /100
NT-PROBNP SERPL-MCNC: 1484 PG/ML (ref 0–900)
PH UR STRIP: 5.5 PH (ref 5–7)
PLATELET # BLD AUTO: 145 10E9/L (ref 150–450)
POTASSIUM SERPL-SCNC: 3.6 MMOL/L (ref 3.4–5.3)
PROT SERPL-MCNC: 6.7 G/DL (ref 6.8–8.8)
RBC # BLD AUTO: 3.28 10E12/L (ref 4.4–5.9)
RBC #/AREA URNS AUTO: 1 /HPF (ref 0–2)
SODIUM SERPL-SCNC: 128 MMOL/L (ref 133–144)
SOURCE: ABNORMAL
SP GR UR STRIP: 1.01 (ref 1–1.03)
SQUAMOUS #/AREA URNS AUTO: <1 /HPF (ref 0–1)
TROPONIN I SERPL-MCNC: <0.015 UG/L (ref 0–0.04)
UROBILINOGEN UR STRIP-MCNC: NORMAL MG/DL (ref 0–2)
WBC # BLD AUTO: 3.9 10E9/L (ref 4–11)
WBC #/AREA URNS AUTO: 159 /HPF (ref 0–5)
WBC CLUMPS #/AREA URNS HPF: PRESENT /HPF

## 2020-05-14 PROCEDURE — 99285 EMERGENCY DEPT VISIT HI MDM: CPT | Mod: 25

## 2020-05-14 PROCEDURE — 85025 COMPLETE CBC W/AUTO DIFF WBC: CPT | Performed by: EMERGENCY MEDICINE

## 2020-05-14 PROCEDURE — 81001 URINALYSIS AUTO W/SCOPE: CPT | Performed by: EMERGENCY MEDICINE

## 2020-05-14 PROCEDURE — 84484 ASSAY OF TROPONIN QUANT: CPT | Performed by: EMERGENCY MEDICINE

## 2020-05-14 PROCEDURE — 83735 ASSAY OF MAGNESIUM: CPT | Performed by: EMERGENCY MEDICINE

## 2020-05-14 PROCEDURE — 83880 ASSAY OF NATRIURETIC PEPTIDE: CPT | Performed by: EMERGENCY MEDICINE

## 2020-05-14 PROCEDURE — 70450 CT HEAD/BRAIN W/O DYE: CPT

## 2020-05-14 PROCEDURE — 80053 COMPREHEN METABOLIC PANEL: CPT | Performed by: EMERGENCY MEDICINE

## 2020-05-14 PROCEDURE — 99285 EMERGENCY DEPT VISIT HI MDM: CPT | Mod: 25 | Performed by: EMERGENCY MEDICINE

## 2020-05-14 PROCEDURE — 93005 ELECTROCARDIOGRAM TRACING: CPT

## 2020-05-14 PROCEDURE — 00000146 ZZHCL STATISTIC GLUCOSE BY METER IP

## 2020-05-14 PROCEDURE — 93010 ELECTROCARDIOGRAM REPORT: CPT | Mod: Z6 | Performed by: EMERGENCY MEDICINE

## 2020-05-14 PROCEDURE — 80320 DRUG SCREEN QUANTALCOHOLS: CPT | Performed by: EMERGENCY MEDICINE

## 2020-05-14 PROCEDURE — 71046 X-RAY EXAM CHEST 2 VIEWS: CPT

## 2020-05-14 PROCEDURE — 87086 URINE CULTURE/COLONY COUNT: CPT | Performed by: EMERGENCY MEDICINE

## 2020-05-14 RX ORDER — AMOXICILLIN 500 MG/1
500 TABLET, FILM COATED ORAL 2 TIMES DAILY
Qty: 14 TABLET | Refills: 0 | Status: SHIPPED | OUTPATIENT
Start: 2020-05-14 | End: 2020-05-21

## 2020-05-14 NOTE — ED NOTES
Bed: ED23  Expected date:   Expected time: 4:30 PM  Means of arrival: Ambulance  Comments:  H427     69M with near-syncopal episode   Triaged yellow

## 2020-05-14 NOTE — ED TRIAGE NOTES
Pt BIBA with c/o of syncope episode. Pt had a couple drinks of whisky with his lunch and went to use the bathroom, when he stood up he felt funny and fell. Hx of afib at baseline. On baby Asprin. . Pt is on Dialysis, last run on friday.

## 2020-05-14 NOTE — ED AVS SNAPSHOT
[facility], [place], Emergency Department  [address] [place] [id number]  Phone:  [phone]                                    Fer Latham   MRN: [id number]    Department:  [facility], Emergency Department   Date of Visit:  5/14/2020           After Visit Summary Signature Page    I have received my discharge instructions, and my questions have been answered. I have discussed any challenges I see with this plan with the nurse or doctor.    ..........................................................................................................................................  Patient/Patient Representative Signature      ..........................................................................................................................................  Patient Representative Print Name and Relationship to Patient    ..................................................               ................................................  Date                                   Time    ..........................................................................................................................................  Reviewed by Signature/Title    ...................................................              ..............................................  Date                                               Time          22EPIC Rev 08/18

## 2020-05-14 NOTE — DISCHARGE INSTRUCTIONS
TODAY'S VISIT:  You were seen today for fall  -   - If you had any labs or imaging/radiology tests performed today, you should also discuss these tests with your usual provider.     FOLLOW-UP:  Please make an appointment to follow up with:  - Your Primary Care Provider. If you do not have a PCP, please call the Primary Care Center (phone: [phone] for an appointment  - your dialysis appointment tomorrow     - Have your provider review the results from today's visit with you again to make sure no further follow-up or additional testing is needed based on those results.     RETURN TO THE EMERGENCY DEPARTMENT  Return to the Emergency Department at any time for any new or worsening symptoms or any concerns.

## 2020-05-14 NOTE — ED PROVIDER NOTES
History     Chief Complaint   Patient presents with     Syncope     HPI  Fer Latham is a 69 year old male with a past medical history of anemia, atrial fibrillation, gout, depression, diabetes, end-stage renal disease on hemodialysis, hypertension, CAD who presents to the emergency department with a chief complaint of loss of balance.  The patient was brought in by ambulance.  Patient states that he had a couple of glasses of whiskey with his lunch.  When he got up to use the bathroom, immediately on standing, he felt funny and fell down.  He denies any loss of consciousness.  The patient is on baby aspirin.  No other blood thinners.  Blood glucose 131.  Patient's last dialysis was on Friday.  The patient denies any head or neck trauma.  He denies any pain anywhere.  He feels back to baseline and would like to go home soon as possible.  The patient does have an appointment for hemodialysis tomorrow.  The patient states that he drinks alcohol daily, he states that he had approximately the same amount as he usually does today.  Patient denies chest pain, shortness of breath, headache, neck pain, abdominal pain, nausea, vomiting, increased leg swelling, or any other new symptoms.  No numbness, weakness, or tingling.  No difficulties with speech.    I have reviewed the Medications, Allergies, Past Medical and Surgical History, and Social History in the Get Satisfaction system.    Past Medical History:   Diagnosis Date     Anemia      Arrhythmia     atrial fib     Arthritis 1990's    gout     BPH (benign prostatic hyperplasia)      Depression      Diabetes mellitus (H)     Type 2  IDDM     Diverticulosis      ESRD (end stage renal disease) on dialysis (H)      Hematuria      HTN (hypertension)      HTN (hypertension)      IgA nephropathy      MI, old      Pneumonia      Rhabdomyolysis      Weakness 5/10/2019     Past Surgical History:   Procedure Laterality Date     ABDOMEN SURGERY       APPENDECTOMY  1970's     ARTHROSCOPY  KNEE  6/27/2013    Procedure: ARTHROSCOPY KNEE;  Right Knee Arthroscopy, Debridment Of Medial Meniscal Tear.  ;  Surgeon: Tomás Wong MD;  [facility]     BACK SURGERY  1999    L5 S1 decompression     BIOPSY  2017    skin     COLONOSCOPY  2013    [facility]     CORONARY ANGIOGRAPHY ADULT ORDER  2018     EYE SURGERY Bilateral 2004    Lens replacement     IR CVC TUNNEL PLACEMENT > 5 YRS OF AGE  11/26/2019     IR CVC TUNNEL REMOVAL RIGHT  11/26/2019     IR CVC TUNNEL REVISION RIGHT  2/10/2020     VASCULAR SURGERY       No current facility-administered medications for this encounter.      Current Outpatient Medications   Medication     aspirin (ASA) 325 MG EC tablet     cholecalciferol 1000 units TABS     diazepam (VALIUM) 5 MG tablet     Allergies   Allergen Reactions     Cephalexin      Ciprofloxacin      Diagnostic X-Ray Materials      Sulfa Drugs Hives     Past medical history, past surgical history, medications, and allergies were reviewed with the patient. Additional pertinent items: None    Social History     Socioeconomic History     Marital status:      Spouse name: Not on file     Number of children: Not on file     Years of education: Not on file     Highest education level: Not on file   Occupational History     Not on file   Social Needs     Financial resource strain: Not on file     Food insecurity     Worry: Not on file     Inability: Not on file     Transportation needs     Medical: Not on file     Non-medical: Not on file   Tobacco Use     Smoking status: Former Smoker     Smokeless tobacco: Never Used     Tobacco comment: quit 35 years ago   Substance and Sexual Activity     Alcohol use: Yes     Comment: 1 to 2 drinks a day (Occasional)     Drug use: No     Sexual activity: Not on file   Lifestyle     Physical activity     Days per week: Not on file     Minutes per session: Not on file     Stress: Not on file   Relationships     Social connections     Talks on phone: Not on file     Gets  together: Not on file     Attends Shinto service: Not on file     Active member of club or organization: Not on file     Attends meetings of clubs or organizations: Not on file     Relationship status: Not on file     Intimate partner violence     Fear of current or ex partner: Not on file     Emotionally abused: Not on file     Physically abused: Not on file     Forced sexual activity: Not on file   Other Topics Concern     Parent/sibling w/ CABG, MI or angioplasty before 65F 55M? Not Asked   Social History Narrative     Not on file     Social history was reviewed with the patient. Additional pertinent items: None    Review of Systems  General: No fevers or chills  Skin: No rash or diaphoresis  Eyes: No eye redness or discharge  Ears/Nose/Throat: No rhinorrhea or nasal congestion  Respiratory: No cough or SOB  Cardiovascular: No chest pain or palpitations  Gastrointestinal: No nausea, vomiting, or diarrhea  Genitourinary: Positive for end-stage renal disease, on hemodialysis  Musculoskeletal: No arthralgias or myalgias  Neurologic: No numbness or weakness, patient denies loss of consciousness  Psychiatric: Positive for alcohol use  Hematologic/Lymphatic/Immunologic: Positive for bilateral leg swelling (at baseline), no easy bruising/bleeding  Endocrine: No polyuria/polydypsia    A complete review of systems was performed with pertinent positives and negatives noted in the HPI, and all other systems negative.    Physical Exam   BP: 102/60  Pulse: 67  Temp: 97.7  F (36.5  C)  Resp: 18  Weight: 135.2 kg (298 lb)  SpO2: 99 %      General: Well nourished, well developed, NAD  HEENT: EOMI, anicteric. NCAT, MMM  Neck: no jugular venous distension, supple, nl ROM  Cardiac: Regular rate and rhythm. No murmurs, rubs, or gallops. Normal S1, S2.  Intact peripheral pulses  Pulm: CTAB, no stridor, wheezes, rales, rhonchi  Abd: Soft, nontender, nondistended.  No masses palpated.    Skin: Warm and dry to the touch.  No  rash  Extremities: Bilateral pitting LE edema, no cyanosis, w/w/p, no posterior calf tenderness  Neuro: Alert and oriented x 4, no facial droop, CN II-XII intact, strength 5/5 all 4 extremities, sensation intact throughout, steady gait, no nystagmus, coordination normal as tested on finger-to-nose, heel-to-shin, and rapid alternating movements. PERRL, EOMI, visual fields intact.  No pronator drift, no lower extremity drift.  Speech is clear without aphasia or dysarthria.      ED Course        Procedures             EKG Interpretation:      Interpreted by Liya Magallon MD  Time reviewed: 1758  Symptoms at time of EKG: None, prior near syncopal episode  Rhythm: Atrial fibrillation with slow ventricular response  Rate: Bradycardic, 58 bpm  Axis: normal  Ectopy: none  Conduction: normal  ST Segments/ T Waves: Poor R wave progression, no ST-T wave changes  Q Waves: none  Comparison to prior: Unchanged from October 2, 2019    Clinical Impression: abnormal EKG                        Labs Ordered and Resulted from Time of ED Arrival Up to the Time of Departure from the ED   CBC WITH PLATELETS DIFFERENTIAL - Abnormal; Notable for the following components:       Result Value    WBC 3.9 (*)     RBC Count 3.28 (*)     Hemoglobin 10.4 (*)     Hematocrit 31.4 (*)     Platelet Count 145 (*)     All other components within normal limits   COMPREHENSIVE METABOLIC PANEL - Abnormal; Notable for the following components:    Sodium 128 (*)     Glucose 100 (*)     Creatinine 2.42 (*)     GFR Estimate 26 (*)     GFR Estimate If Black 30 (*)     Calcium 7.9 (*)     Albumin 2.8 (*)     Protein Total 6.7 (*)     All other components within normal limits   UA MACROSCOPIC WITH REFLEX TO MICRO AND CULTURE - Abnormal; Notable for the following components:    Blood Urine Trace (*)     Protein Albumin Urine 100 (*)     Leukocyte Esterase Urine Large (*)     WBC Urine 159 (*)     WBC Clumps Present (*)     Mucous Urine Present (*)     All  other components within normal limits   NT PROBNP INPATIENT - Abnormal; Notable for the following components:    N-Terminal Pro BNP Inpatient 1,484 (*)     All other components within normal limits   ALCOHOL ETHYL - Abnormal; Notable for the following components:    Ethanol g/dL 0.24 (*)     All other components within normal limits   GLUCOSE BY METER - Abnormal; Notable for the following components:    Glucose 103 (*)     All other components within normal limits   TROPONIN I   MAGNESIUM   GLUCOSE MONITOR NURSING POCT   CARDIAC CONTINUOUS MONITORING   URINE CULTURE AEROBIC BACTERIAL            Results for orders placed or performed during the hospital encounter of 05/14/20 (from the past 24 hour(s))   EKG 12-lead, tracing only   Result Value Ref Range    Interpretation ECG Click View Image link to view waveform and result    CBC with platelets differential   Result Value Ref Range    WBC 3.9 (L) 4.0 - 11.0 10e9/L    RBC Count 3.28 (L) 4.4 - 5.9 10e12/L    Hemoglobin 10.4 (L) 13.3 - 17.7 g/dL    Hematocrit 31.4 (L) 40.0 - 53.0 %    MCV 96 78 - 100 fl    MCH 31.7 26.5 - 33.0 pg    MCHC 33.1 31.5 - 36.5 g/dL    RDW 13.5 10.0 - 15.0 %    Platelet Count 145 (L) 150 - 450 10e9/L    Diff Method Automated Method     % Neutrophils 58.3 %    % Lymphocytes 24.4 %    % Monocytes 9.4 %    % Eosinophils 6.1 %    % Basophils 1.3 %    % Immature Granulocytes 0.5 %    Nucleated RBCs 0 0 /100    Absolute Neutrophil 2.3 1.6 - 8.3 10e9/L    Absolute Lymphocytes 1.0 0.8 - 5.3 10e9/L    Absolute Monocytes 0.4 0.0 - 1.3 10e9/L    Absolute Eosinophils 0.2 0.0 - 0.7 10e9/L    Absolute Basophils 0.1 0.0 - 0.2 10e9/L    Abs Immature Granulocytes 0.0 0 - 0.4 10e9/L    Absolute Nucleated RBC 0.0    Comprehensive metabolic panel   Result Value Ref Range    Sodium 128 (L) 133 - 144 mmol/L    Potassium 3.6 3.4 - 5.3 mmol/L    Chloride 98 94 - 109 mmol/L    Carbon Dioxide 21 20 - 32 mmol/L    Anion Gap 10 3 - 14 mmol/L    Glucose 100 (H) 70 - 99  mg/dL    Urea Nitrogen 21 7 - 30 mg/dL    Creatinine 2.42 (H) 0.66 - 1.25 mg/dL    GFR Estimate 26 (L) >60 mL/min/[1.73_m2]    GFR Estimate If Black 30 (L) >60 mL/min/[1.73_m2]    Calcium 7.9 (L) 8.5 - 10.1 mg/dL    Bilirubin Total 0.4 0.2 - 1.3 mg/dL    Albumin 2.8 (L) 3.4 - 5.0 g/dL    Protein Total 6.7 (L) 6.8 - 8.8 g/dL    Alkaline Phosphatase 85 40 - 150 U/L    ALT 15 0 - 70 U/L    AST 16 0 - 45 U/L   Troponin I   Result Value Ref Range    Troponin I ES <0.015 0.000 - 0.045 ug/L   Magnesium   Result Value Ref Range    Magnesium 1.8 1.6 - 2.3 mg/dL   Nt probnp inpatient (BNP)   Result Value Ref Range    N-Terminal Pro BNP Inpatient 1,484 (H) 0 - 900 pg/mL   Alcohol ethyl   Result Value Ref Range    Ethanol g/dL 0.24 (H) <0.01 g/dL   Glucose by meter   Result Value Ref Range    Glucose 103 (H) 70 - 99 mg/dL   UA reflex to Microscopic and Culture    Specimen: Urine Midstream; Midstream Urine   Result Value Ref Range    Color Urine Yellow     Appearance Urine Slightly Cloudy     Glucose Urine Negative NEG^Negative mg/dL    Bilirubin Urine Negative NEG^Negative    Ketones Urine Negative NEG^Negative mg/dL    Specific Gravity Urine 1.010 1.003 - 1.035    Blood Urine Trace (A) NEG^Negative    pH Urine 5.5 5.0 - 7.0 pH    Protein Albumin Urine 100 (A) NEG^Negative mg/dL    Urobilinogen mg/dL Normal 0.0 - 2.0 mg/dL    Nitrite Urine Negative NEG^Negative    Leukocyte Esterase Urine Large (A) NEG^Negative    Source Midstream Urine     RBC Urine 1 0 - 2 /HPF    WBC Urine 159 (H) 0 - 5 /HPF    WBC Clumps Present (A) NEG^Negative /HPF    Squamous Epithelial /HPF Urine <1 0 - 1 /HPF    Mucous Urine Present (A) NEG^Negative /LPF   XR Chest 2 Views    Narrative    Stable central venous catheter with the tip projects over right  atrium. No substantial change in left retrocardiac opacities, favors  to be atelectasis.   CT Head w/o Contrast    Impression    Impression:   1. No acute intracranial pathology.  2. Diffuse cerebral  volume loss and cerebral white matter changes  consistent with chronic small vessel ischemic disease.       Labs, vital signs, and imaging studies were reviewed by me.    Medications - No data to display    Assessments & Plan (with Medical Decision Making)   Fer Latham is a 69 year old male who presents after a fall, initially reported as a syncopal episode, but patient denies loss of consciousness or near syncope.  Differential diagnosis includes orthostatic hypotension, vasovagal syncope, alcohol intoxication, electrolyte abnormality (hyperkalemia, etc. due to history of being on hemodialysis and last hemodialysis session last Friday), infection, fluid overload, cardiac arrhythmia (patient does have a history of atrial fibrillation).  Labs, urinalysis, chest x-ray, EKG ordered to further evaluate the patient.    EKG shows atrial fibrillation with slow ventricular response, unchanged from prior EKG from October 2019.    Chest x-ray shows NAD    Head CT shows NAD, no acute traumatic injury    Laboratory work-up is remarkable for sodium 128, hemoglobin of 10.4 (not significantly changed from baseline), low white blood cell count is 3.9, normal blood glucose, blood alcohol 0.24, BNP is elevated (consistent with patient's history of end-stage renal disease)    Urinalysis is consistent with acute UTI.  Patient is allergic to Keflex, ciprofloxacin, and sulfa.  Reactions are unspecified.  Patient's last urine culture on 10/2/2019 grew out Enterococcus faecalis sensitive to ampicillin, nitrofurantoin, penicillin, and vancomycin.  Due to patient's allergies and previous sensitivities, will plan on sending patient home on 500 mg amoxicillin twice daily for 7 days.  This was discussed with ER pharmacist.    I have reviewed the nursing notes.    I have reviewed the findings, diagnosis, plan and need for follow up with the patient.    Patient to be discharged home. Advised to follow up with PCP within 1 week. To return to  ER immediately with any new/worsening symptoms. Plan of care discussed with patient who expresses understanding and agrees with plan of care.      New Prescriptions    AMOXICILLIN (AMOXIL) 500 MG TABLET    Take 1 tablet (500 mg) by mouth 2 times daily for 7 days       Final diagnoses:   Alcoholic intoxication with complication (H)   Fall, initial encounter   Hyponatremia   Alcohol abuse   Acute UTI       5/14/2020   [facility], [place], EMERGENCY DEPARTMENT     Liya Magallon MD  05/14/20 2010

## 2020-05-15 LAB
BACTERIA SPEC CULT: NORMAL
Lab: NORMAL
SPECIMEN SOURCE: NORMAL

## 2020-05-16 LAB — INTERPRETATION ECG - MUSE: NORMAL

## 2020-06-11 NOTE — TELEPHONE ENCOUNTER
MEDICAL RECORDS REQUEST   [facility]  Urology Clinic  [address] [place] [address]  PHONE: [phone]  Fax: [phone]        FUTURE VISIT INFORMATION                                                   Fer Latham : 1950 scheduled for future visit at [facility]    APPOINTMENT INFORMATION:    Date: 20 10:30AM    Provider:  Cecilia Lazo PA-C    Reason for Visit/Diagnosis: Frequent urnintation    REFERRAL INFORMATION:    Referring provider:  Self    Specialty: N/A    Referring providers clinic:  N/A    Clinic contact number:  N/A    RECORDS REQUESTED FOR VISIT                                                     NOTES  STATUS/DETAILS   OFFICE NOTE from referring provider  no   OFFICE NOTE from other specialist  no   DISCHARGE SUMMARY from hospital  no   DISCHARGE REPORT from the ER  no   OPERATIVE REPORT  no   MEDICATION LIST  yes   LABS     URINALYSIS (UA)  yes     PRE-VISIT CHECKLIST      Record collection complete Yes- HP recs in CE   Appointment appropriately scheduled           (right time/right provider) Yes   MyChart activation Yes   Questionnaire complete If no, please explain: In process      Completed by: Naina Snow

## 2020-06-16 ENCOUNTER — PRE VISIT (OUTPATIENT)
Dept: UROLOGY | Facility: CLINIC | Age: 70
End: 2020-06-16

## 2020-06-16 NOTE — TELEPHONE ENCOUNTER
Chief Complaint : Consult    Hx/Sx: Urinary frequency, renal failure    Records/Orders: Available    Pt Contacted: N/a    At Rooming: Kelly Womack EMT

## 2020-06-23 ENCOUNTER — PRE VISIT (OUTPATIENT)
Dept: UROLOGY | Facility: CLINIC | Age: 70
End: 2020-06-23

## 2020-10-05 DIAGNOSIS — N18.6 ESRD (END STAGE RENAL DISEASE) ON DIALYSIS (H): Primary | ICD-10-CM

## 2020-10-05 DIAGNOSIS — Z20.822 ENCOUNTER FOR LABORATORY TESTING FOR COVID-19 VIRUS: Primary | ICD-10-CM

## 2020-10-05 DIAGNOSIS — Z99.2 ESRD (END STAGE RENAL DISEASE) ON DIALYSIS (H): Primary | ICD-10-CM

## 2020-10-06 ENCOUNTER — TELEPHONE (OUTPATIENT)
Dept: INTERVENTIONAL RADIOLOGY/VASCULAR | Facility: CLINIC | Age: 70
End: 2020-10-06

## 2020-10-06 NOTE — TELEPHONE ENCOUNTER
I spoke with pt's wife 4 times this morning. She is trying to get pt in for a Covid test today. They have to travel by bus, so the closest clinic is [facility] [place]. I called clinic and they are no longer taking Covid test pt's today, pt will have to go downtown to [facility]. Pt's wife states that she is unable to bring pt to get Covid test. I spoke with Leisa and we will have pt come to appt on Thursday as planned and either test pt on 2A or treat as an unknown status. Pt has a non functioning dialysis line.Pt's wife aware of plan. Cristhian NICOLE

## 2020-10-08 ENCOUNTER — APPOINTMENT (OUTPATIENT)
Dept: MEDSURG UNIT | Facility: CLINIC | Age: 70
End: 2020-10-08
Attending: RADIOLOGY
Payer: MEDICARE

## 2020-10-08 ENCOUNTER — APPOINTMENT (OUTPATIENT)
Dept: INTERVENTIONAL RADIOLOGY/VASCULAR | Facility: CLINIC | Age: 70
End: 2020-10-08
Attending: NURSE PRACTITIONER
Payer: MEDICARE

## 2020-10-08 ENCOUNTER — HOSPITAL ENCOUNTER (OUTPATIENT)
Facility: CLINIC | Age: 70
Discharge: HOME | End: 2020-10-08
Attending: RADIOLOGY | Admitting: RADIOLOGY
Payer: MEDICARE

## 2020-10-08 VITALS
TEMPERATURE: 98.3 F | BODY MASS INDEX: 36.59 KG/M2 | SYSTOLIC BLOOD PRESSURE: 131 MMHG | RESPIRATION RATE: 18 BRPM | HEART RATE: 68 BPM | OXYGEN SATURATION: 95 % | WEIGHT: 285 LBS | DIASTOLIC BLOOD PRESSURE: 71 MMHG

## 2020-10-08 DIAGNOSIS — Z99.2 ESRD (END STAGE RENAL DISEASE) ON DIALYSIS (H): ICD-10-CM

## 2020-10-08 DIAGNOSIS — N18.6 ESRD (END STAGE RENAL DISEASE) ON DIALYSIS (H): ICD-10-CM

## 2020-10-08 LAB
ANION GAP SERPL CALCULATED.3IONS-SCNC: 7 MMOL/L (ref 3–14)
BUN SERPL-MCNC: 21 MG/DL (ref 7–30)
CALCIUM SERPL-MCNC: 9.4 MG/DL (ref 8.5–10.1)
CHLORIDE SERPL-SCNC: 102 MMOL/L (ref 94–109)
CO2 SERPL-SCNC: 23 MMOL/L (ref 20–32)
CREAT SERPL-MCNC: 2.23 MG/DL (ref 0.66–1.25)
ERYTHROCYTE [DISTWIDTH] IN BLOOD BY AUTOMATED COUNT: 13.1 % (ref 10–15)
GFR SERPL CREATININE-BSD FRML MDRD: 29 ML/MIN/{1.73_M2}
GLUCOSE SERPL-MCNC: 93 MG/DL (ref 70–99)
HCT VFR BLD AUTO: 33.5 % (ref 40–53)
HGB BLD-MCNC: 11.1 G/DL (ref 13.3–17.7)
INR PPP: 1.16 (ref 0.86–1.14)
MCH RBC QN AUTO: 32.8 PG (ref 26.5–33)
MCHC RBC AUTO-ENTMCNC: 33.1 G/DL (ref 31.5–36.5)
MCV RBC AUTO: 99 FL (ref 78–100)
PLATELET # BLD AUTO: 171 10E9/L (ref 150–450)
POTASSIUM SERPL-SCNC: 4.5 MMOL/L (ref 3.4–5.3)
RBC # BLD AUTO: 3.38 10E12/L (ref 4.4–5.9)
SODIUM SERPL-SCNC: 132 MMOL/L (ref 133–144)
WBC # BLD AUTO: 6 10E9/L (ref 4–11)

## 2020-10-08 PROCEDURE — C1769 GUIDE WIRE: HCPCS

## 2020-10-08 PROCEDURE — 250N000011 HC RX IP 250 OP 636: Performed by: PHYSICIAN ASSISTANT

## 2020-10-08 PROCEDURE — 250N000009 HC RX 250: Performed by: PHYSICIAN ASSISTANT

## 2020-10-08 PROCEDURE — C1887 CATHETER, GUIDING: HCPCS

## 2020-10-08 PROCEDURE — A9585 GADOBUTROL INJECTION: HCPCS | Performed by: RADIOLOGY

## 2020-10-08 PROCEDURE — 999N000132 HC STATISTIC PP CARE STAGE 1

## 2020-10-08 PROCEDURE — C1725 CATH, TRANSLUMIN NON-LASER: HCPCS

## 2020-10-08 PROCEDURE — 272N000686 HC HEADBAND, CERIBELL EEG

## 2020-10-08 PROCEDURE — 99152 MOD SED SAME PHYS/QHP 5/>YRS: CPT | Performed by: RADIOLOGY

## 2020-10-08 PROCEDURE — 36581 REPLACE TUNNELED CV CATH: CPT

## 2020-10-08 PROCEDURE — 75901 REMOVE CVA DEVICE OBSTRUCT: CPT | Mod: 26 | Performed by: RADIOLOGY

## 2020-10-08 PROCEDURE — 272N000302 HC DEVICE INFLATION CR5

## 2020-10-08 PROCEDURE — 36595 MECH REMOV TUNNELED CV CATH: CPT | Mod: 52 | Performed by: RADIOLOGY

## 2020-10-08 PROCEDURE — 272N000602 HC WOUND GLUE CR1

## 2020-10-08 PROCEDURE — 255N000002 HC RX 255 OP 636: Performed by: RADIOLOGY

## 2020-10-08 PROCEDURE — 85610 PROTHROMBIN TIME: CPT | Performed by: RADIOLOGY

## 2020-10-08 PROCEDURE — 36581 REPLACE TUNNELED CV CATH: CPT | Performed by: RADIOLOGY

## 2020-10-08 PROCEDURE — 250N000009 HC RX 250: Performed by: RADIOLOGY

## 2020-10-08 PROCEDURE — 85027 COMPLETE CBC AUTOMATED: CPT | Performed by: RADIOLOGY

## 2020-10-08 PROCEDURE — 258N000003 HC RX IP 258 OP 636: Performed by: RADIOLOGY

## 2020-10-08 PROCEDURE — 99153 MOD SED SAME PHYS/QHP EA: CPT

## 2020-10-08 PROCEDURE — 272N000504 HC NEEDLE CR4

## 2020-10-08 PROCEDURE — 272N000567 HC SHEATH CR4

## 2020-10-08 PROCEDURE — 77001 FLUOROGUIDE FOR VEIN DEVICE: CPT | Mod: 26 | Performed by: RADIOLOGY

## 2020-10-08 PROCEDURE — C1750 CATH, HEMODIALYSIS,LONG-TERM: HCPCS

## 2020-10-08 PROCEDURE — 80048 BASIC METABOLIC PNL TOTAL CA: CPT | Performed by: RADIOLOGY

## 2020-10-08 PROCEDURE — 99152 MOD SED SAME PHYS/QHP 5/>YRS: CPT

## 2020-10-08 RX ORDER — SODIUM CHLORIDE 9 MG/ML
INJECTION, SOLUTION INTRAVENOUS CONTINUOUS
Status: DISCONTINUED | OUTPATIENT
Start: 2020-10-08 | End: 2020-10-08 | Stop reason: HOSPADM

## 2020-10-08 RX ORDER — GADOBUTROL 604.72 MG/ML
0.1 INJECTION INTRAVENOUS ONCE
Status: COMPLETED | OUTPATIENT
Start: 2020-10-08 | End: 2020-10-08

## 2020-10-08 RX ORDER — HEPARIN SODIUM 1000 [USP'U]/ML
3 INJECTION, SOLUTION INTRAVENOUS; SUBCUTANEOUS ONCE
Status: COMPLETED | OUTPATIENT
Start: 2020-10-08 | End: 2020-10-08

## 2020-10-08 RX ORDER — FOLIC ACID 1 MG/1
1 TABLET ORAL DAILY
Status: ON HOLD | COMMUNITY
Start: 2020-07-13 | End: 2020-10-08

## 2020-10-08 RX ORDER — NALOXONE HYDROCHLORIDE 0.4 MG/ML
.1-.4 INJECTION, SOLUTION INTRAMUSCULAR; INTRAVENOUS; SUBCUTANEOUS
Status: DISCONTINUED | OUTPATIENT
Start: 2020-10-08 | End: 2020-10-08 | Stop reason: HOSPADM

## 2020-10-08 RX ORDER — CLINDAMYCIN PHOSPHATE 900 MG/50ML
900 INJECTION, SOLUTION INTRAVENOUS
Status: COMPLETED | OUTPATIENT
Start: 2020-10-08 | End: 2020-10-08

## 2020-10-08 RX ORDER — DEXTROSE MONOHYDRATE 25 G/50ML
25-50 INJECTION, SOLUTION INTRAVENOUS
Status: DISCONTINUED | OUTPATIENT
Start: 2020-10-08 | End: 2020-10-08 | Stop reason: HOSPADM

## 2020-10-08 RX ORDER — FLUMAZENIL 0.1 MG/ML
0.2 INJECTION, SOLUTION INTRAVENOUS
Status: DISCONTINUED | OUTPATIENT
Start: 2020-10-08 | End: 2020-10-08 | Stop reason: HOSPADM

## 2020-10-08 RX ORDER — DIPHENHYDRAMINE HYDROCHLORIDE 50 MG/ML
50 INJECTION INTRAMUSCULAR; INTRAVENOUS ONCE
Status: COMPLETED | OUTPATIENT
Start: 2020-10-08 | End: 2020-10-08

## 2020-10-08 RX ORDER — FENTANYL CITRATE 50 UG/ML
25-50 INJECTION, SOLUTION INTRAMUSCULAR; INTRAVENOUS EVERY 5 MIN PRN
Status: DISCONTINUED | OUTPATIENT
Start: 2020-10-08 | End: 2020-10-08 | Stop reason: HOSPADM

## 2020-10-08 RX ORDER — HEPARIN SODIUM 1000 [USP'U]/ML
3 INJECTION, SOLUTION INTRAVENOUS; SUBCUTANEOUS ONCE
Status: DISCONTINUED | OUTPATIENT
Start: 2020-10-08 | End: 2020-10-08 | Stop reason: HOSPADM

## 2020-10-08 RX ORDER — NICOTINE POLACRILEX 4 MG
15-30 LOZENGE BUCCAL
Status: DISCONTINUED | OUTPATIENT
Start: 2020-10-08 | End: 2020-10-08 | Stop reason: HOSPADM

## 2020-10-08 RX ADMIN — MIDAZOLAM 0.5 MG: 1 INJECTION INTRAMUSCULAR; INTRAVENOUS at 12:27

## 2020-10-08 RX ADMIN — DIPHENHYDRAMINE HYDROCHLORIDE 50 MG: 50 INJECTION INTRAMUSCULAR; INTRAVENOUS at 11:47

## 2020-10-08 RX ADMIN — FENTANYL CITRATE 50 MCG: 50 INJECTION, SOLUTION INTRAMUSCULAR; INTRAVENOUS at 11:48

## 2020-10-08 RX ADMIN — MIDAZOLAM 2 MG: 1 INJECTION INTRAMUSCULAR; INTRAVENOUS at 11:48

## 2020-10-08 RX ADMIN — MIDAZOLAM 0.5 MG: 1 INJECTION INTRAMUSCULAR; INTRAVENOUS at 12:11

## 2020-10-08 RX ADMIN — LIDOCAINE HYDROCHLORIDE 10 ML: 10 INJECTION, SOLUTION EPIDURAL; INFILTRATION; INTRACAUDAL; PERINEURAL at 13:52

## 2020-10-08 RX ADMIN — GADOBUTROL 15 ML: 604.72 INJECTION INTRAVENOUS at 13:30

## 2020-10-08 RX ADMIN — MIDAZOLAM 1 MG: 1 INJECTION INTRAMUSCULAR; INTRAVENOUS at 11:53

## 2020-10-08 RX ADMIN — CLINDAMYCIN IN 5 PERCENT DEXTROSE 900 MG: 18 INJECTION, SOLUTION INTRAVENOUS at 10:37

## 2020-10-08 RX ADMIN — HEPARIN SODIUM 3000 UNITS: 1000 INJECTION, SOLUTION INTRAVENOUS; SUBCUTANEOUS at 13:52

## 2020-10-08 RX ADMIN — FENTANYL CITRATE 25 MCG: 50 INJECTION, SOLUTION INTRAMUSCULAR; INTRAVENOUS at 12:26

## 2020-10-08 RX ADMIN — FENTANYL CITRATE 25 MCG: 50 INJECTION, SOLUTION INTRAMUSCULAR; INTRAVENOUS at 13:02

## 2020-10-08 RX ADMIN — SODIUM CHLORIDE: 9 INJECTION, SOLUTION INTRAVENOUS at 10:37

## 2020-10-08 RX ADMIN — MIDAZOLAM 0.5 MG: 1 INJECTION INTRAMUSCULAR; INTRAVENOUS at 13:03

## 2020-10-08 RX ADMIN — FENTANYL CITRATE 25 MCG: 50 INJECTION, SOLUTION INTRAMUSCULAR; INTRAVENOUS at 12:11

## 2020-10-08 NOTE — DISCHARGE INSTRUCTIONS
Interventional Radiology  Discharge Instructions    Replacement of Tunneled Catheter     Your site(s) has been closed with:     The Catheter is sutured  to skin at  insertion site    Derma Jo (Skin Glue) on neck incision    Do not apply any ointments over site    This thin layer will slough off in 7-10 days               May gently remove Derma Jo in 10 days if still present         Tunneled catheter is always covered with a Sterile Transparent dressing    Keep site clean and dry     Cover with an occlusive dressing for showering    Weekly transparent dressing changes or when it becomes wet or dirty     If there is any oozing or bleeding from the site, apply direct pressure for 5-10 minutes with a gauze pad.  If bleeding continues after 10 minutes call your primary doctor.  If bleeding cannot be controlled with direct pressure, call 911.    Call your Doctor if:    Bleeding as above    Swelling in your neck or arm    Sudden onset of shortness of breath, lightheadedness, or heart palpitations..    Fever greater than 100.5  F    Other signs of infection such as, redness, tenderness, or drainage from the wound.    If you were given sedation:    We recommend an adult stay with you for the first 24 hours.    No driving or alcoholic beverages for 24 hours.    ADDITIONAL INSTRUCTIONS:         No heavy lifting greater than 10 lbs for 3 days.           May use ice pack for pain or minor swelling for 15 min 3-4 times per day.    [facility] Interventional Radiology Department    Physician: Elton MORENO Date:October 8, 2020  Telephone Numbers:  [phone].....8 am to 4:30 pm   Monday-Friday  Hospital : [phone]....After hours, Weekends, & Holidays.  Ask for the Interventional Radiologist on call.  Someone is on call 24 hours a day.   Emergency Department:  [phone]

## 2020-10-08 NOTE — IP AVS SNAPSHOT
MRN:[id number]                      After Visit Summary   10/8/2020    Fer Latham    MRN: [id number]           Visit Information        Department      10/8/2020  9:44 AM [facility] [address] [place]          Review of your medicines      UNREVIEWED medicines. Ask your doctor about these medicines       Dose / Directions   aspirin 325 MG EC tablet  Commonly known as: ASA      Dose: 325 mg  Take 325 mg by mouth daily  Refills: 0     diazepam 5 MG tablet  Commonly known as: VALIUM      Dose: 5 mg  Take 5 mg by mouth every 12 hours as needed for anxiety  Refills: 0              Protect others around you: Learn how to safely use, store and throw away your medicines at www.disposemymeds.org.       Follow-ups after your visit       Care Instructions       Further instructions from your care team       Interventional Radiology  Discharge Instructions    Replacement of Tunneled Catheter     Your site(s) has been closed with:     The Catheter is sutured  to skin at  insertion site    Derma Jo (Skin Glue) on neck incision    Do not apply any ointments over site    This thin layer will slough off in 7-10 days               May gently remove Derma Jo in 10 days if still present         Tunneled catheter is always covered with a Sterile Transparent dressing    Keep site clean and dry     Cover with an occlusive dressing for showering    Weekly transparent dressing changes or when it becomes wet or dirty     If there is any oozing or bleeding from the site, apply direct pressure for 5-10 minutes with a gauze pad.  If bleeding continues after 10 minutes call your primary doctor.  If bleeding cannot be controlled with direct pressure, call 911.    Call your Doctor if:    Bleeding as above    Swelling in your neck or arm    Sudden onset of shortness of breath, lightheadedness, or heart palpitations..    Fever greater than 100.5  F    Other signs of infection such as, redness, tenderness, or  drainage from the wound.    If you were given sedation:    We recommend an adult stay with you for the first 24 hours.    No driving or alcoholic beverages for 24 hours.    ADDITIONAL INSTRUCTIONS:         No heavy lifting greater than 10 lbs for 3 days.           May use ice pack for pain or minor swelling for 15 min 3-4 times per day.    [facility] Interventional Radiology Department    Physician: Elton MORENO Date:October 8, 2020  Telephone Numbers:  [phone].....8 am to 4:30 pm   Monday-Friday  Hospital : [phone]....After hours, Weekends, & Holidays.  Ask for the Interventional Radiologist on call.  Someone is on call 24 hours a day.   Emergency Department:  [phone]                                                                                                                                                              Additional Information About Your Visit       [employer] Information    [email or web address] gives you secure access to your electronic health record. If you see a primary care provider, you can also send messages to your care team and make appointments. If you have questions, please call your primary care clinic.  If you do not have a primary care provider, please call [phone] and they will assist you.       Care EveryWhere ID    This is your Care EveryWhere ID. This could be used by other organizations to access your [facility] medical records  [id number]       Your Vitals Were  Most recent update: 10/8/2020  2:00 PM    Blood Pressure   135/70          Pulse   80          Temperature   98.3  F (36.8  C) (Oral)          Respirations   18          Weight   129.3 kg (285 lb)             Pulse Oximetry   99%    BMI (Body Mass Index)   36.59 kg/m           Primary Care Provider Office Phone # Fax #    Isrrael Ornelas [phone]      Equal Access to Services    TREMAINE PEDRO AH: Ej Duke, chaz menjivar, qaamaris barrera  lakati urbina. So [facility] [phone].    ATENCIÓN: Si habla miguelina, tiene a scherer disposición servicios gratuitos de asistencia lingüística. Eduin al [phone].    We comply with applicable federal and state civil rights laws, including the [place] Human Rights Act. We do not discriminate on the basis of race, color, creed, Confucianist, national origin, marital status, age, disability, sex, sexual orientation, or gender identity.       Thank you!    Thank you for choosing [facility] for your care. Our goal is always to provide you with excellent care. Hearing back from our patients is one way we can continue to improve our services. Please take a few minutes to complete the written survey that you may receive in the mail after you visit with us. Thank you!            Medication List      ASK your doctor about these medications          Morning Afternoon Evening Bedtime As Needed    aspirin 325 MG EC tablet  Also known as: ASA  INSTRUCTIONS: Take 325 mg by mouth daily                     diazepam 5 MG tablet  Also known as: VALIUM  INSTRUCTIONS: Take 5 mg by mouth every 12 hours as needed for anxiety

## 2020-10-08 NOTE — IP AVS SNAPSHOT
[facility] [address] [address]                                    After Visit Summary   10/8/2020    Fer Latham    MRN: [id number]           After Visit Summary Signature Page    I have received my discharge instructions, and my questions have been answered. I have discussed any challenges I see with this plan with the nurse or doctor.    ..........................................................................................................................................  Patient/Patient Representative Signature      ..........................................................................................................................................  Patient Representative Print Name and Relationship to Patient    ..................................................               ................................................  Date                                   Time    ..........................................................................................................................................  Reviewed by Signature/Title    ...................................................              ..............................................  Date                                               Time          22EPIC Rev 08/18

## 2020-10-08 NOTE — PROGRESS NOTES
Pt tolerating oral intake. Pt up to bathroom, tolerated well. Discharge instructions reviewed, copy given to pt. NIMA white'd.  Pt discharged home via taxi accompanied by wife.

## 2020-10-08 NOTE — IR NOTE
"Interventional Radiology Intra-procedural Nursing Note    Patient Name: Fer Latham  Medical Record Number: [id number]  Today's Date: October 8, 2020    Procedure: tunneled central venous catheter evaluation, revision, replacement    Proceduralist: Elton Londono PA-C, AYO Irving      Procedure Start Time: 1142  Procedure Puncture time: 1147  Procedure End Time: 1320    Sedation start time: 1142  Sedation end time: 1320  Sedation medications given: versed 4.5  mg, fentanyl 125 mcg, benadryl 50 mg IV    Report given to: 2A    D: Patient brought to IR room 2 at 1115. Verified Patient's ID and informed consent using two identifiers. He denied having questions or concerns regarding. Patient stated he was feeling \"anxious\" and requested \"a large dose\" of sedation medications.  A: Patient was positioned supine. He was monitored per IR conscious sedation protocol. Patient tolerated the procedure without apparent incident. The right internal jugular central venous catheter placement was verified using fluoroscopy and is ready for immediate use per Elton Londono PA-C/  P: Patient returned to 2A for post procedure recovery.     COREY Dodson RN  [phone]    "

## 2020-10-08 NOTE — PRE-PROCEDURE
GENERAL PRE-PROCEDURE:     Verbal consent obtained?: Yes    Written consent obtained?: Yes    Risks and benefits: Risks, benefits and alternatives were discussed    DC Plan: Appropriate discharge home plan in place for patients who are going home after procedure   Consent given by:  Patient  Patient states understanding of procedure being performed: Yes    Patient's understanding of procedure matches consent: Yes    Procedure consent matches procedure scheduled: Yes    Appropriately NPO:  Yes  ASA Class:  Class 3- Severe systemic disease, definite functional limitations  Mallampati  :  Grade 2- soft palate, base of uvula, tonsillar pillars, and portion of posterior pharyngeal wall visible  Lungs:  Lungs clear with good breath sounds bilaterally (Posterior auscultation not performed.)  Heart:  Normal heart sounds and rate  History & Physical reviewed:  History and physical reviewed and no updates needed  Statement of review:  I have reviewed the lab findings, diagnostic data, medications, and the plan for sedation

## 2020-10-08 NOTE — PROGRESS NOTES
Brief Social Work Note    SARMAD received page from 2A RN stating that [employer] will not pick pt up from [facility]. SW reviewed pt's chart, pt does not have any transportation benefits available to him. SARMAD arranged taxi through [email or web address] online booking #[id number]. SARMAD updated 2A RN. Pt appreciative of SARMAD assistance.     LANETTE Rasmussen, [facility]  Adult Acute Care   Ph:[phone]  Pager [phone]

## 2020-10-08 NOTE — PROGRESS NOTES
Patient arrived to floor with RN from IR s/p Tunneled Line Revision. VSS. Right chest dressing CDI, right neck dermabond CDI, no hematoma. Patient denies pain. Tolerating regular diet. Wife Ailin at bedside.

## 2020-10-08 NOTE — PROCEDURES
[facility]     Procedure: IR Procedure Note    Date/Time: 10/8/2020 1:39 PM  Performed by: Iggy Londono PA-C  Authorized by: Iggy Londono PA-C   IR Fellow Physician:  Other(s) attending procedure: Attending physician: Mahesh Suero MD. Assist: AYO Irving    UNIVERSAL PROTOCOL   Site Marked: NA  Prior Images Obtained and Reviewed:  Yes  Required items: Required blood products, implants, devices and special equipment available    Patient identity confirmed:  Verbally with patient, arm band, provided demographic data and hospital-assigned identification number  Patient was reevaluated immediately before administering moderate or deep sedation or anesthesia  Confirmation Checklist:  Patient's identity using two indicators, relevant allergies, procedure was appropriate and matched the consent or emergent situation and correct equipment/implants were available  Time out: Immediately prior to the procedure a time out was called    Universal Protocol: the Joint Commission Universal Protocol was followed    Preparation: Patient was prepped and draped in usual sterile fashion    ESBL (mL):  5         ANESTHESIA    Anesthesia: Local infiltration  Local Anesthetic:  Lidocaine 1% without epinephrine  Anesthetic Total (mL):  10      SEDATION    Patient Sedated: Yes    Sedation Type:  Moderate (conscious) sedation  Sedation:  Fentanyl and midazolam (Diphenhydramine)  Vital signs: Vital signs monitored during sedation    See dictated procedure note for full details.  Findings: Existing right internal jugular 16 Fr. 27 cm dual lumen tunneled central venous catheter dysfunctional, with no aspiration from red lumen. Fluoroscopic evaluation notable for fibrin sheath. Fluoroscopy guided venoplasty of right atrium, SVC, and right innominate vein with 10 mm X 8 cm [place] balloon. Fluoroscopy guided over the wire exchange with new 16 Fr., 27 cm., dual lumen  tunneled central venous catheter, advanced such that tip is within the right atrium. Catheter ready for use.    Specimens: none    Complications: None    Condition: Stable    Plan: Follow up per primary team. Return to IR for catheter removal when indicated.    PROCEDURE   Patient Tolerance:  Patient tolerated the procedure well with no immediate complications    Length of time physician/provider present for 1:1 monitoring during sedation: 90

## 2020-10-26 DIAGNOSIS — T82.898D PROBLEM WITH DIALYSIS ACCESS, SUBSEQUENT ENCOUNTER: Primary | ICD-10-CM

## 2020-12-23 ENCOUNTER — TELEPHONE (OUTPATIENT)
Dept: NEPHROLOGY | Facility: CLINIC | Age: 70
End: 2020-12-23

## 2020-12-23 NOTE — TELEPHONE ENCOUNTER
I called Ailin Monroe's request.  [phone]  Discussed  - urinary frequency - he is working with urology but not getting relief.  Going to the bathroom 10 times per night and also multiple time during his dialysis treatment.  - considering ditropan but with his occasional confusions and falls I feel this might not be a good option.    Given decent residual function will trial HD twice per week.    advised appt with PCP to discuss active issues as well as possible cognitive decline.    Lin Puente MD

## 2021-01-15 ENCOUNTER — HEALTH MAINTENANCE LETTER (OUTPATIENT)
Age: 71
End: 2021-01-15

## 2021-04-26 DIAGNOSIS — N21.9: Primary | ICD-10-CM

## 2021-04-27 ENCOUNTER — OFFICE VISIT (OUTPATIENT)
Dept: UROLOGY | Facility: CLINIC | Age: 71
End: 2021-04-27
Attending: INTERNAL MEDICINE
Payer: MEDICARE

## 2021-04-27 ENCOUNTER — PRE VISIT (OUTPATIENT)
Dept: UROLOGY | Facility: CLINIC | Age: 71
End: 2021-04-27

## 2021-04-27 VITALS
HEIGHT: 74 IN | HEART RATE: 80 BPM | SYSTOLIC BLOOD PRESSURE: 107 MMHG | BODY MASS INDEX: 34.01 KG/M2 | WEIGHT: 265 LBS | DIASTOLIC BLOOD PRESSURE: 69 MMHG

## 2021-04-27 DIAGNOSIS — N21.9: ICD-10-CM

## 2021-04-27 DIAGNOSIS — R35.0 URINARY FREQUENCY: Primary | ICD-10-CM

## 2021-04-27 PROCEDURE — 99203 OFFICE O/P NEW LOW 30 MIN: CPT | Performed by: NURSE PRACTITIONER

## 2021-04-27 ASSESSMENT — MIFFLIN-ST. JEOR: SCORE: 2036.78

## 2021-04-27 ASSESSMENT — PAIN SCALES - GENERAL: PAINLEVEL: NO PAIN (0)

## 2021-04-27 NOTE — LETTER
4/27/2021       RE: Fer Latham  [address]  [place] [address]     Dear Colleague,    Thank you for referring your patient, Fer Latham, to the [facility] UROLOGY CLINIC [place] at [facility]. Please see a copy of my visit note below.            Chief Complaint:   Urinary frequency          History of Present Illness:    Fer Latham is a 70 year old male with a history of overactive bladder/severe nocturia and renal failure, currently on HD twice weekly. He has previously followed with urology at , and has undergone bladder Botox twice. He had some brief improvement after the first dose, but frequency again became an issue after the 2nd dose. Had previously tried trospium and oxybutynin, but these were unfortunately not affordable.    Was seen by his primary urologist 6 days ago and was told that there was little for her to offer him. Was offered to try Botox again or consider a catheter. He is joined today by his wife, who voices her frustration with communication and coordination with his primary urologist. They wish to establish care with us and seek second opinion regarding management of OAB.     He underwent urodynamics at [facility] on 8/10/20, which showed a small capacity bladder (122 mL), with detrusor instability and spasms ranging from Pdet 60-80 cm H20 with no leakage, good bladder compliance between episodes of DO, and inability to void at capacity. Was reportedly able to empty his bladder completely in private restroom after 7F Pves catheter removed.          Past Medical History:     Past Medical History:   Diagnosis Date     Anemia      Arrhythmia     atrial fib     Arthritis 1990's    gout     BPH (benign prostatic hyperplasia)      Depression      Diabetes mellitus (H)     Type 2  IDDM     Diverticulosis      ESRD (end stage renal disease) on dialysis (H)      Hematuria      HTN (hypertension)      HTN  (hypertension)      IgA nephropathy      MI, old      Pneumonia      Rhabdomyolysis      Weakness 5/10/2019            Past Surgical History:     Past Surgical History:   Procedure Laterality Date     ABDOMEN SURGERY       APPENDECTOMY  1970's     ARTHROSCOPY KNEE  6/27/2013    Procedure: ARTHROSCOPY KNEE;  Right Knee Arthroscopy, Debridment Of Medial Meniscal Tear.  ;  Surgeon: Tomás Wong MD;  Location: [facility]     BACK SURGERY  1999    L5 S1 decompression     BIOPSY  2017    skin     COLONOSCOPY  2013    [facility]     CORONARY ANGIOGRAPHY ADULT ORDER  2018     EYE SURGERY Bilateral 2004    Lens replacement     IR CVC TUNNEL PLACEMENT > 5 YRS OF AGE  11/26/2019     IR CVC TUNNEL REMOVAL RIGHT  11/26/2019     IR CVC TUNNEL REVISION RIGHT  2/10/2020     IR CVC TUNNEL REVISION RIGHT  10/8/2020     VASCULAR SURGERY              Medications     Current Outpatient Medications   Medication     aspirin (ASA) 325 MG EC tablet     diazepam (VALIUM) 5 MG tablet     No current facility-administered medications for this visit.             Family History:     Family History   Problem Relation Age of Onset     Cancer Father             Social History:     Social History     Socioeconomic History     Marital status:      Spouse name: Not on file     Number of children: Not on file     Years of education: Not on file     Highest education level: Not on file   Occupational History     Not on file   Social Needs     Financial resource strain: Not on file     Food insecurity     Worry: Not on file     Inability: Not on file     Transportation needs     Medical: Not on file     Non-medical: Not on file   Tobacco Use     Smoking status: Former Smoker     Smokeless tobacco: Never Used     Tobacco comment: quit 35 years ago   Substance and Sexual Activity     Alcohol use: Yes     Comment: 1 to 2 drinks a day (Occasional)     Drug use: No     Sexual activity: Not on file   Lifestyle     Physical activity     Days per week: Not on  "file     Minutes per session: Not on file     Stress: Not on file   Relationships     Social connections     Talks on phone: Not on file     Gets together: Not on file     Attends Yarsanism service: Not on file     Active member of club or organization: Not on file     Attends meetings of clubs or organizations: Not on file     Relationship status: Not on file     Intimate partner violence     Fear of current or ex partner: Not on file     Emotionally abused: Not on file     Physically abused: Not on file     Forced sexual activity: Not on file   Other Topics Concern     Parent/sibling w/ CABG, MI or angioplasty before 65F 55M? Not Asked   Social History Narrative     Not on file            Allergies:   Cephalexin, Ciprofloxacin, Diagnostic x-ray materials, and Sulfa drugs         Review of Systems:  From intake questionnaire   Negative 14 system review except as noted on HPI, nurse's note.         Physical Exam:   Patient is a 70 year old male seated in wheelchair  Vitals: Blood pressure 107/69, pulse 80, height 1.88 m (6' 2\"), weight 120.2 kg (265 lb).  General Appearance Adult: Alert, no acute distress, oriented  Lungs: no respiratory distress, or pursed lip breathing  Heart: No obvious jugular venous distension present  Abdomen: soft, nontender, no organomegaly or masses, Body mass index is 34.02 kg/m .  : deferred      Labs and Pathology:    I personally reviewed all applicable laboratory data and went over findings with patient  Significant for:    CBC RESULTS:  Recent Labs   Lab Test 10/08/20  1030 05/14/20  1722 02/10/20  0925 11/26/19  1114   WBC 6.0 3.9* 5.8 5.7   HGB 11.1* 10.4* 11.0* 11.4*    145* 177 188        BMP RESULTS:  Recent Labs   Lab Test 10/08/20  1030 05/14/20  1722 02/10/20  0925 11/26/19  1114   * 128* 137 134   POTASSIUM 4.5 3.6 4.5 4.2   CHLORIDE 102 98 106 102   CO2 23 21 24 22   ANIONGAP 7 10 7 9   GLC 93 100* 116* 110*   BUN 21 21 39* 33*   CR 2.23* 2.42* 2.60* 2.35* "   GFRESTIMATED 29* 26* 24* 27*   GFRESTBLACK 34* 30* 28* 32*   COLUMBA 9.4 7.9* 9.0 9.4       UA RESULTS:   Recent Labs   Lab Test 05/14/20  1729 10/02/19  0725 09/11/19  0446   SG 1.010 1.013 1.014   URINEPH 5.5 5.5 5.5   NITRITE Negative Negative Negative   RBCU 1 7* 9*   WBCU 159* >182* 250*            Assessment and Plan:     Assessment: 70 year old male with a history of overactive bladder/severe nocturia and renal failure, currently on HD twice weekly. UDS from last summer showing small bladder capacity and high-pressure DO. Has been unable to take oral agents for this due to cost, and has found limited benefit from bladder Botox administered by outside urologist. Of note, initial referral to urology placed for lower urinary tract stone, but patient unsure of why. Per my review of his recent scans, I see no mention of stones.     Plan:  -Will pursue a renal and bladder ultrasound to rule out stones in kidneys or bladder.   -Follow up for next available urodynamics study.       Grace Galloway, CNP  Department of Urology

## 2021-04-27 NOTE — TELEPHONE ENCOUNTER
MEDICAL RECORDS REQUEST   [facility]  Urology Clinic  [address] [place] [address]  PHONE: [phone]  Fax: [phone]        FUTURE VISIT INFORMATION                                                   Fer Latham, : 1950 scheduled for future visit at [facility]    APPOINTMENT INFORMATION:    Date: 21    Provider:  Kathryn    Reason for Visit/Diagnosis: lower urinary tract stone overactive bladder    REFERRAL INFORMATION:    Referring provider:  Jeevanfering    Specialty: Nephrology    Referring providers clinic:  [facility]    Clinic contact number:      RECORDS REQUESTED FOR VISIT                                                     NOTES  STATUS/DETAILS   OFFICE NOTE from referring provider  yes   OFFICE NOTE from other specialist  yes   DISCHARGE SUMMARY from hospital  no   DISCHARGE REPORT from the ER  no   OPERATIVE REPORT  no   MEDICATION LIST  yes   LABS     URINALYSIS (UA)  yes   URINE CYTOLOGY  no   KIDNEY STONE     CT STONE  no   IMAGING (IMAGES & REPORT)  in process   KUB  no       Action 21 MJ   Action Taken Requested CT 19 ab pelvis from

## 2021-04-27 NOTE — PATIENT INSTRUCTIONS
UROLOGY CLINIC VISIT PATIENT INSTRUCTIONS    -We will pursue a renal and bladder ultrasound.  -Follow up with me on a Monday for urodynamics.     If you have any issues, questions or concerns in the meantime, do not hesitate to contact us at [phone] or via [employer].     It was a pleasure meeting with you today.  Thank you for allowing me and my team the privilege of caring for you today.  YOU are the reason we are here, and I truly hope we provided you with the excellent service you deserve.  Please let us know if there is anything else we can do for you so that we can be sure you are leaving completely satisfied with your care experience.    Grace Galloway, CNP

## 2021-04-27 NOTE — NURSING NOTE
"Chief Complaint   Patient presents with     Follow Up      lower urinary tract stone overactive bladder       Blood pressure 107/69, pulse 80, height 1.88 m (6' 2\"), weight 120.2 kg (265 lb). Body mass index is 34.02 kg/m .    Patient Active Problem List   Diagnosis     Knee pain     Other postprocedural status(V45.89)     Edema     ESRD (end stage renal disease) on dialysis (H)     Diabetes mellitus (H)     IgA nephropathy     HTN (hypertension)     Weakness     UTI (urinary tract infection)       Allergies   Allergen Reactions     Cephalexin      Ciprofloxacin      Diagnostic X-Ray Materials      Sulfa Drugs Hives       Current Outpatient Medications   Medication Sig Dispense Refill     aspirin (ASA) 325 MG EC tablet Take 325 mg by mouth daily        diazepam (VALIUM) 5 MG tablet Take 5 mg by mouth every 12 hours as needed for anxiety         Social History     Tobacco Use     Smoking status: Former Smoker     Smokeless tobacco: Never Used     Tobacco comment: quit 35 years ago   Substance Use Topics     Alcohol use: Yes     Comment: 1 to 2 drinks a day (Occasional)     Drug use: No       Sosa Galvan  4/27/2021  11:41 AM  "

## 2021-04-27 NOTE — PROGRESS NOTES
Chief Complaint:   Urinary frequency          History of Present Illness:    Fer Latham is a 70 year old male with a history of overactive bladder/severe nocturia and renal failure, currently on HD twice weekly. He has previously followed with urology at , and has undergone bladder Botox twice. He had some brief improvement after the first dose, but frequency again became an issue after the 2nd dose. Had previously tried trospium and oxybutynin, but these were unfortunately not affordable.    Was seen by his primary urologist 6 days ago and was told that there was little for her to offer him. Was offered to try Botox again or consider a catheter. He is joined today by his wife, who voices her frustration with communication and coordination with his primary urologist. They wish to establish care with us and seek second opinion regarding management of OAB.     He underwent urodynamics at [facility] on 8/10/20, which showed a small capacity bladder (122 mL), with detrusor instability and spasms ranging from Pdet 60-80 cm H20 with no leakage, good bladder compliance between episodes of DO, and inability to void at capacity. Was reportedly able to empty his bladder completely in private restroom after 7F Pves catheter removed.          Past Medical History:     Past Medical History:   Diagnosis Date     Anemia      Arrhythmia     atrial fib     Arthritis 1990's    gout     BPH (benign prostatic hyperplasia)      Depression      Diabetes mellitus (H)     Type 2  IDDM     Diverticulosis      ESRD (end stage renal disease) on dialysis (H)      Hematuria      HTN (hypertension)      HTN (hypertension)      IgA nephropathy      MI, old      Pneumonia      Rhabdomyolysis      Weakness 5/10/2019            Past Surgical History:     Past Surgical History:   Procedure Laterality Date     ABDOMEN SURGERY       APPENDECTOMY  1970's     ARTHROSCOPY KNEE  6/27/2013    Procedure: ARTHROSCOPY KNEE;  Right Knee  Arthroscopy, Debridment Of Medial Meniscal Tear.  ;  Surgeon: Tomás Wong MD;  [facility]     BACK SURGERY  1999    L5 S1 decompression     BIOPSY  2017    skin     COLONOSCOPY  2013    [facility]     CORONARY ANGIOGRAPHY ADULT ORDER  2018     EYE SURGERY Bilateral 2004    Lens replacement     IR CVC TUNNEL PLACEMENT > 5 YRS OF AGE  11/26/2019     IR CVC TUNNEL REMOVAL RIGHT  11/26/2019     IR CVC TUNNEL REVISION RIGHT  2/10/2020     IR CVC TUNNEL REVISION RIGHT  10/8/2020     VASCULAR SURGERY              Medications     Current Outpatient Medications   Medication     aspirin (ASA) 325 MG EC tablet     diazepam (VALIUM) 5 MG tablet     No current facility-administered medications for this visit.             Family History:     Family History   Problem Relation Age of Onset     Cancer Father             Social History:     Social History     Socioeconomic History     Marital status:      Spouse name: Not on file     Number of children: Not on file     Years of education: Not on file     Highest education level: Not on file   Occupational History     Not on file   Social Needs     Financial resource strain: Not on file     Food insecurity     Worry: Not on file     Inability: Not on file     Transportation needs     Medical: Not on file     Non-medical: Not on file   Tobacco Use     Smoking status: Former Smoker     Smokeless tobacco: Never Used     Tobacco comment: quit 35 years ago   Substance and Sexual Activity     Alcohol use: Yes     Comment: 1 to 2 drinks a day (Occasional)     Drug use: No     Sexual activity: Not on file   Lifestyle     Physical activity     Days per week: Not on file     Minutes per session: Not on file     Stress: Not on file   Relationships     Social connections     Talks on phone: Not on file     Gets together: Not on file     Attends [employer] service: Not on file     Active member of club or organization: Not on file     Attends meetings of clubs or organizations: Not  "on file     Relationship status: Not on file     Intimate partner violence     Fear of current or ex partner: Not on file     Emotionally abused: Not on file     Physically abused: Not on file     Forced sexual activity: Not on file   Other Topics Concern     Parent/sibling w/ CABG, MI or angioplasty before 65F 55M? Not Asked   Social History Narrative     Not on file            Allergies:   Cephalexin, Ciprofloxacin, Diagnostic x-ray materials, and Sulfa drugs         Review of Systems:  From intake questionnaire   Negative 14 system review except as noted on HPI, nurse's note.         Physical Exam:   Patient is a 70 year old male seated in wheelchair  Vitals: Blood pressure 107/69, pulse 80, height 1.88 m (6' 2\"), weight 120.2 kg (265 lb).  General Appearance Adult: Alert, no acute distress, oriented  Lungs: no respiratory distress, or pursed lip breathing  Heart: No obvious jugular venous distension present  Abdomen: soft, nontender, no organomegaly or masses, Body mass index is 34.02 kg/m .  : deferred      Labs and Pathology:    I personally reviewed all applicable laboratory data and went over findings with patient  Significant for:    CBC RESULTS:  Recent Labs   Lab Test 10/08/20  1030 05/14/20  1722 02/10/20  0925 11/26/19  1114   WBC 6.0 3.9* 5.8 5.7   HGB 11.1* 10.4* 11.0* 11.4*    145* 177 188        BMP RESULTS:  Recent Labs   Lab Test 10/08/20  1030 05/14/20  1722 02/10/20  0925 11/26/19  1114   * 128* 137 134   POTASSIUM 4.5 3.6 4.5 4.2   CHLORIDE 102 98 106 102   CO2 23 21 24 22   ANIONGAP 7 10 7 9   GLC 93 100* 116* 110*   BUN 21 21 39* 33*   CR 2.23* 2.42* 2.60* 2.35*   GFRESTIMATED 29* 26* 24* 27*   GFRESTBLACK 34* 30* 28* 32*   COLUMBA 9.4 7.9* 9.0 9.4       UA RESULTS:   Recent Labs   Lab Test 05/14/20  1729 10/02/19  0725 09/11/19  0446   SG 1.010 1.013 1.014   URINEPH 5.5 5.5 5.5   NITRITE Negative Negative Negative   RBCU 1 7* 9*   WBCU 159* >182* 250*            Assessment and " Plan:     Assessment: 70 year old male with a history of overactive bladder/severe nocturia and renal failure, currently on HD twice weekly. UDS from last summer showing small bladder capacity and high-pressure DO. Has been unable to take oral agents for this due to cost, and has found limited benefit from bladder Botox administered by outside urologist. Of note, initial referral to urology placed for lower urinary tract stone, but patient unsure of why. Per my review of his recent scans, I see no mention of stones.     Plan:  -Will pursue a renal and bladder ultrasound to rule out stones in kidneys or bladder.   -Follow up for next available urodynamics study.       Grace Galloway, CNP  Department of Urology

## 2021-05-14 ENCOUNTER — TELEPHONE (OUTPATIENT)
Dept: UROLOGY | Facility: CLINIC | Age: 71
End: 2021-05-14

## 2021-05-14 NOTE — TELEPHONE ENCOUNTER
[facility] Call Center    Phone Message    May a detailed message be left on voicemail: yes     Reason for Call: Other: Pt would like a call back as he is having penis foreskin issues and he would like to discuss asap     Action Taken: Message routed to:  [facility] Urology    Travel Screening: Not Applicable

## 2021-05-14 NOTE — TELEPHONE ENCOUNTER
LVM for patient to call clinic back or if this is urgent, patient needs to seek help at an .  Shahana Good LPN  Urology Clinic Service   [facility]

## 2021-05-15 ENCOUNTER — HEALTH MAINTENANCE LETTER (OUTPATIENT)
Age: 71
End: 2021-05-15

## 2021-06-11 ENCOUNTER — CARE COORDINATION (OUTPATIENT)
Dept: NEPHROLOGY | Facility: CLINIC | Age: 71
End: 2021-06-11

## 2021-06-11 DIAGNOSIS — T82.898A PROBLEM WITH DIALYSIS ACCESS, INITIAL ENCOUNTER (H): Primary | ICD-10-CM

## 2021-06-11 NOTE — PROGRESS NOTES
COREY Chambers from [facility] called on behalf of MD Puente.  Pt having inadquate dialysis d/t low blood flows at 200 and frequent alarms.  CVC is not positional and has not responded to tPA x2 in May.  Pt has a right tunneled CVC that was placed on 11/26/19 and has had 2 revisions on 2/10/2020 and 10/8/2020.    Pt dialyzes at [facility] on M/F with Dr. Puente at 1030.    Will put in verbal order and send to scheduling team to schedule, contact pt, and fax appointment to dialysis clinic.    SHAUN LENZ RN on 6/11/2021 at 4:46 PM  Dialysis Access Care Coordinator  Phone: [phone]

## 2021-06-14 ENCOUNTER — HOSPITAL ENCOUNTER (OUTPATIENT)
Facility: CLINIC | Age: 71
End: 2021-06-14
Admitting: INTERNAL MEDICINE
Payer: MEDICARE

## 2021-06-14 DIAGNOSIS — Z11.59 ENCOUNTER FOR SCREENING FOR OTHER VIRAL DISEASES: ICD-10-CM

## 2021-06-14 DIAGNOSIS — T82.49XA: Primary | ICD-10-CM

## 2021-06-14 RX ORDER — DIPHENHYDRAMINE HCL 50 MG
50 CAPSULE ORAL EVERY 6 HOURS PRN
Qty: 1 CAPSULE | Refills: 0 | Status: ON HOLD | OUTPATIENT
Start: 2021-06-14 | End: 2022-03-02

## 2021-06-14 RX ORDER — METHYLPREDNISOLONE 32 MG/1
32 TABLET ORAL DAILY
Qty: 1 TABLET | Refills: 0 | Status: SHIPPED | OUTPATIENT
Start: 2021-06-14 | End: 2021-06-16

## 2021-06-15 ENCOUNTER — APPOINTMENT (OUTPATIENT)
Dept: ULTRASOUND IMAGING | Facility: CLINIC | Age: 71
DRG: 981 | End: 2021-06-15
Attending: PHYSICIAN ASSISTANT
Payer: MEDICARE

## 2021-06-15 ENCOUNTER — APPOINTMENT (OUTPATIENT)
Dept: CT IMAGING | Facility: CLINIC | Age: 71
DRG: 981 | End: 2021-06-15
Attending: EMERGENCY MEDICINE
Payer: MEDICARE

## 2021-06-15 ENCOUNTER — HOSPITAL ENCOUNTER (INPATIENT)
Facility: CLINIC | Age: 71
LOS: 16 days | Discharge: NURSING, REHAB OR LONG-TERM CARE | DRG: 981 | End: 2021-07-01
Attending: EMERGENCY MEDICINE | Admitting: PEDIATRICS
Payer: MEDICARE

## 2021-06-15 ENCOUNTER — APPOINTMENT (OUTPATIENT)
Dept: GENERAL RADIOLOGY | Facility: CLINIC | Age: 71
DRG: 981 | End: 2021-06-15
Attending: EMERGENCY MEDICINE
Payer: MEDICARE

## 2021-06-15 DIAGNOSIS — I26.99 SUBACUTE MASSIVE PULMONARY EMBOLISM (H): Primary | ICD-10-CM

## 2021-06-15 DIAGNOSIS — T82.49XA: ICD-10-CM

## 2021-06-15 DIAGNOSIS — B35.6 FUNGAL INFECTION OF THE GROIN: ICD-10-CM

## 2021-06-15 DIAGNOSIS — F41.9 ANXIETY: ICD-10-CM

## 2021-06-15 DIAGNOSIS — Z99.2 DEPENDENCE ON RENAL DIALYSIS (H): ICD-10-CM

## 2021-06-15 DIAGNOSIS — M54.50 ACUTE MIDLINE LOW BACK PAIN WITHOUT SCIATICA: ICD-10-CM

## 2021-06-15 DIAGNOSIS — N48.1 BALANITIS: ICD-10-CM

## 2021-06-15 DIAGNOSIS — S22.080A COMPRESSION FRACTURE OF T12 VERTEBRA, INITIAL ENCOUNTER (H): ICD-10-CM

## 2021-06-15 DIAGNOSIS — Z20.822 CONTACT WITH AND (SUSPECTED) EXPOSURE TO COVID-19: ICD-10-CM

## 2021-06-15 DIAGNOSIS — I48.91 ATRIAL FIBRILLATION WITH RAPID VENTRICULAR RESPONSE (H): ICD-10-CM

## 2021-06-15 DIAGNOSIS — G47.00 INSOMNIA, UNSPECIFIED TYPE: ICD-10-CM

## 2021-06-15 DIAGNOSIS — B37.41 CANDIDA CYSTITIS: ICD-10-CM

## 2021-06-15 DIAGNOSIS — I48.91 ATRIAL FIBRILLATION, UNSPECIFIED TYPE (H): ICD-10-CM

## 2021-06-15 DIAGNOSIS — K59.04 CHRONIC IDIOPATHIC CONSTIPATION: ICD-10-CM

## 2021-06-15 DIAGNOSIS — R53.1 WEAKNESS: ICD-10-CM

## 2021-06-15 DIAGNOSIS — N18.6 END STAGE RENAL DISEASE (H): ICD-10-CM
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
## 2021-06-15 DIAGNOSIS — E87.20 LACTIC ACIDOSIS: ICD-10-CM

## 2021-06-15 DIAGNOSIS — S22.080A CLOSED WEDGE COMPRESSION FRACTURE OF T12 VERTEBRA, INITIAL ENCOUNTER (H): ICD-10-CM

## 2021-06-15 LAB
ALBUMIN SERPL-MCNC: 3.5 G/DL (ref 3.4–5)
ALBUMIN UR-MCNC: 300 MG/DL
ALP SERPL-CCNC: 111 U/L (ref 40–150)
ALT SERPL W P-5'-P-CCNC: 11 U/L (ref 0–70)
AMMONIA PLAS-SCNC: 32 UMOL/L (ref 10–50)
ANION GAP SERPL CALCULATED.3IONS-SCNC: 12 MMOL/L (ref 3–14)
APPEARANCE UR: ABNORMAL
AST SERPL W P-5'-P-CCNC: 17 U/L (ref 0–45)
BASOPHILS # BLD AUTO: 0.1 10E9/L (ref 0–0.2)
BASOPHILS NFR BLD AUTO: 1.1 %
BILIRUB SERPL-MCNC: 0.7 MG/DL (ref 0.2–1.3)
BILIRUB UR QL STRIP: NEGATIVE
BUN SERPL-MCNC: 49 MG/DL (ref 7–30)
CALCIUM SERPL-MCNC: 8.9 MG/DL (ref 8.5–10.1)
CHLORIDE SERPL-SCNC: 100 MMOL/L (ref 94–109)
CO2 SERPL-SCNC: 19 MMOL/L (ref 20–32)
COLOR UR AUTO: ABNORMAL
CREAT SERPL-MCNC: 3.57 MG/DL (ref 0.66–1.25)
CRP SERPL-MCNC: 34 MG/L (ref 0–8)
DIFFERENTIAL METHOD BLD: ABNORMAL
EOSINOPHIL # BLD AUTO: 0.3 10E9/L (ref 0–0.7)
EOSINOPHIL NFR BLD AUTO: 4.2 %
ERYTHROCYTE [DISTWIDTH] IN BLOOD BY AUTOMATED COUNT: 13.4 % (ref 10–15)
ERYTHROCYTE [DISTWIDTH] IN BLOOD BY AUTOMATED COUNT: 13.6 % (ref 10–15)
ETHANOL SERPL-MCNC: <0.01 G/DL
FOLATE SERPL-MCNC: 9.8 NG/ML
GFR SERPL CREATININE-BSD FRML MDRD: 16 ML/MIN/{1.73_M2}
GLUCOSE SERPL-MCNC: 153 MG/DL (ref 70–99)
GLUCOSE UR STRIP-MCNC: 50 MG/DL
HBA1C MFR BLD: 5.2 % (ref 0–5.6)
HCT VFR BLD AUTO: 30.3 % (ref 40–53)
HCT VFR BLD AUTO: 35.7 % (ref 40–53)
HGB BLD-MCNC: 11.5 G/DL (ref 13.3–17.7)
HGB BLD-MCNC: 9.8 G/DL (ref 13.3–17.7)
HGB UR QL STRIP: ABNORMAL
IMM GRANULOCYTES # BLD: 0.1 10E9/L (ref 0–0.4)
IMM GRANULOCYTES NFR BLD: 0.8 %
KETONES UR STRIP-MCNC: NEGATIVE MG/DL
LABORATORY COMMENT REPORT: NORMAL
LACTATE BLD-SCNC: 1.5 MMOL/L (ref 0.7–2)
LACTATE BLD-SCNC: 6.7 MMOL/L (ref 0.7–2)
LEUKOCYTE ESTERASE UR QL STRIP: ABNORMAL
LYMPHOCYTES # BLD AUTO: 1.5 10E9/L (ref 0.8–5.3)
LYMPHOCYTES NFR BLD AUTO: 22.3 %
MAGNESIUM SERPL-MCNC: 2.2 MG/DL (ref 1.6–2.3)
MCH RBC QN AUTO: 32.2 PG (ref 26.5–33)
MCH RBC QN AUTO: 32.7 PG (ref 26.5–33)
MCHC RBC AUTO-ENTMCNC: 32.2 G/DL (ref 31.5–36.5)
MCHC RBC AUTO-ENTMCNC: 32.3 G/DL (ref 31.5–36.5)
MCV RBC AUTO: 100 FL (ref 78–100)
MCV RBC AUTO: 101 FL (ref 78–100)
MONOCYTES # BLD AUTO: 0.5 10E9/L (ref 0–1.3)
MONOCYTES NFR BLD AUTO: 7.8 %
MUCOUS THREADS #/AREA URNS LPF: PRESENT /LPF
NEUTROPHILS # BLD AUTO: 4.3 10E9/L (ref 1.6–8.3)
NEUTROPHILS NFR BLD AUTO: 63.8 %
NITRATE UR QL: NEGATIVE
NRBC # BLD AUTO: 0 10*3/UL
NRBC BLD AUTO-RTO: 0 /100
NT-PROBNP SERPL-MCNC: 3381 PG/ML (ref 0–900)
PH UR STRIP: 5.5 PH (ref 5–7)
PHOSPHATE SERPL-MCNC: 4.2 MG/DL (ref 2.5–4.5)
PLATELET # BLD AUTO: 142 10E9/L (ref 150–450)
PLATELET # BLD AUTO: 181 10E9/L (ref 150–450)
POTASSIUM SERPL-SCNC: 3.5 MMOL/L (ref 3.4–5.3)
PROCALCITONIN SERPL-MCNC: <0.05 NG/ML
PROT SERPL-MCNC: 8.6 G/DL (ref 6.8–8.8)
RADIOLOGIST FLAGS: ABNORMAL
RADIOLOGIST FLAGS: ABNORMAL
RBC # BLD AUTO: 3 10E12/L (ref 4.4–5.9)
RBC # BLD AUTO: 3.57 10E12/L (ref 4.4–5.9)
RBC #/AREA URNS AUTO: 35 /HPF (ref 0–2)
SARS-COV-2 RNA RESP QL NAA+PROBE: NEGATIVE
SODIUM SERPL-SCNC: 131 MMOL/L (ref 133–144)
SOURCE: ABNORMAL
SP GR UR STRIP: 1.01 (ref 1–1.03)
SPECIMEN SOURCE: NORMAL
SQUAMOUS #/AREA URNS AUTO: 16 /HPF (ref 0–1)
TROPONIN I SERPL-MCNC: <0.015 UG/L (ref 0–0.04)
TSH SERPL DL<=0.005 MIU/L-ACNC: 3.94 MU/L (ref 0.4–4)
UROBILINOGEN UR STRIP-MCNC: NORMAL MG/DL (ref 0–2)
VIT B12 SERPL-MCNC: 384 PG/ML (ref 193–986)
WBC # BLD AUTO: 6.7 10E9/L (ref 4–11)
WBC # BLD AUTO: 7.1 10E9/L (ref 4–11)
WBC #/AREA URNS AUTO: >182 /HPF (ref 0–5)
WBC CLUMPS #/AREA URNS HPF: PRESENT /HPF

## 2021-06-15 PROCEDURE — 82077 ASSAY SPEC XCP UR&BREATH IA: CPT | Performed by: EMERGENCY MEDICINE

## 2021-06-15 PROCEDURE — 96368 THER/DIAG CONCURRENT INF: CPT | Performed by: EMERGENCY MEDICINE

## 2021-06-15 PROCEDURE — 93970 EXTREMITY STUDY: CPT | Mod: 26

## 2021-06-15 PROCEDURE — 99223 1ST HOSP IP/OBS HIGH 75: CPT | Mod: AI | Performed by: PEDIATRICS

## 2021-06-15 PROCEDURE — C9803 HOPD COVID-19 SPEC COLLECT: HCPCS | Performed by: EMERGENCY MEDICINE

## 2021-06-15 PROCEDURE — U0005 INFEC AGEN DETEC AMPLI PROBE: HCPCS | Performed by: EMERGENCY MEDICINE

## 2021-06-15 PROCEDURE — 87040 BLOOD CULTURE FOR BACTERIA: CPT | Performed by: EMERGENCY MEDICINE

## 2021-06-15 PROCEDURE — 85025 COMPLETE CBC W/AUTO DIFF WBC: CPT | Performed by: EMERGENCY MEDICINE

## 2021-06-15 PROCEDURE — 84100 ASSAY OF PHOSPHORUS: CPT | Performed by: EMERGENCY MEDICINE

## 2021-06-15 PROCEDURE — U0003 INFECTIOUS AGENT DETECTION BY NUCLEIC ACID (DNA OR RNA); SEVERE ACUTE RESPIRATORY SYNDROME CORONAVIRUS 2 (SARS-COV-2) (CORONAVIRUS DISEASE [COVID-19]), AMPLIFIED PROBE TECHNIQUE, MAKING USE OF HIGH THROUGHPUT TECHNOLOGIES AS DESCRIBED BY CMS-2020-01-R: HCPCS | Performed by: EMERGENCY MEDICINE

## 2021-06-15 PROCEDURE — 83605 ASSAY OF LACTIC ACID: CPT | Performed by: EMERGENCY MEDICINE

## 2021-06-15 PROCEDURE — 84443 ASSAY THYROID STIM HORMONE: CPT | Performed by: EMERGENCY MEDICINE

## 2021-06-15 PROCEDURE — 81001 URINALYSIS AUTO W/SCOPE: CPT | Performed by: EMERGENCY MEDICINE

## 2021-06-15 PROCEDURE — 93005 ELECTROCARDIOGRAM TRACING: CPT | Performed by: EMERGENCY MEDICINE

## 2021-06-15 PROCEDURE — 83735 ASSAY OF MAGNESIUM: CPT | Performed by: EMERGENCY MEDICINE

## 2021-06-15 PROCEDURE — 93970 EXTREMITY STUDY: CPT

## 2021-06-15 PROCEDURE — 83036 HEMOGLOBIN GLYCOSYLATED A1C: CPT | Performed by: EMERGENCY MEDICINE

## 2021-06-15 PROCEDURE — 86140 C-REACTIVE PROTEIN: CPT | Performed by: EMERGENCY MEDICINE

## 2021-06-15 PROCEDURE — 96375 TX/PRO/DX INJ NEW DRUG ADDON: CPT | Performed by: EMERGENCY MEDICINE

## 2021-06-15 PROCEDURE — 250N000011 HC RX IP 250 OP 636: Performed by: EMERGENCY MEDICINE

## 2021-06-15 PROCEDURE — G1004 CDSM NDSC: HCPCS

## 2021-06-15 PROCEDURE — 99207 PR APP CREDIT; MD BILLING SHARED VISIT: CPT | Performed by: PHYSICIAN ASSISTANT

## 2021-06-15 PROCEDURE — 85027 COMPLETE CBC AUTOMATED: CPT | Performed by: PHYSICIAN ASSISTANT

## 2021-06-15 PROCEDURE — 72131 CT LUMBAR SPINE W/O DYE: CPT | Mod: 26 | Performed by: RADIOLOGY

## 2021-06-15 PROCEDURE — 36415 COLL VENOUS BLD VENIPUNCTURE: CPT | Performed by: PHYSICIAN ASSISTANT

## 2021-06-15 PROCEDURE — 250N000011 HC RX IP 250 OP 636: Performed by: PHYSICIAN ASSISTANT

## 2021-06-15 PROCEDURE — 80053 COMPREHEN METABOLIC PANEL: CPT | Performed by: EMERGENCY MEDICINE

## 2021-06-15 PROCEDURE — G1004 CDSM NDSC: HCPCS | Mod: GC | Performed by: RADIOLOGY

## 2021-06-15 PROCEDURE — 71045 X-RAY EXAM CHEST 1 VIEW: CPT

## 2021-06-15 PROCEDURE — 93010 ELECTROCARDIOGRAM REPORT: CPT | Performed by: EMERGENCY MEDICINE

## 2021-06-15 PROCEDURE — 84484 ASSAY OF TROPONIN QUANT: CPT | Performed by: EMERGENCY MEDICINE

## 2021-06-15 PROCEDURE — 82140 ASSAY OF AMMONIA: CPT | Performed by: EMERGENCY MEDICINE

## 2021-06-15 PROCEDURE — 96366 THER/PROPH/DIAG IV INF ADDON: CPT | Performed by: EMERGENCY MEDICINE

## 2021-06-15 PROCEDURE — 99285 EMERGENCY DEPT VISIT HI MDM: CPT | Mod: 25 | Performed by: EMERGENCY MEDICINE

## 2021-06-15 PROCEDURE — 71045 X-RAY EXAM CHEST 1 VIEW: CPT | Mod: 26 | Performed by: RADIOLOGY

## 2021-06-15 PROCEDURE — 96365 THER/PROPH/DIAG IV INF INIT: CPT | Mod: 59 | Performed by: EMERGENCY MEDICINE

## 2021-06-15 PROCEDURE — 120N000002 HC R&B MED SURG/OB UMMC

## 2021-06-15 PROCEDURE — 82607 VITAMIN B-12: CPT | Performed by: EMERGENCY MEDICINE

## 2021-06-15 PROCEDURE — 83880 ASSAY OF NATRIURETIC PEPTIDE: CPT | Performed by: EMERGENCY MEDICINE

## 2021-06-15 PROCEDURE — 82746 ASSAY OF FOLIC ACID SERUM: CPT | Performed by: EMERGENCY MEDICINE

## 2021-06-15 PROCEDURE — 250N000013 HC RX MED GY IP 250 OP 250 PS 637: Performed by: PHYSICIAN ASSISTANT

## 2021-06-15 PROCEDURE — 84145 PROCALCITONIN (PCT): CPT | Performed by: EMERGENCY MEDICINE

## 2021-06-15 PROCEDURE — 87086 URINE CULTURE/COLONY COUNT: CPT | Performed by: PHYSICIAN ASSISTANT

## 2021-06-15 PROCEDURE — 96361 HYDRATE IV INFUSION ADD-ON: CPT | Performed by: EMERGENCY MEDICINE

## 2021-06-15 PROCEDURE — 258N000003 HC RX IP 258 OP 636: Performed by: EMERGENCY MEDICINE

## 2021-06-15 PROCEDURE — 96367 TX/PROPH/DG ADDL SEQ IV INF: CPT | Performed by: EMERGENCY MEDICINE

## 2021-06-15 RX ORDER — ACETAMINOPHEN 500 MG
1000 TABLET ORAL 3 TIMES DAILY
Status: DISCONTINUED | OUTPATIENT
Start: 2021-06-15 | End: 2021-07-01 | Stop reason: HOSPADM

## 2021-06-15 RX ORDER — SODIUM CHLORIDE 9 MG/ML
INJECTION, SOLUTION INTRAVENOUS ONCE
Status: COMPLETED | OUTPATIENT
Start: 2021-06-15 | End: 2021-06-15

## 2021-06-15 RX ORDER — AMOXICILLIN 250 MG
2 CAPSULE ORAL 2 TIMES DAILY PRN
Status: DISCONTINUED | OUTPATIENT
Start: 2021-06-15 | End: 2021-07-01 | Stop reason: HOSPADM

## 2021-06-15 RX ORDER — CLINDAMYCIN PHOSPHATE 900 MG/50ML
900 INJECTION, SOLUTION INTRAVENOUS
Status: CANCELLED | OUTPATIENT
Start: 2021-06-15

## 2021-06-15 RX ORDER — PIPERACILLIN SODIUM, TAZOBACTAM SODIUM 2; .25 G/10ML; G/10ML
2.25 INJECTION, POWDER, LYOPHILIZED, FOR SOLUTION INTRAVENOUS EVERY 6 HOURS
Status: DISCONTINUED | OUTPATIENT
Start: 2021-06-15 | End: 2021-06-16

## 2021-06-15 RX ORDER — DIAZEPAM 2 MG
2 TABLET ORAL EVERY 12 HOURS PRN
Status: DISCONTINUED | OUTPATIENT
Start: 2021-06-15 | End: 2021-06-22

## 2021-06-15 RX ORDER — MORPHINE SULFATE 2 MG/ML
2-4 INJECTION, SOLUTION INTRAMUSCULAR; INTRAVENOUS
Status: DISCONTINUED | OUTPATIENT
Start: 2021-06-15 | End: 2021-06-18

## 2021-06-15 RX ORDER — SODIUM CHLORIDE 9 MG/ML
INJECTION, SOLUTION INTRAVENOUS CONTINUOUS
Status: CANCELLED | OUTPATIENT
Start: 2021-06-15

## 2021-06-15 RX ORDER — FOLIC ACID 1 MG/1
1 TABLET ORAL DAILY
Status: DISCONTINUED | OUTPATIENT
Start: 2021-06-15 | End: 2021-07-01 | Stop reason: HOSPADM

## 2021-06-15 RX ORDER — LIDOCAINE 40 MG/G
CREAM TOPICAL
Status: DISCONTINUED | OUTPATIENT
Start: 2021-06-15 | End: 2021-06-21

## 2021-06-15 RX ORDER — ONDANSETRON 2 MG/ML
4 INJECTION INTRAMUSCULAR; INTRAVENOUS EVERY 6 HOURS PRN
Status: DISCONTINUED | OUTPATIENT
Start: 2021-06-15 | End: 2021-07-01 | Stop reason: HOSPADM

## 2021-06-15 RX ORDER — MULTIPLE VITAMINS W/ MINERALS TAB 9MG-400MCG
1 TAB ORAL DAILY
Status: DISCONTINUED | OUTPATIENT
Start: 2021-06-15 | End: 2021-06-21

## 2021-06-15 RX ORDER — OXYCODONE HYDROCHLORIDE 5 MG/1
5-10 TABLET ORAL EVERY 4 HOURS PRN
Status: DISCONTINUED | OUTPATIENT
Start: 2021-06-15 | End: 2021-06-21

## 2021-06-15 RX ORDER — HEPARIN SODIUM 5000 [USP'U]/.5ML
5000 INJECTION, SOLUTION INTRAVENOUS; SUBCUTANEOUS EVERY 12 HOURS
Status: DISCONTINUED | OUTPATIENT
Start: 2021-06-15 | End: 2021-06-15

## 2021-06-15 RX ORDER — LANOLIN ALCOHOL/MO/W.PET/CERES
100 CREAM (GRAM) TOPICAL DAILY
Status: DISPENSED | OUTPATIENT
Start: 2021-06-15 | End: 2021-06-20

## 2021-06-15 RX ORDER — MORPHINE SULFATE 4 MG/ML
4 INJECTION, SOLUTION INTRAMUSCULAR; INTRAVENOUS
Status: DISCONTINUED | OUTPATIENT
Start: 2021-06-15 | End: 2021-06-15

## 2021-06-15 RX ORDER — BISACODYL 10 MG
10 SUPPOSITORY, RECTAL RECTAL DAILY PRN
Status: DISCONTINUED | OUTPATIENT
Start: 2021-06-15 | End: 2021-07-01 | Stop reason: HOSPADM

## 2021-06-15 RX ORDER — POLYETHYLENE GLYCOL 3350 17 G/17G
17 POWDER, FOR SOLUTION ORAL DAILY
Status: DISCONTINUED | OUTPATIENT
Start: 2021-06-15 | End: 2021-07-01 | Stop reason: HOSPADM

## 2021-06-15 RX ORDER — HEPARIN SODIUM 10000 [USP'U]/100ML
0-5000 INJECTION, SOLUTION INTRAVENOUS CONTINUOUS
Status: DISPENSED | OUTPATIENT
Start: 2021-06-15 | End: 2021-06-19

## 2021-06-15 RX ORDER — PIPERACILLIN SODIUM, TAZOBACTAM SODIUM 2; .25 G/10ML; G/10ML
2.25 INJECTION, POWDER, LYOPHILIZED, FOR SOLUTION INTRAVENOUS ONCE
Status: COMPLETED | OUTPATIENT
Start: 2021-06-15 | End: 2021-06-15

## 2021-06-15 RX ORDER — ACETAMINOPHEN 325 MG/1
650 TABLET ORAL EVERY 4 HOURS PRN
Status: DISCONTINUED | OUTPATIENT
Start: 2021-06-15 | End: 2021-06-15

## 2021-06-15 RX ORDER — LIDOCAINE 4 G/G
1 PATCH TOPICAL
Status: DISCONTINUED | OUTPATIENT
Start: 2021-06-15 | End: 2021-07-01 | Stop reason: HOSPADM

## 2021-06-15 RX ORDER — ONDANSETRON 4 MG/1
4 TABLET, ORALLY DISINTEGRATING ORAL EVERY 6 HOURS PRN
Status: DISCONTINUED | OUTPATIENT
Start: 2021-06-15 | End: 2021-07-01 | Stop reason: HOSPADM

## 2021-06-15 RX ORDER — AMOXICILLIN 250 MG
1 CAPSULE ORAL 2 TIMES DAILY PRN
Status: DISCONTINUED | OUTPATIENT
Start: 2021-06-15 | End: 2021-07-01 | Stop reason: HOSPADM

## 2021-06-15 RX ORDER — LIDOCAINE 40 MG/G
CREAM TOPICAL
Status: CANCELLED | OUTPATIENT
Start: 2021-06-15

## 2021-06-15 RX ADMIN — HEPARIN SODIUM 1800 UNITS/HR: 10000 INJECTION, SOLUTION INTRAVENOUS at 20:39

## 2021-06-15 RX ADMIN — HEPARIN SODIUM 5000 UNITS: 5000 INJECTION, SOLUTION INTRAVENOUS; SUBCUTANEOUS at 20:10

## 2021-06-15 RX ADMIN — Medication 1 TABLET: at 20:09

## 2021-06-15 RX ADMIN — SODIUM CHLORIDE 1000 ML: 9 INJECTION, SOLUTION INTRAVENOUS at 13:31

## 2021-06-15 RX ADMIN — OXYCODONE HYDROCHLORIDE 5 MG: 5 TABLET ORAL at 20:10

## 2021-06-15 RX ADMIN — VANCOMYCIN HYDROCHLORIDE 2500 MG: 1 INJECTION, POWDER, LYOPHILIZED, FOR SOLUTION INTRAVENOUS at 15:18

## 2021-06-15 RX ADMIN — POLYETHYLENE GLYCOL 3350 17 G: 17 POWDER, FOR SOLUTION ORAL at 20:10

## 2021-06-15 RX ADMIN — SODIUM CHLORIDE: 9 INJECTION, SOLUTION INTRAVENOUS at 14:55

## 2021-06-15 RX ADMIN — FOLIC ACID 1 MG: 1 TABLET ORAL at 20:10

## 2021-06-15 RX ADMIN — PIPERACILLIN SODIUM AND TAZOBACTAM SODIUM 2.25 G: 2; .25 INJECTION, POWDER, LYOPHILIZED, FOR SOLUTION INTRAVENOUS at 14:07

## 2021-06-15 RX ADMIN — MORPHINE SULFATE 2 MG: 2 INJECTION, SOLUTION INTRAMUSCULAR; INTRAVENOUS at 20:10

## 2021-06-15 RX ADMIN — ACETAMINOPHEN 1000 MG: 500 TABLET, FILM COATED ORAL at 20:09

## 2021-06-15 RX ADMIN — LIDOCAINE 1 PATCH: 560 PATCH PERCUTANEOUS; TOPICAL; TRANSDERMAL at 20:11

## 2021-06-15 RX ADMIN — THIAMINE HCL TAB 100 MG 100 MG: 100 TAB at 20:09

## 2021-06-15 RX ADMIN — PIPERACILLIN SODIUM AND TAZOBACTAM SODIUM 2.25 G: 2; .25 INJECTION, POWDER, LYOPHILIZED, FOR SOLUTION INTRAVENOUS at 20:43

## 2021-06-15 ASSESSMENT — ENCOUNTER SYMPTOMS
FATIGUE: 1
BACK PAIN: 1
LIGHT-HEADEDNESS: 1
APPETITE CHANGE: 1
SHORTNESS OF BREATH: 1
DIFFICULTY URINATING: 1
GASTROINTESTINAL NEGATIVE: 1

## 2021-06-15 NOTE — PHARMACY-VANCOMYCIN DOSING SERVICE
"Pharmacy Vancomycin Initial Note  Date of Service Emma 15, 2021  Patient's  1950  71 year old, male    Indication: Sepsis    Current estimated CrCl = Estimated Creatinine Clearance: 26.4 mL/min (A) (based on SCr of 3.57 mg/dL (H)).    Creatinine for last 3 days  6/15/2021:  1:03 PM Creatinine 3.57 mg/dL    Recent Vancomycin Level(s) for last 3 days  No results found for requested labs within last 72 hours.      Vancomycin IV Administrations (past 72 hours)      No vancomycin orders with administrations in past 72 hours.                Nephrotoxins and other renal medications (From now, onward)    Start     Dose/Rate Route Frequency Ordered Stop    06/15/21 1355  vancomycin (VANCOCIN) 2,500 mg in sodium chloride 0.9 % 500 mL intermittent infusion      2,500 mg  over 120 Minutes Intravenous ONCE 06/15/21 1350      06/15/21 1350  vancomycin place garcia - receiving intermittent dosing      1 each Intravenous SEE ADMIN INSTRUCTIONS 06/15/21 1350      06/15/21 1335  piperacillin-tazobactam (ZOSYN) 2.25 g vial to attach to  ml bag     Note to Pharmacy: For [facility], [facility] and [facility]: For Zosyn-naive patients, use the \"Zosyn initial dose + extended infusion\" order panel.    2.25 g  over 30 Minutes Intravenous ONCE 06/15/21 1333            Contrast Orders - past 72 hours (72h ago, onward)    None                Plan:  1. Give vancomycin 2500mg IV x1 now, will dose intermittently based on levels/hemodialysis schedule  2. Vancomycin monitoring method: Renal Replacement Therapy  3. Vancomycin therapeutic monitoring goal: 15-20 mg/L  4. Pharmacy will check vancomycin levels as appropriate in 1-3 Days.    5. Serum creatinine levels will be ordered daily for the first week of therapy and at least twice weekly for subsequent weeks.      Ernst Juarez, PHarmD, BCPS    "

## 2021-06-15 NOTE — H&P
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 Complicated by new acute T12 compression fx and RLE DVT.      #Lactic acidosis   #Hypotension   #Severe sepsis from urinary tract infection   Hypotensive, tachycardic with lactic acidosis on admission. Initially thought to be dehydrated (poor PO intake per wife), but infectious work up with grossly positive UA. CXR with unchanged oniel-hilar and L retrocardiac opacities likely atelectasis vs edema. Patient was on 2L O2, but weaned to RA at 95%. Blood cultures obtained and pending. Started on broad spectrum abx and IVF with good response in blood pressure and with repeat lactic acid 1.5.  As for other etiologies for hypotension, considering PE as patient has been very sedentary over the last 1-2 weeks. Less likely adrenal insufficiency.   - Broad spectrum abx Vanc/Zosyn  - Follow up UCx, and  Blood cultures. Added culture from CVC cathter   - Procalcitonin, CRP   - TTE to evaluate for R heart strain, starting empiric anticoagulation for +DVT as noted below     #Atrial fib/flutter with RVR - [id number] score 3 for age, HTN, and DM2. Takes  mg daily, but not on anticoagulation or rate control prior to admission. Presented with HR 180s on admission, which improved after IVF. EKG in Afib/flutter.   -Telemetry  -Hold any BB with soft BP. HR currently within goal <110   -Hold  mg daily while on  anticoagulation     #T12 compression fx - Reporting low back pain for the last 1-2 weeks with multiple recurrent falls (due to weakness and unsteady gait). Denies any head strike or LOC. PCP ordered Xray of Lumbar spine negative for fx per care-everywhere. CT lumbar spine with acute compression fx T12, without any retropulsion. Denies any red flag sx, and no obvious deficit, though patient would not allow formal neuro evaluation.   -TLSO brace  -Pain management: tylenol 1000 mg TID, oxycodone 5-10 mg Q4H PRN, IV morphine PRN breakthrough  -Consider neurosurgery or neuro IR consult if pain not improved with TLSO brace and medications, as may benefit from kyphoplasty   -PT/OT consult     #DVT of RLE - Increased LE edema and immobilized over last 1-2 weeks. US bilateral showed R partially occlusive thrombus in the distal iliac vein extending into the common femoral vein and occlusive thrombus in popliteal vein.   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 Access with CVC (failed attempt with AVF in past per patient. Appears to have a CVC catheter exchange planned on 6/23). Patient still makes urine. Appears hypervolemic with LE edema. Unclear baseline weight.   -HD per nephrology, consulted  -Check Phos, Mag  -I&O and daily weights     #Hx of HTN - Previously on medications but discontinued after lifestyle modifications with weight loss several years ago    #DM2- diet controlled. Check Hemoglobin A1c    #Neurogenic bladder - Follows with urology. S/p botox in past. Not on any medications due to cost.   -Obtain PVR     #Chronic anemia - Baseline 11.0s. .   -Trend CBC  -B12, Folate, TSH    #Hx of alcohol abuse - Hx of withdrawal. Drinking 1/2 drink of whiskey daily. Monitor on CIWA, hold ativan for now unless starts scoring. Start MVI, folic acid and thiamine.      #Anxiety - Continue home Valium at lower dose 2 mg BID PRN and hold for sedation        Diet:  Regular diet   DVT Prophylaxis: Heparin   Brown Catheter: not present  Code Status:   FULL CODE         Disposition Plan   Expected discharge: 4 - 7 days, recommended to TBD once adequate pain management/ tolerating PO medications, antibiotic plan established and O2 use less than 1 liters/minute.  Entered: Ina Ohara PA-C 06/15/2021, 4:18 PM     The patient's care was discussed with the Attending Physician, Dr. Howard Fajardo, Bedside Nurse, Patient and Patient's Family.    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      Patient is a poor historian.  Spoke with patient's wife who was able to provide additional history.  Patient states that he has been feeling generalized weakness and fatigue for the last 2 weeks.  States he has been lying in a chair for the last 9 days occasionally walking to the bathroom from his living room chair.  Expressed significant fatigue and weakness with walking short distance.  Has had multiple falls in the last 2 weeks, but patient and wife denies any head strike or loss of consciousness with these falls but rather due to weakness and unsteady gait.  For the last week he has had severe middle to low back, nonradiating.  Endorses some numbness in his feet which is mild but denies any saddle anesthesia or bladder or bowel incontinence.  Patient came in for  evaluation today after having a presyncopal episode of walking to the bathroom, but was able to sit down in a chair after calling to his wife.  He also felt a little short of breath with this episode.    Denies any fevers, chills, chest pain, shortness of breath at rest, orthopnea, palpitations, dizziness or lightheadedness with standing, nausea, vomiting, diarrhea, abdominal pain, flank pain, or blood in the urine.  Endorses occasional dysuria after eating spicy foods.  Has chronic urinary hesitancy which he is followed with urology which is unchanged from baseline.  Endorses lower extremity swelling and attributes it to sleeping in a chair for the last 9 days.  Denies any recent weight changes though over the last several years he has lost 80 pounds. Endorses dry cough which is new.     Per wife patient has not been eating much or drinking much in the last week due to the heat and feeling unwell.  She states she is unable to care for him at home due to frequent falls.  Denies any recent sick contacts or recent travel.  Patient drinks about 1/2 glass of whiskey a night. Denies any other drug or tobacco use.     Review of Systems    The 10 point Review of Systems is negative other than noted in the HPI or here.     Past Medical History    I have reviewed this patient's medical history and updated it with pertinent information if needed.   Past Medical History:   Diagnosis Date     Anemia      Arrhythmia     atrial fib     Arthritis 1990's    gout     BPH (benign prostatic hyperplasia)      Depression      Diabetes mellitus (H)     Type 2  IDDM     Diverticulosis      ESRD (end stage renal disease) on dialysis (H)      Hematuria      HTN (hypertension)      HTN (hypertension)      IgA nephropathy      MI, old      Pneumonia      Rhabdomyolysis      Weakness 5/10/2019       Past Surgical History   I have reviewed this patient's surgical history and updated it with pertinent information if needed.  Past Surgical  History:   Procedure Laterality Date     ABDOMEN SURGERY       APPENDECTOMY  1970's     ARTHROSCOPY KNEE  6/27/2013    Procedure: ARTHROSCOPY KNEE;  Right Knee Arthroscopy, Debridment Of Medial Meniscal Tear.  ;  Surgeon: Tomás Wong MD;  [facility]     BACK SURGERY  1999    L5 S1 decompression     BIOPSY  2017    skin     COLONOSCOPY  2013    [facility]     CORONARY ANGIOGRAPHY ADULT ORDER  2018     EYE SURGERY Bilateral 2004    Lens replacement     IR CVC TUNNEL PLACEMENT > 5 YRS OF AGE  11/26/2019     IR CVC TUNNEL REMOVAL RIGHT  11/26/2019     IR CVC TUNNEL REVISION RIGHT  2/10/2020     IR CVC TUNNEL REVISION RIGHT  10/8/2020     VASCULAR SURGERY         Social History   I have reviewed this patient's social history and updated it with pertinent information if needed.  Social History     Tobacco Use     Smoking status: Former Smoker     Smokeless tobacco: Never Used     Tobacco comment: quit 35 years ago   Substance Use Topics     Alcohol use: Yes     Comment: 1 to 2 drinks a day (Occasional)     Drug use: No       Family History   I have reviewed this patient's family history and updated it with pertinent information if needed.  Family History   Problem Relation Age of Onset     Cancer Father        Prior to Admission Medications   Prior to Admission Medications   Prescriptions Last Dose Informant Patient Reported? Taking?   aspirin (ASA) 325 MG EC tablet 6/15/2021 at Unknown time Self Yes Yes   Sig: Take 325 mg by mouth daily    diazepam (VALIUM) 5 MG tablet  Self Yes No   Sig: Take 5 mg by mouth every 12 hours as needed for anxiety   diphenhydrAMINE (BENADRYL) 50 MG capsule   No No   Sig: Take 1 capsule (50 mg) by mouth every 6 hours as needed for itching or allergies Administer 30 min - 2 hours pre - IV contrast injection   methylPREDNISolone (MEDROL) 32 MG tablet   No No   Sig: Take 1 tablet (32 mg) by mouth daily 12 hours prior to the procedure with IV contrast      Facility-Administered  Medications: None     Allergies   Allergies   Allergen Reactions     Cephalexin      Ciprofloxacin      Diagnostic X-Ray Materials      Sulfa Drugs Hives       Physical Exam   Vital Signs: Temp: 97.7  F (36.5  C) Temp src: Oral BP: 91/54 Pulse: 101   Resp: 21 SpO2: 96 % O2 Device: Nasal cannula Oxygen Delivery: 2 LPM  Weight: 263 lbs 0 oz  Blood pressure 99/72, pulse 90, temperature 97.7  F (36.5  C), temperature source Oral, resp. rate 26, weight 119.3 kg (263 lb), SpO2 92 %.  GENERAL: Alert and oriented x 3. Lying in bed comfortably while at rest. Appears uncomfortable with movement. Disheveled appearing.  HEENT: AT/NC. Anicteric sclera. Drymembranes moist   CARDIOVASCULAR: Irregularly irregular rhythm, tachycardic rate. S1, S2. No murmurs, rubs, or gallops.   RESPIRATORY: Effort normal on RA at 95%. Clear to anterior ascultation (Refused posterior examination) auscultation bilaterally, respirations unlabored   GI: Abdomen soft, TTP in RUQ abdomen causing back pain. No rebound tenderness. Hypoactive bowel sounds present  MUSCULOSKELETAL: Refused back exam.   EXTREMITIES: Bilaterally pitting edema in LE bilaterally.   NEUROLOGICAL: CN II-XII grossly intact. Moving upper extremities symmetrically. Refused complete neuro exam.   SKIN: Intact. Warm and dry. No jaundice.   PSYCH: Angry affect       Data   Data reviewed today: I reviewed all medications, new labs and imaging results over the last 24 hours. I personally reviewed the EKG tracing showing Atrial fibrillation with RVR at 170 bpm. RAD. QTc 433. 1mm ST depressions in lateral leads. .    Recent Labs   Lab 06/15/21  1303   WBC 6.7   HGB 11.5*         *   POTASSIUM 3.5   CHLORIDE 100   CO2 19*   BUN 49*   CR 3.57*   ANIONGAP 12   COLUMBA 8.9   *   ALBUMIN 3.5   PROTTOTAL 8.6   BILITOTAL 0.7   ALKPHOS 111   ALT 11   AST 17   TROPI <0.015     Recent Results (from the past 24 hour(s))   XR Chest Port 1 View    Narrative    EXAM: XR CHEST PORT  1 VIEW  6/15/2021 1:35 PM     HISTORY:  SOA       COMPARISON:  Chest radiograph 5/14/2020    FINDINGS:     Portable upright view of the chest. Right tunneled central venous  catheter projects over the right atrium. Trachea is midline.  Cardiomediastinal silhouette is borderline enlarged. Pulmonary  vasculature is prominent. Low lung volumes. Grossly unchanged  perihilar and left retrocardiac opacities. No significant pleural  effusion. No pneumothorax.    No acute osseous abnormality. Visualized upper abdomen is  unremarkable.        Impression    IMPRESSION: Mild cardiomegaly with perihilar and left retrocardiac  opacities, likely atelectasis vs. edema.

## 2021-06-15 NOTE — ED TRIAGE NOTES
Pt brought in by ambulance with reports that his wife called EMS because pateint was c/o shortness of breath. Sitting in chair after fall 10 days ago. Edema in legs. A-fib with RVR. Dialysis yesterday. C/o low back pain

## 2021-06-15 NOTE — ED PROVIDER NOTES
"    [facility] EMERGENCY DEPARTMENT ([facility])  6/15/21     History     Chief Complaint   Patient presents with     Hypotension     The history is provided by the patient and medical records.     Fer Latham is a 71 year old male with a past medical history significant for HTN, IgA nephropathy, ESRD on dialysis, type 2 diabetes mellitus, atrial fibrillation, alcohol abuse, and metabolic encephalopathy who presents here to the Emergency Department via EMS from home after wife called because patient reported shortness of breath.  Per EMS patient was found sitting in his chair for the past 10 days after he had a fall in the bathroom.  He was found to been in atrial fibrillation with RVR in 130-200s.  Patient's blood pressure with EMS was lowest at 70/40.  He was put on oxygen, however he does not wear oxygen at baseline.  Patient reports he has had two episodes this week during which he felt \"out of it\" while walking from the bathroom back to his living room chair.  He states both episodes resolved on their onw.  He is unable to describe the \"out of it\" sensation any further, though he did report feeling lightheaded like he was going to pass out and he told his wife he was short of breath.  Patient reports he had a fall a couple weeks ago.  He states he was evaluated following that fall.  Per EMR he was seen on 06/07 reporting low back pain and had a negative lumbar XR.  Patient continues to report low back pain.  Patient reports his legs have been somewhat more swollen.  He states he was last dialyzed yesterday, but he states it was \"close to a full run\".  Patient reports he uses a walker to ambulate at home and he is typically able to get up and out of the house.  He states he is able to make it to the bathroom appropriately.  He does report he has been sleeping in his chair for the past week or so.  Patient denies liver problems or heart problems.  He states he is no longer diabetic.  Patient takes " aspirin but he is otherwise not anticoagulated.     Past Medical History  Past Medical History:   Diagnosis Date     Anemia      Arrhythmia     atrial fib     Arthritis 1990's    gout     BPH (benign prostatic hyperplasia)      Depression      Diabetes mellitus (H)     Type 2  IDDM     Diverticulosis      ESRD (end stage renal disease) on dialysis (H)      Hematuria      HTN (hypertension)      HTN (hypertension)      IgA nephropathy      MI, old      Pneumonia      Rhabdomyolysis      Weakness 5/10/2019     Past Surgical History:   Procedure Laterality Date     ABDOMEN SURGERY       APPENDECTOMY  1970's     ARTHROSCOPY KNEE  6/27/2013    Procedure: ARTHROSCOPY KNEE;  Right Knee Arthroscopy, Debridment Of Medial Meniscal Tear.  ;  Surgeon: Tomás Wong MD;  Location: [facility]     BACK SURGERY  1999    L5 S1 decompression     BIOPSY  2017    skin     COLONOSCOPY  2013    [facility]     CORONARY ANGIOGRAPHY ADULT ORDER  2018     EYE SURGERY Bilateral 2004    Lens replacement     IR CVC TUNNEL PLACEMENT > 5 YRS OF AGE  11/26/2019     IR CVC TUNNEL REMOVAL RIGHT  11/26/2019     IR CVC TUNNEL REVISION RIGHT  2/10/2020     IR CVC TUNNEL REVISION RIGHT  10/8/2020     VASCULAR SURGERY       aspirin (ASA) 325 MG EC tablet  diazepam (VALIUM) 5 MG tablet  diphenhydrAMINE (BENADRYL) 50 MG capsule  methylPREDNISolone (MEDROL) 32 MG tablet      Allergies   Allergen Reactions     Cephalexin      Ciprofloxacin      Diagnostic X-Ray Materials      Sulfa Drugs Hives     Family History  Family History   Problem Relation Age of Onset     Cancer Father      Social History   Social History     Tobacco Use     Smoking status: Former Smoker     Smokeless tobacco: Never Used     Tobacco comment: quit 35 years ago   Substance Use Topics     Alcohol use: Yes     Comment: 1 to 2 drinks a day (Occasional)     Drug use: No      Past medical history, past surgical history, medications, allergies, family history, and social history were reviewed  with the patient. No additional pertinent items.       Review of Systems   Constitutional: Positive for appetite change and fatigue.   Respiratory: Positive for shortness of breath.    Cardiovascular: Positive for leg swelling.   Gastrointestinal: Negative.    Genitourinary: Positive for difficulty urinating.   Musculoskeletal: Positive for back pain (low).   Neurological: Positive for light-headedness.   All other systems reviewed and are negative.    A complete review of systems was performed with pertinent positives and negatives noted in the HPI, and all other systems negative.    Physical Exam   BP: 102/83  Pulse: 139  Temp: 97.7  F (36.5  C)  Resp: 24  Weight: 119.3 kg (263 lb)(bed scale)  SpO2: 93 %  Physical Exam  Vitals signs and nursing note reviewed.   Constitutional:       General: He is in acute distress.   HENT:      Head: Atraumatic.      Mouth/Throat:      Mouth: Mucous membranes are dry.   Eyes:      Extraocular Movements: Extraocular movements intact.      Pupils: Pupils are equal, round, and reactive to light.   Cardiovascular:      Rate and Rhythm: Tachycardia present.   Pulmonary:      Effort: Pulmonary effort is normal.      Breath sounds: Normal breath sounds.   Abdominal:      Palpations: Abdomen is soft.   Musculoskeletal:      Lumbar back: He exhibits tenderness and pain.        Back:       Right lower leg: Edema present.      Left lower leg: Edema present.      Comments: Prominent bilateral lower extremity edema likely chronic   Skin:     General: Skin is warm.   Neurological:      General: No focal deficit present.      Mental Status: He is alert and oriented to person, place, and time.   Psychiatric:         Mood and Affect: Affect is angry.         Speech: Speech normal.       ED Course     12:52 PM  The patient was seen and examined by Олег Graham MD in Room ED02.    Procedures             EKG Interpretation:      Interpreted by Олег Graham MD  Time reviewed:  1310  Symptoms at time of EKG: palpitations   Rhythm: A-fib  Rate: 165  Axis: normal  Ectopy: none  Conduction: normal  ST Segments/ T Waves: No ST-T wave changes  Q Waves: none  Comparison to prior: Previously rate controlled A. fib November 2020    Clinical Impression: A-fib with RVR             Results for orders placed or performed during the hospital encounter of 06/15/21   XR Chest Port 1 View     Status: None (Preliminary result)    Narrative    EXAM: XR CHEST PORT 1 VIEW  6/15/2021 1:35 PM     HISTORY:  SOA       COMPARISON:  Chest radiograph 5/14/2020    FINDINGS:     Portable upright view of the chest. Right tunneled central venous  catheter projects over the right atrium. Trachea is midline.  Cardiomediastinal silhouette is borderline enlarged. Pulmonary  vasculature is prominent. Low lung volumes. Grossly unchanged  perihilar and left retrocardiac opacities. No significant pleural  effusion. No pneumothorax.    No acute osseous abnormality. Visualized upper abdomen is  unremarkable.        Impression    IMPRESSION: Mild cardiomegaly with perihilar and left retrocardiac  opacities, likely atelectasis vs. edema.   CT Lumbar Spine w/o Contrast     Status: Abnormal   Result Value Ref Range    Radiologist flags Compression fracture (Urgent)     Narrative    CT LUMBAR SPINE W/O CONTRAST 6/15/2021 4:45 PM    History: Compression fracture, L-spine.  ICD-10:    Comparison: None.    Technique: Using multidetector thin collimation helical acquisition  technique, axial, coronal and sagittal CT images through the lumbar  spine were obtained without intravenous contrast.     Findings: Image quality degraded by beam attenuation secondary to the  patient's body habitus. There are 5 lumbar type vertebrae. Regarding  alignment, there is minimal retrolisthesis of L5 on S1. Wedge-shaped  compression deformity with visible horizontal fracture lucency through  the T12 vertebral body with up to approximately 30% height  loss.  Associated paravertebral edema. There is no appreciable retropulsion  of vertebral body/fragments.     L3 superior endplate Schmorl's node. Severe loss of intervertebral  disc height at L5-S1. Additional multilevel degenerative changes with  endplate osteophytosis, disc bulges, ligamentum flavum thickening, and  facet hypertrophy. Associated moderate to severe spinal canal stenosis  at L3-4. Multilevel neural foraminal narrowing.    Bilateral renal atrophy.      Impression    Impression:  1. Acute compression fracture of the T12 vertebral body and up to  approximately 30% height loss. No evidence for retropulsion.  2. Multilevel spondylosis, most pronounced at L3-4 where there is  moderate to severe spinal canal stenosis.    [Urgent Result: Compression fracture]    Finding was identified on 6/15/2021 4:48 PM.     Dr. Graham was contacted by Dr. Griffin at 6/15/2021 5:06 PM and  verbalized understanding of the urgent finding.     I have personally reviewed the examination and initial interpretation  and I agree with the findings.    JOHN OLGUIN MD   Lactic acid     Status: Abnormal   Result Value Ref Range    Lactic Acid 6.7 (HH) 0.7 - 2.0 mmol/L   CBC with platelets differential     Status: Abnormal   Result Value Ref Range    WBC 6.7 4.0 - 11.0 10e9/L    RBC Count 3.57 (L) 4.4 - 5.9 10e12/L    Hemoglobin 11.5 (L) 13.3 - 17.7 g/dL    Hematocrit 35.7 (L) 40.0 - 53.0 %     78 - 100 fl    MCH 32.2 26.5 - 33.0 pg    MCHC 32.2 31.5 - 36.5 g/dL    RDW 13.4 10.0 - 15.0 %    Platelet Count 181 150 - 450 10e9/L    Diff Method Automated Method     % Neutrophils 63.8 %    % Lymphocytes 22.3 %    % Monocytes 7.8 %    % Eosinophils 4.2 %    % Basophils 1.1 %    % Immature Granulocytes 0.8 %    Nucleated RBCs 0 0 /100    Absolute Neutrophil 4.3 1.6 - 8.3 10e9/L    Absolute Lymphocytes 1.5 0.8 - 5.3 10e9/L    Absolute Monocytes 0.5 0.0 - 1.3 10e9/L    Absolute Eosinophils 0.3 0.0 - 0.7 10e9/L    Absolute  Basophils 0.1 0.0 - 0.2 10e9/L    Abs Immature Granulocytes 0.1 0 - 0.4 10e9/L    Absolute Nucleated RBC 0.0    Comprehensive metabolic panel     Status: Abnormal   Result Value Ref Range    Sodium 131 (L) 133 - 144 mmol/L    Potassium 3.5 3.4 - 5.3 mmol/L    Chloride 100 94 - 109 mmol/L    Carbon Dioxide 19 (L) 20 - 32 mmol/L    Anion Gap 12 3 - 14 mmol/L    Glucose 153 (H) 70 - 99 mg/dL    Urea Nitrogen 49 (H) 7 - 30 mg/dL    Creatinine 3.57 (H) 0.66 - 1.25 mg/dL    GFR Estimate 16 (L) >60 mL/min/[1.73_m2]    GFR Estimate If Black 19 (L) >60 mL/min/[1.73_m2]    Calcium 8.9 8.5 - 10.1 mg/dL    Bilirubin Total 0.7 0.2 - 1.3 mg/dL    Albumin 3.5 3.4 - 5.0 g/dL    Protein Total 8.6 6.8 - 8.8 g/dL    Alkaline Phosphatase 111 40 - 150 U/L    ALT 11 0 - 70 U/L    AST 17 0 - 45 U/L   Ammonia (on ice)     Status: None   Result Value Ref Range    Ammonia 32 10 - 50 umol/L   Nt probnp inpatient     Status: Abnormal   Result Value Ref Range    N-Terminal Pro BNP Inpatient 3,381 (H) 0 - 900 pg/mL   Troponin I     Status: None   Result Value Ref Range    Troponin I ES <0.015 0.000 - 0.045 ug/L   UA with Microscopic     Status: Abnormal   Result Value Ref Range    Color Urine Orange     Appearance Urine Cloudy     Glucose Urine 50 (A) NEG^Negative mg/dL    Bilirubin Urine Negative NEG^Negative    Ketones Urine Negative NEG^Negative mg/dL    Specific Gravity Urine 1.015 1.003 - 1.035    Blood Urine Moderate (A) NEG^Negative    pH Urine 5.5 5.0 - 7.0 pH    Protein Albumin Urine 300 (A) NEG^Negative mg/dL    Urobilinogen mg/dL Normal 0.0 - 2.0 mg/dL    Nitrite Urine Negative NEG^Negative    Leukocyte Esterase Urine Large (A) NEG^Negative    Source Unspecified Urine     WBC Urine >182 (H) 0 - 5 /HPF    RBC Urine 35 (H) 0 - 2 /HPF    WBC Clumps Present (A) NEG^Negative /HPF    Squamous Epithelial /HPF Urine 16 (H) 0 - 1 /HPF    Mucous Urine Present (A) NEG^Negative /LPF   Asymptomatic SARS-CoV-2 COVID-19 Virus (Coronavirus) by PCR      Status: None    Specimen: Nasopharyngeal   Result Value Ref Range    SARS-CoV-2 Virus Specimen Source Nasal     SARS-CoV-2 PCR Result NEGATIVE     SARS-CoV-2 PCR Comment       Testing was performed using the Xpert Xpress SARS-CoV-2 Assay on the Cepheid Gene-Xpert   Instrument Systems. Additional information about this Emergency Use Authorization (EUA)   assay can be found via the Lab Guide.     Lactic acid whole blood     Status: None   Result Value Ref Range    Lactic Acid 1.5 0.7 - 2.0 mmol/L   CRP inflammation     Status: Abnormal   Result Value Ref Range    CRP Inflammation 34.0 (H) 0.0 - 8.0 mg/L   Hemoglobin A1c     Status: None   Result Value Ref Range    Hemoglobin A1C 5.2 0 - 5.6 %   EKG 12 lead     Status: None (Preliminary result)   Result Value Ref Range    Interpretation ECG Click View Image link to view waveform and result    Blood culture     Status: None (Preliminary result)    Specimen: Hand, Right; Blood   Result Value Ref Range    Specimen Description Blood     Special Requests Right Arm     Culture Micro No growth after 2 hours    Blood culture     Status: None (Preliminary result)    Specimen: Hand, Right; Blood    Right Hand   Result Value Ref Range    Specimen Description Blood Right Hand     Culture Micro No growth after 2 hours      Medications   vancomycin place garcia - receiving intermittent dosing (has no administration in time range)   piperacillin-tazobactam (ZOSYN) 2.25 g vial to attach to  ml bag (has no administration in time range)   aspirin (ASA) EC tablet 325 mg (has no administration in time range)   diazepam (VALIUM) tablet 2 mg (has no administration in time range)   lidocaine 1 % 0.1-1 mL (has no administration in time range)   lidocaine (LMX4) cream (has no administration in time range)   sodium chloride (PF) 0.9% PF flush 3 mL (has no administration in time range)   sodium chloride (PF) 0.9% PF flush 3 mL (has no administration in time range)   melatonin tablet 5 mg  (has no administration in time range)   heparin ANTICOAGULANT injection 5,000 Units (has no administration in time range)   morphine (PF) injection 2-4 mg (has no administration in time range)   oxyCODONE (ROXICODONE) tablet 5-10 mg (has no administration in time range)   polyethylene glycol (MIRALAX) Packet 17 g (has no administration in time range)   senna-docusate (SENOKOT-S/PERICOLACE) 8.6-50 MG per tablet 1 tablet (has no administration in time range)     Or   senna-docusate (SENOKOT-S/PERICOLACE) 8.6-50 MG per tablet 2 tablet (has no administration in time range)   bisacodyl (DULCOLAX) Suppository 10 mg (has no administration in time range)   ondansetron (ZOFRAN-ODT) ODT tab 4 mg (has no administration in time range)     Or   ondansetron (ZOFRAN) injection 4 mg (has no administration in time range)   thiamine (B-1) tablet 100 mg (has no administration in time range)   folic acid (FOLVITE) tablet 1 mg (has no administration in time range)   multivitamin w/minerals (THERA-VIT-M) tablet 1 tablet (has no administration in time range)   acetaminophen (TYLENOL) tablet 1,000 mg (has no administration in time range)   Lidocaine (LIDOCARE) 4 % Patch 1 patch (has no administration in time range)     And   lidocaine patch in PLACE (has no administration in time range)   0.9% sodium chloride BOLUS (0 mLs Intravenous Stopped 6/15/21 1443)   piperacillin-tazobactam (ZOSYN) 2.25 g vial to attach to  ml bag (0 g Intravenous Stopped 6/15/21 1443)   vancomycin (VANCOCIN) 2,500 mg in sodium chloride 0.9 % 500 mL intermittent infusion (0 mg Intravenous Stopped 6/15/21 1802)   sodium chloride 0.9% infusion ( Intravenous New Bag 6/15/21 1455)        Assessments & Plan (with Medical Decision Making)   71-year-old male comes in from home after his wife called the ambulance.  He is a poor historian but reports that he has been sitting in his chair at home for the last week or 2 having difficulty getting out of bed and  ambulating.  He complains of low back pain triggered by a fall.  He did get seen afterwards and by history he had an x-ray that was negative, however, I am unable to verify this.  He is a dialysis patient and did run yesterday.  On arrival here he was in A. fib with RVR with a rate up to about 160.  He was borderline hypotensive and had an elevated lactate, and he was given about 2 L of crystalloid.  A chest x-ray showed mild cardiomegaly.  He has low voltage on his EKG.  Bedside ultrasound did not show significant pericardial fluid.  Because of the complaints of back pain we did a CT of his lumbar spine that shows an acute T12 compression fracture. Report was called to the medicine service, and he has pain meds ordered.  Because of the high lactate he was treated treated as occult sepsis with Zosyn and vancomycin.  This part of the medical record was transcribed by Timo Ramirez Medical Scribe, from a dictation done by Олег Graham MD.       I have reviewed the nursing notes. I have reviewed the findings, diagnosis, plan and need for follow up with the patient.    New Prescriptions    No medications on file       Final diagnoses:   Lactic acidosis   Atrial fibrillation with rapid ventricular response (H)   Acute midline low back pain without sciatica   Closed wedge compression fracture of T12 vertebra, initial encounter (H)   I, Iram Chase, am serving as a trained medical scribe to document services personally performed by Олег Graham MD, based on the provider's statements to me.     I, Олег Graham MD, was physically present and have reviewed and verified the accuracy of this note documented by Iram Chase.     --  Олег Graham MD  [facility], located within [facility] EMERGENCY DEPARTMENT  6/15/2021     Олег Graham MD  06/15/21 1913

## 2021-06-15 NOTE — LETTER
Transition Communication Hand-off for Care Transitions to Next Level of Care Provider    Name: Fer Latham  : 1950  MRN #: [id number]  Primary Care Provider: Isrrael Ornelas     Primary Clinic:  [facility] [address]     Reason for Hospitalization:  Lactic acidosis [E87.2]  Atrial fibrillation with rapid ventricular response (H) [I48.91]  Closed wedge compression fracture of T12 vertebra, initial encounter (H) [S22.080A]  Acute midline low back pain without sciatica [M54.5]  Admit Date/Time: 6/15/2021 12:53 PM  Discharge Date: 21  Payor Source: Payor: MEDICARE / Plan: MEDICARE / Product Type: Medicare /     Readmission Assessment Measure (CANDI) Risk Score/category: 21%         Reason for Communication Hand-off Referral: Other Care coordination    Discharge Plan: [facility]  [address] [place]  [address]  P: [phone]  F: [phone]  Concern for non-adherence with plan of care:   No  Discharge Needs Assessment:  Needs      Most Recent Value   Equipment Currently Used at Home  cane, straight, walker, rolling          Already enrolled in Tele-monitoring program and name of program:  Unknown  Follow-up specialty is recommended: Yes    Follow-up plan:    Future Appointments   Date Time Provider Department Center   2021  1:15 PM Charli Vanegas, PT [facility] O   2021  6:00 AM Liya Quinones, OT [facility] O   2021  9:30 AM Emiliano Dave, PT [facility] O   2021 12:00 PM [id number]   2021  1:00 PM Grace Galloway, CNP [facility]       Any outstanding tests or procedures:        Referrals     Future Labs/Procedures    Occupational Therapy Adult Consult     Comments:    Evaluate and treat as clinically indicated.    Reason:  Physical deconditioning from chronic illness    Physical Therapy Adult Consult     Comments:    Evaluate and treat as clinically indicated.    Reason:  Physical deconditioning from chronic  illness            [place] Recommendations:      LANETTE Rico    AVS/Discharge Summary is the source of truth; this is a helpful guide for improved communication of patient story

## 2021-06-15 NOTE — LETTER
Health Information Management Services               Recipient: [facility] Admissions          Sender: SARMAD Chandra PH: [phone], pager [phone]          Date: July 1, 2021  Patient Name:  Fer Latham  Routing Message:  Discharge orders          The documents accompanying this notice contain confidential information belonging to the sender.  This information is intended only for the use of the individual or entity named above.  The authorized recipient of this information is prohibited from disclosing this information to any other party and is required to destroy the information after its stated need has been fulfilled, unless otherwise required by state law.      If you are not the intended recipient, you are hereby notified that any disclosure, copy, distribution or action taken in reliance on the contents of these documents is strictly prohibited.  If you have received this document in error, please return it by fax to [phone] with a note on the cover sheet explaining why you are returning it (e.g. not your patient, not your provider, etc.).  If you need further assistance, please call [facility] Centralized Transcription at [phone].  Documents may also be returned by mail to [email or web address], , [address], [place], [place] [address].

## 2021-06-15 NOTE — ED NOTES
Bed: ED02  Expected date: 6/15/21  Expected time: 12:48 PM  Means of arrival: Ambulance  Comments:  [facility] with a 72 yo M reports of hypotension. BP 90/60. . ETA 5 mins

## 2021-06-16 ENCOUNTER — DOCUMENTATION ONLY (OUTPATIENT)
Dept: ORTHOPEDICS | Facility: CLINIC | Age: 71
End: 2021-06-16

## 2021-06-16 ENCOUNTER — APPOINTMENT (OUTPATIENT)
Dept: CARDIOLOGY | Facility: CLINIC | Age: 71
DRG: 981 | End: 2021-06-16
Attending: PHYSICIAN ASSISTANT
Payer: MEDICARE

## 2021-06-16 ENCOUNTER — APPOINTMENT (OUTPATIENT)
Dept: CT IMAGING | Facility: CLINIC | Age: 71
DRG: 981 | End: 2021-06-16
Attending: INTERNAL MEDICINE
Payer: MEDICARE

## 2021-06-16 LAB
INTERPRETATION ECG - MUSE: NORMAL
UFH PPP CHRO-ACNC: 0.61 IU/ML
UFH PPP CHRO-ACNC: 1.07 IU/ML
UFH PPP CHRO-ACNC: >1.1 IU/ML
UFH PPP CHRO-ACNC: NORMAL IU/ML

## 2021-06-16 PROCEDURE — 250N000011 HC RX IP 250 OP 636: Performed by: INTERNAL MEDICINE

## 2021-06-16 PROCEDURE — 99233 SBSQ HOSP IP/OBS HIGH 50: CPT | Performed by: INTERNAL MEDICINE

## 2021-06-16 PROCEDURE — G1004 CDSM NDSC: HCPCS | Mod: GC | Performed by: RADIOLOGY

## 2021-06-16 PROCEDURE — 36415 COLL VENOUS BLD VENIPUNCTURE: CPT | Performed by: INTERNAL MEDICINE

## 2021-06-16 PROCEDURE — 85520 HEPARIN ASSAY: CPT | Performed by: PEDIATRICS

## 2021-06-16 PROCEDURE — 93306 TTE W/DOPPLER COMPLETE: CPT

## 2021-06-16 PROCEDURE — 93010 ELECTROCARDIOGRAM REPORT: CPT | Performed by: INTERNAL MEDICINE

## 2021-06-16 PROCEDURE — 85520 HEPARIN ASSAY: CPT | Performed by: INTERNAL MEDICINE

## 2021-06-16 PROCEDURE — 99223 1ST HOSP IP/OBS HIGH 75: CPT | Performed by: PHYSICIAN ASSISTANT

## 2021-06-16 PROCEDURE — 96366 THER/PROPH/DIAG IV INF ADDON: CPT | Performed by: EMERGENCY MEDICINE

## 2021-06-16 PROCEDURE — 93306 TTE W/DOPPLER COMPLETE: CPT | Mod: 26 | Performed by: INTERNAL MEDICINE

## 2021-06-16 PROCEDURE — 71275 CT ANGIOGRAPHY CHEST: CPT | Mod: 26 | Performed by: RADIOLOGY

## 2021-06-16 PROCEDURE — 120N000002 HC R&B MED SURG/OB UMMC

## 2021-06-16 PROCEDURE — 250N000013 HC RX MED GY IP 250 OP 250 PS 637: Performed by: PHYSICIAN ASSISTANT

## 2021-06-16 PROCEDURE — 71275 CT ANGIOGRAPHY CHEST: CPT | Mod: ME

## 2021-06-16 PROCEDURE — 250N000011 HC RX IP 250 OP 636: Performed by: PHYSICIAN ASSISTANT

## 2021-06-16 PROCEDURE — 93005 ELECTROCARDIOGRAM TRACING: CPT

## 2021-06-16 PROCEDURE — 999N000127 HC STATISTIC PERIPHERAL IV START W US GUIDANCE

## 2021-06-16 RX ORDER — NALOXONE HYDROCHLORIDE 0.4 MG/ML
0.2 INJECTION, SOLUTION INTRAMUSCULAR; INTRAVENOUS; SUBCUTANEOUS
Status: DISCONTINUED | OUTPATIENT
Start: 2021-06-16 | End: 2021-07-01 | Stop reason: HOSPADM

## 2021-06-16 RX ORDER — IOPAMIDOL 755 MG/ML
74 INJECTION, SOLUTION INTRAVASCULAR ONCE
Status: COMPLETED | OUTPATIENT
Start: 2021-06-16 | End: 2021-06-16

## 2021-06-16 RX ORDER — LIDOCAINE 40 MG/G
CREAM TOPICAL
Status: DISCONTINUED | OUTPATIENT
Start: 2021-06-16 | End: 2021-06-17 | Stop reason: HOSPADM

## 2021-06-16 RX ORDER — HYDROMORPHONE HYDROCHLORIDE 1 MG/ML
0.5 INJECTION, SOLUTION INTRAMUSCULAR; INTRAVENOUS; SUBCUTANEOUS
Status: DISCONTINUED | OUTPATIENT
Start: 2021-06-16 | End: 2021-06-18

## 2021-06-16 RX ORDER — DIPHENHYDRAMINE HYDROCHLORIDE 50 MG/ML
50 INJECTION INTRAMUSCULAR; INTRAVENOUS ONCE
Status: COMPLETED | OUTPATIENT
Start: 2021-06-16 | End: 2021-06-16

## 2021-06-16 RX ORDER — METHYLPREDNISOLONE 32 MG/1
32 TABLET ORAL ONCE
Status: DISCONTINUED | OUTPATIENT
Start: 2021-06-16 | End: 2021-06-16

## 2021-06-16 RX ORDER — DIPHENHYDRAMINE HYDROCHLORIDE 50 MG/ML
INJECTION INTRAMUSCULAR; INTRAVENOUS
Status: DISCONTINUED
Start: 2021-06-16 | End: 2021-06-17 | Stop reason: HOSPADM

## 2021-06-16 RX ORDER — METHYLPREDNISOLONE SODIUM SUCCINATE 125 MG/2ML
INJECTION, POWDER, LYOPHILIZED, FOR SOLUTION INTRAMUSCULAR; INTRAVENOUS
Status: DISCONTINUED
Start: 2021-06-16 | End: 2021-06-17 | Stop reason: HOSPADM

## 2021-06-16 RX ORDER — LIDOCAINE 40 MG/G
CREAM TOPICAL
Status: DISCONTINUED | OUTPATIENT
Start: 2021-06-16 | End: 2021-07-01 | Stop reason: HOSPADM

## 2021-06-16 RX ORDER — METHYLPREDNISOLONE SODIUM SUCCINATE 40 MG/ML
40 INJECTION, POWDER, LYOPHILIZED, FOR SOLUTION INTRAMUSCULAR; INTRAVENOUS EVERY 4 HOURS
Status: COMPLETED | OUTPATIENT
Start: 2021-06-16 | End: 2021-06-16

## 2021-06-16 RX ORDER — PIPERACILLIN SODIUM, TAZOBACTAM SODIUM 2; .25 G/10ML; G/10ML
2.25 INJECTION, POWDER, LYOPHILIZED, FOR SOLUTION INTRAVENOUS EVERY 6 HOURS
Status: DISCONTINUED | OUTPATIENT
Start: 2021-06-16 | End: 2021-06-19

## 2021-06-16 RX ORDER — NALOXONE HYDROCHLORIDE 0.4 MG/ML
0.4 INJECTION, SOLUTION INTRAMUSCULAR; INTRAVENOUS; SUBCUTANEOUS
Status: DISCONTINUED | OUTPATIENT
Start: 2021-06-16 | End: 2021-07-01 | Stop reason: HOSPADM

## 2021-06-16 RX ORDER — LIDOCAINE 4 G/G
PATCH TOPICAL
Status: ON HOLD | COMMUNITY
Start: 2020-11-24 | End: 2022-02-08

## 2021-06-16 RX ADMIN — ACETAMINOPHEN 1000 MG: 500 TABLET, FILM COATED ORAL at 14:58

## 2021-06-16 RX ADMIN — IOPAMIDOL 74 ML: 755 INJECTION, SOLUTION INTRAVENOUS at 22:11

## 2021-06-16 RX ADMIN — OXYCODONE HYDROCHLORIDE 10 MG: 5 TABLET ORAL at 12:19

## 2021-06-16 RX ADMIN — DIPHENHYDRAMINE HYDROCHLORIDE 50 MG: 50 INJECTION, SOLUTION INTRAMUSCULAR; INTRAVENOUS at 20:30

## 2021-06-16 RX ADMIN — HYDROMORPHONE HYDROCHLORIDE 0.5 MG: 1 INJECTION, SOLUTION INTRAMUSCULAR; INTRAVENOUS; SUBCUTANEOUS at 19:57

## 2021-06-16 RX ADMIN — HEPARIN SODIUM 1600 UNITS/HR: 10000 INJECTION, SOLUTION INTRAVENOUS at 14:29

## 2021-06-16 RX ADMIN — MORPHINE SULFATE 2 MG: 2 INJECTION, SOLUTION INTRAMUSCULAR; INTRAVENOUS at 14:42

## 2021-06-16 RX ADMIN — PIPERACILLIN SODIUM AND TAZOBACTAM SODIUM 2.25 G: 2; .25 INJECTION, POWDER, LYOPHILIZED, FOR SOLUTION INTRAVENOUS at 03:27

## 2021-06-16 RX ADMIN — METHYLPREDNISOLONE SODIUM SUCCINATE 40 MG: 40 INJECTION, POWDER, FOR SOLUTION INTRAMUSCULAR; INTRAVENOUS at 15:37

## 2021-06-16 RX ADMIN — HEPARIN SODIUM 1800 UNITS/HR: 10000 INJECTION, SOLUTION INTRAVENOUS at 11:39

## 2021-06-16 RX ADMIN — OXYCODONE HYDROCHLORIDE 5 MG: 5 TABLET ORAL at 06:53

## 2021-06-16 RX ADMIN — ACETAMINOPHEN 1000 MG: 500 TABLET, FILM COATED ORAL at 19:57

## 2021-06-16 RX ADMIN — PIPERACILLIN SODIUM AND TAZOBACTAM SODIUM 2.25 G: 2; .25 INJECTION, POWDER, LYOPHILIZED, FOR SOLUTION INTRAVENOUS at 18:37

## 2021-06-16 RX ADMIN — OXYCODONE HYDROCHLORIDE 10 MG: 5 TABLET ORAL at 21:45

## 2021-06-16 RX ADMIN — PIPERACILLIN SODIUM AND TAZOBACTAM SODIUM 2.25 G: 2; .25 INJECTION, POWDER, LYOPHILIZED, FOR SOLUTION INTRAVENOUS at 12:12

## 2021-06-16 RX ADMIN — METHYLPREDNISOLONE SODIUM SUCCINATE 40 MG: 40 INJECTION, POWDER, FOR SOLUTION INTRAMUSCULAR; INTRAVENOUS at 18:56

## 2021-06-16 ASSESSMENT — ACTIVITIES OF DAILY LIVING (ADL)
DRESSING/BATHING_DIFFICULTY: YES
EQUIPMENT_CURRENTLY_USED_AT_HOME: WALKER, STANDARD
DIFFICULTY_COMMUNICATING: NO
HEARING_DIFFICULTY_OR_DEAF: NO
WEAR_GLASSES_OR_BLIND: NO
WALKING_OR_CLIMBING_STAIRS: AMBULATION DIFFICULTY, REQUIRES EQUIPMENT
WALKING_OR_CLIMBING_STAIRS_DIFFICULTY: YES
DOING_ERRANDS_INDEPENDENTLY_DIFFICULTY: YES
TOILETING_ISSUES: YES
CONCENTRATING,_REMEMBERING_OR_MAKING_DECISIONS_DIFFICULTY: NO
NUMBER_OF_TIMES_PATIENT_HAS_FALLEN_WITHIN_LAST_SIX_MONTHS: 1
DIFFICULTY_EATING/SWALLOWING: NO
FALL_HISTORY_WITHIN_LAST_SIX_MONTHS: YES
DRESSING/BATHING: BATHING DIFFICULTY, ASSISTANCE 1 PERSON
TOILETING_ASSISTANCE: TOILETING DIFFICULTY, ASSISTANCE 1 PERSON

## 2021-06-16 NOTE — PROGRESS NOTES
S: Fer Latham was seen at the [facility], ER Room 12, for the evaluation and measurements for a custom TLSO.     Dx: T12 Compression Fracture and L3-L4 Spinal Canal Stenosis    O: A Custom TLSO is medically necessary and recommended by Neurosurgery due to severity and instability of spinal fracture.     A: Measurements were completed without incident. Custom [employer] TLSO with a Flex Foam liner and rigid thermoplastic external frame.      P: The TLSO will be fabricated and delivered, pending any unforeseen circumstances, by Thursday afternoon (6/17/2021).  If possible, the TLSO will be delivered earlier.    At the time of delivery, the TLSO will be fit to the patient and all the donning/doffing and care instructions will be provided to the patient.    G: The TLSO increases medial-lateral and anterior-posterior stability of the spine.  The TLSO also applies compression to the patient s spine.  Compression of the patient s soft tissues serves to unload the injured vertebrae which reduces pain and promotes healing.  The goal of this orthosis is to provide spinal stability, promote healing and reduce pain while the patient performs their ADLs.    Electronically signed by Vel Dave CPO, JORDYO    How to Put on a TLSO Back Brace  For optimal fit brace should NOT be donned with patient sitting. Ideal fit is achieved by donning in either laying or standing position. Due to changes in belly tissue, it is difficult to achieve a proper fit when patient is sitting.  1. Apply a snug-fitting cotton t-shirt.  Loose shirts may cause wrinkles and skin irritation.  2. Patient should be supine. Palpate for waist (below ribs but above hips) so you know where to line up waist grooves of the brace.  3. Logroll patient and slide back section of brace under patient lining up waist groove of brace with patient's waist.  4. Roll patient onto their back with the posterior section behind them. Recheck alignment of waist  groove on brace with patient's waist. It may be necessary to logroll patient to the opposite side to get posterior section centered on patient, where it extends anteriorly equal amounts on patient's sides. Repeat logrolling side to side as necessary.  5. Once posterior section is positioned correctly, lay anterior section on patient. Again, line up waist groove of brace to patient's waist. Waist grooves of anterior and posterior section should match up and fit together. Anterior shell should go over the top of posterior shell. Velcro straps should line up with the chafes/D-rings, if they don't, either the anterior or the posterior sections are not in the proper place.  6. Fasten the middle straps first and tighten both sides evenly. Then fasten either top or bottom straps in the same manner. Brace should be snug so as to prevent brace from sliding up on patient. If it is too loose, the brace will slide up and cause discomfort to the patient in the chin and/or axilla area.  7. When properly fit, brace the inferior aspect should allow clearance so as not to cut into the tops of the thighs when sitting but needs to be as low on the pelvic area as possible.    Wearing Schedule  Always check with prescribing doctor for a precise wearing schedule. At times the TLSO is worn only when out of bed while sitting or standing. Other times, the doctor requires the TLSO to be worn at all times.    Cleaning and Maintenance  Rubbing alcohol may be used to clean the inside and outside of the TLSO. Spray TLSO with alcohol and wipe gently with a cloth. Be sure that TLSO is completely dry prior to application to ensure no skin issues will occur. Always wear a clean, dry, snug-fitting shirt under the brace.    Tips and Problem Solving  Brace migration is inevitable, especially when patient is going from laying to upright in bed. The mattress may push posterior section of brace up, which will push the whole brace up. Migration may also  occur when patient sits in a soft cushioned chair. Don't be afraid to readjust brace and pull it down and back to its proper position.    Avoid soft seat cushioned chairs and sit up straight. Soft seat cushions or leaning back into a chair will cause the brace to migrate upward and may place pressure underarms.      Instead of leaning back, try sitting on the front half of the seat of the chair and work your way back until you contact the backrest of the chair.  Sometimes using a pillow in the gap between the back of the chair and the brace will provide more support.    Do not lean forward over a table while eating. Bring the food up to the mouth. This will reduce any pressure on the thighs and chest.    If the TLSO starts to migrate upward under the throat/armpits, ensure that straps are snug. Straps that are loose will allow the brace to shift.

## 2021-06-16 NOTE — PLAN OF CARE
ED PT: Cancel, PT consult acknowledged and appreciated. Pt with several consults this date and awaiting TLSO for T12 compression fx. Will reschedule PT evaluation.

## 2021-06-16 NOTE — CONSULTS
Nephrology Initial Consult  June 16, 2021      Fer Latham MRN:[id number] YOB: 1950  Date of Admission:6/15/2021  Primary care provider: Isrrael Ornelas  Requesting physician: Marc Cameron, *    ASSESSMENT AND RECOMMENDATIONS:   Fer Latham is a 71 year old male with PMH of HTN, Aflutter (not on AC), DMII, ESRD 2/2 IgA nephropathy, and chronic alcohol abuse, admitted with two weeks of weakness and recurrent falls with new shortness of breath secondary to atrial fibrillation with RVR, also with UTI sepsis, new acute T12 compression fx and RLE DVT.      ESKD: dialyzes M/F at [facility] with Dr. Puente. Run time: 3 hrs 45 min. Access: tunneled RIJ (failed AVF). EDW: 119 kg.  - Last OP HD was Monday 6/14 (shortened run); pt often only dialyzes once per week and shortens runs  - Lytes stable, no acute need for HD today; will assess daily       Access: CVC not working well with BFR only in 200's, was scheduled for new CVC on 6/23  - Please consult IR for CVC replacement, hopefully prior to having HD again    Volume:  kg, was bolused 1 L last night with improvement in BP's and lactic acidosis; usual OP UF  0 to 2 kg, still with significant UOP    SOB: due to afib with RVR, now resolved. CXR with mild pulm edema vs atelectasis  Concern for PE on echo, planning IR for thrombectomy    afib w RVR, resolved:  BP: normotensive, usual pre HD pressures 100-120's, post pressures the same, lowest pressure usually in 90's, lower at times.    BMD: recent     Anemia: recent hgb 10's, not on iron or MECCA    New RLE DVT:    New T12 compression fx    UTI: large LE, cx pending  - likely is relevant in spite of ESRD as pt still has significant UOP            Recommendations were communicated to primary team via this note       RADHA ConwayC   [phone]      REASON FOR CONSULT: ESKD/dialysis    HISTORY OF PRESENT ILLNESS:  Fer Latham is a 71 year old male with PMH of HTN, Aflutter  (not on AC), DMII, ESRD 2/2 IgA nephropathy, and chronic alcohol abuse, admitted with two weeks of weakness and recurrent falls with new shortness of breath secondary to atrial fibrillation with RVR, also with UTI sepsis, new acute T12 compression fx and RLE DVT.  Outpatient dialysis records were obtained and reviewed. Per discussion with [facility], patient often only runs once per week. He did have a partial run on Monday. Upon admission, he was found to have significantly elevated lactic acid and was in afib with RVR, both resolved with 1 L fluid bolus. His UA is c/w UTI with large LE, Ucx pending. Echo with high likelihood of PE, will go to IR for possible thrombectomy. The patient's CVC is dysfunctional so will ideally have this replaced prior to patient's next HD run. Patient is seen bedside, not wanting to talk much but says he feels terrible; he was being fitted for back brace and was not interested in signing consent for dialysis today, will attempt again tomorrow. Denies current n/v, chills.       PAST MEDICAL HISTORY:  Reviewed with patient on 06/16/2021     Past Medical History:   Diagnosis Date     Anemia      Arrhythmia     atrial fib     Arthritis 1990's    gout     BPH (benign prostatic hyperplasia)      Depression      Diabetes mellitus (H)     Type 2  IDDM     Diverticulosis      ESRD (end stage renal disease) on dialysis (H)      Hematuria      HTN (hypertension)      HTN (hypertension)      IgA nephropathy      MI, old      Pneumonia      Rhabdomyolysis      Weakness 5/10/2019       Past Surgical History:   Procedure Laterality Date     ABDOMEN SURGERY       APPENDECTOMY  1970's     ARTHROSCOPY KNEE  6/27/2013    Procedure: ARTHROSCOPY KNEE;  Right Knee Arthroscopy, Debridment Of Medial Meniscal Tear.  ;  Surgeon: Tomás Wong MD;  Location: [facility]     BACK SURGERY  1999    L5 S1 decompression     BIOPSY  2017    skin     [facility]     CORONARY ANGIOGRAPHY ADULT ORDER   2018     EYE SURGERY Bilateral 2004    Lens replacement     IR CVC TUNNEL PLACEMENT > 5 YRS OF AGE  11/26/2019     IR CVC TUNNEL REMOVAL RIGHT  11/26/2019     IR CVC TUNNEL REVISION RIGHT  2/10/2020     IR CVC TUNNEL REVISION RIGHT  10/8/2020     VASCULAR SURGERY          MEDICATIONS:  PTA Meds  Prior to Admission medications    Medication Sig Last Dose Taking? Auth Provider   aspirin (ASA) 325 MG EC tablet Take 325 mg by mouth daily  6/15/2021 at Unknown time Yes Unknown, Entered By History   diazepam (VALIUM) 5 MG tablet Take 5 mg by mouth every 12 hours as needed for anxiety  at prn Yes Unknown, Entered By History   diphenhydrAMINE (BENADRYL) 50 MG capsule Take 1 capsule (50 mg) by mouth every 6 hours as needed for itching or allergies Administer 30 min - 2 hours pre - IV contrast injection  at prn Yes Saida Pablo, APRN CNP   Lidocaine (LIDOCARE) 4 % Patch Apply topically to intact skin for upto 12 hrs within 24-hr period, as needed for shoulder pain.  at prn Yes Reported, Patient      Current Meds    acetaminophen  1,000 mg Oral TID     [Held by provider] aspirin  325 mg Oral Daily     folic acid  1 mg Oral Daily     lidocaine  1 patch Transdermal Q24h    And     lidocaine   Transdermal Q8H     multivitamin w/minerals  1 tablet Oral Daily     piperacillin-tazobactam  2.25 g Intravenous Q6H     polyethylene glycol  17 g Oral Daily     sodium chloride (PF)  3 mL Intracatheter Q8H     thiamine  100 mg Oral Daily     vancomycin place garcia - receiving intermittent dosing  1 each Intravenous See Admin Instructions     Infusion Meds    heparin 1,800 Units/hr (06/16/21 2841)       ALLERGIES:    Allergies   Allergen Reactions     Cephalexin      Ciprofloxacin      Diagnostic X-Ray Materials      Sulfa Drugs Hives       REVIEW OF SYSTEMS:  A comprehensive of systems was negative except as noted above.    SOCIAL HISTORY:   Social History     Socioeconomic History     Marital status:      Spouse name: Not on  file     Number of children: Not on file     Years of education: Not on file     Highest education level: Not on file   Occupational History     Not on file   Social Needs     Financial resource strain: Not on file     Food insecurity     Worry: Not on file     Inability: Not on file     Transportation needs     Medical: Not on file     Non-medical: Not on file   Tobacco Use     Smoking status: Former Smoker     Smokeless tobacco: Never Used     Tobacco comment: quit 35 years ago   Substance and Sexual Activity     Alcohol use: Yes     Comment: 1 to 2 drinks a day (Occasional)     Drug use: No     Sexual activity: Not on file   Lifestyle     Physical activity     Days per week: Not on file     Minutes per session: Not on file     Stress: Not on file   Relationships     Social connections     Talks on phone: Not on file     Gets together: Not on file     Attends Jew service: Not on file     Active member of club or organization: Not on file     Attends meetings of clubs or organizations: Not on file     Relationship status: Not on file     Intimate partner violence     Fear of current or ex partner: Not on file     Emotionally abused: Not on file     Physically abused: Not on file     Forced sexual activity: Not on file   Other Topics Concern     Parent/sibling w/ CABG, MI or angioplasty before 65F 55M? Not Asked   Social History Narrative     Not on file     Reviewed with patient     FAMILY MEDICAL HISTORY:   Family History   Problem Relation Age of Onset     Cancer Father      Reviewed with patient     PHYSICAL EXAM:   Temp  Av.7  F (36.5  C)  Min: 97.7  F (36.5  C)  Max: 97.7  F (36.5  C)      Pulse  Av.8  Min: 74  Max: 154 Resp  Av.7  Min: 10  Max: 30  SpO2  Av.5 %  Min: 88 %  Max: 100 %       /80   Pulse 85   Temp 97.7  F (36.5  C) (Oral)   Resp 11   Wt 119.3 kg (263 lb)   SpO2 92%   BMI 33.77 kg/m        Admit Weight: 119.3 kg (263 lb)(bed scale)     GENERAL APPEARANCE:  alert  EYES: no scleral icterus, pupils equal  Pulmonary: lungs clear to auscultation with equal breath sounds bilaterally g  CV: regular rhythm, normal rate   - Edema 1+  GI: soft, nontender, normal bowel sounds  MS: no evidence of inflammation in joints, no muscle tenderness  : no ferguson  SKIN: no rash, warm, dry, no cyanosis  NEURO: face symmetric  Access: tunneled RIJ    LABS:   CMP  Recent Labs   Lab 06/15/21  1303   *   POTASSIUM 3.5   CHLORIDE 100   CO2 19*   ANIONGAP 12   *   BUN 49*   CR 3.57*   GFRESTIMATED 16*   GFRESTBLACK 19*   COLUMBA 8.9   MAG 2.2   PHOS 4.2   PROTTOTAL 8.6   ALBUMIN 3.5   BILITOTAL 0.7   ALKPHOS 111   AST 17   ALT 11     CBC  Recent Labs   Lab 06/15/21  2200 06/15/21  1303   HGB 9.8* 11.5*   WBC 7.1 6.7   RBC 3.00* 3.57*   HCT 30.3* 35.7*   * 100   MCH 32.7 32.2   MCHC 32.3 32.2   RDW 13.6 13.4   * 181     INRNo lab results found in last 7 days.  ABGNo lab results found in last 7 days.   URINE STUDIES  Recent Labs   Lab Test 06/15/21  1522 05/14/20  1729 10/02/19  0725 09/11/19  0446   COLOR Orange Yellow Yellow Yellow   APPEARANCE Cloudy Slightly Cloudy Cloudy Slightly Cloudy   URINEGLC 50* Negative Negative Negative   URINEBILI Negative Negative Negative Negative   URINEKETONE Negative Negative Negative Negative   SG 1.015 1.010 1.013 1.014   UBLD Moderate* Trace* Small* Moderate*   URINEPH 5.5 5.5 5.5 5.5   PROTEIN 300* 100* 100* 30*   NITRITE Negative Negative Negative Negative   LEUKEST Large* Large* Large* Large*   RBCU 35* 1 7* 9*   WBCU >182* 159* >182* 250*     No lab results found.  PTH  Recent Labs   Lab Test 05/14/19  0939   PTHI 86*     IRON STUDIES  No lab results found.    IMAGING:  Reviewed    Lizbeth Clements PA-C

## 2021-06-16 NOTE — PLAN OF CARE
Major Shift Events: Pt arrived on 6D, room 6516 at 1430 from ED. 12 lead done, afib with RVR - no changes. -130s, asymptomatic. 8/10 back pain controlled with scheduled Tylenol and prn Morphine. Pt is unable to sit up d/t back pain - only sips of water given. Hep drip continued at 1600units/hr. HepA level check at 1900.  Plan: Continue plan of care. CT planned for tonight.  For vital signs and complete assessments, please see documentation flowsheets.

## 2021-06-16 NOTE — PROGRESS NOTES
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 Complicated by new acute T12 compression fx and RLE DVT.      Main Plans for Today:  - Echocardiogram with evidence of right heart strain  - Methylprednisolone for contrast allergy 6 and 2 hours prior to CT PE scheduled for 2100  - IR consult for line replacement  - Continue heparin gtt    # Right Heart Strain highly suspicious for PE  Right heart strain on echocardiogram. Remains hemodynamically labile with atrial fibrillation with RVR. History of IV contrast allergy. Discussed with IR, will premedicate with methylprednisolone.  - Methylprednisolone 32 mg IV 6 hours and 2 hours prior to CT PE Scan  - CT PE ordered for 2100  - Interventional Radiology consulted, appreciate assistance    #Lactic acidosis   #Hypotension   #Severe sepsis from urinary tract infection   Hypotensive, tachycardic with lactic acidosis on admission. Initially thought to be dehydrated (poor PO intake per wife), but infectious work up with grossly positive UA. CXR with unchanged oniel-hilar and L retrocardiac opacities likely atelectasis vs edema. Patient was on 2L O2, but weaned to RA at 95%. Blood cultures obtained and pending. Started on broad spectrum abx and IVF with good response in blood pressure and with repeat lactic acid 1.5.  A  - Continue broad spectrum abx Vanc/Zosyn if no source identified can likely discontinue antibiotics with  alternative source of Hypotension  - Follow up UCx, and  Blood cultures. Added culture from CVC catheter.    #Atrial fib/flutter with RVR - [id number] score 3 for age, HTN, and DM2. Takes  mg daily, but not on anticoagulation or rate control prior to admission. Presented with HR 180s on admission, which improved after IVF. EKG in Afib/flutter.   -Telemetry  -Hold any BB with soft BP. HR currently within goal <110   -Hold  mg daily while on anticoagulation     #T12 compression fx - Reporting low back pain for the last 1-2 weeks with multiple recurrent falls (due to weakness and unsteady gait). Denies any head strike or LOC. PCP ordered Xray of Lumbar spine negative for fx per care-everywhere. CT lumbar spine with acute compression fx T12, without any retropulsion. Denies any red flag sx, and no obvious deficit, though patient would not allow formal neuro evaluation.   - TLSO brace  - Pain management: tylenol 1000 mg TID, oxycodone 5-10 mg Q4H PRN, IV hydromorphone PRN breakthrough  - Consider neurosurgery or neuro IR consult if pain not improved with TLSO brace and medications, as may benefit from kyphoplasty   - PT/OT consult     #DVT of RLE - Increased LE edema and immobilized over last 1-2 weeks. US bilateral showed R partially occlusive thrombus in the distal iliac vein extending into the common femoral vein and occlusive thrombus in popliteal vein.   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 Access with CVC (failed attempt with AVF in past per patient. Appears to have a CVC catheter exchange planned on 6/23). Patient still makes urine. Appears hypervolemic with LE edema. Unclear baseline weight.   - HD per nephrology, consulted  - Check Phos, Mag  - I&O and daily weights   - IR consult for catheter replacement    # Hx of HTN - Previously on  medications but discontinued after lifestyle modifications with weight loss several years ago    # DM2- diet controlled. Check Hemoglobin A1c    # Neurogenic bladder - Follows with urology. S/p botox in past. Not on any medications due to cost.   -Obtain PVR     # Chronic anemia - Baseline 11.0s. .   -Trend CBC  -B12, Folate, TSH    # Hx of alcohol abuse - Hx of withdrawal. Drinking 1/2 drink of whiskey daily. Monitor on CIWA, hold ativan for now unless starts scoring. Start MVI, folic acid and thiamine.     # Anxiety - Continue home Valium at lower dose 2 mg BID PRN and hold for sedation        Diet: Combination Diet Regular Diet Adult    DVT Prophylaxis: Heparin gtt  Brown Catheter: not present  Code Status: Full Code         Disposition Plan   Expected discharge: 4 - 7 days, recommended to transitional care unit once antibiotic plan established and PE plan established.  Entered: Marc Cameron MD 06/16/2021, 3:15 PM       The patient's care was discussed with the Bedside Nurse, Care Coordinator/, Patient and IR Consultant.    Marc Cameron MD  Hospitalist Service  [facility]  Contact information available via [facility] Paging/Directory  Please see sign in/sign out for up to date coverage information  ______________________________________________________________________    Interval History   Admitted overnight. This morning complaining of significant back pain. Denies any difficulty breathing, chest pain. No shortness of breath. No palpitations.     Data reviewed today: I reviewed all medications, new labs and imaging results over the last 24 hours. I personally reviewed no images or EKG's today.    Physical Exam   /67   Pulse 110   Temp 97.9  F (36.6  C) (Axillary)   Resp 16   Wt 119.3 kg (263 lb)   SpO2 96%   BMI 33.77 kg/m    General: AAOx3, ill appearing man appears uncomfortable  Skin: no rashes or bruising  HEENT: Neck  supple, no lymphadenopathy  CV: irregularly irregular, no murmurs appreciated  Resp: CTAB, no wheeze, rhonchi   Abd: Soft, non-tender, nondistended, BS+, no masses appreciated  Extremities: warm and well perfused, L>R lower extremity edema      Data   Recent Results (from the past 24 hour(s))   CT Lumbar Spine w/o Contrast   Result Value    Radiologist flags Compression fracture (Urgent)    Narrative    CT LUMBAR SPINE W/O CONTRAST 6/15/2021 4:45 PM    History: Compression fracture, L-spine.  ICD-10:    Comparison: None.    Technique: Using multidetector thin collimation helical acquisition  technique, axial, coronal and sagittal CT images through the lumbar  spine were obtained without intravenous contrast.     Findings: Image quality degraded by beam attenuation secondary to the  patient's body habitus. There are 5 lumbar type vertebrae. Regarding  alignment, there is minimal retrolisthesis of L5 on S1. Wedge-shaped  compression deformity with visible horizontal fracture lucency through  the T12 vertebral body with up to approximately 30% height loss.  Associated paravertebral edema. There is no appreciable retropulsion  of vertebral body/fragments.     L3 superior endplate Schmorl's node. Severe loss of intervertebral  disc height at L5-S1. Additional multilevel degenerative changes with  endplate osteophytosis, disc bulges, ligamentum flavum thickening, and  facet hypertrophy. Associated moderate to severe spinal canal stenosis  at L3-4. Multilevel neural foraminal narrowing.    Bilateral renal atrophy.      Impression    Impression:  1. Acute compression fracture of the T12 vertebral body and up to  approximately 30% height loss. No evidence for retropulsion.  2. Multilevel spondylosis, most pronounced at L3-4 where there is  moderate to severe spinal canal stenosis.    [Urgent Result: Compression fracture]    Finding was identified on 6/15/2021 4:48 PM.     Dr. Graham was contacted by Dr. Griffin at  6/15/2021 5:06 PM and  verbalized understanding of the urgent finding.     I have personally reviewed the examination and initial interpretation  and I agree with the findings.    JOHN OLGUIN MD   US Lower Extremity Venous Duplex Bilateral   Result Value    Radiologist flags DVT (Urgent)    Narrative    EXAMINATION: US LOWER EXTREMITY VENOUS DUPLEX BILATERAL  6/15/2021  8:03 PM      CLINICAL HISTORY: Bilateral lower extremity edema, very sedentary over  the last several days, shortness of breath    COMPARISON: None        PROCEDURE COMMENTS: Ultrasound was performed of the deep venous system  of the right and left lower extremity using grayscale, color, and  spectral Doppler.    FINDINGS:  Partially occlusive thrombus in the distal external iliac vein  extending into the common femoral vein and origin of the profunda.  Occlusive thrombus in the popliteal vein. The left common femoral,  greater saphenous origin, femoral, popliteal, and deep calf veins are  visualized and are patent with normal venous waveforms and response  compression significant bilateral subcutaneous edema.      Impression    IMPRESSION:.  1. Deep venous thrombus in the right lower extremity as described  above.  2. No DVT in the left lower extremity  3. Significant subcutaneous edema in the bilateral lower legs.    [Urgent Result: DVT]    Finding was identified on 6/15/2021 8:09 PM.     Ulysses MORENO was contacted by Dr. Griffin at 6/15/2021 8:12  PM and verbalized understanding of the urgent finding.     I have personally reviewed the examination and initial interpretation  and I agree with the findings.    GETACHEW AYALA MD   Echo Complete    Narrative    [id number]  [id number]  [id number]  [id number]     [facility]  Echocardiography Laboratory  [address] [place] [id number]     Name: MARIA E REESE  MRN: [id number]  : 1950  Study Date: 2021 08:06 AM  Age: 71  yrs  Gender: Male  Patient Location: [facility]  Reason For Study: Atrial Fibrillation  Ordering Physician: ULYSSES MEMBRENO  Performed By: ELIAS Meraz     BSA: 2.4 m2  Height: 74 in  Weight: 263 lb  HR: 86  BP: 118/80 mmHg  ______________________________________________________________________________  Procedure  Complete Portable Echo Adult.  ______________________________________________________________________________  Interpretation Summary  Moderate to severe right ventricular dilation is present.  Hypokinetic RV free wall with hyperkinetic RV apex consistent with  hemodynamically significant pulmonary embolism if the clinical picture is  consistent with PE.  Right ventricular systolic pressure is 33mmHg above the right atrial pressure.  RV changes are new since prior study in 2019.  ______________________________________________________________________________  Left Ventricle  Global and regional left ventricular function is normal with an EF of 55-60%.  Left ventricular wall thickness is normal. Left ventricular size is normal.  Diastolic function not assessed due to atrial fibrillation. No regional wall  motion abnormalities are seen.     Right Ventricle  Moderate to severe right ventricular dilation is present. Hypokinetic RV free  wall with hyperkinetic RV apex consistent with hemodynamically significant  pulmonary embolism if the clinical picture is consistent with PE.     Atria  The right atria appears normal. Moderate to severe left atrial enlargement is  present.     Mitral Valve  The mitral valve is normal. Mild mitral insufficiency is present.     Aortic Valve  Aortic valve is normal in structure and function.     Tricuspid Valve  The tricuspid valve is normal. Mild tricuspid insufficiency is present. Right  ventricular systolic pressure is 33mmHg above the right atrial pressure.     Pulmonic Valve  The pulmonic valve is normal.     Vessels  The thoracic aorta is normal. The pulmonary artery cannot  be assessed. The  inferior vena cava cannot be assessed.     Pericardium  No pericardial effusion is present.     Compared to Previous Study  RV changes are new since prior study in 2019.  ______________________________________________________________________________  MMode/2D Measurements & Calculations     IVSd: 0.89 cm  LVIDd: 4.4 cm  LVIDs: 2.9 cm  LVPWd: 1.1 cm  FS: 33.9 %  LV mass(C)d: 150.1 grams  LV mass(C)dI: 61.5 grams/m2  Ao root diam: 3.2 cm  asc Aorta Diam: 3.6 cm  LVOT diam: 2.2 cm  LVOT area: 3.8 cm2  LA Volume (BP): 85.2 ml  LA Volume Index (BP): 34.9 ml/m2  RWT: 0.51     Doppler Measurements & Calculations  MV E max jen: 70.3 cm/sec  MV A max jen: 34.4 cm/sec  MV E/A: 2.0  MV dec slope: 529.0 cm/sec2  PA acc time: 0.09 sec  TR max jen: 284.0 cm/sec  TR max P.3 mmHg  E/E' av.7  Lateral E/e': 5.6  Medial E/e': 7.8     ______________________________________________________________________________  Report approved by: Ce Acuña 2021 09:11 AM

## 2021-06-16 NOTE — CONSULTS
Interventional Radiology Consult Service Note    Case discussed with Dr Cameron, Dr Knowles, and Lizbeth Clements PA-C.    Fer is a 71 year old male with history of HTN, Aflutter (not on AC), DMII, ESRD 2/2 IgA nephropathy, and chronic alcohol abuse, admitted with two weeks of weakness and recurrent falls with new shortness of breath secondary to atrial fibrillation with RVR, also with UTI sepsis, new acute T12 compression fx and RLE DVT.  Last dialysis runo n 6/14 with shortened run. Recurrent issues with poor flow rate of 200 and frequent alarms. Patient was scheduled on 6/23 for outpatient TCVC revision. Now inpatient with need for dialysis run later this week. Line has been tPA locked x2.    Prior IR line revision on 10/8/20 and 2/10/20. Last revision complicated by inability to pass wire centrally from internal jugular or external jugular vein. Venogram showed no central flow from external jugular. Ultimately plasty with 10 mm [place] balloon from right innominate to right atrium with then successful replacement of catheter.    Patient has a documented contrast allergy. Per patient this was last 5-8 years ago. He does not know the reaction. Revision on 2/10/20 and 10/8/20 done with gadolinium.    IR consult for line revision is now delayed (origionally 6/17) due to workup for possible PE. Patient started on high intensity heparin drip.    LE US 6/15/21:  Partially occlusive thrombus in the distal external iliac vein extending into the common femoral vein and origin of the profunda. Occlusive thrombus in the popliteal vein.     Echo 6/15/21:   Right Ventricle  Moderate to severe right ventricular dilation is present. Hypokinetic RV free wall with hyperkinetic RV apex consistent with hemodynamically significant pulmonary embolism if the clinical picture is consistent with PE.    Patient will need a CT PE study prior to any potential IR intervention. He will need accelerated protocol to be premedicated with IV  methylprednisolone vs hydrocortisone and benadryl prior to contrast CT. Patient will also need to be premedicated prior to IR intervention if indicated. Message sent to primary team Dr Cameron to order exam and premedications. Please contact IR on call if any urgent needs after hours.    Expected date of discharge: TBD    Vitals:   /80   Pulse 82   Temp 97.7  F (36.5  C) (Oral)   Resp 13   Wt 119.3 kg (263 lb)   SpO2 92%   BMI 33.77 kg/m      Pertinent Labs:     Lab Results   Component Value Date    WBC 7.1 06/15/2021    WBC 6.7 06/15/2021    WBC 6.0 10/08/2020       Lab Results   Component Value Date    HGB 9.8 06/15/2021    HGB 11.5 06/15/2021    HGB 11.1 10/08/2020       Lab Results   Component Value Date     06/15/2021     06/15/2021     10/08/2020       Lab Results   Component Value Date    INR 1.16 (H) 10/08/2020    PTT 30 11/26/2019       Lab Results   Component Value Date    POTASSIUM 3.5 06/15/2021        Mikala Madrid PA-C  Interventional Radiology  Pager: [phone]

## 2021-06-17 ENCOUNTER — DOCUMENTATION ONLY (OUTPATIENT)
Dept: ORTHOPEDICS | Facility: CLINIC | Age: 71
End: 2021-06-17

## 2021-06-17 ENCOUNTER — APPOINTMENT (OUTPATIENT)
Dept: INTERVENTIONAL RADIOLOGY/VASCULAR | Facility: CLINIC | Age: 71
DRG: 981 | End: 2021-06-17
Attending: NURSE PRACTITIONER
Payer: MEDICARE

## 2021-06-17 LAB
ALBUMIN SERPL-MCNC: 3 G/DL (ref 3.4–5)
ALP SERPL-CCNC: 103 U/L (ref 40–150)
ALT SERPL W P-5'-P-CCNC: 11 U/L (ref 0–70)
ANION GAP SERPL CALCULATED.3IONS-SCNC: 9 MMOL/L (ref 3–14)
AST SERPL W P-5'-P-CCNC: 13 U/L (ref 0–45)
BACTERIA SPEC CULT: NORMAL
BILIRUB DIRECT SERPL-MCNC: 0.2 MG/DL (ref 0–0.2)
BILIRUB SERPL-MCNC: 0.6 MG/DL (ref 0.2–1.3)
BUN SERPL-MCNC: 54 MG/DL (ref 7–30)
CALCIUM SERPL-MCNC: 7.9 MG/DL (ref 8.5–10.1)
CHLORIDE SERPL-SCNC: 107 MMOL/L (ref 94–109)
CO2 SERPL-SCNC: 21 MMOL/L (ref 20–32)
CREAT SERPL-MCNC: 3.19 MG/DL (ref 0.66–1.25)
ERYTHROCYTE [DISTWIDTH] IN BLOOD BY AUTOMATED COUNT: 13.8 % (ref 10–15)
GFR SERPL CREATININE-BSD FRML MDRD: 19 ML/MIN/{1.73_M2}
GLUCOSE BLDC GLUCOMTR-MCNC: 151 MG/DL (ref 70–99)
GLUCOSE SERPL-MCNC: 182 MG/DL (ref 70–99)
HBV SURFACE AB SERPL IA-ACNC: 17.83 M[IU]/ML
HBV SURFACE AG SERPL QL IA: NONREACTIVE
HCT VFR BLD AUTO: 29.5 % (ref 40–53)
HGB BLD-MCNC: 9.8 G/DL (ref 13.3–17.7)
INTERPRETATION ECG - MUSE: NORMAL
MCH RBC QN AUTO: 32.1 PG (ref 26.5–33)
MCHC RBC AUTO-ENTMCNC: 33.2 G/DL (ref 31.5–36.5)
MCV RBC AUTO: 97 FL (ref 78–100)
PLATELET # BLD AUTO: 163 10E9/L (ref 150–450)
POTASSIUM SERPL-SCNC: 4.4 MMOL/L (ref 3.4–5.3)
PROT SERPL-MCNC: 8.2 G/DL (ref 6.8–8.8)
RADIOLOGIST FLAGS: ABNORMAL
RBC # BLD AUTO: 3.05 10E12/L (ref 4.4–5.9)
SODIUM SERPL-SCNC: 137 MMOL/L (ref 133–144)
SPECIMEN SOURCE: NORMAL
UFH PPP CHRO-ACNC: 0.6 IU/ML
UFH PPP CHRO-ACNC: >1.1 IU/ML
VANCOMYCIN SERPL-MCNC: 16.8 MG/L
WBC # BLD AUTO: 8.2 10E9/L (ref 4–11)

## 2021-06-17 PROCEDURE — 80048 BASIC METABOLIC PNL TOTAL CA: CPT | Performed by: INTERNAL MEDICINE

## 2021-06-17 PROCEDURE — 99152 MOD SED SAME PHYS/QHP 5/>YRS: CPT | Mod: GC | Performed by: RADIOLOGY

## 2021-06-17 PROCEDURE — 36415 COLL VENOUS BLD VENIPUNCTURE: CPT | Performed by: INTERNAL MEDICINE

## 2021-06-17 PROCEDURE — C1725 CATH, TRANSLUMIN NON-LASER: HCPCS

## 2021-06-17 PROCEDURE — 80076 HEPATIC FUNCTION PANEL: CPT | Performed by: INTERNAL MEDICINE

## 2021-06-17 PROCEDURE — 0J2TXYZ CHANGE OTHER DEVICE IN TRUNK SUBCUTANEOUS TISSUE AND FASCIA, EXTERNAL APPROACH: ICD-10-PCS | Performed by: RADIOLOGY

## 2021-06-17 PROCEDURE — 77001 FLUOROGUIDE FOR VEIN DEVICE: CPT | Mod: 26 | Performed by: RADIOLOGY

## 2021-06-17 PROCEDURE — 99233 SBSQ HOSP IP/OBS HIGH 50: CPT | Performed by: INTERNAL MEDICINE

## 2021-06-17 PROCEDURE — 272N000567 HC SHEATH CR4

## 2021-06-17 PROCEDURE — 99233 SBSQ HOSP IP/OBS HIGH 50: CPT | Performed by: PHYSICIAN ASSISTANT

## 2021-06-17 PROCEDURE — 250N000011 HC RX IP 250 OP 636: Performed by: PHYSICIAN ASSISTANT

## 2021-06-17 PROCEDURE — L0486 TLSO RIGIDLINED CUST FAB TWO: HCPCS

## 2021-06-17 PROCEDURE — 80202 ASSAY OF VANCOMYCIN: CPT | Performed by: INTERNAL MEDICINE

## 2021-06-17 PROCEDURE — 36581 REPLACE TUNNELED CV CATH: CPT

## 2021-06-17 PROCEDURE — 250N000011 HC RX IP 250 OP 636: Performed by: STUDENT IN AN ORGANIZED HEALTH CARE EDUCATION/TRAINING PROGRAM

## 2021-06-17 PROCEDURE — 5A1D70Z PERFORMANCE OF URINARY FILTRATION, INTERMITTENT, LESS THAN 6 HOURS PER DAY: ICD-10-PCS | Performed by: PHYSICIAN ASSISTANT

## 2021-06-17 PROCEDURE — 250N000009 HC RX 250: Performed by: PHYSICIAN ASSISTANT

## 2021-06-17 PROCEDURE — 36581 REPLACE TUNNELED CV CATH: CPT | Mod: GC | Performed by: RADIOLOGY

## 2021-06-17 PROCEDURE — 99152 MOD SED SAME PHYS/QHP 5/>YRS: CPT

## 2021-06-17 PROCEDURE — 999N001017 HC STATISTIC GLUCOSE BY METER IP

## 2021-06-17 PROCEDURE — 85027 COMPLETE CBC AUTOMATED: CPT | Performed by: INTERNAL MEDICINE

## 2021-06-17 PROCEDURE — C1769 GUIDE WIRE: HCPCS

## 2021-06-17 PROCEDURE — 87340 HEPATITIS B SURFACE AG IA: CPT | Performed by: INTERNAL MEDICINE

## 2021-06-17 PROCEDURE — 120N000002 HC R&B MED SURG/OB UMMC

## 2021-06-17 PROCEDURE — 02PAX3Z REMOVAL OF INFUSION DEVICE FROM HEART, EXTERNAL APPROACH: ICD-10-PCS | Performed by: RADIOLOGY

## 2021-06-17 PROCEDURE — 250N000012 HC RX MED GY IP 250 OP 636 PS 637: Performed by: INTERNAL MEDICINE

## 2021-06-17 PROCEDURE — C1750 CATH, HEMODIALYSIS,LONG-TERM: HCPCS

## 2021-06-17 PROCEDURE — C1887 CATHETER, GUIDING: HCPCS

## 2021-06-17 PROCEDURE — 027V3ZZ DILATION OF SUPERIOR VENA CAVA, PERCUTANEOUS APPROACH: ICD-10-PCS | Performed by: RADIOLOGY

## 2021-06-17 PROCEDURE — 250N000013 HC RX MED GY IP 250 OP 250 PS 637: Performed by: PHYSICIAN ASSISTANT

## 2021-06-17 PROCEDURE — 258N000003 HC RX IP 258 OP 636: Performed by: INTERNAL MEDICINE

## 2021-06-17 PROCEDURE — 99153 MOD SED SAME PHYS/QHP EA: CPT

## 2021-06-17 PROCEDURE — 85520 HEPARIN ASSAY: CPT | Performed by: INTERNAL MEDICINE

## 2021-06-17 PROCEDURE — 250N000011 HC RX IP 250 OP 636: Performed by: INTERNAL MEDICINE

## 2021-06-17 PROCEDURE — 02HV33Z INSERTION OF INFUSION DEVICE INTO SUPERIOR VENA CAVA, PERCUTANEOUS APPROACH: ICD-10-PCS | Performed by: RADIOLOGY

## 2021-06-17 PROCEDURE — 272N000302 HC DEVICE INFLATION CR5

## 2021-06-17 PROCEDURE — 86706 HEP B SURFACE ANTIBODY: CPT | Performed by: INTERNAL MEDICINE

## 2021-06-17 PROCEDURE — 90937 HEMODIALYSIS REPEATED EVAL: CPT

## 2021-06-17 RX ORDER — NALOXONE HYDROCHLORIDE 0.4 MG/ML
0.4 INJECTION, SOLUTION INTRAMUSCULAR; INTRAVENOUS; SUBCUTANEOUS
Status: DISCONTINUED | OUTPATIENT
Start: 2021-06-17 | End: 2021-06-17 | Stop reason: HOSPADM

## 2021-06-17 RX ORDER — NALOXONE HYDROCHLORIDE 0.4 MG/ML
0.2 INJECTION, SOLUTION INTRAMUSCULAR; INTRAVENOUS; SUBCUTANEOUS
Status: DISCONTINUED | OUTPATIENT
Start: 2021-06-17 | End: 2021-06-17 | Stop reason: HOSPADM

## 2021-06-17 RX ORDER — CLINDAMYCIN PHOSPHATE 900 MG/50ML
900 INJECTION, SOLUTION INTRAVENOUS
Status: COMPLETED | OUTPATIENT
Start: 2021-06-17 | End: 2021-06-17

## 2021-06-17 RX ORDER — HEPARIN SODIUM 1000 [USP'U]/ML
3 INJECTION, SOLUTION INTRAVENOUS; SUBCUTANEOUS ONCE
Status: CANCELLED | OUTPATIENT
Start: 2021-06-17 | End: 2021-06-17

## 2021-06-17 RX ORDER — METHYLPREDNISOLONE 32 MG/1
32 TABLET ORAL ONCE
Status: COMPLETED | OUTPATIENT
Start: 2021-06-17 | End: 2021-06-17

## 2021-06-17 RX ORDER — FENTANYL CITRATE 50 UG/ML
25-50 INJECTION, SOLUTION INTRAMUSCULAR; INTRAVENOUS EVERY 5 MIN PRN
Status: DISCONTINUED | OUTPATIENT
Start: 2021-06-17 | End: 2021-06-17 | Stop reason: HOSPADM

## 2021-06-17 RX ORDER — HEPARIN SODIUM 1000 [USP'U]/ML
3 INJECTION, SOLUTION INTRAVENOUS; SUBCUTANEOUS ONCE
Status: COMPLETED | OUTPATIENT
Start: 2021-06-17 | End: 2021-06-17

## 2021-06-17 RX ORDER — METHYLPREDNISOLONE 32 MG/1
32 TABLET ORAL ONCE
Status: COMPLETED | OUTPATIENT
Start: 2021-06-18 | End: 2021-06-18

## 2021-06-17 RX ORDER — FLUMAZENIL 0.1 MG/ML
0.2 INJECTION, SOLUTION INTRAVENOUS
Status: DISCONTINUED | OUTPATIENT
Start: 2021-06-17 | End: 2021-06-17 | Stop reason: HOSPADM

## 2021-06-17 RX ADMIN — METHYLPREDNISOLONE 32 MG: 32 TABLET ORAL at 21:37

## 2021-06-17 RX ADMIN — FENTANYL CITRATE 25 MCG: 50 INJECTION, SOLUTION INTRAMUSCULAR; INTRAVENOUS at 11:39

## 2021-06-17 RX ADMIN — PIPERACILLIN SODIUM AND TAZOBACTAM SODIUM 2.25 G: 2; .25 INJECTION, POWDER, LYOPHILIZED, FOR SOLUTION INTRAVENOUS at 05:15

## 2021-06-17 RX ADMIN — MORPHINE SULFATE 4 MG: 2 INJECTION, SOLUTION INTRAMUSCULAR; INTRAVENOUS at 21:40

## 2021-06-17 RX ADMIN — OXYCODONE HYDROCHLORIDE 10 MG: 5 TABLET ORAL at 08:27

## 2021-06-17 RX ADMIN — HEPARIN SODIUM 1300 UNITS/HR: 10000 INJECTION, SOLUTION INTRAVENOUS at 05:15

## 2021-06-17 RX ADMIN — DIAZEPAM 2 MG: 2 TABLET ORAL at 21:36

## 2021-06-17 RX ADMIN — PIPERACILLIN SODIUM AND TAZOBACTAM SODIUM 2.25 G: 2; .25 INJECTION, POWDER, LYOPHILIZED, FOR SOLUTION INTRAVENOUS at 00:47

## 2021-06-17 RX ADMIN — LIDOCAINE HYDROCHLORIDE 10 ML: 10 INJECTION, SOLUTION EPIDURAL; INFILTRATION; INTRACAUDAL; PERINEURAL at 11:53

## 2021-06-17 RX ADMIN — MIDAZOLAM 0.5 MG: 1 INJECTION INTRAMUSCULAR; INTRAVENOUS at 11:39

## 2021-06-17 RX ADMIN — SODIUM CHLORIDE 250 ML: 9 INJECTION, SOLUTION INTRAVENOUS at 16:55

## 2021-06-17 RX ADMIN — Medication 5 MG: at 21:37

## 2021-06-17 RX ADMIN — ACETAMINOPHEN 1000 MG: 500 TABLET, FILM COATED ORAL at 21:37

## 2021-06-17 RX ADMIN — ACETAMINOPHEN 1000 MG: 500 TABLET, FILM COATED ORAL at 08:27

## 2021-06-17 RX ADMIN — SODIUM CHLORIDE 300 ML: 9 INJECTION, SOLUTION INTRAVENOUS at 16:55

## 2021-06-17 RX ADMIN — HEPARIN SODIUM 3000 UNITS: 1000 INJECTION INTRAVENOUS; SUBCUTANEOUS at 11:42

## 2021-06-17 RX ADMIN — MIDAZOLAM 1 MG: 1 INJECTION INTRAMUSCULAR; INTRAVENOUS at 11:12

## 2021-06-17 RX ADMIN — Medication 1 TABLET: at 08:27

## 2021-06-17 RX ADMIN — FENTANYL CITRATE 50 MCG: 50 INJECTION, SOLUTION INTRAMUSCULAR; INTRAVENOUS at 11:12

## 2021-06-17 RX ADMIN — HYDROMORPHONE HYDROCHLORIDE 0.5 MG: 1 INJECTION, SOLUTION INTRAMUSCULAR; INTRAVENOUS; SUBCUTANEOUS at 01:07

## 2021-06-17 RX ADMIN — CLINDAMYCIN PHOSPHATE 900 MG: 900 INJECTION, SOLUTION INTRAVENOUS at 11:13

## 2021-06-17 RX ADMIN — Medication: at 16:55

## 2021-06-17 RX ADMIN — THIAMINE HCL TAB 100 MG 100 MG: 100 TAB at 08:27

## 2021-06-17 RX ADMIN — FOLIC ACID 1 MG: 1 TABLET ORAL at 08:28

## 2021-06-17 RX ADMIN — LIDOCAINE 1 PATCH: 560 PATCH PERCUTANEOUS; TOPICAL; TRANSDERMAL at 21:38

## 2021-06-17 RX ADMIN — PIPERACILLIN SODIUM AND TAZOBACTAM SODIUM 2.25 G: 2; .25 INJECTION, POWDER, LYOPHILIZED, FOR SOLUTION INTRAVENOUS at 21:35

## 2021-06-17 RX ADMIN — DIAZEPAM 2 MG: 2 TABLET ORAL at 16:30

## 2021-06-17 RX ADMIN — ACETAMINOPHEN 1000 MG: 500 TABLET, FILM COATED ORAL at 14:36

## 2021-06-17 RX ADMIN — HYDROMORPHONE HYDROCHLORIDE 0.5 MG: 1 INJECTION, SOLUTION INTRAMUSCULAR; INTRAVENOUS; SUBCUTANEOUS at 10:09

## 2021-06-17 RX ADMIN — PIPERACILLIN SODIUM AND TAZOBACTAM SODIUM 2.25 G: 2; .25 INJECTION, POWDER, LYOPHILIZED, FOR SOLUTION INTRAVENOUS at 12:30

## 2021-06-17 RX ADMIN — OXYCODONE HYDROCHLORIDE 10 MG: 5 TABLET ORAL at 14:35

## 2021-06-17 NOTE — PROGRESS NOTES
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 Complicated by new acute T12 compression fx and RLE DVT.      Main Plans for Today:  - Revision of dialysis catheter per IR  - Premedication for thrombectomy planned for tomorrow AM  - Discontinue vancomycin  - Dialysis per Renal    # Right Heart Strain secondary to PE  # DVT of RLE   Increased LE edema and immobilized over last 1-2 weeks. US bilateral showed R partially occlusive thrombus in the distal iliac vein extending into the common femoral vein and occlusive thrombus in popliteal vein. Right heart strain on echocardiogram. Remains hemodynamically labile with atrial fibrillation with RVR. CT PE scan confirmed large PE with right heart strain.   - Continue high intensity heparin drip for now  - Methylprednisolone 32 mg PO 12 hours and 2 hours prior to thrombectomy  - Interventional Radiology consulted, appreciate assistance  - Thrombectomy planned for tomorrow AM, NPO at midnight    #Lactic acidosis   #Hypotension   #Severe sepsis from urinary tract infection   Hypotensive, tachycardic with lactic acidosis on admission. Initially thought to be dehydrated (poor PO intake per wife), but infectious work up with grossly positive UA. CXR with unchanged oniel-hilar and L retrocardiac opacities likely atelectasis vs edema. Patient was on 2L O2, but weaned to RA at 95%. Blood cultures obtained and pending. Started on broad  spectrum abx and IVF with good response in blood pressure and with repeat lactic acid 1.5.  A  - Continue broad spectrum abx with piperacillin/tazobactam  - Discontinue vancomycin  - Follow up UCx, and  Blood cultures. Added culture from CVC catheter.    #Atrial fib/flutter with RVR - [id number] score 3 for age, HTN, and DM2. Takes  mg daily, but not on anticoagulation or rate control prior to admission. Presented with HR 180s on admission, which improved after IVF. EKG in Afib/flutter.   -Telemetry  -Hold any BB with soft BP. HR currently within goal <110   -Hold  mg daily while on anticoagulation     #T12 compression fx - Reporting low back pain for the last 1-2 weeks with multiple recurrent falls (due to weakness and unsteady gait). Denies any head strike or LOC. PCP ordered Xray of Lumbar spine negative for fx per care-everywhere. CT lumbar spine with acute compression fx T12, without any retropulsion. Denies any red flag sx, and no obvious deficit, though patient would not allow formal neuro evaluation.   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 Access with CVC (failed attempt with AVF in past per patient. Appears to have a CVC catheter exchange planned on 6/23). Patient still makes urine. Appears hypervolemic with LE edema. Unclear baseline weight.   - HD per nephrology, consulted  - Check Phos, Mag  - I&O and daily weights   - IR consult for catheter replacement    # Hx of HTN - Previously on medications but discontinued after lifestyle modifications with weight loss several years ago    # DM2- diet controlled.  Check Hemoglobin A1c    # Neurogenic bladder - Follows with urology. S/p botox in past. Not on any medications due to cost.   -Obtain PVR     # Chronic anemia - Baseline 11.0s. .   -Trend CBC  -B12, Folate, TSH    # Hx of alcohol abuse - Hx of withdrawal. Drinking 1/2 drink of whiskey daily. Monitor on CIWA, hold ativan for now unless starts scoring. Start MVI, folic acid and thiamine.     # Anxiety - Continue home Valium at lower dose 2 mg BID PRN and hold for sedation        Diet: Regular Diet Adult  NPO per Anesthesia Guidelines for Procedure/Surgery Except for: Meds    DVT Prophylaxis: Heparin gtt  Brown Catheter: not present  Code Status: Full Code         Disposition Plan   Expected discharge: 4 - 7 days, recommended to transitional care unit once antibiotic plan established and PE plan established.  Entered: Marc Cameron MD 06/17/2021, 5:53 PM       The patient's care was discussed with the Bedside Nurse, Care Coordinator/, Patient, Patient's Family and IR Consultant.    Marc Cameron MD  Hospitalist Service  [facility]  Contact information available via [facility] Paging/Directory  Please see sign in/sign out for up to date coverage information  ______________________________________________________________________    Interval History   CT PE overnight with large PE with evidence of right heart strain. Discussed with IR overnight and no need for emergent procedure as HDS. This morning, continues to have pain. Denies any worsening shortness of breath, chest pain, palpitations.     Data reviewed today: I reviewed all medications, new labs and imaging results over the last 24 hours. I personally reviewed no images or EKG's today.    Physical Exam   BP (!) 151/71   Pulse 81   Temp 97.5  F (36.4  C) (Axillary)   Resp 13   Wt 119.3 kg (263 lb)   SpO2 99%   BMI 33.77 kg/m    General: AAOx3, ill appearing man appears uncomfortable  Skin:  no rashes or bruising  HEENT: Neck supple, no lymphadenopathy  CV: irregularly irregular, no murmurs appreciated  Resp: CTAB, no wheeze, rhonchi   Abd: Soft, non-tender, nondistended, BS+, no masses appreciated  Extremities: warm and well perfused, L>R lower extremity edema      Data   Recent Results (from the past 24 hour(s))   CT Chest Pulmonary Embolism w Contrast   Result Value    Radiologist flags Pulmonary embolism with right heart strain (Urgent)    Narrative    EXAMINATION: CTA pulmonary angiogram, 6/16/2021 10:20 PM     COMPARISON: 6/15/2021    HISTORY: Pulmonary embolism suspected, right heart strain with DVT    TECHNIQUE: Volumetric helical acquisition of CT images of the chest  from the lung apices to the kidneys were acquired after the  administration of 75 mL of Isovue-370 IV contrast.  Post-processed  multiplanar and/or MIP reformations were obtained, archived to PACS  and used in interpretation of this study.     FINDINGS:  Contrast bolus is: excellent. Filling defects involving the  distal bilateral pulmonary arteries extending into the superior and  inferior segmental arteries.    The largest right ventricle transaxial diameter is (measured from  endocardium to endocardium): 4.8 cm   The largest left ventricle transaxial diameter is (measured from  endocardium to endocardium): 3.6 cm  RV/LV ratio is: 1.33 (if ratio greater than 1.1 then sign is  suspicious for right heart strain)  Reflux of contrast into the IVC? Yes  Paradoxical bowing of the interventricular septum to the left? Mild  leftward bowing of the septum  Pericardial effusion?:physiologic pericardial fluid    Bilateral trace pleural effusions and bibasilar atelectasis of the  lungs. No suspicious pulmonary nodules. Right IJ catheter terminates  in the right atrium. No acute abdominal pathology. No acute osseous  abnormalities.      Impression    IMPRESSION:   1. Exam is positive for acute pulmonary embolism with right heart  strain      2. Trace bilateral pleural effusions and bibasilar atelectasis.    [Urgent Result: Pulmonary embolism with right heart strain]    Finding was identified on 6/16/2021 10:29 PM.     Dr. Aguirre was contacted by Dr. Griffin at 6/16/2021 10:43 PM and  verbalized understanding of the urgent finding.     In the event of a positive result for acute pulmonary embolism  resulting in right heart strain, consider calling the   [facility] hospital  for PERT (Pulmonary Embolism Response Team)  Activation?    PERT -- Pulmonary Embolism Response Team (Multidisciplinary team  including cardiology, interventional radiology, critical care,  hematology)       I have personally reviewed the examination and initial interpretation  and I agree with the findings.    SUZY CHAUDHARY MD   IR CVC Tunnel Revision Right    Narrative    Procedures:   Tunneled central venous catheter replacement and revision    Clinical indication: It doesn't work    Comparison studies: CT 6/16/2021    PROCEDURE:        Staff Radiologist: Mahesh Suero MD    Fellow: Colin More MD    I, Dr. Mahesh Suero, was present for the critical part of the  procedure and immediately available for the entire procedure.    Consent: verbal and written informed consent obtained prior to  procedure.    Procedure details: Patient placed in supine position. Right neck and  chest prepped and draped in standard sterile fashion. 1% lidocaine was  used for local anesthesia. Using blunt dissection, the cuff was freed  from surrounding tissues. Using fluoroscopic guidance, the existing  catheter was remove over a stiff angled Glidewire. We initially  attempted to pass the Glidewire through the blue lumen, however this  was partially occluded. We were able to pass the wire easily through  the red lumen. A 9 Japanese 25 cm vascular sheath was advanced over wire  to the superior vena cava. A BEN one catheter and Bentson wire were  used to select the inferior vena cava,  which proved to be quite  challenging. Bentson wire exchanged for stiff angled Glidewire.  Catheter exchanged for a 10 x 80 mm [employer] [place] balloon.  Angioplasty from the inferior cavoatrial junction to the innominate  confluence was performed. Balloon and sheath exchanged for a new 16  Namibian 27 cm tip to cuff Gurpreet split double lumen hemodialysis catheter.  This was positioned under fluoroscopic guidance with the tip at the  inferior cavoatrial junction. Both lumens flushed and aspirated  adequately. Each lumen was locked with 3 cc of 1000-1 heparin  solution. The catheter was secured in place with a 2-0 Ethilon  retention suture at the skin entry site. A sterile dressing was  applied. The patient was transferred in stable condition, having  tolerated the procedure without immediate complication.     Medications: fentanyl 75 mcg IV, midazolam 1.5 mg IV, 1% lidocaine for  local anesthesia.     Monitoring: The patient was placed on continuous monitoring. Vital  signs and sedation monitored by nursing staff under my supervision.  The patient remained stable throughout the procedure.    Face to face sedation time: 55 minutes    Fluoroscopy time: 11.7 minutes    Complications: None.      Impression    IMPRESSION:   1. The blue lumen of the existing catheter was found to be partially  occluded.  2. Venoplasty performed with a 10 mm balloon from the inferior  cavoatrial junction to the innominate confluence.  3. Placement under fluoroscopic guidance of a new 27 cm tip to cuff 16  Namibian Gurpreet split double-lumen hemodialysis catheter. The tip is  positioned at the inferior cavoatrial junction.    PLAN:    This catheter may be used immediately.    I have personally reviewed the examination and initial interpretation  and I agree with the findings.    GEORGES MACIAS

## 2021-06-17 NOTE — PROGRESS NOTES
HEMODIALYSIS TREATMENT NOTE    Date: 6/17/2021  Time: 6:50 PM    Data:  Pre Wt:  124.2 kg (bed weight)   Desired Wt: 124.2 kg   Post Wt:  124.2 kg (estimate)  Weight change: 0 kg  Ultrafiltration - Post Run Net Total Removed (mL):  0  Vascular Access Status: CVC (tunneled RIJ) / patent  Dialyzer Rinse:  Streaked; light  Total Blood Volume Processed: 59.34 L  Total Dialysis (Treatment) Time:  3 hours  Dialysate Bath: K 3, Ca 3  Heparin: None    Lab:   Yes - Hep B Surface Antigen & Antibody (per protocol), Heparin 10A, CBC & BMP    Assessment/Interventions:  HD for ESKD patient admitted with A fib w/RVR, UTI w/sepsis, new acute T12 compression fx and RLE DVT. Had tunneled CVC replacement today. Alert and confused. Oriented to self and understood he was on dialysis but frequently asking illogical questions and making illogical statements. Intermittently restless and irritated but overall cooperative with cares. Tolerated treatment well. No fluid removed. In A fib prior to and throughout. VSS otherwise. Seemed to be less confused by end of run. CVC saline locked post-treatment and Clear Guard caps placed. Report given to primary RN on 6D.     Plan:    Per renal team.

## 2021-06-17 NOTE — PROGRESS NOTES
Received pt supined due to severe back pain, awaiting CT Scan. Pt has c/o pain 8-9/10, pt given IV Dilaudid. Heparin drip in place with a rate of 1600 units/hour, pt's Heparin drip restarted at 1300 unit/hr per protocol for critical lab result  Pt NPO since midnight for possible surgical procedure on 06/17/2021. Pt's CIWA scale negative. Pt's wife called this am, updated of pt's status along with Plan of Care. Pt's call light placed within reach of pt, will continue to monitor and assess pt..

## 2021-06-17 NOTE — PROCEDURES
[facility]    Procedure: IR Procedure Note    Date/Time: 6/17/2021 11:54 AM  Performed by: Colin More MD  Authorized by: Colin More MD     UNIVERSAL PROTOCOL   Site Marked: NA  Prior Images Obtained and Reviewed:  Yes  Required items: Required blood products, implants, devices and special equipment available    Patient identity confirmed:  Verbally with patient, arm band, provided demographic data and hospital-assigned identification number  Patient was reevaluated immediately before administering moderate or deep sedation or anesthesia  Confirmation Checklist:  Patient's identity using two indicators, relevant allergies, procedure was appropriate and matched the consent or emergent situation and correct equipment/implants were available  Time out: Immediately prior to the procedure a time out was called    Universal Protocol: the Joint Commission Universal Protocol was followed    Preparation: Patient was prepped and draped in usual sterile fashion           ANESTHESIA    Anesthesia: Local infiltration  Local Anesthetic:  Lidocaine 1% without epinephrine      SEDATION    Patient Sedated: Yes    Sedation Type:  Moderate (conscious) sedation  Sedation:  Fentanyl and midazolam  Vital signs: Vital signs monitored during sedation    See dictated procedure note for full details.  Findings: Blue lumen partially occluded  Catheter removed over wire, plasty from inferior cavoatrial junction to innominate confluence performed with 10 mm balloon  New 32 cm tip-to-cuff wanda-split placed with tip at inferior cavoatrial junction    Specimens: none    Complications: None    Condition: Stable    Plan: May use immediately    PROCEDURE   Patient Tolerance:  Patient tolerated the procedure well with no immediate complications    Length of time physician/provider present for 1:1 monitoring during sedation: 45

## 2021-06-17 NOTE — PROGRESS NOTES
Nephrology Progress Note  06/17/2021         Assessment & Recommendations:   Fer Latham is a 71 year old male with PMH of HTN, Aflutter (not on AC), DMII, ESRD 2/2 IgA nephropathy, and chronic alcohol abuse, admitted with two weeks of weakness and recurrent falls with new shortness of breath secondary to atrial fibrillation with RVR, also with UTI sepsis, new acute T12 compression fx and RLE DVT and new PE      ESKD: dialyzes M/F at [facility] with Dr. Puente. Run time: 3 hrs 45 min. Access: tunneled RIJ (failed AVF). EDW: 119 kg.  - Last OP HD was Monday 6/14 (shortened run); pt often only dialyzes once per week and shortens runs  - Dialysis today  - Consent for dialysis obtained 6/17/21 from pt's spouse        Access: CVC not working well with BFR only in 200's, was scheduled for new CVC on 6/23  - s/p tunneled CVC replacement 6/17     Volume:  kg, was bolused 1 L last night with improvement in BP's and lactic acidosis; usual OP UF  0 to 2 kg, still with significant UOP     SOB: due to afib with RVR, now resolved. CXR with mild pulm edema vs atelectasis  New PE:   - concern that INR will be difficult to monitor/manage in this patient; would strongly consider apixaban rather than warfarin      afib w RVR, resolved:  BP: normotensive, usual pre HD pressures 100-120's, post pressures the same, lowest pressure usually in 90's, lower at times.     BMD: recent      Anemia: recent hgb 10's, not on iron or MECCA     New RLE DVT:     New T12 compression fx     UTI: large LE, cx mixed jerzy        Recommendations were communicated to primary team via this note       TIFFANIE Conway -C  [phone]    Interval History :   Seen bedside, still somewhat confused and falls asleep when talking. Obtained consent from spouse for dialysis. CVC replaced today by IR. New PE, on heparin.     Review of Systems:   4 point ROS neg other than as noted above    Physical Exam:   I/O last 3 completed shifts:  In: -    Out: 25 [Urine:25]   /75 (BP Location: Right arm)   Pulse 82   Temp 96  F (35.6  C) (Axillary)   Resp 12   Wt 119.3 kg (263 lb)   SpO2 100%   BMI 33.77 kg/m       GENERAL APPEARANCE: drowsy, NAD  EYES:  No scleral icterus, pupils equal  HENT: mouth without ulcers or lesions  PULM: CTA  CV: regular rhythm, normal rate      -edema 2+ LE  GI: soft   INTEGUMENT: no cyanosis   NEURO: confused  Access tunneled CVC    Labs:   All labs reviewed by me  Electrolytes/Renal -   Recent Labs   Lab Test 06/15/21  1303 10/08/20  1030 05/14/20  1722 05/17/19  1231 05/17/19  1231 05/12/19  0728 05/12/19  0728 05/11/19  1253   * 132* 128*   < > 131*   < > 132*  --    POTASSIUM 3.5 4.5 3.6   < > 3.8   < > 3.3* 3.1*   CHLORIDE 100 102 98   < > 96   < > 97  --    CO2 19* 23 21   < > 28   < > 26  --    BUN 49* 21 21   < > 35*   < > 34*  --    CR 3.57* 2.23* 2.42*   < > 3.10*   < > 3.46*  --    * 93 100*   < > 159*   < > 94  --    COLUMBA 8.9 9.4 7.9*   < > 9.0   < > 9.0  --    MAG 2.2  --  1.8  --   --   --  2.3 1.7   PHOS 4.2  --   --   --  3.0  --   --  2.6    < > = values in this interval not displayed.       CBC -   Recent Labs   Lab Test 06/15/21  2200 06/15/21  1303 10/08/20  1030   WBC 7.1 6.7 6.0   HGB 9.8* 11.5* 11.1*   * 181 171       LFTs -   Recent Labs   Lab Test 06/17/21  0922 06/15/21  1303 05/14/20  1722   ALKPHOS 103 111 85   BILITOTAL 0.6 0.7 0.4   ALT 11 11 15   AST 13 17 16   PROTTOTAL 8.2 8.6 6.7*   ALBUMIN 3.0* 3.5 2.8*       Iron Panel - No lab results found.      Imaging:  Reviewed    Current Medications:    sodium chloride 0.9%  250 mL Intravenous Once in dialysis     sodium chloride 0.9%  300 mL Hemodialysis Machine Once     acetaminophen  1,000 mg Oral TID     [Held by provider] aspirin  325 mg Oral Daily     folic acid  1 mg Oral Daily     gelatin absorbable  1 each Topical During Hemodialysis (from stock)     lidocaine  1 patch Transdermal Q24h    And     lidocaine   Transdermal  Q8H     methylPREDNISolone  32 mg Oral Once    And     [START ON 6/18/2021] methylPREDNISolone  32 mg Oral Once     multivitamin w/minerals  1 tablet Oral Daily     - MEDICATION INSTRUCTIONS -   Does not apply Once     piperacillin-tazobactam  2.25 g Intravenous Q6H     polyethylene glycol  17 g Oral Daily     sodium chloride (PF)  3 mL Intracatheter Q8H     sodium chloride (PF)  9 mL Intracatheter During Hemodialysis (from stock)     sodium chloride (PF)  9 mL Intracatheter During Hemodialysis (from stock)     thiamine  100 mg Oral Daily     vancomycin place garcia - receiving intermittent dosing  1 each Intravenous See Admin Instructions       heparin 1,000 Units/hr (06/17/21 0810)     Lizbeth Clements PA-C

## 2021-06-17 NOTE — PROGRESS NOTES
S: Fer Latham was seen at the [facility], Room 6516-01 for the fit and delivery of a Custom [place] TLSO.  The patient s Nurse, Ernst, was present.    Dx: T-12 Compression Fracture, L3-L4 Spinal Canal Stenosis    O: The patient was log rolled by the patient s Nurse, Ernst, while I fit the brace to the patient.      A: The brace was a great fit.   The patient was sedated, so I explained the donning and doffing protocol to the Nurse.  I m including all the donning /doffing instructions at the end of this note.    P:  The TLSO should be worn according to the Physician s instructions.  Please contact the [facility] at [phone] if you have any questions.  Thank you, Vel NEGRO: The TLSO increases medial-lateral and anterior-posterior stability of the spine.  The TLSO also applies compression to the patient s spine.  Compression of the patient s soft tissues serves to unload the injured vertebrae which reduces pain and promotes healing.  The goal of this orthosis is to provide spinal stability, promote healing and reduce pain while the patient performs their ADLs.    Electronically signed by Vel Dave CPO, LPO    How to Put on a TLSO Back Brace  For optimal fit brace should NOT be donned with patient sitting. Ideal fit is achieved by donning in either laying or standing position. Due to changes in belly tissue, it is difficult to achieve a proper fit when patient is sitting.  1. Apply a snug-fitting cotton t-shirt.  Loose shirts may cause wrinkles and skin irritation.  2. Patient should be supine. Palpate for waist (below ribs but above hips) so you know where to line up waist grooves of the brace.  3. Logroll patient and slide back section of brace under patient lining up waist groove of brace with patient's waist.  4. Roll patient onto their back with the posterior section behind them. Recheck alignment of waist groove on brace with patient's waist. It may be necessary to  logroll patient to the opposite side to get posterior section centered on patient, where it extends anteriorly equal amounts on patient's sides. Repeat logrolling side to side as necessary.  5. Once posterior section is positioned correctly, lay anterior section on patient. Again, line up waist groove of brace to patient's waist. Waist grooves of anterior and posterior section should match up and fit together. Anterior shell should go over the top of posterior shell. Velcro straps should line up with the chafes/D-rings, if they don't, either the anterior or the posterior sections are not in the proper place.  6. Fasten the middle straps first and tighten both sides evenly. Then fasten either top or bottom straps in the same manner. Brace should be snug so as to prevent brace from sliding up on patient. If it is too loose, the brace will slide up and cause discomfort to the patient in the chin and/or axilla area.  7. When properly fit, brace the inferior aspect should allow clearance so as not to cut into the tops of the thighs when sitting but needs to be as low on the pelvic area as possible.    Wearing Schedule  Always check with prescribing doctor for a precise wearing schedule. At times the TLSO is worn only when out of bed while sitting or standing. Other times, the doctor requires the TLSO to be worn at all times.    Cleaning and Maintenance  Rubbing alcohol may be used to clean the inside and outside of the TLSO. Spray TLSO with alcohol and wipe gently with a cloth. Be sure that TLSO is completely dry prior to application to ensure no skin issues will occur. Always wear a clean, dry, snug-fitting shirt under the brace.    Tips and Problem Solving  Brace migration is inevitable, especially when patient is going from laying to upright in bed. The mattress may push posterior section of brace up, which will push the whole brace up. Migration may also occur when patient sits in a soft cushioned chair. Don't be  afraid to readjust brace and pull it down and back to its proper position.    Avoid soft seat cushioned chairs and sit up straight. Soft seat cushions or leaning back into a chair will cause the brace to migrate upward and may place pressure underarms.      Instead of leaning back, try sitting on the front half of the seat of the chair and work your way back until you contact the backrest of the chair.  Sometimes using a pillow in the gap between the back of the chair and the brace will provide more support.    Do not lean forward over a table while eating. Bring the food up to the mouth. This will reduce any pressure on the thighs and chest.    If the TLSO starts to migrate upward under the throat/armpits, ensure that straps are snug. Straps that are loose will allow the brace to shift.

## 2021-06-17 NOTE — SIGNIFICANT EVENT
Significant Event Note    Time of event: 10:53 PM June 16, 2021    Description of event:  Spoke to Hermelinda from IR discussed CT chest findings for concerning for acute PE with rv strain.     Plan:  Given hemodynamics are stable and clinically patient is doing will.No intervention would be needed. Will make the patient NPO for now as discussed with IR.     Discussed with: bedside nurse    Lazarus Smith MD

## 2021-06-17 NOTE — CONSULTS
INTERVENTIONAL RADIOLOGY CONSULTATION    Date of Admission: 6/15/2021  Reason for Admission: SOB  Date of Consult: 6/15/21  Requesting Physician: Dr. Cameron    ASSESSMENT AND RECOMMENDATIONS:    Assessment:   Fer is a 71 year old male with history of HTN, Aflutter (not on AC), DMII, ESRD 2/2 IgA nephropathy, and chronic alcohol abuse, admitted with two weeks of weakness and recurrent falls with new shortness of breath secondary to atrial fibrillation with RVR, also with UTI sepsis, new acute T12 compression fx. LE edema and pain prompted ultrasound showing occlusive DVT from distal external iliac extending to common femoral vein and occlusive thrombus in the popliteal vein.    IR consult placed for TCVC revision and PE. Patient has a contrast allergy. Current on heparin ggt and hemodynamically stable.    Biilateral acute lobar PE with right heart strain seen on CT PE and ECHO. Patient is not a candidate for lytics due to acute vertebral fracture. He is a candidate for thrombectomy. Due to DVT extending proximal in femoral vein, recommend IVC filter placement at time of thrombectomy. Discussed the risks and benefits of the procedure with the patient. Will discuss further with family.    Last TCVC revision on 10/8/20 required plasty from the right innominate to the right atrium. No urgent need for dialysis today. Patient normally dialyzes M/F. Added to IR schedule today for right tunneled CVC revision. CT shows fibrin sheath, plan for balloon plasty of fibrin sheath without using IV contrast.    Recommend PO methylprednisolone premedication for contrast allergy to be dosed 12 hours and 2 hours prior to IR procedure. Planned thrombectomy with IR on 6/18/21 at 0900.    Patient seen and discuss with IR staff Dr. Suero. Message sent to primary team Dr Cameron. Please contact IR on call if any urgent needs.    Mikala Madrid PA-C  Interventional Radiology   IR on-call pager:  [phone]  __________________________________________________________________    HISTORY OF PRESENT ILLNESS:  Fer Latham is a 71 year old male with PMHx of HTN, Aflutter not on anticoagulation, DM2, ESRD 2/2 IgA nephropathy on HD, and alcohol abuse, who presented with 2 weeks of weakness and recurrent falls who developed new shortness of breath today after pre-syncopal episode found to be hypotensive in atrial fibrillation with RVR and with lactic acidosis to 6.7 admitted on 6/15/2021 for severe sepsis from likely urinary tract infection. Complicated by new acute T12 compression fx and RLE DVT.      Last dialysis run on 6/14 with shortened run. Recurrent issues with poor flow rate of 200 and frequent alarms. Patient was scheduled on 6/23 for outpatient TCVC revision. Now inpatient with need for dialysis run later this week. Line has been tPA locked x2. Prior IR line revision on 10/8/20 and 2/10/20. Last revision complicated by inability to pass wire centrally from internal jugular or external jugular vein. Venogram showed no central flow from external jugular. Ultimately plasty with 10 mm Dalton balloon from right innominate to right atrium with then successful replacement of catheter. Per patient this was last 5-8 years ago.  Revision on 2/10/20 and 10/8/20 done with gadolinium.    DVT from distal external iliac extending to common femoral vein and occlusive thrombus in the popliteal vein. This prompted cardiac ECHO which showed moderate to severe RV dilation with concern for PE. Troponin negative. Therefore, IR consult for PE. PERT team not activated. Patient had a CT PE done 6/16/21 after premedicated with IV dose to expedite exam due to patient becoming more tachycardic. CT showing bilateral lobar acute PE with heart strain. He has remained hemodynamically stable overnight, therefore no urgent indication for IR intervention.    Patient lying flat in bed due to back pain. Patient's recollection of medical  history vague and requiring frequent redirection.    DIAGNOSTIC STUDIES:   EXAMINATION: CTA pulmonary angiogram, 2021 10:20 PM   FINDINGS:  Contrast bolus is: excellent. Filling defects involving the  distal bilateral pulmonary arteries extending into the superior and  inferior segmental arteries.     The largest right ventricle transaxial diameter is (measured from  endocardium to endocardium): 4.8 cm   The largest left ventricle transaxial diameter is (measured from  endocardium to endocardium): 3.6 cm  RV/LV ratio is: 1.33 (if ratio greater than 1.1 then sign is  suspicious for right heart strain)  Reflux of contrast into the IVC? Yes  Paradoxical bowing of the interventricular septum to the left? Mild  leftward bowing of the septum  Pericardial effusion?:physiologic pericardial fluid     Bilateral trace pleural effusions and bibasilar atelectasis of the  lungs. No suspicious pulmonary nodules. Right IJ catheter terminates  in the right atrium. No acute abdominal pathology. No acute osseous  abnormalities.                                                          IMPRESSION:   1. Exam is positive for acute pulmonary embolism with right heart  strain    2. Trace bilateral pleural effusions and bibasilar atelectasis.  [Urgent Result: Pulmonary embolism with right heart strain]      Recent Results (from the past 4320 hour(s))   Echo Complete    Narrative    [id number]  [id number]  [id number]  [id number]     [facility],[place]  Echocardiography Laboratory  [address] [place] [id number]     Name: MARIA E REESE  MRN: [id number]  : 1950  Study Date: 2021 08:06 AM  Age: 71 yrs  Gender: Male  Patient [facility]: [facility]  Reason For Study: Atrial Fibrillation  Ordering Physician: ULYSSES MEMBRENO  Performed By: ELIAS Meraz     BSA: 2.4 m2  Height: 74 in  Weight: 263 lb  HR: 86  BP: 118/80  mmHg  ______________________________________________________________________________  Procedure  Complete Portable Echo Adult.  ______________________________________________________________________________  Interpretation Summary  Moderate to severe right ventricular dilation is present.  Hypokinetic RV free wall with hyperkinetic RV apex consistent with  hemodynamically significant pulmonary embolism if the clinical picture is  consistent with PE.  Right ventricular systolic pressure is 33mmHg above the right atrial pressure.  RV changes are new since prior study in 2019.  ______________________________________________________________________________  Left Ventricle  Global and regional left ventricular function is normal with an EF of 55-60%.  Left ventricular wall thickness is normal. Left ventricular size is normal.  Diastolic function not assessed due to atrial fibrillation. No regional wall  motion abnormalities are seen.     Right Ventricle  Moderate to severe right ventricular dilation is present. Hypokinetic RV free  wall with hyperkinetic RV apex consistent with hemodynamically significant  pulmonary embolism if the clinical picture is consistent with PE.     Atria  The right atria appears normal. Moderate to severe left atrial enlargement is  present.     Mitral Valve  The mitral valve is normal. Mild mitral insufficiency is present.     Aortic Valve  Aortic valve is normal in structure and function.     Tricuspid Valve  The tricuspid valve is normal. Mild tricuspid insufficiency is present. Right  ventricular systolic pressure is 33mmHg above the right atrial pressure.     Pulmonic Valve  The pulmonic valve is normal.     Vessels  The thoracic aorta is normal. The pulmonary artery cannot be assessed. The  inferior vena cava cannot be assessed.     Pericardium  No pericardial effusion is present.     Compared to Previous Study  RV changes are new since prior study in  .  ______________________________________________________________________________  MMode/2D Measurements & Calculations     IVSd: 0.89 cm  LVIDd: 4.4 cm  LVIDs: 2.9 cm  LVPWd: 1.1 cm  FS: 33.9 %  LV mass(C)d: 150.1 grams  LV mass(C)dI: 61.5 grams/m2  Ao root diam: 3.2 cm  asc Aorta Diam: 3.6 cm  LVOT diam: 2.2 cm  LVOT area: 3.8 cm2  LA Volume (BP): 85.2 ml  LA Volume Index (BP): 34.9 ml/m2  RWT: 0.51     Doppler Measurements & Calculations  MV E max jen: 70.3 cm/sec  MV A max jen: 34.4 cm/sec  MV E/A: 2.0  MV dec slope: 529.0 cm/sec2  PA acc time: 0.09 sec  TR max jen: 284.0 cm/sec  TR max P.3 mmHg  E/E' av.7  Lateral E/e': 5.6  Medial E/e': 7.8     ______________________________________________________________________________  Report approved by: Ce Acuña 2021 09:11 AM           EXAMINATION: US LOWER EXTREMITY VENOUS DUPLEX BILATERAL  6/15/2021  8:03 PM    FINDINGS:  Partially occlusive thrombus in the distal external iliac vein  extending into the common femoral vein and origin of the profunda.  Occlusive thrombus in the popliteal vein. The left common femoral,  greater saphenous origin, femoral, popliteal, and deep calf veins are  visualized and are patent with normal venous waveforms and response  compression significant bilateral subcutaneous edema.                                                                   IMPRESSION:.  1. Deep venous thrombus in the right lower extremity as described  above.  2. No DVT in the left lower extremity  3. Significant subcutaneous edema in the bilateral lower legs.    PAST MEDICAL HISTORY:   Reviewed and edited as appropriate.  Past Medical History:   Diagnosis Date     Anemia      Arrhythmia     atrial fib     Arthritis     gout     BPH (benign prostatic hyperplasia)      Depression      Diabetes mellitus (H)     Type 2  IDDM     Diverticulosis      ESRD (end stage renal disease) on dialysis (H)      Hematuria      HTN (hypertension)       HTN (hypertension)      IgA nephropathy      MI, old      Pneumonia      Rhabdomyolysis      Weakness 5/10/2019       PAST SURGICAL HISTORY:    Reviewed and edited as appropriate.  Past Surgical History:   Procedure Laterality Date     ABDOMEN SURGERY       APPENDECTOMY  1970's     ARTHROSCOPY KNEE  6/27/2013    Procedure: ARTHROSCOPY KNEE;  Right Knee Arthroscopy, Debridment Of Medial Meniscal Tear.  ;  Surgeon: Tomás Wong MD;  Location: [facility]     BACK SURGERY  1999    L5 S1 decompression     BIOPSY  2017    skin     COLONOSCOPY  2013    [facility]     CORONARY ANGIOGRAPHY ADULT ORDER  2018     EYE SURGERY Bilateral 2004    Lens replacement     IR CVC TUNNEL PLACEMENT > 5 YRS OF AGE  11/26/2019     IR CVC TUNNEL REMOVAL RIGHT  11/26/2019     IR CVC TUNNEL REVISION RIGHT  2/10/2020     IR CVC TUNNEL REVISION RIGHT  10/8/2020     VASCULAR SURGERY         FAMILY HISTORY:   Reviewed and edited as appropriate.  Family History   Problem Relation Age of Onset     Cancer Father        SOCIAL HISTORY:   Reviewed and edited as appropriate.  Social History     Tobacco Use     Smoking status: Former Smoker     Smokeless tobacco: Never Used     Tobacco comment: quit 35 years ago   Substance Use Topics     Alcohol use: Yes     Comment: 1 to 2 drinks a day (Occasional)       ALLERGIES:  Cephalexin, Ciprofloxacin, Diagnostic x-ray materials, and Sulfa drugs    ROS:  Respiratory: Shortness of breath  Cardiovascular: positive for irregular heart beat, dyspnea on exertion and lower extremity edema. Negative for chest pain  Gastrointestinal: positive for abdominal pain  Musculoskeletal: positive for fracture and back pain    PHYSICAL EXAM:   /85 (BP Location: Right arm)   Pulse 103   Temp 97.4  F (36.3  C) (Oral)   Resp 16   Wt 119.3 kg (263 lb)   SpO2 98%   BMI 33.77 kg/m    Constitutional: healthy, alert, mild distress and lying flat in bed  Cardiovascular: positive findings: irregular rhythm- A  fib  Gastrointestinal: Diffuse abdominal pain with palpation  Musculoskeletal: 2+ bilateral lower extremit edema to the mid calf, unable to assess PT or DP pulses, cold extremities, normal strength with doral and platar flexion, could not assess capillary refill, palpable b/l femoral pulses  Skin: lower extremities slightly cool to touch, no ulcers or lesions  LABS:  Lab Results   Component Value Date    TROPI <0.015 06/15/2021    TROPI <0.015 05/14/2020    TROPI 0.035 05/10/2019       BMP RESULTS:  Lab Results   Component Value Date     (L) 06/15/2021    POTASSIUM 3.5 06/15/2021    CHLORIDE 100 06/15/2021    CO2 19 (L) 06/15/2021    ANIONGAP 12 06/15/2021     (H) 06/15/2021    BUN 49 (H) 06/15/2021    CR 3.57 (H) 06/15/2021    GFRESTIMATED 16 (L) 06/15/2021    GFRESTBLACK 19 (L) 06/15/2021    COLUMBA 8.9 06/15/2021        CBC RESULTS:  Lab Results   Component Value Date    WBC 7.1 06/15/2021    RBC 3.00 (L) 06/15/2021    HGB 9.8 (L) 06/15/2021    HCT 30.3 (L) 06/15/2021     (H) 06/15/2021    MCH 32.7 06/15/2021    MCHC 32.3 06/15/2021    RDW 13.6 06/15/2021     (L) 06/15/2021       INR/PTT:  Lab Results   Component Value Date    INR 1.16 (H) 10/08/2020    PTT 30 11/26/2019

## 2021-06-17 NOTE — PROVIDER NOTIFICATION
IR Provider Note:    CT chest PE demonstrated bilateral pulmonary emboli with persistent right heart strain. Given patient's hemodynamic stability (HR 76, ), there is no plan for overnight IR intervention.     Plan:  - Will discuss patient on IR rounds in AM to discuss possible intervention for 6/17. If thrombectomy is planned for 6/17, patient will need to receive pre-medication for contrast allergy again.  - Please keep the patient NPO.  - Continue heparin drip.  - Please let us know if the patient's clinical status deteriorates.    Plan discussed with IR attending, Dr. Knowles and cross cover provider, Dr. Smith.    Janki Joy MD  PGY-3

## 2021-06-17 NOTE — PRE-PROCEDURE
GENERAL PRE-PROCEDURE:   Procedure:  TCVC exchange  Date/Time:  6/17/2021 10:36 AM    Verbal consent obtained?: Yes    Written consent obtained?: Yes    Risks and benefits: Risks, benefits and alternatives were discussed    Consent given by:  Patient  Patient states understanding of procedure being performed: Yes    Patient's understanding of procedure matches consent: Yes    Procedure consent matches procedure scheduled: Yes    Expected level of sedation:  Moderate  Appropriately NPO:  Yes  ASA Class:  Class 3- Severe systemic disease, definite functional limitations  Mallampati  :  Grade 2- soft palate, base of uvula, tonsillar pillars, and portion of posterior pharyngeal wall visible  Lungs:  Lungs clear with good breath sounds bilaterally  Heart:  Normal heart sounds and rate  History & Physical reviewed:  History and physical reviewed and no updates needed  Statement of review:  I have reviewed the lab findings, diagnostic data, medications, and the plan for sedation

## 2021-06-17 NOTE — PLAN OF CARE
PT / 6D - PT orders received. Cxl. Pt awaiting TLSO brace this AM and later OOR at procedure. Rescheduled.

## 2021-06-17 NOTE — IR NOTE
Patient Name: Fer Latham  Medical Record Number: [id number]  Today's Date: 6/17/2021    Procedure: tunneled line exchange with plasty  Proceduralist: Linh Suero and Derik    Procedure Start: 1100  Procedure end: 1155  Sedation medications administered: versed  1.5 Mg., fentanyl 75  Mcg.    Report given to: Ernst CAMPOS RN    Other Notes: Pt arrived to IR room 4 from 6D. Consent reviewed. Pt denies any questions or concerns regarding procedure. Pt positioned supine  and monitored per protocol. Pt tolerated procedure without any noted complications. Pt transferred back to 6D.  CVC flushed with heparin and ready to use.

## 2021-06-17 NOTE — PLAN OF CARE
Major Shift Events: Afib with frequent RVR, asymptomatic. Back pain controlled with MARTIN brace, PO oxy and IV dilaudid. Dialysis cath revision in IR today. Went down for dialysis at 1600. Before dialysis pt's orientation level was waxing and weaning. Disoriented to time, place and situation. MD aware and stopped by at bedside. Hep10A checked in dialysis, within therapeutic range. At dialysis currently.   Plan: Continue plan of care.  For vital signs and complete assessments, please see documentation flowsheets.

## 2021-06-18 ENCOUNTER — APPOINTMENT (OUTPATIENT)
Dept: PHYSICAL THERAPY | Facility: CLINIC | Age: 71
DRG: 981 | End: 2021-06-18
Attending: PHYSICIAN ASSISTANT
Payer: MEDICARE

## 2021-06-18 ENCOUNTER — APPOINTMENT (OUTPATIENT)
Dept: CT IMAGING | Facility: CLINIC | Age: 71
DRG: 981 | End: 2021-06-18
Attending: INTERNAL MEDICINE
Payer: MEDICARE

## 2021-06-18 ENCOUNTER — APPOINTMENT (OUTPATIENT)
Dept: CARDIOLOGY | Facility: CLINIC | Age: 71
DRG: 981 | End: 2021-06-18
Attending: INTERNAL MEDICINE
Payer: MEDICARE

## 2021-06-18 LAB
ALBUMIN SERPL-MCNC: 2.6 G/DL (ref 3.4–5)
ALP SERPL-CCNC: 81 U/L (ref 40–150)
ALT SERPL W P-5'-P-CCNC: 9 U/L (ref 0–70)
ANION GAP SERPL CALCULATED.3IONS-SCNC: 8 MMOL/L (ref 3–14)
AST SERPL W P-5'-P-CCNC: 12 U/L (ref 0–45)
BILIRUB SERPL-MCNC: 0.5 MG/DL (ref 0.2–1.3)
BUN SERPL-MCNC: 32 MG/DL (ref 7–30)
CALCIUM SERPL-MCNC: 8.5 MG/DL (ref 8.5–10.1)
CHLORIDE SERPL-SCNC: 102 MMOL/L (ref 94–109)
CO2 SERPL-SCNC: 23 MMOL/L (ref 20–32)
CREAT SERPL-MCNC: 2.21 MG/DL (ref 0.66–1.25)
ERYTHROCYTE [DISTWIDTH] IN BLOOD BY AUTOMATED COUNT: 13.9 % (ref 10–15)
GFR SERPL CREATININE-BSD FRML MDRD: 29 ML/MIN/{1.73_M2}
GLUCOSE BLDC GLUCOMTR-MCNC: 156 MG/DL (ref 70–99)
GLUCOSE BLDC GLUCOMTR-MCNC: 162 MG/DL (ref 70–99)
GLUCOSE BLDC GLUCOMTR-MCNC: 182 MG/DL (ref 70–99)
GLUCOSE SERPL-MCNC: 148 MG/DL (ref 70–99)
HCT VFR BLD AUTO: 32.2 % (ref 40–53)
HGB BLD-MCNC: 10.4 G/DL (ref 13.3–17.7)
INR PPP: 1.36 (ref 0.86–1.14)
MCH RBC QN AUTO: 31.5 PG (ref 26.5–33)
MCHC RBC AUTO-ENTMCNC: 32.3 G/DL (ref 31.5–36.5)
MCV RBC AUTO: 98 FL (ref 78–100)
PLATELET # BLD AUTO: 172 10E9/L (ref 150–450)
POTASSIUM SERPL-SCNC: 3.9 MMOL/L (ref 3.4–5.3)
POTASSIUM SERPL-SCNC: 4.2 MMOL/L (ref 3.4–5.3)
PROT SERPL-MCNC: 7 G/DL (ref 6.8–8.8)
RBC # BLD AUTO: 3.3 10E12/L (ref 4.4–5.9)
SODIUM SERPL-SCNC: 133 MMOL/L (ref 133–144)
UFH PPP CHRO-ACNC: 0.34 IU/ML
WBC # BLD AUTO: 9.2 10E9/L (ref 4–11)

## 2021-06-18 PROCEDURE — 80053 COMPREHEN METABOLIC PANEL: CPT | Performed by: INTERNAL MEDICINE

## 2021-06-18 PROCEDURE — 97530 THERAPEUTIC ACTIVITIES: CPT | Mod: GP

## 2021-06-18 PROCEDURE — 36415 COLL VENOUS BLD VENIPUNCTURE: CPT | Performed by: RADIOLOGY

## 2021-06-18 PROCEDURE — 99232 SBSQ HOSP IP/OBS MODERATE 35: CPT | Mod: 25 | Performed by: PHYSICIAN ASSISTANT

## 2021-06-18 PROCEDURE — 85027 COMPLETE CBC AUTOMATED: CPT | Performed by: INTERNAL MEDICINE

## 2021-06-18 PROCEDURE — C1769 GUIDE WIRE: HCPCS

## 2021-06-18 PROCEDURE — 250N000011 HC RX IP 250 OP 636: Performed by: PHYSICIAN ASSISTANT

## 2021-06-18 PROCEDURE — 999N001017 HC STATISTIC GLUCOSE BY METER IP

## 2021-06-18 PROCEDURE — 51702 INSERT TEMP BLADDER CATH: CPT | Performed by: PHYSICIAN ASSISTANT

## 2021-06-18 PROCEDURE — 0T9B70Z DRAINAGE OF BLADDER WITH DRAINAGE DEVICE, VIA NATURAL OR ARTIFICIAL OPENING: ICD-10-PCS | Performed by: PHYSICIAN ASSISTANT

## 2021-06-18 PROCEDURE — 93308 TTE F-UP OR LMTD: CPT | Mod: 26 | Performed by: INTERNAL MEDICINE

## 2021-06-18 PROCEDURE — 36415 COLL VENOUS BLD VENIPUNCTURE: CPT | Performed by: INTERNAL MEDICINE

## 2021-06-18 PROCEDURE — 93325 DOPPLER ECHO COLOR FLOW MAPG: CPT | Mod: 26 | Performed by: INTERNAL MEDICINE

## 2021-06-18 PROCEDURE — 93321 DOPPLER ECHO F-UP/LMTD STD: CPT | Mod: 26 | Performed by: INTERNAL MEDICINE

## 2021-06-18 PROCEDURE — 70450 CT HEAD/BRAIN W/O DYE: CPT | Mod: 26 | Performed by: RADIOLOGY

## 2021-06-18 PROCEDURE — 272N000143 HC KIT CR3

## 2021-06-18 PROCEDURE — 250N000012 HC RX MED GY IP 250 OP 636 PS 637: Performed by: INTERNAL MEDICINE

## 2021-06-18 PROCEDURE — 272N000504 HC NEEDLE CR4

## 2021-06-18 PROCEDURE — 97162 PT EVAL MOD COMPLEX 30 MIN: CPT | Mod: GP

## 2021-06-18 PROCEDURE — 70450 CT HEAD/BRAIN W/O DYE: CPT | Mod: ME

## 2021-06-18 PROCEDURE — 84132 ASSAY OF SERUM POTASSIUM: CPT | Performed by: INTERNAL MEDICINE

## 2021-06-18 PROCEDURE — 250N000013 HC RX MED GY IP 250 OP 250 PS 637: Performed by: PHYSICIAN ASSISTANT

## 2021-06-18 PROCEDURE — 250N000011 HC RX IP 250 OP 636: Performed by: INTERNAL MEDICINE

## 2021-06-18 PROCEDURE — 93325 DOPPLER ECHO COLOR FLOW MAPG: CPT

## 2021-06-18 PROCEDURE — G1004 CDSM NDSC: HCPCS | Mod: GC | Performed by: RADIOLOGY

## 2021-06-18 PROCEDURE — 120N000002 HC R&B MED SURG/OB UMMC

## 2021-06-18 PROCEDURE — 250N000009 HC RX 250

## 2021-06-18 PROCEDURE — 85610 PROTHROMBIN TIME: CPT | Performed by: RADIOLOGY

## 2021-06-18 PROCEDURE — 99233 SBSQ HOSP IP/OBS HIGH 50: CPT | Performed by: INTERNAL MEDICINE

## 2021-06-18 RX ORDER — HYDROMORPHONE HYDROCHLORIDE 1 MG/ML
.5-1 INJECTION, SOLUTION INTRAMUSCULAR; INTRAVENOUS; SUBCUTANEOUS
Status: DISCONTINUED | OUTPATIENT
Start: 2021-06-18 | End: 2021-06-21

## 2021-06-18 RX ORDER — LIDOCAINE HYDROCHLORIDE 20 MG/ML
JELLY TOPICAL
Status: COMPLETED
Start: 2021-06-18 | End: 2021-06-18

## 2021-06-18 RX ORDER — LIDOCAINE HYDROCHLORIDE 20 MG/ML
JELLY TOPICAL ONCE
Status: COMPLETED | OUTPATIENT
Start: 2021-06-18 | End: 2021-06-18

## 2021-06-18 RX ORDER — QUETIAPINE FUMARATE 25 MG/1
25-50 TABLET, FILM COATED ORAL EVERY 6 HOURS PRN
Status: DISCONTINUED | OUTPATIENT
Start: 2021-06-18 | End: 2021-06-22

## 2021-06-18 RX ORDER — DIAZEPAM 5 MG
10 TABLET ORAL EVERY 30 MIN PRN
Status: DISCONTINUED | OUTPATIENT
Start: 2021-06-18 | End: 2021-06-22

## 2021-06-18 RX ADMIN — LIDOCAINE 1 PATCH: 560 PATCH PERCUTANEOUS; TOPICAL; TRANSDERMAL at 20:11

## 2021-06-18 RX ADMIN — PIPERACILLIN SODIUM AND TAZOBACTAM SODIUM 2.25 G: 2; .25 INJECTION, POWDER, LYOPHILIZED, FOR SOLUTION INTRAVENOUS at 06:10

## 2021-06-18 RX ADMIN — PIPERACILLIN SODIUM AND TAZOBACTAM SODIUM 2.25 G: 2; .25 INJECTION, POWDER, LYOPHILIZED, FOR SOLUTION INTRAVENOUS at 18:33

## 2021-06-18 RX ADMIN — ACETAMINOPHEN 1000 MG: 500 TABLET, FILM COATED ORAL at 09:43

## 2021-06-18 RX ADMIN — PIPERACILLIN SODIUM AND TAZOBACTAM SODIUM 2.25 G: 2; .25 INJECTION, POWDER, LYOPHILIZED, FOR SOLUTION INTRAVENOUS at 12:26

## 2021-06-18 RX ADMIN — HYDROMORPHONE HYDROCHLORIDE 0.5 MG: 1 INJECTION, SOLUTION INTRAMUSCULAR; INTRAVENOUS; SUBCUTANEOUS at 08:22

## 2021-06-18 RX ADMIN — FOLIC ACID 1 MG: 1 TABLET ORAL at 09:59

## 2021-06-18 RX ADMIN — LIDOCAINE HYDROCHLORIDE 10 ML: 20 JELLY TOPICAL at 12:28

## 2021-06-18 RX ADMIN — ACETAMINOPHEN 1000 MG: 500 TABLET, FILM COATED ORAL at 20:11

## 2021-06-18 RX ADMIN — POLYETHYLENE GLYCOL 3350 17 G: 17 POWDER, FOR SOLUTION ORAL at 09:59

## 2021-06-18 RX ADMIN — THIAMINE HCL TAB 100 MG 100 MG: 100 TAB at 09:58

## 2021-06-18 RX ADMIN — DIAZEPAM 2 MG: 2 TABLET ORAL at 09:58

## 2021-06-18 RX ADMIN — MORPHINE SULFATE 4 MG: 2 INJECTION, SOLUTION INTRAMUSCULAR; INTRAVENOUS at 06:24

## 2021-06-18 RX ADMIN — PIPERACILLIN SODIUM AND TAZOBACTAM SODIUM 2.25 G: 2; .25 INJECTION, POWDER, LYOPHILIZED, FOR SOLUTION INTRAVENOUS at 23:51

## 2021-06-18 RX ADMIN — HYDROMORPHONE HYDROCHLORIDE 0.5 MG: 1 INJECTION, SOLUTION INTRAMUSCULAR; INTRAVENOUS; SUBCUTANEOUS at 18:33

## 2021-06-18 RX ADMIN — HYDROMORPHONE HYDROCHLORIDE 0.5 MG: 1 INJECTION, SOLUTION INTRAMUSCULAR; INTRAVENOUS; SUBCUTANEOUS at 12:28

## 2021-06-18 RX ADMIN — Medication 1 TABLET: at 09:58

## 2021-06-18 RX ADMIN — OXYCODONE HYDROCHLORIDE 10 MG: 5 TABLET ORAL at 11:10

## 2021-06-18 RX ADMIN — HEPARIN SODIUM 1000 UNITS/HR: 10000 INJECTION, SOLUTION INTRAVENOUS at 07:19

## 2021-06-18 RX ADMIN — PIPERACILLIN SODIUM AND TAZOBACTAM SODIUM 2.25 G: 2; .25 INJECTION, POWDER, LYOPHILIZED, FOR SOLUTION INTRAVENOUS at 01:27

## 2021-06-18 RX ADMIN — MORPHINE SULFATE 4 MG: 2 INJECTION, SOLUTION INTRAMUSCULAR; INTRAVENOUS at 09:44

## 2021-06-18 RX ADMIN — METHYLPREDNISOLONE 32 MG: 32 TABLET ORAL at 06:10

## 2021-06-18 NOTE — PLAN OF CARE
OT-6B: Cancel/Hold. Pt not yet appropriate for multiple disciplines. Pt with limited command following and tolerance. PT to follow at this time. Will initiate as appropriate.

## 2021-06-18 NOTE — PROGRESS NOTES
Agitation and complaining of inability to urinate. Complains of pain at area of bladder.     Bladder scanned for 350 mL. Paged and updated provider.     Urology consult placed. Urinary catheter to be placed at bedside by urologist. Will continue to monitor.

## 2021-06-18 NOTE — PROGRESS NOTES
Brief Nephrology Note:    Patient dialyzed Thursday 6/17 via newly placed tunneled CVC. Plan on next HD run tomorrow (Sat) and then will return to patient's outpatient M/F schedule next week.    Lizbeth Clements PA-C  [phone]

## 2021-06-18 NOTE — CONSULTS
Repeat ECHO 6/18/21 shows improved RV function when compared to prior. Patient remains stable today. Some new confusion prompting head CT. Multiple attempts to reach spouse unsuccessful this morning.    IR plan to hold off on thrombectomy and IVC filter placement due to patients stability and improved RV function. Continue anticoagulation and monitor.    Patient seen at bedside and plan formulated by Dr Schultz, Dr Schneider, and Dr Cameron.    Please consult IR again if patient's condition deteriorates or request for IR intervention.    Mikala Madrid PA-C  Interventional Radiology  IR on call: [phone]

## 2021-06-18 NOTE — PROGRESS NOTES
's - 170's. Displaying grimacing expressions. Possibly pain related.  Paged and updated provider. Pain medication modified. Will continue to monitor.

## 2021-06-18 NOTE — PROGRESS NOTES
Patient began the night confused and restless to agitated. Requested a sitter for patient safety since patient was attempting to get up and pulling on devices.    Neuro: Confused and forgetful. Lying flat r/t fractured vertebrae.    CV: A-fib with a few RVR episodes, otherwise mostly rate controlled. On heparin drip for PE and DVT. Afebrile. BP WNL.    Resp: RA    GI: NPO since midnight    : urinal. Frequency and urgency noted.    Skin: intacted. Bottom red blanchable.    Lines: 2 PIV LFA, 1 PIV RFA.

## 2021-06-18 NOTE — PROGRESS NOTES
Contacted on-call team regarding heparin drip to verify when to hold. Informed that IR will typically call ahead for holding drip.

## 2021-06-18 NOTE — PROGRESS NOTES
Brief Social Work Note    Due to pt's mental status, SW attempted to call pt's spouse Ailin to complete assessment. SW left VM with Ailin requesting return phone call.     LANETTE Rasmussen, LGSW  6D Adult Acute Care   Ph:[phone]  Pager [phone]

## 2021-06-18 NOTE — PROGRESS NOTES
"   06/18/21 1510   Quick Adds   Type of Visit Initial PT Evaluation   Living Environment   People in home spouse   Current Living Arrangements house   Living Environment Comments Pt a poor historian 2/2 cognition and confusion.    Self-Care   Usual Activity Tolerance moderate   Current Activity Tolerance poor   Equipment Currently Used at Home walker, rolling   Disability/Function   Fall history within last six months yes   Number of times patient has fallen within last six months   ('Multiple falls' per chart. Pt unable to state exact number.)   Change in Functional Status Since Onset of Current Illness/Injury yes   General Information   Onset of Illness/Injury or Date of Surgery 06/15/21   Referring Physician Ina Ohara PA-C   Patient/Family Therapy Goals Statement (PT) Pt does not state goal.   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 Complicated by new acute T12 compression fx and RLE DVT.\"   Existing Precautions/Restrictions fall;spinal   General Observations Activity - as tolerated with TLSO.   Cognition   Affect/Mental Status (Cognition) confused   Follows Commands (Cognition) follows one-step commands;physical/tactile prompts required;repetition of directions required;verbal cues/prompting required   Safety Deficit (Cognition) impulsivity   Pain Assessment   Patient Currently in Pain Yes, see Vital Sign flowsheet   Range of Motion (ROM)   ROM Comment B LE ROM grossly WFL.   Strength   Strength " Comments B LE strength at least 3/5; functional weakness/deconditioning present.   Bed Mobility   Comment (Bed Mobility) Pt requires MaxA of 2 for supine <> sit.   Transfers   Transfer Safety Comments Attempted sit <> stand however pt unable to achieve full upright standing and completes ~50% with MaxA of 2.   Gait/Stairs (Locomotion)   Comment (Gait/Stairs) Not assessed.   Balance   Balance Comments Pt requires Dede of 1-2 to maintain static sitting balance at EOB.   Clinical Impression   Criteria for Skilled Therapeutic Intervention yes, treatment indicated   PT Diagnosis (PT) Impaired functional mobility   Influenced by the following impairments Weakness, impaired balance, decreased activity tolerance, pain, cognition   Functional limitations due to impairments Bed mobility, transfers, gait, stairs   Clinical Presentation Evolving/Changing   Clinical Presentation Rationale Clinical judgement   Clinical Decision Making (Complexity) moderate complexity   Therapy Frequency (PT) 5x/week   Predicted Duration of Therapy Intervention (days/wks) 1 week   Planned Therapy Interventions (PT) balance training;bed mobility training;gait training;home exercise program;patient/family education;postural re-education;stair training;strengthening;transfer training   Risk & Benefits of therapy have been explained evaluation/treatment results reviewed;care plan/treatment goals reviewed;risks/benefits reviewed;patient   PT Discharge Planning    PT Discharge Recommendation (DC Rec) Transitional Care Facility   PT Rationale for DC Rec Pt below baseline and will require continued skilled therapy to progress strength, balance, activity tolerance, and mobility safety/IND.   Total Evaluation Time   Total Evaluation Time (Minutes) 8

## 2021-06-18 NOTE — PROGRESS NOTES
Events of day include: Urinary retention causing agitation and Afib with RVR. See event notes.     Give PRN Dilaudid for pain.     Respiratory status WDL. Using oxymask during sleep for sleep apnea symptoms and desaturation.    Vital signs stable. Afebrile    Family updated and all questions answered; wife and son.

## 2021-06-18 NOTE — PROGRESS NOTES
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 Complicated by new acute T12 compression fx and RLE DVT.      Main Plans for Today:  - Repeat echocardiogram to assess RV strain  - CT Head for altered mental status  - Will not proceed with thrombectomy at this time  - Multiple attempts made to contact patient's wife    # Right Heart Strain secondary to PE  # DVT of RLE   Increased LE edema and immobilized over last 1-2 weeks. US bilateral showed R partially occlusive thrombus in the distal iliac vein extending into the common femoral vein and occlusive thrombus in popliteal vein. Right heart strain on echocardiogram. Remains hemodynamically labile with atrial fibrillation with RVR. CT PE scan confirmed large PE with right heart strain.   - Continue high intensity heparin drip for now  - Interventional Radiology consulted, appreciate assistance  - Repeat echocardiogram ordered    #Lactic acidosis   #Hypotension   #Urinary Tract Infection  #Concern for severe sepsis (vs. PE)  Hypotensive, tachycardic with lactic acidosis on admission. Initially thought to be dehydrated (poor PO intake per wife), but infectious work up with grossly positive UA. CXR with unchanged oniel-hilar and L retrocardiac opacities likely atelectasis vs edema. Patient was on 2L O2, but weaned to RA at 95%. Blood cultures obtained and pending. Started on broad spectrum abx and IVF with good  response in blood pressure and with repeat lactic acid 1.5.  A  - Continue broad spectrum abx with piperacillin/tazobactam  - Discontinued vancomycin 6/17  - Follow up UCx, and  Blood cultures. Added culture from CVC catheter.    # Acute Toxic Metabolic Encephalopathy  Likely multifactorial with worsening from baseline. History of alcohol abuse but also with concern for infection, large PE.   - CT head negative  - Frequent reorientation  - Continue to treat underlying conditions    #Atrial fib/flutter with RVR - [id number] score 3 for age, HTN, and DM2. Takes  mg daily, but not on anticoagulation or rate control prior to admission. Presented with HR 180s on admission, which improved after IVF. EKG in Afib/flutter.   -Telemetry  -Hold any BB with soft BP. HR currently within goal <110   -Hold  mg daily while on anticoagulation     #T12 compression fx - Reporting low back pain for the last 1-2 weeks with multiple recurrent falls (due to weakness and unsteady gait). Denies any head strike or LOC. PCP ordered Xray of Lumbar spine negative for fx per care-everywhere. CT lumbar spine with acute compression fx T12, without any retropulsion. Denies any red flag sx, and no obvious deficit, though patient would not allow formal neuro evaluation.   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 Access with CVC (failed attempt with AVF in past per patient. Appears to have a CVC catheter exchange planned on 6/23). Patient still makes urine. Appears hypervolemic with LE edema. Unclear baseline weight.   - HD per nephrology, consulted  - Check  Phos, Mag  - I&O and daily weights   - IR consult for catheter replacement    # Hx of HTN - Previously on medications but discontinued after lifestyle modifications with weight loss several years ago    # DM2- diet controlled. Check Hemoglobin A1c    # Neurogenic bladder   # Phimosis  Follows with urology. S/p botox in past. Not on any medications due to cost.   - Obtain PVR   - Nursing staff unable to place ferguson catheter  - Urology consult for ferguson placement, appreciate assistance    # Chronic anemia - Baseline 11.0s. .   -Trend CBC  -B12, Folate, TSH    # Hx of alcohol abuse - Hx of withdrawal. Drinking 1/2 drink of whiskey daily. Monitor on CIWA, hold ativan for now unless starts scoring. Start MVI, folic acid and thiamine.     # Anxiety - Continue home Valium at lower dose 2 mg BID PRN and hold for sedation          Diet: NPO per Anesthesia Guidelines for Procedure/Surgery Except for: Meds    DVT Prophylaxis: Heparin gtt  Ferguson Catheter: not present  Code Status: Full Code           Disposition Plan   Expected discharge: 2 - 3 days, recommended to transitional care unit once adequate pain management/ tolerating PO medications, antibiotic plan established and safe disposition plan/ TCU bed available.  Entered: Marc Cameron MD 06/18/2021, 9:45 AM       The patient's care was discussed with the Bedside Nurse, Care Coordinator/, Patient and IR Consultant.    Marc Cameron MD  Hospitalist Service  [facility]  Contact information available via [facility] Paging/Directory  Please see sign in/sign out for up to date coverage information  ______________________________________________________________________    Interval History   No acute events overnight. This morning, unable to get in contact with Ailin to consent for procedure. Thrombectomy cancelled. Patient reports he is in [employer]. Denies any significant pain or discomfort when laying  still.     4 Point ROS otherwise negative.     Data reviewed today: I reviewed all medications, new labs and imaging results over the last 24 hours. I personally reviewed no images or EKG's today.    Physical Exam   BP 98/57 (BP Location: Left arm)   Pulse 85   Temp 97.4  F (36.3  C) (Oral)   Resp 18   Wt 121.9 kg (268 lb 11.9 oz)   SpO2 98%   BMI 34.50 kg/m    General: awake and alert, oriented to person only, chronically ill appearing man in NAD  Skin: no rashes or bruising  CV: RRR, normal S1S2, no murmur  Resp: CTAB, no wheeze, rhonchi   Abd: Soft, tender in suprapubic region, nondistended, BS+, no masses appreciated  Extremities: warm and well perfused, L>R  edema

## 2021-06-18 NOTE — CONSULTS
"Urology Consult History and Physical    Name: Fer Latham    MRN: [id number]   YOB: 1950       We were asked to see Fer Latham at the request of Dr. 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  While inpatient he has been found to have a T12 compression Fx and RLE DVT and he has been started on a high intensity heparin drip.      Urology has been asked to place a Brown.  Nurses have previously tried and failed.     Review of records shows he was recently seen by [facility] (OCTAVIO Green) for  OAB / nocturia.  She scheduled Mr. Latham for a renal bladder US to rule out stones as well as UDS, both planned for 7/12/21.  [facility] and was last seen by Dr. Luna on 4/21/21 for a virtual visit. She had tried intravesical Botox 100u twice, neither with improvement, last on 11/6/20.  On that date, the procedure note states \"I could get the scope in the urethra without any difficulty.\"  Last UDS on 8/10/20 showed small functional bladder capacity (122cc), limited by detrusor overactivity (spasms ranging between pDet 60-32paO0R) with urgency but no leakage. Good compliance between spasms.  Despite removing the catheters he was unable to void for the test, but did empty PVR zero on his own in the bathroom.      Today he feels bladder is uncomfortable and full.  He is requesting a Brown.            Past Medical History:     Past Medical History:   Diagnosis Date     Anemia      " Arrhythmia     atrial fib     Arthritis 1990's    gout     BPH (benign prostatic hyperplasia)      Depression      Diabetes mellitus (H)     Type 2  IDDM     Diverticulosis      ESRD (end stage renal disease) on dialysis (H)      Hematuria      HTN (hypertension)      HTN (hypertension)      IgA nephropathy      MI, old      Pneumonia      Rhabdomyolysis      Weakness 5/10/2019            Past Surgical History:     Past Surgical History:   Procedure Laterality Date     ABDOMEN SURGERY       APPENDECTOMY  1970's     ARTHROSCOPY KNEE  6/27/2013    Procedure: ARTHROSCOPY KNEE;  Right Knee Arthroscopy, Debridment Of Medial Meniscal Tear.  ;  Surgeon: Tomás Wong MD;  Location: [facility] OR     BACK SURGERY  1999    L5 S1 decompression     BIOPSY  2017    skin     COLONOSCOPY  2013    [facility]     CORONARY ANGIOGRAPHY ADULT ORDER  2018     EYE SURGERY Bilateral 2004    Lens replacement     IR CVC TUNNEL PLACEMENT > 5 YRS OF AGE  11/26/2019     IR CVC TUNNEL REMOVAL RIGHT  11/26/2019     IR CVC TUNNEL REVISION RIGHT  2/10/2020     IR CVC TUNNEL REVISION RIGHT  10/8/2020     IR CVC TUNNEL REVISION RIGHT  6/17/2021     VASCULAR SURGERY              Social History:     Social History     Tobacco Use     Smoking status: Former Smoker     Smokeless tobacco: Never Used     Tobacco comment: quit 35 years ago   Substance Use Topics     Alcohol use: Yes     Comment: 1 to 2 drinks a day (Occasional)            Family History:     Family History   Problem Relation Age of Onset     Cancer Father             Allergies:     Allergies   Allergen Reactions     Cephalexin      Ciprofloxacin      Diagnostic X-Ray Materials      Sulfa Drugs Hives            Medications:     Current Facility-Administered Medications   Medication     acetaminophen (TYLENOL) tablet 1,000 mg     [Held by provider] aspirin (ASA) EC tablet 325 mg     bisacodyl (DULCOLAX) Suppository 10 mg     diazepam (VALIUM) tablet 2 mg     folic acid (FOLVITE) tablet 1 mg      heparin 25,000 units in 0.45% NaCl 250 mL ANTICOAGULANT infusion     HYDROmorphone (PF) (DILAUDID) injection 0.5-1 mg     Lidocaine (LIDOCARE) 4 % Patch 1 patch    And     lidocaine patch in PLACE     lidocaine (LMX4) cream     lidocaine (LMX4) cream     lidocaine 1 % 0.1-1 mL     lidocaine 1 % 0.1-1 mL     melatonin tablet 5 mg     multivitamin w/minerals (THERA-VIT-M) tablet 1 tablet     naloxone (NARCAN) injection 0.2 mg    Or     naloxone (NARCAN) injection 0.4 mg    Or     naloxone (NARCAN) injection 0.2 mg    Or     naloxone (NARCAN) injection 0.4 mg     ondansetron (ZOFRAN-ODT) ODT tab 4 mg    Or     ondansetron (ZOFRAN) injection 4 mg     oxyCODONE (ROXICODONE) tablet 5-10 mg     piperacillin-tazobactam (ZOSYN) 2.25 g vial to attach to  ml bag     polyethylene glycol (MIRALAX) Packet 17 g     senna-docusate (SENOKOT-S/PERICOLACE) 8.6-50 MG per tablet 1 tablet    Or     senna-docusate (SENOKOT-S/PERICOLACE) 8.6-50 MG per tablet 2 tablet     sodium chloride (PF) 0.9% PF flush 3 mL     sodium chloride (PF) 0.9% PF flush 3 mL     sodium chloride (PF) 0.9% PF flush 3 mL     sodium chloride (PF) 0.9% PF flush 3 mL     thiamine (B-1) tablet 100 mg             Review of Systems:    ROS: as above in HPI         Physical Exam:     GENERAL PHYSICAL EXAM:   Vitals: BP 98/57 (BP Location: Left arm)   Pulse 85   Temp 97.4  F (36.3  C) (Oral)   Resp 18   Wt 121.9 kg (268 lb 11.9 oz)   SpO2 98%   BMI 34.50 kg/m    Body mass index is 34.5 kg/m .    GEN:  NAD.  Pleasant.   :      Inguinal: no hernias or palpable lymph nodes      Uncircumcised and buried penis.  After partially retracting prepuce, able to partially visualize glans but could not expose the meatus.      PROCEDURE:  Draped and phallus prepped   Using tactile sensation a 16Fr latex coude Brown was placed using tactile guidance as the meatus could not be directly visualized. Brown was hubbed then 10cc instilled in the balloon, then the catheter was  pulled back gently to seat the balloon at the bladder neck  Secured to the thigh tension free    About 200cc urine output with Brown --> this drained initially yellow and clear but the last 30-50cc drained white and turbid     Scrotum normal.      Testicles of normal firmness and consistency, no masses.      Epididymes bilaterally without  tenderness.             Data:   All laboratory data reviewed:    No results found for: PSA  Recent Labs   Lab 06/18/21  0524 06/17/21  1656 06/15/21  2200 06/15/21  1303   WBC 9.2 8.2 7.1 6.7   HGB 10.4* 9.8* 9.8* 11.5*    163 142* 181     Recent Labs   Lab 06/18/21  0524 06/17/21  1656 06/15/21  1303    137 131*   POTASSIUM 3.9 4.4 3.5   CHLORIDE 102 107 100   CO2 23 21 19*   BUN 32* 54* 49*   CR 2.21* 3.19* 3.57*   * 182* 153*   COLUMBA 8.5 7.9* 8.9   MAG  --   --  2.2   PHOS  --   --  4.2     Recent Labs   Lab 06/15/21  1522   COLOR Orange   APPEARANCE Cloudy   URINEGLC 50*   URINEBILI Negative   URINEKETONE Negative   SG 1.015   URINEPH 5.5   PROTEIN 300*   NITRITE Negative   LEUKEST Large*   RBCU 35*   WBCU >182*     Results for orders placed or performed during the hospital encounter of 06/15/21   Urine Culture    Specimen: Unspecified Urine   Result Value Ref Range    Specimen Description Unspecified Urine     Culture Micro       10,000 to 50,000 colonies/mL  mixed urogenital jerzy  Susceptibility testing not routinely done     Results for orders placed or performed during the hospital encounter of 05/14/20   Urine Culture Aerobic Bacterial    Specimen: Midstream Urine   Result Value Ref Range    Specimen Description Midstream Urine     Special Requests Specimen received in preservative     Culture Micro       10,000 to 50,000 colonies/mL  mixed urogenital jerzy         All pertinent imaging reviewed:  None recent relevant         Impression and Plan:   Impression / Plan:   Fer Latham is a 71 year old male with Complicated PMH as above.      From a  urologic standpoint he has longstanding nocturia and frequency and has failed intravesical Botox (Dr. Garcia- health [employer]).  More recently he has UDS and renal US planned with Grace Galloway ([facility]) in 7/2021.      - He was found to have phimosis   - Brown was placed at request of primary team  - Urinalysis was sent today given turbid urine output (ordered).  Pt is on Zosyn       Urology will sign off but please call with questions.     Thank you for the opportunity to participate in the care of Fer Latham.     Cecilia Lazo PA-C  Urology Physician Assistant  Personal Pager: [phone]    Please call Job Code:   [phone] to reach the Urology resident or PA on call - Weekdays  [phone] to reach the Urology resident or PA on call - Weeknights and weekends

## 2021-06-19 ENCOUNTER — APPOINTMENT (OUTPATIENT)
Dept: PHYSICAL THERAPY | Facility: CLINIC | Age: 71
DRG: 981 | End: 2021-06-19
Payer: MEDICARE

## 2021-06-19 LAB
ALBUMIN SERPL-MCNC: 2.7 G/DL (ref 3.4–5)
ALP SERPL-CCNC: 81 U/L (ref 40–150)
ALT SERPL W P-5'-P-CCNC: 11 U/L (ref 0–70)
ANION GAP SERPL CALCULATED.3IONS-SCNC: 6 MMOL/L (ref 3–14)
AST SERPL W P-5'-P-CCNC: 17 U/L (ref 0–45)
BILIRUB SERPL-MCNC: 0.5 MG/DL (ref 0.2–1.3)
BUN SERPL-MCNC: 46 MG/DL (ref 7–30)
CALCIUM SERPL-MCNC: 8.5 MG/DL (ref 8.5–10.1)
CHLORIDE SERPL-SCNC: 105 MMOL/L (ref 94–109)
CO2 SERPL-SCNC: 24 MMOL/L (ref 20–32)
CREAT SERPL-MCNC: 2.54 MG/DL (ref 0.66–1.25)
GFR SERPL CREATININE-BSD FRML MDRD: 25 ML/MIN/{1.73_M2}
GLUCOSE BLDC GLUCOMTR-MCNC: 156 MG/DL (ref 70–99)
GLUCOSE SERPL-MCNC: 152 MG/DL (ref 70–99)
MAGNESIUM SERPL-MCNC: 2.1 MG/DL (ref 1.6–2.3)
POTASSIUM SERPL-SCNC: 5.4 MMOL/L (ref 3.4–5.3)
PROT SERPL-MCNC: 7.2 G/DL (ref 6.8–8.8)
SODIUM SERPL-SCNC: 134 MMOL/L (ref 133–144)
UFH PPP CHRO-ACNC: 0.24 IU/ML

## 2021-06-19 PROCEDURE — 90937 HEMODIALYSIS REPEATED EVAL: CPT

## 2021-06-19 PROCEDURE — 83735 ASSAY OF MAGNESIUM: CPT | Performed by: INTERNAL MEDICINE

## 2021-06-19 PROCEDURE — 250N000011 HC RX IP 250 OP 636: Performed by: PHYSICIAN ASSISTANT

## 2021-06-19 PROCEDURE — 250N000013 HC RX MED GY IP 250 OP 250 PS 637: Performed by: INTERNAL MEDICINE

## 2021-06-19 PROCEDURE — 85520 HEPARIN ASSAY: CPT | Performed by: INTERNAL MEDICINE

## 2021-06-19 PROCEDURE — 97530 THERAPEUTIC ACTIVITIES: CPT | Mod: GP

## 2021-06-19 PROCEDURE — 120N000002 HC R&B MED SURG/OB UMMC

## 2021-06-19 PROCEDURE — 99233 SBSQ HOSP IP/OBS HIGH 50: CPT | Performed by: INTERNAL MEDICINE

## 2021-06-19 PROCEDURE — 258N000003 HC RX IP 258 OP 636: Performed by: INTERNAL MEDICINE

## 2021-06-19 PROCEDURE — 90935 HEMODIALYSIS ONE EVALUATION: CPT | Performed by: INTERNAL MEDICINE

## 2021-06-19 PROCEDURE — 250N000011 HC RX IP 250 OP 636: Performed by: INTERNAL MEDICINE

## 2021-06-19 PROCEDURE — 999N001017 HC STATISTIC GLUCOSE BY METER IP

## 2021-06-19 PROCEDURE — 250N000013 HC RX MED GY IP 250 OP 250 PS 637: Performed by: PHYSICIAN ASSISTANT

## 2021-06-19 PROCEDURE — 80053 COMPREHEN METABOLIC PANEL: CPT | Performed by: INTERNAL MEDICINE

## 2021-06-19 RX ADMIN — POLYETHYLENE GLYCOL 3350 17 G: 17 POWDER, FOR SOLUTION ORAL at 07:47

## 2021-06-19 RX ADMIN — FOLIC ACID 1 MG: 1 TABLET ORAL at 07:47

## 2021-06-19 RX ADMIN — HYDROMORPHONE HYDROCHLORIDE 0.5 MG: 1 INJECTION, SOLUTION INTRAMUSCULAR; INTRAVENOUS; SUBCUTANEOUS at 00:09

## 2021-06-19 RX ADMIN — SODIUM CHLORIDE 300 ML: 9 INJECTION, SOLUTION INTRAVENOUS at 08:04

## 2021-06-19 RX ADMIN — APIXABAN 10 MG: 5 TABLET, FILM COATED ORAL at 20:07

## 2021-06-19 RX ADMIN — HYDROMORPHONE HYDROCHLORIDE 0.5 MG: 1 INJECTION, SOLUTION INTRAMUSCULAR; INTRAVENOUS; SUBCUTANEOUS at 16:55

## 2021-06-19 RX ADMIN — LIDOCAINE 1 PATCH: 560 PATCH PERCUTANEOUS; TOPICAL; TRANSDERMAL at 23:58

## 2021-06-19 RX ADMIN — Medication: at 08:05

## 2021-06-19 RX ADMIN — HYDROMORPHONE HYDROCHLORIDE 1 MG: 1 INJECTION, SOLUTION INTRAMUSCULAR; INTRAVENOUS; SUBCUTANEOUS at 22:55

## 2021-06-19 RX ADMIN — THIAMINE HCL TAB 100 MG 100 MG: 100 TAB at 07:47

## 2021-06-19 RX ADMIN — HYDROMORPHONE HYDROCHLORIDE 1 MG: 1 INJECTION, SOLUTION INTRAMUSCULAR; INTRAVENOUS; SUBCUTANEOUS at 10:40

## 2021-06-19 RX ADMIN — HYDROMORPHONE HYDROCHLORIDE 0.5 MG: 1 INJECTION, SOLUTION INTRAMUSCULAR; INTRAVENOUS; SUBCUTANEOUS at 14:25

## 2021-06-19 RX ADMIN — Medication 5 MG: at 22:55

## 2021-06-19 RX ADMIN — HYDROMORPHONE HYDROCHLORIDE 1 MG: 1 INJECTION, SOLUTION INTRAMUSCULAR; INTRAVENOUS; SUBCUTANEOUS at 20:06

## 2021-06-19 RX ADMIN — OXYCODONE HYDROCHLORIDE 10 MG: 5 TABLET ORAL at 16:55

## 2021-06-19 RX ADMIN — ACETAMINOPHEN 1000 MG: 500 TABLET, FILM COATED ORAL at 14:25

## 2021-06-19 RX ADMIN — Medication 1 TABLET: at 07:47

## 2021-06-19 RX ADMIN — ACETAMINOPHEN 1000 MG: 500 TABLET, FILM COATED ORAL at 20:04

## 2021-06-19 RX ADMIN — ACETAMINOPHEN 1000 MG: 500 TABLET, FILM COATED ORAL at 07:47

## 2021-06-19 RX ADMIN — PIPERACILLIN SODIUM AND TAZOBACTAM SODIUM 2.25 G: 2; .25 INJECTION, POWDER, LYOPHILIZED, FOR SOLUTION INTRAVENOUS at 05:17

## 2021-06-19 RX ADMIN — DIAZEPAM 2 MG: 2 TABLET ORAL at 12:23

## 2021-06-19 RX ADMIN — HEPARIN SODIUM 1000 UNITS/HR: 10000 INJECTION, SOLUTION INTRAVENOUS at 06:17

## 2021-06-19 RX ADMIN — OXYCODONE HYDROCHLORIDE 10 MG: 5 TABLET ORAL at 12:23

## 2021-06-19 RX ADMIN — OXYCODONE HYDROCHLORIDE 10 MG: 5 TABLET ORAL at 22:55

## 2021-06-19 RX ADMIN — PIPERACILLIN SODIUM AND TAZOBACTAM SODIUM 2.25 G: 2; .25 INJECTION, POWDER, LYOPHILIZED, FOR SOLUTION INTRAVENOUS at 12:23

## 2021-06-19 RX ADMIN — SODIUM CHLORIDE 250 ML: 9 INJECTION, SOLUTION INTRAVENOUS at 08:04

## 2021-06-19 NOTE — PROGRESS NOTES
Nephrology  Progress note- dialysis visit  06/19/2021     This patient was seen and examined while on dialysis.  Professional oversight of the patient's dialysis care, access care and dialysis related co-morbidities were addressed as necessary with the patient and / or staff.   Fer is encephalopathic.      Laboratory results and nurses' notes were reviewed.   No changes to management of volume, anemia, BMD, acidosis, or electrolytes.   Diagnosis - ESRD- continue HD M/F but will watch residual renal function as he has had contrast and hemodynamic instability, may meed to move to MWF HD  PE - discussed with primary team - planning apixaban  Anemia of ESRD - refused epo/iron as outpatient.  Do not give in hospital without clear consent    Lin Puente MD  [facility]  Department of Medicine  Division of Renal Disease and Hypertension  [facility] egan

## 2021-06-19 NOTE — PROGRESS NOTES
Major Shift Events:  HD run this am. ~2L removed. VSS throughout. See HD nurse note. Changed rooms due to no ceiling lift in previous room, a recommendation from PT.    Plan: Advocate and educate good pain control. Practice AROM and motivate progression in mobility. Continue to monitor.     Wife updated multiple times today. Had discussion about importance of good sleep hygiene and rest while recovering from illness and deferred waking him up to talk to her on the phone until he was no longer asleep.    For vital signs and complete assessments, please see documentation flowsheets.

## 2021-06-19 NOTE — PROGRESS NOTES
No significant events during the shift. Patient resting in bed. Was awake mid shift for approx 2 -3 hours.    Neuro: TSLO brace in place. More lucid early in shift, but fluctuating confusion and forgetfulness. Cooperative with care.     CV: A-fibrillation. Afebrile. BP WNL.    Resp: WNL.    GI: WNL.    : FC patent and draining. Oliguric.    Skin: Red blanchable bottom.    Lines: PIVx3

## 2021-06-19 NOTE — PROGRESS NOTES
HEMODIALYSIS TREATMENT NOTE    Date: 6/19/2021  Time: 9:24 AM    Data:  Pre Wt:123.3kg     Desired Wt: 121.3 kg   Post Wt: 121.3kg   Weight change:  2kg  Ultrafiltration - Post Run Net Total Removed (mL):2000mL  Vascular Access Status: patent  Dialyzer Rinse: Streaked, Light  Total Blood Volume Processed: 79.5 L Liters  Total Dialysis (Treatment) Time: 3.5 Hours    Lab:   yes  Interventions:Assessments  Pt dialyzed today for 3.5hrs on a K-2 Ca-2.5 bath via RCVC. 400Qb throughout. 2kgs of fluid removed. VSS throughout. Labs drawn and sent. Sitter at BS. Pt rested during HD. See Epic for VS details. Report given post run     Plan:    Per renal

## 2021-06-19 NOTE — PROGRESS NOTES
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 Complicated by new acute T12 compression fx and RLE DVT.      Main Plans for Today:  - Transition from heparin gtt to apixiban this evening  - Dialysis per Nephrology  - Discontinue piperacillin/tazobactam  - Monitor on CIWA    # Right Heart Strain secondary to PE  # DVT of RLE   Increased LE edema and immobilized over last 1-2 weeks. US bilateral showed R partially occlusive thrombus in the distal iliac vein extending into the common femoral vein and occlusive thrombus in popliteal vein. Right heart strain on echocardiogram. Remains hemodynamically labile with atrial fibrillation with RVR. CT PE scan confirmed large PE with right heart strain.   - Continue high intensity heparin drip for now  - Interventional Radiology consulted, appreciate assistance  - Repeat echocardiogram with improvement in RV strain    # Lactic acidosis   # Hypotension   # Urinary Tract Infection  # Concern for severe sepsis (vs. PE)  Hypotensive, tachycardic with lactic acidosis on admission. Initially thought to be dehydrated (poor PO intake per wife), infectious work up with grossly positive UA but culture growing only normal jerzy with no subjective urinary symptoms. CXR with unchanged oniel-hilar and L retrocardiac opacities likely atelectasis vs edema. Patient was on 2L O2, but weaned to RA at 95%. Blood cultures obtained and pending.  Started on broad spectrum abx and IVF with good response in blood pressure and with repeat lactic acid 1.5.  Also found to have DVT in lower extremity. Echocardiogram performed with significant RV strain concerning for large PE. PE diagnosed on CT PE scan after premedicated for allergy. With lack of pathogenic organisms growing in culture, suspect initial presentation related to PE and not infection.   - Discontinue piperacillin/tazobactam 6/19  - Discontinued vancomycin 6/17  - Follow up UCx, and  Blood cultures. Added culture from CVC catheter.  - Continue to follow    # Acute Toxic Metabolic Encephalopathy  # Hx of alcohol abuse  Likely multifactorial with worsening from baseline. History of alcohol abuse but also with concern for infection, large PE. Has a history of withdrawal. Drinking (reportedly) 1/2 drink of whiskey daily.   - Monitor on CIWA with diazepam available per scoring  - Start MVI, folic acid and thiamine.   - CT head negative  - Frequent reorientation  - Continue to treat underlying conditions    #Atrial fib/flutter with RVR - [id number] score 3 for age, HTN, and DM2. Takes  mg daily, but not on anticoagulation or rate control prior to admission. Presented with HR 180s on admission, which improved after IVF. EKG in Afib/flutter.   -Telemetry  -Hold any BB with soft BP. HR currently within goal <110   -Hold  mg daily while on anticoagulation     #T12 compression fx - Reporting low back pain for the last 1-2 weeks with multiple recurrent falls (due to weakness and unsteady gait). Denies any head strike or LOC. PCP ordered Xray of Lumbar spine negative for fx per care-everywhere. CT lumbar spine with acute compression fx T12, without any retropulsion. Denies any red flag sx, and no obvious deficit, though patient would not allow formal neuro evaluation.   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 Access with CVC (failed attempt with AVF in past per patient. Appears to have a CVC catheter exchange planned on 6/23). Patient still makes urine. Appears hypervolemic with LE edema. Unclear baseline weight.   - HD per nephrology, consulted  - Check Phos, Mag  - I&O and daily weights   - IR consult for catheter replacement    # Hx of HTN - Previously on medications but discontinued after lifestyle modifications with weight loss several years ago    # DM2- diet controlled. Check Hemoglobin A1c    # Neurogenic bladder   # Phimosis  Follows with urology. S/p botox in past. Not on any medications due to cost.   - Nursing staff unable to place ferguson catheter  - Urology consult for ferguson placement, appreciate assistance    # Chronic anemia - Baseline 11.0s. .   -Trend CBC  -B12, Folate, TSH    # Anxiety - Continue home Valium at lower dose 2 mg BID PRN and hold for sedation          Diet: Regular Diet Adult    DVT Prophylaxis: Heparin gtt  Ferguson Catheter: in place, indication: Insertion Difficulty;Retention  Code Status: Full Code           Disposition Plan   Expected discharge: 2 - 3 days, recommended to transitional care unit once adequate pain management/ tolerating PO medications, antibiotic plan established and safe disposition plan/ TCU bed available.  Entered: Marc Cameron MD 06/19/2021, 12:32 PM       The patient's care was discussed with the Bedside Nurse, Care Coordinator/, Patient and IR Consultant.    Marc Cameron MD  Hospitalist Service  [facility]  Contact information available via [facility] Paging/Directory  Please see sign in/sign out for  up to date coverage information  ______________________________________________________________________    Interval History   No acute events overnight. Seen in dialysis this AM. Anxious to go home but understands need for remaining in the hospital for now. No acute complaints of pain or discomfort.    4 Point ROS otherwise negative.     Data reviewed today: I reviewed all medications, new labs and imaging results over the last 24 hours. I personally reviewed no images or EKG's today.    Physical Exam   /85 (BP Location: Left arm)   Pulse 115   Temp 97.6  F (36.4  C) (Oral)   Resp 14   Wt 123.3 kg (271 lb 13.2 oz)   SpO2 99%   BMI 34.90 kg/m    General: awake and alert, oriented to person and place, chronically ill appearing man in NAD  Skin: no rashes or bruising  CV: irregularly irregular, tachycardic, normal S1S2, no murmur  Resp: CTAB, no wheeze, rhonchi   Abd: Soft, tender in suprapubic region, nondistended, BS+, no masses appreciated  Extremities: warm and well perfused, L>R  edema

## 2021-06-20 LAB
ANION GAP SERPL CALCULATED.3IONS-SCNC: 8 MMOL/L (ref 3–14)
BUN SERPL-MCNC: 37 MG/DL (ref 7–30)
CALCIUM SERPL-MCNC: 8.9 MG/DL (ref 8.5–10.1)
CHLORIDE SERPL-SCNC: 110 MMOL/L (ref 94–109)
CO2 SERPL-SCNC: 19 MMOL/L (ref 20–32)
CREAT SERPL-MCNC: 2.55 MG/DL (ref 0.66–1.25)
ERYTHROCYTE [DISTWIDTH] IN BLOOD BY AUTOMATED COUNT: 14.2 % (ref 10–15)
GFR SERPL CREATININE-BSD FRML MDRD: 24 ML/MIN/{1.73_M2}
GLUCOSE SERPL-MCNC: 117 MG/DL (ref 70–99)
HCT VFR BLD AUTO: 29.9 % (ref 40–53)
HGB BLD-MCNC: 10.1 G/DL (ref 13.3–17.7)
MCH RBC QN AUTO: 32.4 PG (ref 26.5–33)
MCHC RBC AUTO-ENTMCNC: 33.8 G/DL (ref 31.5–36.5)
MCV RBC AUTO: 96 FL (ref 78–100)
PLATELET # BLD AUTO: 154 10E9/L (ref 150–450)
POTASSIUM SERPL-SCNC: 5.3 MMOL/L (ref 3.4–5.3)
RBC # BLD AUTO: 3.12 10E12/L (ref 4.4–5.9)
SODIUM SERPL-SCNC: 137 MMOL/L (ref 133–144)
WBC # BLD AUTO: 11 10E9/L (ref 4–11)

## 2021-06-20 PROCEDURE — 80048 BASIC METABOLIC PNL TOTAL CA: CPT | Performed by: INTERNAL MEDICINE

## 2021-06-20 PROCEDURE — 99233 SBSQ HOSP IP/OBS HIGH 50: CPT | Performed by: INTERNAL MEDICINE

## 2021-06-20 PROCEDURE — 250N000013 HC RX MED GY IP 250 OP 250 PS 637: Performed by: INTERNAL MEDICINE

## 2021-06-20 PROCEDURE — 85027 COMPLETE CBC AUTOMATED: CPT | Performed by: INTERNAL MEDICINE

## 2021-06-20 PROCEDURE — 250N000013 HC RX MED GY IP 250 OP 250 PS 637: Performed by: PHYSICIAN ASSISTANT

## 2021-06-20 PROCEDURE — 250N000011 HC RX IP 250 OP 636: Performed by: INTERNAL MEDICINE

## 2021-06-20 PROCEDURE — 120N000002 HC R&B MED SURG/OB UMMC

## 2021-06-20 PROCEDURE — 36416 COLLJ CAPILLARY BLOOD SPEC: CPT | Performed by: INTERNAL MEDICINE

## 2021-06-20 RX ADMIN — LIDOCAINE 1 PATCH: 560 PATCH PERCUTANEOUS; TOPICAL; TRANSDERMAL at 20:30

## 2021-06-20 RX ADMIN — HYDROMORPHONE HYDROCHLORIDE 1 MG: 1 INJECTION, SOLUTION INTRAMUSCULAR; INTRAVENOUS; SUBCUTANEOUS at 08:00

## 2021-06-20 RX ADMIN — FOLIC ACID 1 MG: 1 TABLET ORAL at 08:00

## 2021-06-20 RX ADMIN — HYDROMORPHONE HYDROCHLORIDE 1 MG: 1 INJECTION, SOLUTION INTRAMUSCULAR; INTRAVENOUS; SUBCUTANEOUS at 12:03

## 2021-06-20 RX ADMIN — ACETAMINOPHEN 1000 MG: 500 TABLET, FILM COATED ORAL at 08:00

## 2021-06-20 RX ADMIN — Medication 1 TABLET: at 08:11

## 2021-06-20 RX ADMIN — ACETAMINOPHEN 1000 MG: 500 TABLET, FILM COATED ORAL at 20:28

## 2021-06-20 RX ADMIN — DIAZEPAM 2 MG: 2 TABLET ORAL at 08:00

## 2021-06-20 RX ADMIN — OXYCODONE HYDROCHLORIDE 10 MG: 5 TABLET ORAL at 12:27

## 2021-06-20 RX ADMIN — APIXABAN 10 MG: 5 TABLET, FILM COATED ORAL at 08:00

## 2021-06-20 RX ADMIN — OXYCODONE HYDROCHLORIDE 10 MG: 5 TABLET ORAL at 04:27

## 2021-06-20 RX ADMIN — APIXABAN 10 MG: 5 TABLET, FILM COATED ORAL at 20:28

## 2021-06-20 RX ADMIN — POLYETHYLENE GLYCOL 3350 17 G: 17 POWDER, FOR SOLUTION ORAL at 08:00

## 2021-06-20 RX ADMIN — HYDROMORPHONE HYDROCHLORIDE 1 MG: 1 INJECTION, SOLUTION INTRAMUSCULAR; INTRAVENOUS; SUBCUTANEOUS at 04:26

## 2021-06-20 NOTE — PROGRESS NOTES
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 Complicated by new acute T12 compression fx and RLE DVT.      Main Plans for Today:  - Continue apixiban  - Transfer to Med/Surg from [facility]    # Right Heart Strain secondary to PE  # DVT of RLE   Increased LE edema and immobilized over last 1-2 weeks. US bilateral showed R partially occlusive thrombus in the distal iliac vein extending into the common femoral vein and occlusive thrombus in popliteal vein. Right heart strain on echocardiogram. Remains hemodynamically labile with atrial fibrillation with RVR. CT PE scan confirmed large PE with right heart strain.   - Transitioned from heparin gtt to apixiban on 6/19  - Interventional Radiology consulted, appreciate assistance  - Repeat echocardiogram with improvement in RV strain    # Lactic acidosis   # Hypotension   # Urinary Tract Infection  # Initial concern for severe sepsis - Ruled Out  Hypotensive, tachycardic with lactic acidosis on admission. Initially thought to be dehydrated (poor PO intake per wife), infectious work up with grossly positive UA but culture growing only normal jerzy with no subjective urinary symptoms. CXR with unchanged oniel-hilar and L retrocardiac opacities likely atelectasis vs edema. Patient was on 2L O2, but weaned to RA at 95%. Blood cultures obtained and pending. Started on broad spectrum abx and IVF with good response in blood pressure  and with repeat lactic acid 1.5.  Also found to have DVT in lower extremity. Echocardiogram performed with significant RV strain concerning for large PE. PE diagnosed on CT PE scan after premedicated for allergy. With lack of pathogenic organisms growing in culture, suspect initial presentation related to PE and not infection.   - Discontinue piperacillin/tazobactam 6/19  - Discontinued vancomycin 6/17  - Follow up UCx, and  Blood cultures. Added culture from CVC catheter.  - Continue to follow    # Acute Toxic Metabolic Encephalopathy  # Hx of alcohol abuse  Likely multifactorial with worsening from baseline. History of alcohol abuse but also with concern for infection, large PE. Has a history of withdrawal. Drinking (reportedly) 1/2 drink of whiskey daily.   - Monitor on CIWA with diazepam available per scoring  - Continue MVI, folic acid and thiamine.   - CT head negative  - Frequent reorientation  - Continue to treat underlying conditions    #Atrial fib/flutter with RVR - [id number] score 3 for age, HTN, and DM2. Takes  mg daily, but not on anticoagulation or rate control prior to admission. Presented with HR 180s on admission, which improved after IVF. EKG in Afib/flutter.   -Telemetry  -Hold any BB with soft BP. HR currently within goal <110   -Hold  mg daily while on anticoagulation     #T12 compression fx - Reporting low back pain for the last 1-2 weeks with multiple recurrent falls (due to weakness and unsteady gait). Denies any head strike or LOC. PCP ordered Xray of Lumbar spine negative for fx per care-everywhere. CT lumbar spine with acute compression fx T12, without any retropulsion. Denies any red flag sx, and no obvious deficit, though patient would not allow formal neuro evaluation.   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 Access with CVC (failed attempt with AVF in past per patient. Appears to have a CVC catheter exchange planned on 6/23). Patient still makes urine. Appears hypervolemic with LE edema. Unclear baseline weight.   - HD per nephrology, consulted  - Check Phos, Mag  - I&O and daily weights   - IR consult for catheter replacement    # Hx of HTN - Previously on medications but discontinued after lifestyle modifications with weight loss several years ago    # DM2- diet controlled. Check Hemoglobin A1c    # Neurogenic bladder   # Phimosis  Follows with urology. S/p botox in past. Not on any medications due to cost.   - Nursing staff unable to place ferguson catheter  - Urology consult for ferguson placement, appreciate assistance    # Chronic anemia - Baseline 11.0s. .   -Trend CBC  -B12, Folate, TSH    # Anxiety - Continue home Valium at lower dose 2 mg BID PRN and hold for sedation          Diet: Renal Diet (non-dialysis)    DVT Prophylaxis: Heparin gtt  Ferguson Catheter: in place, indication: Retention  Code Status: Full Code           Disposition Plan   Expected discharge: 2 - 3 days, recommended to transitional care unit once adequate pain management/ tolerating PO medications, antibiotic plan established and safe disposition plan/ TCU bed available.  Entered: Marc Cameron MD 06/20/2021, 3:10 PM       The patient's care was discussed with the Bedside Nurse, Care Coordinator/, Patient and IR Consultant.    Marc Cameron MD  Hospitalist Service  [facility]  Contact information available via [facility] Paging/Directory  Please see sign in/sign out for up to date coverage  "information  ______________________________________________________________________    Interval History   No acute events overnight. Fer continues to be confused and perseverative. He was able to tell me his necklace was \"from Tyra\" but continued to answer \"from [place]ra\" for all subsequent questions, ie \"Do you have pain?\". Eventually was able to tell me he was in the hospital.     4 Point ROS unable to be obtained due to patient condition.      Data reviewed today: I reviewed all medications, new labs and imaging results over the last 24 hours. I personally reviewed no images or EKG's today.    Physical Exam   /81 (BP Location: Left arm)   Pulse 83   Temp 96.8  F (36  C) (Axillary)   Resp 14   Wt 123.3 kg (271 lb 13.2 oz)   SpO2 96%   BMI 34.90 kg/m    General: awake and alert, oriented to person and place, chronically ill appearing man in NAD  Skin: no rashes or bruising  CV: irregularly irregular, tachycardic, normal S1S2, no murmur  Resp: CTAB, no wheeze, rhonchi   Abd: Soft, tender in suprapubic region, nondistended, BS+, no masses appreciated  Extremities: warm and well perfused, L>R  edema  "

## 2021-06-20 NOTE — PLAN OF CARE
Neuro: Confused.   CV: A-fib. BP WDL. Afebrile  Resp: Oxy mask at 2L  GI: Hypoactive bowel sound. No BM this shift.  : Brown remained in place. Pt on HD  Access: L hand PIV, L lower FA PIV, R hand PIV, Double lumen R Internal Jugular HD access.  Skin: Red blanchable in coccyx area, skin tear on back.     Pt appears to be sleeping. VSS. No signs of distress noted. Will continue to monitor.

## 2021-06-20 NOTE — PLAN OF CARE
Time of notification: 1620  PM  Provider notified: Dr. Cameron   Patient status: Decreased respirations (6-8 resp/min)  Temp:  [96.8  F (36  C)-98.3  F (36.8  C)] 98.3  F (36.8  C)  Pulse:  [83-95] 93  Resp:  [8-20] 8  BP: ()/(54-81) 91/54  SpO2:  [96 %-100 %] 100 %  Orders received: no new orders

## 2021-06-20 NOTE — PROGRESS NOTES
Nephrology Progress Note  06/20/2021         Assessment & Recommendations:   Fer Latham is a 71 year old male with PMH of HTN, Aflutter (not on AC), DMII, ESRD 2/2 IgA nephropathy, and chronic alcohol abuse, admitted with atrial fibrillation with RVR, new acute T12 compression fx and RLE DVT and new PE      ESKD: dialyzes M/F at [facility] with Dr. Puente. Run time: 3 hrs 45 min. Access: tunneled RIJ (failed AVF). EDW: 119 kg.  - HD completed 6/19, next run tomorrow 6/21  - watch renal function, given hypotension and need for IV contrast he may have lost some residual function and may require thrice weekly HD rather than twice weekly  - Consent for dialysis obtained 6/17/21 from pt's spouse        Access: history of catheter dysfunction  - s/p tunneled CVC replacement 6/17  - heparin lock CVC     Volume:  kg     Pulmonary embolism  RLE DVT dx June 2021 (current) hospitalization  - started on apixaban      afib w RVR, resolved:  BP: normotensive, usual pre HD pressures 100-120's, post pressures the same, lowest pressure usually in 90's, lower at times.     BMD: recent      Anemia: recent hgb 10's, not on iron or MECCA due to patient refusal, has chronically low iron sats.     New T12 compression fx     UTI: large LE, cx mixed jerzy - treated with pip/tazo    SAGE - refuses bipap, sleeping with supplemental O2 via face mask        Recommendations were communicated to primary team via this note     Lin Puente MD  [facility]  Department of Medicine  Division of Renal Disease and Hypertension  [facility]     Interval History :   Seen bedside, somnolent and confused per nursing staff.  He is unable to give history.    Review of Systems:   ROS not obtained due to somnolence    Physical Exam:   I/O last 3 completed shifts:  In: 440 [P.O.:440]  Out: 2225 [Urine:225; Other:2000]   /81 (BP Location: Left arm)   Pulse 83   Temp 96.8  F (36  C) (Axillary)    Resp 14   Wt 123.3 kg (271 lb 13.2 oz)   SpO2 96%   BMI 34.90 kg/m       GENERAL APPEARANCE: somnolent  EYES:  No scleral icterus, pupils equal  HENT: mouth without ulcers or lesions  PULM: O2 via face mask, normal work of breathing  CV: 1+ edema of ankles  GI: soft   INTEGUMENT: no cyanosis   NEURO: confused  Access tunneled CVC    Labs:   All labs reviewed by me  Electrolytes/Renal -   Recent Labs   Lab Test 06/20/21  1123 06/19/21  0811 06/18/21  1506 06/18/21  0524 06/15/21  1303 06/15/21  1303 05/14/20  1722 05/14/20  1722 05/17/19  1231 05/17/19  1231 05/11/19  1253 05/11/19  1253    134  --  133   < > 131*   < > 128*   < > 131*   < >  --    POTASSIUM 5.3 5.4* 4.2 3.9   < > 3.5   < > 3.6   < > 3.8   < > 3.1*   CHLORIDE 110* 105  --  102   < > 100   < > 98   < > 96   < >  --    CO2 19* 24  --  23   < > 19*   < > 21   < > 28   < >  --    BUN 37* 46*  --  32*   < > 49*   < > 21   < > 35*   < >  --    CR 2.55* 2.54*  --  2.21*   < > 3.57*   < > 2.42*   < > 3.10*   < >  --    * 152*  --  148*   < > 153*   < > 100*   < > 159*   < >  --    COLUMBA 8.9 8.5  --  8.5   < > 8.9   < > 7.9*   < > 9.0   < >  --    MAG  --  2.1  --   --   --  2.2  --  1.8  --   --    < > 1.7   PHOS  --   --   --   --   --  4.2  --   --   --  3.0  --  2.6    < > = values in this interval not displayed.       CBC -   Recent Labs   Lab Test 06/20/21  1123 06/18/21  0524 06/17/21  1656   WBC 11.0 9.2 8.2   HGB 10.1* 10.4* 9.8*    172 163       LFTs -   Recent Labs   Lab Test 06/19/21  0811 06/18/21  0524 06/17/21  0922   ALKPHOS 81 81 103   BILITOTAL 0.5 0.5 0.6   ALT 11 9 11   AST 17 12 13   PROTTOTAL 7.2 7.0 8.2   ALBUMIN 2.7* 2.6* 3.0*       Iron Panel - No lab results found.      Imaging:  Reviewed    Current Medications:    acetaminophen  1,000 mg Oral TID     apixaban ANTICOAGULANT  10 mg Oral BID    Followed by     [START ON 6/26/2021] apixaban ANTICOAGULANT  5 mg Oral BID     [Held by provider] aspirin  325 mg Oral  Daily     folic acid  1 mg Oral Daily     lidocaine  1 patch Transdermal Q24h    And     lidocaine   Transdermal Q8H     multivitamin w/minerals  1 tablet Oral Daily     polyethylene glycol  17 g Oral Daily     sodium chloride (PF)  3 mL Intracatheter Q8H       Lin Puente MD

## 2021-06-20 NOTE — PROGRESS NOTES
Major Shift Events:  No major reportable events. Up to chair with complaints of severe pain.     Plan: Downgrade transfer. Order placed, no bed currently available. Continue to monitor.    For vital signs and complete assessments, please see documentation flowsheets.

## 2021-06-21 LAB
ANION GAP SERPL CALCULATED.3IONS-SCNC: 6 MMOL/L (ref 3–14)
BACTERIA SPEC CULT: NO GROWTH
BACTERIA SPEC CULT: NO GROWTH
BUN SERPL-MCNC: 35 MG/DL (ref 7–30)
CALCIUM SERPL-MCNC: 8.2 MG/DL (ref 8.5–10.1)
CHLORIDE SERPL-SCNC: 104 MMOL/L (ref 94–109)
CO2 SERPL-SCNC: 26 MMOL/L (ref 20–32)
CREAT SERPL-MCNC: 2.67 MG/DL (ref 0.66–1.25)
ERYTHROCYTE [DISTWIDTH] IN BLOOD BY AUTOMATED COUNT: 14.2 % (ref 10–15)
GFR SERPL CREATININE-BSD FRML MDRD: 23 ML/MIN/{1.73_M2}
GLUCOSE BLDC GLUCOMTR-MCNC: 110 MG/DL (ref 70–99)
GLUCOSE SERPL-MCNC: 103 MG/DL (ref 70–99)
HCT VFR BLD AUTO: 27.8 % (ref 40–53)
HGB BLD-MCNC: 8.9 G/DL (ref 13.3–17.7)
Lab: NORMAL
MCH RBC QN AUTO: 32.4 PG (ref 26.5–33)
MCHC RBC AUTO-ENTMCNC: 32 G/DL (ref 31.5–36.5)
MCV RBC AUTO: 101 FL (ref 78–100)
PLATELET # BLD AUTO: 152 10E9/L (ref 150–450)
POTASSIUM SERPL-SCNC: 3.7 MMOL/L (ref 3.4–5.3)
RBC # BLD AUTO: 2.75 10E12/L (ref 4.4–5.9)
SODIUM SERPL-SCNC: 136 MMOL/L (ref 133–144)
SPECIMEN SOURCE: NORMAL
SPECIMEN SOURCE: NORMAL
WBC # BLD AUTO: 6.3 10E9/L (ref 4–11)

## 2021-06-21 PROCEDURE — 90937 HEMODIALYSIS REPEATED EVAL: CPT

## 2021-06-21 PROCEDURE — 85027 COMPLETE CBC AUTOMATED: CPT | Performed by: EMERGENCY MEDICINE

## 2021-06-21 PROCEDURE — 99233 SBSQ HOSP IP/OBS HIGH 50: CPT | Performed by: INTERNAL MEDICINE

## 2021-06-21 PROCEDURE — 36415 COLL VENOUS BLD VENIPUNCTURE: CPT | Performed by: INTERNAL MEDICINE

## 2021-06-21 PROCEDURE — 120N000002 HC R&B MED SURG/OB UMMC

## 2021-06-21 PROCEDURE — 250N000013 HC RX MED GY IP 250 OP 250 PS 637: Performed by: INTERNAL MEDICINE

## 2021-06-21 PROCEDURE — 999N001017 HC STATISTIC GLUCOSE BY METER IP

## 2021-06-21 PROCEDURE — 250N000011 HC RX IP 250 OP 636: Performed by: INTERNAL MEDICINE

## 2021-06-21 PROCEDURE — G0463 HOSPITAL OUTPT CLINIC VISIT: HCPCS

## 2021-06-21 PROCEDURE — 99233 SBSQ HOSP IP/OBS HIGH 50: CPT | Performed by: PHYSICIAN ASSISTANT

## 2021-06-21 PROCEDURE — 250N000013 HC RX MED GY IP 250 OP 250 PS 637: Performed by: PHYSICIAN ASSISTANT

## 2021-06-21 PROCEDURE — 80048 BASIC METABOLIC PNL TOTAL CA: CPT | Performed by: INTERNAL MEDICINE

## 2021-06-21 PROCEDURE — 258N000003 HC RX IP 258 OP 636: Performed by: INTERNAL MEDICINE

## 2021-06-21 RX ORDER — OXYCODONE HYDROCHLORIDE 5 MG/1
5 TABLET ORAL EVERY 4 HOURS PRN
Status: DISCONTINUED | OUTPATIENT
Start: 2021-06-21 | End: 2021-07-01 | Stop reason: HOSPADM

## 2021-06-21 RX ORDER — HYDROMORPHONE HYDROCHLORIDE 1 MG/ML
.3-.5 INJECTION, SOLUTION INTRAMUSCULAR; INTRAVENOUS; SUBCUTANEOUS EVERY 6 HOURS PRN
Status: DISCONTINUED | OUTPATIENT
Start: 2021-06-21 | End: 2021-07-01 | Stop reason: HOSPADM

## 2021-06-21 RX ADMIN — QUETIAPINE FUMARATE 25 MG: 25 TABLET ORAL at 00:48

## 2021-06-21 RX ADMIN — APIXABAN 10 MG: 5 TABLET, FILM COATED ORAL at 21:04

## 2021-06-21 RX ADMIN — HYDROMORPHONE HYDROCHLORIDE 0.5 MG: 1 INJECTION, SOLUTION INTRAMUSCULAR; INTRAVENOUS; SUBCUTANEOUS at 21:06

## 2021-06-21 RX ADMIN — FOLIC ACID 1 MG: 1 TABLET ORAL at 12:54

## 2021-06-21 RX ADMIN — Medication: at 08:33

## 2021-06-21 RX ADMIN — LIDOCAINE 1 PATCH: 560 PATCH PERCUTANEOUS; TOPICAL; TRANSDERMAL at 21:04

## 2021-06-21 RX ADMIN — QUETIAPINE FUMARATE 25 MG: 25 TABLET ORAL at 18:32

## 2021-06-21 RX ADMIN — ACETAMINOPHEN 1000 MG: 500 TABLET, FILM COATED ORAL at 13:09

## 2021-06-21 RX ADMIN — DIAZEPAM 2 MG: 2 TABLET ORAL at 00:49

## 2021-06-21 RX ADMIN — Medication 5 MG: at 21:05

## 2021-06-21 RX ADMIN — HYDROMORPHONE HYDROCHLORIDE 1 MG: 1 INJECTION, SOLUTION INTRAMUSCULAR; INTRAVENOUS; SUBCUTANEOUS at 00:48

## 2021-06-21 RX ADMIN — SODIUM CHLORIDE 250 ML: 9 INJECTION, SOLUTION INTRAVENOUS at 08:33

## 2021-06-21 RX ADMIN — SODIUM CHLORIDE 300 ML: 9 INJECTION, SOLUTION INTRAVENOUS at 08:33

## 2021-06-21 RX ADMIN — OXYCODONE HYDROCHLORIDE 5 MG: 5 TABLET ORAL at 18:32

## 2021-06-21 RX ADMIN — APIXABAN 10 MG: 5 TABLET, FILM COATED ORAL at 09:31

## 2021-06-21 RX ADMIN — DIAZEPAM 2 MG: 2 TABLET ORAL at 18:32

## 2021-06-21 RX ADMIN — OXYCODONE HYDROCHLORIDE 10 MG: 5 TABLET ORAL at 00:48

## 2021-06-21 RX ADMIN — ACETAMINOPHEN 1000 MG: 500 TABLET, FILM COATED ORAL at 21:04

## 2021-06-21 RX ADMIN — POLYETHYLENE GLYCOL 3350 17 G: 17 POWDER, FOR SOLUTION ORAL at 12:54

## 2021-06-21 RX ADMIN — Medication 1 TABLET: at 12:54

## 2021-06-21 NOTE — PROGRESS NOTES
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 Complicated by new acute T12 compression fx and RLE DVT.      # Right Heart Strain secondary to PE  # DVT of RLE   Increased LE edema and immobilized over last 1-2 weeks. US bilateral showed R partially occlusive thrombus in the distal iliac vein extending into the common femoral vein and occlusive thrombus in popliteal vein. Right heart strain on echocardiogram. Remains hemodynamically labile with atrial fibrillation with RVR. CT PE scan confirmed large PE with right heart strain.   - Transitioned from heparin gtt to apixiban on 6/19  - Interventional Radiology consulted, appreciate assistance  - Repeat echocardiogram with improvement in RV strain    # Lactic acidosis   # Hypotension   # Urinary Tract Infection  # Initial concern for severe sepsis - Ruled Out  Hypotensive, tachycardic with lactic acidosis on admission. Initially thought to be dehydrated (poor PO intake per wife), infectious work up with grossly positive UA but culture growing only normal jerzy with no subjective urinary symptoms. CXR with unchanged oniel-hilar and L retrocardiac opacities likely atelectasis vs edema. Patient was on 2L O2, but weaned to RA at 95%. Blood cultures obtained and pending. Started on broad spectrum abx and IVF with good response in blood pressure and with repeat lactic acid 1.5.  Also found to have DVT in lower extremity.  Echocardiogram performed with significant RV strain concerning for large PE. PE diagnosed on CT PE scan after premedicated for allergy. With lack of pathogenic organisms growing in culture, suspect initial presentation related to PE and not infection.   - Discontinue piperacillin/tazobactam 6/19  - Discontinued vancomycin 6/17  - Follow up UCx, and  Blood cultures. Added culture from CVC catheter.  - Continue to follow    # Acute Toxic Metabolic Encephalopathy  # Hx of alcohol abuse  Likely multifactorial with worsening from baseline. History of alcohol abuse but also with concern for infection, large PE. Has a history of withdrawal. Drinking (reportedly) 1/2 drink of whiskey daily.   - Monitor on CIWA with diazepam available per scoring  - Continue MVI, folic acid and thiamine.   - CT head negative  - Frequent reorientation  - Continue to treat underlying conditions  - Minimize narcotic pain medications that may be contributing to encephalopathy    #Atrial fib/flutter with RVR - [id number] score 3 for age, HTN, and DM2. Takes  mg daily, but not on anticoagulation or rate control prior to admission. Presented with HR 180s on admission, which improved after IVF. EKG in Afib/flutter.   -Telemetry  -Hold any BB with soft BP. HR currently within goal <110   -Hold  mg daily while on anticoagulation     #T12 compression fx - Reporting low back pain for the last 1-2 weeks with multiple recurrent falls (due to weakness and unsteady gait). Denies any head strike or LOC. PCP ordered Xray of Lumbar spine negative for fx per care-everywhere. CT lumbar spine with acute compression fx T12, without any retropulsion. Denies any red flag sx, and no obvious deficit, though patient would not allow formal neuro evaluation.   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 Access with CVC (failed attempt with AVF in past per patient. Appears to have a CVC catheter exchange planned on 6/23). Patient still makes urine. Appears hypervolemic with LE edema. Unclear baseline weight.   - HD per nephrology, consulted  - Check Phos, Mag  - I&O and daily weights   - IR consult for catheter replacement    # Hx of HTN - Previously on medications but discontinued after lifestyle modifications with weight loss several years ago    # DM2- diet controlled. Check Hemoglobin A1c    # Neurogenic bladder   # Phimosis  Follows with urology. S/p botox in past. Not on any medications due to cost.   - Nursing staff unable to place ferguson catheter  - Urology consult for ferguson placement, appreciate assistance    # Chronic anemia - Baseline 11.0s. .   -Trend CBC  -B12, Folate, TSH    # Anxiety - Continue home Valium at lower dose 2 mg BID PRN and hold for sedation        Diet: Renal Diet (non-dialysis)  Snacks/Supplements Adult: Nepro Oral Supplement; Between Meals    DVT Prophylaxis: Heparin gtt  PRESENT, indication: Strict 1-2 Hour I&O  Code Status: Full Code           Disposition Plan   Expected discharge: 2 - 3 days, recommended to transitional care unit once adequate pain management/ tolerating PO medications, antibiotic plan established and safe disposition plan/ TCU bed available.  Entered: Marc Cameron MD 06/21/2021, 4:02 PM       The patient's care was discussed with the Bedside Nurse, Care Coordinator/, Patient and IR Consultant.    Marc Cameron MD  Hospitalist Service  [facility]  Contact information available via [facility] Paging/Directory  Please see sign in/sign out for up to date coverage  information  ______________________________________________________________________    Interval History   No acute events overnight. Seen in dialysis this morning. Able to carry on more of a conversation than yesterday but still refusing to answer some questions.     4 Point ROS unable to be obtained due to patient condition.      Data reviewed today: I reviewed all medications, new labs and imaging results over the last 24 hours. I personally reviewed no images or EKG's today.    Physical Exam   /65   Pulse 82   Temp 100.1  F (37.8  C) (Axillary)   Resp 12   Wt 123.5 kg (272 lb 4.3 oz)   SpO2 100%   BMI 34.96 kg/m    General: awake and alert, oriented to person and place, chronically ill appearing man in NAD  Skin: no rashes or bruising  CV: irregularly irregular, tachycardic, normal S1S2, no murmur  Resp: CTAB, no wheeze, rhonchi   Abd: Soft, tender in suprapubic region, nondistended, BS+, no masses appreciated  Extremities: warm and well perfused, L>R  edema

## 2021-06-21 NOTE — CONSULTS
[facility] Nurse Inpatient Wpund Assessment   Reason for consultation: Evaluate and treat right back      ASSESSMENT  Right flank wound due to skin tear  Status: initial assessment  Recommend provider order: None, at this time     TREATMENT PLAN  Right flank wound: Every 3 days and PRN cleanse with wound cleanser and pat dry. Paint oniel wound skin with no sting. Conform mepilex over wound. If dressing sticks use single layer of Vaseline gauze.   Orders Written  WO Nurse follow-up plan:signing off  Nursing to notify the Provider(s) and re-consult the WO Nurse if wound(s) deteriorates or new skin concern.    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 Complicated by new acute T12 compression fx and RLE DVT.      Objective Data  Containment of urine/stool: Incontinent pad in bed    Current Diet/ Nutrition:  Orders Placed This Encounter      Renal Diet (non-dialysis)    Output:   I/O last 3 completed shifts:  In: -   Out: 625 [Urine:625]    Risk Assessment:   Sensory Perception: 3-->slightly limited  Moisture: 3-->occasionally moist  Activity: 2-->chairfast  Mobility: 3-->slightly limited  Nutrition: 3-->adequate  Friction and Shear: 2-->potential problem  Gabriel Score: 16    Labs:   Recent Labs   Lab 06/21/21  0730 06/19/21  0811 06/19/21  0811 06/18/21  0738 06/15/21  1303 06/15/21  1303   ALBUMIN  --   --  2.7*  --    < > 3.5   HGB 8.9*   < >  --   --    < > 11.5*   INR  --   --   --  1.36*  --   --    WBC 6.3   < >  --   --    < > 6.7   A1C  --   --   --   --   --  5.2   CRP  --   --    --   --   --  34.0*    < > = values in this interval not displayed.       Physical Exam  Skin inspection: focused buttock and back    Wound Location:  Right flank    Date of last Photo 6/21  Wound History: skin tears from brace  Measurements (length x width x depth, in cm) 2 areas, larger area 1 cm x 1.5 cm  x  0.1 cm   Wound Base:  100 % dermis  Tunneling N/A  Undermining N/A  Palpation of the wound bed: normal   Periwound skin: intact  Color: normal and consistent with surrounding tissue  Temperature: normal   Drainage:, scant  Description of drainage: serosanguinous  Odor: none  Pain: no grimacing or signs of discomfort, none    Interventions  Current support surface: Standard  Atmos Air mattress  Current off-loading measures: Pillows  Repositioning aid: Pillows  Visual inspection of wound(s) completed   Tube Securement: NA  Wound Care: was done per plan of care.  Supplies: floor stock  Educated provided: plan of care  Education provided to: nurse  Discussed importance of:dressing change frequency  Discussed plan of care with Nurse     Christy Crowder RN CWOCN

## 2021-06-21 NOTE — PLAN OF CARE
6D PT - Cancel. Attempted to see pt in AM. Pt being transported to dialysis - this was not communicated to therapist when checked in with nursing. Unable to check back in the PM. Will reschedule.

## 2021-06-21 NOTE — PROGRESS NOTES
HEMODIALYSIS TREATMENT NOTE    Date: 6/21/2021  Time: 1:15 PM    Data:  Pre Wt: 123.5 kg    Desired Wt: 120.5 kg   Post Wt:  120.5 kg  Weight change:  2 kg  Ultrafiltration - Post Run Net Total Removed (mL): 2000 mL  Vascular Access Status: CVC  patent  Dialyzer Rinse: Light  Total Blood Volume Processed: 80.11 L   Total Dialysis (Treatment) Time: 3.5   Dialysate Bath: K 3, Ca 3  Heparin: None    Lab:   No    Interventions:Assessment:  Pt dialyzed for 3.5hrs on K 3,Ca 3 bath tunneled CVC. Pt tolerated 2L fluid removal. BP stayed within parameter of SBP>90. New CVC dressing applied. CVC site clean, dry, and intact. Max 400 BFR achieved with desired AP and . Lumens locked with saline post tx.Hand off report given to COREY Riggins.     Plan:    Next HD per renal team.

## 2021-06-21 NOTE — PROGRESS NOTES
"CLINICAL NUTRITION SERVICES - ASSESSMENT NOTE     Nutrition Prescription    RECOMMENDATIONS FOR MDs/PROVIDERS TO ORDER:  Recommend changing diet to Dialysis diet    Malnutrition Status:    Patient does not meet two of the established criteria necessary for diagnosing malnutrition but is at risk for malnutrition    Recommendations already ordered by Registered Dietitian (RD):  - Nepro BID  - Ordered renal multivitamin, discontinued thera-vit-m    Future/Additional Recommendations:  Monitor adequacy of PO intake. 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     NUTRITION HISTORY  Pt was at dialysis this morning and is now sleeping soundly, unable to obtain nutrition history at this time.     CURRENT NUTRITION ORDERS  Diet: Renal  Intake/Tolerance: Intake of 25% of meals per flowsheet.     LABS  Labs reviewed  Cr 2.67 (H)    MEDICATIONS  Medications reviewed  Folvite  Thera-vit-m  Miralax    ANTHROPOMETRICS  Height: 6' 2\"   Most Recent Weight: 123.5 kg (272 lb 4.3 oz)    IBW: 86.4 kg (143%)  BMI: Obesity Grade I BMI 30-34.9  Weight History: Weight up over the past 2 months.   Wt Readings from Last 5 Encounters:   06/21/21 123.5 kg (272 lb 4.3 oz)   04/27/21 120.2 kg (265 lb)   10/08/20 129.3 kg (285 lb)   05/14/20 135.2 kg " (298 lb)   02/10/20 122.5 kg (270 lb)     Dosing Weight: 95 kg (adjusted based on dry weight of 119.3 kg and IBW of 86.4 kg)    ASSESSED NUTRITION NEEDS  Estimated Energy Needs: 0184-5142 kcals/day (25 - 30 kcals/kg)  Justification: Maintenance given HD and weight status  Estimated Protein Needs: 125-170 grams protein/day (1.3 - 1.8 grams of pro/kg)  Justification: Dialysis  Estimated Fluid Needs: Per provider pending fluid status    PHYSICAL FINDINGS  See malnutrition section below.  - WOC RN consulted     MALNUTRITION  % Intake: < 75% for > 7 days (non-severe)  % Weight Loss: None noted  Subcutaneous Fat Loss: None observed  Muscle Loss: None observed  Fluid Accumulation/Edema: Moderate  Malnutrition Diagnosis: Patient does not meet two of the established criteria necessary for diagnosing malnutrition but is at risk for malnutrition    NUTRITION DIAGNOSIS  Inadequate oral intake related to decreased appetite as evidenced by flowsheet showing pt eating 25% of meals.       INTERVENTIONS  Implementation  Medical food supplement therapy: Nepro trial    Goals  Patient to consume % of nutritionally adequate meal trays TID, or the equivalent with supplements/snacks.     Monitoring/Evaluation  Progress toward goals will be monitored and evaluated per protocol.    Niharika Ignacio, RD, LD  6D RD pager [phone]

## 2021-06-21 NOTE — PLAN OF CARE
Neuro: Disoriented to time. CIWA protocol maintained.   CV: A-fib. BP WDL. Afebrile  Resp: Oxy mask at 2L. Decreased resp rate (6-8 breath/min)  GI: Hypoactive bowel sound. No BM this shift.  : Brown remained in place. Pt on HD. HD scheduled for today.  Access: L hand PIV, L lower FA PIV, R hand PIV, Double lumen R Internal Jugular HD access.  Skin: Red blanchable in coccyx area, skin tear on back (right side).      Pt appears to be sleeping. VSS. No signs of distress noted. Will continue to monitor.     Plan: Transfer to TCU when bed become available.

## 2021-06-21 NOTE — PROGRESS NOTES
Pt was taken away to dialysis before I could see him and assess him this am, I sent his eliquis down and they removed his lido patch, pt returned at 12:15 and was assessed at this time, pt is stable and confused, sitter in the room, will cont to mtr

## 2021-06-21 NOTE — PROGRESS NOTES
Nephrology Progress Note  06/21/2021         Assessment & Recommendations:   Fer Latham is a 71 year old male with PMH of HTN, Aflutter (not on AC), DMII, ESRD 2/2 IgA nephropathy, and chronic alcohol abuse, admitted with atrial fibrillation with RVR, new acute T12 compression fx and RLE DVT and new PE      ESKD: dialyzes M/F at [facility] with Dr. Puente. Run time: 3 hrs 45 min. Access: tunneled RIJ (failed AVF). EDW: 119 kg.  - dialysis likely per M/F schedule  - will watch renal function, given hypotension and need for IV contrast he may have lost some residual function and may require thrice weekly HD rather than twice weekly, so far weights and creat stable between runs  - Consent for dialysis obtained 6/17/21 from pt's spouse        Access: history of catheter dysfunction  - s/p tunneled CVC replacement 6/17  - heparin lock CVC     Volume:  kg     Pulmonary embolism  RLE DVT dx June 2021 (current) hospitalization  - started on apixaban      afib w RVR, resolved:  BP: normotensive, usual pre HD pressures 100-120's, post pressures the same, lowest pressure usually in 90's, lower at times.     BMD: recent      Anemia: recent hgb 10's, not on iron or MECCA due to patient refusal, has chronically low iron sats.     New T12 compression fx     UTI: large LE, cx mixed jerzy - treated with pip/tazo    SAGE - refuses bipap, sleeping with supplemental O2 via face mask        Recommendations were communicated to primary team via this note and in person     Lizbeth Clements PA-C  [phone]    Interval History :   Seen on dialysis, stable run. Pt is still fairly confused. No CP, SOB, chills, n/v    Review of Systems:   4 point ROS neg other than as noted above.    Physical Exam:   I/O last 3 completed shifts:  In: -   Out: 625 [Urine:625]   /82 (BP Location: Right arm)   Pulse 98   Temp 98.5  F (36.9  C) (Axillary)   Resp 12   Wt 123.5 kg (272 lb 4.3 oz)   SpO2 100%   BMI 34.96 kg/m        GENERAL APPEARANCE: confused, slowed  EYES:  No scleral icterus, pupils equal  HENT: mouth without ulcers or lesions  PULM: O2 via face mask, normal work of breathing  CV: 1+ edema of ankles  GI: soft   INTEGUMENT: no cyanosis   NEURO: slowed, confused  Access tunneled CVC    Labs:   All labs reviewed by me  Electrolytes/Renal -   Recent Labs   Lab Test 06/21/21  0730 06/20/21  1123 06/19/21  0811 06/15/21  1303 06/15/21  1303 05/14/20  1722 05/14/20  1722 05/17/19  1231 05/17/19  1231 05/11/19  1253 05/11/19  1253    137 134   < > 131*   < > 128*   < > 131*   < >  --    POTASSIUM 3.7 5.3 5.4*   < > 3.5   < > 3.6   < > 3.8   < > 3.1*   CHLORIDE 104 110* 105   < > 100   < > 98   < > 96   < >  --    CO2 26 19* 24   < > 19*   < > 21   < > 28   < >  --    BUN 35* 37* 46*   < > 49*   < > 21   < > 35*   < >  --    CR 2.67* 2.55* 2.54*   < > 3.57*   < > 2.42*   < > 3.10*   < >  --    * 117* 152*   < > 153*   < > 100*   < > 159*   < >  --    COLUMBA 8.2* 8.9 8.5   < > 8.9   < > 7.9*   < > 9.0   < >  --    MAG  --   --  2.1  --  2.2  --  1.8  --   --    < > 1.7   PHOS  --   --   --   --  4.2  --   --   --  3.0  --  2.6    < > = values in this interval not displayed.       CBC -   Recent Labs   Lab Test 06/21/21  0730 06/20/21  1123 06/18/21  0524   WBC 6.3 11.0 9.2   HGB 8.9* 10.1* 10.4*    154 172       LFTs -   Recent Labs   Lab Test 06/19/21  0811 06/18/21  0524 06/17/21  0922   ALKPHOS 81 81 103   BILITOTAL 0.5 0.5 0.6   ALT 11 9 11   AST 17 12 13   PROTTOTAL 7.2 7.0 8.2   ALBUMIN 2.7* 2.6* 3.0*       Iron Panel - No lab results found.      Imaging:  Reviewed    Current Medications:    acetaminophen  1,000 mg Oral TID     apixaban ANTICOAGULANT  10 mg Oral BID    Followed by     [START ON 6/26/2021] apixaban ANTICOAGULANT  5 mg Oral BID     [Held by provider] aspirin  325 mg Oral Daily     folic acid  1 mg Oral Daily     lidocaine  1 patch Transdermal Q24h    And     lidocaine   Transdermal Q8H      [START ON 6/22/2021] multivitamin RENAL  1 capsule Oral Daily     polyethylene glycol  17 g Oral Daily     sodium chloride (PF)  3 mL Intracatheter Q8H       TIFFANIE Conway

## 2021-06-21 NOTE — PROGRESS NOTES
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 Hospital course c/b newly diagnosed acute T12 compression fx and RLE DVT.     Changes today:  - Liberalize diet   - Stop PRN Valium, start Hydroxyzine  - Start scheduled at bedtime Seroquel with PRN available for agitation  - Delirium precautions    # Submassive PE c/b right heart strain  # DVT of RLE   # Acute hypoxic respiratory failure  P/w hypotension and elevated lactate on admission in the setting of several weeks of dyspnea at home. Initial concern was for sepsis, though infectious work-up was unremarkable. Also noted to have increased LE edema and US confirmed RLE DVT in the distal iliac vein extending into the common femoral vein and occlusive thrombus in popliteal vein. CTA confirmed large PE with right heart strain, and TTE shows HD changes related to RV dysfunction.   - IR was consulted, initial plans for thrombectomy were c/b inability to get in touch with spouse to consent for procedure   - Given repeat TTE on 6/18 showed improved RV function, plan to continue treatment with AC alone  - Transitioned from heparin gtt to apixiban on 6/19  - Remains hemodynamically stable  - Hypoxia is likely related to PE, wean O2 as tolerated targeting sat of 92% or greater    # Acute Toxic Metabolic Encephalopathy  # Hx of alcohol abuse  Likely multifactorial w/ alcohol use disorder and hx of withdrawal, acute illness (submassive PE), medications (narcotics for pain control from compression fracture), and delirium all contributing.  - He is now 5 days s/p admission, so likelihood of EtOH  withdrawal at this point is quite low   - Stop CIWA monitoring, discontinue PRN BZDs  - Continue MVI, folic acid and thiamine.   - Frequent reorientation  - Continue to treat underlying conditions  - Minimize narcotic pain medications that may be contributing to encephalopathy  - Schedule Seroquel 25 mg qHS  - Stop home Valium PRN for anxiety    #Atrial fib/flutter with RVR - [id number] score 3 for age, HTN, and DM2. Takes  mg daily, but not on anticoagulation or rate control prior to admission. Presented with HR 180s on admission, which improved after IVF. EKG showed Afib/flutter with RVR. Now rate controlled on AC.  - Telemetry  - Hold BB with soft BP. HR currently within goal <110, can start beta blocker PRN should it be indicated  - Hold  mg daily while on anticoagulation     #T12 compression fx   Reported low back pain for the last 1-2 weeks with multiple recurrent falls (due to weakness and unsteady gait). CT lumbar spine with acute compression fx T12, without any retropulsion.   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 Puente. Patient does still make urine.   - Nephrology consulted, appreciate recs   - May require x3/weekly dialysis moving forward given hypotension/IV contrast administration this admission, will monitor UOP dailiy  - BMP/Mg/Phos daily  - I&O and daily weights   - IR consult for catheter replacement, replaced on 6/17  - Diet liberalized on 6/22 per patients' wife request    # Hx of HTN  Previously on medications but  discontinued after lifestyle modifications with weight loss several years ago    # DM2- diet controlled.   - Hemoglobin A1c 5.2 on admission  - Monitor BGs with BMP    # Neurogenic bladder   # Phimosis  Follows with urology. S/p botox in past. Not on any medications due to cost.   - Nursing staff unable to place ferguson catheter  - Urology consulted for ferguson placement, appreciate assistance  - Will discuss removal of ferguson and TOV closer to the time of discharge, but per wife patient has struggled with bladder dysfunction for some time    # Chronic anemia - Baseline 11.0s. . B12/Folate/TSH all within normal limits.  - Hgb downtrending 9-10 this hospitalization  - Trend CBC    # Anxiety -   - Hold home Valium for anxiety  - Trial low-dose Hydroxyzine instead       Diet: Renal Diet (non-dialysis)  Snacks/Supplements Adult: Nepro Oral Supplement; Between Meals    DVT Prophylaxis: Eliquis  Ferguson Catheter: PRESENT, indication: Strict 1-2 Hour I&O  Central Lines: PRESENT  CVC Double Lumen Right Internal jugular Tunneled;Non - valved (open ended)-Site Assessment: WDL  Code Status: Full Code      Disposition Plan   Expected discharge: 2 - 3 days, recommended to transitional care unit once mental status at baseline.     The patient's care was discussed with the Bedside Nurse, Patient and Patient's Family.    Yunier Polo MD  Hospitalist Service  [facility]  Securely message with the Vocera Web Console (learn more here)  Text page via [facility] Paging/Directory  Please see sign in/sign out for up to date coverage information    Risk Factors Present on Admission                ______________________________________________________________________    Interval History   DONNY Monroe reports he is feeling well today aside from dry mouth. Knows he is in the hospital. Knows his name is Fer. Denies any difficulty breathing, cough, or sputum production. No fevers/chills.  Doesn't engage much with examination otherwise.    Data reviewed today: I reviewed all medications, new labs and imaging results over the last 24 hours. I personally reviewed no images or EKG's today.    Physical Exam   Vital Signs: Temp: 100.1  F (37.8  C) Temp src: Axillary BP: 102/66 Pulse: 89   Resp: 23 SpO2: 100 % O2 Device: Oxymask Oxygen Delivery: 3 LPM  Weight: 272 lbs 4.29 oz  General Appearance: Elderly male, appears chronically ill appearing. Very dry mouth.  Respiratory: Breathing comfortably on 3L/nc. Lungs diminished at the bases bilaterally. Otherwise moving good air.  Cardiovascular: Irregularly irregular rate and rhythm, no m/r/g.  GI: soft, non-distended, non-tender to palpation.   Skin: pallor present, no skin rash  Other: A&O x2, not to date. Moving all extremities spontaneously. Following basic commands. Frequent non-sensical statements with inattention symptoms.     Data   Recent Labs   Lab 06/22/21  0650 06/21/21  0730 06/20/21  1123 06/19/21  0811 06/18/21  0738 06/18/21  0738 06/18/21  0524   WBC  --  6.3 11.0  --   --   --  9.2   HGB  --  8.9* 10.1*  --   --   --  10.4*   MCV  --  101* 96  --   --   --  98   PLT  --  152 154  --   --   --  172   INR  --   --   --   --   --  1.36*  --     136 137 134  --   --  133   POTASSIUM 3.1* 3.7 5.3 5.4*   < >  --  3.9   CHLORIDE 108 104 110* 105  --   --  102   CO2 25 26 19* 24  --   --  23   BUN 19 35* 37* 46*  --   --  32*   CR 1.73* 2.67* 2.55* 2.54*  --   --  2.21*   ANIONGAP 5 6 8 6  --   --  8   COLUMBA 7.3* 8.2* 8.9 8.5  --   --  8.5   GLC 93 103* 117* 152*  --   --  148*   ALBUMIN  --   --   --  2.7*  --   --  2.6*   PROTTOTAL  --   --   --  7.2  --   --  7.0   BILITOTAL  --   --   --  0.5  --   --  0.5   ALKPHOS  --   --   --  81  --   --  81   ALT  --   --   --  11  --   --  9   AST  --   --   --  17  --   --  12    < > = values in this interval not displayed.     No results found for this or any previous visit (from the past 24  hour(s)).

## 2021-06-21 NOTE — PROGRESS NOTES
Brief Social Work Note    Due to pt's mental status, SW attempted to call pt's spouse Ailin to complete assessment. SW left  with Ailin requesting return phone call.      Addend 1100: SW received phone call from spouseAilin - assessment completed below.    Care Management Initial Consult    General Information  Assessment completed with: Spouse or significant other, VM-chart review,    Type of CM/SW Visit: Initial Assessment  Primary Care Provider verified and updated as needed: Yes   Readmission within the last 30 days: no previous admission in last 30 days   Reason for Consult: discharge planning  Advance Care Planning: No HCD on file and pt is unable to complete HCD at this time due to altered mental status.       Communication Assessment  Patient's communication style: spoken language (English or Bilingual)    Hearing Difficulty or Deaf: no   Wear Glasses or Blind: no    Cognitive  Cognitive/Neuro/Behavioral: .WDL except, orientation  Level of Consciousness: confused  Arousal Level: opens eyes spontaneously  Orientation: disoriented to, time  Mood/Behavior: calm  Best Language: 0 - No aphasia  Speech: clear, spontaneous    Living Environment:   People in home: spouse     Current living Arrangements: apartment      Able to return to prior arrangements: yes       Family/Social Support:  Care provided by: self, spouse/significant other  Provides care for: no one  Marital Status:   Wife          Description of Support System: Supportive, Involved         Current Resources:   Patient receiving home care services: No   Community Resources: Dialysis Services - M/F at [facility]  Equipment currently used at home: walker, rolling  Supplies currently used at home:      Employment/Financial:  Employment Status: retired        Financial Concerns: Ailin noted financial concerns, however did not wish to elaborate.          Lifestyle & Psychosocial Needs:        Socioeconomic History     Marital status:       "Spouse name: Not on file     Number of children: Not on file     Years of education: Not on file     Highest education level: Not on file     Tobacco Use     Smoking status: Former Smoker     Smokeless tobacco: Never Used     Tobacco comment: quit 35 years ago   Substance and Sexual Activity     Alcohol use: Yes     Comment: 1 to 2 drinks a day (Occasional)     Drug use: No       Functional Status:  Prior to admission patient needed assistance: Per Ailin she \"has to help pt with a lot of stuff.\"     Mental Health Status:  Mental Health Status: No Current Concerns       Chemical Dependency Status:  Chemical Dependency Status: Current Concern       ETOH concerns - Ailin did not wish to discuss.          Values/Beliefs:  Spiritual, Cultural Beliefs, Tenriism Practices, Values that affect care: NA              Additional Information:  SARMAD spoke with Ailin to introduce self and role on treatment team. SW discussed current TCU recommendations. Per Ailin - \"that will just kill him.\" SW informed Ailin that currently pt is required a lift to get out bed and that it would not be safe for her to care for him and home. Ailin responded with \"mhm.\" SARMAD questioned if herself and pt would be agreeable to this. Ailin responded with \"you need to ask him about that.\" SW informed Ailin that at this time pt is confused and not able to have those conversations. SARMAD questioned if pt had ever been to TCU in the past, per Ailin pt has however she does not remember where. SW informed Ailin this SW will talk with primary team to find out when pt is medically ready and call her back this afternoon. Ailin agreeable.    Jeanne Cuadra, LANETTE, LGSW  6D Adult Acute Care SARMAD  Ph:[phone]  Pager [phone]        "

## 2021-06-21 NOTE — PROGRESS NOTES
Pt is stable since back from dialysis, pt's wife called for updates and is upset that he cannot have treats and is on a renal diet, wife states she has not heard from any physicians, pt ate part of his lunch and keeps asking for the bedpan but has not gone to the bathroom, pt is confused and only oriented to self, does not display any symptoms of DTs, pt's urine is cloudy, will cont to mtr

## 2021-06-22 ENCOUNTER — APPOINTMENT (OUTPATIENT)
Dept: PHYSICAL THERAPY | Facility: CLINIC | Age: 71
DRG: 981 | End: 2021-06-22
Payer: MEDICARE

## 2021-06-22 LAB
ANION GAP SERPL CALCULATED.3IONS-SCNC: 5 MMOL/L (ref 3–14)
BUN SERPL-MCNC: 19 MG/DL (ref 7–30)
CALCIUM SERPL-MCNC: 7.3 MG/DL (ref 8.5–10.1)
CHLORIDE SERPL-SCNC: 108 MMOL/L (ref 94–109)
CO2 SERPL-SCNC: 25 MMOL/L (ref 20–32)
CREAT SERPL-MCNC: 1.73 MG/DL (ref 0.66–1.25)
GFR SERPL CREATININE-BSD FRML MDRD: 39 ML/MIN/{1.73_M2}
GLUCOSE SERPL-MCNC: 93 MG/DL (ref 70–99)
POTASSIUM SERPL-SCNC: 3.1 MMOL/L (ref 3.4–5.3)
SODIUM SERPL-SCNC: 138 MMOL/L (ref 133–144)

## 2021-06-22 PROCEDURE — 250N000013 HC RX MED GY IP 250 OP 250 PS 637: Performed by: INTERNAL MEDICINE

## 2021-06-22 PROCEDURE — 250N000013 HC RX MED GY IP 250 OP 250 PS 637: Performed by: PHYSICIAN ASSISTANT

## 2021-06-22 PROCEDURE — 80048 BASIC METABOLIC PNL TOTAL CA: CPT | Performed by: INTERNAL MEDICINE

## 2021-06-22 PROCEDURE — 120N000002 HC R&B MED SURG/OB UMMC

## 2021-06-22 PROCEDURE — 99233 SBSQ HOSP IP/OBS HIGH 50: CPT | Performed by: INTERNAL MEDICINE

## 2021-06-22 PROCEDURE — 36415 COLL VENOUS BLD VENIPUNCTURE: CPT | Performed by: INTERNAL MEDICINE

## 2021-06-22 PROCEDURE — 97530 THERAPEUTIC ACTIVITIES: CPT | Mod: GP

## 2021-06-22 RX ORDER — QUETIAPINE FUMARATE 25 MG/1
25 TABLET, FILM COATED ORAL AT BEDTIME
Status: DISCONTINUED | OUTPATIENT
Start: 2021-06-22 | End: 2021-07-01 | Stop reason: HOSPADM

## 2021-06-22 RX ORDER — QUETIAPINE FUMARATE 25 MG/1
25 TABLET, FILM COATED ORAL DAILY PRN
Status: DISCONTINUED | OUTPATIENT
Start: 2021-06-22 | End: 2021-07-01 | Stop reason: HOSPADM

## 2021-06-22 RX ORDER — HYDROXYZINE HYDROCHLORIDE 25 MG/1
25 TABLET, FILM COATED ORAL EVERY 6 HOURS PRN
Status: DISCONTINUED | OUTPATIENT
Start: 2021-06-22 | End: 2021-07-01 | Stop reason: HOSPADM

## 2021-06-22 RX ADMIN — Medication 1 CAPSULE: at 08:45

## 2021-06-22 RX ADMIN — ACETAMINOPHEN 1000 MG: 500 TABLET, FILM COATED ORAL at 13:55

## 2021-06-22 RX ADMIN — QUETIAPINE FUMARATE 50 MG: 25 TABLET ORAL at 04:03

## 2021-06-22 RX ADMIN — OXYCODONE HYDROCHLORIDE 5 MG: 5 TABLET ORAL at 04:03

## 2021-06-22 RX ADMIN — ACETAMINOPHEN 1000 MG: 500 TABLET, FILM COATED ORAL at 20:49

## 2021-06-22 RX ADMIN — FOLIC ACID 1 MG: 1 TABLET ORAL at 08:45

## 2021-06-22 RX ADMIN — Medication 5 MG: at 20:50

## 2021-06-22 RX ADMIN — APIXABAN 10 MG: 5 TABLET, FILM COATED ORAL at 08:45

## 2021-06-22 RX ADMIN — ACETAMINOPHEN 1000 MG: 500 TABLET, FILM COATED ORAL at 08:45

## 2021-06-22 RX ADMIN — APIXABAN 10 MG: 5 TABLET, FILM COATED ORAL at 20:49

## 2021-06-22 RX ADMIN — POLYETHYLENE GLYCOL 3350 17 G: 17 POWDER, FOR SOLUTION ORAL at 08:45

## 2021-06-22 RX ADMIN — LIDOCAINE 1 PATCH: 560 PATCH PERCUTANEOUS; TOPICAL; TRANSDERMAL at 20:50

## 2021-06-22 RX ADMIN — QUETIAPINE 25 MG: 25 TABLET ORAL at 21:00

## 2021-06-22 NOTE — PROGRESS NOTES
"Care Management Follow Up    Length of Stay (days): 7    Expected Discharge Date: TBD     Concerns to be Addressed: discharge planning     Patient plan of care discussed at interdisciplinary rounds: Yes    Anticipated Discharge Disposition: Transitional Care     Anticipated Discharge Services: TCU   Anticipated Discharge DME:  OP HD    Patient/family educated on Medicare website which has current facility and service quality ratings:  Yes  Education Provided on the Discharge Plan:  Yes  Patient/Family in Agreement with the Plan:  Yes    Referrals Placed by CM/SW:  None as of now  Private pay costs discussed: Not applicable    Additional Information:  SARMAD called pt's spouse Ailin [phone] to discuss discharge planning. Ailin stated someone \"told her he can do rehab at the hospital.\" SARMAD questioned who told Ailin this, however Ailin was not able to inform SW. SARMAD informed Ailin that pt cannot rehab at hospital, but [place] Ailin stated she would like pt to go there for TCU and \"does not want him to go to a nursing home.\" Per Ailin, she \"has had 21 ortho surgeries in her lifetime and knows everything about TCU's.\" Ailin stated pt was recently at [facility] and pt was not happy with his care there. Per Ailin, if she \"sends him to a nursing home she is sending him to die.\" Ailin questioned if she can take pt home. SW provided supportive listening, however reiterated that pt is requiring a ceiling lift to get out of bed. Ailin stated \"well you need to talk with him about this.\" SARMAD informed Ailin that this SW is unable to discuss discharge planning with pt due to pt's confusion and pt has a sitter at bedside for safety. Per Ailin, \"well confused is his middle name.\" SARMAD questioned if this SARMAD can send Ailin a medicare.gov list to her email for her to review. SARMAD emailed list to [email or web address]. Ailin requested MD phone call. SARMAD will request MD call Ailin.     Jeanne Cuadra, LANETTE, LGSW  6D " Adult Acute Care   Ph:[phone]  Pager [phone]

## 2021-06-22 NOTE — PROGRESS NOTES
Pt is alert and oriented to all but time, pt up to commode and chair with lift this shift, sat up for 5 hours, brace worn for lift and in the chair, brace removed once back in the bed, still talks as though he is confused and requires a sitter for redirection, had a large bowel movement, tolerating diet and fluids, afib and BP WDL, on RA, afebrile, will cont to mtr

## 2021-06-22 NOTE — PLAN OF CARE
Neuro: Disoriented to time. CIWA protocol maintained.   CV: A-fib. BP WDL. Afebrile  Resp: Room Air. Decreased resp rate.   GI: Hypoactive bowel sound. Small BM this morning.   : Brown remained in place. Pt on HD.  Access: L hand PIV, L lower FA PIV, R hand PIV, Double lumen R Internal Jugular HD access.  Skin: Red blanchable in coccyx area, skin tear on back (right side).      Pt appears to be sleeping. VSS. No signs of distress noted. Will continue to monitor.      Plan: Transfer to TCU when bed become available.

## 2021-06-23 ENCOUNTER — APPOINTMENT (OUTPATIENT)
Dept: PHYSICAL THERAPY | Facility: CLINIC | Age: 71
DRG: 981 | End: 2021-06-23
Payer: MEDICARE

## 2021-06-23 LAB
ANION GAP SERPL CALCULATED.3IONS-SCNC: 8 MMOL/L (ref 3–14)
BUN SERPL-MCNC: 28 MG/DL (ref 7–30)
CALCIUM SERPL-MCNC: 8.4 MG/DL (ref 8.5–10.1)
CHLORIDE SERPL-SCNC: 108 MMOL/L (ref 94–109)
CO2 SERPL-SCNC: 20 MMOL/L (ref 20–32)
CREAT SERPL-MCNC: 2.08 MG/DL (ref 0.66–1.25)
ERYTHROCYTE [DISTWIDTH] IN BLOOD BY AUTOMATED COUNT: 14.1 % (ref 10–15)
GFR SERPL CREATININE-BSD FRML MDRD: 31 ML/MIN/{1.73_M2}
GLUCOSE SERPL-MCNC: 108 MG/DL (ref 70–99)
HCT VFR BLD AUTO: 28.2 % (ref 40–53)
HGB BLD-MCNC: 9.6 G/DL (ref 13.3–17.7)
MCH RBC QN AUTO: 31.8 PG (ref 26.5–33)
MCHC RBC AUTO-ENTMCNC: 34 G/DL (ref 31.5–36.5)
MCV RBC AUTO: 93 FL (ref 78–100)
PLATELET # BLD AUTO: 174 10E9/L (ref 150–450)
POTASSIUM SERPL-SCNC: 4.8 MMOL/L (ref 3.4–5.3)
RBC # BLD AUTO: 3.02 10E12/L (ref 4.4–5.9)
SODIUM SERPL-SCNC: 136 MMOL/L (ref 133–144)
WBC # BLD AUTO: 9.2 10E9/L (ref 4–11)

## 2021-06-23 PROCEDURE — 97110 THERAPEUTIC EXERCISES: CPT | Mod: GP

## 2021-06-23 PROCEDURE — 250N000013 HC RX MED GY IP 250 OP 250 PS 637: Performed by: PHYSICIAN ASSISTANT

## 2021-06-23 PROCEDURE — 97530 THERAPEUTIC ACTIVITIES: CPT | Mod: GP

## 2021-06-23 PROCEDURE — 85027 COMPLETE CBC AUTOMATED: CPT | Performed by: INTERNAL MEDICINE

## 2021-06-23 PROCEDURE — 36416 COLLJ CAPILLARY BLOOD SPEC: CPT | Performed by: INTERNAL MEDICINE

## 2021-06-23 PROCEDURE — 120N000002 HC R&B MED SURG/OB UMMC

## 2021-06-23 PROCEDURE — 80048 BASIC METABOLIC PNL TOTAL CA: CPT | Performed by: INTERNAL MEDICINE

## 2021-06-23 PROCEDURE — 250N000013 HC RX MED GY IP 250 OP 250 PS 637: Performed by: INTERNAL MEDICINE

## 2021-06-23 PROCEDURE — 99232 SBSQ HOSP IP/OBS MODERATE 35: CPT | Performed by: INTERNAL MEDICINE

## 2021-06-23 PROCEDURE — 250N000013 HC RX MED GY IP 250 OP 250 PS 637: Performed by: NURSE PRACTITIONER

## 2021-06-23 RX ORDER — NYSTATIN 100000 U/G
OINTMENT TOPICAL 2 TIMES DAILY
Status: DISCONTINUED | OUTPATIENT
Start: 2021-06-23 | End: 2021-07-01 | Stop reason: HOSPADM

## 2021-06-23 RX ADMIN — NYSTATIN: 100000 OINTMENT TOPICAL at 20:41

## 2021-06-23 RX ADMIN — Medication 1 CAPSULE: at 08:27

## 2021-06-23 RX ADMIN — FOLIC ACID 1 MG: 1 TABLET ORAL at 08:27

## 2021-06-23 RX ADMIN — ACETAMINOPHEN 1000 MG: 500 TABLET, FILM COATED ORAL at 08:27

## 2021-06-23 RX ADMIN — ACETAMINOPHEN 1000 MG: 500 TABLET, FILM COATED ORAL at 20:40

## 2021-06-23 RX ADMIN — APIXABAN 10 MG: 5 TABLET, FILM COATED ORAL at 08:27

## 2021-06-23 RX ADMIN — OXYCODONE HYDROCHLORIDE 5 MG: 5 TABLET ORAL at 08:27

## 2021-06-23 RX ADMIN — LIDOCAINE 1 PATCH: 560 PATCH PERCUTANEOUS; TOPICAL; TRANSDERMAL at 20:40

## 2021-06-23 RX ADMIN — APIXABAN 10 MG: 5 TABLET, FILM COATED ORAL at 20:40

## 2021-06-23 RX ADMIN — QUETIAPINE 25 MG: 25 TABLET ORAL at 21:00

## 2021-06-23 NOTE — PROGRESS NOTES
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 Hospital course c/b newly diagnosed acute T12 compression fx and RLE DVT.     Changes today:  - Continue delirium precautions and management of sleep/wake cycling, pt appears to be improving  - Disposition planning, appreciate SW assistance    # Submassive PE c/b right heart strain  # DVT of RLE   # Acute hypoxic respiratory failure  P/w hypotension and elevated lactate on admission in the setting of several weeks of dyspnea at home. Initial concern was for sepsis, though infectious work-up was unremarkable. Also noted to have increased LE edema and US confirmed RLE DVT in the distal iliac vein extending into the common femoral vein and occlusive thrombus in popliteal vein. CTA confirmed large PE with right heart strain, and TTE shows HD changes related to RV dysfunction.   - IR was consulted, initial plans for thrombectomy were c/b inability to get in touch with spouse to consent for procedure   - Given repeat TTE on 6/18 showed improved RV function, plan to continue treatment with AC alone  - Transitioned from heparin gtt to apixiban on 6/19  - Remains hemodynamically stable  - Hypoxia is likely related to PE, wean O2 as tolerated targeting sat of 92% or greater    # Acute Toxic Metabolic Encephalopathy  # Hx of alcohol abuse  Likely multifactorial w/ alcohol use disorder and hx of withdrawal, acute illness (submassive PE), medications (narcotics for pain control from compression fracture), and delirium all contributing. Per discussion with wife, at baseline patient is  oriented x3 but does need assistance with ADLs. His picture seems most consistent with hospital acquired delirium.  - Continue MVI, folic acid and thiamine.   - Frequent reorientation  - Continue to treat underlying conditions  - Minimize narcotic pain medications that may be contributing to encephalopathy  - Continue Seroquel 25 mg qHS  - Stop home Valium PRN for anxiety; Hydroxyzine PRN available instead    #Atrial fib/flutter with RVR - [id number] score 3 for age, HTN, and DM2. Takes  mg daily, but not on anticoagulation or rate control prior to admission. Presented with HR 180s on admission, which improved after IVF. EKG showed Afib/flutter with RVR. Now rate controlled on AC.  - Telemetry  - Hold BB with soft BP. HR currently within goal <110, can start beta blocker PRN should it be indicated  - Hold  mg on discharge while anticoagulated for PE    #T12 compression fx   Reported low back pain for the last 1-2 weeks with multiple recurrent falls (due to weakness and unsteady gait). CT lumbar spine with acute compression fx T12, without any retropulsion.   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 Puente. Patient does still make urine.   - Nephrology consulted, appreciate recs   - May require x3/weekly dialysis moving forward given hypotension/IV contrast administration this admission, will monitor UOP dailiy  - BMP/Mg/Phos daily  - I&O and daily  weights   - IR consult for catheter replacement, replaced on 6/17  - Diet liberalized on 6/22 per patients' wife request    # Hx of HTN  Previously on medications but discontinued after lifestyle modifications with weight loss several years ago    # DM2- diet controlled.   - Hemoglobin A1c 5.2 on admission  - Monitor BGs with BMP    # Neurogenic bladder   # Phimosis  Follows with urology. S/p botox in past. Not on any medications due to cost.   - Nursing staff unable to place ferguson catheter  - Urology consulted for ferguson placement, appreciate assistance, placed on 6/19  - Will discuss removal of ferguson and TOV closer to the time of discharge     # Chronic anemia - Baseline 11.0s. . B12/Folate/TSH all within normal limits.  - Hgb downtrending 9-10 this hospitalization  - Trend CBC    # Anxiety -   - Hold home Valium for anxiety  - Trial low-dose Hydroxyzine instead       Diet: Snacks/Supplements Adult: Nepro Oral Supplement; Between Meals  3 Gram K Diet    DVT Prophylaxis: Eliquis  Ferguson Catheter: PRESENT, indication: Other (Comment)(immobilization)  Central Lines: PRESENT  CVC Double Lumen Right Internal jugular Tunneled;Non - valved (open ended)-Site Assessment: WDL  Code Status: Full Code      Disposition Plan   Expected discharge: 2 - 3 days, recommended to transitional care unit once mental status at baseline.     The patient's care was discussed with the Bedside Nurse, Patient and Patient's Family.    Yunier Pool MD  Hospitalist Service, [address]  Securely message with the Vocera Web Console (learn more here)  Text page via [employer] Paging/Directory  Please see sign in/sign out for up to date coverage information    Risk Factors Present on Admission                ______________________________________________________________________    Interval History   Slept overnight, irritable when awoken but no behavioral issues noted    Fer higgins  "this morning. Much less agitated and interactive today, could follow conversation. Oriented to self and to place (\"care facility\") but not to time (thought it was halloween season) or location ([place], [place]). Aware of himself and his wife's name. Fer reports he is overall doing well - denies any cough or sputum production or shortness of breath. Denies any abdominal pain, nausea, or vomiting. Does report pain in his right hip/low back which is severe and worse with position changes. No numbness or weakness in his legs, no bowel/bladder incontinence or saddle anesthesia. No fever/chills. No further concerns at this time.    Data reviewed today: I reviewed all medications, new labs and imaging results over the last 24 hours. I personally reviewed no images or EKG's today.    Physical Exam   Vital Signs: Temp: 97.4  F (36.3  C) Temp src: Oral BP: 110/74 Pulse: 75   Resp: 13 SpO2: 98 % O2 Device: None (Room air)    Weight: 272 lbs 4.29 oz  General Appearance: Pleasant elderly male, lying in bed, less agitated, more interactive and oriented to conversation, responding to questions appropriately.  Respiratory: Breathing comfortably on room air, lungs clear to auscultation bilaterally.  Cardiovascular: RRR, no m/r/g. Radial pulses 2+ and symmetric.  GI: soft, non-distended, non-tender to palpation. Brown catheter draining clear yellow urine  Skin: no skin rash, pallor is present.  Other: Trace edema bilaterally. Alert to self, not to time and place. Follows commands and answering questions appropriately.     Data   Recent Labs   Lab 06/23/21  1005 06/22/21  0650 06/21/21  0730 06/20/21  1123 06/19/21  0811 06/18/21  0738 06/18/21  0738 06/18/21  0524   WBC 9.2  --  6.3 11.0  --   --   --  9.2   HGB 9.6*  --  8.9* 10.1*  --   --   --  10.4*   MCV 93  --  101* 96  --   --   --  98     --  152 154  --   --   --  172   INR  --   --   --   --   --   --  1.36*  --     138 136 137 134  --   --  133   POTASSIUM 4.8 " 3.1* 3.7 5.3 5.4*   < >  --  3.9   CHLORIDE 108 108 104 110* 105  --   --  102   CO2 20 25 26 19* 24  --   --  23   BUN 28 19 35* 37* 46*  --   --  32*   CR 2.08* 1.73* 2.67* 2.55* 2.54*  --   --  2.21*   ANIONGAP 8 5 6 8 6  --   --  8   COLUMBA 8.4* 7.3* 8.2* 8.9 8.5  --   --  8.5   * 93 103* 117* 152*  --   --  148*   ALBUMIN  --   --   --   --  2.7*  --   --  2.6*   PROTTOTAL  --   --   --   --  7.2  --   --  7.0   BILITOTAL  --   --   --   --  0.5  --   --  0.5   ALKPHOS  --   --   --   --  81  --   --  81   ALT  --   --   --   --  11  --   --  9   AST  --   --   --   --  17  --   --  12    < > = values in this interval not displayed.     No results found for this or any previous visit (from the past 24 hour(s)).

## 2021-06-23 NOTE — PROGRESS NOTES
"Pt rested through the night, when awaken is irritable, refused to be repositioned & said \"leave, bye\" sitter remains at bedside, no acute events overnight, uop adequate, VSS    See further flowsheets and MAR for further details.   "

## 2021-06-23 NOTE — PLAN OF CARE
OT/6D: Per PT, pt now appropriate to initiate OT services. Pt following commands, would benefit from ADL participation. Scheduled for tomorrow. 6/24/21.

## 2021-06-23 NOTE — PROGRESS NOTES
Shift Summary  Pt spent an uneventful day, no acute distress noted. Pt remains with 1:1 sitter and remained more lucid today. Pt is pending transfer to medical unit. Care and observation continues.

## 2021-06-24 ENCOUNTER — APPOINTMENT (OUTPATIENT)
Dept: OCCUPATIONAL THERAPY | Facility: CLINIC | Age: 71
DRG: 981 | End: 2021-06-24
Attending: PHYSICIAN ASSISTANT
Payer: MEDICARE

## 2021-06-24 ENCOUNTER — APPOINTMENT (OUTPATIENT)
Dept: PHYSICAL THERAPY | Facility: CLINIC | Age: 71
DRG: 981 | End: 2021-06-24
Payer: MEDICARE

## 2021-06-24 LAB
ANION GAP SERPL CALCULATED.3IONS-SCNC: 6 MMOL/L (ref 3–14)
BUN SERPL-MCNC: 33 MG/DL (ref 7–30)
CALCIUM SERPL-MCNC: 8.7 MG/DL (ref 8.5–10.1)
CHLORIDE SERPL-SCNC: 107 MMOL/L (ref 94–109)
CO2 SERPL-SCNC: 24 MMOL/L (ref 20–32)
CREAT SERPL-MCNC: 2.27 MG/DL (ref 0.66–1.25)
ERYTHROCYTE [DISTWIDTH] IN BLOOD BY AUTOMATED COUNT: 14.2 % (ref 10–15)
GFR SERPL CREATININE-BSD FRML MDRD: 28 ML/MIN/{1.73_M2}
GLUCOSE SERPL-MCNC: 119 MG/DL (ref 70–99)
HCT VFR BLD AUTO: 30.3 % (ref 40–53)
HGB BLD-MCNC: 9.5 G/DL (ref 13.3–17.7)
MCH RBC QN AUTO: 31.3 PG (ref 26.5–33)
MCHC RBC AUTO-ENTMCNC: 31.4 G/DL (ref 31.5–36.5)
MCV RBC AUTO: 100 FL (ref 78–100)
PLATELET # BLD AUTO: 212 10E9/L (ref 150–450)
POTASSIUM SERPL-SCNC: 3.6 MMOL/L (ref 3.4–5.3)
RBC # BLD AUTO: 3.04 10E12/L (ref 4.4–5.9)
SODIUM SERPL-SCNC: 138 MMOL/L (ref 133–144)
TSH SERPL DL<=0.005 MIU/L-ACNC: 3.84 MU/L (ref 0.4–4)
VIT B12 SERPL-MCNC: 803 PG/ML (ref 193–986)
WBC # BLD AUTO: 7.2 10E9/L (ref 4–11)

## 2021-06-24 PROCEDURE — 97116 GAIT TRAINING THERAPY: CPT | Mod: GP

## 2021-06-24 PROCEDURE — 82607 VITAMIN B-12: CPT | Performed by: INTERNAL MEDICINE

## 2021-06-24 PROCEDURE — 250N000013 HC RX MED GY IP 250 OP 250 PS 637: Performed by: INTERNAL MEDICINE

## 2021-06-24 PROCEDURE — 120N000002 HC R&B MED SURG/OB UMMC

## 2021-06-24 PROCEDURE — 85027 COMPLETE CBC AUTOMATED: CPT | Performed by: INTERNAL MEDICINE

## 2021-06-24 PROCEDURE — 97530 THERAPEUTIC ACTIVITIES: CPT | Mod: GP

## 2021-06-24 PROCEDURE — 97535 SELF CARE MNGMENT TRAINING: CPT | Mod: GO

## 2021-06-24 PROCEDURE — 99232 SBSQ HOSP IP/OBS MODERATE 35: CPT | Performed by: INTERNAL MEDICINE

## 2021-06-24 PROCEDURE — 97110 THERAPEUTIC EXERCISES: CPT | Mod: GP

## 2021-06-24 PROCEDURE — 80048 BASIC METABOLIC PNL TOTAL CA: CPT | Performed by: INTERNAL MEDICINE

## 2021-06-24 PROCEDURE — 84443 ASSAY THYROID STIM HORMONE: CPT | Performed by: INTERNAL MEDICINE

## 2021-06-24 PROCEDURE — 250N000013 HC RX MED GY IP 250 OP 250 PS 637: Performed by: PHYSICIAN ASSISTANT

## 2021-06-24 PROCEDURE — 36415 COLL VENOUS BLD VENIPUNCTURE: CPT | Performed by: INTERNAL MEDICINE

## 2021-06-24 PROCEDURE — 97165 OT EVAL LOW COMPLEX 30 MIN: CPT | Mod: GO

## 2021-06-24 RX ADMIN — Medication 5 MG: at 21:12

## 2021-06-24 RX ADMIN — NYSTATIN: 100000 OINTMENT TOPICAL at 08:02

## 2021-06-24 RX ADMIN — ACETAMINOPHEN 1000 MG: 500 TABLET, FILM COATED ORAL at 08:02

## 2021-06-24 RX ADMIN — Medication 1 CAPSULE: at 08:03

## 2021-06-24 RX ADMIN — APIXABAN 10 MG: 5 TABLET, FILM COATED ORAL at 21:13

## 2021-06-24 RX ADMIN — Medication 2.5 MG: at 21:12

## 2021-06-24 RX ADMIN — FOLIC ACID 1 MG: 1 TABLET ORAL at 08:03

## 2021-06-24 RX ADMIN — ACETAMINOPHEN 1000 MG: 500 TABLET, FILM COATED ORAL at 21:12

## 2021-06-24 RX ADMIN — ACETAMINOPHEN 1000 MG: 500 TABLET, FILM COATED ORAL at 15:37

## 2021-06-24 RX ADMIN — QUETIAPINE 25 MG: 25 TABLET ORAL at 21:12

## 2021-06-24 RX ADMIN — NYSTATIN: 100000 OINTMENT TOPICAL at 21:14

## 2021-06-24 RX ADMIN — LIDOCAINE 1 PATCH: 560 PATCH PERCUTANEOUS; TOPICAL; TRANSDERMAL at 21:13

## 2021-06-24 RX ADMIN — OXYCODONE HYDROCHLORIDE 5 MG: 5 TABLET ORAL at 06:38

## 2021-06-24 RX ADMIN — APIXABAN 10 MG: 5 TABLET, FILM COATED ORAL at 08:02

## 2021-06-24 RX ADMIN — HYDROXYZINE HYDROCHLORIDE 25 MG: 25 TABLET, FILM COATED ORAL at 21:13

## 2021-06-24 NOTE — PROGRESS NOTES
06/24/21 1230   Quick Adds   Type of Visit Initial Occupational Therapy Evaluation   Living Environment   People in home spouse   Current Living Arrangements apartment   Home Accessibility no concerns   Transportation Anticipated health plan transportation   Living Environment Comments Pt reports he has an elevator in his building and has a tub/shower with grab bars and a chair.    Self-Care   Usual Activity Tolerance fair   Current Activity Tolerance poor   Regular Exercise No   Equipment Currently Used at Home cane, straight;walker, rolling   Disability/Function   Hearing Difficulty or Deaf no   Wear Glasses or Blind no   Concentrating, Remembering or Making Decisions Difficulty no   Difficulty Communicating no   Difficulty Eating/Swallowing no   Walking or Climbing Stairs Difficulty yes   Walking or Climbing Stairs ambulation difficulty, requires equipment   Mobility Management walker/cane   Dressing/Bathing Difficulty yes   Dressing/Bathing bathing difficulty, requires equipment   Toileting issues yes   Toileting Assistance toileting difficulty, requires equipment   Doing Errands Independently Difficulty (such as shopping) yes   Fall history within last six months yes   Number of times patient has fallen within last six months 2   Change in Functional Status Since Onset of Current Illness/Injury yes   General Information   Onset of Illness/Injury or Date of Surgery 06/15/21   Patient/Family Therapy Goal Statement (OT) Pt would like to get better and return home independently.    Existing Precautions/Restrictions brace worn when out of bed;fall;spinal   Left Upper Extremity (Weight-bearing Status)   (10lbs)   Right Upper Extremity (Weight-bearing Status)   (10lbs)   Left Lower Extremity (Weight-bearing Status) full weight-bearing (FWB)   Right Lower Extremity (Weight-bearing Status) full weight-bearing (FWB)   Cognitive Status Examination   Orientation Status person   Affect/Mental Status (Cognitive)  agitated;confused   Follows Commands physical/tactile prompts required;verbal cues/prompting required   Visual Perception   Visual Impairment/Limitations WFL;corrective lenses for reading   Sensory   Sensory Quick Adds No deficits were identified   Sensory Comments Pt reports neuropathy in BLE's.    Pain Assessment   Patient Currently in Pain No   Integumentary/Edema   Integumentary/Edema no deficits were identifed   Range of Motion Comprehensive   Comment, General Range of Motion BUE AROM WFL   Strength Comprehensive (MMT)   Comment, General Manual Muscle Testing (MMT) Assessment Formal MMT not performed due to precautions. Pt demonstrates a mild deficit in  strength R>L   Bed Mobility   Comment (Bed Mobility) Mod A And max vc's.    Transfers   Transfer Comments Dependent sling transfer    Clinical Impression   Criteria for Skilled Therapeutic Interventions Met (OT) yes;meets criteria;skilled treatment is necessary   OT Diagnosis Decreased independence with functional transfers and ADLS/IADLS.    OT Problem List-Impairments impacting ADL problems related to;activity tolerance impaired;cognition;fear & anxiety;flexibility;mobility;range of motion (ROM);strength;pain;sensory feedback;postural control   Assessment of Occupational Performance 5 or more Performance Deficits   Identified Performance Deficits Decreased independence with functional transfers and ADLS/IADLS.    Planned Therapy Interventions (OT) ADL retraining;IADL retraining;cognition;bed mobility training;ROM;strengthening;stretching;transfer training;home program guidelines;progressive activity/exercise   Clinical Decision Making Complexity (OT) low complexity   Therapy Frequency (OT) 5x/week   Predicted Duration of Therapy 7/8/2021   Risk & Benefits of therapy have been explained evaluation/treatment results reviewed;care plan/treatment goals reviewed;patient   OT Discharge Planning    OT Discharge Recommendation (DC Rec) Transitional Care Facility    OT Rationale for DC Rec Pt is below baseline function. pt will benefit from continued therapy to maximize functional independence.    OT Brief overview of current status  Mod A and vc's for rolling in bed. Pt requires dependent sling transfer to bedside chair.    Total Evaluation Time (Minutes)   Total Evaluation Time (Minutes) 10

## 2021-06-24 NOTE — PROGRESS NOTES
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 Hospital course c/b newly diagnosed acute T12 compression fx and RLE DVT.     Changes today:  - Disposition planning, appreciate PT/OT/SW assistance  - Continue management of delirium    # Submassive PE c/b right heart strain  # DVT of RLE   # Acute hypoxic respiratory failure  P/w hypotension and elevated lactate on admission in the setting of several weeks of dyspnea at home. Initial concern was for sepsis, though infectious work-up was unremarkable. Also noted to have increased LE edema and US confirmed RLE DVT in the distal iliac vein extending into the common femoral vein and occlusive thrombus in popliteal vein. CTA confirmed large PE with right heart strain, and TTE shows HD changes related to RV dysfunction.   - IR was consulted, initial plans for thrombectomy were c/b inability to get in touch with spouse to consent for procedure   - Given repeat TTE on 6/18 showed improved RV function, plan to continue treatment with AC alone  - Transitioned from heparin gtt to apixiban on 6/19  - Remains hemodynamically stable  - Hypoxia is likely related to PE, wean O2 as tolerated targeting sat of 92% or greater    # Acute Toxic Metabolic Encephalopathy  # Hx of alcohol abuse  Likely multifactorial w/ alcohol use disorder and hx of withdrawal, acute illness (submassive PE), medications (narcotics for pain control from compression fracture), and delirium all contributing. Per discussion with wife, at baseline patient is oriented x3 but does need assistance with ADLs. His picture  seems most consistent with hospital acquired delirium.  - Continue MVI, folic acid and thiamine.   - Frequent reorientation  - Continue to treat underlying conditions  - Minimize narcotic pain medications that may be contributing to encephalopathy  - Continue Seroquel 25 mg qHS  - Stop home Valium PRN for anxiety; Hydroxyzine PRN available instead    #Atrial fib/flutter with RVR - [id number] score 3 for age, HTN, and DM2. Takes  mg daily, but not on anticoagulation or rate control prior to admission. Presented with HR 180s on admission, which improved after IVF. EKG showed Afib/flutter with RVR. Now rate controlled on AC.  - Telemetry  - Hold BB with soft BP. HR currently within goal <110, can start beta blocker PRN should it be indicated  - Hold  mg on discharge while anticoagulated for PE    #T12 compression fx   Reported low back pain for the last 1-2 weeks with multiple recurrent falls (due to weakness and unsteady gait). CT lumbar spine with acute compression fx T12, without any retropulsion.   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 Puente. Patient does still make urine.   - Nephrology consulted, appreciate recs   - May require x3/weekly dialysis moving forward given hypotension/IV contrast administration this admission, will monitor UOP dailiy  - BMP/Mg/Phos daily  - I&O and daily weights   - IR consult for catheter replacement, replaced on 6/17  - Diet liberalized on 6/22 per  patients' wife request    # Hx of HTN  Previously on medications but discontinued after lifestyle modifications with weight loss several years ago    # DM2- diet controlled.   - Hemoglobin A1c 5.2 on admission  - Monitor BGs with BMP    # Neurogenic bladder   # Phimosis  Follows with urology. S/p botox in past. Not on any medications due to cost.   - Nursing staff unable to place ferguson catheter  - Urology consulted for ferguson placement, appreciate assistance, placed on 6/19  - Will discuss removal of ferguson and TOV closer to the time of discharge     # Chronic anemia - Baseline 11.0s. . B12/Folate/TSH all within normal limits.  - Hgb downtrending 9-10 this hospitalization  - Trend CBC    # Anxiety -   - Hold home Valium for anxiety  - Trial low-dose Hydroxyzine instead       Diet: Snacks/Supplements Adult: Nepro Oral Supplement; Between Meals  3 Gram K Diet    DVT Prophylaxis: Eliquis  Ferguson Catheter: PRESENT, indication: Insertion Difficulty(immobilization)  Central Lines: PRESENT  CVC Double Lumen Right Internal jugular Tunneled;Non - valved (open ended)-Site Assessment: WDL  Code Status: Full Code      Disposition Plan   Expected discharge: 2 - 3 days, recommended to transitional care unit once adequate pain management/ tolerating PO medications and safe disposition plan/ TCU bed available.     The patient's care was discussed with the Bedside Nurse, Care Coordinator/ and Patient.    Yunier Polo MD  Hospitalist Service, [address]  Securely message with the Vocera Web Console (learn more here)  Text page via [facility] Paging/Directory  Please see sign in/sign out for up to date coverage information    Risk Factors Present on Admission                ______________________________________________________________________    Interval History   NAEO  Received 2 total doses of Oxycodone over past 24 hours  Worked with PT - recommending  discharge to TCU    More clear today. Oriented to self and place, but not to time. Denies any shortness of breath or chest pain. Denies any fevers or chills. Feels like his back pain is controlled on his current regimen of pain medications. Has no further concerns at this point.    Data reviewed today: I reviewed all medications, new labs and imaging results over the last 24 hours. I personally reviewed no images or EKG's today.    Physical Exam   Vital Signs: Temp: 97.9  F (36.6  C) Temp src: Oral BP: 133/88 Pulse: 86   Resp: 11 SpO2: 100 % O2 Device: None (Room air)    Weight: 272 lbs 4.29 oz  General Appearance: Sleeping but arouses easily to voice, more conversant and tracks conversation better today  Respiratory: Breathing comfortably on room air, no respiratory distress, lungs clear to auscultation bilaterally.  Cardiovascular: Irregularly irregular rate and rhythm, no m/r/g.  GI: soft, non-distended, non-tender to palpation, BS+  Skin: warm, dry, no skin rash, no pallor  Other: Trace edema bilaterally     Data   Recent Labs   Lab 06/24/21  0739 06/23/21  1005 06/22/21  0650 06/21/21  0730 06/19/21  0811 06/19/21  0811 06/18/21  0738 06/18/21  0738 06/18/21  0524   WBC 7.2 9.2  --  6.3   < >  --   --   --  9.2   HGB 9.5* 9.6*  --  8.9*   < >  --   --   --  10.4*    93  --  101*   < >  --   --   --  98    174  --  152   < >  --   --   --  172   INR  --   --   --   --   --   --   --  1.36*  --     136 138 136   < > 134  --   --  133   POTASSIUM 3.6 4.8 3.1* 3.7   < > 5.4*   < >  --  3.9   CHLORIDE 107 108 108 104   < > 105  --   --  102   CO2 24 20 25 26   < > 24  --   --  23   BUN 33* 28 19 35*   < > 46*  --   --  32*   CR 2.27* 2.08* 1.73* 2.67*   < > 2.54*  --   --  2.21*   ANIONGAP 6 8 5 6   < > 6  --   --  8   CLOUMBA 8.7 8.4* 7.3* 8.2*   < > 8.5  --   --  8.5   * 108* 93 103*   < > 152*  --   --  148*   ALBUMIN  --   --   --   --   --  2.7*  --   --  2.6*   PROTTOTAL  --   --   --    --   --  7.2  --   --  7.0   BILITOTAL  --   --   --   --   --  0.5  --   --  0.5   ALKPHOS  --   --   --   --   --  81  --   --  81   ALT  --   --   --   --   --  11  --   --  9   AST  --   --   --   --   --  17  --   --  12    < > = values in this interval not displayed.     No results found for this or any previous visit (from the past 24 hour(s)).

## 2021-06-24 NOTE — PLAN OF CARE
No acute events overnight, patient rested well, remains confused, irritable at times, gets frustrated with questions and choices, sitter at bedside, patient to transfer to TCU.

## 2021-06-24 NOTE — PROGRESS NOTES
"Care Management Follow Up    Length of Stay (days): 9    Expected Discharge Date: 06/26/21     Concerns to be Addressed: discharge planning     Patient plan of care discussed at interdisciplinary rounds: Yes    Anticipated Discharge Disposition: Transitional Care Unit     Anticipated Discharge Services: TCU  Anticipated Discharge DME:  OP HD    Patient/family educated on Medicare website which has current facility and service quality ratings:  Yes  Education Provided on the Discharge Plan:  Yes  Patient/Family in Agreement with the Plan:  Yes    Referrals Placed by CM/SW:    - [facility] *[id number]  Private pay costs discussed: Not applicable    Additional Information:  At request of spouse, SW sent TCU referral to  TCU.    Addend 1320: Per [facility] admissions, will continue to follow - pt needs to remain of 1:1 sitter for 24 hours.   SARMAD called pt's spouse Ailin to discuss. SARMAD informed Ailin that SW sent referral to  TCU, however they may not be able to accommodate him and they may have a long wait list. SARMAD questioned if Ailin would have a second option for TCU. Ailin stated \"if you send him to a nursing home you will kill him.\" SARMAD questioned Ailin on what an alternative option would be. Ailin stated \"I don't know you are the .\" SW informed Ailin that she is declining pt be sent to a community TCU - therefore SW is not sure what an alternative option would be. SW informed Ailin that this SW cannot ensure that  TCU will accept pt. Ailin stated \"yeah I know that.\" After much discussion, Ailin was agreeable to a referral to [facility]. SW will send referral when pt has been off sitter for 24 hours.     Jeanne Cuadra, LANETTE, LGSW  6D Adult Acute Care   Ph:[phone]  Pager [phone]          "

## 2021-06-25 ENCOUNTER — APPOINTMENT (OUTPATIENT)
Dept: OCCUPATIONAL THERAPY | Facility: CLINIC | Age: 71
DRG: 981 | End: 2021-06-25
Payer: MEDICARE

## 2021-06-25 LAB
ANION GAP SERPL CALCULATED.3IONS-SCNC: 10 MMOL/L (ref 3–14)
BUN SERPL-MCNC: 35 MG/DL (ref 7–30)
CALCIUM SERPL-MCNC: 8.8 MG/DL (ref 8.5–10.1)
CHLORIDE SERPL-SCNC: 110 MMOL/L (ref 94–109)
CO2 SERPL-SCNC: 17 MMOL/L (ref 20–32)
CREAT SERPL-MCNC: 2.2 MG/DL (ref 0.66–1.25)
ERYTHROCYTE [DISTWIDTH] IN BLOOD BY AUTOMATED COUNT: 14.3 % (ref 10–15)
GFR SERPL CREATININE-BSD FRML MDRD: 29 ML/MIN/{1.73_M2}
GLUCOSE SERPL-MCNC: 102 MG/DL (ref 70–99)
HCT VFR BLD AUTO: 29.3 % (ref 40–53)
HGB BLD-MCNC: 9.5 G/DL (ref 13.3–17.7)
MCH RBC QN AUTO: 31.3 PG (ref 26.5–33)
MCHC RBC AUTO-ENTMCNC: 32.4 G/DL (ref 31.5–36.5)
MCV RBC AUTO: 96 FL (ref 78–100)
PLATELET # BLD AUTO: 205 10E9/L (ref 150–450)
POTASSIUM SERPL-SCNC: 5.2 MMOL/L (ref 3.4–5.3)
RBC # BLD AUTO: 3.04 10E12/L (ref 4.4–5.9)
SODIUM SERPL-SCNC: 138 MMOL/L (ref 133–144)
WBC # BLD AUTO: 8.9 10E9/L (ref 4–11)

## 2021-06-25 PROCEDURE — 90937 HEMODIALYSIS REPEATED EVAL: CPT

## 2021-06-25 PROCEDURE — 80048 BASIC METABOLIC PNL TOTAL CA: CPT | Performed by: INTERNAL MEDICINE

## 2021-06-25 PROCEDURE — 250N000013 HC RX MED GY IP 250 OP 250 PS 637: Performed by: INTERNAL MEDICINE

## 2021-06-25 PROCEDURE — 250N000011 HC RX IP 250 OP 636: Performed by: INTERNAL MEDICINE

## 2021-06-25 PROCEDURE — 97530 THERAPEUTIC ACTIVITIES: CPT | Mod: GO

## 2021-06-25 PROCEDURE — 250N000013 HC RX MED GY IP 250 OP 250 PS 637: Performed by: PHYSICIAN ASSISTANT

## 2021-06-25 PROCEDURE — 120N000002 HC R&B MED SURG/OB UMMC

## 2021-06-25 PROCEDURE — 97535 SELF CARE MNGMENT TRAINING: CPT | Mod: GO

## 2021-06-25 PROCEDURE — 85027 COMPLETE CBC AUTOMATED: CPT | Performed by: INTERNAL MEDICINE

## 2021-06-25 PROCEDURE — 99233 SBSQ HOSP IP/OBS HIGH 50: CPT | Performed by: PHYSICIAN ASSISTANT

## 2021-06-25 PROCEDURE — 99232 SBSQ HOSP IP/OBS MODERATE 35: CPT | Performed by: INTERNAL MEDICINE

## 2021-06-25 PROCEDURE — 258N000003 HC RX IP 258 OP 636: Performed by: INTERNAL MEDICINE

## 2021-06-25 PROCEDURE — 36416 COLLJ CAPILLARY BLOOD SPEC: CPT | Performed by: INTERNAL MEDICINE

## 2021-06-25 RX ADMIN — QUETIAPINE 25 MG: 25 TABLET ORAL at 21:41

## 2021-06-25 RX ADMIN — NYSTATIN: 100000 OINTMENT TOPICAL at 08:45

## 2021-06-25 RX ADMIN — Medication 2.5 MG: at 05:30

## 2021-06-25 RX ADMIN — ACETAMINOPHEN 1000 MG: 500 TABLET, FILM COATED ORAL at 20:46

## 2021-06-25 RX ADMIN — Medication 1 CAPSULE: at 08:44

## 2021-06-25 RX ADMIN — LIDOCAINE 1 PATCH: 560 PATCH PERCUTANEOUS; TOPICAL; TRANSDERMAL at 20:47

## 2021-06-25 RX ADMIN — SODIUM CHLORIDE 250 ML: 9 INJECTION, SOLUTION INTRAVENOUS at 12:36

## 2021-06-25 RX ADMIN — ACETAMINOPHEN 1000 MG: 500 TABLET, FILM COATED ORAL at 08:44

## 2021-06-25 RX ADMIN — SODIUM CHLORIDE 300 ML: 9 INJECTION, SOLUTION INTRAVENOUS at 12:36

## 2021-06-25 RX ADMIN — OXYCODONE HYDROCHLORIDE 5 MG: 5 TABLET ORAL at 16:58

## 2021-06-25 RX ADMIN — APIXABAN 10 MG: 5 TABLET, FILM COATED ORAL at 08:44

## 2021-06-25 RX ADMIN — HEPARIN SODIUM 3000 UNITS: 1000 INJECTION INTRAVENOUS; SUBCUTANEOUS at 12:36

## 2021-06-25 RX ADMIN — Medication: at 12:36

## 2021-06-25 RX ADMIN — Medication 2.5 MG: at 21:40

## 2021-06-25 RX ADMIN — FOLIC ACID 1 MG: 1 TABLET ORAL at 08:43

## 2021-06-25 RX ADMIN — APIXABAN 10 MG: 5 TABLET, FILM COATED ORAL at 20:46

## 2021-06-25 RX ADMIN — POLYETHYLENE GLYCOL 3350 17 G: 17 POWDER, FOR SOLUTION ORAL at 08:44

## 2021-06-25 NOTE — PROGRESS NOTES
HEMODIALYSIS TREATMENT NOTE    Date: 6/25/2021  Time: 4:25 PM    Data:  Pre Wt:     Desired Wt: 120.5 kg   Post Wt:    Weight change:   kg  Ultrafiltration - Post Run Net Total Removed (mL): 2000 mL  Vascular Access Status: patent  Dialyzer Rinse: Light  Total Blood Volume Processed: 82.6 L Liters  Total Dialysis (Treatment) Time: 3.5 Hours    Lab:       Interventions:  3.75 hours of HD with 2000 mls pulled. I took over patient with 30 min left on run. Hep locked catheter.     Assessment:  ESRD patient in for his regular HD run. VSS      Plan:    HD Monday

## 2021-06-25 NOTE — PROGRESS NOTES
Pt VSS this shift, confusion is clearing up and he is oriented X4, dialysis today with 2 L taken off, wife visited this morning before dialysis, tolerating a diet but when weaver will refuse his meal, appropriate to leave for rehab now, no sitter for the last 32 hours, will cont to mtr

## 2021-06-25 NOTE — PLAN OF CARE
6D PT - Cancel. Pt working with OT in AM. Pt unable to tolerate 2 therapies in the AM. Pt at dialysis in PM during scheduled tech time. Will reschedule.

## 2021-06-25 NOTE — PROGRESS NOTES
Care Management Follow Up    Length of Stay (days): 10    Expected Discharge Date: 06/26/21     Concerns to be Addressed: discharge planning     Patient plan of care discussed at interdisciplinary rounds: Yes    Anticipated Discharge Disposition: Transitional Care     Anticipated Discharge Services: OP HD  Anticipated Discharge DME:  NA    Patient/family educated on Medicare website which has current facility and service quality ratings:  Yes  Education Provided on the Discharge Plan:  Yes  Patient/Family in Agreement with the Plan:  Yes    Referrals Placed by CM/SW:    - [facility] *[id number]  Referral sent, paged admissions regarding referral today 6/25/21    - [facility]  [phone] f: [phone]   Referral sent via Epic   Private pay costs discussed: transportation costs    Additional Information:  Per charge RN, pt has been off sitter for 24 hours, as of 11 am on 6/25/21. SARMAD sent the following referrals.  Per MD, pt is medically ready for discharge today. Per MD, spouse is at bedside and would like to speak with SARMAD.    Addend 1500: SARMAD updated pt's spouse.     Jeanne Cuadra, LANETTE, LGSW  6D Adult Acute Care   Ph:[phone]  Pager [phone]

## 2021-06-25 NOTE — PLAN OF CARE
Problem: Adult Inpatient Plan of Care  Goal: Plan of Care Review  Outcome: Improving  Goal: Patient-Specific Goal (Individualized)  Outcome: Improving  Goal: Absence of Hospital-Acquired Illness or Injury  Outcome: Improving  Intervention: Identify and Manage Fall Risk  Recent Flowsheet Documentation  Taken 6/24/2021 1600 by Roverto Ewing RN  Safety Promotion/Fall Prevention:   bed alarm on   bedside attendant  Taken 6/24/2021 1200 by Roverto Ewing RN  Safety Promotion/Fall Prevention:   bed alarm on   bedside attendant  Taken 6/24/2021 0800 by Roverto Ewing RN  Safety Promotion/Fall Prevention:   bed alarm on   bedside attendant  Intervention: Prevent Skin Injury  Recent Flowsheet Documentation  Taken 6/24/2021 1600 by Roverto Ewing RN  Body Position: weight shifting  Taken 6/24/2021 1200 by Roverto Ewing RN  Body Position: weight shifting  Taken 6/24/2021 0800 by Roverto Ewing RN  Body Position: weight shifting  Intervention: Prevent Infection  Recent Flowsheet Documentation  Taken 6/24/2021 1600 by Roverto Ewing RN  Infection Prevention: environmental surveillance performed  Taken 6/24/2021 1200 by Roverto Ewing RN  Infection Prevention: environmental surveillance performed  Taken 6/24/2021 0800 by Roverto Ewing RN  Infection Prevention: environmental surveillance performed  Goal: Optimal Comfort and Wellbeing  Outcome: Improving  Goal: Readiness for Transition of Care  Outcome: Improving     Problem: Patient Goal: Social Work Focus  Goal: 1. Patient Goal: Care Coordination / Social Work  Outcome: Improving  Goal: 2. Patient Goal: Care Coordination / Social Work Focus  Outcome: Improving  Goal: 3. Patient Goal: Care Coordination / Social Work Focus  Outcome: Improving     Problem: Discharge Planning  Goal: Discharge Planning (Adult, OB, Behavioral, Peds)  Outcome: Improving     Problem: OT General Care Plan  Goal: Transfer (OT)  Description: Transfer (OT)  Outcome: Improving  Goal: Toilet  Transfer/Toileting (OT)  Description: Toilet Transfer/Toileting (OT)  Outcome: Improving  Goal: Cognitive (OT)  Description: Cognitive (OT)  Outcome: Improving

## 2021-06-25 NOTE — PROGRESS NOTES
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 Hospital course c/b newly diagnosed acute T12 compression fx and RLE DVT.     Changes today:  - Disposition planning, appreciate PT/OT/SW assistance  - Continue management of delirium with frequent re-orientation  - Will plan to keep ferguson until Urology follow-up on 7/12 given difficulty of placement    # Submassive PE c/b right heart strain  # DVT of RLE   # Acute hypoxic respiratory failure  P/w hypotension and elevated lactate on admission in the setting of several weeks of dyspnea at home. Initial concern was for sepsis, though infectious work-up was unremarkable. Also noted to have increased LE edema and US confirmed RLE DVT in the distal iliac vein extending into the common femoral vein and occlusive thrombus in popliteal vein. CTA confirmed large PE with right heart strain, and initial TTE on 6/15 showed HD changes related to RV dysfunction.   - Repeat TTE on 6/18 showed improved RV function, plan to continue treatment with AC alone   - Transitioned from heparin gtt to apixiban on 6/19, will continue for at least 3 months  - Remains hemodynamically stable and on room air    # Acute Toxic Metabolic Encephalopathy  # Hx of alcohol abuse  Likely multifactorial w/ alcohol use disorder and hx of withdrawal, acute illness (submassive PE), medications (narcotics for pain control from compression fracture), and delirium all contributing. Per discussion with wife, at baseline patient is oriented x3 but does need assistance with ADLs. His picture seems most consistent with  hospital acquired delirium and is improving daily.  - Continue MVI, folic acid and thiamine.   - Frequent reorientation  - Continue to treat underlying conditions  - Scheduled pain medication on 6/24 given wife report that patient usually underreports pain  - Continue Seroquel 25 mg qHS  - Stop home Valium PRN for anxiety; Hydroxyzine PRN available instead    #Atrial fib/flutter with RVR - [id number] score 3 for age, HTN, and DM2. Takes  mg daily, but not on anticoagulation or rate control prior to admission. Presented with HR 180s on admission, which improved after IVF. EKG showed Afib/flutter with RVR. Now rate controlled on AC.  - Telemetry  - Hold BB with soft BP. HR currently within goal <110, can start beta blocker PRN should it be indicated  - Hold  mg on discharge while anticoagulated for PE    #T12 compression fx   Reported low back pain for the last 1-2 weeks with multiple recurrent falls (due to weakness and unsteady gait). CT lumbar spine with acute compression fx T12, without any retropulsion.   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 Puente. Patient does still make urine.   - Nephrology consulted, appreciate recs   - May require x3/weekly dialysis moving forward given hypotension/IV contrast administration this admission, will monitor UOP dailiy  - BMP/Mg/Phos daily  - I&O and daily weights   - IR consult for catheter replacement, replaced on 6/17  - Diet liberalized on 6/22 per patients' wife request    # Hx of HTN  Previously on medications but  discontinued after lifestyle modifications with weight loss several years ago    # DM2- diet controlled.   - Hemoglobin A1c 5.2 on admission  - Monitor BGs with BMP    # Neurogenic bladder   # Phimosis  Follows with urology. S/p botox in past. Not on any medications due to cost.   - Nursing staff unable to place ferguson catheter  - Urology consulted for ferguson placement, appreciate assistance, placed on 6/19  - Discussed plan for TOV prior to discharge; given difficulty of placement, will plan to keep ferguson on discharge with plan for TOV on follow-up visit on 7/12 with Urology as outpatient.    # Chronic anemia - Baseline 11.0s. . B12/Folate/TSH all within normal limits.  - Hgb downtrending 9-10 this hospitalization  - Trend CBC    # Anxiety -   - Hold home Valium for anxiety  - Trial low-dose Hydroxyzine instead       Diet:   Low K, Low Phos  DVT Prophylaxis: Eliquis  Ferguson Catheter: PRESENT, indication: Insertion Difficulty(immobilization)  Central Lines: PRESENT  CVC Double Lumen Right Internal jugular Tunneled;Non - valved (open ended)-Site Assessment: WDL  Code Status: Full Code      Disposition Plan   Expected discharge: medically ready for discharge, recommended to transitional care unit once safe disposition plan/ TCU bed available.     The patient's care was discussed with the Bedside Nurse, Care Coordinator/, Patient and Pharmacy..    Yunier Polo MD  Hospitalist Service  [facility]  Securely message with the Vocera Web Console (learn more here)  Text page via [employer] Paging/Directory  Please see sign in/sign out for up to date coverage information    Risk Factors Present on Admission                ______________________________________________________________________    Interval History   Doing much better today. Oriented to self, place, and time. States his pain is pretty well controlled in his low back. Biggest source of pain in the  top of his back brace which is digging into his neck. Otherwise no abdominal pain, nausea, or vomiting. No fevers/chills overnight. Understands the plan is to discharge to TCU. He has no further concerns at this time. Plan also discussed with wife Ailin, who is at bedside.    Data reviewed today: I reviewed all medications, new labs and imaging results over the last 24 hours. I personally reviewed no images or EKG's today.    Physical Exam   Vital Signs: Temp: 98.4  F (36.9  C) Temp src: Oral BP: 128/73 Pulse: 85   Resp: 12 SpO2: 99 % O2 Device: None (Room air)    Weight: 272 lbs 4.29 oz  General Appearance: Pleasant, conversant, oriented x3, interactive with exam, seen while on recliner chair.  Respiratory: Breathing comfortably on room air, lungs clear to auscultation bilaterally.  Cardiovascular: Irregularly irregular rate and rhythm, no m/r/g.  GI: soft, non-distended, non-tender to palpation, BS+  Skin: no skin rash, pallor is present.  Other: Trace edema bilaterally,     Data   Recent Labs   Lab 06/25/21  0949 06/24/21  0739 06/23/21  1005 06/19/21  0811 06/19/21  0811   WBC 8.9 7.2 9.2   < >  --    HGB 9.5* 9.5* 9.6*   < >  --    MCV 96 100 93   < >  --     212 174   < >  --     138 136   < > 134   POTASSIUM 5.2 3.6 4.8   < > 5.4*   CHLORIDE 110* 107 108   < > 105   CO2 17* 24 20   < > 24   BUN 35* 33* 28   < > 46*   CR 2.20* 2.27* 2.08*   < > 2.54*   ANIONGAP 10 6 8   < > 6   COLUMBA 8.8 8.7 8.4*   < > 8.5   * 119* 108*   < > 152*   ALBUMIN  --   --   --   --  2.7*   PROTTOTAL  --   --   --   --  7.2   BILITOTAL  --   --   --   --  0.5   ALKPHOS  --   --   --   --  81   ALT  --   --   --   --  11   AST  --   --   --   --  17    < > = values in this interval not displayed.     No results found for this or any previous visit (from the past 24 hour(s)).

## 2021-06-25 NOTE — PLAN OF CARE
No major events overnight, pt awake on and off through the night, remains confused, wakes up and thinks he misses a meal, attempts to reorient patient to time/situation, patient to have HD today at approximately 12pm according to HD RN, wife given this information/updated.     See flowsheets and MAR for further details.

## 2021-06-25 NOTE — PROGRESS NOTES
Patient received awake in bed at 0700, 1:1 sitter at bedside. Although confused and disoriented to time, patient alert and appropriate at that time. Patient 1:1 sitter discontinued at 11am. Patient up to bedside chair using ceiling lift for transfer this shiftr. Patient currently asleep in bed, no acute changes in status noted. Continue plan of care.

## 2021-06-26 ENCOUNTER — APPOINTMENT (OUTPATIENT)
Dept: MRI IMAGING | Facility: CLINIC | Age: 71
DRG: 981 | End: 2021-06-26
Attending: INTERNAL MEDICINE
Payer: MEDICARE

## 2021-06-26 LAB
ANION GAP SERPL CALCULATED.3IONS-SCNC: 6 MMOL/L (ref 3–14)
BUN SERPL-MCNC: 19 MG/DL (ref 7–30)
CALCIUM SERPL-MCNC: 8.3 MG/DL (ref 8.5–10.1)
CHLORIDE SERPL-SCNC: 102 MMOL/L (ref 94–109)
CO2 SERPL-SCNC: 28 MMOL/L (ref 20–32)
CREAT SERPL-MCNC: 1.87 MG/DL (ref 0.66–1.25)
ERYTHROCYTE [DISTWIDTH] IN BLOOD BY AUTOMATED COUNT: 14.1 % (ref 10–15)
GFR SERPL CREATININE-BSD FRML MDRD: 36 ML/MIN/{1.73_M2}
GLUCOSE SERPL-MCNC: 86 MG/DL (ref 70–99)
HCT VFR BLD AUTO: 27.2 % (ref 40–53)
HGB BLD-MCNC: 8.6 G/DL (ref 13.3–17.7)
MCH RBC QN AUTO: 31.3 PG (ref 26.5–33)
MCHC RBC AUTO-ENTMCNC: 31.6 G/DL (ref 31.5–36.5)
MCV RBC AUTO: 99 FL (ref 78–100)
PLATELET # BLD AUTO: 206 10E9/L (ref 150–450)
POTASSIUM SERPL-SCNC: 3.4 MMOL/L (ref 3.4–5.3)
RBC # BLD AUTO: 2.75 10E12/L (ref 4.4–5.9)
SODIUM SERPL-SCNC: 136 MMOL/L (ref 133–144)
WBC # BLD AUTO: 7.6 10E9/L (ref 4–11)

## 2021-06-26 PROCEDURE — 250N000013 HC RX MED GY IP 250 OP 250 PS 637: Performed by: PHYSICIAN ASSISTANT

## 2021-06-26 PROCEDURE — 72148 MRI LUMBAR SPINE W/O DYE: CPT | Mod: 26 | Performed by: RADIOLOGY

## 2021-06-26 PROCEDURE — 36415 COLL VENOUS BLD VENIPUNCTURE: CPT | Performed by: INTERNAL MEDICINE

## 2021-06-26 PROCEDURE — 250N000013 HC RX MED GY IP 250 OP 250 PS 637: Performed by: INTERNAL MEDICINE

## 2021-06-26 PROCEDURE — 85027 COMPLETE CBC AUTOMATED: CPT | Performed by: INTERNAL MEDICINE

## 2021-06-26 PROCEDURE — 120N000002 HC R&B MED SURG/OB UMMC

## 2021-06-26 PROCEDURE — G1004 CDSM NDSC: HCPCS

## 2021-06-26 PROCEDURE — 80048 BASIC METABOLIC PNL TOTAL CA: CPT | Performed by: INTERNAL MEDICINE

## 2021-06-26 PROCEDURE — G1004 CDSM NDSC: HCPCS | Mod: GC | Performed by: RADIOLOGY

## 2021-06-26 PROCEDURE — 72148 MRI LUMBAR SPINE W/O DYE: CPT | Mod: ME

## 2021-06-26 PROCEDURE — 99232 SBSQ HOSP IP/OBS MODERATE 35: CPT | Performed by: INTERNAL MEDICINE

## 2021-06-26 RX ADMIN — ACETAMINOPHEN 1000 MG: 500 TABLET, FILM COATED ORAL at 20:09

## 2021-06-26 RX ADMIN — DOCUSATE SODIUM 50 MG AND SENNOSIDES 8.6 MG 2 TABLET: 8.6; 5 TABLET, FILM COATED ORAL at 15:24

## 2021-06-26 RX ADMIN — OXYCODONE HYDROCHLORIDE 5 MG: 5 TABLET ORAL at 10:04

## 2021-06-26 RX ADMIN — NYSTATIN: 100000 OINTMENT TOPICAL at 10:03

## 2021-06-26 RX ADMIN — ACETAMINOPHEN 1000 MG: 500 TABLET, FILM COATED ORAL at 10:01

## 2021-06-26 RX ADMIN — APIXABAN 10 MG: 5 TABLET, FILM COATED ORAL at 10:02

## 2021-06-26 RX ADMIN — HYDROXYZINE HYDROCHLORIDE 25 MG: 25 TABLET, FILM COATED ORAL at 05:38

## 2021-06-26 RX ADMIN — Medication 2.5 MG: at 05:38

## 2021-06-26 RX ADMIN — Medication 2.5 MG: at 22:44

## 2021-06-26 RX ADMIN — Medication 2.5 MG: at 15:24

## 2021-06-26 RX ADMIN — QUETIAPINE FUMARATE 25 MG: 25 TABLET ORAL at 20:09

## 2021-06-26 RX ADMIN — APIXABAN 5 MG: 5 TABLET, FILM COATED ORAL at 20:09

## 2021-06-26 RX ADMIN — FOLIC ACID 1 MG: 1 TABLET ORAL at 10:02

## 2021-06-26 RX ADMIN — POLYETHYLENE GLYCOL 3350 17 G: 17 POWDER, FOR SOLUTION ORAL at 10:01

## 2021-06-26 RX ADMIN — NYSTATIN: 100000 OINTMENT TOPICAL at 20:20

## 2021-06-26 RX ADMIN — ACETAMINOPHEN 1000 MG: 500 TABLET, FILM COATED ORAL at 15:24

## 2021-06-26 ASSESSMENT — ACTIVITIES OF DAILY LIVING (ADL)
ADLS_ACUITY_SCORE: 20
ADLS_ACUITY_SCORE: 19
ADLS_ACUITY_SCORE: 18
ADLS_ACUITY_SCORE: 20

## 2021-06-26 NOTE — PROGRESS NOTES
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 Hospital course c/b newly diagnosed acute T12 compression fx and RLE DVT.     Changes today:  - Disposition planning, appreciate PT/OT/SW assistance  - Bowel regimen  - MRI of the Lumbar Spine to evaluate for any cord compression given pt's weakness in RLE    # Submassive PE c/b right heart strain  # DVT of RLE   # Acute hypoxic respiratory failure  P/w hypotension and elevated lactate on admission in the setting of several weeks of dyspnea at home. Initial concern was for sepsis, though infectious work-up was unremarkable. Also noted to have increased LE edema and US confirmed RLE DVT in the distal iliac vein extending into the common femoral vein and occlusive thrombus in popliteal vein. CTA confirmed large PE with right heart strain, and initial TTE on 6/15 showed HD changes related to RV dysfunction.   - Repeat TTE on 6/18 showed improved RV function, plan to continue treatment with AC alone   - Transitioned from heparin gtt to apixiban on 6/19, will continue for at least 3 months  - Remains hemodynamically stable and on room air    #T12 compression fx   Reported low back pain for the last 1-2 weeks with multiple recurrent falls (due to weakness and unsteady gait). CT lumbar spine with acute compression fx T12, without any retropulsion.   - TLSO brace in place when out of bed  - Pain control as below:   - APAP TID, Lidocaine patch, Voltaren gel   - Low dose Oxycodone scheduled on 6/24 given wife reports pt underreports pain   - PRN Oxycodone, IV Dilaudid for  severe refractory pain  - PT/OT consult, recommending discharge to TCU  - Given patient reports weakness/pain in RLE ~11 days after injury, will obtain MRI to ensure no cord compression, but probability of this is low at this time.    # Acute Toxic Metabolic Encephalopathy  # Hx of alcohol abuse  Likely multifactorial w/ alcohol use disorder and hx of withdrawal, acute illness (submassive PE), medications (narcotics for pain control from compression fracture), and ESRD requiring iHD, w/ delirium all contributing. Per discussion with wife, at baseline patient is oriented x3 but does need assistance with ADLs. His picture seems most consistent with hospital acquired delirium and is improving daily.  - Continue MVI, folic acid and thiamine.   - Frequent reorientation  - Continue to treat underlying conditions  - Scheduled pain medication on 6/24 given wife report that patient usually underreports pain  - Continue Seroquel 25 mg qHS  - Stop home Valium PRN for anxiety; Hydroxyzine PRN available instead    #Atrial fib/flutter with RVR - [id number] score 3 for age, HTN, and DM2. Takes  mg daily, but not on anticoagulation or rate control prior to admission. Presented with HR 180s on admission, which improved after IVF. EKG showed Afib/flutter with RVR. Now rate controlled on AC.  - Telemetry  - Hold BB with soft BP. 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 Puente. Patient does still make urine.   - Nephrology consulted, appreciate recs   - Continue to dialyze x2/weekly  - BMP/Mg/Phos daily  - I&O and daily weights   - IR consult for catheter replacement, replaced on 6/17  - Diet liberalized on 6/22 per patients' wife request    # Hx of HTN  Previously on medications but  discontinued after lifestyle modifications with weight loss several years ago    # DM2- diet controlled.   - Hemoglobin A1c 5.2 on admission  - Monitor BGs with BMP    # Neurogenic bladder   # Phimosis  Follows with urology. S/p botox in past. Not on any medications due to cost.   - Nursing staff unable to place ferguson catheter  - Urology consulted for ferguson placement, appreciate assistance, placed on 6/19  - Discussed plan for TOV prior to discharge; given difficulty of placement, will plan to keep ferguson on discharge with plan for TOV on follow-up visit on 7/12 with Urology as outpatient.    # Chronic anemia - Baseline 11.0s. . B12/Folate/TSH all within normal limits.  - Hgb downtrending 9-10 this hospitalization  - Trend CBC    # Anxiety -   - Hold home Valium for anxiety  - Trial low-dose Hydroxyzine instead         Diet: 2 Gram K Diet    DVT Prophylaxis: Eliquis  Ferguson Catheter: PRESENT, indication: Strict 1-2 Hour I&O  Central Lines: PRESENT  CVC Double Lumen Right Internal jugular Tunneled;Non - valved (open ended)-Site Assessment: WDL  Code Status: Full Code      Disposition Plan   Expected discharge: 2 - 3 days, recommended to transitional care unit once safe disposition plan/ TCU bed available.     The patient's care was discussed with the Bedside Nurse and Patient.    Yunier Polo MD  Hospitalist Service, [address]  Securely message with the Vocera Web Console (learn more here)  Text page via [employer] Paging/Directory  Please see sign in/sign out for up to date coverage information    Risk Factors Present on Admission                ______________________________________________________________________    Interval History   NAEO. Tolerated HD without difficulty. Recieved 0 PRNs for pain over past 24 hours.    Patient reports feeling tired out after all the activity yesterday including PT. Denies any confusion and is aware of why he's in the  hospital. Also aware of plans to pursue discharge to rehab after his hospitalization. Pt denies any fevers/chills, chest pain, or shortness of breath. He does endorse pretty severe pain in his RLE compared with the LLE with associated weakness with hip flexion. States that it's a combination of pain and weakness in that leg. No bladder/bowel incontinence (though has ferguson in place) or saddle anesthesia.    Data reviewed today: I reviewed all medications, new labs and imaging results over the last 24 hours. I personally reviewed no images or EKG's today.    Physical Exam   Vital Signs: Temp: 95.9  F (35.5  C) Temp src: Oral BP: 128/72 Pulse: 85   Resp: 16 SpO2: 98 % O2 Device: None (Room air)    Weight: 272 lbs 4.29 oz  General Appearance: Alert, oriented x4, no distress, pleasant and conversant, most clear mentally that he's been this hospitalization  Respiratory: Breathing comfortably on room air, lungs clear to auscultation bilaterally.  Cardiovascular: RRR, no m/r/g. Radial pulses 2+ and symmetric.  GI: soft, non-distended, non-tender to palpation, BS+  Skin: warm, dry, no skin rash  Other: There is R>L lower extremity weakness with hip and knee flexion. Plantar/dorsiflexion is 5/5 and symmetric bilaterally. Sensation intact to light touch in the lower extremities bilaterally.     Data   Recent Labs   Lab 06/26/21  1244 06/25/21  0949 06/24/21  0739   WBC 7.6 8.9 7.2   HGB 8.6* 9.5* 9.5*   MCV 99 96 100    205 212    138 138   POTASSIUM 3.4 5.2 3.6   CHLORIDE 102 110* 107   CO2 28 17* 24   BUN 19 35* 33*   CR 1.87* 2.20* 2.27*   ANIONGAP 6 10 6   COLUMBA 8.3* 8.8 8.7   GLC 86 102* 119*     No results found for this or any previous visit (from the past 24 hour(s)).

## 2021-06-26 NOTE — PLAN OF CARE
"/55   Pulse 69   Temp 97.2  F (36.2  C) (Oral)   Resp 14   Wt 123.5 kg (272 lb 4.3 oz)   SpO2 98%   BMI 34.96 kg/m      Time 2628-7659      Reason for admission: Lactic acidosis, Atrial fibrillation with rapid ventricular response, Closed wedge compression fracture of T12 vertebra   Vitals: VSS on RA  Activity: Did not get OOB, A2 for repositioning  Pain: c/o back pain, managed with PO oxy  Neuro: A&Ox4, intermittently forgetful  Mood/Behavior: calm, cooperative  Cardiac: WNL  Respiratory: WNL  GI/: No BM, ferguson in place  Diet:Regular~poor appetite  Lines: PIV x2      New changes this shift: MRI ordered, checklist completed and sent. Pt slept majority of shift. Encouragement needed to try and eat but pt only tolerated less than 25%, pt stated he \"feels full\". PRN stool softener given as pt reports \"it has been a few days.\" Skin tear on R flank wound care done this evening, new mepilex in place.       Plan: Awaiting TCU acceptance. Continue to monitor and follow POC.          "

## 2021-06-26 NOTE — PLAN OF CARE
"Shift time: 3437-3463.    Neuro: alert. Oriented x4, however forgetful and seems to be slightly confused at times.   VS: VSS on RA.   Pain: denies pain, pain meds are scheduled.   Diet: 2 g K diet  Act: lift, Ax2. Did not get OOB.   Lines: 2 PIV SL.. CVC WNL.  Skin: blanchable redness and scab on bottom. Excoriation in groin area. Mepilex on back covering sores.  Cardiovascular: X patient on tele, erma//beto (MD notified). This is not new per notes.   Resp: Denies SOB. On RA. LS clear.   GI/: ferguson cath in place (placed by urology- will follow up with urology). Pt states that he has not had a BM in a \"few days.\"     New this shift: beto read on tele strip, MD paged. Gave scheduled oxycodone this morning.      "

## 2021-06-26 NOTE — PROGRESS NOTES
Pt arrived to the unit at 2240 from 6D via bed. Pt tolerated transfer well. 4 eyes completed with staff COREY Rhodes and Ernestina.     Julia Ashford RN on 6/25/2021 at 10:56 PM

## 2021-06-27 LAB
ANION GAP SERPL CALCULATED.3IONS-SCNC: 6 MMOL/L (ref 3–14)
BUN SERPL-MCNC: 28 MG/DL (ref 7–30)
CALCIUM SERPL-MCNC: 8.5 MG/DL (ref 8.5–10.1)
CHLORIDE SERPL-SCNC: 105 MMOL/L (ref 94–109)
CO2 SERPL-SCNC: 25 MMOL/L (ref 20–32)
CREAT SERPL-MCNC: 2.22 MG/DL (ref 0.66–1.25)
ERYTHROCYTE [DISTWIDTH] IN BLOOD BY AUTOMATED COUNT: 14.2 % (ref 10–15)
GFR SERPL CREATININE-BSD FRML MDRD: 29 ML/MIN/{1.73_M2}
GLUCOSE SERPL-MCNC: 129 MG/DL (ref 70–99)
HCT VFR BLD AUTO: 27.3 % (ref 40–53)
HGB BLD-MCNC: 8.8 G/DL (ref 13.3–17.7)
MCH RBC QN AUTO: 30.9 PG (ref 26.5–33)
MCHC RBC AUTO-ENTMCNC: 32.2 G/DL (ref 31.5–36.5)
MCV RBC AUTO: 96 FL (ref 78–100)
PLATELET # BLD AUTO: 218 10E9/L (ref 150–450)
POTASSIUM SERPL-SCNC: 4.3 MMOL/L (ref 3.4–5.3)
RBC # BLD AUTO: 2.85 10E12/L (ref 4.4–5.9)
SODIUM SERPL-SCNC: 136 MMOL/L (ref 133–144)
WBC # BLD AUTO: 6.9 10E9/L (ref 4–11)

## 2021-06-27 PROCEDURE — 250N000013 HC RX MED GY IP 250 OP 250 PS 637: Performed by: INTERNAL MEDICINE

## 2021-06-27 PROCEDURE — 85027 COMPLETE CBC AUTOMATED: CPT | Performed by: INTERNAL MEDICINE

## 2021-06-27 PROCEDURE — 99232 SBSQ HOSP IP/OBS MODERATE 35: CPT | Performed by: INTERNAL MEDICINE

## 2021-06-27 PROCEDURE — 80048 BASIC METABOLIC PNL TOTAL CA: CPT | Performed by: INTERNAL MEDICINE

## 2021-06-27 PROCEDURE — 36416 COLLJ CAPILLARY BLOOD SPEC: CPT | Performed by: INTERNAL MEDICINE

## 2021-06-27 PROCEDURE — 120N000002 HC R&B MED SURG/OB UMMC

## 2021-06-27 PROCEDURE — 250N000013 HC RX MED GY IP 250 OP 250 PS 637: Performed by: PHYSICIAN ASSISTANT

## 2021-06-27 RX ADMIN — FOLIC ACID 1 MG: 1 TABLET ORAL at 10:43

## 2021-06-27 RX ADMIN — Medication 1 CAPSULE: at 10:43

## 2021-06-27 RX ADMIN — NYSTATIN: 100000 OINTMENT TOPICAL at 21:13

## 2021-06-27 RX ADMIN — ACETAMINOPHEN 1000 MG: 500 TABLET, FILM COATED ORAL at 21:00

## 2021-06-27 RX ADMIN — QUETIAPINE 25 MG: 25 TABLET ORAL at 21:00

## 2021-06-27 RX ADMIN — APIXABAN 5 MG: 5 TABLET, FILM COATED ORAL at 10:43

## 2021-06-27 RX ADMIN — Medication 2.5 MG: at 06:29

## 2021-06-27 RX ADMIN — LIDOCAINE 1 PATCH: 560 PATCH PERCUTANEOUS; TOPICAL; TRANSDERMAL at 22:11

## 2021-06-27 RX ADMIN — Medication 2.5 MG: at 21:00

## 2021-06-27 RX ADMIN — APIXABAN 5 MG: 5 TABLET, FILM COATED ORAL at 21:01

## 2021-06-27 RX ADMIN — ACETAMINOPHEN 1000 MG: 500 TABLET, FILM COATED ORAL at 14:55

## 2021-06-27 RX ADMIN — OXYCODONE HYDROCHLORIDE 5 MG: 5 TABLET ORAL at 22:36

## 2021-06-27 RX ADMIN — NYSTATIN: 100000 OINTMENT TOPICAL at 10:43

## 2021-06-27 RX ADMIN — Medication 2.5 MG: at 14:55

## 2021-06-27 RX ADMIN — POLYETHYLENE GLYCOL 3350 17 G: 17 POWDER, FOR SOLUTION ORAL at 10:42

## 2021-06-27 RX ADMIN — ACETAMINOPHEN 1000 MG: 500 TABLET, FILM COATED ORAL at 10:43

## 2021-06-27 ASSESSMENT — ACTIVITIES OF DAILY LIVING (ADL)
ADLS_ACUITY_SCORE: 19
ADLS_ACUITY_SCORE: 18
ADLS_ACUITY_SCORE: 19
ADLS_ACUITY_SCORE: 19

## 2021-06-27 NOTE — PLAN OF CARE
"/64 (BP Location: Right arm)   Pulse 86   Temp 97.4  F (36.3  C) (Oral)   Resp 16   Wt 123.5 kg (272 lb 4.3 oz)   SpO2 100%   BMI 34.96 kg/m      Time 1721-0220      Reason for admission: Lactic acidosis, Atrial fibrillation with rapid ventricular response, Closed wedge compression fracture of T12 vertebra  Vitals: VSS on RA  Activity: A2 for repositioning  Pain: c/o back pain, managed with scheduled pain meds  Neuro: A&Ox4, speech logical  Mood/Behavior: calm, cooperative  Cardiac: WNL  Respiratory: WNL  GI/: Incontinent of BM x1, Ferguson in place w/ minimal UOP~pt on HD  Diet:Regular- poor appetite  Lines: PIV       New changes this shift: Tele dc'd. Pt c/o \"burning\" sensation \"in bladder\" and feels the urge to urinate even though ferguson in place, MD paged~no response~will continue to monitor symptoms and UOP. Writer did bladder scan which only 54ml was noted.       Plan: Pending TCU placement. Continue to monitor and follow POC.          "

## 2021-06-27 NOTE — PROGRESS NOTES
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 Hospital course c/b newly diagnosed acute T12 compression fx and RLE DVT.     Changes today:  - Disposition planning, appreciate PT/OT/SW assistance  - Bowel regimen  - MRI shows no cord compression, continue pain control and use of brace when up  - Discontinue telemetry    # Submassive PE c/b right heart strain  # DVT of RLE   # Acute hypoxic respiratory failure  P/w hypotension and elevated lactate on admission in the setting of several weeks of dyspnea at home. Initial concern was for sepsis, though infectious work-up was unremarkable. Also noted to have increased LE edema and US confirmed RLE DVT in the distal iliac vein extending into the common femoral vein and occlusive thrombus in popliteal vein. CTA confirmed large PE with right heart strain, and initial TTE on 6/15 showed HD changes related to RV dysfunction.   - Repeat TTE on 6/18 showed improved RV function, plan to continue treatment with AC alone rather than pursue angiography/filter placement  - Transitioned from heparin gtt to apixiban on 6/19, will continue for at least 3 months   - Now on 5 mg Eliquis BID as of 6/27  - Remains hemodynamically stable and on room air    #T12 compression fx   Reported low back pain for the last 1-2 weeks with multiple recurrent falls (due to weakness and unsteady gait). CT lumbar spine with acute compression fx T12, without any retropulsion.   - MRI performed on 6/26 due to patient RLE > LLE weakness, though exam likely limited by pain - results show T12  compression fracture without cord compression/acute findings, will continue conservative management  - TLSO brace in place when out of bed  - Pain control as below:   - APAP TID, Lidocaine patch, Voltaren gel   - Low dose Oxycodone scheduled on 6/24 given wife reports pt underreports pain   - PRN Oxycodone, IV Dilaudid for severe refractory pain  - PT/OT consult, recommending discharge to TCU    # Acute Toxic Metabolic Encephalopathy  # Hx of alcohol abuse  Likely multifactorial w/ alcohol use disorder and hx of withdrawal, acute illness (submassive PE), medications (narcotics for pain control from compression fracture), and ESRD requiring iHD, w/ delirium all contributing. Per discussion with wife, at baseline patient is oriented x3 but does need assistance with ADLs. His picture seems most consistent with hospital acquired delirium and is improving daily.  - Continue MVI, folic acid and thiamine.   - Frequent reorientation  - Continue to treat underlying conditions  - Scheduled pain medication on 6/24   - Continue Seroquel 25 mg qHS  - Stop home Valium PRN for anxiety; Hydroxyzine PRN available instead    #Atrial fib/flutter with RVR - [id number] score 3 for age, HTN, and DM2. Takes  mg daily, but not on anticoagulation or rate control prior to admission. Presented with HR 180s on admission, which improved after IVF. EKG showed Afib/flutter with RVR. Now rate controlled on AC.  - Hold BB with soft BP. 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 Puente. Patient does still make urine.   - Nephrology consulted, appreciate recs   - Continue to dialyze x2/weekly  - BMP/Mg/Phos daily  - I&O and daily weights   - IR consult for catheter  replacement, replaced on 6/17  - Diet liberalized per pt request    # Hx of HTN  Previously on medications but discontinued after lifestyle modifications with weight loss several years ago    # DM2- diet controlled.   - Hemoglobin A1c 5.2 on admission  - Monitor BGs with BMP    # Neurogenic bladder   # Phimosis  Follows with urology. S/p botox in past. Not on any medications due to cost.   - Nursing staff unable to place ferguson catheter  - Urology consulted for ferguson placement, appreciate assistance, placed on 6/19  - Discussed plan for TOV prior to discharge; given difficulty of placement, will plan to keep ferguson on discharge with plan for TOV on follow-up visit on 7/12 with Urology as outpatient.    # Chronic anemia - Baseline 11.0s. . B12/Folate/TSH all within normal limits.  - Hgb downtrending 9-10 this hospitalization  - Trend CBC    # Anxiety -   - Hold home Valium for anxiety  - Trial low-dose Hydroxyzine instead           Diet: Regular Diet Adult    DVT Prophylaxis: Eliquis  Ferguson Catheter: PRESENT, indication: Strict 1-2 Hour I&O  Central Lines: PRESENT  CVC Double Lumen Right Internal jugular Tunneled;Non - valved (open ended)-Site Assessment: WDL  Code Status: Full Code      Disposition Plan   Expected discharge: medically ready for discharge to TCU, recommended to transitional care unit once safe disposition plan/ TCU bed available.     The patient's care was discussed with the Bedside Nurse, Patient and Patient's Family.    Yunier Polo MD  Hospitalist Service, [address]  Securely message with the Vocera Web Console (learn more here)  Text page via [employer] Paging/Directory  Please see sign in/sign out for up to date coverage information    Risk Factors Present on Admission                ______________________________________________________________________    Interval History   DONNY Monroe states he is doing well today. States the MRI  "was like \"being in a time warp\" but otherwise tolerated it fine. Pain in his back is well controlled today. Appetite good, denies any nausea/vomiting. Aware that we continue to work on plan for discharge to rehab but that this is limited by the weekend.    Data reviewed today: I reviewed all medications, new labs and imaging results over the last 24 hours. I personally reviewed no images or EKG's today.    Physical Exam   Vital Signs: Temp: 96.3  F (35.7  C) Temp src: Axillary BP: 118/67 Pulse: 64   Resp: 16 SpO2: 96 % O2 Device: None (Room air)    Weight: 272 lbs 4.29 oz  General Appearance: Resting comfortably, no acute distress, A&O x3  Respiratory: Breathing comfortably on room air, lungs clear to auscultation bilaterally.  Cardiovascular: Irregularly irregular, no m/r/g. Radial pulses 2+ and symmetric.  GI: non-distended, non-tender to palpation, BS+, no guarding/rebound  Skin: warm, dry, no skin rash.  Other: Strength with hip flexion improved in RLE today c/w yesterday, less painful as well. Plantar/dosrifelxion of the LLE/RLE is 5/5 and symmetric. Sensation to light touch intact.     Data   Recent Labs   Lab 06/26/21  1244 06/25/21  0949 06/24/21  0739   WBC 7.6 8.9 7.2   HGB 8.6* 9.5* 9.5*   MCV 99 96 100    205 212    138 138   POTASSIUM 3.4 5.2 3.6   CHLORIDE 102 110* 107   CO2 28 17* 24   BUN 19 35* 33*   CR 1.87* 2.20* 2.27*   ANIONGAP 6 10 6   COLUMBA 8.3* 8.8 8.7   GLC 86 102* 119*     Recent Results (from the past 24 hour(s))   MR Lumbar Spine w/o Contrast    Narrative    MR LUMBAR SPINE W/O CONTRAST 6/26/2021 7:35 PM    Provided History: Low back pain, trauma    ICD-10:    Comparison: CT L-spine 6/16/2021    Technique: Sagittal T1-weighted, sagittal STIR, 3D volumetric axial  and sagittal reconstructed T2-weighted images of the lumbar spine were  obtained without intravenous contrast.     Findings: There are 5 lumbar-type vertebrae assumed for the purposes  of this dictation.  The tip of " the conus medullaris is at L1.  There  is grade 1 retrolisthesis of L2 on L3.. Multiple Schmorl's nodes.  There is a T1 and T2 hyperintense focus within the L2 vertebral body,  likely representing a benign process such as focal fatty deposition or  a hemangioma .  Acute compression fracture of the T12 vertebral body  with associated edema and approximately 50% vertebral body height  loss.     On a level by level basis:    T11-T12: No significant retropulsion of T12 vertebral body fracture  fragments. No significant spinal canal stenosis.    T12-L1: No spinal canal or neuroforaminal stenosis.    L1-2: Mild facet arthropathy bilaterally. Ligamentum flavum  thickening. No spinal canal or neuroforaminal stenosis.    L2-3: Facet arthropathy bilaterally. Ligamentum flavum thickening.  Tiny posterior disc bulge. Mild neural foraminal stenosis bilaterally.  Spinal canal is patent.    L3-4: Facet arthropathy bilaterally. Ligamentum flavum thickening.  Mild to moderate posterior disc bulge. Mild spinal canal stenosis.  Mild left-sided and mild to moderate right-sided neural foraminal  stenosis.    L4-5: Facet arthropathy bilaterally. Ligamentum flavum thickening.  Small posterior disc bulge. No spinal canal or neural foraminal  stenosis.    L5-S1: Facet arthropathy bilaterally. Right eccentric posterior disc  bulge narrows the right lateral recess and abuts upon the traversing  right S1 nerve root.. No spinal canal stenosis. Mild bilateral neural  foraminal stenosis.    Paraspinous tissues are within normal limits.      Impression    Impression:   1. Redemonstration of acute T12 vertebral body compression fracture  with approximately 50% vertebral body height loss.  2. Mild spondylosis most probably at L3-L4 with mild left-sided and  mild to moderate right-sided neural foraminal stenosis. Further  degenerative disease as described above.    I have personally reviewed the examination and initial interpretation  and I agree  with the findings.    DENISE LONDONO MD

## 2021-06-27 NOTE — PLAN OF CARE
Shift time: 0132-8825.    Neuro: Alert, disoriented to time, and intermittently place//forgetful. Pt stated that he was at [facility] (easily redirectable). Also appears to be confused about the year at times.   VS: VSS on RA.   Pain: c/o pain in his back- gave scheduled oxycodone and tylenol. Declined lidocaine patch tonight.   Diet: Regular diet, poor appetite.   Act: lift, did not get OOB.   Lines: PIV SL. CVC WNL.   Skin: excoriated oniel area- nystatin cream applied. Dressing change on R flank performed 6/26.   Cardiovascular: on tele, aflutter/afib.   Resp: Denies shortness of breath. On RA. LS clear.   GI/: ferguson in place (placed by urology, will follow up outpt?). Urine dark/thick. Due for BM stool softeners given yesterday.     New this shift: started eliquis tonight. MRI of lumbar spine to eval for cord compression this 6/26. Awaiting TCU acceptance/placement.      COVID-19 ruled out COVID-19 ruled out

## 2021-06-27 NOTE — PROGRESS NOTES
Care Management Follow Up    Length of Stay (days): 12    Expected Discharge Date: 06/26/21     Concerns to be Addressed: discharge planning       Anticipated Discharge Disposition: Transitional Care, Outpatient Dialysis      Additional Information:  Contacted Naina at [facility] and she indicated that they had not reviewed the assessment information and would do that today and page me. I did not receive any f/u from her today. Will ask Unit SW to f/u tomorrow.      Ailin Andre, [facility]   [phone]

## 2021-06-28 ENCOUNTER — APPOINTMENT (OUTPATIENT)
Dept: OCCUPATIONAL THERAPY | Facility: CLINIC | Age: 71
DRG: 981 | End: 2021-06-28
Payer: MEDICARE

## 2021-06-28 LAB
ALBUMIN UR-MCNC: 300 MG/DL
ANION GAP SERPL CALCULATED.3IONS-SCNC: 8 MMOL/L (ref 3–14)
APPEARANCE UR: ABNORMAL
BILIRUB UR QL STRIP: NEGATIVE
BUN SERPL-MCNC: 31 MG/DL (ref 7–30)
CALCIUM SERPL-MCNC: 8.6 MG/DL (ref 8.5–10.1)
CHLORIDE SERPL-SCNC: 103 MMOL/L (ref 94–109)
CO2 SERPL-SCNC: 22 MMOL/L (ref 20–32)
COLOR UR AUTO: ABNORMAL
CREAT SERPL-MCNC: 2.27 MG/DL (ref 0.66–1.25)
ERYTHROCYTE [DISTWIDTH] IN BLOOD BY AUTOMATED COUNT: 14.4 % (ref 10–15)
GFR SERPL CREATININE-BSD FRML MDRD: 28 ML/MIN/{1.73_M2}
GLUCOSE SERPL-MCNC: 108 MG/DL (ref 70–99)
GLUCOSE UR STRIP-MCNC: 70 MG/DL
HCT VFR BLD AUTO: 27.2 % (ref 40–53)
HGB BLD-MCNC: 9 G/DL (ref 13.3–17.7)
HGB UR QL STRIP: ABNORMAL
KETONES UR STRIP-MCNC: NEGATIVE MG/DL
LEUKOCYTE ESTERASE UR QL STRIP: ABNORMAL
MCH RBC QN AUTO: 31.1 PG (ref 26.5–33)
MCHC RBC AUTO-ENTMCNC: 33.1 G/DL (ref 31.5–36.5)
MCV RBC AUTO: 94 FL (ref 78–100)
NITRATE UR QL: NEGATIVE
PH UR STRIP: 6 PH (ref 5–7)
PLATELET # BLD AUTO: 192 10E9/L (ref 150–450)
POTASSIUM SERPL-SCNC: 4.6 MMOL/L (ref 3.4–5.3)
RBC # BLD AUTO: 2.89 10E12/L (ref 4.4–5.9)
RBC #/AREA URNS AUTO: >182 /HPF (ref 0–2)
SODIUM SERPL-SCNC: 133 MMOL/L (ref 133–144)
SOURCE: ABNORMAL
SP GR UR STRIP: 1.03 (ref 1–1.03)
SQUAMOUS #/AREA URNS AUTO: 0 /HPF (ref 0–1)
UROBILINOGEN UR STRIP-MCNC: NORMAL MG/DL (ref 0–2)
WBC # BLD AUTO: 7.1 10E9/L (ref 4–11)
WBC #/AREA URNS AUTO: >182 /HPF (ref 0–5)

## 2021-06-28 PROCEDURE — 80048 BASIC METABOLIC PNL TOTAL CA: CPT | Performed by: INTERNAL MEDICINE

## 2021-06-28 PROCEDURE — 90937 HEMODIALYSIS REPEATED EVAL: CPT

## 2021-06-28 PROCEDURE — 250N000013 HC RX MED GY IP 250 OP 250 PS 637: Performed by: INTERNAL MEDICINE

## 2021-06-28 PROCEDURE — 250N000011 HC RX IP 250 OP 636: Performed by: PHYSICIAN ASSISTANT

## 2021-06-28 PROCEDURE — 258N000003 HC RX IP 258 OP 636: Performed by: INTERNAL MEDICINE

## 2021-06-28 PROCEDURE — 250N000013 HC RX MED GY IP 250 OP 250 PS 637: Performed by: PHYSICIAN ASSISTANT

## 2021-06-28 PROCEDURE — 87086 URINE CULTURE/COLONY COUNT: CPT | Performed by: PEDIATRICS

## 2021-06-28 PROCEDURE — 99233 SBSQ HOSP IP/OBS HIGH 50: CPT | Performed by: INTERNAL MEDICINE

## 2021-06-28 PROCEDURE — 0T2BX0Z CHANGE DRAINAGE DEVICE IN BLADDER, EXTERNAL APPROACH: ICD-10-PCS | Performed by: PHYSICIAN ASSISTANT

## 2021-06-28 PROCEDURE — 120N000002 HC R&B MED SURG/OB UMMC

## 2021-06-28 PROCEDURE — 97535 SELF CARE MNGMENT TRAINING: CPT | Mod: GO

## 2021-06-28 PROCEDURE — 87106 FUNGI IDENTIFICATION YEAST: CPT | Performed by: PEDIATRICS

## 2021-06-28 PROCEDURE — 85027 COMPLETE CBC AUTOMATED: CPT | Performed by: INTERNAL MEDICINE

## 2021-06-28 PROCEDURE — 99232 SBSQ HOSP IP/OBS MODERATE 35: CPT | Performed by: PHYSICIAN ASSISTANT

## 2021-06-28 PROCEDURE — 81001 URINALYSIS AUTO W/SCOPE: CPT | Performed by: PHYSICIAN ASSISTANT

## 2021-06-28 PROCEDURE — 51702 INSERT TEMP BLADDER CATH: CPT | Performed by: PHYSICIAN ASSISTANT

## 2021-06-28 PROCEDURE — 250N000011 HC RX IP 250 OP 636: Performed by: INTERNAL MEDICINE

## 2021-06-28 PROCEDURE — 36416 COLLJ CAPILLARY BLOOD SPEC: CPT | Performed by: INTERNAL MEDICINE

## 2021-06-28 PROCEDURE — 97530 THERAPEUTIC ACTIVITIES: CPT | Mod: GO

## 2021-06-28 PROCEDURE — 99232 SBSQ HOSP IP/OBS MODERATE 35: CPT | Mod: 25 | Performed by: PHYSICIAN ASSISTANT

## 2021-06-28 RX ORDER — NYSTATIN 100000 U/G
CREAM TOPICAL 2 TIMES DAILY
Status: DISCONTINUED | OUTPATIENT
Start: 2021-06-28 | End: 2021-07-01 | Stop reason: HOSPADM

## 2021-06-28 RX ORDER — CEFTRIAXONE 1 G/1
1 INJECTION, POWDER, FOR SOLUTION INTRAMUSCULAR; INTRAVENOUS EVERY 24 HOURS
Status: DISCONTINUED | OUTPATIENT
Start: 2021-06-28 | End: 2021-06-30

## 2021-06-28 RX ADMIN — Medication: at 14:36

## 2021-06-28 RX ADMIN — Medication 1 CAPSULE: at 08:59

## 2021-06-28 RX ADMIN — OXYCODONE HYDROCHLORIDE 5 MG: 5 TABLET ORAL at 20:05

## 2021-06-28 RX ADMIN — APIXABAN 5 MG: 5 TABLET, FILM COATED ORAL at 20:06

## 2021-06-28 RX ADMIN — ACETAMINOPHEN 1000 MG: 500 TABLET, FILM COATED ORAL at 08:59

## 2021-06-28 RX ADMIN — MICONAZOLE NITRATE: 20 POWDER TOPICAL at 20:06

## 2021-06-28 RX ADMIN — NYSTATIN: 100000 OINTMENT TOPICAL at 09:00

## 2021-06-28 RX ADMIN — NYSTATIN: 100000 CREAM TOPICAL at 20:06

## 2021-06-28 RX ADMIN — APIXABAN 5 MG: 5 TABLET, FILM COATED ORAL at 08:59

## 2021-06-28 RX ADMIN — ACETAMINOPHEN 1000 MG: 500 TABLET, FILM COATED ORAL at 20:05

## 2021-06-28 RX ADMIN — Medication 2.5 MG: at 06:33

## 2021-06-28 RX ADMIN — Medication 2.5 MG: at 16:34

## 2021-06-28 RX ADMIN — CEFTRIAXONE 1 G: 1 INJECTION, POWDER, FOR SOLUTION INTRAMUSCULAR; INTRAVENOUS at 16:32

## 2021-06-28 RX ADMIN — ONDANSETRON 4 MG: 2 INJECTION INTRAMUSCULAR; INTRAVENOUS at 20:06

## 2021-06-28 RX ADMIN — NYSTATIN: 100000 OINTMENT TOPICAL at 20:06

## 2021-06-28 RX ADMIN — SODIUM CHLORIDE 250 ML: 9 INJECTION, SOLUTION INTRAVENOUS at 14:36

## 2021-06-28 RX ADMIN — SODIUM CHLORIDE 300 ML: 9 INJECTION, SOLUTION INTRAVENOUS at 14:35

## 2021-06-28 RX ADMIN — FOLIC ACID 1 MG: 1 TABLET ORAL at 08:59

## 2021-06-28 RX ADMIN — HYDROMORPHONE HYDROCHLORIDE 0.5 MG: 1 INJECTION, SOLUTION INTRAMUSCULAR; INTRAVENOUS; SUBCUTANEOUS at 11:22

## 2021-06-28 RX ADMIN — QUETIAPINE 25 MG: 25 TABLET ORAL at 22:01

## 2021-06-28 RX ADMIN — Medication 2.5 MG: at 22:00

## 2021-06-28 ASSESSMENT — ACTIVITIES OF DAILY LIVING (ADL)
ADLS_ACUITY_SCORE: 19
ADLS_ACUITY_SCORE: 18

## 2021-06-28 NOTE — PROGRESS NOTES
Care Management Follow Up    Length of Stay (days): 13    Expected Discharge Date: 06/29/21     Concerns to be Addressed: discharge planning     Patient plan of care discussed at interdisciplinary rounds: Yes    Anticipated Discharge Disposition: Transitional Care     Anticipated Discharge Services: OP HD  M/F at [facility] with Dr. Puente.    Patient/family educated on Medicare website which has current facility and service quality ratings:  yes  Education Provided on the Discharge Plan:  yes  Patient/Family in Agreement with the Plan: yes      Referrals Placed by CM/SW:   [facility]- clinically accepted  [address] [place]  [address]  P: [phone]  F: [phone]     Private pay costs discussed: Not applicable    Additional Information:  SW received voicemail from Lor ([phone]) \[facility] [place] [facility]\"" Admissions- noting that per review pt has been clinically accepted. She identified additional questions about pts date and result of covid test as well as if pt has been vaccinated. Per [facility], pt has not been vaccinated against covid-19. Additionally, she wondered if pt has rides setup for dialysis and noted needing at least 1 weeks worth of rides setup. Pt had dialysis prior to admission and SW will inquire with spouse to provide update and resolve outstanding questions.      LANETTE Bell, LGSW   Acute Care   PH: [phone]  Pager: [phone]  [facility]

## 2021-06-28 NOTE — PLAN OF CARE
Shift time: 1392-2426.     Neuro: Alert, oriented x4. forgetful at times.   VS: VSS on RA.   Pain: c/o pain in his back- gave scheduled oxycodone and tylenol. Lido patch in place. Pt experiencing some increased pain in back before bed, gave 5mg PRN oxycodone.   Diet: Regular diet, poor appetite.   Act: lift, did not get OOB.   Lines: PIV SL. CVC WNL.   Skin: excoriated oniel area- nystatin cream applied. Dressing change on R flank performed 6/26.   Cardiovascular: tele discontinued   Resp: Denies shortness of breath. On RA. LS clear.   GI/: ferguson in place (placed by urology). Pt is having discharge around catheter site-  ferguson cares provided. Urine dark. LBM 6/28.      New this shift:  Awaiting TCU acceptance/placement.

## 2021-06-28 NOTE — PROGRESS NOTES
Nephrology Progress Note  06/28/2021         Assessment & Recommendations:   Fer Latham is a 71 year old male with PMH of HTN, Aflutter (not on AC), DMII, ESRD 2/2 IgA nephropathy, and chronic alcohol abuse, admitted with atrial fibrillation with RVR, new acute T12 compression fx and RLE DVT and new PE      ESKD: dialyzes M/F at [facility] with Dr. Puente. Run time: 3 hrs 45 min. Access: tunneled RIJ (failed AVF). EDW: 119 kg (need to reduce).  - dialysis per M/F schedule  - Consent for dialysis obtained 6/17/21 from pt's spouse        Access: history of catheter dysfunction  - s/p tunneled CVC replacement 6/17  - heparin lock CVC     Volume:  kg, still with significant UOP  - daily weights  - Wt 114 kg today, if accurate will need to reduce EDW to ~ 112 kg     Pulmonary embolism  RLE DVT dx June 2021 (current) hospitalization  - started on apixaban      afib w RVR, resolved:  BP: normotensive, usual pre HD pressures 100-120's, post pressures the same, lowest pressure usually in 90's, lower at times.     BMD: recent      Anemia: recent hgb 10's, not on iron or MECCA due to patient refusal, has chronically low iron sats.     New T12 compression fx     UTI: large LE, cx mixed jerzy - treated with pip/tazo    SAGE - refuses bipap, sleeping with supplemental O2 via face mask        Recommendations were communicated to primary team via this note      Lizbeth Clements PA-C  P [phone]    Interval History :   Seen on dialysis, stable run. No CP, SOB, chills, n/v    Review of Systems:   4 point ROS neg other than as noted above.    Physical Exam:   I/O last 3 completed shifts:  In: -   Out: 450 [Urine:450]   BP 96/61   Pulse 76   Temp 96.2  F (35.7  C) (Oral)   Resp 10   Wt 114 kg (251 lb 5.2 oz)   SpO2 99%   BMI 32.27 kg/m       GENERAL APPEARANCE: NAD  EYES:  No scleral icterus, pupils equal  HENT: mouth without ulcers or lesions  PULM: CTA  CV: 1+ edema of ankles  GI: soft   INTEGUMENT: no  cyanosis   Access tunneled CVC    Labs:   All labs reviewed by me  Electrolytes/Renal -   Recent Labs   Lab Test 06/28/21  0730 06/27/21  1646 06/26/21  1244 06/19/21  0811 06/19/21  0811 06/15/21  1303 06/15/21  1303 05/14/20  1722 05/14/20  1722 05/17/19  1231 05/17/19  1231 05/11/19  1253 05/11/19  1253    136 136   < > 134   < > 131*   < > 128*   < > 131*   < >  --    POTASSIUM 4.6 4.3 3.4   < > 5.4*   < > 3.5   < > 3.6   < > 3.8   < > 3.1*   CHLORIDE 103 105 102   < > 105   < > 100   < > 98   < > 96   < >  --    CO2 22 25 28   < > 24   < > 19*   < > 21   < > 28   < >  --    BUN 31* 28 19   < > 46*   < > 49*   < > 21   < > 35*   < >  --    CR 2.27* 2.22* 1.87*   < > 2.54*   < > 3.57*   < > 2.42*   < > 3.10*   < >  --    * 129* 86   < > 152*   < > 153*   < > 100*   < > 159*   < >  --    COLUMBA 8.6 8.5 8.3*   < > 8.5   < > 8.9   < > 7.9*   < > 9.0   < >  --    MAG  --   --   --   --  2.1  --  2.2  --  1.8  --   --    < > 1.7   PHOS  --   --   --   --   --   --  4.2  --   --   --  3.0  --  2.6    < > = values in this interval not displayed.       CBC -   Recent Labs   Lab Test 06/28/21  0730 06/27/21  1646 06/26/21  1244   WBC 7.1 6.9 7.6   HGB 9.0* 8.8* 8.6*    218 206       LFTs -   Recent Labs   Lab Test 06/19/21  0811 06/18/21  0524 06/17/21  0922   ALKPHOS 81 81 103   BILITOTAL 0.5 0.5 0.6   ALT 11 9 11   AST 17 12 13   PROTTOTAL 7.2 7.0 8.2   ALBUMIN 2.7* 2.6* 3.0*       Iron Panel - No lab results found.      Imaging:  Reviewed    Current Medications:    sodium chloride 0.9%  250 mL Intravenous Once in dialysis     sodium chloride 0.9%  300 mL Hemodialysis Machine Once     acetaminophen  1,000 mg Oral TID     apixaban ANTICOAGULANT  5 mg Oral BID     [Held by provider] aspirin  325 mg Oral Daily     cefTRIAXone  1 g Intravenous Q24H     folic acid  1 mg Oral Daily     gelatin absorbable  1 each Topical During Hemodialysis (from stock)     lidocaine  1 patch Transdermal Q24h    And      lidocaine   Transdermal Q8H     miconazole   Topical BID     multivitamin RENAL  1 capsule Oral Daily     - MEDICATION INSTRUCTIONS -   Does not apply Once     nystatin   Topical BID     nystatin   Topical BID     oxyCODONE  2.5 mg Oral Q8H     polyethylene glycol  17 g Oral Daily     QUEtiapine  25 mg Oral At Bedtime     sodium chloride (PF)  3 mL Intracatheter Q8H     sodium chloride (PF)  9 mL Intracatheter During Hemodialysis (from stock)     sodium chloride (PF)  9 mL Intracatheter During Hemodialysis (from stock)       TIFFANIE Conway

## 2021-06-28 NOTE — PROVIDER NOTIFICATION
Pt only oriented to self, according to flowsheet assessments this seems to be a change overnight. Provider paged about change.

## 2021-06-28 NOTE — PROGRESS NOTES
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  While inpatient he has been found to have a T12 compression Fx and RLE DVT/ PE and he has been started on a high intensity heparin drip and is currently on eliquis.  Hospital course has been c/b encephalopathy which has worsened today, per the primary service, and urology assistance is requested for Brown exchange given new concern for UTI.      He was last seen by Urology on 6/18/21 for Brown placement in the setting of phimosis and incomplete bladder emptying.      O:  GENERAL PHYSICAL EXAM:   Vitals: /77   Pulse 95   Temp 96.2  F (35.7  C) (Oral)   Resp 18   Wt 114 kg (251 lb 5.2 oz)   SpO2 100%   BMI 32.27 kg/m    Body mass index is 32.27 kg/m .    URINE:  Turbid with bits of debris/ tissue.    PROCEDURE:  After taking down 10cc balloon, Existing Brown was removed  GENERAL: Well groomed, well nourished male     :      Inguinal: no hernias or  palpable lymph nodes      Uncircumcised penis with inability to fully expose glans d/t phimosis.  Meatus, however was partially visualized and patent without visible trauma.       Scrotum normal.      Testicles descended and nontender      + significant candida rash of BL groin creases    PROCEDURE:  Prepped and draped  16Fr latex coude Brown replaced without difficulty  No urine output therefore Brown was successfully flushed with 15cc sterile NS (in and out) prior to inflating balloon  Balloon then distended with 10cc water, then the Brown was pulled back to seat the balloon at the bladder neck  The process was uncomplicated nonetheless uncomfortable for the patient.      Impression/ Plan:  Fer Latham is a 71 year old male with Complicated PMH as above.       From a urologic standpoint he  has longstanding 1) nocturia and urinary frequency and has failed intravesical Botox (Dr. Garcia- [facility]).  More recently he has UDS and renal US planned with Grace Galloway ([facility]) in 7/2021.  Here in the hospital he has had a Brown and there is 2) concern for UTI (based on appearance of urine and worsened encephalopathy) . Brown exchange was required to collect a urine specimen.     He also has phimosis and a significant 3) Candida intertrigo (of groin) as well as 4) balanitis of the glans/ prepuce.       - Urine sent today for analysis and culture  - Consider mycostatin powder to BL groin and mycostatin cream to glans penis.  Addition of an oral antifungal would also be helpful (if not contraindicated).  Please monitor for improvement.  Alternatively could consult [facility] for recommendations.    - Brown cares per primary service.  Although the patient had frequency and nocturia prior to admission, he did empty his bladder well (per review of CareEverywhere urology notes).  Once encephlopathy clears and infection has been treated, he is likely to pass a voiding trial.      TIFFANIE [facility]  [phone]

## 2021-06-28 NOTE — PROVIDER NOTIFICATION
Provider paged for concerns of UTI due to report from overnight nurse and initial assessment, provider notified of concerns.

## 2021-06-28 NOTE — PROGRESS NOTES
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 Hospital course c/b newly diagnosed acute T12 compression fx and RLE DVT.     Changes today:  - Concern for UTI; ferguson exchanged per Urology  - Follow culture results, pt started on empiric Ceftriaxone  - Disposition planning, appreciate PT/OT/SW assistance    # Concern for UTI  # Neurogenic bladder   # Phimosis  Follows with urology. S/p botox in past. Not on any medications due to cost.   - Nursing staff unable to place ferguson catheter; Urology consulted for ferguson placement, placed on 6/19  - On 6/28 developed suprapubic discomfort with discharge from around catheter, Urology exchanged ferguson and obtained UA after exchange performed   - Follow urine culture results   - Start Ceftriaxone 1 mg daily (6/28)    # Submassive PE c/b right heart strain  # DVT of RLE   # Acute hypoxic respiratory failure  P/w hypotension and elevated lactate on admission in the setting of several weeks of dyspnea at home. Initial concern was for sepsis, though infectious work-up was unremarkable. Also noted to have increased LE edema and US confirmed RLE DVT in the distal iliac vein extending into the common femoral vein and occlusive thrombus in popliteal vein. CTA confirmed large PE with right heart strain, and initial TTE on 6/15 showed HD changes related to RV dysfunction.   - Repeat TTE on 6/18 showed improved RV function, plan to continue treatment with AC alone rather than pursue angiography/filter placement  - Transitioned from heparin gtt to apixiban on 6/19, will continue for at least 3  months   - Now on 5 mg Eliquis BID as of 6/27  - Remains hemodynamically stable and on room air    #T12 compression fx   Reported low back pain for the last 1-2 weeks with multiple recurrent falls (due to weakness and unsteady gait). CT lumbar spine with acute compression fx T12, without any retropulsion.   - MRI performed on 6/26 due to patient RLE > LLE weakness, though exam likely limited by pain - results show T12 compression fracture without cord compression/acute findings, will continue conservative management  - TLSO brace in place when out of bed  - Pain control as below:   - APAP TID, Lidocaine patch, Voltaren gel   - Low dose Oxycodone scheduled on 6/24 given wife reports pt underreports pain   - PRN Oxycodone, IV Dilaudid for severe refractory pain  - PT/OT consult, recommending discharge to TCU    # Acute Toxic Metabolic Encephalopathy  # Hx of alcohol abuse  Likely multifactorial w/ alcohol use disorder and hx of withdrawal, acute illness (submassive PE), medications (narcotics for pain control from compression fracture), and ESRD requiring iHD, w/ delirium all contributing. Per discussion with wife, at baseline patient is oriented x3 but does need assistance with ADLs. His picture seems most consistent with hospital acquired delirium and is improving daily.  - Continue MVI, folic acid and thiamine.   - Frequent reorientation  - Continue to treat underlying conditions  - Scheduled pain medication on 6/24   - Continue Seroquel 25 mg qHS  - Stop home Valium PRN for anxiety; Hydroxyzine PRN available instead    #Atrial fib/flutter with RVR - [id number] score 3 for age, HTN, and DM2. Takes  mg daily, but not on anticoagulation or rate control prior to admission. Presented with HR 180s on admission, which improved after IVF. EKG showed Afib/flutter with RVR. Now rate controlled on AC.  - Hold BB with soft BP. 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 Puente. Patient does still make urine.   - Nephrology consulted, appreciate recs   - Continue to dialyze x2/weekly  - BMP/Mg/Phos daily  - I&O and daily weights   - IR consult for catheter replacement, replaced on 6/17  - Diet liberalized per pt request    # Hx of HTN  Previously on medications but discontinued after lifestyle modifications with weight loss several years ago    # DM2- diet controlled.   - Hemoglobin A1c 5.2 on admission  - Monitor BGs with BMP    # Chronic anemia - Baseline 11.0s. . B12/Folate/TSH all within normal limits.  - Hgb downtrending 9-10 this hospitalization  - Trend CBC    # Anxiety -   - Hold home Valium for anxiety  - Trial low-dose Hydroxyzine instead     Diet: Regular Diet Adult    DVT Prophylaxis: Eliquis  Ferguson Catheter: PRESENT, indication: Strict 1-2 Hour I&O  Central Lines: PRESENT  CVC Double Lumen Right Internal jugular Tunneled;Non - valved (open ended)-Site Assessment: WDL  Code Status: Full Code      Disposition Plan   Expected discharge: 1-2 days, recommended to TCU once adequate pain management/ tolerating PO medications and safe disposition plan/ TCU bed available.     The patient's care was discussed with the Bedside Nurse and Patient.    Yunier Polo MD  Hospitalist Service, [address]  Securely message with the Vocera Web Console (learn more here)  Text page via [employer] Paging/Directory  Please see sign in/sign out for up to date coverage information    Risk Factors Present on Admission                ______________________________________________________________________    Interval History   - Pt reporting discomfort in his bladder with some drainage around ferguson catheter site per  nursing notes    Patient reports some urgency and burning around his catheter. Feels like he needs to go, even though he has the catheter inside. Also endorses some pain around his bladder as well. Otherwise no fever/chills. Appetite good, no nausea/vomiting. No fevers/chills. No shortness of breath. No further concerns at this time.      Data reviewed today: I reviewed all medications, new labs and imaging results over the last 24 hours. I personally reviewed no images or EKG's today.    Physical Exam   Vital Signs: Temp: 96.2  F (35.7  C) Temp src: Oral BP: 113/56 Pulse: 77   Resp: 18 SpO2: 98 % O2 Device: None (Room air)    Weight: 272 lbs 4.29 oz  General Appearance: Pleasant, awake and interactive, no distress, comfortable  Respiratory: Breathing comfortably on room air, lungs clear to auscultation bilaterally  Cardiovascular: RRR, no m/r/g. Radial pulses 2+ and symmetric.  GI: soft, non-distended, there is suprapubic abdominal tenderness to palpation without rebound/guarding.  Skin: Around ferguson catheter there is some thicker mucoid type drainage. Ferguson draining turbid material.  Other: Trace edema of the lower extremities bilaterally.     Data   Recent Labs   Lab 06/28/21  0730 06/27/21  1646 06/26/21  1244   WBC 7.1 6.9 7.6   HGB 9.0* 8.8* 8.6*   MCV 94 96 99    218 206    136 136   POTASSIUM 4.6 4.3 3.4   CHLORIDE 103 105 102   CO2 22 25 28   BUN 31* 28 19   CR 2.27* 2.22* 1.87*   ANIONGAP 8 6 6   COLUMBA 8.6 8.5 8.3*   * 129* 86     No results found for this or any previous visit (from the past 24 hour(s)).

## 2021-06-28 NOTE — PROGRESS NOTES
HEMODIALYSIS TREATMENT NOTE    Date: 6/28/2021  Time: 4:02 PM    Data:  Pre Wt:     Desired Wt: 113 kg   Post Wt:    Weight change:   kg  Ultrafiltration - Post Run Net Total Removed (mL): 1000 mL  Vascular Access Status: patent  Dialyzer Rinse: Light  Total Blood Volume Processed: 83.22 L Liters  Total Dialysis (Treatment) Time: 3.5 Hours    Lab:       Interventions:  3.5 hours of HD with 1000 mls pulled. Unable to pull more as pressure drops. Turned patient q 2 hours    Assessment:  ESRD patient in for his regular HD run. VSS patient confused as to time and place. Is cooperative     Plan:    HD Wed

## 2021-06-28 NOTE — PLAN OF CARE
Time: 6775-9943    Reason for admit: Lactic acidosis [E87.2]  Atrial fibrillation with rapid ventricular response (H) [I48.91]  Closed wedge compression fracture of T12 vertebra, initial encounter (H) [S22.080A]  Acute midline low back pain without sciatica [M54.5]  Vitals: /61 (BP Location: Right arm)   Pulse 94   Temp 97.5  F (36.4  C) (Oral)   Resp 16   Wt 114 kg (251 lb 5.2 oz)   SpO2 95%   BMI 32.27 kg/m      New this shift: VSS on RA. Throughout shift pt only oriented to self. Pt had white/yellow drainage from penis and his urine is cloudy and dark. Urology contacted by primary team to assess cath. Urology replaced urinary cath, pt did not tolerate this procedure well, he was extremely up set and in a lot of pain. Urine sent from new cath prior to giving IV abx, abx not given until after HD due to this med being dialyzed off, doses re-timed. IV dilaudid given following replacement due to pt complaining of continuous pain in lower abd and penis. Cath cares done on new cath, puss still noted during cares. Pt went to HD today, refer to dialysis note for details. Pt got up to chair w therapy and needed the lift to get back into bed due to weakness.     Plan: Treat UTI

## 2021-06-29 ENCOUNTER — APPOINTMENT (OUTPATIENT)
Dept: PHYSICAL THERAPY | Facility: CLINIC | Age: 71
DRG: 981 | End: 2021-06-29
Payer: MEDICARE

## 2021-06-29 PROCEDURE — 97116 GAIT TRAINING THERAPY: CPT | Mod: GP

## 2021-06-29 PROCEDURE — 120N000002 HC R&B MED SURG/OB UMMC

## 2021-06-29 PROCEDURE — G0463 HOSPITAL OUTPT CLINIC VISIT: HCPCS

## 2021-06-29 PROCEDURE — 99233 SBSQ HOSP IP/OBS HIGH 50: CPT | Performed by: STUDENT IN AN ORGANIZED HEALTH CARE EDUCATION/TRAINING PROGRAM

## 2021-06-29 PROCEDURE — 250N000011 HC RX IP 250 OP 636: Performed by: INTERNAL MEDICINE

## 2021-06-29 PROCEDURE — 999N000127 HC STATISTIC PERIPHERAL IV START W US GUIDANCE

## 2021-06-29 PROCEDURE — 97530 THERAPEUTIC ACTIVITIES: CPT | Mod: GP

## 2021-06-29 PROCEDURE — 250N000013 HC RX MED GY IP 250 OP 250 PS 637: Performed by: PHYSICIAN ASSISTANT

## 2021-06-29 PROCEDURE — 250N000013 HC RX MED GY IP 250 OP 250 PS 637: Performed by: INTERNAL MEDICINE

## 2021-06-29 RX ADMIN — NYSTATIN: 100000 CREAM TOPICAL at 07:53

## 2021-06-29 RX ADMIN — OXYCODONE HYDROCHLORIDE 5 MG: 5 TABLET ORAL at 23:59

## 2021-06-29 RX ADMIN — ACETAMINOPHEN 1000 MG: 500 TABLET, FILM COATED ORAL at 07:51

## 2021-06-29 RX ADMIN — POLYETHYLENE GLYCOL 3350 17 G: 17 POWDER, FOR SOLUTION ORAL at 07:52

## 2021-06-29 RX ADMIN — QUETIAPINE 25 MG: 25 TABLET ORAL at 22:20

## 2021-06-29 RX ADMIN — ACETAMINOPHEN 1000 MG: 500 TABLET, FILM COATED ORAL at 20:19

## 2021-06-29 RX ADMIN — HYDROMORPHONE HYDROCHLORIDE 0.5 MG: 1 INJECTION, SOLUTION INTRAMUSCULAR; INTRAVENOUS; SUBCUTANEOUS at 11:01

## 2021-06-29 RX ADMIN — Medication 2.5 MG: at 05:33

## 2021-06-29 RX ADMIN — LIDOCAINE 1 PATCH: 560 PATCH PERCUTANEOUS; TOPICAL; TRANSDERMAL at 20:19

## 2021-06-29 RX ADMIN — NYSTATIN: 100000 OINTMENT TOPICAL at 07:53

## 2021-06-29 RX ADMIN — HYDROMORPHONE HYDROCHLORIDE 0.5 MG: 1 INJECTION, SOLUTION INTRAMUSCULAR; INTRAVENOUS; SUBCUTANEOUS at 20:17

## 2021-06-29 RX ADMIN — CEFTRIAXONE 1 G: 1 INJECTION, POWDER, FOR SOLUTION INTRAMUSCULAR; INTRAVENOUS at 16:29

## 2021-06-29 RX ADMIN — FOLIC ACID 1 MG: 1 TABLET ORAL at 07:51

## 2021-06-29 RX ADMIN — APIXABAN 5 MG: 5 TABLET, FILM COATED ORAL at 20:19

## 2021-06-29 RX ADMIN — MICONAZOLE NITRATE: 20 POWDER TOPICAL at 07:53

## 2021-06-29 RX ADMIN — Medication 2.5 MG: at 22:20

## 2021-06-29 RX ADMIN — Medication 2.5 MG: at 14:06

## 2021-06-29 RX ADMIN — APIXABAN 5 MG: 5 TABLET, FILM COATED ORAL at 07:51

## 2021-06-29 RX ADMIN — Medication 1 CAPSULE: at 07:51

## 2021-06-29 RX ADMIN — ACETAMINOPHEN 1000 MG: 500 TABLET, FILM COATED ORAL at 14:06

## 2021-06-29 ASSESSMENT — ACTIVITIES OF DAILY LIVING (ADL)
ADLS_ACUITY_SCORE: 20

## 2021-06-29 NOTE — CONSULTS
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  While inpatient he has been found to have a T12 compression Fx and RLE DVT/ PE and he has been started on a high intensity heparin drip and is currently on eliquis.  A: Patient with healing fungal rash to bilateral groin and scrotum, light pink and fading with use of antifungal topicals. Glans penis with some purulent drainage on assessment, difficult to determine amount due to antifungal cream appearing similar to purulent discharge.    R: Groin cares: BID cleanse skin within groin folds with microKlenz and dry thoroughly, then apply antifungal powder to skin within creases and to scrotum and rub into the skin.   Glans penis cares: BID cleanse glans with microKlenz and cotton applicators (q-tips) and dry thoroughly, then apply a thin layer of ordered antifungal cream to glans using cotton applicator.     WOC service will sign off, please reconsult for further concerns or if area deteriorates     Bertha Shankar RN, CWOCN

## 2021-06-29 NOTE — PLAN OF CARE
Time: 6290-7815  Pt alert, confused, only oriented to self. Bed alarm on. VSS on RA. Lift to transfer. Repo q2h. Pt to wear brace when OOB. Pain in mid-lower back, prn oxy and sched oxy given. R chest CVC. L PIV-TKO. R PIV-SL. Brown in place with dark nicole output. No BM this shift. Will cont to monitor.

## 2021-06-29 NOTE — PLAN OF CARE
Time: 0565-0029    Reason for admit: Lactic acidosis [E87.2]  Atrial fibrillation with rapid ventricular response (H) [I48.91]  Closed wedge compression fracture of T12 vertebra, initial encounter (H) [S22.080A]  Acute midline low back pain without sciatica [M54.5]  Vitals: /64 (BP Location: Left arm)   Pulse 78   Temp 95.4  F (35.2  C) (Oral)   Resp 16   Wt 114 kg (251 lb 5.2 oz)   SpO2 96%   BMI 32.27 kg/m      New this shift: VSS on RA. Pt orientation improving, although still disoriented at times. New antifungal creams applied today. WOC nurse saw pt today, new groin/glans care order in the active orders BID. Done by writer. IV abx given. R and L hand PIVs began leaking, new PIV placed. Urine cultures show growth. Balanitits of the glans is reason pt require an indwelling cath in place, he will have a follow up w urology TO due to continued retention.     Plan:  Treat UTI and discharge to TCU

## 2021-06-29 NOTE — PROGRESS NOTES
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 Hospital course c/b newly diagnosed acute T12 compression fx and RLE DVT. Patients right heart strain has improved with anticoagulation alone so no more aggressive intervention planned. Unfortunately developed what appears to be likely CAUTI so started on ceftriaxone and waiting on cultures. He will eventually need TCU with oral abx and urology follow up for TOV due to ferguson need for urinary rentention.     Concern for UTI  Neurogenic bladder   Phimosis  Candida intertrigo of groin  Balanitis of the glans/prepuce  Follows with urology. S/p botox in past. Not on any medications due to cost.   - Nursing staff unable to place ferguson catheter; Urology consulted for ferguson placement, placed on 6/19  - Patient to keep ferguson catheter in place and follow up with urology for TOV given continued retention   - On 6/28 developed suprapubic discomfort with discharge from around catheter, Urology exchanged ferguson and obtained UA after exchange performed   - Follow urine culture results   - Continue Ceftriaxone 1 mg daily (6/28)    Submassive PE c/b right heart strain  DVT of RLE   Acute hypoxic respiratory failure  P/w hypotension and elevated lactate on admission in the setting of several weeks of dyspnea at home. Initial concern was for sepsis, though infectious work-up was unremarkable. Also noted to have increased LE edema and US confirmed RLE DVT in the distal iliac vein extending into the common femoral vein and occlusive thrombus in popliteal vein. CTA confirmed large PE with right heart  strain, and initial TTE on 6/15 showed HD changes related to RV dysfunction. but repeat TTE on 6/18 showed improved RV function.  - Started apixaban on 6/19, will continue for at least 3 months   - Now on 5 mg Eliquis BID as of 6/27  - Remains hemodynamically stable and on room air    T12 compression fx   Reported low back pain for the last 1-2 weeks with multiple recurrent falls (due to weakness and unsteady gait). CT lumbar spine with acute compression fx T12, without any retropulsion.   - MRI performed on 6/26 due to patient RLE > LLE weakness, though exam likely limited by pain - results show T12 compression fracture without cord compression/acute findings, will continue conservative management  - TLSO brace in place when out of bed  - Pain control as below:   - APAP TID, Lidocaine patch, Voltaren gel   - Low dose Oxycodone scheduled on 6/24 given wife reports pt underreports pain   - PRN Oxycodone, IV Dilaudid for severe refractory pain  - PT/OT consult, recommending discharge to TCU    Acute Toxic Metabolic Encephalopathy  Hx of alcohol abuse  Likely multifactorial w/ alcohol use disorder and hx of withdrawal, acute illness (submassive PE), medications (narcotics for pain control from compression fracture), and ESRD requiring iHD, w/ delirium all contributing. Per discussion with wife, at baseline patient is oriented x3 but does need assistance with ADLs. His picture seems most consistent with hospital acquired delirium and is improving daily.  - Continue MVI, folic acid and thiamine.   - Frequent reorientation  - Continue to treat underlying conditions  - Scheduled pain medication on 6/24   - Continue Seroquel 25 mg qHS  - Stop home Valium PRN for anxiety; Hydroxyzine PRN available instead    Atrial fib/flutter with RVR - [id number] score 3 for age, HTN, and DM2. Takes  mg daily, but not on anticoagulation or rate control prior to admission. Presented with HR 180s on admission, which improved after  IVF. EKG showed Afib/flutter with RVR. Now rate controlled on AC.  - Hold BB with soft BP. 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 Puente. Patient does still make urine.   - Nephrology consulted, appreciate recs   - Continue to dialyze x2/weekly  - BMP/Mg/Phos daily  - I&O and daily weights   - IR consult for catheter replacement, replaced on 6/17  - Diet liberalized per pt request    Hx of HTN  Previously on medications but discontinued after lifestyle modifications with weight loss several years ago    DM2- diet controlled.   - Hemoglobin A1c 5.2 on admission  - Monitor BGs with BMP    Chronic anemia - Baseline 11.0s. . B12/Folate/TSH all within normal limits.  - Hgb downtrending 9-10 this hospitalization  - Trend CBC    Anxiety -   - Hold home Valium for anxiety  - Trial low-dose Hydroxyzine instead         Diet: Regular Diet Adult  Snacks/Supplements Adult: Magic Cup; With Meals    DVT Prophylaxis: Eliquis  Brown Catheter: PRESENT, indication: Neurogenic Bladder  Central Lines: PRESENT  CVC Double Lumen Right Internal jugular Tunneled;Non - valved (open ended)-Site Assessment: WDL  Code Status: Full Code      Disposition Plan   Expected discharge: 1-2 days, recommended to TCU once adequate pain management/ tolerating PO medications and safe disposition plan/ TCU bed available.     The patient's care was discussed with the Bedside Nurse and Patient.    Ap Tavera DO  Hospitalist Service, [address]  Securely message with the Vocera Web Console (learn more here)  Text page via [employer] Paging/Directory  Please see sign in/sign out for up to date coverage information    Risk Factors  Present on Admission                ______________________________________________________________________    Interval History     No acute events overnight     Patient continues to have bladder pain with palpation. He continues to be intermittently confused but not aggressive. Breathing well, no chest pain, no fevers or chills.    Four point ROS Completed and otherwise negative      Data reviewed today: I reviewed all medications, new labs and imaging results over the last 24 hours. I personally reviewed no images or EKG's today.    Physical Exam   Vital Signs: Temp: 95.4  F (35.2  C) Temp src: Axillary BP: 128/78 Pulse: 80   Resp: 16 SpO2: 100 % O2 Device: None (Room air)    Weight: 251 lbs 5.19 oz  General Appearance: Pleasant, awake and interactive, no distress, comfortable  Respiratory: Breathing comfortably on room air, lungs clear to auscultation bilaterally  Cardiovascular: RRR, no m/r/g. Radial pulses 2+ and symmetric.  GI: soft, non-distended, there is suprapubic abdominal tenderness to palpation without rebound/guarding.  Skin: groin now covered in powder, unable to assess glans  Other: Trace edema of the lower extremities bilaterally.     Data   Recent Labs   Lab 06/28/21  0730 06/27/21  1646 06/26/21  1244   WBC 7.1 6.9 7.6   HGB 9.0* 8.8* 8.6*   MCV 94 96 99    218 206    136 136   POTASSIUM 4.6 4.3 3.4   CHLORIDE 103 105 102   CO2 22 25 28   BUN 31* 28 19   CR 2.27* 2.22* 1.87*   ANIONGAP 8 6 6   COLUMBA 8.6 8.5 8.3*   * 129* 86     No results found for this or any previous visit (from the past 24 hour(s)).

## 2021-06-29 NOTE — PROGRESS NOTES
"Care Management Follow Up    Length of Stay (days): 14    Expected Discharge Date: 07/01/21     Concerns to be Addressed: discharge planning     Patient plan of care discussed at interdisciplinary rounds: Yes    Anticipated Discharge Disposition: Transitional Care, OP HD   M/F at [facility] with Dr. Puente.    Patient/family educated on Medicare website which has current facility and service quality ratings:  yes  Education Provided on the Discharge Plan:  yes  Patient/Family in Agreement with the Plan:  yes    Referrals Placed by CM/SARMAD:    [facility]- clinically accepted  [address] [place]  [address]  P: [phone]  F: [phone]  Admissions: [phone]  Private pay costs discussed: Not applicable    Additional Information:  [facility] and noted receiving message about pts clinical acceptance. SARMAD accepted bed offer and noted that pt is not med ready for discharge yet. Lor had outstanding questions for SARMAD yesterday and SARMAD will provide update.    SARMAD spoke with spouse- Ailin to provide updates about discharge planning. She noted satisfaction with acceptance and relayed a \"wonderful\" program. Ailin reported that pt is not vaccinated and does not have intention of doing this. Ailin noted that pt uses [facility] for dialysis. SARMAD agreed to provide discharge updates as they are available and she agreed. Of note, she relayed  satisfaction with pts care from [facility] staff.    Update 1110: SARMAD received return call from [facility] CC and SW answered outstanding questions. Lor noted that pt will need a new covid test upon discharge and she is also requesting updated notes either today or tomorrow. SARMAD will send those over for review.    LANETTE Bell, [address] Acute Care   PH: [phone]  Pager: [phone]  [facility]          "

## 2021-06-29 NOTE — PLAN OF CARE
Time: [id number]     Reason for admission: Lactic acidosis [E87.2]  Atrial fibrillation with rapid ventricular response (H) [I48.91]  Closed wedge compression fracture of T12 vertebra, initial encounter (H) [S22.080A]  Acute midline low back pain without sciatica [M54.5]    Vitals: /78 (BP Location: Right arm)   Pulse 80   Temp 95.4  F (35.2  C) (Axillary)   Resp 16   Wt 114 kg (251 lb 5.2 oz)   SpO2 100%   BMI 32.27 kg/m       Activity: Lift. Wears back brace when moving.   Pain: c/o pain in the lower back and hips. Scheduled oxy given this morning.   Neuro: pt. AxO, dis: situation, this morning at 0530 assessment questions asked.   Cardiac: wdl  Respiratory: WDL  GI/: Brown in place. Cloudy output. Positive for uti. No bm overnight.   Diet:  Orders Placed This Encounter      Regular Diet Adult     Lines:   Peripheral IV 06/15/21 Right Hand (Active)   Site Assessment [facility] 06/28/21 2000   Line Status Saline locked 06/28/21 2000   Dressing Intervention Dressing reinforced 06/20/21 0000   Phlebitis Scale 0-->no symptoms 06/28/21 2000   Infiltration Scale 0 06/28/21 2000   Infiltration Site Treatment Method  None 06/20/21 0800   Number of days: 14       Peripheral IV 06/15/21 Left Hand (Active)   Site Assessment [facility] 06/28/21 2000   Line Status Infusing 06/28/21 2000   Dressing Intervention Dressing reinforced 06/20/21 0000   Phlebitis Scale 0-->no symptoms 06/28/21 2000   Infiltration Scale 0 06/28/21 2000   Infiltration Site Treatment Method  None 06/20/21 0800   Number of days: 14       Hemodialysis Vascular Access Arteriovenous fistula Left Forearm (Active)   Number of days:        CVC Double Lumen Right Internal jugular Tunneled;Non - valved (open ended) (Active)   Site Assessment [facility] 06/28/21 2000   External Cath Length (cm) 2 cm 06/21/21 1215   Dressing Type Chlorhexidine sponge;Transparent 06/28/21 1600   Dressing Status clean;dry;intact 06/28/21 1600   Dressing Intervention new dressing 06/28/21  1600   Dressing Change Due 06/30/21 06/28/21 1600   Line Necessity yes, meets criteria 06/28/21 1600   Blue - Status cap changed;saline locked 06/28/21 1600   Blue - Cap Change Due 06/24/21 06/17/21 1955   Red - Status cap changed;saline locked 06/28/21 1600   Red - Cap Change Due 06/24/21 06/17/21 1955   Phlebitis Scale 0-->no symptoms 06/25/21 2000   Infiltration? no 06/20/21 0800   Infiltration Scale 0 06/20/21 0800   Infiltration Site Treatment Method  None 06/20/21 0800   Was a vesicant infusing? no 06/17/21 1955   CVC Comment HD 06/28/21 1600   Number of days: 12      Skin/Wounds: wound on the right lateral side of the abd. Dressing changes Q3 days. Due today. Mepilex covering.      Labs: Labs/Lactic]:    Hemoglobin (g/dL)   Date Value   06/28/2021 9.0 (L)     Creatinine (mg/dL)   Date Value   06/28/2021 2.27 (H)     Platelet Count (10e9/L)   Date Value   06/28/2021 192     Hematocrit (%)   Date Value   06/28/2021 27.2 (L)     WBC (10e9/L)   Date Value   06/28/2021 7.1       Lactic Acid (mmol/L)   Date Value   06/15/2021 1.5   06/15/2021 6.7 (HH)     New changes this shift: Uneventful shift. Pt. Slept throughout the night. C/o of pain in the lower back. Scheduled oxy given. Pt. Disorientated to situation.      Plan: continue to monitor, follow poc.

## 2021-06-30 ENCOUNTER — APPOINTMENT (OUTPATIENT)
Dept: PHYSICAL THERAPY | Facility: CLINIC | Age: 71
DRG: 981 | End: 2021-06-30
Payer: MEDICARE

## 2021-06-30 LAB
ANION GAP SERPL CALCULATED.3IONS-SCNC: 4 MMOL/L (ref 3–14)
BACTERIA SPEC CULT: ABNORMAL
BUN SERPL-MCNC: 27 MG/DL (ref 7–30)
CALCIUM SERPL-MCNC: 8.6 MG/DL (ref 8.5–10.1)
CHLORIDE SERPL-SCNC: 102 MMOL/L (ref 94–109)
CO2 SERPL-SCNC: 28 MMOL/L (ref 20–32)
CREAT SERPL-MCNC: 2.75 MG/DL (ref 0.66–1.25)
ERYTHROCYTE [DISTWIDTH] IN BLOOD BY AUTOMATED COUNT: 14.6 % (ref 10–15)
GFR SERPL CREATININE-BSD FRML MDRD: 22 ML/MIN/{1.73_M2}
GLUCOSE SERPL-MCNC: 108 MG/DL (ref 70–99)
HCT VFR BLD AUTO: 26.9 % (ref 40–53)
HGB BLD-MCNC: 8.6 G/DL (ref 13.3–17.7)
LABORATORY COMMENT REPORT: NORMAL
MCH RBC QN AUTO: 32.1 PG (ref 26.5–33)
MCHC RBC AUTO-ENTMCNC: 32 G/DL (ref 31.5–36.5)
MCV RBC AUTO: 100 FL (ref 78–100)
PLATELET # BLD AUTO: 217 10E9/L (ref 150–450)
POTASSIUM SERPL-SCNC: 3.7 MMOL/L (ref 3.4–5.3)
RBC # BLD AUTO: 2.68 10E12/L (ref 4.4–5.9)
SARS-COV-2 RNA RESP QL NAA+PROBE: NEGATIVE
SODIUM SERPL-SCNC: 134 MMOL/L (ref 133–144)
SPECIMEN SOURCE: ABNORMAL
SPECIMEN SOURCE: NORMAL
WBC # BLD AUTO: 5.8 10E9/L (ref 4–11)

## 2021-06-30 PROCEDURE — 250N000013 HC RX MED GY IP 250 OP 250 PS 637: Performed by: PHYSICIAN ASSISTANT

## 2021-06-30 PROCEDURE — 99233 SBSQ HOSP IP/OBS HIGH 50: CPT | Performed by: STUDENT IN AN ORGANIZED HEALTH CARE EDUCATION/TRAINING PROGRAM

## 2021-06-30 PROCEDURE — 97530 THERAPEUTIC ACTIVITIES: CPT | Mod: GP

## 2021-06-30 PROCEDURE — 120N000002 HC R&B MED SURG/OB UMMC

## 2021-06-30 PROCEDURE — 250N000013 HC RX MED GY IP 250 OP 250 PS 637: Performed by: INTERNAL MEDICINE

## 2021-06-30 PROCEDURE — 250N000011 HC RX IP 250 OP 636: Performed by: INTERNAL MEDICINE

## 2021-06-30 PROCEDURE — 80048 BASIC METABOLIC PNL TOTAL CA: CPT | Performed by: INTERNAL MEDICINE

## 2021-06-30 PROCEDURE — U0005 INFEC AGEN DETEC AMPLI PROBE: HCPCS | Performed by: NURSE PRACTITIONER

## 2021-06-30 PROCEDURE — 36415 COLL VENOUS BLD VENIPUNCTURE: CPT | Performed by: INTERNAL MEDICINE

## 2021-06-30 PROCEDURE — 85027 COMPLETE CBC AUTOMATED: CPT | Performed by: INTERNAL MEDICINE

## 2021-06-30 PROCEDURE — 250N000013 HC RX MED GY IP 250 OP 250 PS 637: Performed by: STUDENT IN AN ORGANIZED HEALTH CARE EDUCATION/TRAINING PROGRAM

## 2021-06-30 PROCEDURE — U0003 INFECTIOUS AGENT DETECTION BY NUCLEIC ACID (DNA OR RNA); SEVERE ACUTE RESPIRATORY SYNDROME CORONAVIRUS 2 (SARS-COV-2) (CORONAVIRUS DISEASE [COVID-19]), AMPLIFIED PROBE TECHNIQUE, MAKING USE OF HIGH THROUGHPUT TECHNOLOGIES AS DESCRIBED BY CMS-2020-01-R: HCPCS | Performed by: NURSE PRACTITIONER

## 2021-06-30 RX ORDER — FLUCONAZOLE 200 MG/1
200 TABLET ORAL ONCE
Status: COMPLETED | OUTPATIENT
Start: 2021-06-30 | End: 2021-06-30

## 2021-06-30 RX ORDER — FLUCONAZOLE 200 MG/1
200 TABLET ORAL ONCE
Status: DISCONTINUED | OUTPATIENT
Start: 2021-06-30 | End: 2021-06-30

## 2021-06-30 RX ORDER — FLUCONAZOLE 100 MG/1
100 TABLET ORAL ONCE
Status: DISCONTINUED | OUTPATIENT
Start: 2021-07-01 | End: 2021-06-30

## 2021-06-30 RX ORDER — FLUCONAZOLE 100 MG/1
100 TABLET ORAL DAILY
Status: DISCONTINUED | OUTPATIENT
Start: 2021-07-01 | End: 2021-07-01 | Stop reason: HOSPADM

## 2021-06-30 RX ORDER — FLUCONAZOLE 200 MG/1
200 TABLET ORAL DAILY
Status: DISCONTINUED | OUTPATIENT
Start: 2021-06-30 | End: 2021-06-30

## 2021-06-30 RX ADMIN — FOLIC ACID 1 MG: 1 TABLET ORAL at 07:48

## 2021-06-30 RX ADMIN — Medication 2.5 MG: at 14:09

## 2021-06-30 RX ADMIN — Medication 2.5 MG: at 21:55

## 2021-06-30 RX ADMIN — QUETIAPINE 25 MG: 25 TABLET ORAL at 21:54

## 2021-06-30 RX ADMIN — Medication 2.5 MG: at 05:54

## 2021-06-30 RX ADMIN — HYDROMORPHONE HYDROCHLORIDE 0.5 MG: 1 INJECTION, SOLUTION INTRAMUSCULAR; INTRAVENOUS; SUBCUTANEOUS at 19:35

## 2021-06-30 RX ADMIN — MICONAZOLE NITRATE: 20 POWDER TOPICAL at 19:36

## 2021-06-30 RX ADMIN — HYDROMORPHONE HYDROCHLORIDE 0.5 MG: 1 INJECTION, SOLUTION INTRAMUSCULAR; INTRAVENOUS; SUBCUTANEOUS at 02:59

## 2021-06-30 RX ADMIN — NYSTATIN: 100000 CREAM TOPICAL at 19:36

## 2021-06-30 RX ADMIN — MICONAZOLE NITRATE: 20 POWDER TOPICAL at 07:51

## 2021-06-30 RX ADMIN — NYSTATIN: 100000 OINTMENT TOPICAL at 19:36

## 2021-06-30 RX ADMIN — FLUCONAZOLE 200 MG: 200 TABLET ORAL at 12:38

## 2021-06-30 RX ADMIN — HYDROMORPHONE HYDROCHLORIDE 0.5 MG: 1 INJECTION, SOLUTION INTRAMUSCULAR; INTRAVENOUS; SUBCUTANEOUS at 12:38

## 2021-06-30 RX ADMIN — OXYCODONE HYDROCHLORIDE 5 MG: 5 TABLET ORAL at 07:58

## 2021-06-30 RX ADMIN — ACETAMINOPHEN 1000 MG: 500 TABLET, FILM COATED ORAL at 07:48

## 2021-06-30 RX ADMIN — APIXABAN 5 MG: 5 TABLET, FILM COATED ORAL at 19:36

## 2021-06-30 RX ADMIN — Medication 1 CAPSULE: at 07:48

## 2021-06-30 RX ADMIN — NYSTATIN: 100000 CREAM TOPICAL at 07:49

## 2021-06-30 RX ADMIN — ACETAMINOPHEN 1000 MG: 500 TABLET, FILM COATED ORAL at 14:09

## 2021-06-30 RX ADMIN — NYSTATIN: 100000 OINTMENT TOPICAL at 07:49

## 2021-06-30 RX ADMIN — ACETAMINOPHEN 1000 MG: 500 TABLET, FILM COATED ORAL at 19:35

## 2021-06-30 RX ADMIN — APIXABAN 5 MG: 5 TABLET, FILM COATED ORAL at 07:48

## 2021-06-30 ASSESSMENT — ACTIVITIES OF DAILY LIVING (ADL)
ADLS_ACUITY_SCORE: 20

## 2021-06-30 NOTE — PLAN OF CARE
/54 (BP Location: Left arm)   Pulse 76   Temp 96.5  F (35.8  C) (Oral)   Resp 18   Wt 114 kg (251 lb 5.2 oz)   SpO2 99%   BMI 32.27 kg/m    Neuro: A&Ox4. Flat affect. Motor strength mod BLE's.   Cardiac: Afebrile, VSS.   Respiratory: RA   GI/: ferguson patent with low uop. No BM this shift.   Diet/appetite: Tolerating reg diet. Denies nausea   Activity: turned q2hrs with list assist of 2  Pain: scheduled and prn oxycodone and IV dilaudid admin for pain control. Lidocaine patch in place  Skin: No new deficits noted. Dressings CDI. Blanchable redness on pressure point areas. Hesitant to repo.  Lines: piv sl'd  Drains: ferguson  Rested btwn cares. Call light appropriate. Continue to monitor. Continue POC.

## 2021-06-30 NOTE — PROGRESS NOTES
"Care Management Follow Up    Length of Stay (days): 15    Expected Discharge Date: 07/01/21     Concerns to be Addressed: discharge planning     Patient plan of care discussed at interdisciplinary rounds: Yes    Anticipated Discharge Disposition: Transitional Care, Outpatient Dialysis   M/F at [facility] with Dr. Puente.    Patient/family educated on Medicare website which has current facility and service quality ratings:  yes  Education Provided on the Discharge Plan:  yes  Patient/Family in Agreement with the Plan:  yes    Referrals Placed by CM/SARMAD:    [facility]  [address] [place] [address] SHIOLH Villar  [address]  P: [phone]  F: [phone]  Admissions: [phone]    RN reporting #: Call the main line and ask for nursing to provide report.  Discharge orders can be sent to e-fax: [phone]    Private pay costs discussed: transportation costs    Additional Information:  SARMAD received update from  that pt is medically ready for discharge. SARMAD called Lor [phone] to provide update and setup discharge for tomorrow morning/afternoon pending their availability.    SARMAD spoke w/ spouse - Ailin via phone to provide updates about discharge planning and inquire about transportation. SARMAD provided education about [facility] transport and she denied stating \"I don't have $100 dollars\". SARMAD inquired about personal transport and she denied not having a car. SARMAD wondered about a cab ride and she denied this as well. Ailin suggested [employer] and offered to set this up. SARMAD wondered about a wheelchair and she hoped hospital could lend one for pt. SARMAD reported this is only possible if [employer] can make a round trip and return the wheelchair to the hospital. SARMAD attempted to understand Ailin's requests, however upon clarification, Ailin stated \"are you stupid? You're not understanding me\", \"I already told you this\" etc. SARMAD overheard pt in the background state \"I'm not going, I don't have a way to get there\". SARMAD " inquired about what Ailin would like to do and she asked SARMAD for suggestions. SARMAD mentioned a cab ride or another transport company but noted these are likely out of pocket expenses. Ailin proceeded to yell at  via phone. SARMAD offered to continue conversation at a later time once she can think about it. Ailin continued to yell and SARMAD identified intention to hang and SARMAD ended call.    Update 1600: SARMAD received call from Lor confirming that pt can be admitted tomorrow. She noted that pt will need a covid swab prior to discharge. SARMAD paged Dr. Schuster to notify of this. Needs PAS. Lor will need update about potential discharge timeframe, if this becomes known and family is agreeable.    LANETTE Bell, LGSW   Acute Care   PH: [phone]  Pager: [phone]  [facility]

## 2021-06-30 NOTE — PROGRESS NOTES
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 Hospital course c/b newly diagnosed acute T12 compression fx and RLE DVT. Patients right heart strain has improved with anticoagulation alone so no more aggressive intervention planned. Unfortunately developed what appears to be likely CAUTI so started on ceftriaxone and waiting on cultures. He will eventually need TCU with oral abx and urology follow up for TOV due to ferguson need for urinary rentention.     Possible candidal cystitis    Neurogenic bladder   Phimosis  Candida intertrigo of groin  Balanitis of the glans/prepuce  Follows with urology. S/p botox in past. Not on any medications due to cost.   - Nursing staff unable to place ferguson catheter; Urology consulted for ferguson placement, placed on 6/19  - Patient to keep ferguson catheter in place and follow up with urology for TOV given continued retention   - On 6/28 developed suprapubic discomfort with discharge from around catheter, Urology exchanged ferguson and obtained UA after exchange performed which ended up growing candida. Given patients profound yeast infection of groin/penis, new suprapubic pain, and no history of candida in urine my feelings are to treat this as a true candidal cystitis and that it is not just colonization.   - Fluconazole 100 mg daily x14 days total (renally adjusted)    Submassive PE c/b right heart strain  DVT of RLE   Acute hypoxic respiratory failure  P/w hypotension and elevated lactate on admission in the setting of several weeks of dyspnea at home. Initial concern was for sepsis, though  infectious work-up was unremarkable. Also noted to have increased LE edema and US confirmed RLE DVT in the distal iliac vein extending into the common femoral vein and occlusive thrombus in popliteal vein. CTA confirmed large PE with right heart strain, and initial TTE on 6/15 showed HD changes related to RV dysfunction. but repeat TTE on 6/18 showed improved RV function.  - Started apixaban on 6/19, will continue for at least 3 months   - Now on 5 mg Eliquis BID as of 6/27  - Remains hemodynamically stable and on room air    T12 compression fx   Reported low back pain for the last 1-2 weeks with multiple recurrent falls (due to weakness and unsteady gait). CT lumbar spine with acute compression fx T12, without any retropulsion.   - MRI performed on 6/26 due to patient RLE > LLE weakness, though exam likely limited by pain - results show T12 compression fracture without cord compression/acute findings, will continue conservative management  - TLSO brace in place when out of bed  - Pain control as below:   - APAP TID, Lidocaine patch, Voltaren gel   - Low dose Oxycodone scheduled on 6/24 given wife reports pt underreports pain   - PRN Oxycodone, IV Dilaudid for severe refractory pain  - PT/OT consult, recommending discharge to TCU    Acute Toxic Metabolic Encephalopathy  Hx of alcohol abuse  Likely multifactorial w/ alcohol use disorder and hx of withdrawal, acute illness (submassive PE), medications (narcotics for pain control from compression fracture), and ESRD requiring iHD, w/ delirium all contributing. Per discussion with wife, at baseline patient is oriented x3 but does need assistance with ADLs. His picture seems most consistent with hospital acquired delirium and is improving daily.  - Continue MVI, folic acid and thiamine.   - Frequent reorientation  - Continue to treat underlying conditions  - Scheduled pain medication on 6/24   - Continue Seroquel 25 mg qHS  - Stop home Valium PRN for anxiety; Hydroxyzine  PRN available instead    Atrial fib/flutter with RVR - [id number] score 3 for age, HTN, and DM2. Takes  mg daily, but not on anticoagulation or rate control prior to admission. Presented with HR 180s on admission, which improved after IVF. EKG showed Afib/flutter with RVR. Now rate controlled on AC.  - Hold BB with soft BP. 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 Puente. Patient does still make urine.   - Nephrology consulted, appreciate recs   - Continue to dialyze x2/weekly  - BMP/Mg/Phos daily  - I&O and daily weights   - IR consult for catheter replacement, replaced on 6/17  - Diet liberalized per pt request    Hx of HTN  Previously on medications but discontinued after lifestyle modifications with weight loss several years ago    DM2- diet controlled.   - Hemoglobin A1c 5.2 on admission  - Monitor BGs with BMP    Chronic anemia - Baseline 11.0s. . B12/Folate/TSH all within normal limits.  - Hgb downtrending 9-10 this hospitalization  - Trend CBC    Anxiety -   - Hold home Valium for anxiety  - Trial low-dose Hydroxyzine instead         Diet: Regular Diet Adult  Snacks/Supplements Adult: Magic Cup; With Meals    DVT Prophylaxis: Eliquis  Brown Catheter: PRESENT, indication: Neurogenic Bladder  Central Lines: PRESENT  CVC Double Lumen Right Internal jugular Tunneled;Non - valved (open ended)-Site Assessment: WDL  Code Status: Full Code      Disposition Plan   Expected discharge: medically ready when bed is available, recommended to TCU once adequate pain management/ tolerating PO medications and safe disposition plan/ TCU bed available.     The patient's care was discussed with the Bedside Nurse and Patient.    Ap Tavera,  DO  Hospitalist Service, [address]  Securely message with the [employer] Web Console (learn more here)  Text page via [facility] Paging/Directory  Please see sign in/sign out for up to date coverage information    Risk Factors Present on Admission                ______________________________________________________________________    Interval History     No acute events overnight     Patient much more alert and talkative today than yesterday. Pain in groin improved. He does feel like he will never get a TCU and said good luck. No chest pain, no SOB, no cough    Four point ROS Completed and otherwise negative      Data reviewed today: I reviewed all medications, new labs and imaging results over the last 24 hours. I personally reviewed no images or EKG's today.    Physical Exam   Vital Signs: Temp: 97.9  F (36.6  C) Temp src: Oral BP: 104/51 Pulse: 78   Resp: 14 SpO2: 98 % O2 Device: None (Room air)    Weight: 224 lbs 13.91 oz  General Appearance: Pleasant, awake and interactive, no distress, comfortable  Respiratory: Breathing comfortably on room air, lungs clear to auscultation bilaterally  Cardiovascular: RRR, no m/r/g. Radial pulses 2+ and symmetric.  GI: soft, non-distended, there is suprapubic abdominal tenderness to palpation without rebound/guarding.  Skin: groin now covered in powder, unable to assess glans  Other: Trace edema of the lower extremities bilaterally.     Data   Recent Labs   Lab 06/30/21  0843 06/28/21  0730 06/27/21  1646   WBC 5.8 7.1 6.9   HGB 8.6* 9.0* 8.8*    94 96    192 218    133 136   POTASSIUM 3.7 4.6 4.3   CHLORIDE 102 103 105   CO2 28 22 25   BUN 27 31* 28   CR 2.75* 2.27* 2.22*   ANIONGAP 4 8 6   COLUMBA 8.6 8.6 8.5   * 108* 129*     No results found for this or any previous visit (from the past 24 hour(s)).

## 2021-06-30 NOTE — PLAN OF CARE
Care of patient from [id number]:    VSS. A&O, moving extremities. Appears in discomfort and trying to reposition self up against side rail; upon further questioning pt admits to significant back pain and is reluctant to accept help with repositioning or take scheduled pain meds. After a calm conversation, pt agreed to reposition and take scheduled meds. Afterward appeared to relax and close eyes to sleep. Brown with 65mL of cloudy, yellow-brown urine output.

## 2021-06-30 NOTE — PLAN OF CARE
Pt A&O with flat affect VSS RA.  Lift to get out of bed, 2 assist to move in bed.  Repositioned q2h.  Brown catheter with low output and orange urine. Perineal area excoriated, creams and powders in room applied.  Needs assistance with taking meds, eating, and ordering meals.  Tolerating regular diet.  KEARA wound on back; dressing in place.  PIV SL.  PRN IV dilaudid and scheduled oxycodone given for generalized pain.  Calls appropriately.

## 2021-07-01 ENCOUNTER — APPOINTMENT (OUTPATIENT)
Dept: OCCUPATIONAL THERAPY | Facility: CLINIC | Age: 71
DRG: 981 | End: 2021-07-01
Payer: MEDICARE

## 2021-07-01 VITALS
SYSTOLIC BLOOD PRESSURE: 108 MMHG | WEIGHT: 224.87 LBS | BODY MASS INDEX: 28.87 KG/M2 | DIASTOLIC BLOOD PRESSURE: 49 MMHG | RESPIRATION RATE: 18 BRPM | HEART RATE: 80 BPM | OXYGEN SATURATION: 99 % | TEMPERATURE: 95.4 F

## 2021-07-01 LAB
ANION GAP SERPL CALCULATED.3IONS-SCNC: 6 MMOL/L (ref 3–14)
BUN SERPL-MCNC: 31 MG/DL (ref 7–30)
CALCIUM SERPL-MCNC: 8.5 MG/DL (ref 8.5–10.1)
CHLORIDE SERPL-SCNC: 103 MMOL/L (ref 94–109)
CO2 SERPL-SCNC: 26 MMOL/L (ref 20–32)
CREAT SERPL-MCNC: 2.95 MG/DL (ref 0.66–1.25)
ERYTHROCYTE [DISTWIDTH] IN BLOOD BY AUTOMATED COUNT: 14.8 % (ref 10–15)
GFR SERPL CREATININE-BSD FRML MDRD: 21 ML/MIN/{1.73_M2}
GLUCOSE SERPL-MCNC: 73 MG/DL (ref 70–99)
HCT VFR BLD AUTO: 27 % (ref 40–53)
HGB BLD-MCNC: 8.8 G/DL (ref 13.3–17.7)
MCH RBC QN AUTO: 32.1 PG (ref 26.5–33)
MCHC RBC AUTO-ENTMCNC: 32.6 G/DL (ref 31.5–36.5)
MCV RBC AUTO: 99 FL (ref 78–100)
PLATELET # BLD AUTO: 196 10E9/L (ref 150–450)
POTASSIUM SERPL-SCNC: 4 MMOL/L (ref 3.4–5.3)
RBC # BLD AUTO: 2.74 10E12/L (ref 4.4–5.9)
SODIUM SERPL-SCNC: 135 MMOL/L (ref 133–144)
WBC # BLD AUTO: 6.6 10E9/L (ref 4–11)

## 2021-07-01 PROCEDURE — 250N000013 HC RX MED GY IP 250 OP 250 PS 637: Performed by: INTERNAL MEDICINE

## 2021-07-01 PROCEDURE — 36415 COLL VENOUS BLD VENIPUNCTURE: CPT | Performed by: INTERNAL MEDICINE

## 2021-07-01 PROCEDURE — 250N000013 HC RX MED GY IP 250 OP 250 PS 637: Performed by: STUDENT IN AN ORGANIZED HEALTH CARE EDUCATION/TRAINING PROGRAM

## 2021-07-01 PROCEDURE — 99239 HOSP IP/OBS DSCHRG MGMT >30: CPT | Performed by: STUDENT IN AN ORGANIZED HEALTH CARE EDUCATION/TRAINING PROGRAM

## 2021-07-01 PROCEDURE — 80048 BASIC METABOLIC PNL TOTAL CA: CPT | Performed by: INTERNAL MEDICINE

## 2021-07-01 PROCEDURE — 85027 COMPLETE CBC AUTOMATED: CPT | Performed by: INTERNAL MEDICINE

## 2021-07-01 PROCEDURE — 250N000013 HC RX MED GY IP 250 OP 250 PS 637: Performed by: PHYSICIAN ASSISTANT

## 2021-07-01 PROCEDURE — 97535 SELF CARE MNGMENT TRAINING: CPT | Mod: GO | Performed by: OCCUPATIONAL THERAPIST

## 2021-07-01 RX ORDER — QUETIAPINE FUMARATE 25 MG/1
25 TABLET, FILM COATED ORAL AT BEDTIME
Status: ON HOLD | DISCHARGE
Start: 2021-07-01 | End: 2022-02-03

## 2021-07-01 RX ORDER — FOLIC ACID 1 MG/1
1 TABLET ORAL DAILY
DISCHARGE
Start: 2021-07-02

## 2021-07-01 RX ORDER — BISACODYL 10 MG
10 SUPPOSITORY, RECTAL RECTAL DAILY PRN
DISCHARGE
Start: 2021-07-01

## 2021-07-01 RX ORDER — HYDROXYZINE HYDROCHLORIDE 25 MG/1
25 TABLET, FILM COATED ORAL EVERY 6 HOURS PRN
Status: ON HOLD | DISCHARGE
Start: 2021-07-01 | End: 2022-02-03

## 2021-07-01 RX ORDER — QUETIAPINE FUMARATE 25 MG/1
25 TABLET, FILM COATED ORAL DAILY PRN
Status: ON HOLD | DISCHARGE
Start: 2021-07-01 | End: 2022-03-02

## 2021-07-01 RX ORDER — OXYCODONE HYDROCHLORIDE 5 MG/1
2.5 TABLET ORAL EVERY 8 HOURS
Refills: 0 | Status: ON HOLD | DISCHARGE
Start: 2021-07-01 | End: 2022-02-08

## 2021-07-01 RX ORDER — ACETAMINOPHEN 500 MG
1000 TABLET ORAL 3 TIMES DAILY
DISCHARGE
Start: 2021-07-01 | End: 2024-08-14

## 2021-07-01 RX ORDER — NYSTATIN 100000 U/G
OINTMENT TOPICAL 2 TIMES DAILY
DISCHARGE
Start: 2021-07-01 | End: 2022-02-03

## 2021-07-01 RX ORDER — FLUCONAZOLE 100 MG/1
100 TABLET ORAL DAILY
Status: ON HOLD | DISCHARGE
Start: 2021-07-02 | End: 2022-02-03

## 2021-07-01 RX ORDER — NYSTATIN 100000 U/G
CREAM TOPICAL 2 TIMES DAILY
DISCHARGE
Start: 2021-07-01 | End: 2024-08-14

## 2021-07-01 RX ADMIN — MICONAZOLE NITRATE: 20 POWDER TOPICAL at 08:24

## 2021-07-01 RX ADMIN — NYSTATIN: 100000 OINTMENT TOPICAL at 08:24

## 2021-07-01 RX ADMIN — FLUCONAZOLE 100 MG: 100 TABLET ORAL at 08:23

## 2021-07-01 RX ADMIN — OXYCODONE HYDROCHLORIDE 5 MG: 5 TABLET ORAL at 01:43

## 2021-07-01 RX ADMIN — ACETAMINOPHEN 1000 MG: 500 TABLET, FILM COATED ORAL at 08:23

## 2021-07-01 RX ADMIN — NYSTATIN: 100000 CREAM TOPICAL at 08:24

## 2021-07-01 RX ADMIN — Medication 1 CAPSULE: at 08:23

## 2021-07-01 RX ADMIN — APIXABAN 5 MG: 5 TABLET, FILM COATED ORAL at 08:23

## 2021-07-01 RX ADMIN — FOLIC ACID 1 MG: 1 TABLET ORAL at 08:24

## 2021-07-01 RX ADMIN — Medication 2.5 MG: at 06:19

## 2021-07-01 ASSESSMENT — ACTIVITIES OF DAILY LIVING (ADL)
ADLS_ACUITY_SCORE: 20

## 2021-07-01 NOTE — PLAN OF CARE
Alert , withdrawn. Complained of pain once this shift with relief from prn pain med. Brown catheter in place with cloudy urine output . Refused turning and repositioning in bed. Possible discharge today to TCU. Vital signs stable on room air. Will continue to monitor and follow plan of care

## 2021-07-01 NOTE — PROGRESS NOTES
Care Management Follow Up    Length of Stay (days): 16    Expected Discharge Date: 07/01/21     Concerns to be Addressed: discharge planning     Patient plan of care discussed at interdisciplinary rounds: Yes    Anticipated Discharge Disposition: TCU  Anticipated Discharge Services: OP HD   M/F at [facility] with Dr. Puente.    Patient/family educated on Medicare website which has current facility and service quality ratings:  yes  Education Provided on the Discharge Plan:  yes  Patient/Family in Agreement with the Plan:  yes    Referrals Placed by CM/SW:    [place] [id number] [place] [address] [place]ILOH Villar  [address]  P: [phone]  F: [phone]  Admissions: [phone]     RN reporting #: Call the main line and ask for nursing to provide report.  Discharge orders can be sent to e-fax: [phone]  Private pay costs discussed: transportation costs    Additional Information:  SW escalated this case to management in light of transportation barrier to discharge. Pt is medically ready and spouse has declined to pay for transportation out of pocket. SW received permission for hospital to pay for the ride. This is an exception only and only a 1 time accommodation.    SW spoke w/ Ailin via phone to notify her of this accommodation. SW reported this is a one-time exception and she verbalized understanding.     SW setup w/c ride via [employer] ([phone]) 7/1 1p. SW notified HE that  department will take care of the billing. SW notified Dr. Tavera to complete discharge orders. SARMAD notified bedside nurse Leticia as well.    LANETTE Bell, [address] Acute Care   PH: [phone]  Pager: [phone]  [facility]

## 2021-07-01 NOTE — PLAN OF CARE
Pt A&O with flat affect.  VSS RA.  When getting out of bed, pt requires lift and brace should be worn.  Incontinent of stool and 2 assist to roll and reposition q2h. Reg diet, needs assistance eating. Brown catheter with low output with nicole urine. C/O back pain, scheduled and PRN oxycodone given with relief.  No BM this shift.  Mepilex on back, CDI.  Groin area red and excoriated with penial discharge. Creams and powder applied and sent in bag to TCU. PIV removed prior to transfer to TCU.  Pt left by wheelchair, handoff report given, and prescription was signed.

## 2021-07-01 NOTE — PROGRESS NOTES
Care Management Discharge Note    Discharge Date: 07/01/21  Discharge Disposition: [facility]  [address] [place]  [address]  P: [phone]  F: [phone]  Admissions: [phone]     RN reporting #: Call the main line and ask for nursing to provide report.  Discharge orders can be sent to e-fax: [phone]     Discharge Services: OP HD  M/F at [facility] with Dr. Puente.    Discharge Transportation: Wheelchair (HE will bring one) ride via HE setup 7/1 1p.  Private pay costs discussed: transportation costs    PAS Confirmation Code:  [id number]  Patient/family educated on Medicare website which has current facility and service quality ratings:  yes    Education Provided on the Discharge Plan:  yes  Persons Notified of Discharge Plans: 5a nursing, spouse, provider, facility  Patient/Family in Agreement with the Plan: yes     Handoff Referral Completed: Yes    Additional Information:  SARMAD faxed discharge orders to e-fax number. SARMAD updated Yogesh [phone] at facility about discharge timeframe. SARMAD noted sending discharge orders and to follow up if not received.    LANETTE Bell, RADHA  5A Acute Care   PH: [phone]  Pager: [phone]  [facility]

## 2021-07-01 NOTE — DISCHARGE SUMMARY
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  The following labs/tests are   recommended: BMP.             Unresulted Labs Ordered in the Past 30 Days of this Admission     No orders found from 5/16/2021 to 6/16/2021.          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 Hospital course c/b newly diagnosed acute T12 compression fx and RLE DVT. Patients right heart strain has improved with anticoagulation alone so no more aggressive intervention planned. Unfortunately developed what appears to be likely CAUTI so started on ceftriaxone but cultures ended up growing candida. It was decided to be a true candidal cystitis so he was started on fluconazole for 14 days which he will finish at TCU.      Possible candidal cystitis    Neurogenic bladder   Phimosis  Candida intertrigo of groin  Balanitis  of the glans/prepuce  Follows with urology. S/p botox in past. Not on any medications due to cost. Nursing staff unable to place ferguson catheter; Urology consulted for ferguson placement, placed on 6/19. On 6/28 developed suprapubic discomfort with discharge from around catheter, Urology exchanged ferguson and obtained UA after exchange performed which ended up growing candida. Given patients profound yeast infection of groin/penis, new suprapubic pain, and no history of candida in urine my feelings are to treat this as a true candidal cystitis and that it is not just colonization.  - Patient to keep ferguson catheter in place and follow up with urology for TOV given continued retention. This is scheduled 7/12  - Fluconazole 100 mg daily x14 days total (renally adjusted)     Submassive PE c/b right heart strain  DVT of RLE   Acute hypoxic respiratory failure  P/w hypotension and elevated lactate on admission in the setting of several weeks of dyspnea at home. Initial concern was for sepsis, though infectious work-up was unremarkable. Also noted to have increased LE edema and US confirmed RLE DVT in the distal iliac vein extending into the common femoral vein and occlusive thrombus in popliteal vein. CTA confirmed large PE with right heart strain, and initial TTE on 6/15 showed HD changes related to RV dysfunction. but repeat TTE on 6/18 showed improved RV function.  - Started apixaban on 6/19, will continue for at least 3 months              - Now on 5 mg Eliquis BID as of 6/27  - Remains hemodynamically stable and on room air     T12 compression fx   Reported low back pain for the last 1-2 weeks with multiple recurrent falls (due to weakness and unsteady gait). CT lumbar spine with acute compression fx T12, without any retropulsion.   - MRI performed on 6/26 due to patient RLE > LLE weakness, though exam likely limited by pain - results show T12 compression fracture without cord compression/acute findings, will continue  conservative management  - TLSO brace in place when out of bed  - Pain control as below:              - APAP TID, Lidocaine patch, Voltaren gel              - Low dose Oxycodone scheduled on 6/24 given wife reports pt underreports pain              - PRN Oxycodone, IV Dilaudid for severe refractory pain  - PT/OT consult, recommending discharge to TCU     Acute Toxic Metabolic Encephalopathy  Hx of alcohol abuse  Likely multifactorial w/ alcohol use disorder and hx of withdrawal, acute illness (submassive PE), medications (narcotics for pain control from compression fracture), and ESRD requiring iHD, w/ delirium all contributing. Per discussion with wife, at baseline patient is oriented x3 but does need assistance with ADLs. His picture seems most consistent with hospital acquired delirium and is improving daily.  - Continue MVI, folic acid and thiamine.   - Frequent reorientation  - Continue to treat underlying conditions  - Scheduled pain medication on 6/24   - Continue Seroquel 25 mg qHS  - Stop home Valium PRN for anxiety; Hydroxyzine PRN available instead    Atrial fib/flutter with RVR   [id number] score 3 for age, HTN, and DM2. Takes  mg daily, but not on anticoagulation or rate control prior to admission. Presented with HR 180s on admission, which improved after IVF. EKG showed Afib/flutter with RVR. Now rate controlled on AC.  - Hold BB with soft BP. 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 Puente. Patient does still make urine.   - Nephrology consulted, appreciate recs              - Continue to dialyze x2/weekly  - I&O and daily weights      Hx of HTN  Previously on medications but discontinued after lifestyle modifications with  weight loss several years ago     DM2- diet controlled.   - Hemoglobin A1c 5.2 on admission  - Monitor BGs with BMP     Chronic anemia - Baseline 11.0s. . B12/Folate/TSH all within normal limits.  - Hgb downtrending 9-10 this hospitalization  - Trend CBC     Anxiety -   - Hold home Valium for anxiety  - Trial low-dose Hydroxyzine instead    Consultations This Hospital Stay   PHARMACY TO DOSE VANCO  PHYSICAL THERAPY ADULT IP CONSULT  OCCUPATIONAL THERAPY ADULT IP CONSULT  ORTHOSIS SPINAL IP CONSULT  NEPHROLOGY ESRD ADULT IP CONSULT  PHARMACY IP CONSULT  PHARMACY IP CONSULT  INTERVENTIONAL RADIOLOGY ADULT/PEDS IP CONSULT  VASCULAR ACCESS CARE ADULT IP CONSULT  INTERVENTIONAL RADIOLOGY ADULT/PEDS IP CONSULT  UROLOGY IP CONSULT  WOUND OSTOMY CONTINENCE NURSE  IP CONSULT  WOUND OSTOMY CONTINENCE NURSE  IP CONSULT  VASCULAR ACCESS CARE ADULT IP CONSULT  PHYSICAL THERAPY ADULT IP CONSULT  OCCUPATIONAL THERAPY ADULT IP CONSULT    Code Status   Full Code    Time Spent on this Encounter   Ap KEITH DO, personally saw the patient today and spent greater than 30 minutes discharging this patient.       Ap Tavera DO  [facility] [address] [address]  Phone: [phone]  ______________________________________________________________________    Physical Exam   Vital Signs: Temp: 95.4  F (35.2  C) Temp src: Oral BP: 108/49 Pulse: 80   Resp: 18 SpO2: 99 % O2 Device: None (Room air)    Weight: 224 lbs 13.91 oz    General Appearance:  Pleasant, awake and interactive, no distress, comfortable  Respiratory: Breathing comfortably on room air, lungs clear to auscultation bilaterally  Cardiovascular: RRR, no m/r/g. Radial pulses 2+ and symmetric.  GI: soft, non-distended, there is suprapubic abdominal tenderness to palpation without rebound/guarding.  Skin: groin now covered in powder, unable to assess glans  Other:  Trace edema of the lower extremities bilaterally.       Primary Care  Physician   Isrrael Ornelas    Discharge Orders      General info for SNF    Length of Stay Estimate: Short Term Care: Estimated # of Days 31-90  Condition at Discharge: Stable  Level of care:skilled   Rehabilitation Potential: Fair  Admission H&P remains valid and up-to-date: Yes  Recent Chemotherapy: N/A  Use Nursing Home Standing Orders: Yes     Mantoux instructions    Give two-step Mantoux (PPD) Per Facility Policy Yes     Reason for your hospital stay    You were in the hospital due to weakness and were found to have a sub massive PE     Brown catheter    To straight gravity drainage. Change catheter every 2 weeks and PRN for leaking or decreased uring output with signs of bladder distention. DO NOT change catheter without a specific MD order IF diagnosis of benign prostatic hypertrophy (BPH), neurogenic bladder, or other urological conditions     Follow Up and recommended labs and tests    Follow up with shelter physician.  The following labs/tests are recommended: BMP.     Activity - Up with nursing assistance     Physical Therapy Adult Consult    Evaluate and treat as clinically indicated.    Reason:  Physical deconditioning from chronic illness     Occupational Therapy Adult Consult    Evaluate and treat as clinically indicated.    Reason:  Physical deconditioning from chronic illness     Contact Isolation     Advance Diet as Tolerated    Follow this diet upon discharge: Orders Placed This Encounter      Snacks/Supplements Adult: Magic Cup; With Meals      Regular Diet Adult       Significant Results and Procedures   Most Recent 3 CBC's:  Recent Labs   Lab Test 07/01/21  0733 06/30/21  0843 06/28/21  0730   WBC 6.6 5.8 7.1   HGB 8.8* 8.6* 9.0*   MCV 99 100 94    217 192     Most Recent 3 BMP's:  Recent Labs   Lab Test 07/01/21  0733 06/30/21  0843 06/28/21  0730    134 133   POTASSIUM 4.0 3.7 4.6   CHLORIDE 103 102 103   CO2 26 28 22   BUN 31* 27 31*   CR 2.95* 2.75* 2.27*   ANIONGAP 6 4 8    COLUMBA 8.5 8.6 8.6   GLC 73 108* 108*     Most Recent 2 LFT's:  Recent Labs   Lab Test 06/19/21  0811 06/18/21  0524   AST 17 12   ALT 11 9   ALKPHOS 81 81   BILITOTAL 0.5 0.5     Most Recent 3 INR's:  Recent Labs   Lab Test 06/18/21  0738 10/08/20  1030 02/10/20  0925   INR 1.36* 1.16* 1.21*   ,   Results for orders placed or performed during the hospital encounter of 06/15/21   XR Chest Port 1 View    Narrative    EXAM: XR CHEST PORT 1 VIEW  6/15/2021 1:35 PM     HISTORY:  SOA       COMPARISON:  Chest radiograph 5/14/2020    FINDINGS:     Portable upright view of the chest. Right tunneled central venous  catheter projects over the right atrium. Trachea is midline.  Cardiomediastinal silhouette is borderline enlarged. Pulmonary  vasculature is prominent. Low lung volumes. Grossly unchanged  perihilar and left retrocardiac opacities. No significant pleural  effusion. No pneumothorax.    No acute osseous abnormality. Visualized upper abdomen is  unremarkable.        Impression    IMPRESSION: Mild cardiomegaly with perihilar and left retrocardiac  opacities, likely atelectasis vs. edema.    I have personally reviewed the examination and initial interpretation  and I agree with the findings.    JOSUÉ CANADA MD   CT Lumbar Spine w/o Contrast     Value    Radiologist flags Compression fracture (Urgent)    Narrative    CT LUMBAR SPINE W/O CONTRAST 6/15/2021 4:45 PM    History: Compression fracture, L-spine.  ICD-10:    Comparison: None.    Technique: Using multidetector thin collimation helical acquisition  technique, axial, coronal and sagittal CT images through the lumbar  spine were obtained without intravenous contrast.     Findings: Image quality degraded by beam attenuation secondary to the  patient's body habitus. There are 5 lumbar type vertebrae. Regarding  alignment, there is minimal retrolisthesis of L5 on S1. Wedge-shaped  compression deformity with visible horizontal fracture lucency through  the T12  vertebral body with up to approximately 30% height loss.  Associated paravertebral edema. There is no appreciable retropulsion  of vertebral body/fragments.     L3 superior endplate Schmorl's node. Severe loss of intervertebral  disc height at L5-S1. Additional multilevel degenerative changes with  endplate osteophytosis, disc bulges, ligamentum flavum thickening, and  facet hypertrophy. Associated moderate to severe spinal canal stenosis  at L3-4. Multilevel neural foraminal narrowing.    Bilateral renal atrophy.      Impression    Impression:  1. Acute compression fracture of the T12 vertebral body and up to  approximately 30% height loss. No evidence for retropulsion.  2. Multilevel spondylosis, most pronounced at L3-4 where there is  moderate to severe spinal canal stenosis.    [Urgent Result: Compression fracture]    Finding was identified on 6/15/2021 4:48 PM.     Dr. Graham was contacted by Dr. Griffin at 6/15/2021 5:06 PM and  verbalized understanding of the urgent finding.     I have personally reviewed the examination and initial interpretation  and I agree with the findings.    JOHN OLGUIN MD   US Lower Extremity Venous Duplex Bilateral     Value    Radiologist flags DVT (Urgent)    Narrative    EXAMINATION: US LOWER EXTREMITY VENOUS DUPLEX BILATERAL  6/15/2021  8:03 PM      CLINICAL HISTORY: Bilateral lower extremity edema, very sedentary over  the last several days, shortness of breath    COMPARISON: None        PROCEDURE COMMENTS: Ultrasound was performed of the deep venous system  of the right and left lower extremity using grayscale, color, and  spectral Doppler.    FINDINGS:  Partially occlusive thrombus in the distal external iliac vein  extending into the common femoral vein and origin of the profunda.  Occlusive thrombus in the popliteal vein. The left common femoral,  greater saphenous origin, femoral, popliteal, and deep calf veins are  visualized and are patent with normal venous  waveforms and response  compression significant bilateral subcutaneous edema.      Impression    IMPRESSION:.  1. Deep venous thrombus in the right lower extremity as described  above.  2. No DVT in the left lower extremity  3. Significant subcutaneous edema in the bilateral lower legs.    [Urgent Result: DVT]    Finding was identified on 6/15/2021 8:09 PM.     Ina MORENO was contacted by Dr. Griffin at 6/15/2021 8:12  PM and verbalized understanding of the urgent finding.     I have personally reviewed the examination and initial interpretation  and I agree with the findings.    GETACHEW AYALA MD   CT Chest Pulmonary Embolism w Contrast     Value    Radiologist flags Pulmonary embolism with right heart strain (Urgent)    Narrative    EXAMINATION: CTA pulmonary angiogram, 6/16/2021 10:20 PM     COMPARISON: 6/15/2021    HISTORY: Pulmonary embolism suspected, right heart strain with DVT    TECHNIQUE: Volumetric helical acquisition of CT images of the chest  from the lung apices to the kidneys were acquired after the  administration of 75 mL of Isovue-370 IV contrast.  Post-processed  multiplanar and/or MIP reformations were obtained, archived to PACS  and used in interpretation of this study.     FINDINGS:  Contrast bolus is: excellent. Filling defects involving the  distal bilateral pulmonary arteries extending into the superior and  inferior segmental arteries.    The largest right ventricle transaxial diameter is (measured from  endocardium to endocardium): 4.8 cm   The largest left ventricle transaxial diameter is (measured from  endocardium to endocardium): 3.6 cm  RV/LV ratio is: 1.33 (if ratio greater than 1.1 then sign is  suspicious for right heart strain)  Reflux of contrast into the IVC? Yes  Paradoxical bowing of the interventricular septum to the left? Mild  leftward bowing of the septum  Pericardial effusion?:physiologic pericardial fluid    Bilateral trace pleural effusions and bibasilar  atelectasis of the  lungs. No suspicious pulmonary nodules. Right IJ catheter terminates  in the right atrium. No acute abdominal pathology. No acute osseous  abnormalities.      Impression    IMPRESSION:   1. Exam is positive for acute pulmonary embolism with right heart  strain     2. Trace bilateral pleural effusions and bibasilar atelectasis.    [Urgent Result: Pulmonary embolism with right heart strain]    Finding was identified on 6/16/2021 10:29 PM.     Dr. Aguirre was contacted by Dr. Griffin at 6/16/2021 10:43 PM and  verbalized understanding of the urgent finding.     In the event of a positive result for acute pulmonary embolism  resulting in right heart strain, consider calling the   [facility] hospital  for PERT (Pulmonary Embolism Response Team)  Activation?    PERT -- Pulmonary Embolism Response Team (Multidisciplinary team  including cardiology, interventional radiology, critical care,  hematology)       I have personally reviewed the examination and initial interpretation  and I agree with the findings.    SUZY CHAUDHARY MD   IR CVC Tunnel Revision Right    Narrative    Procedures:   Tunneled central venous catheter replacement and revision    Clinical indication: It doesn't work    Comparison studies: CT 6/16/2021    PROCEDURE:        Staff Radiologist: Mahesh Suero MD    Fellow: Colin More MD    I, Dr. Mahesh Suero, was present for the critical part of the  procedure and immediately available for the entire procedure.    Consent: verbal and written informed consent obtained prior to  procedure.    Procedure details: Patient placed in supine position. Right neck and  chest prepped and draped in standard sterile fashion. 1% lidocaine was  used for local anesthesia. Using blunt dissection, the cuff was freed  from surrounding tissues. Using fluoroscopic guidance, the existing  catheter was remove over a stiff angled Glidewire. We initially  attempted to pass the Glidewire through  the blue lumen, however this  was partially occluded. We were able to pass the wire easily through  the red lumen. A 9 [place] 25 cm vascular sheath was advanced over wire  to the superior vena cava. A BEN one catheter and Bentson wire were  used to select the inferior vena cava, which proved to be quite  challenging. Bentson wire exchanged for stiff angled Glidewire.  Catheter exchanged for a 10 x 80 mm [employer] [place] balloon.  Angioplasty from the inferior cavoatrial junction to the innominate  confluence was performed. Balloon and sheath exchanged for a new 16  [place] 27 cm tip to cuff Gurpreet split double lumen hemodialysis catheter.  This was positioned under fluoroscopic guidance with the tip at the  inferior cavoatrial junction. Both lumens flushed and aspirated  adequately. Each lumen was locked with 3 cc of 1000-1 heparin  solution. The catheter was secured in place with a 2-0 Ethilon  retention suture at the skin entry site. A sterile dressing was  applied. The patient was transferred in stable condition, having  tolerated the procedure without immediate complication.     Medications: fentanyl 75 mcg IV, midazolam 1.5 mg IV, 1% lidocaine for  local anesthesia.     Monitoring: The patient was placed on continuous monitoring. Vital  signs and sedation monitored by nursing staff under my supervision.  The patient remained stable throughout the procedure.    Face to face sedation time: 55 minutes    Fluoroscopy time: 11.7 minutes    Complications: None.      Impression    IMPRESSION:   1. The blue lumen of the existing catheter was found to be partially  occluded.  2. Venoplasty performed with a 10 mm balloon from the inferior  cavoatrial junction to the innominate confluence.  3. Placement under fluoroscopic guidance of a new 27 cm tip to cuff 16  [place] Gurpreet split double-lumen hemodialysis catheter. The tip is  positioned at the inferior cavoatrial junction.    PLAN:    This catheter may be used  immediately.    I have personally reviewed the examination and initial interpretation  and I agree with the findings.    GEORGES MACIAS   CT Head w/o Contrast    Narrative    CT HEAD W/O CONTRAST 6/18/2021 11:16 AM    History: Mental status change, unknown cause     Comparison: CT 5/14/2020    Technique: Using multidetector thin collimation helical acquisition  technique, axial, coronal and sagittal CT images from the skull base  to the vertex were obtained without intravenous contrast.   (topogram) image(s) also obtained and reviewed.    Findings: There is no intracranial hemorrhage, mass effect, or midline  shift. Gray/white matter differentiation in both cerebral hemispheres  is preserved. Diffuse cerebral volume loss with proportionate  appearance of the ventricles. Patchy periventricular and subcortical  hypodensities, nonspecific but most likely representing chronic small  vessel ischemic disease. The basal cisterns are clear.    The bony calvaria and the bones of the skull base are normal. Minimal  debris within the bilateral maxillary sinuses inferiorly. Mastoid air  cells are clear.      Impression    Impression:  1. No acute intracranial pathology.   2. Diffuse cerebral volume loss and sequelae of chronic small vessel  ischemic disease, similar to CT 5/14/2020.    I have personally reviewed the examination and initial interpretation  and I agree with the findings.    NATALIE RITTER MD   MR Lumbar Spine w/o Contrast    Narrative    MR LUMBAR SPINE W/O CONTRAST 6/26/2021 7:35 PM    Provided History: Low back pain, trauma    ICD-10:    Comparison: CT L-spine 6/16/2021    Technique: Sagittal T1-weighted, sagittal STIR, 3D volumetric axial  and sagittal reconstructed T2-weighted images of the lumbar spine were  obtained without intravenous contrast.     Findings: There are 5 lumbar-type vertebrae assumed for the purposes  of this dictation.  The tip of the conus medullaris is at L1.  There  is grade 1  retrolisthesis of L2 on L3.. Multiple Schmorl's nodes.  There is a T1 and T2 hyperintense focus within the L2 vertebral body,  likely representing a benign process such as focal fatty deposition or  a hemangioma .  Acute compression fracture of the T12 vertebral body  with associated edema and approximately 50% vertebral body height  loss.     On a level by level basis:    T11-T12: No significant retropulsion of T12 vertebral body fracture  fragments. No significant spinal canal stenosis.    T12-L1: No spinal canal or neuroforaminal stenosis.    L1-2: Mild facet arthropathy bilaterally. Ligamentum flavum  thickening. No spinal canal or neuroforaminal stenosis.    L2-3: Facet arthropathy bilaterally. Ligamentum flavum thickening.  Tiny posterior disc bulge. Mild neural foraminal stenosis bilaterally.  Spinal canal is patent.    L3-4: Facet arthropathy bilaterally. Ligamentum flavum thickening.  Mild to moderate posterior disc bulge. Mild spinal canal stenosis.  Mild left-sided and mild to moderate right-sided neural foraminal  stenosis.    L4-5: Facet arthropathy bilaterally. Ligamentum flavum thickening.  Small posterior disc bulge. No spinal canal or neural foraminal  stenosis.    L5-S1: Facet arthropathy bilaterally. Right eccentric posterior disc  bulge narrows the right lateral recess and abuts upon the traversing  right S1 nerve root.. No spinal canal stenosis. Mild bilateral neural  foraminal stenosis.    Paraspinous tissues are within normal limits.      Impression    Impression:   1. Redemonstration of acute T12 vertebral body compression fracture  with approximately 50% vertebral body height loss.  2. Mild spondylosis most probably at L3-L4 with mild left-sided and  mild to moderate right-sided neural foraminal stenosis. Further  degenerative disease as described above.    I have personally reviewed the examination and initial interpretation  and I agree with the findings.    DENISE LONDONO MD   Echo  Complete    Narrative    [id number]  [id number]  [id number]  [id number]     [facility],[place]  Echocardiography Laboratory  [address] [place] [id number]     Name: MARIA E REESE  MRN: [id number]  : 1950  Study Date: 2021 08:06 AM  Age: 71 yrs  Gender: Male  Patient Location: [facility]  Reason For Study: Atrial Fibrillation  Ordering Physician: ULYSSES MEMBRENO  Performed By: ELIAS Meraz     BSA: 2.4 m2  Height: 74 in  Weight: 263 lb  HR: 86  BP: 118/80 mmHg  ______________________________________________________________________________  Procedure  Complete Portable Echo Adult.  ______________________________________________________________________________  Interpretation Summary  Moderate to severe right ventricular dilation is present.  Hypokinetic RV free wall with hyperkinetic RV apex consistent with  hemodynamically significant pulmonary embolism if the clinical picture is  consistent with PE.  Right ventricular systolic pressure is 33mmHg above the right atrial pressure.  RV changes are new since prior study in .  ______________________________________________________________________________  Left Ventricle  Global and regional left ventricular function is normal with an EF of 55-60%.  Left ventricular wall thickness is normal. Left ventricular size is normal.  Diastolic function not assessed due to atrial fibrillation. No regional wall  motion abnormalities are seen.     Right Ventricle  Moderate to severe right ventricular dilation is present. Hypokinetic RV free  wall with hyperkinetic RV apex consistent with hemodynamically significant  pulmonary embolism if the clinical picture is consistent with PE.     Atria  The right atria appears normal. Moderate to severe left atrial enlargement is  present.     Mitral Valve  The mitral valve is normal. Mild mitral insufficiency is present.     Aortic Valve  Aortic valve is normal in structure and  function.     Tricuspid Valve  The tricuspid valve is normal. Mild tricuspid insufficiency is present. Right  ventricular systolic pressure is 33mmHg above the right atrial pressure.     Pulmonic Valve  The pulmonic valve is normal.     Vessels  The thoracic aorta is normal. The pulmonary artery cannot be assessed. The  inferior vena cava cannot be assessed.     Pericardium  No pericardial effusion is present.     Compared to Previous Study  RV changes are new since prior study in 2019.  ______________________________________________________________________________  MMode/2D Measurements & Calculations     IVSd: 0.89 cm  LVIDd: 4.4 cm  LVIDs: 2.9 cm  LVPWd: 1.1 cm  FS: 33.9 %  LV mass(C)d: 150.1 grams  LV mass(C)dI: 61.5 grams/m2  Ao root diam: 3.2 cm  asc Aorta Diam: 3.6 cm  LVOT diam: 2.2 cm  LVOT area: 3.8 cm2  LA Volume (BP): 85.2 ml  LA Volume Index (BP): 34.9 ml/m2  RWT: 0.51     Doppler Measurements & Calculations  MV E max jen: 70.3 cm/sec  MV A max jen: 34.4 cm/sec  MV E/A: 2.0  MV dec slope: 529.0 cm/sec2  PA acc time: 0.09 sec  TR max jen: 284.0 cm/sec  TR max P.3 mmHg  E/E' av.7  Lateral E/e': 5.6  Medial E/e': 7.8     ______________________________________________________________________________  Report approved by: Ce Acuña 2021 09:11 AM         Echocardiogram Limited    Narrative    [id number]  [id number]  [id number]  [id number]^HARMONY^MELANIA^TIERNEY                                                                       Version 3     [facility],[place]  Echocardiography Laboratory  [address] [place] [id number]     Name: MARIA E REESE  MRN: [id number]  : 1950  Study Date: 2021 11:28 AM  Age: 71 yrs  Gender: Male  Patient Location: [facility]  Reason For Study: Cor Pulmonale  Ordering Physician: MELANIA ANTUNEZ  Referring Physician: MD Keyana [facility]  Performed By: Dino Rodriguez     HR: 83  BP: 121/71  mmHg  ______________________________________________________________________________  Procedure  Limited Portable Echo Adult.     ______________________________________________________________________________  Interpretation Summary  Moderate right ventricular dilation is present.  Global right ventricular function is mildly to moderately reduced.  Pulmonary artery systolic pressure cannot be assessed.  Improved RV size and function when compared to previous study.  ______________________________________________________________________________  Left Ventricle  Global and regional left ventricular function is normal with an EF of 55-60%.     Right Ventricle  Moderate right ventricular dilation is present. Global right ventricular  function is mildly to moderately reduced.     Tricuspid Valve  Pulmonary artery systolic pressure cannot be assessed.     Pericardium  No pericardial effusion is present.     ______________________________________________________________________________  Report approved by: eC Acuña 06/18/2021 01:12 PM     ______________________________________________________________________________            Discharge Medications   Discharge Medication List as of 7/1/2021 11:24 AM      START taking these medications    Details   acetaminophen (TYLENOL) 500 MG tablet Take 2 tablets (1,000 mg) by mouth 3 times daily, Transitional      apixaban ANTICOAGULANT (ELIQUIS) 5 MG tablet Take 1 tablet (5 mg) by mouth 2 times daily, Transitional      bisacodyl (DULCOLAX) 10 MG suppository Place 1 suppository (10 mg) rectally daily as needed for constipation, Transitional      fluconazole (DIFLUCAN) 100 MG tablet Take 1 tablet (100 mg) by mouth daily, Transitional      folic acid (FOLVITE) 1 MG tablet Take 1 tablet (1 mg) by mouth daily, Transitional      hydrOXYzine (ATARAX) 25 MG tablet Take 1 tablet (25 mg) by mouth every 6 hours as needed for anxiety, Transitional      miconazole (MICATIN) 2 % external  powder Apply topically 2 times dailyTransitional      multivitamin RENAL (NEPHROCAPS/TRIPHROCAPS) 1 MG capsule Take 1 capsule by mouth daily, Transitional      nystatin (MYCOSTATIN) 700945 UNIT/GM external cream Apply topically 2 times dailyTransitional      nystatin (MYCOSTATIN) 533030 UNIT/GM external ointment Apply topically 2 times dailyTransitional      oxyCODONE (ROXICODONE) 5 MG tablet Take 0.5 tablets (2.5 mg) by mouth every 8 hours, R-0, Transitional      !! QUEtiapine (SEROQUEL) 25 MG tablet Take 1 tablet (25 mg) by mouth At Bedtime, Transitional      !! QUEtiapine (SEROQUEL) 25 MG tablet Take 1 tablet (25 mg) by mouth daily as needed (agitation), Transitional       !! - Potential duplicate medications found. Please discuss with provider.      CONTINUE these medications which have NOT CHANGED    Details   diazepam (VALIUM) 5 MG tablet Take 5 mg by mouth every 12 hours as needed for anxiety, Historical      diphenhydrAMINE (BENADRYL) 50 MG capsule Take 1 capsule (50 mg) by mouth every 6 hours as needed for itching or allergies Administer 30 min - 2 hours pre - IV contrast injection, Disp-1 capsule, R-0, E-Prescribe      Lidocaine (LIDOCARE) 4 % Patch Apply topically to intact skin for upto 12 hrs within 24-hr period, as needed for shoulder pain.Historical      melatonin 5 MG tablet Take 1 tablet (5 mg) by mouth nightly as needed for sleep, Transitional         STOP taking these medications       aspirin (ASA) 325 MG EC tablet Comments:   Reason for Stopping:             Allergies   Allergies   Allergen Reactions     Cephalexin      Ciprofloxacin      Diagnostic X-Ray Materials      Sulfa Drugs Hives

## 2021-07-01 NOTE — PLAN OF CARE
Time: 8660-4253    Reason for admission: falls/weakness, T12 fracture  Vitals: VSS on RA  Activity: Ax2/lift  Pain: C/O back pain, scheduled and PRN oxycodone, IV dilaudid given with relief  Neuro: A&Ox4  Cardiac: WDL  Respiratory: WDL  GI/: Brown in place with purulent discharge.  No Bm this shift  Diet: regular diet, fair appetite  Lines:  PIV SL  Skin: penile wounds, cares done per WOC RN.  Mepilex to back, CDI  Plan: Discharge to TCU 7/1.  Will continue to monitor and follow POC

## 2021-07-02 NOTE — PLAN OF CARE
Occupational Therapy Discharge Summary    Reason for therapy discharge:    Discharged to transitional care facility.    Progress towards therapy goal(s). See goals on Care Plan in [facility] electronic health record for goal details.  Goals partially met.  Barriers to achieving goals:   discharge from facility.    Therapy recommendation(s):    Continued therapy is recommended.  Rationale/Recommendations:  Pt is below baseline with I/ADLs & mobility.

## 2021-07-02 NOTE — DISCHARGE SUMMARY
Dialysis Discharge Summary Brief    [facility]  Division of Nephrology  Nephrology Discharge Dialysis Orders  Ph: [phone]  Fax: [phone]    Fer Latham  MRN: [id number]  YOB: 1950    [facility] Dialysis Unit: [place]  Primary Nephrologist: Dr. Puente/Nadine Reyes NP    Date of Admission: 6/15/2021  Date of Discharge: 7/1/2021    Please see hospitalist summary for full details of admission. Note, please re-establish EDW based on your scales. CVC was exchanged. Pt was started on apixaban for PE's. Please page me at  with any questions. Thanks much. Lizbeth Clements PA-C    Fer Latham is a 71 year old male with PMH of HTN, Aflutter (not on AC), DMII, ESRD 2/2 IgA nephropathy, and chronic alcohol abuse, admitted with atrial fibrillation with RVR, new acute T12 compression fx and RLE DVT and new PE      ESKD: dialyzes M/F at [facility] with Dr. Puente. Run time: 3 hrs 45 min. Access: tunneled RIJ (failed AVF). EDW: 119 kg (need to reduce).          Access: history of catheter dysfunction  - s/p tunneled CVC replacement 6/17  - heparin lock CVC     Volume:  kg, still with significant UOP  - Bed weights highly variable, please re-establish EDW based on pt's current post HD weight 7/2     Pulmonary embolism  RLE DVT dx June 2021 hospitalization  - started on apixaban        afib w RVR, resolved:  BP: normotensive, usual pre HD pressures 100-120's, post pressures the same, lowest pressure usually in 90's, lower at times.     BMD: recent      Anemia: recent hgb 10's  - Hold MECCA, new PE's     New T12 compression fx              Discharge Diagnosis:    ICD-10-CM    1. Subacute massive pulmonary embolism (H)  I26.99 apixaban ANTICOAGULANT (ELIQUIS) 5 MG tablet   2. Lactic acidosis  E87.2 Comprehensive metabolic panel     Ammonia (on ice)     Nt probnp inpatient     Troponin I     UA with Microscopic     Blood culture     Blood culture      Asymptomatic SARS-CoV-2 COVID-19 Virus (Coronavirus) by PCR     Lactic acid whole blood     CRP inflammation     CRP inflammation     Procalcitonin     Procalcitonin     Alcohol ethyl     Alcohol ethyl     Folate     Folate     Hemoglobin A1c     Hemoglobin A1c     Vitamin B12     Vitamin B12     TSH with free T4 reflex     TSH with free T4 reflex     Magnesium     Magnesium     Phosphorus     Phosphorus     CBC with platelets     Urine Culture     Heparin Unfractionated Anti Xa Level     Heparin Unfractionated Anti Xa Level     Heparin Unfractionated Anti Xa Level     Heparin Unfractionated Anti Xa Level     Heparin Unfractionated Anti Xa Level     CBC with platelets     Basic metabolic panel     Hepatic panel     Vancomycin level     Glucose by meter     Glucose by meter     Hepatitis B Surface Antibody     Hepatitis B surface antigen     Heparin Unfractionated Anti Xa Level     Heparin Unfractionated Anti Xa Level     Heparin Unfractionated Anti Xa Level     CBC with platelets     Comprehensive metabolic panel     INR     Glucose by meter     Glucose by meter     Glucose by meter     Glucose by meter     UA with Microscopic reflex to Culture     Potassium     Glucose by meter     Glucose by meter     Heparin Unfractionated Anti Xa Level     Comprehensive metabolic panel     Magnesium     Glucose by meter     Glucose by meter     CBC with platelets     Basic metabolic panel     Basic metabolic panel     CBC with platelets     Glucose by meter     Glucose by meter     Basic metabolic panel     CBC with platelets     Basic metabolic panel     CBC with platelets     Basic metabolic panel     TSH with free T4 reflex     TSH with free T4 reflex     Vitamin B12     Vitamin B12     CBC with platelets     Basic metabolic panel     CBC with platelets     Basic metabolic panel     CBC with platelets     Basic metabolic panel     CBC with platelets     Basic metabolic panel     Urine Culture Aerobic Bacterial     Urine  Culture Aerobic Bacterial     CBC with platelets     Basic metabolic panel     Asymptomatic SARS-CoV-2 COVID-19 Virus (Coronavirus) by PCR     CBC with platelets     Basic metabolic panel     CANCELED: CRP inflammation     CANCELED: Procalcitonin     CANCELED: Alcohol ethyl     CANCELED: Vitamin B12     CANCELED: Folate     CANCELED: TSH with free T4 reflex     CANCELED: Hemoglobin A1c     CANCELED: Blood culture     CANCELED: Magnesium     CANCELED: Phosphorus     CANCELED: Heparin Unfractionated Anti Xa Level     CANCELED: Platelet count     CANCELED: CBC with platelets     CANCELED: Platelet count     CANCELED: Vitamin B12     CANCELED: TSH with free T4 reflex     CANCELED: UA with Microscopic reflex to Culture     CANCELED: CBC with platelets     CANCELED: Basic metabolic panel   3. Atrial fibrillation with rapid ventricular response (H)  I48.91    4. Acute midline low back pain without sciatica  M54.5    5. Closed wedge compression fracture of T12 vertebra, initial encounter (H)  S22.080A acetaminophen (TYLENOL) 500 MG tablet     oxyCODONE (ROXICODONE) 5 MG tablet   6. Compression fracture of T12 vertebra, initial encounter (H)  S22.080A    7. Atrial fibrillation, unspecified type (H)  I48.91 apixaban ANTICOAGULANT (ELIQUIS) 5 MG tablet   8. End stage renal disease (H)  N18.6 multivitamin RENAL (NEPHROCAPS/TRIPHROCAPS) 1 MG capsule   9. Dependence on renal dialysis (H)  Z99.2    10. Contact with and (suspected) exposure to covid-19  Z20.822    11. Accidental fall, initial encounter  W19.XXXA    12. Mechanical complication of dialysis catheter, initial encounter (H)  T82.49XA IR CVC Tunnel Revision Right     IR CVC Tunnel Revision Right   13. Chronic idiopathic constipation  K59.04 bisacodyl (DULCOLAX) 10 MG suppository   14. Candida cystitis  B37.41 fluconazole (DIFLUCAN) 100 MG tablet   15. Anxiety  F41.9 hydrOXYzine (ATARAX) 25 MG tablet     QUEtiapine (SEROQUEL) 25 MG tablet     QUEtiapine (SEROQUEL) 25 MG  tablet   16. Insomnia, unspecified type  G47.00 melatonin 5 MG tablet     QUEtiapine (SEROQUEL) 25 MG tablet     QUEtiapine (SEROQUEL) 25 MG tablet   17. Fungal infection of the groin  B35.6 miconazole (MICATIN) 2 % external powder     nystatin (MYCOSTATIN) 539847 UNIT/GM external cream     nystatin (MYCOSTATIN) 769822 UNIT/GM external ointment   18. Balanitis  N48.1 nystatin (MYCOSTATIN) 102653 UNIT/GM external cream     nystatin (MYCOSTATIN) 549850 UNIT/GM external ointment   19. Weakness  R53.1 folic acid (FOLVITE) 1 MG tablet       [x] Resume all previous dialysis orders with exception as noted below    New Orders (if not applicable put NA):  Estimated Dry Weight Re-establish at current post HD weight 7/2   Dialysis Duration    Dialysis Access Replaced CVC 6/17/21   Antibiotics (dose per dialysis, end date)            Labs to be drawn at dialysis    Other major changes to dialysis prescription (e.g. Dialysate bath, heparin, blood flow rate, etc)      Medication changes (also fax the unit a copy of the discharge summary)         No MECCA, recent PE's, started on apixaban     Name of physician completing this form: TIFFANIE Conway

## 2021-07-05 ENCOUNTER — PRE VISIT (OUTPATIENT)
Dept: UROLOGY | Facility: CLINIC | Age: 71
End: 2021-07-05

## 2021-07-05 NOTE — TELEPHONE ENCOUNTER
Last office visit: 03/21/19    Upcoming scheduled visit: none    Last refill given: 04/23/20 for 3 month supply with 1 refill    Last UDS/results: n/a    PDMP reviewed: n/a    Medication refill request approved per protocol for 1 month supply. Patient due for appt. Letter sent   UDS for urinary frequency/LUTS.  Records available in [facility].

## 2021-07-12 ENCOUNTER — ALLIED HEALTH/NURSE VISIT (OUTPATIENT)
Dept: UROLOGY | Facility: CLINIC | Age: 71
End: 2021-07-12
Payer: MEDICARE

## 2021-07-12 ENCOUNTER — ANCILLARY PROCEDURE (OUTPATIENT)
Dept: ULTRASOUND IMAGING | Facility: CLINIC | Age: 71
End: 2021-07-12
Attending: NURSE PRACTITIONER
Payer: MEDICARE

## 2021-07-12 VITALS — SYSTOLIC BLOOD PRESSURE: 103 MMHG | HEART RATE: 88 BPM | DIASTOLIC BLOOD PRESSURE: 69 MMHG

## 2021-07-12 DIAGNOSIS — N21.9: ICD-10-CM

## 2021-07-12 DIAGNOSIS — R35.0 URINARY FREQUENCY: Primary | ICD-10-CM

## 2021-07-12 PROBLEM — D69.6 THROMBOCYTOPENIA (H): Status: ACTIVE | Noted: 2020-11-20

## 2021-07-12 PROBLEM — F10.939 ALCOHOL WITHDRAWAL (H): Status: ACTIVE | Noted: 2020-11-20

## 2021-07-12 PROBLEM — F10.10 ALCOHOL ABUSE: Status: ACTIVE | Noted: 2020-11-20

## 2021-07-12 PROBLEM — J18.9 CAP (COMMUNITY ACQUIRED PNEUMONIA): Status: ACTIVE | Noted: 2018-06-13

## 2021-07-12 PROBLEM — E87.1 HYPONATREMIA: Status: ACTIVE | Noted: 2020-11-20

## 2021-07-12 PROBLEM — R80.9 PROTEINURIA: Status: ACTIVE | Noted: 2017-03-06

## 2021-07-12 PROBLEM — L29.9 ITCHING: Status: ACTIVE | Noted: 2018-07-18

## 2021-07-12 PROBLEM — I26.99 ACUTE MASSIVE PULMONARY EMBOLISM (H): Status: ACTIVE | Noted: 2021-07-08

## 2021-07-12 PROBLEM — M62.82 NON-TRAUMATIC RHABDOMYOLYSIS: Status: ACTIVE | Noted: 2018-06-13

## 2021-07-12 PROBLEM — F32.1 CURRENT MODERATE EPISODE OF MAJOR DEPRESSIVE DISORDER WITHOUT PRIOR EPISODE (H): Status: ACTIVE | Noted: 2019-04-30

## 2021-07-12 PROBLEM — Z86.19 HX OF SEPSIS: Status: ACTIVE | Noted: 2018-07-17

## 2021-07-12 PROBLEM — G93.41 METABOLIC ENCEPHALOPATHY: Status: ACTIVE | Noted: 2018-06-22

## 2021-07-12 PROBLEM — R60.9 DEPENDENT EDEMA: Status: ACTIVE | Noted: 2021-07-12

## 2021-07-12 PROBLEM — D64.9 NORMOCYTIC ANEMIA: Status: ACTIVE | Noted: 2018-06-13

## 2021-07-12 PROBLEM — R53.81 DEBILITY: Status: ACTIVE | Noted: 2018-06-22

## 2021-07-12 PROBLEM — Z99.2 ACUTE RENAL FAILURE ON DIALYSIS (H): Status: ACTIVE | Noted: 2018-07-18

## 2021-07-12 PROBLEM — N17.0 ACUTE TUBULAR NECROSIS (H): Status: ACTIVE | Noted: 2018-06-14

## 2021-07-12 PROBLEM — R65.20 SEVERE SEPSIS (H): Status: ACTIVE | Noted: 2018-06-13

## 2021-07-12 PROBLEM — D72.829 LEUKOCYTOSIS: Status: ACTIVE | Noted: 2018-06-13

## 2021-07-12 PROBLEM — I82.4Z1 ACUTE DEEP VEIN THROMBOSIS (DVT) OF DISTAL VEIN OF RIGHT LOWER EXTREMITY (H): Status: ACTIVE | Noted: 2021-07-08

## 2021-07-12 PROBLEM — R79.89 ELEVATED TROPONIN: Status: ACTIVE | Noted: 2018-06-13

## 2021-07-12 PROBLEM — F51.04 CHRONIC INSOMNIA: Status: ACTIVE | Noted: 2021-07-08

## 2021-07-12 PROBLEM — F32.9 MAJOR DEPRESSIVE DISORDER WITH CURRENT ACTIVE EPISODE: Status: ACTIVE | Noted: 2019-04-30

## 2021-07-12 PROBLEM — A41.9 SEVERE SEPSIS (H): Status: ACTIVE | Noted: 2018-06-13

## 2021-07-12 PROBLEM — B37.41 CANDIDA CYSTITIS: Status: ACTIVE | Noted: 2021-07-08

## 2021-07-12 PROBLEM — N17.9 ACUTE RENAL FAILURE ON DIALYSIS (H): Status: ACTIVE | Noted: 2018-07-18

## 2021-07-12 PROBLEM — N17.9 AKI (ACUTE KIDNEY INJURY) (H): Status: ACTIVE | Noted: 2018-06-13
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
## 2021-07-12 PROBLEM — N32.81 OAB (OVERACTIVE BLADDER): Status: ACTIVE | Noted: 2020-08-19

## 2021-07-12 PROBLEM — E87.5 HYPERKALEMIA: Status: ACTIVE | Noted: 2018-06-13

## 2021-07-12 PROCEDURE — 99207 PR NO BILLABLE SERVICE THIS VISIT: CPT | Performed by: NURSE PRACTITIONER

## 2021-07-12 PROCEDURE — 76770 US EXAM ABDO BACK WALL COMP: CPT | Performed by: RADIOLOGY

## 2021-07-12 PROCEDURE — 87086 URINE CULTURE/COLONY COUNT: CPT | Performed by: NURSE PRACTITIONER

## 2021-07-12 PROCEDURE — 87106 FUNGI IDENTIFICATION YEAST: CPT | Performed by: NURSE PRACTITIONER

## 2021-07-12 RX ORDER — DIPHENOXYLATE HYDROCHLORIDE AND ATROPINE SULFATE 2.5; .025 MG/1; MG/1
1 TABLET ORAL
Status: ON HOLD | COMMUNITY
Start: 2021-07-01 | End: 2022-02-08

## 2021-07-12 ASSESSMENT — PAIN SCALES - GENERAL: PAINLEVEL: NO PAIN (0)

## 2021-07-12 NOTE — PATIENT INSTRUCTIONS
UROLOGY CLINIC VISIT PATIENT INSTRUCTIONS    -We will send your urine for culture and I will notify you if antibiotics or antifungals are needed.  -Reschedule urodynamics    If you have any issues, questions or concerns in the meantime, do not hesitate to contact us at [phone] or via [employer].     It was a pleasure meeting with you today.  Thank you for allowing me and my team the privilege of caring for you today.  YOU are the reason we are here, and I truly hope we provided you with the excellent service you deserve.  Please let us know if there is anything else we can do for you so that we can be sure you are leaving completely satisfied with your care experience.    Grace Galloway, CNP

## 2021-07-12 NOTE — PROGRESS NOTES
PREPROCEDURE DIAGNOSES:    1. OAB with severe nocturia  2. Renal failure on HD  3. UDS from one year ago showing small capacity bladder with high-pressure DO.    POSTPROCEDURE DIAGNOSES:  1. Possible UTI    PROCEDURE:   -Urethral catheterization for measurement of postvoid residual urine volume.    INDICATIONS FOR PROCEDURE:  Mr. Fer Latham is a pleasant 71 year old male with OAB with severe nocturia, renal failure on HD and UDS from one year ago showing small capacity bladder with high-pressure DO. Video urodynamic assessment is requested today to better characterize Mr. Fer Latham's voiding dysfunction.      DESCRIPTION OF PROCEDURE:  Risks, benefits, and alternatives to urodynamics were discussed with the patient and he wished to proceed.  Urodynamics are planned to better assess the primary etiology for Mr. Latham's urologic dysfunction. After informed consent was obtained, the patient was taken to the procedure room where the study was initiated. Findings below.     PRE-STUDY UROFLOWMETRY:  Postvoid residual by catheter: 10 mL.  Pretest urine dipstick was positive for large leukocytes and blood. The patient denies any UTI symptoms today; specifically, denies dysuria, frequency, urgency, hematuria, fevers, chills, N/V. However, he reports that he is quite fatigued today and his blood pressure is reading 82-88 systolically. Advised that we defer our study today given these findings. He agrees with this plan.       ASSESSMENT/PLAN:  Mr. Fer Latham is a pleasant 71 year old male with OAB with severe nocturia, renal failure on HD and UDS from one year ago showing small capacity bladder with high-pressure DO who presented today for UDS, but UA concerning for UTI. Will send UC today to guide treatment and reschedule UDS for another day.    OCTAVIO Green, CNP  Department of Urology

## 2021-07-12 NOTE — NURSING NOTE
Chief Complaint   Patient presents with     Urodynamics Study     LUTS/frequency     Itzel Carmen, CMA

## 2021-07-13 LAB — BACTERIA UR CULT: ABNORMAL

## 2021-07-15 ENCOUNTER — TELEPHONE (OUTPATIENT)
Dept: UROLOGY | Facility: CLINIC | Age: 71
End: 2021-07-15

## 2021-07-15 DIAGNOSIS — B37.9 CANDIDA INFECTION: Primary | ICD-10-CM

## 2021-07-15 RX ORDER — FLUCONAZOLE 100 MG/1
TABLET ORAL
Qty: 6 TABLET | Refills: 0 | Status: ON HOLD | OUTPATIENT
Start: 2021-07-15 | End: 2022-02-03

## 2021-07-15 NOTE — TELEPHONE ENCOUNTER
Please reschedule patient's UDS. His UDS on 7/12 wasn't completed due to infection.   Thank you.  Shahana Good LPN  Urology Clinic Service   [facility]     X Size Of Lesion In Cm: 0 Depth Of Biopsy: dermis Billing Type: Third-Party Bill Biopsy Type: H and E Wound Care: Polysporin ointment Bill [id number] For Specimen Handling/Conveyance To Laboratory?: no Electrodesiccation Text: The wound bed was treated with electrodesiccation after the biopsy was performed. Post-Care Instructions: I reviewed with the patient in detail post-care instructions. Patient is to keep the biopsy site dry overnight, and then apply bacitracin twice daily until healed. Patient may apply hydrogen peroxide soaks to remove any crusting. Dressing: pressure dressing Anesthesia Volume In Cc (Will Not Render If 0): 0.5 Type Of Destruction Used: Curettage Electrodesiccation And Curettage Text: The wound bed was treated with electrodesiccation and curettage after the biopsy was performed. Biopsy Method: 15 blade- incisional biopsy technique Detail Level: Detailed Lab: 16 Curettage Text: The wound bed was treated with curettage after the biopsy was performed. Was A Bandage Applied: Yes Silver Nitrate Text: The wound bed was treated with silver nitrate after the biopsy was performed. Hemostasis: Drysol and Electrocautery Notification Instructions: Patient will be notified of biopsy results. However, patient instructed to call the office if not contacted within 2 weeks. Cryotherapy Text: The wound bed was treated with cryotherapy after the biopsy was performed. Consent: Written consent was obtained and risks were reviewed including but not limited to scarring, infection, bleeding, scabbing, incomplete removal, nerve damage and allergy to anesthesia. Anesthesia Type: 1% lidocaine with epinephrine

## 2021-07-15 NOTE — TELEPHONE ENCOUNTER
pts spouse negrito is calling, the message sent earlier was mis-read, pt had a UDS on 7/12 and negrito is calling for the results, does gaudencio need to be on any medication ? Please call negrito to let her know how the UDS went, thank you

## 2021-07-15 NOTE — TELEPHONE ENCOUNTER
Left a basic message that the UDS was rescheduled for 8/16 but if she had medical questions to call and speak with a nurse.

## 2021-07-15 NOTE — TELEPHONE ENCOUNTER
[facility] Call Center    Phone Message    May a detailed message be left on voicemail: yes     Reason for Call: Other: Pt spouse Ailin would like a call to discuss Urodynamic study results and possible medication. Please call .     Action Taken: Message routed to:  [facility] Urology    Travel Screening: Not Applicable

## 2021-08-06 LAB
ALBUMIN UR-MCNC: >300 MG/DL
APPEARANCE UR: CLEAR
BILIRUB UR QL STRIP: ABNORMAL
COLOR UR AUTO: YELLOW
GLUCOSE UR STRIP-MCNC: NEGATIVE MG/DL
HGB UR QL STRIP: ABNORMAL
KETONES UR STRIP-MCNC: 5 MG/DL
LEUKOCYTE ESTERASE UR QL STRIP: ABNORMAL
NITRATE UR QL: NEGATIVE
PH UR STRIP: 5 [PH] (ref 5–8)
SP GR UR STRIP: >1.03 (ref 1–1.03)
UROBILINOGEN UR STRIP-ACNC: 1 E.U./DL

## 2021-08-10 ENCOUNTER — TELEPHONE (OUTPATIENT)
Dept: UROLOGY | Facility: CLINIC | Age: 71
End: 2021-08-10

## 2021-08-10 DIAGNOSIS — R35.0 URINARY FREQUENCY: Primary | ICD-10-CM

## 2021-08-10 NOTE — TELEPHONE ENCOUNTER
----- Message from Shahana Good LPN sent at 8/10/2021  2:49 PM CDT -----  Edith,    Could you assist with this?  ----- Message -----  From: Itzel Carmen CMA  Sent: 8/10/2021   2:15 PM CDT  To: YVETTE James!    This patient is scheduled for UDS with Grace on Monday.  He is in a nursing home currently ([facility]).  I spoke with his nurse, Constantin, who said to fax the orders to him and he will collect the sample (fax: [phone]).  I just placed the orders now.  Could you please fax for me since I will be out of the clinic until Thursday?  Thanks so much!!!!!

## 2021-08-10 NOTE — PROGRESS NOTES
Spoke with Constantin (nursing staff) at [facility] today.  Per Grace Galloway CNP, patient will need a UA/UC prior to his Urodynamics study next week to rule out UTI.  Per Constantin, ok to fax orders to them and they will collect the sample.  (fax: [phone]).  Orders placed and faxed.  Will watch for results.    Itzel Carmen, CMA

## 2021-08-10 NOTE — TELEPHONE ENCOUNTER
CSS faxed UA/UC orders to [facility] at [phone] on 8/10/21 at 3:30PM, per nursing home request.

## 2021-08-18 ENCOUNTER — TELEPHONE (OUTPATIENT)
Dept: UROLOGY | Facility: CLINIC | Age: 71
End: 2021-08-18

## 2021-08-18 DIAGNOSIS — N39.0 URINARY TRACT INFECTION: Primary | ICD-10-CM

## 2021-08-18 RX ORDER — NITROFURANTOIN 25; 75 MG/1; MG/1
100 CAPSULE ORAL 2 TIMES DAILY
Qty: 10 CAPSULE | Refills: 0 | Status: ON HOLD | OUTPATIENT
Start: 2021-08-18 | End: 2022-02-03

## 2021-08-18 NOTE — TELEPHONE ENCOUNTER
[facility] Call Center    Phone Message    May a detailed message be left on voicemail: yes     Reason for Call: Other: sheryl from [facility] is calling, pt had UA/UC done on 8/10 which showed an infection, she would like orders on how to treat it faxed to [phone], thank you     Action Taken: Message routed to:  [facility] uro    Travel Screening: Not Applicable

## 2021-08-18 NOTE — TELEPHONE ENCOUNTER
JESSICA Alvarez to call clinic back. Would like to start patient on antibiotics.  Shahana Good LPN  Urology Clinic Service   [facility]

## 2021-08-19 ENCOUNTER — TRANSFERRED RECORDS (OUTPATIENT)
Dept: HEALTH INFORMATION MANAGEMENT | Facility: CLINIC | Age: 71
End: 2021-08-19

## 2021-08-31 ENCOUNTER — OFFICE VISIT (OUTPATIENT)
Dept: UROLOGY | Facility: CLINIC | Age: 71
End: 2021-08-31
Payer: MEDICARE

## 2021-08-31 DIAGNOSIS — R35.0 URINARY FREQUENCY: Primary | ICD-10-CM

## 2021-08-31 DIAGNOSIS — N21.9: ICD-10-CM

## 2021-08-31 PROCEDURE — 99211 OFF/OP EST MAY X REQ PHY/QHP: CPT

## 2021-08-31 RX ORDER — NITROFURANTOIN 25; 75 MG/1; MG/1
100 CAPSULE ORAL ONCE
Status: COMPLETED | OUTPATIENT
Start: 2021-08-31 | End: 2021-08-31

## 2021-08-31 RX ADMIN — NITROFURANTOIN 100 MG: 25; 75 CAPSULE ORAL at 10:05

## 2021-08-31 NOTE — PROGRESS NOTES
Macrobid 100mg given per protocol: Yes.   The following medication was given:     MEDICATION:  Macrobid  ROUTE: PO  SITE: Medication was given orally   DOSE: 100mg  LOT #: [id number]  : [employer]  EXPIRATION DATE: 11/21  NDC#: 003 [id number]   Was there drug waste? No    Prior to medication administration, verified patient identity using patient's name and date of birth.  Due to medication administration, patient instructed to remain in clinic for 15 minutes  afterwards, and to report any adverse reaction to me immediately.    Drug Amount Wasted:  None.  Vial/Syringe: Single dose vial    Patient did tolerate procedure well.    Patient instructed as to where to call or go for pain, fever, leakage, or decreased urine flow.       Sosa Galvan  8/31/2021  9:42 AM

## 2021-08-31 NOTE — PROGRESS NOTES
Fer Latham comes into clinic today at the request of the care facility for TOV (trial of void).    Pt arrived for a voiding trial at the request of his nursing facility. Patient stated he did not want the catheter removed, and provider, Grace Galloway NP declined orders for a voiding trial and ordered a catheter exchange instead.     Pt requested pain medication prior to catheter exchange. I explained that we do not offer pain medication for catheter exchanges. Pt declined following instructions for positioning for catheter exchange.     I phoned his nursing facility, verified with COREY Nelson that the facility is able to do catheter exchanges. I explained the patient was requesting pain medication, she stated pt had been administered Oxycodone and tylenol prior to leaving their facility.    Clinic supervisor notified that catheter was not exchanged and why.    This service provided today was under the supervising provider of the day Grace Galloway NP, who was available if needed.    Simona John, CMA

## 2021-09-04 ENCOUNTER — HEALTH MAINTENANCE LETTER (OUTPATIENT)
Age: 71
End: 2021-09-04

## 2021-10-15 ENCOUNTER — TELEPHONE (OUTPATIENT)
Dept: UROLOGY | Facility: CLINIC | Age: 71
End: 2021-10-15

## 2021-10-15 DIAGNOSIS — R35.0 URINARY FREQUENCY: Primary | ICD-10-CM

## 2021-10-15 NOTE — TELEPHONE ENCOUNTER
----- Message from Itzel Carmen CMA sent at 10/15/2021  1:32 PM CDT -----  Regarding: Fax orders  Maurice Sharma,    I just placed orders for a UA/UC for this patient.  He lives at [facility]  currently.  Could you please fax these orders over for me?  The fax number is [phone].  Thanks!!    [place]

## 2021-10-15 NOTE — PROGRESS NOTES
Spoke with Raven at [facility].  Per Rebecca Christianson PA-C, patient will need a UA/UC to rule out UTI prior to his Urodynamics Study next week.  Orders placed.  Order to be faxed to [phone].  Will watch for results.    Itzel Carmen, CMA

## 2021-10-15 NOTE — TELEPHONE ENCOUNTER
Action 10/15/21 MMT   Action Taken  CSS faxed orders to Raven at [facility] at [phone] on 10/15/21 at 1:45PM per request.

## 2021-10-30 ENCOUNTER — HEALTH MAINTENANCE LETTER (OUTPATIENT)
Age: 71
End: 2021-10-30

## 2022-02-02 ENCOUNTER — HOSPITAL ENCOUNTER (OUTPATIENT)
Facility: CLINIC | Age: 72
Setting detail: OBSERVATION
Discharge: HOME | End: 2022-02-04
Attending: EMERGENCY MEDICINE | Admitting: PHYSICIAN ASSISTANT
Payer: MEDICARE

## 2022-02-02 DIAGNOSIS — I12.0 BENIGN HYPERTENSIVE KIDNEY DISEASE WITH CHRONIC KIDNEY DISEASE STAGE V OR END STAGE RENAL DISEASE (H): ICD-10-CM

## 2022-02-02 DIAGNOSIS — N18.6 TYPE 2 DIABETES MELLITUS WITH ESRD (END-STAGE RENAL DISEASE) (H): ICD-10-CM

## 2022-02-02 DIAGNOSIS — N39.0 URINARY TRACT INFECTION ASSOCIATED WITH INDWELLING URETHRAL CATHETER, INITIAL ENCOUNTER (H): ICD-10-CM

## 2022-02-02 DIAGNOSIS — I48.91 ATRIAL FIBRILLATION, UNSPECIFIED TYPE (H): ICD-10-CM

## 2022-02-02 DIAGNOSIS — Z79.01 LONG TERM (CURRENT) USE OF ANTICOAGULANTS: ICD-10-CM

## 2022-02-02 DIAGNOSIS — N18.9 ANEMIA DUE TO CHRONIC KIDNEY DISEASE, UNSPECIFIED CKD STAGE: ICD-10-CM

## 2022-02-02 DIAGNOSIS — G47.00 INSOMNIA, UNSPECIFIED TYPE: ICD-10-CM

## 2022-02-02 DIAGNOSIS — E11.22 TYPE 2 DIABETES MELLITUS WITH ESRD (END-STAGE RENAL DISEASE) (H): ICD-10-CM
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
## 2022-02-02 DIAGNOSIS — Z99.2 DEPENDENCE ON RENAL DIALYSIS (H): ICD-10-CM

## 2022-02-02 DIAGNOSIS — F41.9 ANXIETY: ICD-10-CM

## 2022-02-02 DIAGNOSIS — D63.1 ANEMIA DUE TO CHRONIC KIDNEY DISEASE, UNSPECIFIED CKD STAGE: ICD-10-CM

## 2022-02-02 DIAGNOSIS — N31.9 NEUROGENIC BLADDER: ICD-10-CM

## 2022-02-02 DIAGNOSIS — D63.1 ANEMIA IN CHRONIC KIDNEY DISEASE (CODE): ICD-10-CM

## 2022-02-02 DIAGNOSIS — R60.0 LOCALIZED EDEMA: ICD-10-CM

## 2022-02-02 DIAGNOSIS — R53.1 WEAKNESS: ICD-10-CM

## 2022-02-02 DIAGNOSIS — K59.00 CONSTIPATION, UNSPECIFIED CONSTIPATION TYPE: ICD-10-CM

## 2022-02-02 DIAGNOSIS — N32.89 BLADDER SPASMS: Primary | ICD-10-CM

## 2022-02-02 DIAGNOSIS — T83.511A URINARY TRACT INFECTION ASSOCIATED WITH INDWELLING URETHRAL CATHETER, INITIAL ENCOUNTER (H): ICD-10-CM

## 2022-02-02 DIAGNOSIS — Z20.822 CONTACT WITH AND (SUSPECTED) EXPOSURE TO COVID-19: ICD-10-CM

## 2022-02-02 DIAGNOSIS — F32.9 MAJOR DEPRESSIVE DISORDER WITH SINGLE EPISODE, REMISSION STATUS UNSPECIFIED: ICD-10-CM

## 2022-02-02 DIAGNOSIS — D69.6 THROMBOCYTOPENIA (H): ICD-10-CM

## 2022-02-02 DIAGNOSIS — E87.1 HYPONATREMIA: ICD-10-CM

## 2022-02-02 LAB
ALBUMIN UR-MCNC: 300 MG/DL
ANION GAP SERPL CALCULATED.3IONS-SCNC: 11 MMOL/L (ref 3–14)
APPEARANCE UR: ABNORMAL
BACTERIA #/AREA URNS HPF: ABNORMAL /HPF
BASOPHILS # BLD AUTO: 0.1 10E3/UL (ref 0–0.2)
BASOPHILS NFR BLD AUTO: 1 %
BILIRUB UR QL STRIP: NEGATIVE
BUN SERPL-MCNC: 31 MG/DL (ref 7–30)
CALCIUM SERPL-MCNC: 9 MG/DL (ref 8.5–10.1)
CHLORIDE BLD-SCNC: 96 MMOL/L (ref 94–109)
CO2 SERPL-SCNC: 21 MMOL/L (ref 20–32)
COLOR UR AUTO: YELLOW
CREAT SERPL-MCNC: 2.05 MG/DL (ref 0.66–1.25)
EOSINOPHIL # BLD AUTO: 0.1 10E3/UL (ref 0–0.7)
EOSINOPHIL NFR BLD AUTO: 1 %
ERYTHROCYTE [DISTWIDTH] IN BLOOD BY AUTOMATED COUNT: 14.6 % (ref 10–15)
GFR SERPL CREATININE-BSD FRML MDRD: 34 ML/MIN/1.73M2
GLUCOSE BLD-MCNC: 128 MG/DL (ref 70–99)
GLUCOSE UR STRIP-MCNC: NEGATIVE MG/DL
HCT VFR BLD AUTO: 33 % (ref 40–53)
HGB BLD-MCNC: 11.2 G/DL (ref 13.3–17.7)
HGB UR QL STRIP: ABNORMAL
HOLD SPECIMEN: NORMAL
HYALINE CASTS: 4 /LPF
IMM GRANULOCYTES # BLD: 0 10E3/UL
IMM GRANULOCYTES NFR BLD: 0 %
KETONES UR STRIP-MCNC: NEGATIVE MG/DL
LACTATE SERPL-SCNC: 2 MMOL/L (ref 0.7–2)
LEUKOCYTE ESTERASE UR QL STRIP: ABNORMAL
LYMPHOCYTES # BLD AUTO: 1 10E3/UL (ref 0.8–5.3)
LYMPHOCYTES NFR BLD AUTO: 13 %
MCH RBC QN AUTO: 32 PG (ref 26.5–33)
MCHC RBC AUTO-ENTMCNC: 33.9 G/DL (ref 31.5–36.5)
MCV RBC AUTO: 94 FL (ref 78–100)
MONOCYTES # BLD AUTO: 0.6 10E3/UL (ref 0–1.3)
MONOCYTES NFR BLD AUTO: 8 %
NEUTROPHILS # BLD AUTO: 5.7 10E3/UL (ref 1.6–8.3)
NEUTROPHILS NFR BLD AUTO: 77 %
NITRATE UR QL: NEGATIVE
NRBC # BLD AUTO: 0 10E3/UL
NRBC BLD AUTO-RTO: 0 /100
PH UR STRIP: 5.5 [PH] (ref 5–7)
PLATELET # BLD AUTO: 277 10E3/UL (ref 150–450)
POTASSIUM BLD-SCNC: 4.4 MMOL/L (ref 3.4–5.3)
RBC # BLD AUTO: 3.5 10E6/UL (ref 4.4–5.9)
RBC URINE: 99 /HPF
SODIUM SERPL-SCNC: 128 MMOL/L (ref 133–144)
SP GR UR STRIP: 1.02 (ref 1–1.03)
UROBILINOGEN UR STRIP-MCNC: NORMAL MG/DL
WBC # BLD AUTO: 7.4 10E3/UL (ref 4–11)
WBC CLUMPS #/AREA URNS HPF: PRESENT /HPF
WBC URINE: >182 /HPF

## 2022-02-02 PROCEDURE — G0378 HOSPITAL OBSERVATION PER HR: HCPCS

## 2022-02-02 PROCEDURE — 96361 HYDRATE IV INFUSION ADD-ON: CPT | Performed by: EMERGENCY MEDICINE

## 2022-02-02 PROCEDURE — 36415 COLL VENOUS BLD VENIPUNCTURE: CPT | Performed by: PHYSICIAN ASSISTANT

## 2022-02-02 PROCEDURE — 99285 EMERGENCY DEPT VISIT HI MDM: CPT | Performed by: EMERGENCY MEDICINE

## 2022-02-02 PROCEDURE — 36415 COLL VENOUS BLD VENIPUNCTURE: CPT | Performed by: EMERGENCY MEDICINE

## 2022-02-02 PROCEDURE — 83605 ASSAY OF LACTIC ACID: CPT | Performed by: PHYSICIAN ASSISTANT

## 2022-02-02 PROCEDURE — 85014 HEMATOCRIT: CPT | Performed by: PHYSICIAN ASSISTANT

## 2022-02-02 PROCEDURE — 250N000013 HC RX MED GY IP 250 OP 250 PS 637: Performed by: PHYSICIAN ASSISTANT

## 2022-02-02 PROCEDURE — 81001 URINALYSIS AUTO W/SCOPE: CPT | Performed by: EMERGENCY MEDICINE

## 2022-02-02 PROCEDURE — 87186 SC STD MICRODIL/AGAR DIL: CPT | Performed by: PHYSICIAN ASSISTANT

## 2022-02-02 PROCEDURE — 258N000003 HC RX IP 258 OP 636: Performed by: EMERGENCY MEDICINE

## 2022-02-02 PROCEDURE — C9803 HOPD COVID-19 SPEC COLLECT: HCPCS | Performed by: EMERGENCY MEDICINE

## 2022-02-02 PROCEDURE — 99285 EMERGENCY DEPT VISIT HI MDM: CPT | Mod: 25 | Performed by: EMERGENCY MEDICINE

## 2022-02-02 PROCEDURE — 82310 ASSAY OF CALCIUM: CPT | Performed by: PHYSICIAN ASSISTANT

## 2022-02-02 RX ORDER — SODIUM CHLORIDE 9 MG/ML
INJECTION, SOLUTION INTRAVENOUS CONTINUOUS
Status: DISCONTINUED | OUTPATIENT
Start: 2022-02-02 | End: 2022-02-03 | Stop reason: ALTCHOICE

## 2022-02-02 RX ORDER — HYDROMORPHONE HYDROCHLORIDE 1 MG/ML
0.5 INJECTION, SOLUTION INTRAMUSCULAR; INTRAVENOUS; SUBCUTANEOUS
Status: COMPLETED | OUTPATIENT
Start: 2022-02-02 | End: 2022-02-03

## 2022-02-02 RX ORDER — ACETAMINOPHEN 500 MG
1000 TABLET ORAL ONCE
Status: COMPLETED | OUTPATIENT
Start: 2022-02-02 | End: 2022-02-02

## 2022-02-02 RX ADMIN — SODIUM CHLORIDE 1000 ML: 9 INJECTION, SOLUTION INTRAVENOUS at 22:12

## 2022-02-02 RX ADMIN — ACETAMINOPHEN 1000 MG: 500 TABLET ORAL at 17:39

## 2022-02-02 NOTE — ED PROVIDER NOTES
ED Provider Note  [facility]      History     Chief Complaint   Patient presents with     UTI     HPI  Fer Latham is a 71 year old male with a PMH BPH and chronic indwelling urinary catheter who presents the ED today with suprapubic pain in the setting of urinary catheter as well as darkening urine color.  He has been having pain intermittently for the past month but noticed change in urine over the past 2 days.  Catheter has not been changed in approximately 1 month.  He feels generally unwell.  No other symptoms noted.    Past Medical History  Past Medical History:   Diagnosis Date     Anemia      Arrhythmia     atrial fib     Arthritis 1990's    gout     BPH (benign prostatic hyperplasia)      Current moderate episode of major depressive disorder without prior episode (H) 4/30/2019     Depression      Diabetes mellitus (H)     Type 2  IDDM     Diverticulosis      ESRD (end stage renal disease) on dialysis (H)      Hematuria      HTN (hypertension)      HTN (hypertension)      IgA nephropathy      MI, old      Pneumonia      Rhabdomyolysis      Weakness 5/10/2019     Past Surgical History:   Procedure Laterality Date     ABDOMEN SURGERY       APPENDECTOMY  1970's     ARTHROSCOPY KNEE  6/27/2013    Procedure: ARTHROSCOPY KNEE;  Right Knee Arthroscopy, Debridment Of Medial Meniscal Tear.  ;  Surgeon: Tomás Wong MD;  Location: [facility]     BACK SURGERY  1999    L5 S1 decompression     BIOPSY  2017    skin     COLONOSCOPY  2013    [facility]     CORONARY ANGIOGRAPHY ADULT ORDER  2018     EYE SURGERY Bilateral 2004    Lens replacement     IR CVC TUNNEL PLACEMENT > 5 YRS OF AGE  11/26/2019     IR CVC TUNNEL REMOVAL RIGHT  11/26/2019     IR CVC TUNNEL REVISION RIGHT  2/10/2020     IR CVC TUNNEL REVISION RIGHT  10/8/2020     IR CVC TUNNEL REVISION RIGHT  6/17/2021     VASCULAR SURGERY       acetaminophen (TYLENOL) 500 MG tablet  apixaban ANTICOAGULANT (ELIQUIS) 5 MG tablet  bisacodyl  (DULCOLAX) 10 MG suppository  diazepam (VALIUM) 5 MG tablet  diphenhydrAMINE (BENADRYL) 50 MG capsule  fluconazole (DIFLUCAN) 100 MG tablet  fluconazole (DIFLUCAN) 100 MG tablet  folic acid (FOLVITE) 1 MG tablet  hydrOXYzine (ATARAX) 25 MG tablet  Lidocaine (LIDOCARE) 4 % Patch  melatonin 5 MG tablet  miconazole (MICATIN) 2 % external powder  Multiple Vitamin (MULTI-VITAMINS) TABS  multivitamin RENAL (NEPHROCAPS/TRIPHROCAPS) 1 MG capsule  nitroFURantoin macrocrystal-monohydrate (MACROBID) 100 MG capsule  nystatin (MYCOSTATIN) 312777 UNIT/GM external cream  nystatin (MYCOSTATIN) 915342 UNIT/GM external ointment  oxyCODONE (ROXICODONE) 5 MG tablet  QUEtiapine (SEROQUEL) 25 MG tablet  QUEtiapine (SEROQUEL) 25 MG tablet      Allergies   Allergen Reactions     Cephalexin      Ciprofloxacin      Diagnostic X-Ray Materials      Other reaction(s): Unknown     Sulfa Drugs Hives     Other reaction(s): Unknown  PN: LW Reaction: Rash, Generalized       Family History  Family History   Problem Relation Age of Onset     Cancer Father      Social History   Social History     Tobacco Use     Smoking status: Former Smoker     Smokeless tobacco: Never Used     Tobacco comment: quit 35 years ago   Substance Use Topics     Alcohol use: Yes     Comment: 1 to 2 drinks a day (Occasional)     Drug use: No      Past medical history, past surgical history, medications, allergies, family history, and social history were reviewed with the patient. No additional pertinent items.       Review of Systems  A complete review of systems was performed with pertinent positives and negatives noted in the HPI, and all other systems negative.    Physical Exam   Pulse: 111  Temp: 98.2  F (36.8  C)  Resp: 18  Weight: 117.9 kg (260 lb)  SpO2: 96 %  Physical Exam  Vitals and nursing note reviewed.   Constitutional:       General: He is not in acute distress.     Appearance: He is well-developed. He is not diaphoretic.   HENT:      Head: Normocephalic and  atraumatic.      Mouth/Throat:      Pharynx: No oropharyngeal exudate.   Eyes:      General: No scleral icterus.        Right eye: No discharge.         Left eye: No discharge.      Pupils: Pupils are equal, round, and reactive to light.   Cardiovascular:      Rate and Rhythm: Normal rate and regular rhythm.      Heart sounds: Normal heart sounds. No murmur heard.  No friction rub. No gallop.    Pulmonary:      Effort: Pulmonary effort is normal. No respiratory distress.      Breath sounds: Normal breath sounds. No wheezing.   Chest:      Chest wall: No tenderness.   Abdominal:      General: Bowel sounds are normal. There is no distension.      Palpations: Abdomen is soft.      Tenderness: There is abdominal tenderness in the suprapubic area.   Genitourinary:     Comments: Chronic indwelling Brown.  Musculoskeletal:         General: No tenderness or deformity. Normal range of motion.      Cervical back: Normal range of motion and neck supple.   Skin:     General: Skin is warm and dry.      Coloration: Skin is not pale.      Findings: No erythema or rash.   Neurological:      Mental Status: He is alert and oriented to person, place, and time.      Cranial Nerves: No cranial nerve deficit.         ED Course      Procedures                     Results for orders placed or performed during the hospital encounter of 02/02/22   [place] Draw     Status: None (In process)    Narrative    The following orders were created for panel order [place] Draw.  Procedure                               Abnormality         Status                     ---------                               -----------         ------                     Extra Blue Top Tube[[id number]]                              In process                 Extra Red Top Tube[[id number]]                               In process                 Extra Green Top (Lithium...[[id number]]                      In process                 Extra Purple Top Tube[[id number]]                             In process                   Please view results for these tests on the individual orders.   CBC with platelets differential     Status: None (In process)    Narrative    The following orders were created for panel order CBC with platelets differential.  Procedure                               Abnormality         Status                     ---------                               -----------         ------                     CBC with platelets and d...[[id number]]                      In process                   Please view results for these tests on the individual orders.     Medications   HYDROmorphone (PF) (DILAUDID) injection 0.5 mg (has no administration in time range)   0.9% sodium chloride BOLUS (has no administration in time range)     Followed by   sodium chloride 0.9% infusion (has no administration in time range)   acetaminophen (TYLENOL) tablet 1,000 mg (1,000 mg Oral Given 2/2/22 1739)        Assessments & Plan (with Medical Decision Making)   Is a 71-year-old male with a chronic indwelling Brown who presents with suprapubic pain as well as change in urine.  He also notes feeling generally unwell.  Brown has not been changed in more than 1 month.  Urine appears infected.  Patient's Brown was changed and patient was given IV fluids he did have some improvement in symptoms.  UA shows UTI.  Lactic acid is not elevated.  Lab work shows sodium of 128.  Creatinine is 2.05 which is at patient's baseline.  Patient was given piperacillin tazobactam as he has multiple antibiotic allergies.  We will admit to Observation.    I have reviewed the nursing notes. I have reviewed the findings, diagnosis, plan and need for follow up with the patient.    New Prescriptions    No medications on file       Final diagnoses:   None       --  Pete Macias DO  [facility] EMERGENCY DEPARTMENT  2/2/2022     Pete Macias DO  02/03/22 0230

## 2022-02-02 NOTE — ED TRIAGE NOTES
Intermittent supra public pain x one month, now worse. Pt with urinary catheter, also report change in urine color in the last two days to dark brown.

## 2022-02-02 NOTE — LETTER
Transition Communication Hand-off for Care Transitions to Next Level of Care Provider    Name: Fer Latham  : 1950  MRN #: [id number]  Primary Care Provider: Isrrael Ornelas     Primary Clinic: [facility] [address]  [facility] [address]     Reason for Hospitalization:  Urinary tract infection associated with indwelling urethral catheter, initial encounter (H) [T83.511A, N39.0]  Admit Date/Time: 2022  5:00 PM  Discharge Date: 2022  Payor Source: Payor: MEDICARE / Plan: MEDICARE / Product Type: Medicare /      Reason for Communication Hand-off Referral: Other  continuity of care    Discharge Plan: per attached AVS    Concern for non-adherence with plan of care:   Y/N no  Discharge Needs Assessment:  Needs      Most Recent Value   Equipment Currently Used at Home walker, rolling, wheelchair, manual        Follow-up plan:  No future appointments.    Any outstanding tests or procedures:        Referrals     Future Labs/Procedures    Primary Care Referral     Comments:    Please be aware that coverage of these services is subject to the terms and limitations of your health insurance plan.  Call member services at your health plan with any benefit or coverage questions.            Key Recommendations:  Per attached AVS    Monty Duff, RN    AVS/Discharge Summary is the source of truth; this is a helpful guide for improved communication of patient story

## 2022-02-02 NOTE — ED NOTES
"ED Triage Provider Note  [facility]  Encounter Date: Feb 2, 2022    History:  Chief Complaint   Patient presents with    UTI     Fer Latham is a 71 year old male past medical history significant for indwelling Brown catheter, who presents to the ED with perineal pain.  Patient presents alone.  States that over the last 2 weeks he has had intermittent perineal pain, now is having constant pain starting today.  He states he is also noticing dark-colored urine starting today as well.  He denies any hematuria.  He states his Brown is not been changed in 6 weeks.  He denies any associated fevers chest pain shortness of breath abdominal pain vomiting or diarrhea.  He is anticoagulated with Eliquis for atrial fibrillation.    Patient request pain medication tell me \"I want the good stuff\".    Review of Systems:  Patient denies any fever chest pain shortness of breath abdominal pain vomiting    Exam:  Pulse 111   Temp 98.2  F (36.8  C)   Resp 18   Wt 117.9 kg (260 lb)   SpO2 96%   BMI 33.38 kg/m    General: No acute distress. Appears stated age.   Cardio: Regular rate, extremities well perfused  Resp: Normal work of breathing, grossly normal respiratory rate  Neuro: Alert.  Abdominal: Patient has no focal tenderness, abdomen soft.  : Male and female chaperone present.  Examination of the perineum revealed indwelling Brown catheter.  The tubing itself does have some crusted dried material in it.  Urine is brown in color.  Examination of the perineum reveals s attempted examination of the perineum difficult due to patient's ambulation and him being in a wheelchair here in the triage room.  Difficult to visualize the penis, prepuce.    Medical Decision Making:  Patient arriving to the ED with problem as above. A medical screening exam was performed.  CBC BMP, urinalysis orders initiated from Triage. The patient is appropriate to wait in triage.      Rachel Mosley, " KIMBER on 2/2/2022 at 5:27 PM     Rachel Mosley, KIMBER  02/02/22 2127

## 2022-02-03 ENCOUNTER — APPOINTMENT (OUTPATIENT)
Dept: ULTRASOUND IMAGING | Facility: CLINIC | Age: 72
End: 2022-02-03
Payer: MEDICARE

## 2022-02-03 PROBLEM — N39.0 UTI (URINARY TRACT INFECTION): Status: ACTIVE | Noted: 2019-09-11

## 2022-02-03 LAB
HOLD SPECIMEN: NORMAL
HOLD SPECIMEN: NORMAL
SARS-COV-2 RNA RESP QL NAA+PROBE: NEGATIVE

## 2022-02-03 PROCEDURE — 96376 TX/PRO/DX INJ SAME DRUG ADON: CPT | Performed by: EMERGENCY MEDICINE

## 2022-02-03 PROCEDURE — G0378 HOSPITAL OBSERVATION PER HR: HCPCS

## 2022-02-03 PROCEDURE — 250N000011 HC RX IP 250 OP 636: Performed by: NURSE PRACTITIONER

## 2022-02-03 PROCEDURE — 250N000013 HC RX MED GY IP 250 OP 250 PS 637: Performed by: PHYSICIAN ASSISTANT

## 2022-02-03 PROCEDURE — 96375 TX/PRO/DX INJ NEW DRUG ADDON: CPT | Performed by: EMERGENCY MEDICINE

## 2022-02-03 PROCEDURE — 93970 EXTREMITY STUDY: CPT | Mod: 26 | Performed by: RADIOLOGY

## 2022-02-03 PROCEDURE — 96376 TX/PRO/DX INJ SAME DRUG ADON: CPT

## 2022-02-03 PROCEDURE — 96361 HYDRATE IV INFUSION ADD-ON: CPT

## 2022-02-03 PROCEDURE — 99220 PR INITIAL OBSERVATION CARE,LEVEL III: CPT | Mod: FS | Performed by: FAMILY MEDICINE

## 2022-02-03 PROCEDURE — 258N000003 HC RX IP 258 OP 636: Performed by: EMERGENCY MEDICINE

## 2022-02-03 PROCEDURE — 250N000011 HC RX IP 250 OP 636: Performed by: EMERGENCY MEDICINE

## 2022-02-03 PROCEDURE — 258N000003 HC RX IP 258 OP 636: Performed by: PHYSICIAN ASSISTANT

## 2022-02-03 PROCEDURE — 250N000011 HC RX IP 250 OP 636: Performed by: PHYSICIAN ASSISTANT

## 2022-02-03 PROCEDURE — 96365 THER/PROPH/DIAG IV INF INIT: CPT | Performed by: EMERGENCY MEDICINE

## 2022-02-03 PROCEDURE — 96375 TX/PRO/DX INJ NEW DRUG ADDON: CPT

## 2022-02-03 PROCEDURE — 93970 EXTREMITY STUDY: CPT

## 2022-02-03 PROCEDURE — U0003 INFECTIOUS AGENT DETECTION BY NUCLEIC ACID (DNA OR RNA); SEVERE ACUTE RESPIRATORY SYNDROME CORONAVIRUS 2 (SARS-COV-2) (CORONAVIRUS DISEASE [COVID-19]), AMPLIFIED PROBE TECHNIQUE, MAKING USE OF HIGH THROUGHPUT TECHNOLOGIES AS DESCRIBED BY CMS-2020-01-R: HCPCS | Performed by: EMERGENCY MEDICINE

## 2022-02-03 RX ORDER — LIDOCAINE 40 MG/G
CREAM TOPICAL
Status: DISCONTINUED | OUTPATIENT
Start: 2022-02-03 | End: 2022-02-04

## 2022-02-03 RX ORDER — MIRTAZAPINE 15 MG/1
15 TABLET, FILM COATED ORAL AT BEDTIME
Status: ON HOLD | COMMUNITY
Start: 2021-11-18 | End: 2022-02-08

## 2022-02-03 RX ORDER — ATROPA BELLADONNA AND OPIUM 16.2; 6 MG/1; MG/1
60 SUPPOSITORY RECTAL EVERY 8 HOURS PRN
Status: DISCONTINUED | OUTPATIENT
Start: 2022-02-03 | End: 2022-02-03

## 2022-02-03 RX ORDER — POLYETHYLENE GLYCOL 3350 17 G/17G
8.5 POWDER, FOR SOLUTION ORAL DAILY PRN
COMMUNITY
Start: 2022-01-14

## 2022-02-03 RX ORDER — CEFDINIR 300 MG/1
300 CAPSULE ORAL EVERY OTHER DAY
Qty: 7 CAPSULE | Refills: 0 | Status: ON HOLD | OUTPATIENT
Start: 2022-02-04 | End: 2022-03-02

## 2022-02-03 RX ORDER — ONDANSETRON 2 MG/ML
4 INJECTION INTRAMUSCULAR; INTRAVENOUS EVERY 6 HOURS PRN
Status: DISCONTINUED | OUTPATIENT
Start: 2022-02-03 | End: 2022-02-04 | Stop reason: HOSPADM

## 2022-02-03 RX ORDER — SODIUM CHLORIDE 9 MG/ML
INJECTION, SOLUTION INTRAVENOUS CONTINUOUS
Status: DISCONTINUED | OUTPATIENT
Start: 2022-02-03 | End: 2022-02-03

## 2022-02-03 RX ORDER — SENNOSIDES A AND B 8.6 MG/1
17.2 TABLET, FILM COATED ORAL 2 TIMES DAILY PRN
COMMUNITY
Start: 2022-01-14

## 2022-02-03 RX ORDER — BISACODYL 10 MG
10 SUPPOSITORY, RECTAL RECTAL DAILY PRN
Status: DISCONTINUED | OUTPATIENT
Start: 2022-02-03 | End: 2022-02-04 | Stop reason: HOSPADM

## 2022-02-03 RX ORDER — MIRTAZAPINE 15 MG/1
15 TABLET, FILM COATED ORAL AT BEDTIME
Status: DISCONTINUED | OUTPATIENT
Start: 2022-02-03 | End: 2022-02-04 | Stop reason: HOSPADM

## 2022-02-03 RX ORDER — ACETAMINOPHEN 500 MG
1000 TABLET ORAL 3 TIMES DAILY PRN
Status: DISCONTINUED | OUTPATIENT
Start: 2022-02-03 | End: 2022-02-04 | Stop reason: HOSPADM

## 2022-02-03 RX ORDER — POLYETHYLENE GLYCOL 3350 17 G/17G
17 POWDER, FOR SOLUTION ORAL 3 TIMES DAILY PRN
Status: DISCONTINUED | OUTPATIENT
Start: 2022-02-03 | End: 2022-02-04 | Stop reason: HOSPADM

## 2022-02-03 RX ORDER — SENNOSIDES 8.6 MG
2 TABLET ORAL 2 TIMES DAILY PRN
Status: DISCONTINUED | OUTPATIENT
Start: 2022-02-03 | End: 2022-02-04 | Stop reason: HOSPADM

## 2022-02-03 RX ORDER — DIPHENHYDRAMINE HCL 50 MG
50 CAPSULE ORAL EVERY 6 HOURS PRN
Status: DISCONTINUED | OUTPATIENT
Start: 2022-02-03 | End: 2022-02-04 | Stop reason: HOSPADM

## 2022-02-03 RX ORDER — SODIUM CHLORIDE 9 MG/ML
INJECTION, SOLUTION INTRAVENOUS CONTINUOUS
Status: DISCONTINUED | OUTPATIENT
Start: 2022-02-04 | End: 2022-02-04

## 2022-02-03 RX ORDER — POLYETHYLENE GLYCOL 3350 17 G/17G
8.5 POWDER, FOR SOLUTION ORAL DAILY
Status: DISCONTINUED | OUTPATIENT
Start: 2022-02-04 | End: 2022-02-04 | Stop reason: HOSPADM

## 2022-02-03 RX ORDER — ATROPA BELLADONNA AND OPIUM 16.2; 3 MG/1; MG/1
30 SUPPOSITORY RECTAL EVERY 8 HOURS PRN
Qty: 10 SUPPOSITORY | Refills: 0 | Status: SHIPPED | OUTPATIENT
Start: 2022-02-03 | End: 2022-02-04

## 2022-02-03 RX ORDER — PIPERACILLIN SODIUM, TAZOBACTAM SODIUM 2; .25 G/10ML; G/10ML
2.25 INJECTION, POWDER, LYOPHILIZED, FOR SOLUTION INTRAVENOUS EVERY 6 HOURS
Status: DISCONTINUED | OUTPATIENT
Start: 2022-02-03 | End: 2022-02-03

## 2022-02-03 RX ORDER — CLINDAMYCIN PHOSPHATE 900 MG/50ML
900 INJECTION, SOLUTION INTRAVENOUS
Status: DISCONTINUED | OUTPATIENT
Start: 2022-02-03 | End: 2022-02-04

## 2022-02-03 RX ORDER — ONDANSETRON 4 MG/1
4 TABLET, ORALLY DISINTEGRATING ORAL EVERY 6 HOURS PRN
Status: DISCONTINUED | OUTPATIENT
Start: 2022-02-03 | End: 2022-02-04 | Stop reason: HOSPADM

## 2022-02-03 RX ORDER — DIAZEPAM 5 MG
5 TABLET ORAL EVERY 12 HOURS PRN
Status: DISCONTINUED | OUTPATIENT
Start: 2022-02-03 | End: 2022-02-04 | Stop reason: HOSPADM

## 2022-02-03 RX ORDER — PIPERACILLIN SODIUM, TAZOBACTAM SODIUM 3; .375 G/15ML; G/15ML
3.38 INJECTION, POWDER, LYOPHILIZED, FOR SOLUTION INTRAVENOUS ONCE
Status: COMPLETED | OUTPATIENT
Start: 2022-02-03 | End: 2022-02-03

## 2022-02-03 RX ORDER — CEFTRIAXONE 1 G/1
1 INJECTION, POWDER, FOR SOLUTION INTRAMUSCULAR; INTRAVENOUS EVERY 24 HOURS
Status: DISCONTINUED | OUTPATIENT
Start: 2022-02-03 | End: 2022-02-04 | Stop reason: HOSPADM

## 2022-02-03 RX ORDER — SODIUM CHLORIDE 9 MG/ML
INJECTION, SOLUTION INTRAVENOUS CONTINUOUS
Status: DISCONTINUED | OUTPATIENT
Start: 2022-02-03 | End: 2022-02-04 | Stop reason: HOSPADM

## 2022-02-03 RX ORDER — CEFDINIR 300 MG/1
300 CAPSULE ORAL EVERY OTHER DAY
Status: DISCONTINUED | OUTPATIENT
Start: 2022-02-04 | End: 2022-02-04 | Stop reason: HOSPADM

## 2022-02-03 RX ORDER — OXYBUTYNIN CHLORIDE 5 MG/1
5 TABLET ORAL 3 TIMES DAILY
Status: DISCONTINUED | OUTPATIENT
Start: 2022-02-03 | End: 2022-02-03

## 2022-02-03 RX ORDER — ATROPA BELLADONNA AND OPIUM 16.2; 3 MG/1; MG/1
30 SUPPOSITORY RECTAL EVERY 8 HOURS PRN
Status: DISCONTINUED | OUTPATIENT
Start: 2022-02-03 | End: 2022-02-04 | Stop reason: HOSPADM

## 2022-02-03 RX ADMIN — APIXABAN 5 MG: 5 TABLET, FILM COATED ORAL at 22:53

## 2022-02-03 RX ADMIN — SODIUM CHLORIDE: 9 INJECTION, SOLUTION INTRAVENOUS at 23:05

## 2022-02-03 RX ADMIN — PIPERACILLIN SODIUM AND TAZOBACTAM SODIUM 2.25 G: 2; .25 INJECTION, POWDER, LYOPHILIZED, FOR SOLUTION INTRAVENOUS at 07:31

## 2022-02-03 RX ADMIN — HYDROMORPHONE HYDROCHLORIDE 0.5 MG: 1 INJECTION, SOLUTION INTRAMUSCULAR; INTRAVENOUS; SUBCUTANEOUS at 00:50

## 2022-02-03 RX ADMIN — PIPERACILLIN AND TAZOBACTAM 3.38 G: 3; .375 INJECTION, POWDER, LYOPHILIZED, FOR SOLUTION INTRAVENOUS at 00:49

## 2022-02-03 RX ADMIN — MIRTAZAPINE 15 MG: 15 TABLET, FILM COATED ORAL at 22:53

## 2022-02-03 RX ADMIN — SODIUM CHLORIDE 1000 ML: 9 INJECTION, SOLUTION INTRAVENOUS at 00:49

## 2022-02-03 RX ADMIN — CEFTRIAXONE SODIUM 1 G: 1 INJECTION, POWDER, FOR SOLUTION INTRAMUSCULAR; INTRAVENOUS at 12:27

## 2022-02-03 RX ADMIN — SODIUM CHLORIDE: 9 INJECTION, SOLUTION INTRAVENOUS at 07:30

## 2022-02-03 RX ADMIN — HYDROMORPHONE HYDROCHLORIDE 0.5 MG: 1 INJECTION, SOLUTION INTRAMUSCULAR; INTRAVENOUS; SUBCUTANEOUS at 02:57

## 2022-02-03 ASSESSMENT — ACTIVITIES OF DAILY LIVING (ADL): DEPENDENT_IADLS:: SHOPPING;TRANSPORTATION

## 2022-02-03 NOTE — PROGRESS NOTES
-diagnostic tests and consults completed and resulted- Not met  -vital signs normal or at patient baseline - Met  -tolerating oral intake to maintain hydration-Met   -adequate pain control on oral analgesics -Met  -tolerating oral antibiotics or has plans for home infusion setup - Not met,  IVABX  -infection is improving - In progress  -returns to baseline functional status - Not met  -safe disposition plan has been identified - In progress  -consults completed with dispo plan - Not met    Nurse to notify provider when observation goals have been met and patient is ready for discharge.    /59 (BP Location: Right arm)   Pulse 79   Temp 98.4  F (36.9  C) (Oral)   Resp 18   Wt 117.9 kg (260 lb)   SpO2 98%   BMI 33.38 kg/m

## 2022-02-03 NOTE — PLAN OF CARE
PT / OBS - Attempted PT eval x2 however pt leaving with transport on first attempt and pt adamantly declining PT eval on second attempt. Will re-attempt as able otherwise reschedule. Provider aware.

## 2022-02-03 NOTE — PROGRESS NOTES
"Pt arrived from ED at 04:05. Pt was settled in the room by the charge nurse and NST. Writer attempted to complete admission and pt would not cooperate with answering questions. Pt was asked mentation questions and he answered just name and date of birth and said,\" I am tired of asnwering any more questions. I have not slept . I don't want anyone goofing with me.\"  Pt has been sleeping since the writer left the room. Will continue to approach.  "

## 2022-02-03 NOTE — PROGRESS NOTES
"Writer attempted to assess pt, he refuses answering any questions stating \" get off me, why are you on me?\" when asked if he goes to Dialysis and which days he goes pt states \" don't worry about it, just leave it alone\". IVMF and IV ABX infusing. Will re approach pt again.  "

## 2022-02-03 NOTE — PROGRESS NOTES
-diagnostic tests and consults completed and resulted- Not met  -vital signs normal or at patient baseline - Met  -tolerating oral intake to maintain hydration-Met   -adequate pain control on oral analgesics -Met  -tolerating oral antibiotics or has plans for home infusion setup - Not met,  IVABX  -infection is improving - In progress  -returns to baseline functional status - Not met  -safe disposition plan has been identified - In progress  -consults completed with dispo plan - Not met

## 2022-02-03 NOTE — PROGRESS NOTES
-diagnostic tests and consults completed and resulted- Not met  -vital signs normal or at patient baseline - Met  -tolerating oral intake to maintain hydration-Met   -adequate pain control on oral analgesics -Met  -tolerating oral antibiotics or has plans for home infusion setup - Not met,  IVABX  -infection is improving - In progress  -returns to baseline functional status - Not met  -safe disposition plan has been identified - In progress  -consults completed with dispo plan - Not met    Pt refusing cares and treatment, labs and Hemo Dialysis.    Nurse to notify provider when observation goals have been met and patient is ready for discharge.

## 2022-02-03 NOTE — CONSULTS
Care Management Initial Consult    General Information  Assessment completed with: Patient, Spouse AilinVM-chart review,    Type of CM/SW Visit: Initial Assessment    Primary Care Provider verified and updated as needed:     Readmission within the last 30 days: no previous admission in last 30 days      Reason for Consult: discharge planning    Advance Care Planning: Advance Care Planning Reviewed: Did not discuss at this time.     Communication Assessment  Patient's communication style: spoken language (English or Bilingual)    Hearing Difficulty or Deaf: no   Wear Glasses or Blind: yes    Cognitive  Cognitive/Neuro/Behavioral:                        Living Environment:   People in home: spouse   (spouse, Ailin)  Current living Arrangements: apartment      Able to return to prior arrangements: yes       Family/Social Support:  Care provided by: self,spouse/significant other  Provides care for: no one  Marital Status:   Wife,Children          Description of Support System: Supportive,Involved       Current Resources:   Patient receiving home care services: No     Community Resources: Dialysis Services, [employer]  Equipment currently used at home: walker, rolling,wheelchair, manual  Supplies currently used at home: None    Both Fer and his wife use [employer] for transportation.    Employment/Financial:  Employment Status: retired        Financial Concerns: other (see comments)   Referral to Financial Counselor: Yes (MA application pending)     Ailin reported financial difficulties but did not elaborate. She reported that  they have pending MA applications    Lifestyle & Psychosocial Needs:  Social Determinants of Health     Tobacco Use: Medium Risk     Smoking Tobacco Use: Former Smoker     Smokeless Tobacco Use: Never Used   Alcohol Use: Not on file   Financial Resource Strain: Not on file   Food Insecurity: Not on file   Transportation Needs: Not on file   Physical Activity: Not on file   Stress:  Not on file   Social Connections: Not on file   Intimate Partner Violence: Not on file   Depression: Not at risk     PHQ-2 Score: 0   Housing Stability: Not on file       Functional Status:  Prior to admission patient needed assistance:   Dependent ADLs:: Ambulation-walker,Bathing,Dressing,Wheelchair-with assist  Dependent IADLs:: Shopping,Transportation       Mental Health Status:  Mental Health Status: No Current Concerns       Chemical Dependency Status:  Chemical Dependency Status: Did not address at this time.            Values/Beliefs:  Spiritual, Cultural Beliefs, [employer] Practices, Values that affect care:                   Care Management Follow Up    Length of Stay (days): 0    Expected Discharge Date:  TBD     Concerns to be Addressed:     MCCORMICK Document and Initial Assessment  Patient plan of care discussed at interdisciplinary rounds: Yes    Anticipated Discharge Disposition:  TBD     Anticipated Discharge Services:    Anticipated Discharge DME:      Patient/family educated on Medicare website which has current facility and service quality ratings:  N/A  Education Provided on the Discharge Plan:  N/A  Patient/Family in Agreement with the Plan:  N/A    Referrals Placed by CM/SW:  Not at this time  Private pay costs discussed: insurance costs out of pocket expenses, co-pays and deductibles    Additional Information:      1450 SW met with Fer at bedside to introduce self, explain SW role, explain the Medicare Outpatient Observation Notice (MOON), why he was receiving it, and to perform initial assessment.  OCTAVIO Mckeon was present and discussing discharge plans with patient. He was also speaking on the phone with his wife Ailin. Fer asked SW to speak with Ailin.    Ailin explained that their plan was for her to schedule a [employer] ride to [facility] on 2/4 to bring pt's wheelchair, jacket and other clothing and then ride [employer] home. SARMAD offered to arrange a wheelchair ride for Fer with [employer]  [facility] [employer] but she declined due to the cost, telling SARMAD that [employer] costs only a couple of dollars for a ride, and they didn't have the money to pay for a ride through  [employer].     SARMAD asked if pt had a supplemental insurance plan. Ailin told SARMAD that they did until the end of 2021, but the provider dropped them. She said she didn't know why.     SARMAD explained the MCCORMICK the financial ramifications of not having a supplemental plan. SARMAD explained that a w/c ride through [facility] today would be significantly less costly than an additional night in Observation.and Ailin expressed understanding at  1455 but reiterated that they didn't have the money to pay for transportation. She said that she would have to take the light rail to the hospital with Fer's wheelchair and coat, and that they would then take it home. SARMAD assured Ailin that SARMAD would be able to arrange discharge transportation through [employer] upon her arrival.     SARMAD asked if they had completed an MA application and she reported that their applications were pending. She said she was pretty sure that they would qualify, but they hadn't heard anything yet.  SARMAD reiterated SARMAD intent to arrange a ride through [employer], gave Ailin SW availability and contact information and ended the call.    SARMAD explained the plan as it stood to pt and excused self from his room.    SARMAD faxed MCCORMICK to [facility] ([phone]) at 1507 then placed MCCORMICK in Fer's chart.    1545 Pt requested to speak with SARMAD and informed SW that his wife would not be able to come to get him today.    SARMAD conferred with OCTAVIO Mckeon who suggested that and RN could bring pt to [employer] vehicle if pt's wife could meet pt at door with his w/c.     1650 SARMAD called Ailin ([phone]) and left VM with the offer.    SARMAD will continue to follow as needed.    Amanda Payan MSW, LGSW  ED/OBS   [facility]  Phone: [phone]  Pager: [phone]  Fax: [phone]      On-call pager, [phone], 4:00 pm to midnight

## 2022-02-03 NOTE — PROGRESS NOTES
[facility]    Medicine Progress Note - Hospitalist Service    Date of Admission:  2/2/2022    Assessment & Plan        Fer Latham is a 71 year old male admitted on 2/2/2022. He has a history of urinary tract infection associated with indwelling urethral catheter, Compression fracture of T12 vertebra history of DVT PE and A.fib with long term anticoagulation use,  , ESRD on dialysis, Neurogenic bladder,overactive bladder,type 2 diabetes mellitus, hypertension, Weakness,  Imbalance, constipation, Pressure sore on left heel, Renal lesion, Major depressive disorder, insomnia who presents the ED today with suprapubic pain in the setting of urinary catheter as well as dark urine color.       ## Suprapubic catheter pain  ## BPH   ## Brown catheter in place  ## Neurogenic Bladder  ## OAB (overactive bladder)  ## UTI  Reports intermittent suprapubic pain off and on for the past 2 days. He has been having pain intermittently for the past month but noticed change in urine over the past 2 days.  Catheter has not been changed in approximately 1 month.  He feels generally unwell.  His has been having bladder spasms. Brown placed by Urology when hospitalized in June at the [facility] for suspected candidal cystitis. Reportedly had urinary catheter replacement with Urology at [place] on 08/31/21.  Has a history of overactive bladder/severe nocturia. Followed at [facility] for Urology cares. He has had Botox injections in the past. Has also been on trospium and oxybutynin but unfortunately these medications were not affordable. In the ED. Vitals:BP:116/59  Pulse: 79 Temp: 98.4 Resp: 18 SP02:98% Labs: Cr 2.05, BUN 31, GFR 34, Na 128, , WBC 7.4, Hgb 11.2, Plt 277,  k 4.4, UA: Cloudy, negative nitrite, Large amount of blood, Large LE, WBC <182, 99 RBC, many bacteria, WBC clumps present.  UC pending. Medications:  1 L IVF x 2, Tylenol 1 g PO x 1, Zosyn IV x 1.  Imaging: US LE: No deep  venous thrombosis demonstrated in either leg. Consults: Nephrology Plan: Admit to ED observation for treatment of UTI.    Last UC 11/2021 grew multiple bacterial morphotypes, UC from 9/2021 grew E. Coli resistant to Tetracycline Patient received Keflex for 5 days.  UC pending at time of discharge.   - Continue Ceftriaxone, transition to Cefdinir at discharge  - B and O supps prn for bladder spasms.   - Follow UC    ## Hyponatremia: Refused labs this am   - IVF  - Repeat CBC in am      ## ESRD (end stage renal disease) on dialysis M/F HD. Cr 2.05, GFR 34, BUN 31  ## Anemia  - Nephrology consulted, patient declined dialysis today.   - Repeat CBC and BMP in am     ## Lesion of left native kidney:  Renal lesion seen on CT. Would need repeat MRI to rule out malignancy. Patient declined in the past.   - Patient will follow up with MRI outpatient.      ## Weakness  ## Imbalance  PT outpatient   - Ensure supplement once daily   .  ## Constipation  - Miralax to 17 g TID prn     ## Anxiety  - Prn Valium     ## Major depressive disorder  ##Chronic insomnia Per chart review, patient was on Remeron and Melatonin  - Pharmacy consulted     ## Long term current use of anticoagulant  ## Personal history of DVT (deep vein thrombosis)  ## Personal history of pulmonary embolism  -  On Eliquis.    ## Disposition: Patient reports he needs his wheelchair and a jacket to get home. He and his wife do not own a car. He reports he does not have money or keys with him. He and his wife use the train for transport  -  consulted for transportation home       Diet: Renal Diet (dialysis)  Snacks/Supplements Adult: Ensure Enlive; Between Meals    DVT Prophylaxis: Low Risk/Ambulatory with no VTE prophylaxis indicated  Brown Catheter: Not present  Central Lines: PRESENT     Cardiac Monitoring: None  Code Status: Full Code      Disposition Plan   Expected Discharge: Tomorrow   Anticipated discharge location: Home   Delays:  Transportation       The  patient's care was discussed with the Attending Physician, Dr. Haskins, Bedside Nurse, Care Coordinator/ and Patient.    OCTAVIO Rincon CNP            ______________________________________________________________________    Interval History   Admitted overnight     Data reviewed today: I reviewed all medications, new labs and imaging results over the last 24 hours.    Physical Exam   Vital Signs: Temp: 98.4  F (36.9  C) Temp src: Oral BP: 116/59 Pulse: 79   Resp: 18 SpO2: 98 % O2 Device: None (Room air)    Weight: 260 lbs 0 oz  Constitutional: healthy, alert and no distress   Head: Normocephalic. No masses, lesions, tenderness or abnormalities   Neck: Neck supple. No adenopathy. Thyroid symmetric, normal size,, Carotids without bruits.   ENT: ENT exam normal, no neck nodes or sinus tenderness   Cardiovascular: RRR. No murmurs, clicks gallops or rub   Respiratory: . Good diaphragmatic excursion. Lungs clear   Gastrointestinal: Abdomen soft, suprapubic pain. BS normal. No masses, organomegaly.   : Deferred   Musculoskeletal: extremities normal- no gross deformities noted, gait normal and normal muscle tone   Skin: no suspicious lesions or rashes   Neurologic: Gait normal. Reflexes normal and symmetric. Sensation grossly WNL.   Psychiatric: mentation appears normal and affect normal/bright   Hematologic/Lymphatic/Immunologic: normal ant/post cervical, axillary, supraclavicular and inguinal       Data   Recent Labs   Lab 02/02/22  1714   WBC 7.4   HGB 11.2*   MCV 94      *   POTASSIUM 4.4   CHLORIDE 96   CO2 21   BUN 31*   CR 2.05*   ANIONGAP 11   COLUMBA 9.0   *     Recent Results (from the past 24 hour(s))   US Lower Extremity Venous Duplex Bilateral    Narrative    BILATERAL LOWER EXTREMITY DUPLEX VENOUS ULTRASOUND 2/3/2022 10:24 AM    CLINICAL HISTORY: Bilateral lower extremity edema. Swelling.  History  of right femoral partially occlusive and right popliteal occlusive  deep  venous thrombosis.    COMPARISONS: 6/15/2021    REFERRING PROVIDER: MOON LINDSEY NASHIEF    TECHNIQUE: Grayscale, color Doppler, Doppler waveform ultrasound  evaluation was performed through the bilateral common femoral,  femoral, popliteal, and posterior tibial veins.     FINDINGS: Right common femoral vein patent and fully compressible with  a phasic waveform. Right femoral vein waveform is somewhat blunted but  remains phasic and the vein is fully compressible. Right popliteal and  posterior tibial veins are patent and fully compressible with phasic  waveforms.    Left common femoral, femoral, popliteal, and posterior tibial veins  are patent and fully compressible with phasic waveforms.      Impression    IMPRESSION: No deep venous thrombosis demonstrated in either leg.    JORDIN ROSE MD         SYSTEM ID:  [id number]     Medications     sodium chloride 75 mL/hr at 02/03/22 0800       sodium chloride 0.9%  250 mL Intravenous Once in dialysis/CRRT     sodium chloride 0.9%  300 mL Hemodialysis Machine Once     [Held by provider] apixaban ANTICOAGULANT  5 mg Oral BID     [START ON 2/4/2022] cefdinir  300 mg Oral Every Other Day     cefTRIAXone  1 g Intravenous Q24H     - MEDICATION INSTRUCTIONS -   Does not apply Once     sodium chloride (PF)  9 mL Intracatheter During Dialysis/CRRT (from stock)     sodium chloride (PF)  9 mL Intracatheter During Dialysis/CRRT (from stock)

## 2022-02-03 NOTE — PHARMACY-ADMISSION MEDICATION HISTORY
Admission Medication History Completed by Pharmacy    See [facility] Admission Navigator for allergy information, preferred outpatient pharmacy, prior to admission medications and immunization status.     Medication History Sources:     Sure Scripts    [facility]    Changes made to PTA medication list (reason):    Added: None    Deleted: None    Changed: None    Additional Information:    Unable to reach patient, completed the medication history based on fill history on sure scripts and speaking to [facility] pharmacy #[id number].    Appears patient only consistently fills apixaban (last filled 1/12) and mirtazapine (last filled 12/6)    Spoke to [facility] pharmacy and they stated several prescriptions were sent to them on 1/13/22 but never picked up (melatonin, mirtazapine, miralax, bisacodyl suppository, and eliquis)    Diazepam was last filled in May for a 15 days supply to be taken as needed so it appears patient rarely needs this.    Prior to Admission medications    Medication Sig Last Dose Taking? Auth Provider   apixaban ANTICOAGULANT (ELIQUIS) 5 MG tablet Take 1 tablet (5 mg) by mouth 2 times daily  Yes Ap Tavera DO   cefdinir (OMNICEF) 300 MG capsule Take 1 capsule (300 mg) by mouth every other day for 14 days  Yes Linda Boucher APRN CNP   mirtazapine (REMERON) 15 MG tablet Take 15 mg by mouth At Bedtime  Yes Reported, Patient   opium-belladonna (B&O SUPPRETTES) 30-16.2 MG per suppository Place 1 suppository rectally every 8 hours as needed for bladder spasms  Yes Linda Boucher APRN CNP   acetaminophen (TYLENOL) 500 MG tablet Take 2 tablets (1,000 mg) by mouth 3 times daily   Ap Tavera DO   bisacodyl (DULCOLAX) 10 MG suppository Place 1 suppository (10 mg) rectally daily as needed for constipation   Ap Tavera DO   diazepam (VALIUM) 5 MG tablet Take 5 mg by mouth every 12 hours as needed for anxiety   Unknown, Entered By History   diphenhydrAMINE (BENADRYL) 50 MG capsule Take 1 capsule (50 mg) by mouth every 6  hours as needed for itching or allergies Administer 30 min - 2 hours pre - IV contrast injection   Saida Pablo APRN CNP   fluconazole (DIFLUCAN) 100 MG tablet Take 200 mg orally for one day, then 100 mg orally for four (4) days after.   Sangita Lora MD   fluconazole (DIFLUCAN) 100 MG tablet Take 1 tablet (100 mg) by mouth daily   Ap Tavera DO   folic acid (FOLVITE) 1 MG tablet Take 1 tablet (1 mg) by mouth daily   Ap Tavera DO   hydrOXYzine (ATARAX) 25 MG tablet Take 1 tablet (25 mg) by mouth every 6 hours as needed for anxiety   Ap Tavera DO   Lidocaine (LIDOCARE) 4 % Patch Apply topically to intact skin for upto 12 hrs within 24-hr period, as needed for shoulder pain.   Reported, Patient   melatonin 5 MG tablet Take 1 tablet (5 mg) by mouth nightly as needed for sleep   Ap Tavera DO   miconazole (MICATIN) 2 % external powder Apply topically 2 times daily   Ap Tavera DO   Multiple Vitamin (MULTI-VITAMINS) TABS Take 1 tablet by mouth   Reported, Patient   multivitamin RENAL (NEPHROCAPS/TRIPHROCAPS) 1 MG capsule Take 1 capsule by mouth daily   Ap Tavera DO   nitroFURantoin macrocrystal-monohydrate (MACROBID) 100 MG capsule Take 1 capsule (100 mg) by mouth 2 times daily   Grace Galloway, CNP   nystatin (MYCOSTATIN) 024630 UNIT/GM external cream Apply topically 2 times daily   Ap Tavera DO   oxyCODONE (ROXICODONE) 5 MG tablet Take 0.5 tablets (2.5 mg) by mouth every 8 hours   Ap Tavera DO   polyethylene glycol (MIRALAX) 17 GM/Dose powder Take 8.5 g by mouth daily   Reported, Patient   QUEtiapine (SEROQUEL) 25 MG tablet Take 1 tablet (25 mg) by mouth At Bedtime   Ap Tavera DO   QUEtiapine (SEROQUEL) 25 MG tablet Take 1 tablet (25 mg) by mouth daily as needed (agitation)   Ap Tavera DO   senna (SENOKOT) 8.6 MG tablet Take 17.2 mg by mouth 2 times daily as needed   Reported, Patient       Date completed: 02/03/22    Medication history completed by: TRINY MONACO  BLADER, H

## 2022-02-03 NOTE — DISCHARGE SUMMARY
[facility]  Hospitalist Discharge Summary      Date of Admission:  2/2/2022  Date of Discharge:  2/3/2022  Discharging Provider: OCTAVIO Rincon CNP  Discharge Service: Hospitalist Service    Discharge Diagnoses   UTI  Bladder spasms    Follow-ups Needed After Discharge   {Additional follow-up instructions/to-do's for PCP    : Hospital follow up    Unresulted Labs Ordered in the Past 30 Days of this Admission     Date and Time Order Name Status Description    2/2/2022  5:35 PM Urine Culture In process       These results will be followed up by PCP.    Discharge Disposition   Discharged to home  Condition at discharge: Stable    Hospital Course   Fer Latham is a 71 year old male admitted on 2/2/2022. He has a history of urinary tract infection associated with indwelling urethral catheter, Compression fracture of T12 vertebra history of DVT PE and A.fib with long term anticoagulation use,  , ESRD on dialysis, Neurogenic bladder,overactive bladder,type 2 diabetes mellitus, hypertension, Weakness,  Imbalance, constipation, Pressure sore on left heel, Renal lesion, Major depressive disorder, insomnia who presents the ED today with suprapubic pain in the setting of urinary catheter as well as dark urine color.       ## Suprapubic catheter pain  ## BPH   ## Brown catheter in place  ## Neurogenic Bladder  ## OAB (overactive bladder)  ## UTI  Reports intermittent suprapubic pain off and on for the past 2 days. He has been having pain intermittently for the past month but noticed change in urine over the past 2 days.  Catheter has not been changed in approximately 1 month.  He feels generally unwell.  His has been having bladder spasms. Brown placed by Urology when hospitalized in June at the [facility] for suspected candidal cystitis. Reportedly had urinary catheter replacement with Urology at [place] on 08/31/21.  Has a history of overactive bladder/severe  nocturia. Followed at [facility] for Urology cares. He has had Botox injections in the past. Has also been on trospium and oxybutynin but unfortunately these medications were not affordable. In the ED. Vitals:BP:116/59  Pulse: 79 Temp: 98.4 Resp: 18 SP02:98% Labs: Cr 2.05, BUN 31, GFR 34, Na 128, , WBC 7.4, Hgb 11.2, Plt 277,  k 4.4, UA: Cloudy, negative nitrite, Large amount of blood, Large LE, WBC <182, 99 RBC, many bacteria, WBC clumps present.  UC pending. Medications:  1 L IVF x 2, Tylenol 1 g PO x 1, Zosyn IV x 1.  Imaging: US LE: No deep venous thrombosis demonstrated in either leg. Consults: Nephrology Plan: Admit to ED observation for treatment of UTI.    Last UC 11/2021 grew multiple bacterial morphotypes, UC from 9/2021 grew E. Coli resistant to Tetracycline Patient received Keflex for 5 days.  UC pending at time of discharge.   - Continue Ceftriaxone, transition to Cefdinir at discharge  - B and O supps prn for bladder spasms.      ## ESRD (end stage renal disease) on dialysis M/F HD.   - Nephrology consulted, patient declined dialysis today.      ## Lesion of left native kidney:  Renal lesion seen on CT. Would need repeat MRI to rule out malignancy. Patient declined in the past.   - Patient will follow up with MRI outpatient.      ## Weakness  ## Imbalance  PT outpatient   - Ensure supplement once daily   .  ## Constipation  - Miralax to 17 g TID prn     ## Anxiety  - Prn Valium     ## Major depressive disorder  ##Chronic insomnia Per chart review, patient was on Remeron and Melatonin  - Pharmacy consulted     ## Long term current use of anticoagulant  ## Personal history of DVT (deep vein thrombosis)  ## Personal history of pulmonary embolism  -  On Eliquis.    Consultations This Hospital Stay   NEPHROLOGY ESRD ADULT IP CONSULT  CARE MANAGEMENT / SOCIAL WORK IP CONSULT  PHYSICAL THERAPY ADULT IP CONSULT  MEDICATION HISTORY IP PHARMACY CONSULT  PHARMACY IP CONSULT    Code Status   Full Code    Time  Spent on this Encounter   I, OCTAVIO Rincon CNP, personally saw the patient today and spent less than or equal to 30 minutes discharging this patient.       OCTAVIO Rincon CNP  [facility] [address] [facility]  [address] [place] [id number]  Phone: [phone]  Fax: [phone]  ______________________________________________________________________    Physical Exam   Vital Signs: Temp: 98.4  F (36.9  C) Temp src: Oral BP: 116/59 Pulse: 79   Resp: 18 SpO2: 98 % O2 Device: None (Room air)    Weight: 260 lbs 0 oz  Constitutional: awake, alert, cooperative, no apparent distress, and appears stated age  Eyes: Lids and lashes normal, pupils equal, round and reactive to light, extra ocular muscles intact, sclera clear, conjunctiva normal  ENT: Normocephalic, without obvious abnormality, atraumatic, sinuses nontender on palpation, external ears without lesions, oral pharynx with moist mucous membranes, tonsils without erythema or exudates, gums normal and good dentition.  Hematologic / Lymphatic: no cervical lymphadenopathy  Respiratory: No increased work of breathing, good air exchange, clear to auscultation bilaterally, no crackles or wheezing  Cardiovascular: Normal apical impulse, regular rate and rhythm, normal S1 and S2, no S3 or S4, and no murmur noted  GI: No scars, normal bowel sounds, soft, non-distended, non-tender, no masses palpated, no hepatosplenomegally  Skin: no bruising or bleeding  Musculoskeletal: There is no redness, warmth, or swelling of the joints.  Full range of motion noted.  Motor strength is 5 out of 5 all extremities bilaterally.  Tone is normal.  Neurologic: Awake, alert, oriented to name, place and time.  Cranial nerves II-XII are grossly intact.  Motor is 5 out of 5 bilaterally.  Cerebellar finger to nose, heel to shin intact.  Sensory is intact.  Babinski down going, Romberg negative, and gait is normal.  Neuropsychiatric: General: normal,  calm and normal eye        Primary Care Physician   Isrrael Ornelas    Discharge Orders      CBC with platelets     Basic metabolic panel       Significant Results and Procedures   Results for orders placed or performed during the hospital encounter of 02/02/22   US Lower Extremity Venous Duplex Bilateral    Narrative    BILATERAL LOWER EXTREMITY DUPLEX VENOUS ULTRASOUND 2/3/2022 10:24 AM    CLINICAL HISTORY: Bilateral lower extremity edema. Swelling.  History  of right femoral partially occlusive and right popliteal occlusive  deep venous thrombosis.    COMPARISONS: 6/15/2021    REFERRING PROVIDER: MOON LINDSEY NASHIEF    TECHNIQUE: Grayscale, color Doppler, Doppler waveform ultrasound  evaluation was performed through the bilateral common femoral,  femoral, popliteal, and posterior tibial veins.     FINDINGS: Right common femoral vein patent and fully compressible with  a phasic waveform. Right femoral vein waveform is somewhat blunted but  remains phasic and the vein is fully compressible. Right popliteal and  posterior tibial veins are patent and fully compressible with phasic  waveforms.    Left common femoral, femoral, popliteal, and posterior tibial veins  are patent and fully compressible with phasic waveforms.      Impression    IMPRESSION: No deep venous thrombosis demonstrated in either leg.    JORDIN ROSE MD         SYSTEM ID:  [id number]       Discharge Medications   Current Discharge Medication List      START taking these medications    Details   cefdinir (OMNICEF) 300 MG capsule Take 1 capsule (300 mg) by mouth every other day for 14 days  Qty: 7 capsule, Refills: 0    Associated Diagnoses: Urinary tract infection associated with indwelling urethral catheter, initial encounter (H)      opium-belladonna (B&O SUPPRETTES) 30-16.2 MG per suppository Place 1 suppository rectally every 8 hours as needed for bladder spasms  Qty: 10 suppository, Refills: 0    Associated Diagnoses: Bladder spasms          CONTINUE these medications which have NOT CHANGED    Details   apixaban ANTICOAGULANT (ELIQUIS) 5 MG tablet Take 1 tablet (5 mg) by mouth 2 times daily  Qty:      Associated Diagnoses: Atrial fibrillation, unspecified type (H); Subacute massive pulmonary embolism (H)      mirtazapine (REMERON) 15 MG tablet Take 15 mg by mouth At Bedtime      acetaminophen (TYLENOL) 500 MG tablet Take 2 tablets (1,000 mg) by mouth 3 times daily  Qty:      Associated Diagnoses: Closed wedge compression fracture of T12 vertebra, initial encounter (H)      bisacodyl (DULCOLAX) 10 MG suppository Place 1 suppository (10 mg) rectally daily as needed for constipation  Qty:      Associated Diagnoses: Chronic idiopathic constipation      diazepam (VALIUM) 5 MG tablet Take 5 mg by mouth every 12 hours as needed for anxiety      diphenhydrAMINE (BENADRYL) 50 MG capsule Take 1 capsule (50 mg) by mouth every 6 hours as needed for itching or allergies Administer 30 min - 2 hours pre - IV contrast injection  Qty: 1 capsule, Refills: 0    Associated Diagnoses: Mechanical complication of dialysis catheter, initial encounter (H)      !! fluconazole (DIFLUCAN) 100 MG tablet Take 200 mg orally for one day, then 100 mg orally for four (4) days after.  Qty: 6 tablet, Refills: 0    Associated Diagnoses: Candida infection      !! fluconazole (DIFLUCAN) 100 MG tablet Take 1 tablet (100 mg) by mouth daily  Qty:      Associated Diagnoses: Candida cystitis      folic acid (FOLVITE) 1 MG tablet Take 1 tablet (1 mg) by mouth daily  Qty:      Associated Diagnoses: Weakness      hydrOXYzine (ATARAX) 25 MG tablet Take 1 tablet (25 mg) by mouth every 6 hours as needed for anxiety  Qty:      Associated Diagnoses: Anxiety      Lidocaine (LIDOCARE) 4 % Patch Apply topically to intact skin for upto 12 hrs within 24-hr period, as needed for shoulder pain.      melatonin 5 MG tablet Take 1 tablet (5 mg) by mouth nightly as needed for sleep  Qty:      Associated  Diagnoses: Insomnia, unspecified type      miconazole (MICATIN) 2 % external powder Apply topically 2 times daily  Qty:      Associated Diagnoses: Fungal infection of the groin      Multiple Vitamin (MULTI-VITAMINS) TABS Take 1 tablet by mouth      multivitamin RENAL (NEPHROCAPS/TRIPHROCAPS) 1 MG capsule Take 1 capsule by mouth daily  Qty:      Associated Diagnoses: End stage renal disease (H)      nitroFURantoin macrocrystal-monohydrate (MACROBID) 100 MG capsule Take 1 capsule (100 mg) by mouth 2 times daily  Qty: 10 capsule, Refills: 0    Associated Diagnoses: Urinary tract infection      nystatin (MYCOSTATIN) 292911 UNIT/GM external cream Apply topically 2 times daily  Qty:      Associated Diagnoses: Fungal infection of the groin; Balanitis      oxyCODONE (ROXICODONE) 5 MG tablet Take 0.5 tablets (2.5 mg) by mouth every 8 hours  Refills: 0    Associated Diagnoses: Closed wedge compression fracture of T12 vertebra, initial encounter (H)      polyethylene glycol (MIRALAX) 17 GM/Dose powder Take 8.5 g by mouth daily      !! QUEtiapine (SEROQUEL) 25 MG tablet Take 1 tablet (25 mg) by mouth At Bedtime  Qty:      Associated Diagnoses: Anxiety; Insomnia, unspecified type      !! QUEtiapine (SEROQUEL) 25 MG tablet Take 1 tablet (25 mg) by mouth daily as needed (agitation)  Qty:      Associated Diagnoses: Anxiety; Insomnia, unspecified type      senna (SENOKOT) 8.6 MG tablet Take 17.2 mg by mouth 2 times daily as needed       !! - Potential duplicate medications found. Please discuss with provider.        Allergies   Allergies   Allergen Reactions     Cephalexin      Ciprofloxacin      Diagnostic X-[employer]      Other reaction(s): Unknown     Sulfa Drugs Hives     Other reaction(s): Unknown  PN: LW Reaction: Rash, Generalized

## 2022-02-03 NOTE — H&P
[facility]    History and Physical - ED Observation Service       Date of Admission:  2/2/2022    Assessment & Plan      Fer Latham is a 71 year old male admitted on 2/2/2022. 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       ##. Catheter Associated UTI: reports intermittent perineal pain x 1 month, more constant today. Noticed change in urine over the past 2 days. Feels generally unwell. Denies flank pain, hematuria, fevers, chest pain, shortness of breath, nausea, vomiting, or diarrhea. Reports catheter has not been changed in 6 weeks. However d/c'ed from TCU on 1/14/22 where it was being changed q7joqpe and at discharge was recommended d5llgeg. It is noted that catheter was changed at discharge or when it was last changed there however wondering if it got changed at day of discharge. In ED, VSS, afebrile. Labs show BUN 31 (baseline 20's-30's), Cr 2.05 (baseline 1.8-mid 2 range), GFR 34. CBC with WBC 7.4, H/H 11.2/33.0 (baseline 8-11). Lactic acid 2.0. UA reports cloudy, 300 protein, large blood, large LE, > 182 WBC, 99 RBC, many bacteria and clumps. UCx sent and pending. In ED the patient was given 1L NS bolus, dilaudid 0.5mg IV x 2, zosyn 3.375mg IV x 1. Patient's ferguson was changed in ED. UCx 6/15/21-10-50k MUGF, 6/28/21-10-50k candida albicans, 7/12/21 >100k candida albicans (treated w/. Fluconazole). Per [facility] UC[facility] on 8/10/21 - >100k E. Coli, pansensitive (treated w/ a dose of Fosfomycin); 9/15/21 >100k Ecoli + 50-100k enterococcus, pansensitive (keflex); 11/5/21 > 100k multiple bacterial morphytes. Also remotely 10/2/19 UCx with 10-50k  "enterococcus pansensitive.   -Continue Zosyn d/t concern for Enterococcus especially and should cover E. Coli as well  -Follow UCx  -Add on CRP and procalcitonin  -Repeat CBC, CRP and lactic acid in AM  -Will need OP Urology appointment for follow up and reinforce q4 weeks catheter change    ##. Neurogenic Bladder  ##. Overactive bladder  ##. Urinary Retention  ##. Chronic Urinary Ferguson  Per chart review patient has been seen at [facility] (8/31/21) and discussed with [facility] (11/16/21) however declined trial of void and wanted to keep ferguson long term. It is noted that multiple discussions in TCU encouraging him to explore ferguson removal options due to risk of recurrent UTIs have been declined repeatedly. Ferguson was being changed every 2weeks but at discharge recommended every 4 weeks.   -Will need OP Urology appointment for follow up and reinforce q4 weeks catheter change  -Will need catheter change by 3/2/22 (last change 2/2/22)    ##. 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 Hyponatremia  Dialyzes twice weekly (Tu/Th) at [facility] with Dr. Puente. Patient does still make urine and has chronic ferguson in place. Per chart review it is noted that patient has refused dialysis at times. Was given 1L NS bolus in ED.  - NS 75ml/hr. Decrease or discontinue if Na improving in AM  - Nephrology consult for HD  - Strict I&O   - Daily weights   - Repeat BMP in AM  - Follow up with Endocrine referral from TCU for osteoporosis management.     ##. BLE Edema: Significant BLE edema. Patient admits has worsened. Does not wrap or wear TEDs.   -BLE US to r/o DVT    ##. Generalized Weakness: Could be related to any of the above, deconditioning, poor po intake. When patient is asked how he moves around he states \"I don't know\". Then when asked if he has a walker, cane, wheelchair, he states \"I have all of them\".  -PT consult  - for disposition planning     ##. Chronic Anemia related to CKD:  Baseline 8-11. " MCV 90's-100s. Normal B12/Folate 6/24/21. No Iron level recently   - Follow CBC and add iron/TIBC level     ##. Atrial fibrillation: HR 70's-110s. No recent EKG. Not on rate lowering medication. Plan per chart review is to continue apixaban long term. No noted bleeding complications noted.  - Continue with PTA Eliquis     ##. Prior PE/DVT: Was continued on Apixaban at TCU with plan for long term anticoagulation due to atrial fibrillation   - Continue with PTA Eliquis     ##. Chronic constipation: - Continue with PTA Miralax 8.5gm daily and Dulcolax PRN    ##. MDD  ##. Insomnia:  - Continue PTA Remeron  - Continue Melatonin prn    ##. Anxiety: per med rec patient has hydroxyzine, ativan, and diazepam but denies use. Was not discharged on them from TCU on 1/14/22    ##. T12 fracture: per discharge from TCU on 1/14/21 it is noted mobility and function improved with PT and TLSO was removed on 10/6/21 per neurosurgery. Opioids weaned and only on scheduled tylenol TID. Has not followed with NSG as December appointment cancelled by wife and TCU was unable to schedule appointment. Vit D level on 9/7/21 low at 26.    -Follow up with NSG as OP    ##. Left Renal lesion: found incidentally on spine CT 8/19/21. Follow up Renal MRI was recommended but patient canceled the appointment and decided he did not want to pursue further workup.    ##. Left Buttock Wound: At discharge from TCU it is noted that it was almost entirely healed by discharge. It is noted that patient refused wound cares and position changes at times. He was to continued daily dressing changes with calcium alginate dressing until healed. Patient is refusing skin assessment here.   - Will need skin assessment    ##. DM2: Diet controlled. Last A1c on 10/26/21 was 6.0    ##. HTN: BP 90's-140's/50's-90's. Previously on medications but discontinued after lifestyle modifications with weight loss several years ago    ##. Alcohol use disorder: When asked about  "consumption responded saying \"what does that have to do with why I am here\". I explained need to know and being able to manage withdrawals if they happen      Diet: Renal Diet (dialysis)  Snacks/Supplements Adult: Ensure Enlive; Between Meals  DVT Prophylaxis: expect short stay in ED Obs  Ferguson Catheter: Not present  Central Lines: PRESENT       Cardiac Monitoring: None  Code Status: Full Code    Clinically Significant Risk Factors Present on Admission         # Hyponatremia: Na = 128 mmol/L (Ref range: 133 - 144 mmol/L) on admission, will monitor as appropriate      # Coagulation Defect: home medication list includes an anticoagulant medication        Disposition Plan   Expected Discharge:   Anticipated discharge location:  Awaiting care coordination huddle  Delays:          The patient's care was discussed with the Attending Physician, Dr. Haskins.    TIFFANIE Orosco  Hospitalist Service  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  He has been having intermittent perineal pain for the past month, more constant today but noticed change in urine over the past 2 days. Catheter has not been changed in 6 weeks.  He feels generally unwell.  He denies any flank pain, hematuria, associated fevers, chest pain, " shortness of breath, abdominal pain, vomiting, or diarrhea.     Per chart review patient was admitted to a TCU ([facility]) from 7/1/21 - 1/14/22 after hospitalized at [facility] 6/15-7/1/21 for massive PE with R heart strain, atrial fib w/ RVR, new RLE DVT, and new T12 fracture. He was started on eliquis 6/19/21 for DVT/PE; Right heart strain improved with IVF. T12 fracture managed with TLSO and pain management; delirium and alcohol withdrawal treated; treated with fluconazole for CAUTI w/ candida.     It is noted that he has a history of refusing home care, inconsistent follow up with specialists, irregular dialysis attendance.    In the ED, HR 70's-110s, BP 90's-140's/50's-90's, RR 18, SaO2 % on RA, Temp 98.2  F . Labs show BMP with Na 128, BUN 31, Cr 2.05, GFR 34, glucose 128 otherwise unremarkable. CBC with H/H 11.2/33.0, RBC 3.50. lactic acid 2.0. UA reports cloudy, 300 protein, large blood, large LE, > 182 WBC, 99 RBC, many bacteria and clumps. UCx sent and pending. Covid 19 PCR negative.  In the ED the patient was given 1L NS bolus, dilaudid 0.5mg IV x 2, zosyn 3.375mg IV x 1. Patient's ferguson was changed in ED. Per ED staff patient could potentially have been discharged however patient stated he didn't feel well enough to return home and for that reason will be admitted to the Observation Unit.     Review of Systems    All other ROS negative except those mentioned in above note.      Past Medical History    I have reviewed this patient's medical history and updated it with pertinent information if needed.   Past Medical History:   Diagnosis Date     Anemia      Arrhythmia     atrial fib     Arthritis 1990's    gout     BPH (benign prostatic hyperplasia)      Current moderate episode of major depressive disorder without prior episode (H) 4/30/2019     Depression      Diabetes mellitus (H)     Type 2  IDDM     Diverticulosis      ESRD (end stage renal disease) on dialysis (H)      Hematuria       HTN (hypertension)      HTN (hypertension)      IgA nephropathy      MI, old      Pneumonia      Rhabdomyolysis      Weakness 5/10/2019       Past Surgical History   I have reviewed this patient's surgical history and updated it with pertinent information if needed.  Past Surgical History:   Procedure Laterality Date     ABDOMEN SURGERY       APPENDECTOMY  1970's     ARTHROSCOPY KNEE  6/27/2013    Procedure: ARTHROSCOPY KNEE;  Right Knee Arthroscopy, Debridment Of Medial Meniscal Tear.  ;  Surgeon: Tomás Wong MD;  Location: [facility]     BACK SURGERY  1999    L5 S1 decompression     BIOPSY  2017    skin     COLONOSCOPY  2013    [facility]     CORONARY ANGIOGRAPHY ADULT ORDER  2018     EYE SURGERY Bilateral 2004    Lens replacement     IR CVC TUNNEL PLACEMENT > 5 YRS OF AGE  11/26/2019     IR CVC TUNNEL REMOVAL RIGHT  11/26/2019     IR CVC TUNNEL REVISION RIGHT  2/10/2020     IR CVC TUNNEL REVISION RIGHT  10/8/2020     IR CVC TUNNEL REVISION RIGHT  6/17/2021     VASCULAR SURGERY         Social History   I have reviewed this patient's social history and updated it with pertinent information if needed.  Social History     Tobacco Use     Smoking status: Former Smoker     Smokeless tobacco: Never Used     Tobacco comment: quit 35 years ago   Substance Use Topics     Alcohol use: Yes     Comment: 1 to 2 drinks a day (Occasional)     Drug use: No       Family History   I have reviewed this patient's family history and updated it with pertinent information if needed.  Family History   Problem Relation Age of Onset     Cancer Father        Prior to Admission Medications   Prior to Admission Medications   Prescriptions Last Dose Informant Patient Reported? Taking?   Lidocaine (LIDOCARE) 4 % Patch  Self Yes No   Sig: Apply topically to intact skin for upto 12 hrs within 24-hr period, as needed for shoulder pain.   Multiple Vitamin (MULTI-VITAMINS) TABS   Yes No   Sig: Take 1 tablet by mouth   QUEtiapine (SEROQUEL) 25 MG  tablet   No No   Sig: Take 1 tablet (25 mg) by mouth At Bedtime   QUEtiapine (SEROQUEL) 25 MG tablet   No No   Sig: Take 1 tablet (25 mg) by mouth daily as needed (agitation)   acetaminophen (TYLENOL) 500 MG tablet   No No   Sig: Take 2 tablets (1,000 mg) by mouth 3 times daily   apixaban ANTICOAGULANT (ELIQUIS) 5 MG tablet   No Yes   Sig: Take 1 tablet (5 mg) by mouth 2 times daily   bisacodyl (DULCOLAX) 10 MG suppository   No No   Sig: Place 1 suppository (10 mg) rectally daily as needed for constipation   diazepam (VALIUM) 5 MG tablet  Self Yes No   Sig: Take 5 mg by mouth every 12 hours as needed for anxiety   diphenhydrAMINE (BENADRYL) 50 MG capsule  Self No No   Sig: Take 1 capsule (50 mg) by mouth every 6 hours as needed for itching or allergies Administer 30 min - 2 hours pre - IV contrast injection   fluconazole (DIFLUCAN) 100 MG tablet   No No   Sig: Take 1 tablet (100 mg) by mouth daily   fluconazole (DIFLUCAN) 100 MG tablet   No No   Sig: Take 200 mg orally for one day, then 100 mg orally for four (4) days after.   folic acid (FOLVITE) 1 MG tablet   No No   Sig: Take 1 tablet (1 mg) by mouth daily   hydrOXYzine (ATARAX) 25 MG tablet   No No   Sig: Take 1 tablet (25 mg) by mouth every 6 hours as needed for anxiety   melatonin 5 MG tablet   No No   Sig: Take 1 tablet (5 mg) by mouth nightly as needed for sleep   miconazole (MICATIN) 2 % external powder   No No   Sig: Apply topically 2 times daily   mirtazapine (REMERON) 15 MG tablet   Yes Yes   Sig: Take 15 mg by mouth At Bedtime   multivitamin RENAL (NEPHROCAPS/TRIPHROCAPS) 1 MG capsule   No No   Sig: Take 1 capsule by mouth daily   nitroFURantoin macrocrystal-monohydrate (MACROBID) 100 MG capsule   No No   Sig: Take 1 capsule (100 mg) by mouth 2 times daily   nystatin (MYCOSTATIN) 951282 UNIT/GM external cream   No No   Sig: Apply topically 2 times daily   oxyCODONE (ROXICODONE) 5 MG tablet   No No   Sig: Take 0.5 tablets (2.5 mg) by mouth every 8 hours    polyethylene glycol (MIRALAX) 17 GM/Dose powder   Yes No   Sig: Take 8.5 g by mouth daily   senna (SENOKOT) 8.6 MG tablet   Yes No   Sig: Take 17.2 mg by mouth 2 times daily as needed      Facility-Administered Medications: None     Allergies   Allergies   Allergen Reactions     Cephalexin      Ciprofloxacin      Diagnostic X-Ray Materials      Other reaction(s): Unknown     Sulfa Drugs Hives     Other reaction(s): Unknown  PN: LW Reaction: Rash, Generalized         Physical Exam   Vital Signs: Temp: 97.9  F (36.6  C) Temp src: Oral BP: 117/81 Pulse: 84   Resp: 16 SpO2: 99 % O2 Device: None (Room air)    Weight: 260 lbs 0 oz    Constitutional: sleeping but aroused with verbal stimuli. Responds at his will. Some questions ignored.  Eyes: Lids and lashes normal  ENT: Normocephalic, without obvious abnormality, atraumatic  Hematologic / Lymphatic: no cervical lymphadenopathy  Respiratory: No increased work of breathing, good air exchange, clear to auscultation bilaterally, no crackles or wheezing  Cardiovascular: Normal apical impulse, irregular rate and rhythm, normal S1 and S2, no S3 or S4, and no murmur noted  GI: No scars, normal bowel sounds, soft, non-distended, minimally tender in lower abdomen, no masses palpated, no hepatosplenomegally  Skin: no bruising or bleeding  Musculoskeletal: There is no redness, warmth, or swelling of the joints.  Full range of motion noted.  Motor strength is 5 out of 5 all extremities bilaterally.  Tone is normal.  Ext: significant BLE edema  Neurologic: oriented to name, place and time.    Neuropsychiatric: General: normal, calm and normal eye contact    Data   Data reviewed today: I reviewed all medications, new labs and imaging results over the last 24 hours. I personally reviewed   Recent Labs   Lab 02/02/22  1714   WBC 7.4   HGB 11.2*   MCV 94      *   POTASSIUM 4.4   CHLORIDE 96   CO2 21   BUN 31*   CR 2.05*   ANIONGAP 11   COLUMBA 9.0   *     Most Recent 3  CBC's:  Recent Labs   Lab Test 02/02/22  1714 07/01/21  0733 06/30/21  0843   WBC 7.4 6.6 5.8   HGB 11.2* 8.8* 8.6*   MCV 94 99 100    196 217     Most Recent 3 BMP's:  Recent Labs   Lab Test 02/02/22  1714 07/01/21  0733 06/30/21  0843   * 135 134   POTASSIUM 4.4 4.0 3.7   CHLORIDE 96 103 102   CO2 21 26 28   BUN 31* 31* 27   CR 2.05* 2.95* 2.75*   ANIONGAP 11 6 4   COLUMBA 9.0 8.5 8.6   * 73 108*     Most Recent 2 LFT's:  Recent Labs   Lab Test 06/19/21  0811 06/18/21  0524   AST 17 12   ALT 11 9   ALKPHOS 81 81   BILITOTAL 0.5 0.5     Most Recent 3 INR's:  Recent Labs   Lab Test 06/18/21  0738 10/08/20  1030 02/10/20  0925   INR 1.36* 1.16* 1.21*     Most Recent 3 Creatinines:  Recent Labs   Lab Test 02/02/22 1714 07/01/21  0733 06/30/21  0843   CR 2.05* 2.95* 2.75*     Most Recent 3 Hemoglobins:  Recent Labs   Lab Test 02/02/22 1714 07/01/21  0733 06/30/21  0843   HGB 11.2* 8.8* 8.6*     Most Recent 6 Bacteria Isolates From Any Culture (See EPIC Reports for Culture Details):  Recent Labs   Lab Test 06/28/21  1150 06/15/21  1522 06/15/21  1350 06/15/21  1306 05/14/20  1729 10/02/19  0900   CULT 10,000 to 50,000 colonies/mL  Candida albicans  Susceptibility testing not routinely done  * 10,000 to 50,000 colonies/mL  mixed urogenital jerzy  Susceptibility testing not routinely done   No growth No growth 10,000 to 50,000 colonies/mL  mixed urogenital jerzy   10,000 to 50,000 colonies/mL  Enterococcus faecalis  *  <10,000 colonies/mL  urogenital jerzy       Most Recent Hemoglobin A1c:  Recent Labs   Lab Test 06/15/21  1303   A1C 5.2     Most Recent 6 glucoses:  Recent Labs   Lab Test 02/02/22  1714 07/01/21  0733 06/30/21  0843 06/28/21  0730 06/27/21  1646 06/26/21  1244   * 73 108* 108* 129* 86     Most Recent Urinalysis:  Recent Labs   Lab Test 02/02/22  2324 07/12/21  1402   COLOR Yellow Yellow   APPEARANCE Cloudy* Clear   URINEGLC Negative Negative   URINEBILI Negative Moderate*    URINEKETONE Negative 5 *   SG 1.018 >1.030*   UBLD Large* Large*   URINEPH 5.5 5.0   PROTEIN 300 * >300*   UROBILINOGEN  --  1.0   NITRITE Negative Negative   LEUKEST Large* Large*   RBCU 99*  --    WBCU >182*  --      Most Recent ESR & CRP:  Recent Labs   Lab Test 06/15/21  1303   CRP 34.0*     7.4    \    11.2 (L)    /    277   N 77    L N/A    128 (L)    96    31 (H) /   ------------------------------------ 128 (H)   ALT N/A   AST N/A   AP N/A   ALB N/A   Ca 9.0  4.4    21    2.05 (H) \    % RETIC N/A    LDH N/A  Troponin N/A    BNP N/A    CK N/A  INR N/A   PTT N/A    D-dimer N/A    Fibrinogen N/A    Antithrombin N/A  Ferritin N/A  CRP N/A    IL-6 N/A  Recent Results (from the past 24 hour(s))   US Lower Extremity Venous Duplex Bilateral    Narrative    BILATERAL LOWER EXTREMITY DUPLEX VENOUS ULTRASOUND 2/3/2022 10:24 AM    CLINICAL HISTORY: Bilateral lower extremity edema. Swelling.  History  of right femoral partially occlusive and right popliteal occlusive  deep venous thrombosis.    COMPARISONS: 6/15/2021    REFERRING PROVIDER: MOON LINDSEY NASHIEF    TECHNIQUE: Grayscale, color Doppler, Doppler waveform ultrasound  evaluation was performed through the bilateral common femoral,  femoral, popliteal, and posterior tibial veins.     FINDINGS: Right common femoral vein patent and fully compressible with  a phasic waveform. Right femoral vein waveform is somewhat blunted but  remains phasic and the vein is fully compressible. Right popliteal and  posterior tibial veins are patent and fully compressible with phasic  waveforms.    Left common femoral, femoral, popliteal, and posterior tibial veins  are patent and fully compressible with phasic waveforms.      Impression    IMPRESSION: No deep venous thrombosis demonstrated in either leg.    JORDIN ROSE MD         SYSTEM ID:  [id number]

## 2022-02-04 ENCOUNTER — DOCUMENTATION ONLY (OUTPATIENT)
Dept: PHARMACY | Facility: CLINIC | Age: 72
End: 2022-02-04
Payer: MEDICARE

## 2022-02-04 VITALS
TEMPERATURE: 97.6 F | WEIGHT: 260 LBS | OXYGEN SATURATION: 100 % | DIASTOLIC BLOOD PRESSURE: 63 MMHG | RESPIRATION RATE: 16 BRPM | BODY MASS INDEX: 33.38 KG/M2 | HEART RATE: 69 BPM | SYSTOLIC BLOOD PRESSURE: 120 MMHG

## 2022-02-04 LAB
ALBUMIN SERPL-MCNC: 2.3 G/DL (ref 3.4–5)
ALP SERPL-CCNC: 100 U/L (ref 40–150)
ALT SERPL W P-5'-P-CCNC: 10 U/L (ref 0–70)
ANION GAP SERPL CALCULATED.3IONS-SCNC: 8 MMOL/L (ref 3–14)
AST SERPL W P-5'-P-CCNC: 12 U/L (ref 0–45)
BACTERIA UR CULT: ABNORMAL
BASOPHILS # BLD AUTO: 0.1 10E3/UL (ref 0–0.2)
BASOPHILS NFR BLD AUTO: 1 %
BILIRUB SERPL-MCNC: 0.4 MG/DL (ref 0.2–1.3)
BUN SERPL-MCNC: 33 MG/DL (ref 7–30)
CA-I BLD-MCNC: 4.5 MG/DL (ref 4.4–5.2)
CALCIUM SERPL-MCNC: 8.3 MG/DL (ref 8.5–10.1)
CHLORIDE BLD-SCNC: 106 MMOL/L (ref 94–109)
CO2 SERPL-SCNC: 22 MMOL/L (ref 20–32)
CREAT SERPL-MCNC: 2.24 MG/DL (ref 0.66–1.25)
EOSINOPHIL # BLD AUTO: 0.3 10E3/UL (ref 0–0.7)
EOSINOPHIL NFR BLD AUTO: 6 %
ERYTHROCYTE [DISTWIDTH] IN BLOOD BY AUTOMATED COUNT: 14.8 % (ref 10–15)
GFR SERPL CREATININE-BSD FRML MDRD: 31 ML/MIN/1.73M2
GLUCOSE BLD-MCNC: 58 MG/DL (ref 70–99)
HCT VFR BLD AUTO: 28.6 % (ref 40–53)
HGB BLD-MCNC: 9.3 G/DL (ref 13.3–17.7)
IMM GRANULOCYTES # BLD: 0 10E3/UL
IMM GRANULOCYTES NFR BLD: 0 %
LACTATE SERPL-SCNC: 0.7 MMOL/L (ref 0.7–2)
LYMPHOCYTES # BLD AUTO: 1 10E3/UL (ref 0.8–5.3)
LYMPHOCYTES NFR BLD AUTO: 19 %
MCH RBC QN AUTO: 32.2 PG (ref 26.5–33)
MCHC RBC AUTO-ENTMCNC: 32.5 G/DL (ref 31.5–36.5)
MCV RBC AUTO: 99 FL (ref 78–100)
MONOCYTES # BLD AUTO: 0.6 10E3/UL (ref 0–1.3)
MONOCYTES NFR BLD AUTO: 11 %
NEUTROPHILS # BLD AUTO: 3.5 10E3/UL (ref 1.6–8.3)
NEUTROPHILS NFR BLD AUTO: 63 %
NRBC # BLD AUTO: 0 10E3/UL
NRBC BLD AUTO-RTO: 0 /100
PLATELET # BLD AUTO: 202 10E3/UL (ref 150–450)
POTASSIUM BLD-SCNC: 4 MMOL/L (ref 3.4–5.3)
PROT SERPL-MCNC: 6.4 G/DL (ref 6.8–8.8)
RBC # BLD AUTO: 2.89 10E6/UL (ref 4.4–5.9)
SODIUM SERPL-SCNC: 136 MMOL/L (ref 133–144)
WBC # BLD AUTO: 5.6 10E3/UL (ref 4–11)

## 2022-02-04 PROCEDURE — 85025 COMPLETE CBC W/AUTO DIFF WBC: CPT | Performed by: PHYSICIAN ASSISTANT

## 2022-02-04 PROCEDURE — G0378 HOSPITAL OBSERVATION PER HR: HCPCS

## 2022-02-04 PROCEDURE — 250N000013 HC RX MED GY IP 250 OP 250 PS 637: Performed by: PHYSICIAN ASSISTANT

## 2022-02-04 PROCEDURE — 82330 ASSAY OF CALCIUM: CPT | Performed by: PHYSICIAN ASSISTANT

## 2022-02-04 PROCEDURE — 83605 ASSAY OF LACTIC ACID: CPT | Performed by: PHYSICIAN ASSISTANT

## 2022-02-04 PROCEDURE — 96361 HYDRATE IV INFUSION ADD-ON: CPT

## 2022-02-04 PROCEDURE — 36415 COLL VENOUS BLD VENIPUNCTURE: CPT | Performed by: PHYSICIAN ASSISTANT

## 2022-02-04 PROCEDURE — 80053 COMPREHEN METABOLIC PANEL: CPT | Performed by: PHYSICIAN ASSISTANT

## 2022-02-04 RX ADMIN — APIXABAN 5 MG: 5 TABLET, FILM COATED ORAL at 09:05

## 2022-02-04 RX ADMIN — Medication 1 CAPSULE: at 09:05

## 2022-02-04 NOTE — PLAN OF CARE
Outpatient/Observation goals to be met before discharge home: /81   Pulse 84   Temp 97.9  F (36.6  C)   Resp 16   Wt 117.9 kg (260 lb)   SpO2 99%   BMI 33.38 kg/m        vital signs normal or at patient baseline :Met   -tolerating oral intake to maintain hydration:Met   -adequate pain control on oral analgesics :Met;Denies pain  -tolerating oral antibiotics or has plans for home infusion setup : On IV abx   -infection is improving: In progress  -returns to baseline functional status:In progress  -safe disposition plan has been identified:In progress   -consults completed with dispo plan: Not met      Pt refused writer to do assessment.

## 2022-02-04 NOTE — PROGRESS NOTES
SHIFT: [id number]    Neuro: A&Ox4.   Cardiac: unable to assess pt refused   Respiratory:  Respiration regular, unable to auscultate pt refused  GI/: Adequate with urinary catheter, voiding adequate amount of urine at ubag  Diet/appetite: renal diet tolerating food and fluids, ate late lunch ate 90% of food  Activity:  In bed did not get up  Pain: denies pain  Skin: No new deficits noted.      Plan: Continue with POC. Notify primary team with changes.

## 2022-02-04 NOTE — PROGRESS NOTES
Prior Authorization Approval    Belladonna Alkaloids-Opium 16.2-30mg suppositories  Date Initiated: 2/4/2022  Date Completed: 2/4/2022  Prior Auth Type: Non-Formulary                Status: Approved    Effective Date: 01/01/2022 - 02/04/2023  Copay: 217.19     Filling Pharmacy: [facility] - [address]    Insurance: [employer] Part D - Phone [phone] Fax [phone]  ID: [id number]  Case Number: [id number] / [id number]   Submitted Via: Sona Fritz  [facility] Pharmacy Liaison  Ph: [phone] Pager: [phone]

## 2022-02-04 NOTE — PROGRESS NOTES
Brief Nephrology Note:    I saw the patient briefly today. He came in for ferguson exchange and is being treated for a UTI. He declined to dialyze yesterday and he declines again today. He typically dialyzes Tuesdays and Thursdays and did dialyze this last Tuesday. His electrolytes and volume are stable. I called his home unit, [facility], and they would be able to dialyze him tomorrow if he decides on that. We will sign off at this point; if anything changes, will be happy to do consult and dialysis.    Lizbeth Clements PA-C  [phone]

## 2022-02-04 NOTE — PLAN OF CARE
OBSERVATION GOALS PRIOR TO DISCHARGE:    - Diagnostic tests and consults completed and resulted:  Pt refused PT/OT  - Vital signs normal or at patient baseline:  Met; VSS on RA  - Tolerating oral intake to maintain hydration:  Met  - Adequate pain control on oral analgesics:  Met; denies pain   - Tolerating oral antibiotics or has plans for home infusion setup: In process; pt to discharge home with PO abx  - Infection is improving:  In procress  - Returns to baseline functional status:  Pt refusing to perform self-cares.  Unable to identify.  Infection improving otherwise.  - Safe disposition plan has been identified:  In procress  - Consults completed with dispo plan:  In procress    A&Ox4, no deficits noted.  Refused full skin assessment or self-cares.    /63 (BP Location: Right arm)   Pulse 69   Temp 97.6  F (36.4  C) (Oral)   Resp 16   Wt 117.9 kg (260 lb)   SpO2 100%   BMI 33.38 kg/m

## 2022-02-04 NOTE — PLAN OF CARE
Outpatient/Observation goals to be met before discharge home: /81   Pulse 84   Temp 97.9  F (36.6  C)   Resp 16   Wt 117.9 kg (260 lb)   SpO2 99%   BMI 33.38 kg/m       -diagnostic tests and consults completed and resulted: Not met  -vital signs normal or at patient baseline:Met  -tolerating oral intake to maintain hydration :Met  -adequate pain control on oral analgesics:Denies pain   -tolerating oral antibiotics or has plans for home infusion setup: Not met; pt is on IV abx .  -infection is improving : in progress  -returns to baseline functional status: Not met   -safe disposition plan has been identified : in progress  -consults completed with dispo plan: In progress

## 2022-02-04 NOTE — PLAN OF CARE
Outpatient/Observation goals to be met before discharge home: /81   Pulse 84   Temp 97.9  F (36.6  C)   Resp 16   Wt 117.9 kg (260 lb)   SpO2 99%   BMI 33.38 kg/m      -vital signs normal or at patient baseline :Met   -tolerating oral intake to maintain hydration:Met   -adequate pain control on oral analgesics :Met;Denies pain  -tolerating oral antibiotics or has plans for home infusion setup : On IV abx   -infection is improving: In progress  -returns to baseline functional status:In progress  -safe disposition plan has been identified:In progress   -consults completed with dispo plan: Not met

## 2022-02-07 ENCOUNTER — APPOINTMENT (OUTPATIENT)
Dept: CT IMAGING | Facility: CLINIC | Age: 72
DRG: 698 | End: 2022-02-07
Attending: EMERGENCY MEDICINE
Payer: MEDICARE

## 2022-02-07 ENCOUNTER — HOSPITAL ENCOUNTER (INPATIENT)
Facility: CLINIC | Age: 72
LOS: 22 days | Discharge: NURSING, REHAB OR LONG-TERM CARE | DRG: 698 | End: 2022-03-02
Attending: EMERGENCY MEDICINE | Admitting: FAMILY MEDICINE
Payer: MEDICARE

## 2022-02-07 DIAGNOSIS — L30.9 DERMATITIS: ICD-10-CM

## 2022-02-07 DIAGNOSIS — F32.1 CURRENT MODERATE EPISODE OF MAJOR DEPRESSIVE DISORDER WITHOUT PRIOR EPISODE (H): ICD-10-CM

## 2022-02-07 DIAGNOSIS — Z96.0 PRESENCE OF UROGENITAL IMPLANT: ICD-10-CM

## 2022-02-07 DIAGNOSIS — E44.0 MODERATE MALNUTRITION (H): Primary | ICD-10-CM

## 2022-02-07 DIAGNOSIS — F32.9 MAJOR DEPRESSIVE DISORDER WITH CURRENT ACTIVE EPISODE, UNSPECIFIED DEPRESSION EPISODE SEVERITY, UNSPECIFIED WHETHER RECURRENT: ICD-10-CM

## 2022-02-07 DIAGNOSIS — Z20.822 CONTACT WITH AND (SUSPECTED) EXPOSURE TO COVID-19: ICD-10-CM

## 2022-02-07 DIAGNOSIS — N30.01 ACUTE CYSTITIS WITH HEMATURIA: ICD-10-CM

## 2022-02-07 LAB
ALBUMIN SERPL-MCNC: 3 G/DL (ref 3.4–5)
ALBUMIN UR-MCNC: 100 MG/DL
ALP SERPL-CCNC: 140 U/L (ref 40–150)
ALT SERPL W P-5'-P-CCNC: 13 U/L (ref 0–70)
ANION GAP SERPL CALCULATED.3IONS-SCNC: 9 MMOL/L (ref 3–14)
APPEARANCE UR: ABNORMAL
AST SERPL W P-5'-P-CCNC: 17 U/L (ref 0–45)
BACTERIA #/AREA URNS HPF: ABNORMAL /HPF
BASOPHILS # BLD AUTO: 0.1 10E3/UL (ref 0–0.2)
BASOPHILS NFR BLD AUTO: 1 %
BILIRUB SERPL-MCNC: 0.3 MG/DL (ref 0.2–1.3)
BILIRUB UR QL STRIP: NEGATIVE
BUN SERPL-MCNC: 29 MG/DL (ref 7–30)
CALCIUM SERPL-MCNC: 8.6 MG/DL (ref 8.5–10.1)
CHLORIDE BLD-SCNC: 104 MMOL/L (ref 94–109)
CO2 SERPL-SCNC: 21 MMOL/L (ref 20–32)
COLOR UR AUTO: YELLOW
CREAT SERPL-MCNC: 1.96 MG/DL (ref 0.66–1.25)
EOSINOPHIL # BLD AUTO: 0.3 10E3/UL (ref 0–0.7)
EOSINOPHIL NFR BLD AUTO: 4 %
ERYTHROCYTE [DISTWIDTH] IN BLOOD BY AUTOMATED COUNT: 14.6 % (ref 10–15)
GFR SERPL CREATININE-BSD FRML MDRD: 36 ML/MIN/1.73M2
GLUCOSE BLD-MCNC: 112 MG/DL (ref 70–99)
GLUCOSE UR STRIP-MCNC: NEGATIVE MG/DL
HCT VFR BLD AUTO: 35.3 % (ref 40–53)
HGB BLD-MCNC: 11.6 G/DL (ref 13.3–17.7)
HGB UR QL STRIP: ABNORMAL
HOLD SPECIMEN: NORMAL
HOLD SPECIMEN: NORMAL
IMM GRANULOCYTES # BLD: 0 10E3/UL
IMM GRANULOCYTES NFR BLD: 0 %
KETONES UR STRIP-MCNC: NEGATIVE MG/DL
LACTATE SERPL-SCNC: 3.1 MMOL/L (ref 0.7–2)
LEUKOCYTE ESTERASE UR QL STRIP: ABNORMAL
LYMPHOCYTES # BLD AUTO: 1.5 10E3/UL (ref 0.8–5.3)
LYMPHOCYTES NFR BLD AUTO: 23 %
MCH RBC QN AUTO: 32.1 PG (ref 26.5–33)
MCHC RBC AUTO-ENTMCNC: 32.9 G/DL (ref 31.5–36.5)
MCV RBC AUTO: 98 FL (ref 78–100)
MONOCYTES # BLD AUTO: 0.5 10E3/UL (ref 0–1.3)
MONOCYTES NFR BLD AUTO: 7 %
MUCOUS THREADS #/AREA URNS LPF: PRESENT /LPF
NEUTROPHILS # BLD AUTO: 4.2 10E3/UL (ref 1.6–8.3)
NEUTROPHILS NFR BLD AUTO: 65 %
NITRATE UR QL: NEGATIVE
NRBC # BLD AUTO: 0 10E3/UL
NRBC BLD AUTO-RTO: 0 /100
PH UR STRIP: 5.5 [PH] (ref 5–7)
PLATELET # BLD AUTO: 263 10E3/UL (ref 150–450)
POTASSIUM BLD-SCNC: 3.8 MMOL/L (ref 3.4–5.3)
PROT SERPL-MCNC: 8.5 G/DL (ref 6.8–8.8)
RBC # BLD AUTO: 3.61 10E6/UL (ref 4.4–5.9)
RBC URINE: 22 /HPF
SODIUM SERPL-SCNC: 134 MMOL/L (ref 133–144)
SP GR UR STRIP: 1.01 (ref 1–1.03)
UROBILINOGEN UR STRIP-MCNC: NORMAL MG/DL
WBC # BLD AUTO: 6.6 10E3/UL (ref 4–11)
WBC CLUMPS #/AREA URNS HPF: PRESENT /HPF
WBC URINE: 174 /HPF
YEAST #/AREA URNS HPF: ABNORMAL /HPF
YEAST #/AREA URNS HPF: ABNORMAL /HPF

## 2022-02-07 PROCEDURE — 250N000011 HC RX IP 250 OP 636: Performed by: EMERGENCY MEDICINE

## 2022-02-07 PROCEDURE — 83605 ASSAY OF LACTIC ACID: CPT | Performed by: EMERGENCY MEDICINE

## 2022-02-07 PROCEDURE — 99285 EMERGENCY DEPT VISIT HI MDM: CPT | Mod: 25 | Performed by: EMERGENCY MEDICINE

## 2022-02-07 PROCEDURE — 74176 CT ABD & PELVIS W/O CONTRAST: CPT | Mod: 26 | Performed by: RADIOLOGY

## 2022-02-07 PROCEDURE — 81001 URINALYSIS AUTO W/SCOPE: CPT | Performed by: EMERGENCY MEDICINE

## 2022-02-07 PROCEDURE — 80053 COMPREHEN METABOLIC PANEL: CPT | Performed by: EMERGENCY MEDICINE

## 2022-02-07 PROCEDURE — 36415 COLL VENOUS BLD VENIPUNCTURE: CPT | Performed by: EMERGENCY MEDICINE

## 2022-02-07 PROCEDURE — 87086 URINE CULTURE/COLONY COUNT: CPT | Performed by: EMERGENCY MEDICINE

## 2022-02-07 PROCEDURE — 74176 CT ABD & PELVIS W/O CONTRAST: CPT

## 2022-02-07 PROCEDURE — 87106 FUNGI IDENTIFICATION YEAST: CPT | Performed by: EMERGENCY MEDICINE

## 2022-02-07 PROCEDURE — 96361 HYDRATE IV INFUSION ADD-ON: CPT | Performed by: EMERGENCY MEDICINE

## 2022-02-07 PROCEDURE — 258N000003 HC RX IP 258 OP 636: Performed by: EMERGENCY MEDICINE

## 2022-02-07 PROCEDURE — 83036 HEMOGLOBIN GLYCOSYLATED A1C: CPT | Performed by: STUDENT IN AN ORGANIZED HEALTH CARE EDUCATION/TRAINING PROGRAM

## 2022-02-07 PROCEDURE — 99285 EMERGENCY DEPT VISIT HI MDM: CPT | Performed by: EMERGENCY MEDICINE

## 2022-02-07 PROCEDURE — 87040 BLOOD CULTURE FOR BACTERIA: CPT | Performed by: EMERGENCY MEDICINE

## 2022-02-07 PROCEDURE — C9803 HOPD COVID-19 SPEC COLLECT: HCPCS | Performed by: EMERGENCY MEDICINE

## 2022-02-07 PROCEDURE — 85025 COMPLETE CBC W/AUTO DIFF WBC: CPT | Performed by: EMERGENCY MEDICINE

## 2022-02-07 PROCEDURE — 96374 THER/PROPH/DIAG INJ IV PUSH: CPT | Mod: 59 | Performed by: EMERGENCY MEDICINE

## 2022-02-07 RX ORDER — SODIUM CHLORIDE 9 MG/ML
INJECTION, SOLUTION INTRAVENOUS CONTINUOUS
Status: DISCONTINUED | OUTPATIENT
Start: 2022-02-07 | End: 2022-02-08

## 2022-02-07 RX ORDER — FLUCONAZOLE 200 MG/1
200 TABLET ORAL ONCE
Status: DISCONTINUED | OUTPATIENT
Start: 2022-02-07 | End: 2022-02-08

## 2022-02-07 RX ORDER — HYDROMORPHONE HCL IN WATER/PF 6 MG/30 ML
0.5 PATIENT CONTROLLED ANALGESIA SYRINGE INTRAVENOUS
Status: DISCONTINUED | OUTPATIENT
Start: 2022-02-07 | End: 2022-02-07

## 2022-02-07 RX ORDER — NYSTATIN 100000 U/G
CREAM TOPICAL 2 TIMES DAILY
Status: DISCONTINUED | OUTPATIENT
Start: 2022-02-07 | End: 2022-03-02 | Stop reason: HOSPADM

## 2022-02-07 RX ORDER — PIPERACILLIN SODIUM, TAZOBACTAM SODIUM 3; .375 G/15ML; G/15ML
3.38 INJECTION, POWDER, LYOPHILIZED, FOR SOLUTION INTRAVENOUS ONCE
Status: COMPLETED | OUTPATIENT
Start: 2022-02-07 | End: 2022-02-08

## 2022-02-07 RX ADMIN — SODIUM CHLORIDE 1000 ML: 9 INJECTION, SOLUTION INTRAVENOUS at 22:26

## 2022-02-07 RX ADMIN — HYDROMORPHONE HYDROCHLORIDE 0.5 MG: 1 INJECTION, SOLUTION INTRAMUSCULAR; INTRAVENOUS; SUBCUTANEOUS at 21:56

## 2022-02-07 ASSESSMENT — ENCOUNTER SYMPTOMS: ABDOMINAL PAIN: 1

## 2022-02-07 NOTE — LETTER
Recipient: [facility]           Sender: Azeb NICE  - Ph: [phone]           Date: February 14, 2022  Patient Name:  Fer Latham  Routing Message:  Please review initial referral for TCU. Thanks!         The documents accompanying this notice contain confidential information belonging to the sender.  This information is intended only for the use of the individual or entity named above.  The authorized recipient of this information is prohibited from disclosing this information to any other party and is required to destroy the information after its stated need has been fulfilled, unless otherwise required by state law.    If you are not the intended recipient, you are hereby notified that any disclosure, copy, distribution or action taken in reliance on the contents of these documents is strictly prohibited.  If you have received this document in error, please return it by fax to [phone] with a note on the cover sheet explaining why you are returning it (e.g. not your patient, not your provider, etc.).  If you need further assistance, please call .  Documents may also be returned by mail to [employer], , [address], [place], [place] [address].

## 2022-02-07 NOTE — LETTER
[facility]               Recipient: [facility]           Sender: Alma Jimenes SW      Pager: [phone]          Date: February 26, 2022  Patient Name:  Fer Latham  Routing Message:  Sending again for Global Referral consideration. Patient is participating more in therapies and has active secondary insurance.          The documents accompanying this notice contain confidential information belonging to the sender.  This information is intended only for the use of the individual or entity named above.  The authorized recipient of this information is prohibited from disclosing this information to any other party and is required to destroy the information after its stated need has been fulfilled, unless otherwise required by state law.      If you are not the intended recipient, you are hereby notified that any disclosure, copy, distribution or action taken in reliance on the contents of these documents is strictly prohibited.  If you have received this document in error, please return it by fax to [phone] with a note on the cover sheet explaining why you are returning it (e.g. not your patient, not your provider, etc.).  If you need further assistance, please call [facility] Centralized Transcription at [phone].  Documents may also be returned by mail to [employer], , [facility] [address], [place], [place] [address].

## 2022-02-07 NOTE — LETTER
Recipient: [facility]ade          Sender: Azeb NICE [phone]          Date: February 21, 2022  Patient Name:  Fer Latham  Routing Message: Hello, this is a global referral for TCU placement for Fer Latham         The documents accompanying this notice contain confidential information belonging to the sender.  This information is intended only for the use of the individual or entity named above.  The authorized recipient of this information is prohibited from disclosing this information to any other party and is required to destroy the information after its stated need has been fulfilled, unless otherwise required by state law.    If you are not the intended recipient, you are hereby notified that any disclosure, copy, distribution or action taken in reliance on the contents of these documents is strictly prohibited.  If you have received this document in error, please return it by fax to [phone] with a note on the cover sheet explaining why you are returning it (e.g. not your patient, not your provider, etc.).  If you need further assistance, please call .  Documents may also be returned by mail to [employer], , [facility] [address], [place], [place] [address].

## 2022-02-07 NOTE — LETTER
Recipient: [facility]           Sender: Azeb BAÑUELOS [phone]          Date: February 21, 2022  Patient Name:  Fer Latham  Routing Message: This is a global referral for initial TCU for Fer Latham. Thanks!         The documents accompanying this notice contain confidential information belonging to the sender.  This information is intended only for the use of the individual or entity named above.  The authorized recipient of this information is prohibited from disclosing this information to any other party and is required to destroy the information after its stated need has been fulfilled, unless otherwise required by state law.    If you are not the intended recipient, you are hereby notified that any disclosure, copy, distribution or action taken in reliance on the contents of these documents is strictly prohibited.  If you have received this document in error, please return it by fax to [phone] with a note on the cover sheet explaining why you are returning it (e.g. not your patient, not your provider, etc.).  If you need further assistance, please call .  Documents may also be returned by mail to [employer], , [facility] [address], [place], [place] [address].

## 2022-02-08 ENCOUNTER — APPOINTMENT (OUTPATIENT)
Dept: MRI IMAGING | Facility: CLINIC | Age: 72
DRG: 698 | End: 2022-02-08
Attending: STUDENT IN AN ORGANIZED HEALTH CARE EDUCATION/TRAINING PROGRAM
Payer: MEDICARE

## 2022-02-08 ENCOUNTER — APPOINTMENT (OUTPATIENT)
Dept: PHYSICAL THERAPY | Facility: CLINIC | Age: 72
DRG: 698 | End: 2022-02-08
Attending: FAMILY MEDICINE
Payer: MEDICARE

## 2022-02-08 PROBLEM — B35.6 TINEA CRURIS: Status: ACTIVE | Noted: 2022-02-08

## 2022-02-08 PROBLEM — Z99.2 ESRD (END STAGE RENAL DISEASE) ON DIALYSIS (H): Status: ACTIVE | Noted: 2018-08-03

## 2022-02-08 PROBLEM — E87.20 LACTIC ACIDOSIS: Status: ACTIVE | Noted: 2020-11-20

## 2022-02-08 PROBLEM — S22.080D T12 COMPRESSION FRACTURE, WITH ROUTINE HEALING, SUBSEQUENT ENCOUNTER: Status: ACTIVE | Noted: 2021-07-08

## 2022-02-08 PROBLEM — N31.9 NEUROGENIC BLADDER: Status: ACTIVE | Noted: 2020-09-29

## 2022-02-08 PROBLEM — S31.109A WOUND OF GROIN: Status: ACTIVE | Noted: 2022-02-08

## 2022-02-08 PROBLEM — N18.6 ESRD (END STAGE RENAL DISEASE) ON DIALYSIS (H): Status: ACTIVE | Noted: 2018-08-03

## 2022-02-08 PROBLEM — S31.809A BUTTOCK WOUND: Status: ACTIVE | Noted: 2022-02-08

## 2022-02-08 LAB
ANION GAP SERPL CALCULATED.3IONS-SCNC: 7 MMOL/L (ref 3–14)
ANION GAP SERPL CALCULATED.3IONS-SCNC: 8 MMOL/L (ref 3–14)
BACTERIA UR CULT: ABNORMAL
BUN SERPL-MCNC: 27 MG/DL (ref 7–30)
BUN SERPL-MCNC: 31 MG/DL (ref 7–30)
CALCIUM SERPL-MCNC: 8.2 MG/DL (ref 8.5–10.1)
CALCIUM SERPL-MCNC: 8.6 MG/DL (ref 8.5–10.1)
CHLORIDE BLD-SCNC: 109 MMOL/L (ref 94–109)
CHLORIDE BLD-SCNC: 110 MMOL/L (ref 94–109)
CO2 SERPL-SCNC: 19 MMOL/L (ref 20–32)
CO2 SERPL-SCNC: 21 MMOL/L (ref 20–32)
CREAT SERPL-MCNC: 2 MG/DL (ref 0.66–1.25)
CREAT SERPL-MCNC: 2.16 MG/DL (ref 0.66–1.25)
ERYTHROCYTE [DISTWIDTH] IN BLOOD BY AUTOMATED COUNT: 14.8 % (ref 10–15)
ETHANOL SERPL-MCNC: 0.01 G/DL
GFR SERPL CREATININE-BSD FRML MDRD: 32 ML/MIN/1.73M2
GFR SERPL CREATININE-BSD FRML MDRD: 35 ML/MIN/1.73M2
GLUCOSE BLD-MCNC: 106 MG/DL (ref 70–99)
GLUCOSE BLD-MCNC: 68 MG/DL (ref 70–99)
GLUCOSE BLDC GLUCOMTR-MCNC: 105 MG/DL (ref 70–99)
GLUCOSE BLDC GLUCOMTR-MCNC: 78 MG/DL (ref 70–99)
HBA1C MFR BLD: 5 % (ref 0–5.6)
HCT VFR BLD AUTO: 30.8 % (ref 40–53)
HGB BLD-MCNC: 10.2 G/DL (ref 13.3–17.7)
LACTATE SERPL-SCNC: 1.4 MMOL/L (ref 0.7–2)
MCH RBC QN AUTO: 32.8 PG (ref 26.5–33)
MCHC RBC AUTO-ENTMCNC: 33.1 G/DL (ref 31.5–36.5)
MCV RBC AUTO: 99 FL (ref 78–100)
PLATELET # BLD AUTO: 229 10E3/UL (ref 150–450)
POTASSIUM BLD-SCNC: 4.5 MMOL/L (ref 3.4–5.3)
POTASSIUM BLD-SCNC: 4.6 MMOL/L (ref 3.4–5.3)
RBC # BLD AUTO: 3.11 10E6/UL (ref 4.4–5.9)
SARS-COV-2 RNA RESP QL NAA+PROBE: NEGATIVE
SODIUM SERPL-SCNC: 136 MMOL/L (ref 133–144)
SODIUM SERPL-SCNC: 138 MMOL/L (ref 133–144)
WBC # BLD AUTO: 5.2 10E3/UL (ref 4–11)

## 2022-02-08 PROCEDURE — 250N000013 HC RX MED GY IP 250 OP 250 PS 637: Performed by: STUDENT IN AN ORGANIZED HEALTH CARE EDUCATION/TRAINING PROGRAM

## 2022-02-08 PROCEDURE — 96376 TX/PRO/DX INJ SAME DRUG ADON: CPT | Performed by: EMERGENCY MEDICINE

## 2022-02-08 PROCEDURE — 82077 ASSAY SPEC XCP UR&BREATH IA: CPT | Performed by: STUDENT IN AN ORGANIZED HEALTH CARE EDUCATION/TRAINING PROGRAM

## 2022-02-08 PROCEDURE — 85027 COMPLETE CBC AUTOMATED: CPT | Performed by: STUDENT IN AN ORGANIZED HEALTH CARE EDUCATION/TRAINING PROGRAM

## 2022-02-08 PROCEDURE — 36415 COLL VENOUS BLD VENIPUNCTURE: CPT | Performed by: EMERGENCY MEDICINE

## 2022-02-08 PROCEDURE — 80048 BASIC METABOLIC PNL TOTAL CA: CPT | Performed by: STUDENT IN AN ORGANIZED HEALTH CARE EDUCATION/TRAINING PROGRAM

## 2022-02-08 PROCEDURE — 250N000013 HC RX MED GY IP 250 OP 250 PS 637: Performed by: EMERGENCY MEDICINE

## 2022-02-08 PROCEDURE — 99223 1ST HOSP IP/OBS HIGH 75: CPT | Mod: AI | Performed by: FAMILY MEDICINE

## 2022-02-08 PROCEDURE — G0463 HOSPITAL OUTPT CLINIC VISIT: HCPCS

## 2022-02-08 PROCEDURE — 97161 PT EVAL LOW COMPLEX 20 MIN: CPT | Mod: GP

## 2022-02-08 PROCEDURE — 82610 CYSTATIN C: CPT | Performed by: CLINICAL NURSE SPECIALIST

## 2022-02-08 PROCEDURE — 72148 MRI LUMBAR SPINE W/O DYE: CPT | Mod: ME

## 2022-02-08 PROCEDURE — 99207 PR NO BILLABLE SERVICE THIS VISIT: CPT | Performed by: INTERNAL MEDICINE

## 2022-02-08 PROCEDURE — 97530 THERAPEUTIC ACTIVITIES: CPT | Mod: GP

## 2022-02-08 PROCEDURE — 96365 THER/PROPH/DIAG IV INF INIT: CPT | Performed by: EMERGENCY MEDICINE

## 2022-02-08 PROCEDURE — 82310 ASSAY OF CALCIUM: CPT | Performed by: CLINICAL NURSE SPECIALIST

## 2022-02-08 PROCEDURE — G1004 CDSM NDSC: HCPCS | Mod: GC | Performed by: RADIOLOGY

## 2022-02-08 PROCEDURE — 83605 ASSAY OF LACTIC ACID: CPT | Performed by: EMERGENCY MEDICINE

## 2022-02-08 PROCEDURE — 72148 MRI LUMBAR SPINE W/O DYE: CPT | Mod: 26 | Performed by: RADIOLOGY

## 2022-02-08 PROCEDURE — 250N000011 HC RX IP 250 OP 636: Performed by: STUDENT IN AN ORGANIZED HEALTH CARE EDUCATION/TRAINING PROGRAM

## 2022-02-08 PROCEDURE — 36415 COLL VENOUS BLD VENIPUNCTURE: CPT | Performed by: STUDENT IN AN ORGANIZED HEALTH CARE EDUCATION/TRAINING PROGRAM

## 2022-02-08 PROCEDURE — 120N000002 HC R&B MED SURG/OB UMMC

## 2022-02-08 PROCEDURE — 99222 1ST HOSP IP/OBS MODERATE 55: CPT | Performed by: CLINICAL NURSE SPECIALIST

## 2022-02-08 PROCEDURE — 250N000011 HC RX IP 250 OP 636: Performed by: EMERGENCY MEDICINE

## 2022-02-08 PROCEDURE — U0005 INFEC AGEN DETEC AMPLI PROBE: HCPCS | Performed by: EMERGENCY MEDICINE

## 2022-02-08 RX ORDER — LIDOCAINE 40 MG/G
CREAM TOPICAL
Status: DISCONTINUED | OUTPATIENT
Start: 2022-02-08 | End: 2022-03-02 | Stop reason: HOSPADM

## 2022-02-08 RX ORDER — QUETIAPINE FUMARATE 25 MG/1
25 TABLET, FILM COATED ORAL DAILY PRN
Status: DISCONTINUED | OUTPATIENT
Start: 2022-02-08 | End: 2022-02-08

## 2022-02-08 RX ORDER — CEFTRIAXONE 2 G/1
2 INJECTION, POWDER, FOR SOLUTION INTRAMUSCULAR; INTRAVENOUS EVERY 24 HOURS
Status: DISCONTINUED | OUTPATIENT
Start: 2022-02-09 | End: 2022-02-08

## 2022-02-08 RX ORDER — POLYETHYLENE GLYCOL 3350 17 G/17G
8.5 POWDER, FOR SOLUTION ORAL DAILY
Status: DISCONTINUED | OUTPATIENT
Start: 2022-02-08 | End: 2022-02-22

## 2022-02-08 RX ORDER — BISACODYL 10 MG
10 SUPPOSITORY, RECTAL RECTAL DAILY PRN
Status: DISCONTINUED | OUTPATIENT
Start: 2022-02-08 | End: 2022-03-02 | Stop reason: HOSPADM

## 2022-02-08 RX ORDER — MIRTAZAPINE 15 MG/1
15 TABLET, FILM COATED ORAL AT BEDTIME
Status: DISCONTINUED | OUTPATIENT
Start: 2022-02-08 | End: 2022-02-08

## 2022-02-08 RX ORDER — FOLIC ACID 1 MG/1
1 TABLET ORAL DAILY
Status: DISCONTINUED | OUTPATIENT
Start: 2022-02-08 | End: 2022-02-15

## 2022-02-08 RX ORDER — SODIUM CHLORIDE, SODIUM LACTATE, POTASSIUM CHLORIDE, CALCIUM CHLORIDE 600; 310; 30; 20 MG/100ML; MG/100ML; MG/100ML; MG/100ML
INJECTION, SOLUTION INTRAVENOUS CONTINUOUS
Status: DISCONTINUED | OUTPATIENT
Start: 2022-02-08 | End: 2022-02-12

## 2022-02-08 RX ORDER — OXYCODONE HYDROCHLORIDE 5 MG/1
5-10 TABLET ORAL EVERY 4 HOURS PRN
Status: DISCONTINUED | OUTPATIENT
Start: 2022-02-08 | End: 2022-02-08

## 2022-02-08 RX ORDER — OXYCODONE HYDROCHLORIDE 5 MG/1
5 TABLET ORAL EVERY 4 HOURS PRN
Status: DISCONTINUED | OUTPATIENT
Start: 2022-02-08 | End: 2022-02-12

## 2022-02-08 RX ORDER — LIDOCAINE 4 G/G
1-2 PATCH TOPICAL
Status: DISCONTINUED | OUTPATIENT
Start: 2022-02-08 | End: 2022-02-15

## 2022-02-08 RX ORDER — SENNOSIDES 8.6 MG
1-2 TABLET ORAL 2 TIMES DAILY PRN
Status: DISCONTINUED | OUTPATIENT
Start: 2022-02-08 | End: 2022-03-02 | Stop reason: HOSPADM

## 2022-02-08 RX ORDER — CEFTRIAXONE 2 G/1
2 INJECTION, POWDER, FOR SOLUTION INTRAMUSCULAR; INTRAVENOUS EVERY 24 HOURS
Status: DISCONTINUED | OUTPATIENT
Start: 2022-02-08 | End: 2022-02-09

## 2022-02-08 RX ORDER — CEFTRIAXONE 1 G/1
1 INJECTION, POWDER, FOR SOLUTION INTRAMUSCULAR; INTRAVENOUS EVERY 24 HOURS
Status: DISCONTINUED | OUTPATIENT
Start: 2022-02-08 | End: 2022-02-08

## 2022-02-08 RX ORDER — ACETAMINOPHEN 325 MG/1
975 TABLET ORAL EVERY 8 HOURS
Status: DISCONTINUED | OUTPATIENT
Start: 2022-02-08 | End: 2022-02-08

## 2022-02-08 RX ORDER — HYDROMORPHONE HCL IN WATER/PF 6 MG/30 ML
0.3 PATIENT CONTROLLED ANALGESIA SYRINGE INTRAVENOUS EVERY 4 HOURS PRN
Status: DISCONTINUED | OUTPATIENT
Start: 2022-02-08 | End: 2022-02-09

## 2022-02-08 RX ORDER — ACETAMINOPHEN 325 MG/1
975 TABLET ORAL EVERY 8 HOURS PRN
Status: DISCONTINUED | OUTPATIENT
Start: 2022-02-08 | End: 2022-03-02 | Stop reason: HOSPADM

## 2022-02-08 RX ADMIN — NYSTATIN: 100000 CREAM TOPICAL at 00:15

## 2022-02-08 RX ADMIN — MICONAZOLE NITRATE: 20 POWDER TOPICAL at 20:26

## 2022-02-08 RX ADMIN — OXYCODONE HYDROCHLORIDE 5 MG: 5 TABLET ORAL at 11:15

## 2022-02-08 RX ADMIN — OXYCODONE HYDROCHLORIDE 5 MG: 5 TABLET ORAL at 16:14

## 2022-02-08 RX ADMIN — POLYETHYLENE GLYCOL 3350 8.5 G: 17 POWDER, FOR SOLUTION ORAL at 10:37

## 2022-02-08 RX ADMIN — FOLIC ACID 1 MG: 1 TABLET ORAL at 10:38

## 2022-02-08 RX ADMIN — HYDROMORPHONE HYDROCHLORIDE 0.5 MG: 1 INJECTION, SOLUTION INTRAMUSCULAR; INTRAVENOUS; SUBCUTANEOUS at 00:34

## 2022-02-08 RX ADMIN — MICONAZOLE NITRATE: 20 POWDER TOPICAL at 16:14

## 2022-02-08 RX ADMIN — PIPERACILLIN AND TAZOBACTAM 3.38 G: 3; .375 INJECTION, POWDER, LYOPHILIZED, FOR SOLUTION INTRAVENOUS at 00:38

## 2022-02-08 RX ADMIN — RENO CAPS 1 CAPSULE: 100; 1.5; 1.7; 20; 10; 1; 150; 5; 6 CAPSULE ORAL at 10:38

## 2022-02-08 RX ADMIN — CEFTRIAXONE SODIUM 2 G: 2 INJECTION, POWDER, FOR SOLUTION INTRAMUSCULAR; INTRAVENOUS at 12:22

## 2022-02-08 RX ADMIN — MIRTAZAPINE 15 MG: 15 TABLET, FILM COATED ORAL at 05:12

## 2022-02-08 RX ADMIN — OXYCODONE HYDROCHLORIDE 5 MG: 5 TABLET ORAL at 05:12

## 2022-02-08 RX ADMIN — NYSTATIN: 100000 CREAM TOPICAL at 10:38

## 2022-02-08 RX ADMIN — THIAMINE HCL TAB 100 MG 100 MG: 100 TAB at 10:38

## 2022-02-08 RX ADMIN — NYSTATIN: 100000 CREAM TOPICAL at 20:26

## 2022-02-08 RX ADMIN — ACETAMINOPHEN 975 MG: 325 TABLET, FILM COATED ORAL at 05:12

## 2022-02-08 RX ADMIN — APIXABAN 5 MG: 5 TABLET, FILM COATED ORAL at 10:38

## 2022-02-08 RX ADMIN — HYDROMORPHONE HYDROCHLORIDE 0.3 MG: 0.2 INJECTION, SOLUTION INTRAMUSCULAR; INTRAVENOUS; SUBCUTANEOUS at 13:52

## 2022-02-08 ASSESSMENT — ACTIVITIES OF DAILY LIVING (ADL)
ADLS_ACUITY_SCORE: 11
ADLS_ACUITY_SCORE: 19
ADLS_ACUITY_SCORE: 19
ADLS_ACUITY_SCORE: 11
ADLS_ACUITY_SCORE: 19
ADLS_ACUITY_SCORE: 8
ADLS_ACUITY_SCORE: 11
ADLS_ACUITY_SCORE: 19
ADLS_ACUITY_SCORE: 11
ADLS_ACUITY_SCORE: 8
ADLS_ACUITY_SCORE: 19
ADLS_ACUITY_SCORE: 19
ADLS_ACUITY_SCORE: 11
ADLS_ACUITY_SCORE: 19
ADLS_ACUITY_SCORE: 11
ADLS_ACUITY_SCORE: 11
ADLS_ACUITY_SCORE: 17
ADLS_ACUITY_SCORE: 19

## 2022-02-08 ASSESSMENT — ENCOUNTER SYMPTOMS
CONSTIPATION: 0
VOMITING: 0
COLOR CHANGE: 0
EYE PAIN: 0
WEAKNESS: 0
ARTHRALGIAS: 0
SHORTNESS OF BREATH: 0
PALPITATIONS: 0
CONFUSION: 0
ABDOMINAL DISTENTION: 0
NAUSEA: 0
FATIGUE: 0
CHEST TIGHTNESS: 0
MYALGIAS: 0
NECK PAIN: 0
DIZZINESS: 0
FEVER: 0
CHILLS: 0
DIARRHEA: 0
COUGH: 0
DIFFICULTY URINATING: 0
HEADACHES: 0
BACK PAIN: 0
SORE THROAT: 0
DYSURIA: 0
FREQUENCY: 0

## 2022-02-08 NOTE — PROGRESS NOTES
[facility]    Progress Note - [facility]       Date of Admission:  2/7/2022    Family Medicine Progress Note - [place] Service       Main Plans for Today     -Dialysis today?  -Physical exam including   -Continue antibiotics (Ceftriaxone)      Assessment & Plan     Fer Latham is a 71 year old male with PMH recurrent UTI's, BPH, neurogenic bladder w/chronic ferguson, DM2 (diet controlled), HTN, ESRD on dialysis, chronic atrial fibrillation (on eliquis), alcohol abuse and T12 compression fracture who is admitted for UTI.     # Lactic Acidosis, resolved  # Concern for Acute Encephalopathy  On initial evaluation and extended physical therapy visit, distinct confusion noted with cognitive decline from baseline and previous admission. Likely resulting from existing infection. Worsening creatinine in the setting of ESRD. Family members expressing decreasing ability to complete ADLs at home independently and desire for TCU placement after hospitalization. Patient has had negative experiences with TCU's in the past, suggesting a different TCU this time may be met with a better result. AOx3 on this provider's examination, but does endorse confusion with plan of care and necessity of physical exams.  - Cognitive assessment pending with OT    # catheter associated UTI- e coli  # candida in urine  # suprapubic pain  # neurogenic bladder w/chronic ferguson  # overactive bladder  # BPH  Ferguson placed by urology June 2021. Follows at [facility] for urology cares. Has been on oxybutytnin and trospium but stopped 2/2 cost. Hx candidal cystitis June 2021-teated with fluconazole PO.     Recent admission 2/2-2/3, treated with zosyn and ceftriaxone. Discharged with cefdinir 300mg every other day x 7 days. Patient reports able to take his antibiotics at home without issue. Returns due to suprapubic/groin pain and increased drainage into catheter bag.     Ucx from 2/2 grew  pansensitive E. Coli. CT on admission confirmed cystitis, no hydronephrosis. Vitals reassuring. Pt declined physical exam, however ED exam showed non-tender testicles, erythema of groin (likely fungal) and multiple sores in buttocks/groin. Normal WBC, afebrile. Does not meet sepsis criteria. Lactic acidosis resolved w/IVF. Ferguson last exchanged 2/2 & functioning. UA now showing yeast, which is new.      Prior urine cultures:  -2/2/22: pansensitive E coli  -7/12/21: >100,000 candida  -6/28/21: 10-50,000 candida  -6/15/21: mixed urogenital jerzy  -5/14/20: mixed urogenital jerzy  -10/2/19: 10-50,000 enterococcus faecalis, pansensitive     Plan:  -stop zosyn (2/7)   -start ceftriaxone (2/8- ), as recent 2/2 ucx showed pansensitive e coli  -monitor new UCx  -stop IVF  -stop PO fluconazole (did not receive a dose)              -unlikely for yeast to cause UTI              -likely colonization due to tinea cruris               -day team to reassess (pt has been treated with PO fluconazole multiple times in the past)  -does not need ferguson exchanged (last exchanged 2/2 admission)  -monitor bcx  -daily cbc, bmp     # tinea cruris  # chronic groin/buttocks wounds  Perianal wounds, L buttocks wound along with erythema in groin per ED exam.  -PTA miconazole powder, nystatin cream   -WOC consult     # pain mgmt  -PO oxycodone 5 mg q4hr prn  -IV dilaudid 0.3mg q4h prn  -tylenol TID  -lidocaine patch for back     # T12 compression fracture  # low back pain  Worsening of known T12 compression fx w/periverterbral edema. No fever/chills to suggest infection. Would not allow back exam but complaining of pain. Denies recent falls.   -neurosurgery consulted, appreciate recs              -no neurosurgical intervention              -lumbar MRI w/o contrast in AM  -up with assist (until able to assess gait/strength)  - PT/OT consulted     # patchy pneumonitis  Found on CT. No respiratory symptoms. Unclear etiology.      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 Electrolyte and kidney function currently ok/at baseline.  -nephrology consulted for dialysis  -daily cbc, bmp  -I&O, daily weights     # social  Concern for ability to care for self at home. Per ED exam, pt very disheveled, has multiple sores in groin/buttocks. Had ziploc bag with unknown substance in his briefs. Reports able to take his home medications without difficulty. Decisional.   -needs further discussion with patient and/or family regarding baseline status, including ADLs/IADLs  -PT/OT  ________     # long term anticoagulant use  # hx DVT  # hx PE  # atrial fibrillation  Not on rate/rhythm control due to concerns for hypotension in the past. Was in RVR on admission, resolved with pain control.  -PTA eliquis 5mg BID     # anxiety  # MDD  # chronic insomnia  -hold PTA valium 5mg Q12hr PRN (last filled Oct 2021 per PDMP)  -PTA melatonin  -PTA remeron 15mg at bedtime  -PTA seroquel 25mg PRN agitation     # hx alcohol abuse  Drinks alcohol, unclear when last drink was. Became agitated when asked.  -blood alcohol added on  -PTA folic acid & multivitamin  -PO thiamine daily  -consider adding on CIWA      # DM2  Not on medications, last A1C 5.2% in June 2021.  -A1C added on     # anemia of chronic disease  Baseline ~11. MCV macrocytic. Likely from alcohol use and kidney disease.  -daily cbc     # constipation  -daily miralax  -senna prn  -dulcolax suppository prn     Diet: Combination Diet Regular Diet Adult    DVT Prophylaxis: Eliquis, PTA  Brown Catheter: PRESENT, indication:    Fluids: PO  Central Lines: PRESENT     Cardiac Monitoring: None  Code Status: Full Code      Disposition Plan   Expected Discharge: 02/10/2022     Anticipated discharge location:  Awaiting care coordination huddle  Delays: Declining physical exam.      The patient's care was discussed with the Attending Physician, Dr. Jansen.    DO [facility]  [facility]  [facility]  Securely message with the [employer] (learn more here)  Text page via [facility] Paging/Directory   Please see signed in provider for up to date coverage information    ______________________________________________________________________    Interval History     Patient seen and examined bedside, sitting upright eating breakfast. Declined physical exam, heart/lungs and . Encouraged him to allow this later on group rounds, possibly with male provider. Reports having no problems taking his medicines at home, returning to the hospital primarily for pain control.     Pain well controlled this AM on current pain regimen.     Data reviewed today: I reviewed all medications, new labs and imaging results over the last 24 hours.     Physical Exam   Vital Signs: Temp: 98  F (36.7  C) Temp src: Oral BP: 118/65 Pulse: 82   Resp: 22 SpO2: 100 % O2 Device: None (Room air)    Weight: 0 lbs 0 oz  Physical Exam  Vitals and nursing note reviewed.   Constitutional:       General: He is not in acute distress.     Appearance: He is not ill-appearing or diaphoretic.      Comments: Laying in bed eating breakfast. Frustrated and irritable.   HENT:      Head: Normocephalic and atraumatic.   Cardiovascular:      Rate and Rhythm: Declined evaluation. Extremities well perfused and not cyanotic.  Pulmonary:      Effort: Pulmonary effort is normal. Speaking in full sentences without obvious respiratory distress.  Abdominal:      General: Declined evaluation, reports improvement of pain since last night.   Genitourinary:     Comments: Did not allow  exam.  Musculoskeletal:      Comments: Unable to examine back.   Neurological:      Mental Status: He is alert.      Comments: Unable to assess muscle strength, gait or perform neuro exam.   Psychiatric:      Comments: Frustrated by need for physical exam. Declined further HPI or exam.

## 2022-02-08 NOTE — CONSULTS
[facility] Nurse Inpatient Wound Assessment   Reason for consultation: Evaluate and treat  Buttocks and groin wounds    Assessment  L) posterior thigh/scrotum wounds due to Friction and Moisture Associated Skin Damage (MASD)   BL groin- intertrigo    Status: initial assessment and healing    Treatment Plan  L) posterior thigh/scrotum wounds: Daily  and PRN   Cleanse gently and apply No sting film barrier daily. Make sure skin surfaces are  to allow the barrier to dry completely before allowing skin to return to the normal position. Coat twice for better protection, ensure to dry first layer before applying second layer.     Bl groin- Cleanse with abram cleanse and protect. Apply antifungal powder per MD order.    Orders Written  Recommended provider order: None, at this time  WOC Nurse follow-up plan:weekly  Nursing to notify the Provider(s) and re-consult the WOC Nurse if wound(s) deteriorates or new skin concern.    Patient History  According to provider note(s): Fer Latham is a 71 year old male with PMH recurrent UTI's, BPH, neurogenic bladder w/chronic ferguson, DM2 (diet controlled), HTN, ESRD on dialysis, chronic atrial fibrillation (on eliquis), alcohol abuse and T12 compression fracture who is admitted for UTI.    Objective Data  Containment of urine/stool: Indwelling catheter    Active Diet Order  Orders Placed This Encounter      Combination Diet Regular Diet Adult      Output:   I/O last 3 completed shifts:  In: -   Out: 700 [Urine:700]    Risk Assessment:   Sensory Perception: 3-->slightly limited  Moisture: 3-->occasionally moist  Activity: 1-->bedfast  Mobility: 2-->very limited  Nutrition: 4-->excellent  Friction and Shear: 2-->potential problem  Gabriel Score: 15                          Labs: Recent Labs   Lab 02/08/22  1006 02/07/22  2128   ALBUMIN  --  3.0*   HGB 10.2* 11.6*   WBC 5.2 6.6   A1C  --  5.0       Physical Exam  Areas of skin assessed: focused coccyx, sacrum, BL buttocks and  groin    Wound Location: L) posterior thigh/scrotum      Date of last photo 2/8  Wound History: POA    Wound Base: 100 % dermis and superficial scab     Palpation of the wound bed: normal      Drainage: none     Description of drainage: none     Measurements (length x width x depth, in cm) 3  x 0.2  x  0.1 cm and 0.5x0.3x0.0 cm scab     Tunneling N/A     Undermining N/A  Periwound skin: intact and evidence of healed friction wound      Color: dusky      Temperature: normal   Odor: none  Pain: absent, none  Pain intervention prior to dressing change:     2. BL groin  Wound History: POA    Wound Base: 100 % scattered satellite lesions     Palpation of the wound bed: normal      Drainage: none     Description of drainage: none     Measurements (length x width x depth, in cm)      Tunneling N/A     Undermining N/A  Periwound skin: intact and moist      Color: dusky      Temperature: normal   Odor: none  Pain: absent, none  Pain intervention prior to dressing change:       Interventions  Visual inspection and assessment completed   Wound Care Rationale Provide protection   Wound Care: done per plan of care  Supplies: floor stock and discussed with RN  Current off-loading measures: Pillows  Current support surface: Standard  Atmos Air mattress  Education provided to: importance of repositioning, plan of care, wound progress, Moisture management and Off-loading pressure  Discussed plan of care with Patient and Nurse    Camilla Vásquez RN

## 2022-02-08 NOTE — ED NOTES
Assessed pt's catheter & groin at bedside w/ ED provider. Multiple opens spots, rash, and curd like discharge noted. Pt rates pain 10/10 in scrotal area. A zip lock bag with an orange soft substance was also found in breif, pt states he doesn't know what it was or where it came from. States he lives at home with his wife of 47 years, and she has been helping take care of pt. He states that he hasn't been changed/cleaned since he discharged 2/2, and states he wasn't aware he needed to be cleaned every day. Open sore also noted on left buttock, pt also states he was unware of it and that he cannot feel it.

## 2022-02-08 NOTE — H&P
[facility]    History and Physical - [facility] Service       Date of Admission:  2/7/2022    Assessment & Plan      Fer Latham is a 71 year old male with PMH recurrent UTI's, BPH, neurogenic bladder w/chronic ferguson, DM2 (diet controlled), HTN, ESRD on dialysis, chronic atrial fibrillation (on eliquis), alcohol abuse and T12 compression fracture who is admitted for UTI.    # catheter associated UTI- e coli  # candida in urine  # lactic acidosis-resolved  # suprapubic pain  # neurogenic bladder w/chronic ferguson  # overactive bladder  # BPH  Ferguson placed by urology June 2021. Follows at [facility] for urology cares. Has been on oxybutytnin and trospium but stopped 2/2 cost. Hx candidal cystitis June 2021-teated with fluconazole PO.    Recent admission 2/2-2/3, treated with zosyn and ceftriaxone. Discharged with cefdinir 300mg every other day x 7 days. Unclear if he was taking the antibiotic or not. Returns due to suprapubic/groin pain and increased drainage into catheter bag.    Ucx from 2/2 grew pansensitive E. Coli. CT on admission confirmed cystitis, no hydronephrosis. Vitals reassuring. Pt declined physical exam, however ED exam showed non-tender testicles, erythema of groin (likely fungal) and multiple sores in buttocks/groin. Normal WBC, afebrile. Does not meet sepsis criteria. Lactic acidosis resolved w/IVF. Ferguson last exchanged 2/2 & functioning. UA now showing yeast, which is new.     Prior urine cultures:  -2/2/22: pansensitive E coli  -7/12/21: >100,000 candida  -6/28/21: 10-50,000 candida  -6/15/21: mixed urogenital jerzy  -5/14/20: mixed urogenital jerzy  -10/2/19: 10-50,000 enterococcus faecalis, pansensitive    Plan:  -stop zosyn (2/7)   -start ceftriaxone (2/8- ), as recent 2/2 ucx showed pansensitive e coli  -monitor new UCx  -stop IVF  -stop PO fluconazole (did not receive a dose)   -unlikely for yeast to cause UTI   -likely colonization due  to tinea cruris    -day team to reassess (pt has been treated with PO fluconazole multiple times in the past)  -does not need ferguson exchanged (last exchanged 2/2 admission)  -monitor bcx  -daily cbc, bmp    # tinea cruris  # chronic groin/buttocks wounds  Perianal wounds, L buttocks wound along with erythema in groin per ED exam.  -PTA miconazole powder, nystatin cream   -WOC consult    # pain mgmt  -PO oxycodone 5 mg q4hr prn  -IV dilaudid 0.3mg q4h prn  -tylenol TID  -lidocaine patch for back    # T12 compression fracture  # low back pain  Worsening of known T12 compression fx w/periverterbral edema. No fever/chills to suggest infection. Would not allow back exam but complaining of pain. Denies recent falls.   -neurosurgery consulted, appreciate recs   -no neurosurgical intervention   -lumbar MRI w/o contrast in AM  -up with assist (until able to assess gait/strength)    # patchy pneumonitis  Found on CT. No respiratory symptoms. Unclear etiology.     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 Electrolyte and kidney function currently ok/at baseline.  -nephrology consulted for dialysis  -daily cbc, bmp  -I&O, daily weights    # social  Concern for ability to care for self at home. Per ED exam, pt very disheveled, has multiple sores in groin/buttocks. Had ziploc bag with unknown substance in his briefs. Unclear if he is taking his medications or not. May need placement in TCU.  -needs further discussion with patient and/or family regarding baseline status, including ADLs/IADLs  -PT/OT  ________    # long term anticoagulant use  # hx DVT  # hx PE  # atrial fibrillation  Not on rate/rhythm control due to concerns for hypotension in the past. Was in RVR on admission, resolved with pain control.  -PTA eliquis 5mg BID    # anxiety  # MDD  # chronic insomnia  -hold PTA valium 5mg Q12hr PRN (last filled Oct 2021 per PDMP)  -PTA melatonin  -PTA remeron 15mg at bedtime  -PTA seroquel 25mg PRN  "agitation    # hx alcohol abuse  Drinks alcohol, unclear when last drink was. Became angry when asked.  -blood alcohol added on  -PTA folic acid & multivitamin  -PO thiamine daily  -consider adding on CIWA     # DM2  Not on medications, last A1C 5.2% in June 2021.  -A1C added on    # anemia of chronic disease  Baseline ~11. MCV macrocytic. Likely from alcohol use and kidney disease.  -daily cbc    # constipation  -daily miralax  -senna prn  -dulcolax suppository prn       Diet: Combination Diet Regular Diet Adult  DVT Prophylaxis: DOAC  Ferguson Catheter: Present  Fluids: PO  Central Lines: PRESENT     Cardiac Monitoring: None  Code Status: Full Code    Clinically Significant Risk Factors Present on Admission              # Coagulation Defect: home medication list includes an anticoagulant medication        Disposition Plan   Expected Discharge: 02/10/2022  Anticipated discharge location:  Awaiting care coordination huddle   -needs PT/OT evaluation for dispo planning       The patient's care was discussed with the Attending Physician, Dr. Powell.    DO [facility]  [facility]  Securely message with the Vocera Web Console (learn more here)  Text page via [facility] Paging/Directory   Please see signed in provider for up to date coverage information    ______________________________________________________________________    Chief Complaint   UTI    History is obtained from the patient and chart review    History of Present Illness   Fer Latham is a 71 year old male with PMH recurrent UTI's, BPH, neurogenic bladder w/chronic ferguson, DM2, HTN, ESRD, chronic atrial fibrillation, alcohol abuse and T12 compression fracutre who presented to ED for UTI.    Per chart review, complains of pain\" behind the balls\" that radiates up into the suprapubic area. Recently had ferguson exchanged last week which improved his pain. Denies flank pain, CVA " tenderness, CP, SOB, fever/chills, n/v/d/c.    Pt was admitted 2/2-2/3 for UTI. Brown was changed this admission. Was discharged with cefdinir 300mg every other day for 14 days and opium-belladonna rectal suppositories for bladder spasms. Declined dialysis during this admission.     Currently pt is frustrated and angry. He declined an extensive physical exam. Declined having the lights on, and provided one word answers to my questions. Complaining only of back pain. Denies recent falls. Lives w/his wife. States he drinks alcohol but does not comment on how much. Became angry when asked.    ED course:  Vitals: afebrile, HR 80-90s (one reading of 148), -130s/60-80s, O2sat 95% on RA  Labs: wbc 6.6, hgb 11.6, platelets 263, creatinine 1.96 (improved from 2.24), GFR 36, lactic 3.1-->1.4, bcx pending, UA cloudy w/moderate blood, protein, large LE, moderate bacteria, yeast, ucx pending, covid neg  Imaging: CT ab/pelvis with bladder wall thickening, perivesicular edema (suggestive of cystitis), no hydronephrosis, patchy bibasilar pneumonitis, interval evoluation of T12 compression fracture w/some paravertebral edema  Interventions: 1L NS bolus followed by mIVR, nystatin cream, zosyn,       Review of Systems    The 10 point Review of Systems is negative other than noted in the HPI or here.     Past Medical History    I have reviewed this patient's medical history and updated it with pertinent information if needed.   Past Medical History:   Diagnosis Date     Anemia      Arrhythmia     atrial fib     Arthritis 1990's    gout     BPH (benign prostatic hyperplasia)      Current moderate episode of major depressive disorder without prior episode (H) 4/30/2019     Depression      Diabetes mellitus (H)     Type 2  IDDM     Diverticulosis      ESRD (end stage renal disease) on dialysis (H)      Hematuria      HTN (hypertension)      HTN (hypertension)      IgA nephropathy      MI, old      Pneumonia      Rhabdomyolysis       Weakness 5/10/2019        Past Surgical History   I have reviewed this patient's surgical history and updated it with pertinent information if needed.  Past Surgical History:   Procedure Laterality Date     ABDOMEN SURGERY       APPENDECTOMY  1970's     ARTHROSCOPY KNEE  6/27/2013    Procedure: ARTHROSCOPY KNEE;  Right Knee Arthroscopy, Debridment Of Medial Meniscal Tear.  ;  Surgeon: Tomás Wong MD;  Location: [facility]     BACK SURGERY  1999    L5 S1 decompression     BIOPSY  2017    skin     COLONOSCOPY  2013    [facility]     CORONARY ANGIOGRAPHY ADULT ORDER  2018     EYE SURGERY Bilateral 2004    Lens replacement     IR CVC TUNNEL PLACEMENT > 5 YRS OF AGE  11/26/2019     IR CVC TUNNEL REMOVAL RIGHT  11/26/2019     IR CVC TUNNEL REVISION RIGHT  2/10/2020     IR CVC TUNNEL REVISION RIGHT  10/8/2020     IR CVC TUNNEL REVISION RIGHT  6/17/2021     VASCULAR SURGERY          Social History   I have reviewed this patient's social history and updated it with pertinent information if needed. Fer Latham  reports that he has quit smoking. He has never used smokeless tobacco. He reports current alcohol use. He reports that he does not use drugs.    Family History   I have reviewed this patient's family history and updated it with pertinent information if needed.  Family History   Problem Relation Age of Onset     Cancer Father        Prior to Admission Medications   Prior to Admission Medications   Prescriptions Last Dose Informant Patient Reported? Taking?   Lidocaine (LIDOCARE) 4 % Patch  Self Yes No   Sig: Apply topically to intact skin for upto 12 hrs within 24-hr period, as needed for shoulder pain.   Multiple Vitamin (MULTI-VITAMINS) TABS   Yes No   Sig: Take 1 tablet by mouth   QUEtiapine (SEROQUEL) 25 MG tablet   No No   Sig: Take 1 tablet (25 mg) by mouth daily as needed (agitation)   acetaminophen (TYLENOL) 500 MG tablet   No No   Sig: Take 2 tablets (1,000 mg) by mouth 3 times daily   apixaban ANTICOAGULANT  (ELIQUIS) 5 MG tablet   No No   Sig: Take 1 tablet (5 mg) by mouth 2 times daily   bisacodyl (DULCOLAX) 10 MG suppository   No No   Sig: Place 1 suppository (10 mg) rectally daily as needed for constipation   cefdinir (OMNICEF) 300 MG capsule   No No   Sig: Take 1 capsule (300 mg) by mouth every other day for 14 days   diazepam (VALIUM) 5 MG tablet  Self Yes No   Sig: Take 5 mg by mouth every 12 hours as needed for anxiety   diphenhydrAMINE (BENADRYL) 50 MG capsule  Self No No   Sig: Take 1 capsule (50 mg) by mouth every 6 hours as needed for itching or allergies Administer 30 min - 2 hours pre - IV contrast injection   folic acid (FOLVITE) 1 MG tablet   No No   Sig: Take 1 tablet (1 mg) by mouth daily   melatonin 5 MG tablet   No No   Sig: Take 1 tablet (5 mg) by mouth nightly as needed for sleep   miconazole (MICATIN) 2 % external powder   No No   Sig: Apply topically 2 times daily   mirtazapine (REMERON) 15 MG tablet   Yes No   Sig: Take 15 mg by mouth At Bedtime   multivitamin RENAL (NEPHROCAPS/TRIPHROCAPS) 1 MG capsule   No No   Sig: Take 1 capsule by mouth daily   nystatin (MYCOSTATIN) 351529 UNIT/GM external cream   No No   Sig: Apply topically 2 times daily   oxyCODONE (ROXICODONE) 5 MG tablet   No No   Sig: Take 0.5 tablets (2.5 mg) by mouth every 8 hours   polyethylene glycol (MIRALAX) 17 GM/Dose powder   Yes No   Sig: Take 8.5 g by mouth daily   senna (SENOKOT) 8.6 MG tablet   Yes No   Sig: Take 17.2 mg by mouth 2 times daily as needed      Facility-Administered Medications: None     Allergies   Allergies   Allergen Reactions     Cephalexin      Ciprofloxacin      Diagnostic X-Ray Materials      Other reaction(s): Unknown     Sulfa Drugs Hives     Other reaction(s): Unknown  PN: LW Reaction: Rash, Generalized         Physical Exam   Vital Signs: Temp: 98.2  F (36.8  C) Temp src: Oral BP: 116/60 Pulse: 81   Resp: 12 SpO2: 95 % O2 Device: None (Room air)    Weight: 0 lbs 0 oz     Pt declined majority of  physical exam.    Physical Exam  Vitals and nursing note reviewed.   Constitutional:       General: He is not in acute distress.     Appearance: He is not ill-appearing or diaphoretic.      Comments: Laying in bed. Frustrated and irritable.   HENT:      Head: Normocephalic and atraumatic.   Cardiovascular:      Rate and Rhythm: Normal rate and regular rhythm.   Pulmonary:      Effort: Pulmonary effort is normal.      Breath sounds: Normal breath sounds.   Abdominal:      General: Abdomen is flat. There is no distension.   Genitourinary:     Comments: Did not allow  exam.  Musculoskeletal:      Comments: Unable to examine back.   Neurological:      Mental Status: He is alert.      Comments: Unable to assess muscle strength, gait or perform neuro exam.   Psychiatric:      Comments: One-word answers to questions. Declined further HPI or exam.         Data   Data reviewed today: I reviewed all medications, new labs and imaging results over the last 24 hours. I personally reviewed the EKG tracing showing afib w/RVR and the CT ab/pelvis image(s) showing bladder thickening.    Most Recent 3 CBC's:Recent Labs   Lab Test 02/07/22 2128 02/04/22  0549 02/02/22  1714   WBC 6.6 5.6 7.4   HGB 11.6* 9.3* 11.2*   MCV 98 99 94    202 277     Most Recent 3 BMP's:Recent Labs   Lab Test 02/07/22 2128 02/04/22  0549 02/02/22  1714    136 128*   POTASSIUM 3.8 4.0 4.4   CHLORIDE 104 106 96   CO2 21 22 21   BUN 29 33* 31*   CR 1.96* 2.24* 2.05*   ANIONGAP 9 8 11   COLUMBA 8.6 8.3* 9.0   * 58* 128*     Most Recent 2 LFT's:Recent Labs   Lab Test 02/07/22 2128 02/04/22  0549   AST 17 12   ALT 13 10   ALKPHOS 140 100   BILITOTAL 0.3 0.4     Most Recent 6 Bacteria Isolates From Any Culture (See EPIC Reports for Culture Details):Recent Labs   Lab Test 06/28/21  1150 06/15/21  1522 06/15/21  1350 06/15/21  1306 05/14/20  1729 10/02/19  0900   CULT 10,000 to 50,000 colonies/mL  Candida albicans  Susceptibility testing not  routinely done  * 10,000 to 50,000 colonies/mL  mixed urogenital jerzy  Susceptibility testing not routinely done   No growth No growth 10,000 to 50,000 colonies/mL  mixed urogenital jerzy   10,000 to 50,000 colonies/mL  Enterococcus faecalis  *  <10,000 colonies/mL  urogenital jerzy       Most Recent Urinalysis:Recent Labs   Lab Test 02/07/22  2202 02/02/22  2324 07/12/21  1402   COLOR Yellow   < > Yellow   APPEARANCE Cloudy*   < > Clear   URINEGLC Negative   < > Negative   URINEBILI Negative   < > Moderate*   URINEKETONE Negative   < > 5 *   SG 1.008   < > >1.030*   UBLD Moderate*   < > Large*   URINEPH 5.5   < > 5.0   PROTEIN 100 *   < > >300*   UROBILINOGEN  --   --  1.0   NITRITE Negative   < > Negative   LEUKEST Large*   < > Large*   RBCU 22*   < >  --    WBCU 174*   < >  --     < > = values in this interval not displayed.     Recent Results (from the past 24 hour(s))   CT Abdomen Pelvis w/o Contrast    Narrative    EXAM: CT ABDOMEN PELVIS W/O CONTRAST  LOCATION: [facility]  DATE/TIME: 2/7/2022 10:19 PM    INDICATION: Severe pelvic and suprapubic pain. Recent UTI. Recent Brown exchange.  COMPARISON: None.  TECHNIQUE: CT scan of the abdomen and pelvis was performed without IV contrast. Multiplanar reformats were obtained. Dose reduction techniques were used.  CONTRAST: None.    FINDINGS:   LOWER CHEST: Coronary artery calcification. Patchy areas of interstitial coarsening in the lung bases as seen in the right middle lobe and left lower lobe.    HEPATOBILIARY: Normal.    PANCREAS: Normal.    SPLEEN: Normal.    ADRENAL GLANDS: Normal.    KIDNEYS/BLADDER: No hydronephrosis. A Brown catheter is well-positioned in the urinary bladder, though there is bladder wall thickening and perivesicular edema.    BOWEL: Colonic diverticulosis. No bowel obstruction.    LYMPH NODES: Multiple scattered subcentimeter retroperitoneal lymph nodes.    VASCULATURE: Mild aortoiliac  atherosclerotic calcification. No abdominal aortic aneurysm.    PELVIC ORGANS: Normal.    MUSCULOSKELETAL: Small fat-containing left inguinal hernia. Moderate lumbar spine degenerative change. There is a fracture deformity through the superior endplate of the T12 vertebral body as seen on sagittal image 68. This appearance has evolved   compared to the MRI from 06/26/2021, with increasing compression deformity and new focal kyphosis. There is some edema in the paravertebral region at this level as well.      Impression    IMPRESSION:   1.  Brown catheter well-positioned in the urinary bladder. Bladder wall thickening and perivesicular edema suggestive of cystitis. No hydronephrosis.    2.  Patchy bibasilar pneumonitis.    3.  Interval evolution of previously seen T12 superior endplate compression fracture deformity with new focal kyphosis at this level. This could reflect sequela of a more recent superimposed injury. There is some paravertebral edema at this level which   could reflect posttraumatic blood products, though if patient has signs or symptoms of generalized infection, repeat MRI evaluation would be beneficial.

## 2022-02-08 NOTE — PROGRESS NOTES
"Care Management Follow Up    Length of Stay (days): 0    Expected Discharge Date: 02/10/2022     Concerns to be Addressed: discharge       Patient plan of care discussed at interdisciplinary rounds: Yes    Anticipated Discharge Disposition: TBD     Anticipated Discharge Services: TBD    Anticipated Discharge DME: TBD     Patient/family educated on Medicare website which has current facility and service quality ratings:    Education Provided on the Discharge Plan:    Patient/Family in Agreement with the Plan:      Referrals Placed by CM/SW: NA  Private pay costs discussed: Not applicable    Additional Information:    SARMAD received a call from pt's wife Ailin reporting that SW told her pt is discharging today. Ailin reported that a SW told her to bring pt's wheelchair and discharge with [facility]. She reports that she spoke with someone named \"Dean\" who gave her this SARMAD phone number.     SARMAD reached out to pt's MD to confirm that pt is NOT discharging today and will likely need TCU placement. SARMAD spoke with ED SARMAD Moran who believes that she may be mis remembering from pt's previous admission and is getting the two admissions confused.     SARMAD called Ailin back to let her know that pt is NOT discharging today. She acknowledges this and is in agreement. She reports that she had to call an ambulance for pt \"yesterday\". SARMAD will update pt's treatment team regarding Ailin's confusion.     LANETTE Madrid, RADHA   7C    Phone: [phone]  Pager: [phone]      "

## 2022-02-08 NOTE — PROGRESS NOTES
"7C PT Eval     02/08/22 1000   Quick Adds   Type of Visit Initial PT Evaluation   Living Environment   People in home spouse   Current Living Arrangements apartment   Living Environment Comments Patient very guarded and poor historian deferring majority of subjective history.    Self-Care   Usual Activity Tolerance poor   Current Activity Tolerance poor   Regular Exercise No   Equipment Currently Used at Home walker, rolling;wheelchair, manual   Activity/Exercise/Self-Care Comment Again, patient very limited historian but does state that he owns \"all of the equipment except a motorized WC\".  Patient unsure about baseline mobility but states that he was recently at [facility] for 7 months (later 7 weeks) where he worked on pivot transfers and supposedly walking.    Disability/Function   Hearing Difficulty or Deaf no   Wear Glasses or Blind yes   Vision Management glasses   Concentrating, Remembering or Making Decisions Difficulty yes   Concentration Management confused   Difficulty Communicating no   Difficulty Eating/Swallowing no   Walking or Climbing Stairs Difficulty yes   Walking or Climbing Stairs ambulation difficulty, requires equipment   Mobility Management WC for mobility   Dressing/Bathing Difficulty no   Toileting issues no   Doing Errands Independently Difficulty (such as shopping) yes   Errands Management spouse assists   Change in Functional Status Since Onset of Current Illness/Injury yes   General Information   Onset of Illness/Injury or Date of Surgery 02/07/22   Referring Physician Milton Jansen MD   Patient/Family Therapy Goals Statement (PT) To return home   Pertinent History of Current Problem (include personal factors and/or comorbidities that impact the POC) Per EMR \"Fer Latham is a 71 year old male with PMH recurrent UTI's, BPH, neurogenic bladder w/chronic ferguson, DM2 (diet controlled), HTN, ESRD on dialysis, chronic atrial fibrillation (on eliquis), alcohol abuse and T12 " "compression fracture who is admitted for UTI.\"   Existing Precautions/Restrictions spinal  (TLSO when OOB)   General Observations activity: up with assist   Cognition   Affect/Mental Status (Cognition) anxious;confused;emotionally labile   Cognitive Status Comments patient easily frustrated, intermittently confused and frustrated throughout session   Posture    Posture Protracted shoulders;Forward head position   Range of Motion (ROM)   ROM Comment B knee flexion contractures   Strength   Manual Muscle Testing Quick Adds Deficits observed during functional mobility   Strength Comments Patient grossly deconditioned, refused formal mobility assessment but able to perform straight leg raise bilaterally. 4/5 knee extension strength at 90 degrees of knee flexion.    Bed Mobility   Bed Mobility supine-sit;sit-supine   Supine-Sit [place] (Bed Mobility) modified independence   Sit-Supine [place] (Bed Mobility) modified independence   Clinical Impression   Criteria for Skilled Therapeutic Intervention yes, treatment indicated   PT Diagnosis (PT) impaired functional mobility   Influenced by the following impairments impaired cognition, decreased activity tolerance, knee flexion contractures   Functional limitations due to impairments bed mobility, transfers, gait, stairs, WC mobility   Clinical Presentation Stable/Uncomplicated   Clinical Presentation Rationale clinical judgement   Clinical Decision Making (Complexity) low complexity   Therapy Frequency (PT) 3x/week   Predicted Duration of Therapy Intervention (days/wks) 2 weeks   Planned Therapy Interventions (PT) balance training;bed mobility training;gait training;lumbar stabilization;postural re-education;ROM (range of motion);stair training;strengthening;stretching;transfer training   Risk & Benefits of therapy have been explained evaluation/treatment results reviewed;care plan/treatment goals reviewed;risks/benefits reviewed;current/potential barriers " reviewed;participants voiced agreement with care plan;participants included;patient   PT Discharge Planning    PT Discharge Recommendation (DC Rec) Transitional Care Facility;Long term care facility   PT Rationale for DC Rec At this time patient with greatly impaired mobility although major portions of session were not performed due to patient refusal. Spent increased time discussing goals of PT but patient with very limited participation due to cognitive impairment. Patient clearly failing at home and PT was unable to reach family via phone call at time of eval. Currently recommend TCU although patient may eventually require LTC.    PT Brief overview of current status  mod I bed mobility   Total Evaluation Time   Total Evaluation Time (Minutes) 10       Kolton Alford PT, DPT  Pager #[phone]

## 2022-02-08 NOTE — PHARMACY-ADMISSION MEDICATION HISTORY
"  Admission Medication History Completed by Pharmacy    See [facility] Admission Navigator for allergy information, preferred outpatient pharmacy, prior to admission medications and immunization status.     Medication History Sources:     Patient's spouse, Dispense Report, Chart Review    Changes made to PTA medication list (reason):    Added: vitamin D3, aspirin    Deleted: lidocaine patch, mirtazapine, multiple vitamin, diazepam    Changed: polyethylene glycol powder to PRN    Additional Information:    Spoke to patient's spouse who confirms patient is \"hardly taking anything\". Meds highlighted in blue were confirmed current meds for patient per spouse.     Per spouse, they cannot afford the monthly copay for Eliquis (~380/month) so she has been giving patient aspirin 325 mg once daily. Spouse reports patient is a \"bleeder\" and will bruise/bleed easily upon injury e.g. stubbed toe. Patient has history of chronic atrial fibrillation and had a RLE DVT in 6/2021.     Spouse confirmed patient is allergic to Sulfa, wasn't aware of other allergic reactions.     Per previous med history on 2/3/22, the pharmacist contacted outpatient pharmacy who confirmed medications were filled 1/13 but never picked up by patient/spouse. These included: melatonin, mirtazapine, miralax, bisacodyl suppository, and Eliquis    Per discharge summary from last hospitalization, diazepam was discontinued. This medication was last dispensed 5/26/2021 qty 30.     Patient previously on oxycodone sporadically for T12 compression fracture. Appears patient got various day supplies prescribed 7/2021-10/2021. Last dispensed 10/1/2021 for qty 5. Per spouse, patient reports this was most effective for his pain. Per spouse, patient cannot sit up in bed, lay flat, or roll over without pain.     Prior to Admission medications    Medication Sig Last Dose Taking? Auth Provider   aspirin (ASA) 325 MG EC tablet Take 325 mg by mouth daily Past Week at Unknown time Yes " Unknown, Entered By History   cefdinir (OMNICEF) 300 MG capsule Take 1 capsule (300 mg) by mouth every other day for 14 days Past Week at Unknown time Yes Linda Boucher APRN CNP   polyethylene glycol (MIRALAX) 17 GM/Dose powder Take 8.5 g by mouth daily as needed  Unknown at Unknown time Yes Reported, Patient   senna (SENOKOT) 8.6 MG tablet Take 17.2 mg by mouth 2 times daily as needed Unknown at Unknown time Yes Reported, Patient   Vitamin D3 (CHOLECALCIFEROL) 125 MCG (5000 UT) tablet Take 1 tablet by mouth daily Past Week at Unknown time Yes Unknown, Entered By History   acetaminophen (TYLENOL) 500 MG tablet Take 2 tablets (1,000 mg) by mouth 3 times daily Not taking  Ap Tavera, DO   apixaban ANTICOAGULANT (ELIQUIS) 5 MG tablet Take 1 tablet (5 mg) by mouth 2 times daily Not taking  Liang, Ap, DO   bisacodyl (DULCOLAX) 10 MG suppository Place 1 suppository (10 mg) rectally daily as needed for constipation Not taking  Liang, Ap, DO   diphenhydrAMINE (BENADRYL) 50 MG capsule Take 1 capsule (50 mg) by mouth every 6 hours as needed for itching or allergies Administer 30 min - 2 hours pre - IV contrast injection Not taking  Saida Pablo APRN CNP   folic acid (FOLVITE) 1 MG tablet Take 1 tablet (1 mg) by mouth daily Not taking  Ap Tavera, DO   melatonin 5 MG tablet Take 1 tablet (5 mg) by mouth nightly as needed for sleep Not taking  Liang, Ap, DO   miconazole (MICATIN) 2 % external powder Apply topically 2 times daily Not taking  Amaraoss, Ap, DO   multivitamin RENAL (NEPHROCAPS/TRIPHROCAPS) 1 MG capsule Take 1 capsule by mouth daily Not taking  Liang, Ap, DO   nystatin (MYCOSTATIN) 283213 UNIT/GM external cream Apply topically 2 times daily Not taking  Liang, Ap, DO   QUEtiapine (SEROQUEL) 25 MG tablet Take 1 tablet (25 mg) by mouth daily as needed (agitation) Not taking  Liang, Ap, DO       Date completed: 02/08/22    Medication history completed by: Bette Souza [facility]

## 2022-02-08 NOTE — ED PROVIDER NOTES
"    [facility]GENC[facility] DE[facility]MENT ([facility])  2/07/22 ED23  History     Chief Complaint   Patient presents with     Rule out Urinary Tract Infection     The history is provided by the patient and medical records.     Fer Latham is a 71 year old male with a past medical history of recurrent UTI's, BPH, neurogenic bladder, type II diabetes, hypertension, ESRD, chronic atrial fibrillation, and alcohol abuse who presents to the emergency department for evaluation of UTI. Patient states he recently had his ferguson catheter changed which gave him instant relief but now he states that he is in pain and that \"something is going on\". Patient reports he has had his catheter for less than a year but is not certain why he has it. Patient states his pain is located \"behind the balls\" and radiates up into the suprapubic area. He denies any flank pain, CVA tenderness, respiratory symptoms, diarrhea, hematuria, has no other medical complaints.  He feels that there has been more drainage than usual into his catheter bag.    Per chart review patient was recently admitted for a UTI and bladder spasms from 2/2-2/3/2022. Patient had a suprapubic catheter at that time. Patient was started on Zosyn and Tylenol in the ED and was on 5 days of Keflex. Patient was advised to continue Ceftriaxone and transition to Cefdinir at discharge. Review of urine culture from that visit shows pansensitive E. coli. When asked if patient has been taking his antibiotics he replies \"I think so, but I think they told me to take 1 every 2 days\".    Past Medical History  Past Medical History:   Diagnosis Date     Anemia      Arrhythmia     atrial fib     Arthritis 1990's    gout     BPH (benign prostatic hyperplasia)      Current moderate episode of major depressive disorder without prior episode (H) 4/30/2019     Depression      Diabetes mellitus (H)     Type 2  IDDM     Diverticulosis      ESRD (end stage renal disease) on dialysis (H)      " Hematuria      HTN (hypertension)      HTN (hypertension)      IgA nephropathy      MI, old      Pneumonia      Rhabdomyolysis      Weakness 5/10/2019     Past Surgical History:   Procedure Laterality Date     ABDOMEN SURGERY       APPENDECTOMY  1970's     ARTHROSCOPY KNEE  6/27/2013    Procedure: ARTHROSCOPY KNEE;  Right Knee Arthroscopy, Debridment Of Medial Meniscal Tear.  ;  Surgeon: Tomás Wong MD;  Location: [facility]     BACK SURGERY  1999    L5 S1 decompression     BIOPSY  2017    skin     COLONOSCOPY  2013    [facility]     CORONARY ANGIOGRAPHY ADULT ORDER  2018     EYE SURGERY Bilateral 2004    Lens replacement     IR CVC TUNNEL PLACEMENT > 5 YRS OF AGE  11/26/2019     IR CVC TUNNEL REMOVAL RIGHT  11/26/2019     IR CVC TUNNEL REVISION RIGHT  2/10/2020     IR CVC TUNNEL REVISION RIGHT  10/8/2020     IR CVC TUNNEL REVISION RIGHT  6/17/2021     VASCULAR SURGERY       acetaminophen (TYLENOL) 500 MG tablet  apixaban ANTICOAGULANT (ELIQUIS) 5 MG tablet  bisacodyl (DULCOLAX) 10 MG suppository  cefdinir (OMNICEF) 300 MG capsule  diazepam (VALIUM) 5 MG tablet  diphenhydrAMINE (BENADRYL) 50 MG capsule  folic acid (FOLVITE) 1 MG tablet  Lidocaine (LIDOCARE) 4 % Patch  melatonin 5 MG tablet  miconazole (MICATIN) 2 % external powder  mirtazapine (REMERON) 15 MG tablet  Multiple Vitamin (MULTI-VITAMINS) TABS  multivitamin RENAL (NEPHROCAPS/TRIPHROCAPS) 1 MG capsule  nystatin (MYCOSTATIN) 860282 UNIT/GM external cream  oxyCODONE (ROXICODONE) 5 MG tablet  polyethylene glycol (MIRALAX) 17 GM/Dose powder  QUEtiapine (SEROQUEL) 25 MG tablet  senna (SENOKOT) 8.6 MG tablet      Allergies   Allergen Reactions     Cephalexin      Ciprofloxacin      Diagnostic X-Ray Materials      Other reaction(s): Unknown     Sulfa Drugs Hives     Other reaction(s): Unknown  PN: LW Reaction: Rash, Generalized       Family History  Family History   Problem Relation Age of Onset     Cancer Father      Social History   Social History     Tobacco  Use     Smoking status: Former Smoker     Smokeless tobacco: Never Used     Tobacco comment: quit 35 years ago   Substance Use Topics     Alcohol use: Yes     Comment: 1 to 2 drinks a day (Occasional)     Drug use: No      Past medical history, past surgical history, medications, allergies, family history, and social history were reviewed with the patient. No additional pertinent items.       Review of Systems   Constitutional: Negative for chills, fatigue and fever.   HENT: Negative for congestion and sore throat.    Eyes: Negative for pain and visual disturbance.   Respiratory: Negative for cough, chest tightness and shortness of breath.    Cardiovascular: Negative for chest pain and palpitations.   Gastrointestinal: Positive for abdominal pain. Negative for abdominal distention, constipation, diarrhea, nausea and vomiting.   Genitourinary: Positive for penile pain. Negative for difficulty urinating, dysuria, frequency and urgency.        Pelvic pain   Musculoskeletal: Negative for arthralgias, back pain, myalgias and neck pain.   Skin: Negative for color change and rash.   Neurological: Negative for dizziness, weakness and headaches.   Psychiatric/Behavioral: Negative for confusion.   All other systems reviewed and are negative.    A complete review of systems was performed with pertinent positives and negatives noted in the HPI, and all other systems negative.    Physical Exam   BP: 137/84  Pulse: (!) 148  Temp: 98.2  F (36.8  C)  Resp: 18  SpO2: 99 %  Physical Exam  Vitals and nursing note reviewed.   Constitutional:       General: He is not in acute distress.     Appearance: Normal appearance. He is not ill-appearing or toxic-appearing.      Comments: Disheveled appearance   HENT:      Head: Normocephalic and atraumatic.      Nose: Nose normal.      Mouth/Throat:      Mouth: Mucous membranes are moist.   Eyes:      Pupils: Pupils are equal, round, and reactive to light.   Cardiovascular:      Rate and Rhythm:  Normal rate.      Pulses: Normal pulses.      Heart sounds: Normal heart sounds.   Pulmonary:      Effort: Pulmonary effort is normal. No respiratory distress.      Breath sounds: Normal breath sounds.   Abdominal:      General: Abdomen is flat. There is no distension.   Genitourinary:     Comments: Brown catheter in place. Penis inverted. Testicles are nontender to palpation. Surrounding erythema of the groin, consistent with dermatitis, likely fungal. Multiple sores in the perennial area. Ulcer of the left buttock noted, appears chronic.  Musculoskeletal:         General: No swelling or deformity. Normal range of motion.      Cervical back: Normal range of motion. No rigidity.   Skin:     General: Skin is warm.      Capillary Refill: Capillary refill takes less than 2 seconds.   Neurological:      Mental Status: He is alert and oriented to person, place, and time.   Psychiatric:         Mood and Affect: Mood normal.         ED Course     9:32 PM  The patient was seen and examined by Alberto Rubio DO in Room 23.    Procedures       The medical record was reviewed and interpreted.  Current labs reviewed and interpreted.  Previous labs reviewed and interpreted.  Current images reviewed and interpreted: ct a/p.              Results for orders placed or performed during the hospital encounter of 02/07/22   CT Abdomen Pelvis w/o Contrast     Status: None    Narrative    EXAM: CT ABDOMEN PELVIS W/O CONTRAST  LOCATION: [facility]  DATE/TIME: 2/7/2022 10:19 PM    INDICATION: Severe pelvic and suprapubic pain. Recent UTI. Recent Brown exchange.  COMPARISON: None.  TECHNIQUE: CT scan of the abdomen and pelvis was performed without IV contrast. Multiplanar reformats were obtained. Dose reduction techniques were used.  CONTRAST: None.    FINDINGS:   LOWER CHEST: Coronary artery calcification. Patchy areas of interstitial coarsening in the lung bases as seen in the right middle  lobe and left lower lobe.    HEPATOBILIARY: Normal.    PANCREAS: Normal.    SPLEEN: Normal.    ADRENAL GLANDS: Normal.    KIDNEYS/BLADDER: No hydronephrosis. A Brown catheter is well-positioned in the urinary bladder, though there is bladder wall thickening and perivesicular edema.    BOWEL: Colonic diverticulosis. No bowel obstruction.    LYMPH NODES: Multiple scattered subcentimeter retroperitoneal lymph nodes.    VASCULATURE: Mild aortoiliac atherosclerotic calcification. No abdominal aortic aneurysm.    PELVIC ORGANS: Normal.    MUSCULOSKELETAL: Small fat-containing left inguinal hernia. Moderate lumbar spine degenerative change. There is a fracture deformity through the superior endplate of the T12 vertebral body as seen on sagittal image 68. This appearance has evolved   compared to the MRI from 06/26/2021, with increasing compression deformity and new focal kyphosis. There is some edema in the paravertebral region at this level as well.      Impression    IMPRESSION:   1.  Brown catheter well-positioned in the urinary bladder. Bladder wall thickening and perivesicular edema suggestive of cystitis. No hydronephrosis.    2.  Patchy bibasilar pneumonitis.    3.  Interval evolution of previously seen T12 superior endplate compression fracture deformity with new focal kyphosis at this level. This could reflect sequela of a more recent superimposed injury. There is some paravertebral edema at this level which   could reflect posttraumatic blood products, though if patient has signs or symptoms of generalized infection, repeat MRI evaluation would be beneficial.     Comprehensive metabolic panel     Status: Abnormal   Result Value Ref Range    Sodium 134 133 - 144 mmol/L    Potassium 3.8 3.4 - 5.3 mmol/L    Chloride 104 94 - 109 mmol/L    Carbon Dioxide (CO2) 21 20 - 32 mmol/L    Anion Gap 9 3 - 14 mmol/L    Urea Nitrogen 29 7 - 30 mg/dL    Creatinine 1.96 (H) 0.66 - 1.25 mg/dL    Calcium 8.6 8.5 - 10.1 mg/dL     Glucose 112 (H) 70 - 99 mg/dL    Alkaline Phosphatase 140 40 - 150 U/L    AST 17 0 - 45 U/L    ALT 13 0 - 70 U/L    Protein Total 8.5 6.8 - 8.8 g/dL    Albumin 3.0 (L) 3.4 - 5.0 g/dL    Bilirubin Total 0.3 0.2 - 1.3 mg/dL    GFR Estimate 36 (L) >60 mL/min/1.73m2   Lactic acid whole blood     Status: Abnormal   Result Value Ref Range    Lactic Acid 3.1 (H) 0.7 - 2.0 mmol/L   UA with Microscopic reflex to Culture     Status: Abnormal    Specimen: Urine, Brown Catheter   Result Value Ref Range    Color Urine Yellow Colorless, Straw, Light Yellow, Yellow    Appearance Urine Cloudy (A) Clear    Glucose Urine Negative Negative mg/dL    Bilirubin Urine Negative Negative    Ketones Urine Negative Negative mg/dL    Specific Gravity Urine 1.008 1.003 - 1.035    Blood Urine Moderate (A) Negative    pH Urine 5.5 5.0 - 7.0    Protein Albumin Urine 100  (A) Negative mg/dL    Urobilinogen Urine Normal Normal, 2.0 mg/dL    Nitrite Urine Negative Negative    Leukocyte Esterase Urine Large (A) Negative    Bacteria Urine Moderate (A) None Seen /HPF    WBC Clumps Urine Present (A) None Seen /HPF    Budding Yeast Urine Moderate (A) None Seen /HPF    Hyphal Yeast Urine Many (A) None Seen /HPF    Mucus Urine Present (A) None Seen /LPF    RBC Urine 22 (H) <=2 /HPF    WBC Urine 174 (H) <=5 /HPF    Narrative    Urine Culture ordered based on laboratory criteria   CBC with platelets and differential     Status: Abnormal   Result Value Ref Range    WBC Count 6.6 4.0 - 11.0 10e3/uL    RBC Count 3.61 (L) 4.40 - 5.90 10e6/uL    Hemoglobin 11.6 (L) 13.3 - 17.7 g/dL    Hematocrit 35.3 (L) 40.0 - 53.0 %    MCV 98 78 - 100 fL    MCH 32.1 26.5 - 33.0 pg    MCHC 32.9 31.5 - 36.5 g/dL    RDW 14.6 10.0 - 15.0 %    Platelet Count 263 150 - 450 10e3/uL    % Neutrophils 65 %    % Lymphocytes 23 %    % Monocytes 7 %    % Eosinophils 4 %    % Basophils 1 %    % Immature Granulocytes 0 %    NRBCs per 100 WBC 0 <1 /100    Absolute Neutrophils 4.2 1.6 - 8.3  10e3/uL    Absolute Lymphocytes 1.5 0.8 - 5.3 10e3/uL    Absolute Monocytes 0.5 0.0 - 1.3 10e3/uL    Absolute Eosinophils 0.3 0.0 - 0.7 10e3/uL    Absolute Basophils 0.1 0.0 - 0.2 10e3/uL    Absolute Immature Granulocytes 0.0 <=0.4 10e3/uL    Absolute NRBCs 0.0 10e3/uL   Extra Blue Top Tube     Status: None   Result Value Ref Range    Hold Specimen JIC    Extra Red Top Tube     Status: None   Result Value Ref Range    Hold Specimen JIC    Lactic acid whole blood     Status: Normal   Result Value Ref Range    Lactic Acid 1.4 0.7 - 2.0 mmol/L   CBC with platelets differential     Status: Abnormal    Narrative    The following orders were created for panel order CBC with platelets differential.  Procedure                               Abnormality         Status                     ---------                               -----------         ------                     CBC with platelets and d...[[id number]]  Abnormal            Final result                 Please view results for these tests on the individual orders.   [place] Draw     Status: None    Narrative    The following orders were created for panel order [place] Draw.  Procedure                               Abnormality         Status                     ---------                               -----------         ------                     Extra Blue Top Tube[[id number]]                              Final result               Extra Red Top Tube[[id number]]                               Final result                 Please view results for these tests on the individual orders.     Medications   HYDROmorphone (DILAUDID) injection 0.5 mg (0.5 mg Intravenous Given 2/8/22 0034)   0.9% sodium chloride BOLUS (0 mLs Intravenous Stopped 2/8/22 0015)     Followed by   sodium chloride 0.9% infusion (has no administration in time range)   nystatin (MYCOSTATIN) cream ( Topical Given 2/8/22 0015)   piperacillin-tazobactam (ZOSYN) 3.375 g vial to attach to  mL bag (3.375 g  Intravenous New Bag 2/8/22 0038)   fluconazole (DIFLUCAN) tablet 200 mg (has no administration in time range)        Assessments & Plan (with Medical Decision Making)   Patient with history of BPH, neurogenic bladder, indwelling Brown, presents to the ED with complaint of pelvic /suprapubic pain.    Differential diagnosis was UTI, pyelonephritis, Brown occlusion/malfunction, hydronephrosis, ureterolithiasis    On arrival, patient is tachycardic into the 140s.  He is in severe pain.  His rhythm is A. fib with RVR.  This confirmed the EKG without any signs of acute ischemia.  Patient was given IV Dilaudid for pain and started on IV fluids.    On exam, there is some mild tenderness palpation in the suprapubic area.  No CVA tenderness.  When patient's brief was removed, the areas extremely disheveled/malodorous.  There is erythema of the groin area consistent with dermatitis.  Was given nystatin ointment.  Testicles are unremarkable, nontender, low suspicion for torsion or other dimeric testicular pathology.  Chronic appearing ulcer of the left buttocks noted.  Brown catheter appears to be in place with purulent/clumpy appearing drainage.  Additionally, there was a Ziploc bag with an unknown origin substance removed from the groin area.  Patient is unsure what this is.    Initial lactic acid is 3.1.  Blood cultures were drawn.  Patient started on IV antibiotics.  Previous cultures show pansensitive E. coli.  Given that he continues to have symptoms and UA is consistent with continued UTI, have broaden coverage to IV Zosyn.  Patient also has yeast in his urine which could represent symptomatic fungal cystitis.  Started on oral fluconazole after discussion with pharmacy.    Remainder of laboratory work-up is largely unremarkable.  Creatinine is 1.96 with consistent with baseline.  No bilirubin elevation or transaminitis to suggest hepatobiliary pathology.    CT shows well-positioned Brown catheter with bladder wall  thickening and perivascular edema suggestive of cystitis.  No hydronephrosis.  Also shows some patchy pneumonitis of unknown significance.  Patient without any cough, or other respiratory symptoms.    He also has a slight worsening of known T12 superior endplate compression fracture with perivertebral edema.  Patient has tenderness in this area, but states that is unchanged.  Is not the primary reason for his visit today.  Motor strength is unchanged from baseline.  Discussed with neurosurgery evaluated patient in the ED and recommend non-emergent MRI in AM    Given presence of recurrent/resistant UTI given outpatient management, plan to admit patient to the medicine service.  Is clear that patient has difficulty caring for himself at home, would recommend placement in rehab/TCU upon discharge.        I have reviewed the nursing notes. I have reviewed the findings, diagnosis, plan and need for follow up with the patient.    New Prescriptions    No medications on file       Final diagnoses:   Acute cystitis with hematuria     IHerminia, lydia serving as a trained medical scribe to document services personally performed by Alberto Rubio DO based on the provider's statements to me on February 7, 2022.  This document has been checked and approved by the attending provider.    I, Alberto Rubio DO, was physically present and have reviewed and verified the accuracy of this note documented by Herminia Mcginnis medical scribe.      Alberto Rubio DO  --  Alberto Rubio DO  [facility] EMERGENCY DEPARTMENT  2/7/2022     Alberto Rubio DO  02/08/22 0219

## 2022-02-08 NOTE — ED TRIAGE NOTES
Pt BIBA w/known UTI, has indwelling catheter. Was started on abx a week ago, this afternoon started to have severe groin & back pain and increasing weakness.

## 2022-02-08 NOTE — CONSULTS
[facility]       NEUROSURGERY CONSULTATION NOTE    This consultation was requested by Dr. Rubio from the ED service.    Reason for Consultation: Compression fracture      HPI: Fer Latham is a 71 year old male with a PMH of HTN, atrial flutter, DMII, ESRD on HD, DVT on Eliquis, BPH with chronic Brown, being admitted for complicated UTI. He has a known T12 compression fracture that was secondary to a fall on June of 2021. He received his follow-up with Dr. Diop at [facility], seen on 10/6/21, at which point his TLSO was discontinued and he was to follow up in 6 weeks with repeated XR. Of note, XR report from 10/6 describes compression fracture to have further loss of height, categorized as moderately severe with focal kyphosis. He did not continue follow-up, now with incidentally found worsened compression fracture when compared previous MRI on 6/26/2021. He denies any trauma since his original diagnosis of the compression fracture. He does endorse mild worsening of his midline lower back pain. He is able to ambulate with a walker. He describes generalized weakness in the setting of acute illness, but denies focal lower extremity weakness, lower extremity numbness, shooting pain down the legs, stool incontinence or saddle anesthesia. He has a chronic indwelling urinary catheter.         PAST MEDICAL HISTORY:   Past Medical History:   Diagnosis Date     Anemia      Arrhythmia     atrial fib     Arthritis 1990's    gout     BPH (benign prostatic hyperplasia)      Current moderate episode of major depressive disorder without prior episode (H) 4/30/2019     Depression      Diabetes mellitus (H)     Type 2  IDDM     Diverticulosis      ESRD (end stage renal disease) on dialysis (H)      Hematuria      HTN (hypertension)      HTN (hypertension)      IgA nephropathy      MI, old      Pneumonia      Rhabdomyolysis      Weakness 5/10/2019       PAST SURGICAL HISTORY:    Past Surgical History:   Procedure Laterality Date     ABDOMEN SURGERY       APPENDECTOMY  1970's     ARTHROSCOPY KNEE  6/27/2013    Procedure: ARTHROSCOPY KNEE;  Right Knee Arthroscopy, Debridment Of Medial Meniscal Tear.  ;  Surgeon: Tomás Wong MD;  [facility]     BACK SURGERY  1999    L5 S1 decompression     BIOPSY  2017    skin     COLONOSCOPY  2013    [facility]     CORONARY ANGIOGRAPHY ADULT ORDER  2018     EYE SURGERY Bilateral 2004    Lens replacement     IR CVC TUNNEL PLACEMENT > 5 YRS OF AGE  11/26/2019     IR CVC TUNNEL REMOVAL RIGHT  11/26/2019     IR CVC TUNNEL REVISION RIGHT  2/10/2020     IR CVC TUNNEL REVISION RIGHT  10/8/2020     IR CVC TUNNEL REVISION RIGHT  6/17/2021     VASCULAR SURGERY         FAMILY HISTORY:   Family History   Problem Relation Age of Onset     Cancer Father        SOCIAL HISTORY:   Social History     Tobacco Use     Smoking status: Former Smoker     Smokeless tobacco: Never Used     Tobacco comment: quit 35 years ago   Substance Use Topics     Alcohol use: Yes     Comment: 1 to 2 drinks a day (Occasional)       MEDICATIONS:  (Not in a hospital admission)      Allergies:  Allergies   Allergen Reactions     Cephalexin      Ciprofloxacin      Diagnostic X-Ray Materials      Other reaction(s): Unknown     Sulfa Drugs Hives     Other reaction(s): Unknown  PN: LW Reaction: Rash, Generalized         ROS: 10 point ROS of systems including Constitutional, Eyes, Respiratory, Cardiovascular, Gastroenterology, Genitourinary, Integumentary, Muscularskeletal, Psychiatric were all negative except for pertinent positives noted in my HPI.    Physical exam:   Blood pressure 116/60, pulse 81, temperature 98.2  F (36.8  C), temperature source Oral, resp. rate 12, SpO2 95 %.  CV: BP and HR as noted above  PULM: breathing comfortably on room air  ABD: soft, non-distended  NEUROLOGIC:  -- Awake; Alert; oriented x 3  -- Follows commands briskly  -- +repetition, calculation, and naming  --  Speech fluent, spontaneous. No aphasia or dysarthria.  -- no gaze preference. No apparent hemineglect.  Cranial Nerves:  -- visual fields full to confrontation, PERRL 3-2mm bilat and brisk, extraocular movements intact  -- face symmetrical, tongue midline  -- sensory V1-V3 intact bilaterally  -- palate elevates symmetrically, uvula midline  -- hearing grossly intact bilat  -- Trapezii 5/5 strength bilat symmetric  -- Cerebellar: Finger nose finger without dysmetria, intact rapid alternating motions bilaterally    Motor:  Normal bulk / tone; no tremor, rigidity, or bradykinesia.  No muscle wasting or fasciculations  No Pronator Drift     Delt Bi Tri Hand Flexion/  Extension Iliopsoas Quadriceps Hamstrings Tibialis Anterior Gastroc    C5 C6 C7 C8/T1 L2 L3 L4-S1 L4 S1   R 5 5 5 5 5 5 4+* 5 5   L 5 5 5 5 5 5 5 5 5   *Related to knee injury  Sensory: diminished to light touch bilateral feet in sock-like distribution, chronic    Reflexes:     Bi Tri BR Marilee Pat Ach Bab    C5-6 C7-8 C6 UMN L2-4 S1 UMN   R 2+ 2+ 2+ Norm 2+ 2+ Norm   L 2+ 2+ 2+ Norm 2+ 2+ Norm     Gait: Deferred      LABS:  Last Comprehensive Metabolic Panel:  Sodium   Date Value Ref Range Status   02/07/2022 134 133 - 144 mmol/L Final   07/01/2021 135 133 - 144 mmol/L Final     Potassium   Date Value Ref Range Status   02/07/2022 3.8 3.4 - 5.3 mmol/L Final   07/01/2021 4.0 3.4 - 5.3 mmol/L Final     Chloride   Date Value Ref Range Status   02/07/2022 104 94 - 109 mmol/L Final   07/01/2021 103 94 - 109 mmol/L Final     Carbon Dioxide   Date Value Ref Range Status   07/01/2021 26 20 - 32 mmol/L Final     Carbon Dioxide (CO2)   Date Value Ref Range Status   02/07/2022 21 20 - 32 mmol/L Final     Anion Gap   Date Value Ref Range Status   02/07/2022 9 3 - 14 mmol/L Final   07/01/2021 6 3 - 14 mmol/L Final     Glucose   Date Value Ref Range Status   02/07/2022 112 (H) 70 - 99 mg/dL Final   07/01/2021 73 70 - 99 mg/dL Final     Urea Nitrogen   Date Value Ref Range  Status   02/07/2022 29 7 - 30 mg/dL Final   07/01/2021 31 (H) 7 - 30 mg/dL Final     Creatinine   Date Value Ref Range Status   02/07/2022 1.96 (H) 0.66 - 1.25 mg/dL Final   07/01/2021 2.95 (H) 0.66 - 1.25 mg/dL Final     GFR Estimate   Date Value Ref Range Status   02/07/2022 36 (L) >60 mL/min/1.73m2 Final     Comment:     Effective December 21, 2021 eGFRcr in adults is calculated using the 2021 CKD-EPI creatinine equation which includes age and gender (Chacha et al., NEJ, DOI: 10.1056/GNEJoh3195736)   07/01/2021 21 (L) >60 mL/min/[1.73_m2] Final     Comment:     Non  GFR Calc  Starting 12/18/2018, serum creatinine based estimated GFR (eGFR) will be   calculated using the Chronic Kidney Disease Epidemiology Collaboration   (CKD-EPI) equation.       Calcium   Date Value Ref Range Status   02/07/2022 8.6 8.5 - 10.1 mg/dL Final   07/01/2021 8.5 8.5 - 10.1 mg/dL Final     Lab Results   Component Value Date    WBC 6.6 02/07/2022    WBC 6.6 07/01/2021     Lab Results   Component Value Date    RBC 3.61 02/07/2022    RBC 2.74 07/01/2021     Lab Results   Component Value Date    HGB 11.6 02/07/2022    HGB 8.8 07/01/2021     Lab Results   Component Value Date    HCT 35.3 02/07/2022    HCT 27.0 07/01/2021     Lab Results   Component Value Date    MCV 98 02/07/2022    MCV 99 07/01/2021     Lab Results   Component Value Date    MCH 32.1 02/07/2022    MCH 32.1 07/01/2021     Lab Results   Component Value Date    MCHC 32.9 02/07/2022    MCHC 32.6 07/01/2021     Lab Results   Component Value Date    RDW 14.6 02/07/2022    RDW 14.8 07/01/2021     Lab Results   Component Value Date     02/07/2022     07/01/2021         IMAGING:  CT abdomen/pelvis: IMPRESSION:   1.  Brown catheter well-positioned in the urinary bladder. Bladder wall thickening and perivesicular edema suggestive of cystitis. No hydronephrosis.     2.  Patchy bibasilar pneumonitis.     3.  Interval evolution of previously seen T12 superior  endplate compression fracture deformity with new focal kyphosis at this level. This could reflect sequela of a more recent superimposed injury. There is some paravertebral edema at this level which   could reflect posttraumatic blood products, though if patient has signs or symptoms of generalized infection, repeat MRI evaluation would be beneficial.    ASSESSMENT: 70yo male with worsening kyphosis and height loss of known T12 compression fracture, without symptoms/signs of radiculopathy or myelopathy    RECOMMENDATIONS:  No neurosurgical intervention indicated at this time   TLSO brace when out of bed  Follow up with previous neurosurgeon (Dr. Diop) as outpatient      Neurosurgery will follow peripherally    The patient was discussed with Dr. Walls, neurosurgery chief resident and they agree with the above.    Yessenia Pitts MD  Neurosurgery Resident, PGY-2    Lonnie Angela MD  Neurosurgery Resident, PGY-2    In addition to the assessment of diagnoses detailed above, this 71 year old male  patient admitted from the Emergency Department has the following conditions contributing to the complexity of their medical care:    Arrhythmia including atrial fibrillation,  Coagulopathy based on pre-admission Eliquis use and Anemia of chronic disease,  Chronic kidney disease stage IV,    Clinically Significant Risk Factors Present on Admission             # Coagulation Defect: home medication list includes an anticoagulant medication     # CKD, ESRD on dialysis: Will monitor and treat as appropriate  - last Cr =  1.96 mg/dL (Ref range: 0.66 - 1.25 mg/dL)  - last GFR = 36 mL/min/1.73m2 (Ref range: >60 mL/min/1.73m2)

## 2022-02-08 NOTE — PROGRESS NOTES
Admitted/transferred from:   2 RN full   skin assessment completed by Yisel Cunha RN and Jacinta Bhardwaj RN.  Skin assessment finding: issues found skin to groin area is excoriated with multiple small open areas to groin and inner thigh bilaterally, rash to groin, open area to left buttock. +3 edema to BLE, discoloration present to LE bilaterally, thick broken toenaills with dried blood.   Interventions/actions: WOC consult ordered and skin interventions turn and reposition, barrier cream      Will continue to monitor.

## 2022-02-08 NOTE — UTILIZATION REVIEW
Admission Status; Secondary Review Determination       Under the authority of the Utilization Management Committee, the utilization review process indicated a secondary review on the above patient. The review outcome is based on review of the medical records, discussions with staff, and applying clinical experience noted on the date of the review.     (x) Inpatient Status Appropriate - This patient's medical care is consistent with medical management for inpatient care and reasonable inpatient medical practice.     RATIONALE FOR DETERMINATION     Fer Latham is a 71 year old male with history of recurrent UTI's, BPH, neurogenic bladder w/chronic ferguson, DM2 (diet controlled), HTN, ESRD on dialysis, chronic atrial fibrillation (on eliquis), alcohol abuse and T12 compression fracture. Fer was recently admitted with UTI and bladder spasms from 2/2/2022 through 2/3/2022.  He was treated with Zosyn and discharged home on Keflex.  Its not clear if he actually took the Keflex.  Urine culture at that time grew pansensitive E. coli. He presented to the [facility] emergency department for evaluation of breakthrough testicular pain radiating to the suprapubic area.  It sounds like he was cantankerous in the emergency department, refusing exam from some providers.  Emergency department evaluation showed tachycardia on arrival.  Laboratory evaluation showed creatinine of 1.96 which is his baseline with end-stage renal disease.  Lactic acid is elevated at 3.1 and urinalysis showed 174 white blood cells, 22 red blood cells, large leukocyte esterase, moderate bacteria, and white blood cell clumps.  Testing for COVID-19 was negative.  Yeast was noted in urine.  Blood alcohol level was undetectable.  Fer was given Zosyn in the emergency department.  He was admitted to the hospital and treated with Rocephin for suspected urinary tract infection.  Since admission it has become clear that Fer is confused compared to his baseline.  He  is being evaluated by occupational therapy for cognition.  Inpatient admission is appropriate for ongoing treatment of urinary tract infection, borderline sepsis criteria (brief tachycardia on presentation, lactic acidosis, and encephalopathy) and encephalopathy that is most likely due to infection or sepsis but also possibly due to metabolic causes.    At the time of admission with the information available to the attending physician more than 2 nights Hospital complex care was anticipated, based on patient risk of adverse outcome if treated as outpatient and complex care required. Inpatient admission is appropriate based on the Medicare guidelines.     This document was produced using voice recognition software       The information on this document is developed by the utilization review team in order for the business office to ensure compliance. This only denotes the appropriateness of proper admission status and does not reflect the quality of care rendered.   The definitions of Inpatient Status and Observation Status used in making the determination above are those provided in the CMS Coverage Manual, Chapter 1 and Chapter 6, section 70.4.   Sincerely,     Emiliano Saez MD    Utilization Review  Physician Advisor  [facility].

## 2022-02-08 NOTE — PLAN OF CARE
Pt is Aox4, though is resistant to responding to questions d/t crankiness. VSS. PIV functioning. IV ABX dose changed and new dose started. HD catheter intact, CDI. Pt states that last BM was 2 days ago but normally doesn't go everyday. Patient ate 100% of breakfast. Pt reports moderate to severe lower back pain, given PRN Oxycodone. Called patient's wife and gave her an update as well as the room phone number and the patient her number. Pt has a chronic ferguson catheter that was changed on 2/2/22, checked w/ physicians and they stated to keep it. WOC RN rounded and determined no wounds to buttock and advised to apply skin prep to the buttock, sacral meplix, and miconazole to groin for fungal infection to groin. Wife requested that he discharge to TCU for rehab upon discharge. Patient aware of plan. /65 (BP Location: Right arm)   Pulse 82   Temp 98  F (36.7  C) (Oral)   Resp 22   SpO2 100%

## 2022-02-08 NOTE — CONSULTS
Nephrology Initial Consult  February 8, 2022      Fer Latham MRN:[id number] YOB: 1950  Date of Admission:2/7/2022  Primary care provider: Isrrael Ornelas  Requesting physician: No att. providers found    ASSESSMENT AND RECOMMENDATIONS:   ESRD-dialyzes 2x/week at [facility] with Dr. Puente. Run time: 3 hrs 45 min but often shortens runs. Access: tunneled RIJ (failed AVF). EDW: 119 kg (need to reduce).   -Holding on run today as labs are reasonable, below EDW.    -Will decide daily while admitted on runs, holding today.    -Getting records from outpt unit.     -Checking Cystatin-C as he has not run in 5 days and clearance appears possibly above level of needing RRT.      Volume-Makes significant amount of UOP, is below his typical EDW which may indicate some muscle mass loss.      Electrolytes-K 4.6, bicarb 21, no acute issues.      BMD-Ca 8.6, Mg and Phos not checked today.     Anemia-Hgb 11.6, holding MECCA.      Nutrition-Taking PO, intake minimal currently.     Time spent: 25 minutes on this date of encounter for chart review, physical exam, medical decision making and co-ordination of care.     Discussed with Dr Fernandez    Recommendations were communicated to primary team via verbal communication.     OCTAVIO Ivy CNS  Clinical Nurse Specialist  [phone]    REASON FOR CONSULT: Requested to evaluate 71 yom with ESRD for management of dialysis while admitted with UTI.     HISTORY OF PRESENT ILLNESS:  Fer Latham is a 71 yom with hx of HTN, Aflutter (not on AC), DMII, ESRD 2/2 IgA nephropathy, and chronic alcohol abuse, admitted with two weeks of weakness and recurrent falls with new shortness of breath secondary to atrial fibrillation with RVR, frequent UTI's, T12 compression fx and RLE DVT who is admitted with UTI.      PAST MEDICAL HISTORY:  Past Medical History:   Diagnosis Date     Anemia      Arrhythmia     atrial fib     Arthritis 1990's    gout     BPH (benign prostatic  hyperplasia)      Current moderate episode of major depressive disorder without prior episode (H) 4/30/2019     Depression      Diabetes mellitus (H)     Type 2  IDDM     Diverticulosis      ESRD (end stage renal disease) on dialysis (H)      Hematuria      HTN (hypertension)      HTN (hypertension)      IgA nephropathy      MI, old      Pneumonia      Rhabdomyolysis      Weakness 5/10/2019       Past Surgical History:   Procedure Laterality Date     ABDOMEN SURGERY       APPENDECTOMY  1970's     ARTHROSCOPY KNEE  6/27/2013    Procedure: ARTHROSCOPY KNEE;  Right Knee Arthroscopy, Debridment Of Medial Meniscal Tear.  ;  Surgeon: Tomás Wong MD;  [facility]     BACK SURGERY  1999    L5 S1 decompression     BIOPSY  2017    skin     [facility]COPY  2013    [facility]     CORONARY ANGIOGRAPHY ADULT ORDER  2018     EYE SURGERY Bilateral 2004    Lens replacement     IR CVC TUNNEL PLACEMENT > 5 YRS OF AGE  11/26/2019     IR CVC TUNNEL REMOVAL RIGHT  11/26/2019     IR CVC TUNNEL REVISION RIGHT  2/10/2020     IR CVC TUNNEL REVISION RIGHT  10/8/2020     IR CVC TUNNEL REVISION RIGHT  6/17/2021     VASCULAR SURGERY          MEDICATIONS:  PTA Meds  Prior to Admission medications    Medication Sig Last Dose Taking? Auth Provider   acetaminophen (TYLENOL) 500 MG tablet Take 2 tablets (1,000 mg) by mouth 3 times daily   Ap Tavera DO   apixaban ANTICOAGULANT (ELIQUIS) 5 MG tablet Take 1 tablet (5 mg) by mouth 2 times daily   Ap Tavera DO   bisacodyl (DULCOLAX) 10 MG suppository Place 1 suppository (10 mg) rectally daily as needed for constipation   Ap Tavera DO   cefdinir (OMNICEF) 300 MG capsule Take 1 capsule (300 mg) by mouth every other day for 14 days   Lidna Boucher APRN CNP   diphenhydrAMINE (BENADRYL) 50 MG capsule Take 1 capsule (50 mg) by mouth every 6 hours as needed for itching or allergies Administer 30 min - 2 hours pre - IV contrast injection   Saida Pablo APRN CNP   folic acid (FOLVITE) 1  MG tablet Take 1 tablet (1 mg) by mouth daily   Ap Tavera DO   Lidocaine (LIDOCARE) 4 % Patch Apply topically to intact skin for upto 12 hrs within 24-hr period, as needed for shoulder pain.   Reported, Patient   melatonin 5 MG tablet Take 1 tablet (5 mg) by mouth nightly as needed for sleep   Ap Tavera DO   miconazole (MICATIN) 2 % external powder Apply topically 2 times daily   Ap Tavera DO   mirtazapine (REMERON) 15 MG tablet Take 15 mg by mouth At Bedtime   Reported, Patient   Multiple Vitamin (MULTI-VITAMINS) TABS Take 1 tablet by mouth   Reported, Patient   multivitamin RENAL (NEPHROCAPS/TRIPHROCAPS) 1 MG capsule Take 1 capsule by mouth daily   Ap Tavera DO   nystatin (MYCOSTATIN) 584968 UNIT/GM external cream Apply topically 2 times daily   Ap Tavera DO   oxyCODONE (ROXICODONE) 5 MG tablet Take 0.5 tablets (2.5 mg) by mouth every 8 hours   Ap Tavera DO   polyethylene glycol (MIRALAX) 17 GM/Dose powder Take 8.5 g by mouth daily   Reported, Patient   QUEtiapine (SEROQUEL) 25 MG tablet Take 1 tablet (25 mg) by mouth daily as needed (agitation)   Ap Tavera DO   senna (SENOKOT) 8.6 MG tablet Take 17.2 mg by mouth 2 times daily as needed   Reported, Patient      Current Meds    apixaban ANTICOAGULANT  5 mg Oral BID     cefTRIAXone  1 g Intravenous Q24H     folic acid  1 mg Oral Daily     lidocaine  1-2 patch Transdermal Q24H     lidocaine   Transdermal Q8H     miconazole   Topical BID     mirtazapine  15 mg Oral At Bedtime     multivitamin RENAL  1 capsule Oral Daily     nystatin   Topical BID     polyethylene glycol  8.5 g Oral Daily     sodium chloride (PF)  3 mL Intracatheter Q8H     thiamine  100 mg Oral Daily     Infusion Meds    [Held by provider] lactated ringers       - MEDICATION INSTRUCTIONS -         ALLERGIES:    Allergies   Allergen Reactions     Cephalexin      Ciprofloxacin      Diagnostic X-Ray Materials      Other reaction(s): Unknown     Sulfa Drugs Hives     Other  reaction(s): Unknown  PN: LW Reaction: Rash, Generalized         REVIEW OF SYSTEMS:  A 10 point review of systems was negative except as noted above.    SOCIAL HISTORY:   Social History     Socioeconomic History     Marital status:      Spouse name: Not on file     Number of children: Not on file     Years of education: Not on file     Highest education level: Not on file   Occupational History     Not on file   Tobacco Use     Smoking status: Former Smoker     Smokeless tobacco: Never Used     Tobacco comment: quit 35 years ago   Substance and Sexual Activity     Alcohol use: Yes     Comment: 1 to 2 drinks a day (Occasional)     Drug use: No     Sexual activity: Not on file   Other Topics Concern     Parent/sibling w/ CABG, MI or angioplasty before 65F 55M? Not Asked   Social History Narrative     Not on file     Social Determinants of Health     Financial Resource Strain: Not on file   Food Insecurity: Not on file   Transportation Needs: Not on file   Physical Activity: Not on file   Stress: Not on file   Social Connections: Not on file   Intimate Partner Violence: Not on file   Housing Stability: Not on file     Reviewed with patient, noncontributory social hx.     FAMILY MEDICAL HISTORY:   Family History   Problem Relation Age of Onset     Cancer Father      Reviewed with patient, no hx of renal disease in family.     PHYSICAL EXAM:   Temp  Av.1  F (36.7  C)  Min: 98  F (36.7  C)  Max: 98.2  F (36.8  C)      Pulse  Av.8  Min: 62  Max: 148 Resp  Av.2  Min: 9  Max: 22  SpO2  Av.9 %  Min: 95 %  Max: 100 %       /65 (BP Location: Right arm)   Pulse 82   Temp 98  F (36.7  C) (Oral)   Resp 22   SpO2 100%       Admit       GENERAL APPEARANCE: alert and no distress  EYES: No scleral icterus  NECK:  No JVD  Pulmonary: lungs clear to auscultation with equal breath sounds bilaterally, no clubbing or cyanosis  CV: Regular rhythm, normal rate, no rub   - Edema trace  GI: soft, nontender,  normal bowel sounds  MS: no evidence of inflammation in joints, no muscle tenderness  : + Brown  SKIN: no rash, warm, dry  NEURO: No focal deficits.     LABS:   CMP  Recent Labs   Lab 02/08/22  0758 02/08/22  0502 02/07/22 2128 02/04/22  0549 02/02/22  1714   NA  --  138 134 136 128*   POTASSIUM  --  4.6 3.8 4.0 4.4   CHLORIDE  --  109 104 106 96   CO2  --  21 21 22 21   ANIONGAP  --  8 9 8 11   GLC 78 68* 112* 58* 128*   BUN  --  27 29 33* 31*   CR  --  2.00* 1.96* 2.24* 2.05*   GFRESTIMATED  --  35* 36* 31* 34*   COLUMBA  --  8.6 8.6 8.3* 9.0   PROTTOTAL  --   --  8.5 6.4*  --    ALBUMIN  --   --  3.0* 2.3*  --    BILITOTAL  --   --  0.3 0.4  --    ALKPHOS  --   --  140 100  --    AST  --   --  17 12  --    ALT  --   --  13 10  --      CBC  Recent Labs   Lab 02/07/22 2128 02/04/22  0549 02/02/22  1714   HGB 11.6* 9.3* 11.2*   WBC 6.6 5.6 7.4   RBC 3.61* 2.89* 3.50*   HCT 35.3* 28.6* 33.0*   MCV 98 99 94   MCH 32.1 32.2 32.0   MCHC 32.9 32.5 33.9   RDW 14.6 14.8 14.6    202 277     INRNo lab results found in last 7 days.  ABGNo lab results found in last 7 days.   URINE STUDIES  Recent Labs   Lab Test 02/07/22  2202 02/02/22  2324 07/12/21  1402 06/28/21  1150 06/15/21  1522   COLOR Yellow Yellow Yellow Orange Orange   APPEARANCE Cloudy* Cloudy* Clear Cloudy Cloudy   URINEGLC Negative Negative Negative 70* 50*   URINEBILI Negative Negative Moderate* Negative Negative   URINEKETONE Negative Negative 5 * Negative Negative   SG 1.008 1.018 >1.030* 1.027 1.015   UBLD Moderate* Large* Large* Large* Moderate*   URINEPH 5.5 5.5 5.0 6.0 5.5   PROTEIN 100 * 300 * >300* 300* 300*   UROBILINOGEN  --   --  1.0  --   --    NITRITE Negative Negative Negative Negative Negative   LEUKEST Large* Large* Large* Large* Large*   RBCU 22* 99*  --  >182* 35*   WBCU 174* >182*  --  >182* >182*     No lab results found.  PTH  Recent Labs   Lab Test 05/14/19  0939   PTHI 86*     IRON STUDIES  No lab results found.

## 2022-02-09 ENCOUNTER — APPOINTMENT (OUTPATIENT)
Dept: OCCUPATIONAL THERAPY | Facility: CLINIC | Age: 72
DRG: 698 | End: 2022-02-09
Payer: MEDICARE

## 2022-02-09 LAB
ANION GAP SERPL CALCULATED.3IONS-SCNC: 8 MMOL/L (ref 3–14)
BUN SERPL-MCNC: 33 MG/DL (ref 7–30)
CALCIUM SERPL-MCNC: 8.3 MG/DL (ref 8.5–10.1)
CHLORIDE BLD-SCNC: 110 MMOL/L (ref 94–109)
CO2 SERPL-SCNC: 20 MMOL/L (ref 20–32)
CREAT SERPL-MCNC: 2.27 MG/DL (ref 0.66–1.25)
CYSTATIN C SERPL-MCNC: 2.4 MG/L
ERYTHROCYTE [DISTWIDTH] IN BLOOD BY AUTOMATED COUNT: 14.8 % (ref 10–15)
GFR SERPL CREATININE-BSD FRML MDRD: 30 ML/MIN/1.73M2
GFR/BSA.PRED SERPLBLD CYS-BASED-ARV: 23 ML/MIN/BSA
GLUCOSE BLD-MCNC: 65 MG/DL (ref 70–99)
HCT VFR BLD AUTO: 28.8 % (ref 40–53)
HGB BLD-MCNC: 9.4 G/DL (ref 13.3–17.7)
MCH RBC QN AUTO: 32.3 PG (ref 26.5–33)
MCHC RBC AUTO-ENTMCNC: 32.6 G/DL (ref 31.5–36.5)
MCV RBC AUTO: 99 FL (ref 78–100)
PLATELET # BLD AUTO: 223 10E3/UL (ref 150–450)
POTASSIUM BLD-SCNC: 5 MMOL/L (ref 3.4–5.3)
RBC # BLD AUTO: 2.91 10E6/UL (ref 4.4–5.9)
SODIUM SERPL-SCNC: 138 MMOL/L (ref 133–144)
WBC # BLD AUTO: 4.9 10E3/UL (ref 4–11)

## 2022-02-09 PROCEDURE — 120N000002 HC R&B MED SURG/OB UMMC

## 2022-02-09 PROCEDURE — 250N000013 HC RX MED GY IP 250 OP 250 PS 637: Performed by: STUDENT IN AN ORGANIZED HEALTH CARE EDUCATION/TRAINING PROGRAM

## 2022-02-09 PROCEDURE — 85027 COMPLETE CBC AUTOMATED: CPT | Performed by: STUDENT IN AN ORGANIZED HEALTH CARE EDUCATION/TRAINING PROGRAM

## 2022-02-09 PROCEDURE — 80048 BASIC METABOLIC PNL TOTAL CA: CPT | Performed by: STUDENT IN AN ORGANIZED HEALTH CARE EDUCATION/TRAINING PROGRAM

## 2022-02-09 PROCEDURE — 97530 THERAPEUTIC ACTIVITIES: CPT | Mod: GO

## 2022-02-09 PROCEDURE — 97165 OT EVAL LOW COMPLEX 30 MIN: CPT | Mod: GO

## 2022-02-09 PROCEDURE — 99232 SBSQ HOSP IP/OBS MODERATE 35: CPT | Performed by: CLINICAL NURSE SPECIALIST

## 2022-02-09 PROCEDURE — 99233 SBSQ HOSP IP/OBS HIGH 50: CPT | Mod: GC | Performed by: FAMILY MEDICINE

## 2022-02-09 PROCEDURE — 36415 COLL VENOUS BLD VENIPUNCTURE: CPT | Performed by: STUDENT IN AN ORGANIZED HEALTH CARE EDUCATION/TRAINING PROGRAM

## 2022-02-09 PROCEDURE — L0456 TLSO FLEX TRNK SJ-SS PRE CST: HCPCS

## 2022-02-09 RX ORDER — CALCITONIN SALMON 200 [IU]/.09ML
1 SPRAY, METERED NASAL DAILY
Status: DISCONTINUED | OUTPATIENT
Start: 2022-02-09 | End: 2022-02-15

## 2022-02-09 RX ORDER — FLUCONAZOLE 100 MG/1
200 TABLET ORAL DAILY
Status: DISCONTINUED | OUTPATIENT
Start: 2022-02-09 | End: 2022-02-15

## 2022-02-09 RX ORDER — CALCITONIN SALMON 200 [IU]/.09ML
1 SPRAY, METERED NASAL DAILY
Status: DISCONTINUED | OUTPATIENT
Start: 2022-02-09 | End: 2022-02-09

## 2022-02-09 RX ADMIN — APIXABAN 5 MG: 5 TABLET, FILM COATED ORAL at 20:54

## 2022-02-09 RX ADMIN — ACETAMINOPHEN 975 MG: 325 TABLET, FILM COATED ORAL at 09:22

## 2022-02-09 RX ADMIN — APIXABAN 5 MG: 5 TABLET, FILM COATED ORAL at 09:23

## 2022-02-09 RX ADMIN — LIDOCAINE 2 PATCH: 246 PATCH TOPICAL at 09:25

## 2022-02-09 RX ADMIN — NYSTATIN: 100000 CREAM TOPICAL at 09:23

## 2022-02-09 RX ADMIN — RENO CAPS 1 CAPSULE: 100; 1.5; 1.7; 20; 10; 1; 150; 5; 6 CAPSULE ORAL at 09:23

## 2022-02-09 RX ADMIN — FLUCONAZOLE 200 MG: 100 TABLET ORAL at 09:22

## 2022-02-09 RX ADMIN — MICONAZOLE NITRATE: 20 POWDER TOPICAL at 20:54

## 2022-02-09 RX ADMIN — OXYCODONE HYDROCHLORIDE 5 MG: 5 TABLET ORAL at 03:13

## 2022-02-09 RX ADMIN — NYSTATIN: 100000 CREAM TOPICAL at 20:54

## 2022-02-09 RX ADMIN — FOLIC ACID 1 MG: 1 TABLET ORAL at 09:23

## 2022-02-09 RX ADMIN — OXYCODONE HYDROCHLORIDE 5 MG: 5 TABLET ORAL at 09:22

## 2022-02-09 RX ADMIN — POLYETHYLENE GLYCOL 3350 8.5 G: 17 POWDER, FOR SOLUTION ORAL at 09:23

## 2022-02-09 RX ADMIN — THIAMINE HCL TAB 100 MG 100 MG: 100 TAB at 09:22

## 2022-02-09 RX ADMIN — MICONAZOLE NITRATE: 20 POWDER TOPICAL at 09:23

## 2022-02-09 ASSESSMENT — ACTIVITIES OF DAILY LIVING (ADL)
ADLS_ACUITY_SCORE: 17
ADLS_ACUITY_SCORE: 19
ADLS_ACUITY_SCORE: 21
ADLS_ACUITY_SCORE: 17
ADLS_ACUITY_SCORE: 21
ADLS_ACUITY_SCORE: 19
ADLS_ACUITY_SCORE: 17
ADLS_ACUITY_SCORE: 21
ADLS_ACUITY_SCORE: 19
ADLS_ACUITY_SCORE: 17
ADLS_ACUITY_SCORE: 19
ADLS_ACUITY_SCORE: 17
ADLS_ACUITY_SCORE: 19
ADLS_ACUITY_SCORE: 17
ADLS_ACUITY_SCORE: 19

## 2022-02-09 NOTE — PLAN OF CARE
Pt is Aox4, VSS, RA. Skin intact except for a couple small abrasions to the upper posterior thighs, skin prep applied. Catheter cares done, no leaking noted since change of catheter yesterday. CHG wipes done for HD cath. Patient likes to refuse repositioning despite re-educating about the reasons and need for it, agreed to reposition to right side one time. No BM yet. Urine in catheter is still cloudy and yellow. Pt refused breakfast stating that he didn't feel hungry but was encouraged to eat lunch. /55 (BP Location: Right arm)   Pulse 70   Temp 97.2  F (36.2  C) (Oral)   Resp 20   Wt 112.1 kg (247 lb 2.2 oz)   SpO2 99%   BMI 31.73 kg/m

## 2022-02-09 NOTE — PROGRESS NOTES
Nephrology Progress Note  02/09/2022       Fer Latham is a 71 yom with hx of HTN, Aflutter (not on AC), DMII, ESRD 2/2 IgA nephropathy, and chronic alcohol abuse, admitted with two weeks of weakness and recurrent falls with new shortness of breath secondary to atrial fibrillation with RVR, frequent UTI's, T12 compression fx and RLE DVT who is admitted with UTI.       Interval History :   Mr Latham's Cr is up only slightly, made 1.5L of UOP yesterday without diuretic.  Holding on HD again today, will continue to decide daily as he clearly has some low level gfr.  Cystatin-C is pending, this should give us an idea of his clearance as Cr is likely misleading due to his low muscle mass.      Assessment & Recommendations:   ESRD-dialyzes 2x/week at [facility] on Tues/Thurs. Run time: 3 hrs 45 min but often shortens runs. Access: tunneled RIJ (failed AVF). EDW: 119 kg (need to reduce).                -Will decide daily while admitted on runs, holding today.                 -Getting records from outpt unit.                  -Checking Cystatin-C as he has not run in 5 days and clearance appears possibly above level of needing RRT.       Volume-Makes significant amount of UOP, intake is minimal.  Will consider diuretic if needed to augment UOP.        Electrolytes-K 5.0, bicarb 20, no acute issues.       BMD-Ca 8.3, Mg and Phos not checked today.      Anemia-Hgb 9.4, on decline the past 2 days, on 1800u EPO with runs.        Nutrition-Taking PO, intake minimal currently.      Time spent: 25 minutes on this date of encounter for chart review, physical exam, medical decision making and co-ordination of care.      Discussed with Dr Fernandez     Recommendations were communicated to primary team via verbal communication.        OCTAVIO Ivy CNS  Clinical Nurse Specialist  [phone]    Review of Systems:   I reviewed the following systems:  Gen: No fevers or chills  CV: No CP at rest  Resp: No SOB at rest  GI: No  N/V    Physical Exam:   I/O last 3 completed shifts:  In: 700 [P.O.:700]  Out: 1100 [Urine:1100]   /55 (BP Location: Right arm)   Pulse 70   Temp 97.2  F (36.2  C) (Oral)   Resp 20   Wt 115.9 kg (255 lb 8.2 oz)   SpO2 99%   BMI 32.81 kg/m       GENERAL APPEARANCE: alert and no distress  EYES: No scleral icterus  NECK:  No JVD  Pulmonary: lungs clear to auscultation with equal breath sounds bilaterally, no clubbing or cyanosis  CV: Regular rhythm, normal rate, no rub   - Edema trace  GI: soft, nontender, normal bowel sounds  MS: no evidence of inflammation in joints, no muscle tenderness  : + Brown  SKIN: no rash, warm, dry  NEURO: No focal deficits.     Labs:   All labs reviewed by me  Electrolytes/Renal - Recent Labs   Lab Test 02/09/22  0732 02/08/22  1139 02/08/22  1006 02/08/22  0758 02/08/22  0502 06/20/21  1123 06/19/21  0811 06/17/21  1656 06/15/21  1303 10/08/20  1030 05/14/20  1722 06/06/19  0136 05/17/19  1231 05/12/19  0728 05/11/19  1253     --  136  --  138   < > 134   < > 131*   < > 128*   < > 131*   < >  --    POTASSIUM 5.0  --  4.5  --  4.6   < > 5.4*   < > 3.5   < > 3.6   < > 3.8   < > 3.1*   CHLORIDE 110*  --  110*  --  109   < > 105   < > 100   < > 98   < > 96   < >  --    CO2 20  --  19*  --  21   < > 24   < > 19*   < > 21   < > 28   < >  --    BUN 33*  --  31*  --  27   < > 46*   < > 49*   < > 21   < > 35*   < >  --    CR 2.27*  --  2.16*  --  2.00*   < > 2.54*   < > 3.57*   < > 2.42*   < > 3.10*   < >  --    GLC 65* 105* 106*   < > 68*   < > 152*   < > 153*   < > 100*   < > 159*   < >  --    COLUMBA 8.3*  --  8.2*  --  8.6   < > 8.5   < > 8.9   < > 7.9*   < > 9.0   < >  --    MAG  --   --   --   --   --   --  2.1  --  2.2  --  1.8  --   --    < > 1.7   PHOS  --   --   --   --   --   --   --   --  4.2  --   --   --  3.0  --  2.6    < > = values in this interval not displayed.       CBC -   Recent Labs   Lab Test 02/09/22  0732 02/08/22  1006 02/07/22  3553   WBC 4.9 5.2 6.6   HGB  9.4* 10.2* 11.6*    229 263       LFTs -   Recent Labs   Lab Test 02/07/22  2128 02/04/22  0549 06/19/21  0811   ALKPHOS 140 100 81   BILITOTAL 0.3 0.4 0.5   ALT 13 10 11   AST 17 12 17   PROTTOTAL 8.5 6.4* 7.2   ALBUMIN 3.0* 2.3* 2.7*       Iron Panel - No lab results found.        Current Medications:    apixaban ANTICOAGULANT  5 mg Oral BID     fluconazole  200 mg Oral Daily     folic acid  1 mg Oral Daily     lidocaine  1-2 patch Transdermal Q24H     lidocaine   Transdermal Q8H     miconazole   Topical BID     multivitamin RENAL  1 capsule Oral Daily     nystatin   Topical BID     polyethylene glycol  8.5 g Oral Daily     sodium chloride (PF)  3 mL Intracatheter Q8H     thiamine  100 mg Oral Daily       [Held by provider] lactated ringers       - MEDICATION INSTRUCTIONS -

## 2022-02-09 NOTE — PROGRESS NOTES
[facility]    Progress Note - [facility]       Date of Admission:  2/7/2022    Family Medicine Progress Note - [facility]       Main Plans for Today     -stop ceftriaxone, start PO fluconazole     Assessment & Plan     Fer Latham is a 71 year old male with PMH recurrent UTI's, BPH, neurogenic bladder w/chronic ferguson, DM2 (diet controlled), HTN, ESRD on dialysis (twice weekly), chronic atrial fibrillation (on eliquis), alcohol abuse and T12 compression fracture who is admitted for UTI.     # Lactic Acidosis, resolved  # Concern for Acute Encephalopathy  On initial evaluation and extended physical therapy visit, distinct confusion noted with cognitive decline from baseline and previous admission. Likely resulting from existing infection. Worsening creatinine in the setting of ESRD. Family members expressing decreasing ability to complete ADLs at home independently and desire for TCU placement after hospitalization. Patient has had negative experiences with TCU's in the past, suggesting a different TCU this time may be met with a better result. AOx3 on this provider's examination, but does endorse confusion with plan of care and necessity of physical exams.  - Cognitive assessment pending with OT    # catheter associated UTI- e coli  # candida in urine  # suprapubic pain  # neurogenic bladder w/chronic ferguson  # overactive bladder  # BPH  Ferguson placed by urology June 2021. Follows at [facility] for urology cares. Has been on oxybutytnin and trospium but stopped 2/2 cost. Hx candidal cystitis June 2021-teated with fluconazole PO.     Recent admission 2/2-2/3, treated with zosyn and ceftriaxone. Discharged with cefdinir 300mg every other day x 7 days. Patient reports able to take his antibiotics at home without issue. Returned due to suprapubic/groin pain and increased drainage into catheter bag. Patient was initially started on ceftriaxone during  this hospitalization based on UA results and previous Ucx, but Ucx from this hospitalization only shows candida, no E. Coli.      Pt declined physical exam, however ED exam showed non-tender testicles, erythema of groin (likely fungal) and multiple sores in buttocks/groin. Normal WBC, afebrile. Does not meet sepsis criteria. Lactic acidosis resolved w/IVF. Brown last exchanged 2/8 and functioning well.      Prior urine cultures:  -2/8/2022: 10-50,000 candida  -2/2/22: pansensitive E coli  -7/12/21: >100,000 candida  -6/28/21: 10-50,000 candida  -6/15/21: mixed urogenital jerzy  -5/14/20: mixed urogenital jerzy  -10/2/19: 10-50,000 enterococcus faecalis, pansensitive     Plan:  -monitor new UCx - resulted in candida 10-50 000 on 2/8  -stop ceftriaxone, start PO fluconazole 200mg daily x14 days  -monitor bcx  -daily cbc, bmp     # tinea cruris  # chronic groin/buttocks wounds  Perianal wounds, L buttocks wound along with erythema in groin per ED exam.  -PTA miconazole powder, nystatin cream   -WOC consult     # pain mgmt  -PO oxycodone 5 mg q4hr prn  -tylenol TID  -lidocaine patch for back     # T12 compression fracture  # low back pain  Worsening of known T12 compression fx w/periverterbral edema. No fever/chills to suggest infection. Would not allow back exam but complaining of pain. Denies recent falls.   -neurosurgery consulted, appreciate recs              -no neurosurgical intervention              -lumbar MRI w/o contrast in AM  -up with assist (until able to assess gait/strength)  - PT/OT consulted     # patchy pneumonitis  Found on CT. No respiratory symptoms. Unclear etiology.      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 Electrolyte and kidney function currently ok/at baseline.  -nephrology consulted for dialysis - will continue to evaluate daily for dialysis  -daily cbc, bmp  -I&O, daily weights     # social  Concern for ability to care for self at home. Per ED exam, pt very  disheveled, has multiple sores in groin/buttocks. Had ziploc bag with unknown substance in his briefs. Reports able to take his home medications without difficulty. Decisional.   -needs further discussion with patient and/or family regarding baseline status, including ADLs/IADLs  -PT/OT  ________     # long term anticoagulant use  # hx DVT  # hx PE  # atrial fibrillation  Not on rate/rhythm control due to concerns for hypotension in the past. Was in RVR on admission, resolved with pain control. CHADSVASC of 4, therefore, needs anticoagulation.   -PTA eliquis 5mg BID  -per pharmD, patient was not taking eliquis at home due to cost; will speak to wife to determine plan for anticoagulation on discharge     # anxiety  # MDD  # chronic insomnia  -hold PTA valium 5mg Q12hr PRN (last filled Oct 2021 per PDMP)  -PTA melatonin  -PTA remeron 15mg at bedtime  -PTA seroquel 25mg PRN agitation     # hx alcohol abuse  Drinks alcohol, unclear when last drink was. Became agitated when asked.  -blood alcohol added on  -PTA folic acid & multivitamin  -PO thiamine daily  -consider adding on CIWA      # DM2  Not on medications, last A1C 5.2% in June 2021.  -A1C added on     # anemia of chronic disease  Baseline ~11. MCV macrocytic. Likely from alcohol use and kidney disease.  -daily cbc     # constipation  -daily miralax  -senna prn  -dulcolax suppository prn     Diet: Combination Diet Regular Diet Adult    DVT Prophylaxis: Eliquis, PTA  Brown Catheter: PRESENT, indication: Neurogenic Bladder, Neurogenic Bladder  Fluids: PO  Central Lines: PRESENT       Cardiac Monitoring: None  Code Status: Full Code      Disposition Plan   Expected Discharge: 02/10/2022     Anticipated discharge location:  Awaiting care coordination huddle     The patient's care was discussed with the Attending Physician, Dr. Jansen.    Abbey Puente MD  [place]  [facility]  Securely message  with the [employer] Web Console (learn more here)  Text page via [facility] Paging/Directory   Please see signed in provider for up to date coverage information    ______________________________________________________________________    Interval History     Patient seen and examined bedside, laying flat. Seems to be AOx2, but was very upset when asked orienting questions, noting he has answered these several times. Does not appear to have clear understanding of why he is in the hospital. Repeatedly notes that he would like to get some rest.     Data reviewed today: I reviewed all medications, new labs and imaging results over the last 24 hours.     Physical Exam   Vital Signs: Temp: 97.6  F (36.4  C) Temp src: Temporal BP: 111/65 Pulse: 71   Resp: 16 SpO2: 98 % O2 Device: None (Room air)    Weight: 255 lbs 8.21 oz  Physical Exam  Vitals reviewed.   Constitutional:       Appearance: Normal appearance.   HENT:      Head: Normocephalic.   Eyes:      Conjunctiva/sclera: Conjunctivae normal.   Cardiovascular:      Rate and Rhythm: Normal rate and regular rhythm.      Heart sounds: Normal heart sounds.   Pulmonary:      Effort: Pulmonary effort is normal.      Breath sounds: Normal breath sounds.   Genitourinary:     Comments: Patient declined    Musculoskeletal:         General: No swelling.   Skin:     General: Skin is warm and dry.   Neurological:      Mental Status: He is alert. He is disoriented.   Psychiatric:         Mood and Affect: Affect is angry.         Behavior: Behavior is uncooperative.

## 2022-02-09 NOTE — PROGRESS NOTES
TLSO was brought at 1345 and fitted to patient while in bed. Patient stated he wanted to keep it on and has refused to allow staff to remove since or reposition. Will pass on to next RN. /55 (BP Location: Right arm)   Pulse 70   Temp 97.2  F (36.2  C) (Oral)   Resp 20   Wt 115.9 kg (255 lb 8.2 oz)   SpO2 99%   BMI 32.81 kg/m

## 2022-02-09 NOTE — PROGRESS NOTES
02/09/22 1400   Quick Adds   Type of Visit Initial Occupational Therapy Evaluation   Living Environment   People in home spouse   Current Living Arrangements apartment   Living Environment Comments Pt lives in an apartment with his wife, unsure of access concerns as pt was very tangential and difficult to get straight answers from.    Self-Care   Usual Activity Tolerance poor   Current Activity Tolerance poor   Regular Exercise No   Equipment Currently Used at Home walker, rolling;wheelchair, manual;grab bar, toilet   Activity/Exercise/Self-Care Comment Pt reports he has grab bars by his toilet, was using a walker and a manual wheelchair at home.    Instrumental Activities of Daily Living (IADL)   IADL Comments Wife assists with most IADLs, reports his wife loves to cook.    Disability/Function   Hearing Difficulty or Deaf no   Wear Glasses or Blind yes   Vision Management glasses   Concentrating, Remembering or Making Decisions Difficulty yes   Difficulty Communicating no   Difficulty Eating/Swallowing no   Walking or Climbing Stairs Difficulty yes   Walking or Climbing Stairs ambulation difficulty, requires equipment   Mobility Management walker or wheelchair    Dressing/Bathing Difficulty yes   Dressing/Bathing Management Unable to complete bathing on her own.    Toileting issues yes   Toileting Management grab bars used at baseline    Toileting Assistance toileting difficulty, requires equipment   Doing Errands Independently Difficulty (such as shopping) yes   Errands Management wife assists    Fall history within last six months no   Change in Functional Status Since Onset of Current Illness/Injury yes   General Information   Onset of Illness/Injury or Date of Surgery 02/07/22   Referring Physician North Judd DO   Patient/Family Therapy Goal Statement (OT) return home, gain basic independence    Additional Occupational Profile Info/Pertinent History of Current Problem Per chart: Fer bains  71 yom with hx of HTN, Aflutter (not on AC), DMII, ESRD 2/2 IgA nephropathy, and chronic alcohol abuse, admitted with two weeks of weakness and recurrent falls with new shortness of breath secondary to atrial fibrillation with RVR, frequent UTI's, T12 compression fx and RLE DVT who is admitted with UTI.     Existing Precautions/Restrictions fall;spinal   Limitations/Impairments safety/cognitive   Left Upper Extremity (Weight-bearing Status) full weight-bearing (FWB)   Right Upper Extremity (Weight-bearing Status) full weight-bearing (FWB)   Left Lower Extremity (Weight-bearing Status) full weight-bearing (FWB)   Right Lower Extremity (Weight-bearing Status) full weight-bearing (FWB)   General Observations and Info Activity: up with assist    Cognitive Status Examination   Orientation Status orientation to person, place and time   Affect/Mental Status (Cognitive) confused   Follows Commands WNL   Safety Deficit moderate deficit   Memory Deficit moderate deficit   Attention Deficit moderate deficit   Cognitive Status Comments Tangential and distractible, often makes round about statements that do not always make sense. Pt may benefit from further cognitive testing but it is likely that pt has cognition deficits at baseline, limited insight to deficits and need to care for self.    Pain Assessment   Patient Currently in Pain Yes, see Vital Sign flowsheet   Posture   Posture forward head position;protracted shoulders   Range of Motion Comprehensive   Comment, General Range of Motion BUE ROM WFL    Strength Comprehensive (MMT)   Comment, General Manual Muscle Testing (MMT) Assessment BUE strength WFL 3/5    Coordination   Upper Extremity Coordination No deficits were identified   Gross Motor Coordination Impaired 2/2 weakness and pain.    Fine Motor Coordination Not tested, pt was able to feed self without difficulty and manipulate utensils.    Balance   Balance Comments Untseady on high falls risk.    Bathing Assessment    Comment (Bathing) Ax2   Upper Body Dressing Assessment   Comment (Upper Body Dressing) Ax1   Lower Body Dressing Assessment   Comment (Lower Body Dressing) Ax1    Grooming Assessment   Comment (Grooming) Ax1    Toileting   Comment (Toileting) Ax2    Clinical Impression   Criteria for Skilled Therapeutic Interventions Met (OT) yes;meets criteria;skilled treatment is necessary   OT Diagnosis Decreased ADL-I    OT Problem List-Impairments impacting ADL problems related to;activity tolerance impaired;balance;cognition;coordination;pain;strength;post-surgical precautions;flexibility;mobility   Assessment of Occupational Performance 5 or more Performance Deficits   Identified Performance Deficits ambulation, transferring, toileting, bathing, dressing   Planned Therapy Interventions (OT) ADL retraining;cognition;strengthening;progressive activity/exercise;transfer training   Clinical Decision Making Complexity (OT) low complexity   Therapy Frequency (OT) 5x/week   Predicted Duration of Therapy 1 week    Risk & Benefits of therapy have been explained evaluation/treatment results reviewed;care plan/treatment goals reviewed;risks/benefits reviewed;participants voiced agreement with care plan;current/potential barriers reviewed;participants included;patient   OT Discharge Planning    OT Discharge Recommendation (DC Rec) Transitional Care Facility   OT Rationale for DC Rec Pt is well below baseline level of function and is not able to care for self, currently needs at least Ax1-2 for all mobility and ADLs. Would benefit from further therapy to progress functional independence with all mobility and ADLs prior ro return home.    OT Brief overview of current status  Ax1-2   Total Evaluation Time (Minutes)   Total Evaluation Time (Minutes) 6

## 2022-02-09 NOTE — PLAN OF CARE
/51 (BP Location: Right arm)   Pulse 87   Temp 98.2  F (36.8  C) (Oral)   Resp 18   Wt 112.1 kg (247 lb 2.2 oz)   SpO2 99%   BMI 31.73 kg/m       MRI was completed in the evening. Patient has been refusing cares, requesting to be left alone. Patient also refused his pm Eliquis. Reported pain but refused to rate it. Later rated pain 8/10. Oxycodone was given. During assessment, patient stated I am not going over this with you again. Patient slept most part of the shift. Brown in place with adequate urine output. Continue with plan of care.

## 2022-02-10 ENCOUNTER — APPOINTMENT (OUTPATIENT)
Dept: OCCUPATIONAL THERAPY | Facility: CLINIC | Age: 72
DRG: 698 | End: 2022-02-10
Payer: MEDICARE

## 2022-02-10 PROCEDURE — 97535 SELF CARE MNGMENT TRAINING: CPT | Mod: GO

## 2022-02-10 PROCEDURE — 250N000013 HC RX MED GY IP 250 OP 250 PS 637: Performed by: STUDENT IN AN ORGANIZED HEALTH CARE EDUCATION/TRAINING PROGRAM

## 2022-02-10 PROCEDURE — 99233 SBSQ HOSP IP/OBS HIGH 50: CPT | Performed by: INTERNAL MEDICINE

## 2022-02-10 PROCEDURE — 97530 THERAPEUTIC ACTIVITIES: CPT | Mod: GO

## 2022-02-10 PROCEDURE — 99233 SBSQ HOSP IP/OBS HIGH 50: CPT | Mod: GC | Performed by: FAMILY MEDICINE

## 2022-02-10 PROCEDURE — 120N000002 HC R&B MED SURG/OB UMMC

## 2022-02-10 PROCEDURE — 99207 PR NON-BILLABLE SERV PER CHARTING: CPT | Performed by: CLINICAL NURSE SPECIALIST

## 2022-02-10 RX ADMIN — OXYCODONE HYDROCHLORIDE 5 MG: 5 TABLET ORAL at 21:13

## 2022-02-10 RX ADMIN — FOLIC ACID 1 MG: 1 TABLET ORAL at 09:15

## 2022-02-10 RX ADMIN — FLUCONAZOLE 200 MG: 100 TABLET ORAL at 09:15

## 2022-02-10 RX ADMIN — LIDOCAINE 1 PATCH: 246 PATCH TOPICAL at 09:16

## 2022-02-10 RX ADMIN — THIAMINE HCL TAB 100 MG 100 MG: 100 TAB at 09:15

## 2022-02-10 RX ADMIN — CALCITONIN SALMON 1 SPRAY: 200 SPRAY, METERED NASAL at 10:52

## 2022-02-10 RX ADMIN — OXYCODONE HYDROCHLORIDE 5 MG: 5 TABLET ORAL at 10:51

## 2022-02-10 RX ADMIN — ACETAMINOPHEN 975 MG: 325 TABLET, FILM COATED ORAL at 20:59

## 2022-02-10 RX ADMIN — APIXABAN 5 MG: 5 TABLET, FILM COATED ORAL at 20:59

## 2022-02-10 RX ADMIN — MICONAZOLE NITRATE: 20 POWDER TOPICAL at 09:15

## 2022-02-10 RX ADMIN — APIXABAN 5 MG: 5 TABLET, FILM COATED ORAL at 09:15

## 2022-02-10 RX ADMIN — ACETAMINOPHEN 975 MG: 325 TABLET, FILM COATED ORAL at 10:51

## 2022-02-10 RX ADMIN — NYSTATIN: 100000 CREAM TOPICAL at 09:15

## 2022-02-10 RX ADMIN — OXYCODONE HYDROCHLORIDE 5 MG: 5 TABLET ORAL at 16:47

## 2022-02-10 RX ADMIN — POLYETHYLENE GLYCOL 3350 8.5 G: 17 POWDER, FOR SOLUTION ORAL at 09:14

## 2022-02-10 RX ADMIN — SENNOSIDES 2 TABLET: 8.6 TABLET ORAL at 10:51

## 2022-02-10 RX ADMIN — RENO CAPS 1 CAPSULE: 100; 1.5; 1.7; 20; 10; 1; 150; 5; 6 CAPSULE ORAL at 09:15

## 2022-02-10 ASSESSMENT — ACTIVITIES OF DAILY LIVING (ADL)
ADLS_ACUITY_SCORE: 21
ADLS_ACUITY_SCORE: 25
ADLS_ACUITY_SCORE: 25
ADLS_ACUITY_SCORE: 21
ADLS_ACUITY_SCORE: 19
ADLS_ACUITY_SCORE: 21
ADLS_ACUITY_SCORE: 25
ADLS_ACUITY_SCORE: 21
ADLS_ACUITY_SCORE: 21
ADLS_ACUITY_SCORE: 19
ADLS_ACUITY_SCORE: 25
ADLS_ACUITY_SCORE: 21
ADLS_ACUITY_SCORE: 21
ADLS_ACUITY_SCORE: 25
ADLS_ACUITY_SCORE: 21
ADLS_ACUITY_SCORE: 25
ADLS_ACUITY_SCORE: 21
ADLS_ACUITY_SCORE: 19
ADLS_ACUITY_SCORE: 25

## 2022-02-10 NOTE — PLAN OF CARE
Pt Aox4, VSS, RA, pt states that his pain is manageable when at rest but gets worse w/ movement. Pt still refusing repositioning, was able to turn him two times this shift using the lit lift, floating heels w/ pills. Pt eating % of meals. No BM since admission and patient states that last BM was 2/6, miralax and PRN senna given. Catheter cares complete, adequate urine output. HD catheter dressing was changed today w/ sterile dressing, and change reminder placed in Epic. /75 (BP Location: Right arm)   Pulse 71   Temp 98.9  F (37.2  C) (Oral)   Resp 17   Wt 115.9 kg (255 lb 8.2 oz)   SpO2 100%   BMI 32.81 kg/m

## 2022-02-10 NOTE — PROGRESS NOTES
[facility]  Progress Note - [facility]       Date of Admission:  2/7/2022    Family Medicine Progress Note - [facility]       Main Plans for Today     -PO fluconazole   -Anticoagulation plan   -Dispo planning, TCU placement    Assessment & Plan     Fer Lahtam is a 71 year old male with PMH recurrent UTI's, BPH, neurogenic bladder w/chronic ferguson, DM2 (diet controlled), HTN, ESRD on dialysis (twice weekly), chronic atrial fibrillation (on eliquis), alcohol abuse and T12 compression fracture admitted for UTI.     # Catheter-associated UTI  # Candidal cystitis  # Neurogenic bladder w/chronic indwelling ferguson  # BPH with LUTS  Ferguson placed by urology June 2021. Has been on oxybutytnin and trospium but stopped 2/2 cost. Hx candidal cystitis June 2021-teated with fluconazole PO. Ferguson exchanged 2/8 (new size) and functioning well.      Recent admission 2/2-2/3, treated with zosyn and ceftriaxone. Discharged with cefdinir 300mg every other day x 7 days, reports adherence. Returned due to suprapubic/groin pain and increased drainage into catheter bag. Patient was initially started on ceftriaxone during this hospitalization based on UA results and previous Ucx, but Ucx from this hospitalization only growing Candida, no bacteria consistent with Candida cystitis vs. colonization.      -PO fluconazole 200mg daily x14 days  -daily cbc, bmp    Prior urine cultures:  -2/8/2022: 10-50,000 candida  -2/2/22: pansensitive E coli  -7/12/21: >100,000 candida  -6/28/21: 10-50,000 candida  -6/15/21: mixed urogenital jerzy  -5/14/20: mixed urogenital jerzy  -10/2/19: 10-50,000 enterococcus faecalis, pansensitive    # Lactic Acidosis, resolved  # Concern for acute encephalopathy  On initial evaluation and extended physical therapy visit, distinct confusion noted with cognitive decline from baseline and previous admission. Likely resulting from existing  infection. Worsening creatinine in the setting of ESRD. Family members expressing decreasing ability to complete ADLs at home independently and desire for TCU placement after hospitalization. Patient has had negative experiences with TCU's in the past, suggesting a different TCU this time may be met with a better result. AOx2 on this provider's examination, and endorses confusion with reason for hospitalization and plan of care.   - Per OT cognitive assessment on 2/9: A/Ox3, moderate deficits in safety, memory, and attention. May benefit from further cognitive testing, but likely has cognitive deficits at baseline, with limited insight.     # Tinea cruris  # Chronic groin/buttocks wounds  Perianal wounds, L buttocks wound along with erythema in groin per ED exam.  -PTA miconazole powder, nystatin cream   -WOC consulted, appreciate following     # T12 compression fracture  # low back pain  Worsening of known T12 compression fx w/periverterbral edema. No fever/chills to suggest infection. Denies recent falls. MRI lumbar spine 2/08 with moderate spinal canal narrowing at T11-T12. Neurosurgery consulted, no neurosurgical intervention planned.   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 Electrolyte and kidney function stable, continues to make urine. Cystatin C suggests gfr of 23 ml/min which suggests he may be able to hold on outpt dialysis per Nephrology.  -Nephrology consulted - continue to evaluate daily for need for dialysis  -daily cbc, bmp  -I&O, daily weights  ____________________________________________     # hx DVT  # hx PE  # atrial fibrillation  Not on rate/rhythm control due to concerns for hypotension in the past. Was in RVR on admission, resolved with pain control. [id number], therefore, needs  "anticoagulation though notably had not been taking Eliquis at home due to cost.   -Eliquis 5mg BID  -Discuss with patient and wife plan for anticoagulation on discharge (DOAC vs warfarin vs risks/benefits of no AC)     # anxiety  # MDD  # chronic insomnia  -hold PTA valium 5mg Q12hr PRN (last filled Oct 2021 per PDMP)  -PTA melatonin  -PTA remeron 15mg at bedtime  -PTA seroquel 25mg PRN agitation     # hx alcohol abuse  Drinks alcohol, unclear when last drink was. Became agitated when asked.  -PTA folic acid & multivitamin  -PO thiamine daily  -consider adding on CIWA     # DM2: Not on medications, A1C 5.1% on admission.     # Anemia of chronic disease: Baseline hgb ~11. Likely 2/2 alcohol use and CKD.     # Constipation  -daily miralax  -senna prn  -dulcolax suppository prn     Diet: Combination Diet Regular Diet Adult    DVT Prophylaxis: Eliquis, PTA  Brown Catheter: PRESENT, indication: Neurogenic Bladder, Neurogenic Bladder  Fluids: PO  Central Lines: PRESENT       Cardiac Monitoring: None  Code Status: Full Code      Disposition Plan   Expected Discharge: 02/10/2022     Anticipated discharge location:  Awaiting care coordination huddle     The patient's care was discussed with the Attending Physician, Dr. Jansen.    Lilly Coffman MD  [facility]  [facility]  Securely message with the Vocera Web Console (learn more here)  Text page via [facility] Paging/Directory   Please see signed in provider for up to date coverage information    ____________________________________________________    Interval History   No acute events. Adequate UOP via catheter. Refusing conversation, difficult to engage. Denies pain \"as long as I don't move\".     Data reviewed today: I reviewed all medications, new labs and imaging results over the last 24 hours.     Physical Exam   Vital Signs: Temp: 98.4  F (36.9  C) Temp src: Oral BP: 120/58 Pulse: 73   Resp: 18 " SpO2: 98 % O2 Device: None (Room air)    Weight: 255 lbs 8.21 oz  Physical Exam  Vitals reviewed.   Constitutional:       General: He is not in acute distress.     Appearance: Normal appearance. He is well-developed. He is not diaphoretic.   HENT:      Head: Normocephalic.   Cardiovascular:      Rate and Rhythm: Normal rate.   Pulmonary:      Effort: Pulmonary effort is normal. No respiratory distress.   Neurological:      Mental Status: He is alert.   Psychiatric:         Mood and Affect: Affect is angry.         Behavior: Behavior is uncooperative.

## 2022-02-10 NOTE — PLAN OF CARE
Shift Summary:     On Contact precautions for MRSA     Neuro: Alert and oriented x4. Patient had an experience this afternoon where he thought people were in his room and using his bathroom, RN assured patient that it was people talking in the hallway.   Respiratory: on room air   Cardiac: -  GI: tolerating a regular diet. Bowel sounds audible. No BM.   : Brown in place  Pain: patient denied pain this shift. Complained of discomfort while turning.   Skin: applied Nystatin and Micatin powder to groin area per order   Mobility: Bedbound. Turning as tolerated by patient. He has refused repositioning. Plan to be seen more by OT/PT to evaluate his mobility.     Continue plan of care

## 2022-02-10 NOTE — PROGRESS NOTES
Nephrology Progress Note  02/10/2022           Fer Latham is a 71 yom with hx of HTN, Aflutter (not on AC), DMII, ESRD 2/2 IgA nephropathy, and chronic alcohol abuse, admitted with two weeks of weakness and recurrent falls with new shortness of breath secondary to atrial fibrillation with RVR, frequent UTI's, T12 compression fx and RLE DVT who is admitted with UTI.       Interval History :   Mr Latham refused labs this am but Cystatin-C from 2/8 suggested gfr of 23ml/min so may be able to discharge to CKD clinic rather than on dialysis.  I am reaching out to his outpt dialysis unit to see where they would like to follow-up with him.       Assessment & Recommendations:   ESRD-dialyzes 2x/week at [facility] on Tues/Thurs. Run time: 3 hrs 45 min but often shortens runs. Access: tunneled RIJ (failed AVF). EDW: 119 kg (need to reduce).                -Has not needed HD this admission, may have enough gfr to be off of HD as an outpatient.                  -Cystatin C suggests gfr of 23 ml/min which suggests he may be able to hold on outpt dialysis.  I am reaching out to his outpt HD unit to see how they would like me to proceed.       Volume-Makes significant amount of UOP, intake is minimal.  Will consider diuretic if needed to augment UOP.        Electrolytes-Pt refused today     BMD-Pt refused today.      Anemia-Hgb 9.4 yesterday, not drawn today.        Nutrition-Taking PO, intake minimal currently.      Time spent: 25 minutes on this date of encounter for chart review, physical exam, medical decision making and co-ordination of care.      Discussed with Dr Fernandez     Recommendations were communicated to primary team via verbal communication.        OCTAVIO Ivy CNS  Clinical Nurse Specialist  [phone]      Review of Systems:   I reviewed the following systems:  Gen: No fevers or chills  CV: No CP at rest  Resp: No SOB at rest  GI: No N/V      Physical Exam:   I/O last 3 completed shifts:  In: -   Out:  900 [Urine:900]   /58 (BP Location: Right arm)   Pulse 73   Temp 98.4  F (36.9  C) (Oral)   Resp 18   Wt 115.9 kg (255 lb 8.2 oz)   SpO2 98%   BMI 32.81 kg/m       GENERAL APPEARANCE: alert and no distress  EYES: No scleral icterus  NECK:  No JVD  Pulmonary: lungs clear to auscultation with equal breath sounds bilaterally, no clubbing or cyanosis  CV: Regular rhythm, normal rate, no rub   - Edema trace  GI: soft, nontender, normal bowel sounds  MS: no evidence of inflammation in joints, no muscle tenderness  : + Brown  SKIN: no rash, warm, dry  NEURO: No focal deficits.     Labs:   All labs reviewed by me  Electrolytes/Renal - Recent Labs   Lab Test 02/09/22  0732 02/08/22  1139 02/08/22  1006 02/08/22  0758 02/08/22  0502 06/20/21  1123 06/19/21  0811 06/17/21  1656 06/15/21  1303 10/08/20  1030 05/14/20  1722 06/06/19  0136 05/17/19  1231 05/12/19  0728 05/11/19  1253     --  136  --  138   < > 134   < > 131*   < > 128*   < > 131*   < >  --    POTASSIUM 5.0  --  4.5  --  4.6   < > 5.4*   < > 3.5   < > 3.6   < > 3.8   < > 3.1*   CHLORIDE 110*  --  110*  --  109   < > 105   < > 100   < > 98   < > 96   < >  --    CO2 20  --  19*  --  21   < > 24   < > 19*   < > 21   < > 28   < >  --    BUN 33*  --  31*  --  27   < > 46*   < > 49*   < > 21   < > 35*   < >  --    CR 2.27*  --  2.16*  --  2.00*   < > 2.54*   < > 3.57*   < > 2.42*   < > 3.10*   < >  --    GLC 65* 105* 106*   < > 68*   < > 152*   < > 153*   < > 100*   < > 159*   < >  --    COLUMBA 8.3*  --  8.2*  --  8.6   < > 8.5   < > 8.9   < > 7.9*   < > 9.0   < >  --    MAG  --   --   --   --   --   --  2.1  --  2.2  --  1.8  --   --    < > 1.7   PHOS  --   --   --   --   --   --   --   --  4.2  --   --   --  3.0  --  2.6    < > = values in this interval not displayed.       CBC -   Recent Labs   Lab Test 02/09/22  0732 02/08/22  1006 02/07/22  5288   WBC 4.9 5.2 6.6   HGB 9.4* 10.2* 11.6*    229 263       LFTs -   Recent Labs   Lab Test  02/07/22  2128 02/04/22  0549 06/19/21  0811   ALKPHOS 140 100 81   BILITOTAL 0.3 0.4 0.5   ALT 13 10 11   AST 17 12 17   PROTTOTAL 8.5 6.4* 7.2   ALBUMIN 3.0* 2.3* 2.7*       Iron Panel - No lab results found.        Current Medications:    apixaban ANTICOAGULANT  5 mg Oral BID     calcitonin (salmon)  1 spray Alternating Nostrils Daily     fluconazole  200 mg Oral Daily     folic acid  1 mg Oral Daily     lidocaine  1-2 patch Transdermal Q24H     lidocaine   Transdermal Q8H     miconazole   Topical BID     multivitamin RENAL  1 capsule Oral Daily     nystatin   Topical BID     polyethylene glycol  8.5 g Oral Daily     sodium chloride (PF)  3 mL Intracatheter Q8H     thiamine  100 mg Oral Daily       [Held by provider] lactated ringers       - MEDICATION INSTRUCTIONS -

## 2022-02-10 NOTE — PLAN OF CARE
/53 (BP Location: Right arm)   Pulse 72   Temp 98.6  F (37  C) (Temporal)   Resp 18   Wt 115.9 kg (255 lb 8.2 oz)   SpO2 98%   BMI 32.81 kg/m       Resumed care of patient from 2300 - 0730.  Patient allowed repositioning once, refused other times. Refused to rate pain. Slept most part of the night. Brown cath in place with adequate urine output. VSS on RA. No acute events overnight. Continue with plan of care.

## 2022-02-10 NOTE — PROGRESS NOTES
"Care Management Follow Up    Length of Stay (days): 2    Expected Discharge Date: 02/11/2022     Concerns to be Addressed: TCU placement     Patient plan of care discussed at interdisciplinary rounds: Yes    Anticipated Discharge Disposition: TCU     Anticipated Discharge Services: TBD   Anticipated Discharge DME: TBD     Patient/family educated on Medicare website which has current facility and service quality ratings: yes   Education Provided on the Discharge Plan: NA   Patient/Family in Agreement with the Plan: NA     Referrals Placed by CM/SW:      Private pay costs discussed: Not applicable    Additional Information:    SW has attempted to meet with pt twice (2/9 and 2/10). Pt refuses to complete a CMA with SW or to have a conversation about discharge planning. When prompted to set up a time to meet, pt reports he is expecting to have \"too many visitors\" to have time to check in.     SW has provided pt with a list of TCU options and asked that he pick out some preferences. Pt reports he will do this. SW will follow up with pt later this afternoon to check on TCU preferences.      SW is not able to follow up with pt as he is resting. SW attempted to call pt's bed phone but the line was busy. SW will check in tomorrow regarding TCU referrals.       LANETTE Madrid, RADHA   7C    Phone: [phone]  Pager: [phone]      "

## 2022-02-11 LAB
ANION GAP SERPL CALCULATED.3IONS-SCNC: 6 MMOL/L (ref 3–14)
BUN SERPL-MCNC: 35 MG/DL (ref 7–30)
CALCIUM SERPL-MCNC: 8.5 MG/DL (ref 8.5–10.1)
CHLORIDE BLD-SCNC: 111 MMOL/L (ref 94–109)
CO2 SERPL-SCNC: 22 MMOL/L (ref 20–32)
CREAT SERPL-MCNC: 2.27 MG/DL (ref 0.66–1.25)
ERYTHROCYTE [DISTWIDTH] IN BLOOD BY AUTOMATED COUNT: 14.6 % (ref 10–15)
GFR SERPL CREATININE-BSD FRML MDRD: 30 ML/MIN/1.73M2
GLUCOSE BLD-MCNC: 68 MG/DL (ref 70–99)
HCT VFR BLD AUTO: 30.5 % (ref 40–53)
HGB BLD-MCNC: 9.9 G/DL (ref 13.3–17.7)
MCH RBC QN AUTO: 32.5 PG (ref 26.5–33)
MCHC RBC AUTO-ENTMCNC: 32.5 G/DL (ref 31.5–36.5)
MCV RBC AUTO: 100 FL (ref 78–100)
PLATELET # BLD AUTO: 200 10E3/UL (ref 150–450)
POTASSIUM BLD-SCNC: 5.2 MMOL/L (ref 3.4–5.3)
RBC # BLD AUTO: 3.05 10E6/UL (ref 4.4–5.9)
SODIUM SERPL-SCNC: 139 MMOL/L (ref 133–144)
WBC # BLD AUTO: 4.8 10E3/UL (ref 4–11)

## 2022-02-11 PROCEDURE — 250N000013 HC RX MED GY IP 250 OP 250 PS 637: Performed by: STUDENT IN AN ORGANIZED HEALTH CARE EDUCATION/TRAINING PROGRAM

## 2022-02-11 PROCEDURE — 120N000002 HC R&B MED SURG/OB UMMC

## 2022-02-11 PROCEDURE — 36415 COLL VENOUS BLD VENIPUNCTURE: CPT | Performed by: CLINICAL NURSE SPECIALIST

## 2022-02-11 PROCEDURE — 99232 SBSQ HOSP IP/OBS MODERATE 35: CPT | Mod: GC | Performed by: FAMILY MEDICINE

## 2022-02-11 PROCEDURE — 82310 ASSAY OF CALCIUM: CPT | Performed by: CLINICAL NURSE SPECIALIST

## 2022-02-11 PROCEDURE — 85027 COMPLETE CBC AUTOMATED: CPT | Performed by: STUDENT IN AN ORGANIZED HEALTH CARE EDUCATION/TRAINING PROGRAM

## 2022-02-11 PROCEDURE — 99232 SBSQ HOSP IP/OBS MODERATE 35: CPT | Performed by: CLINICAL NURSE SPECIALIST

## 2022-02-11 RX ORDER — FUROSEMIDE 20 MG
20 TABLET ORAL DAILY
Status: DISCONTINUED | OUTPATIENT
Start: 2022-02-11 | End: 2022-02-12

## 2022-02-11 RX ADMIN — MICONAZOLE NITRATE: 20 POWDER TOPICAL at 09:50

## 2022-02-11 RX ADMIN — CALCITONIN SALMON 1 SPRAY: 200 SPRAY, METERED NASAL at 09:50

## 2022-02-11 RX ADMIN — NYSTATIN: 100000 CREAM TOPICAL at 09:50

## 2022-02-11 RX ADMIN — BISACODYL 10 MG: 10 SUPPOSITORY RECTAL at 09:41

## 2022-02-11 RX ADMIN — ACETAMINOPHEN 975 MG: 325 TABLET, FILM COATED ORAL at 09:40

## 2022-02-11 RX ADMIN — POLYETHYLENE GLYCOL 3350 8.5 G: 17 POWDER, FOR SOLUTION ORAL at 09:40

## 2022-02-11 RX ADMIN — APIXABAN 5 MG: 5 TABLET, FILM COATED ORAL at 09:40

## 2022-02-11 RX ADMIN — FOLIC ACID 1 MG: 1 TABLET ORAL at 09:40

## 2022-02-11 RX ADMIN — LIDOCAINE 1 PATCH: 246 PATCH TOPICAL at 09:50

## 2022-02-11 RX ADMIN — RENO CAPS 1 CAPSULE: 100; 1.5; 1.7; 20; 10; 1; 150; 5; 6 CAPSULE ORAL at 09:40

## 2022-02-11 RX ADMIN — THIAMINE HCL TAB 100 MG 100 MG: 100 TAB at 09:40

## 2022-02-11 RX ADMIN — APIXABAN 5 MG: 5 TABLET, FILM COATED ORAL at 21:45

## 2022-02-11 RX ADMIN — FLUCONAZOLE 200 MG: 100 TABLET ORAL at 09:41

## 2022-02-11 RX ADMIN — ACETAMINOPHEN 975 MG: 325 TABLET, FILM COATED ORAL at 21:50

## 2022-02-11 RX ADMIN — OXYCODONE HYDROCHLORIDE 5 MG: 5 TABLET ORAL at 21:50

## 2022-02-11 RX ADMIN — SENNOSIDES 2 TABLET: 8.6 TABLET ORAL at 09:40

## 2022-02-11 RX ADMIN — FUROSEMIDE 20 MG: 20 TABLET ORAL at 15:54

## 2022-02-11 ASSESSMENT — ACTIVITIES OF DAILY LIVING (ADL)
ADLS_ACUITY_SCORE: 23

## 2022-02-11 NOTE — PROGRESS NOTES
Care Management Follow Up    Length of Stay (days): 3    Expected Discharge Date: 02/11/2022     Concerns to be Addressed: discharge planning, TCU placement      Patient plan of care discussed at interdisciplinary rounds: Yes    Anticipated Discharge Disposition: Transitional Care      Anticipated Discharge Services: TBD   Anticipated Discharge DME: TBD     Patient/family educated on Medicare website which has current facility and service quality ratings: yes    Education Provided on the Discharge Plan: yes   Patient/Family in Agreement with the Plan: yes    Referrals Placed by CM/SW:      [facility] - Ph: [phone]    [facility] - Ph: [phone]    [facility] - Ph: [phone]    [facility] - Ph: [phone]    [facility] - Ph: [phone]    [facility] - Ph: [phone]    Private pay costs discussed: Not applicable    Additional Information:    SW spoke with pt who requests that SW coordinate with his wife regarding TCU preferences. SW called pt's wife Ailin who is okay with SW making referrals to TCU as long as they have five stars. Ailin reports that the facility pt was at previously was not acceptable.     SW has made referrals for TCU. SW will continue to follow up for TCU placement and discharge planning.     LANETTE Madrid, RADHA   7C    Phone: [phone]  Pager: [phone]

## 2022-02-11 NOTE — PLAN OF CARE
Pt is Aox4, VSS. RA. Patient states that his pain is managed better but doesn't put a rating on it, nonverbal ques of pain have improved greatly. Patient allowed for lab collection w/ encouragement from the RN. Brown intact, urine is clear and light yellow, w/ adequate output. PRN sennakot-doculate and suppository given w/ scheduled miralax d/t 6th day w/o a BM, pt had a large brown soft incontinent stool. Skin cleaned and skin prep applied. CHG bed bath completed and fresh clothing from home put on w/ assistance. Patient does appear to be log rolling better today. Pt states that his wife doesn't plan to come today d/t diarrhea, will inform the  so they can communicate w/ patient and spouse about choices on TCU placement. Patient has fair appetite today ate 75% of breakfast. Patient more agreeable to reposition roughly q 2hrs today. /75 (BP Location: Right arm)   Pulse 71   Temp 98.9  F (37.2  C) (Oral)   Resp 17   Wt 115.9 kg (255 lb 8.2 oz)   SpO2 100%   BMI 32.81 kg/m

## 2022-02-11 NOTE — PLAN OF CARE
VSS, has refused VS at times. A&Ox4. Brown patent with adequate urine output, passing gas, no BM. Tolerating regular diet. Pain managed with prn oxycodone x1 and prn tylenol x1. Needs repositioning q2h, but pt refuses most times, use lift. PIV saline locked. CVC internal jugular, dressing clean/dry/intact. Contact precautions maintained. Continue plan of care.

## 2022-02-11 NOTE — PROGRESS NOTES
[facility]  Progress Note - [facility]       Date of Admission:  2/7/2022    Family Medicine Progress Note - [facility]       Main Plans for Today     - cont PO fluconazole   - Dispo planning, TCU placement    Assessment & Plan     Fer Latham is a 71 year old male with PMH recurrent UTI's, BPH, neurogenic bladder w/chronic ferguson, DM2 (diet controlled), HTN, ESRD on dialysis (twice weekly), chronic atrial fibrillation (on eliquis), alcohol abuse and T12 compression fracture admitted for UTI.     # Catheter-associated UTI  # Candidal cystitis  # Neurogenic bladder w/chronic indwelling ferguson  # BPH with LUTS  Ferguson placed by urology June 2021. Has been on oxybutytnin and trospium but stopped 2/2 cost. Hx candidal cystitis June 2021-teated with fluconazole PO. Ferguson exchanged 2/8 (new size) and functioning well.      Recent admission 2/2-2/3, treated with zosyn and ceftriaxone. Discharged with cefdinir 300mg every other day x 7 days, reports adherence. Returned due to suprapubic/groin pain and increased drainage into catheter bag. Patient was initially started on ceftriaxone during this hospitalization based on UA results and previous Ucx, but Ucx from this hospitalization only growing Candida, no bacteria consistent with Candida cystitis vs. colonization.      -PO fluconazole 200mg daily x14 days (2/9-)  -daily cbc, bmp    Prior urine cultures:  -2/8/2022: 10-50,000 candida  -2/2/22: pansensitive E coli  -7/12/21: >100,000 candida  -6/28/21: 10-50,000 candida  -6/15/21: mixed urogenital jerzy  -5/14/20: mixed urogenital jerzy  -10/2/19: 10-50,000 enterococcus faecalis, pansensitive    # Lactic Acidosis, resolved  # Concern for acute encephalopathy  On initial evaluation and extended physical therapy visit, distinct confusion noted with cognitive decline from baseline and previous admission. Likely resulting from existing infection.  Worsening creatinine in the setting of ESRD. Family members expressing decreasing ability to complete ADLs at home independently and desire for TCU placement after hospitalization. Patient has had negative experiences with TCU's in the past, suggesting a different TCU this time may be met with a better result. AOx2 on this provider's examination, and endorses confusion with reason for hospitalization and plan of care.   - Per OT cognitive assessment on 2/9: A/Ox3, moderate deficits in safety, memory, and attention. May benefit from further cognitive testing, but likely has cognitive deficits at baseline, with limited insight.     # Tinea cruris  # Chronic groin/buttocks wounds  Perianal wounds, L buttocks wound along with erythema in groin per ED exam.  -PTA miconazole powder, nystatin cream   -WOC consulted, appreciate following     # T12 compression fracture  # low back pain  Worsening of known T12 compression fx w/periverterbral edema. No fever/chills to suggest infection. Denies recent falls. MRI lumbar spine 2/08 with moderate spinal canal narrowing at T11-T12. Neurosurgery consulted, no neurosurgical intervention planned.   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 Electrolyte and kidney function stable, continues to make urine. Cystatin C suggests gfr of 23 ml/min which suggests he may be able to hold on outpt dialysis per Nephrology. Output of 1.6 L yesterday.   -Nephrology consulted    - no dialysis indicated now, coordinated to outpatient nephrologist to have patient follow up with CKD clinic   - due to uptrending K (5.2 this AM), given 1x dose of PO lasix 20mg  -daily cbc, bmp  -I&O, daily weights  ____________________________________________     # hx DVT  # hx PE  # atrial fibrillation  Not on  rate/rhythm control due to concerns for hypotension in the past. Was in RVR on admission, resolved with pain control. [id number], therefore, needs anticoagulation though notably had not been taking Eliquis at home due to cost.   -Eliquis 5mg BID  -Discussed anticoagulation plan with patient's wife, who declined eliquis due to costs and warfarin due to inconvenience of frequent labs     # anxiety  # MDD  # chronic insomnia  -hold PTA valium 5mg Q12hr PRN (last filled Oct 2021 per PDMP)  -PTA melatonin  -PTA remeron 15mg at bedtime  -PTA seroquel 25mg PRN agitation     # hx alcohol abuse  Drinks alcohol, unclear when last drink was. Became agitated when asked.  -PTA folic acid & multivitamin  -PO thiamine daily  -consider adding on CIWA     # DM2: Not on medications, A1C 5.1% on admission.     # Anemia of chronic disease: Baseline hgb ~11. Likely 2/2 alcohol use and CKD.     # Constipation  -daily miralax  -senna prn  -dulcolax suppository prn     Diet: Combination Diet Regular Diet Adult    DVT Prophylaxis: Eliquis, PTA  Brown Catheter: PRESENT, indication: Neurogenic Bladder, Neurogenic Bladder  Fluids: PO  Central Lines: PRESENT  CVC Double Lumen Right Internal jugular Tunneled;Non - valved (open ended)-Site Assessment: WDL    Cardiac Monitoring: None  Code Status: Full Code      Disposition Plan   Expected Discharge: 02/11/2022     Anticipated discharge location:  Awaiting care coordination huddle     The patient's care was discussed with the Attending Physician, Dr. Jansen.    Abbey Puente MD  [facility]  [facility]  Securely message with the Vocera Web Console (learn more here)  Text page via [facility] Paging/Directory   Please see signed in provider for up to date coverage information    ____________________________________________________    Interval History   No acute events. Adequate UOP via catheter. Refusing conversation, difficult to  engage. Noting some abdominal pain after a suppository, but refused abdominal exam.     Data reviewed today: I reviewed all medications, new labs and imaging results over the last 24 hours.     Physical Exam   Vital Signs: Temp: 98.9  F (37.2  C) Temp src: Oral BP: 125/75 Pulse: 71   Resp: 17 SpO2: 100 % O2 Device: None (Room air)    Weight: 255 lbs 8.21 oz  Physical Exam  Vitals reviewed.   Constitutional:       General: He is not in acute distress.     Appearance: Normal appearance. He is well-developed. He is not diaphoretic.   HENT:      Head: Normocephalic.   Cardiovascular:      Rate and Rhythm: Normal rate.   Pulmonary:      Effort: Pulmonary effort is normal. No respiratory distress.   Neurological:      Mental Status: He is alert.   Psychiatric:         Mood and Affect: Affect is angry.         Behavior: Behavior is uncooperative.

## 2022-02-11 NOTE — PROGRESS NOTES
Renal provider requested patient's R CVC dressing be changed and CVC flushed prior to patient discharge.   Checked dressing - new dressing applied yesterday; is CDI w/ BioPatch visible and no drainage noted. Thus, dressing not changed.     CVC limbs wrapped with heparin lock stickers applied; appears to have been flushed yesterday. Thus CVC left unchanged.

## 2022-02-11 NOTE — PROGRESS NOTES
Nephrology Progress Note  02/11/2022            Fer Latham is a 71 yom with hx of HTN, Aflutter (not on AC), DMII, ESRD 2/2 IgA nephropathy, and chronic alcohol abuse, admitted with two weeks of weakness and recurrent falls with new shortness of breath secondary to atrial fibrillation with RVR, frequent UTI's, T12 compression fx and RLE DVT who is admitted with UTI.       Interval History :   Mr Latham's Cr is stable at 2.3 and Cystatin-C suggests a gfr of ~23.  I discussed with his outpt nephrologist who agreed we can hold on outpt dialysis.  He did not feel strongly he personally needed to follow-up with pt.  I discussed with pt and offered either to follow up with [facility] [place] [facility] can arrange with his current group, he is closer to [facility] geographically so I will set him up in our CKD clinic.  Plan to keep his tunneled line for now, HD RN will flush it and change dressing today.       Assessment & Recommendations:   ESRD-dialyzes 2x/week at [facility] on Tues/Thurs. Run time: 3 hrs 45 min but often shortens runs. Access: tunneled RIJ (failed AVF). EDW: 119 kg (need to reduce).                -Has not needed HD this admission, appears to have enough gfr to be off of HD as an outpatient.                  -Cystatin C suggests gfr of 23 ml/min which suggests he may be able to hold on outpt dialysis.  I discussed with Dr Santiago who has followed him in HD, agreed he can follow up with CKD clinic but will hold on pulling line.       Volume-Makes significant amount of UOP, intake is minimal.  Can start loop diuretic to keep up with intake as wt is up a bit.       Electrolytes-K 5.2, can add some lasix to regimen to increase excretion if it continues to increase.  Bicarb 22.       BMD-Ca 8.5     Anemia-Hgb 9.9 yesterday, not drawn today.        Nutrition-Taking PO, intake minimal currently.      Time spent: 25 minutes on this date of encounter for chart review, physical exam, medical decision making and  co-ordination of care.        OCTAVIO Ivy CNS  Clinical Nurse Specialist  [phone]      Review of Systems:   I reviewed the following systems:  Gen: No fevers or chills  CV: No CP at rest  Resp: No SOB at rest  GI: No N/V      Physical Exam:   I/O last 3 completed shifts:  In: 450 [P.O.:450]  Out: 1350 [Urine:1350]   BP (!) 142/72 (BP Location: Right arm)   Pulse 76   Temp 97.8  F (36.6  C) (Oral)   Resp 18   Wt 115.9 kg (255 lb 8.2 oz)   SpO2 100%   BMI 32.81 kg/m       GENERAL APPEARANCE: alert and no distress  EYES: No scleral icterus  NECK:  No JVD  Pulmonary: lungs clear to auscultation with equal breath sounds bilaterally, no clubbing or cyanosis  CV: Regular rhythm, normal rate, no rub   - Edema trace  GI: soft, nontender, normal bowel sounds  MS: no evidence of inflammation in joints, no muscle tenderness  : + Brown  SKIN: no rash, warm, dry  NEURO: No focal deficits.     Labs:   All labs reviewed by me  Electrolytes/Renal - Recent Labs   Lab Test 02/11/22  0941 02/09/22  0732 02/08/22  1139 02/08/22  1006 06/20/21  1123 06/19/21  0811 06/17/21  1656 06/15/21  1303 10/08/20  1030 05/14/20  1722 06/06/19  0136 05/17/19  1231 05/12/19  0728 05/11/19  1253    138  --  136   < > 134   < > 131*   < > 128*   < > 131*   < >  --    POTASSIUM 5.2 5.0  --  4.5   < > 5.4*   < > 3.5   < > 3.6   < > 3.8   < > 3.1*   CHLORIDE 111* 110*  --  110*   < > 105   < > 100   < > 98   < > 96   < >  --    CO2 22 20  --  19*   < > 24   < > 19*   < > 21   < > 28   < >  --    BUN 35* 33*  --  31*   < > 46*   < > 49*   < > 21   < > 35*   < >  --    CR 2.27* 2.27*  --  2.16*   < > 2.54*   < > 3.57*   < > 2.42*   < > 3.10*   < >  --    GLC 68* 65* 105* 106*   < > 152*   < > 153*   < > 100*   < > 159*   < >  --    COLUMBA 8.5 8.3*  --  8.2*   < > 8.5   < > 8.9   < > 7.9*   < > 9.0   < >  --    MAG  --   --   --   --   --  2.1  --  2.2  --  1.8  --   --    < > 1.7   PHOS  --   --   --   --   --   --   --  4.2  --   --    --  3.0  --  2.6    < > = values in this interval not displayed.       CBC -   Recent Labs   Lab Test 02/11/22  0941 02/09/22  0732 02/08/22  1006   WBC 4.8 4.9 5.2   HGB 9.9* 9.4* 10.2*    223 229       LFTs -   Recent Labs   Lab Test 02/07/22  2128 02/04/22  0549 06/19/21  0811   ALKPHOS 140 100 81   BILITOTAL 0.3 0.4 0.5   ALT 13 10 11   AST 17 12 17   PROTTOTAL 8.5 6.4* 7.2   ALBUMIN 3.0* 2.3* 2.7*       Iron Panel - No lab results found.        Current Medications:    apixaban ANTICOAGULANT  5 mg Oral BID     calcitonin (salmon)  1 spray Alternating Nostrils Daily     fluconazole  200 mg Oral Daily     folic acid  1 mg Oral Daily     lidocaine  1-2 patch Transdermal Q24H     lidocaine   Transdermal Q8H     miconazole   Topical BID     multivitamin RENAL  1 capsule Oral Daily     nystatin   Topical BID     polyethylene glycol  8.5 g Oral Daily     sodium chloride (PF)  3 mL Intracatheter Q8H     thiamine  100 mg Oral Daily       [Held by provider] lactated ringers       - MEDICATION INSTRUCTIONS -

## 2022-02-12 LAB
ANION GAP SERPL CALCULATED.3IONS-SCNC: 7 MMOL/L (ref 3–14)
BUN SERPL-MCNC: 34 MG/DL (ref 7–30)
CALCIUM SERPL-MCNC: 9 MG/DL (ref 8.5–10.1)
CHLORIDE BLD-SCNC: 109 MMOL/L (ref 94–109)
CO2 SERPL-SCNC: 22 MMOL/L (ref 20–32)
CREAT SERPL-MCNC: 2.51 MG/DL (ref 0.66–1.25)
GFR SERPL CREATININE-BSD FRML MDRD: 27 ML/MIN/1.73M2
GLUCOSE BLD-MCNC: 70 MG/DL (ref 70–99)
POTASSIUM BLD-SCNC: 5.3 MMOL/L (ref 3.4–5.3)
SODIUM SERPL-SCNC: 138 MMOL/L (ref 133–144)

## 2022-02-12 PROCEDURE — 250N000013 HC RX MED GY IP 250 OP 250 PS 637: Performed by: STUDENT IN AN ORGANIZED HEALTH CARE EDUCATION/TRAINING PROGRAM

## 2022-02-12 PROCEDURE — 99232 SBSQ HOSP IP/OBS MODERATE 35: CPT | Mod: GC | Performed by: FAMILY MEDICINE

## 2022-02-12 PROCEDURE — 36415 COLL VENOUS BLD VENIPUNCTURE: CPT | Performed by: STUDENT IN AN ORGANIZED HEALTH CARE EDUCATION/TRAINING PROGRAM

## 2022-02-12 PROCEDURE — 80048 BASIC METABOLIC PNL TOTAL CA: CPT | Performed by: STUDENT IN AN ORGANIZED HEALTH CARE EDUCATION/TRAINING PROGRAM

## 2022-02-12 PROCEDURE — 120N000002 HC R&B MED SURG/OB UMMC

## 2022-02-12 RX ORDER — NALOXONE HYDROCHLORIDE 0.4 MG/ML
0.2 INJECTION, SOLUTION INTRAMUSCULAR; INTRAVENOUS; SUBCUTANEOUS
Status: DISCONTINUED | OUTPATIENT
Start: 2022-02-12 | End: 2022-03-02 | Stop reason: HOSPADM

## 2022-02-12 RX ORDER — FUROSEMIDE 20 MG
40 TABLET ORAL DAILY
Status: DISCONTINUED | OUTPATIENT
Start: 2022-02-13 | End: 2022-02-15

## 2022-02-12 RX ORDER — FUROSEMIDE 20 MG
20 TABLET ORAL ONCE
Status: DISCONTINUED | OUTPATIENT
Start: 2022-02-12 | End: 2022-02-12

## 2022-02-12 RX ORDER — NALOXONE HYDROCHLORIDE 0.4 MG/ML
0.4 INJECTION, SOLUTION INTRAMUSCULAR; INTRAVENOUS; SUBCUTANEOUS
Status: DISCONTINUED | OUTPATIENT
Start: 2022-02-12 | End: 2022-03-02 | Stop reason: HOSPADM

## 2022-02-12 RX ORDER — FUROSEMIDE 20 MG
20 TABLET ORAL DAILY
Status: DISCONTINUED | OUTPATIENT
Start: 2022-02-12 | End: 2022-02-12

## 2022-02-12 RX ORDER — OXYCODONE HYDROCHLORIDE 5 MG/1
5 TABLET ORAL EVERY 6 HOURS PRN
Status: DISCONTINUED | OUTPATIENT
Start: 2022-02-12 | End: 2022-02-15

## 2022-02-12 RX ORDER — FUROSEMIDE 20 MG
20 TABLET ORAL ONCE
Status: COMPLETED | OUTPATIENT
Start: 2022-02-12 | End: 2022-02-12

## 2022-02-12 RX ADMIN — MICONAZOLE NITRATE: 20 POWDER TOPICAL at 09:13

## 2022-02-12 RX ADMIN — FLUCONAZOLE 200 MG: 100 TABLET ORAL at 09:04

## 2022-02-12 RX ADMIN — FOLIC ACID 1 MG: 1 TABLET ORAL at 09:04

## 2022-02-12 RX ADMIN — FUROSEMIDE 20 MG: 20 TABLET ORAL at 09:04

## 2022-02-12 RX ADMIN — NYSTATIN: 100000 CREAM TOPICAL at 09:13

## 2022-02-12 RX ADMIN — THIAMINE HCL TAB 100 MG 100 MG: 100 TAB at 09:04

## 2022-02-12 RX ADMIN — OXYCODONE HYDROCHLORIDE 5 MG: 5 TABLET ORAL at 09:36

## 2022-02-12 RX ADMIN — APIXABAN 5 MG: 5 TABLET, FILM COATED ORAL at 09:04

## 2022-02-12 RX ADMIN — FUROSEMIDE 20 MG: 20 TABLET ORAL at 14:31

## 2022-02-12 RX ADMIN — APIXABAN 5 MG: 5 TABLET, FILM COATED ORAL at 20:40

## 2022-02-12 RX ADMIN — LIDOCAINE 1 PATCH: 246 PATCH TOPICAL at 09:13

## 2022-02-12 RX ADMIN — OXYCODONE HYDROCHLORIDE 5 MG: 5 TABLET ORAL at 20:44

## 2022-02-12 RX ADMIN — RENO CAPS 1 CAPSULE: 100; 1.5; 1.7; 20; 10; 1; 150; 5; 6 CAPSULE ORAL at 09:04

## 2022-02-12 RX ADMIN — CALCITONIN SALMON 1 SPRAY: 200 SPRAY, METERED NASAL at 09:13

## 2022-02-12 ASSESSMENT — ACTIVITIES OF DAILY LIVING (ADL)
ADLS_ACUITY_SCORE: 23
ADLS_ACUITY_SCORE: 19
ADLS_ACUITY_SCORE: 23
ADLS_ACUITY_SCORE: 19
ADLS_ACUITY_SCORE: 19
ADLS_ACUITY_SCORE: 21
ADLS_ACUITY_SCORE: 19
ADLS_ACUITY_SCORE: 23
ADLS_ACUITY_SCORE: 19
ADLS_ACUITY_SCORE: 23
ADLS_ACUITY_SCORE: 23
ADLS_ACUITY_SCORE: 19
ADLS_ACUITY_SCORE: 19
ADLS_ACUITY_SCORE: 23
ADLS_ACUITY_SCORE: 19

## 2022-02-12 NOTE — PROGRESS NOTES
Care Management Initial Consult    General Information  Assessment completed with: VM-chart review  Type of CM/SW Visit: Initial Assessment    Primary Care Provider verified and updated as needed: No   Readmission within the last 30 days: no previous admission in last 30 days   Reason for Consult: discharge planning  Advance Care Planning: No docs on chart           Communication Assessment  Patient's communication style: spoken language (English or Bilingual)    Hearing Difficulty or Deaf: no   Wear Glasses or Blind: yes    Cognitive  Cognitive/Neuro/Behavioral: WDL  Level of Consciousness: alert  Arousal Level: opens eyes spontaneously  Orientation: disoriented to,time  Mood/Behavior: calm,cooperative  Best Language: 0 - No aphasia  Speech: clear,spontaneous    Living Environment:   People in home: spouse     Current living Arrangements: apartment      Able to return to prior arrangements: yes       Family/Social Support:  Care provided by: self,spouse/significant other  Provides care for: no one  Marital Status:         Description of Support System: Wife,Children, Supportive,Involved    Support Assessment: Adequate social supports    Current Resources:   Patient receiving home care services: Unknown      Community Resources: Unknown    Equipment currently used at home: walker, rolling,wheelchair, manual,grab bar, toilet  Supplies currently used at home: Unknown      Employment/Financial:  Employment Status: Unknown          Financial Concerns: Unknown        Lifestyle & Psychosocial Needs:  Social Determinants of Health     Tobacco Use: Medium Risk     Smoking Tobacco Use: Former Smoker     Smokeless Tobacco Use: Never Used   Alcohol Use: Not on file   Financial Resource Strain: Not on file   Food Insecurity: Not on file   Transportation Needs: Not on file   Physical Activity: Not on file   Stress: Not on file   Social Connections: Not on file   Intimate Partner Violence: Not on file   Depression: Not at  risk     PHQ-2 Score: 0   Housing Stability: Not on file       Functional Status:  Prior to admission patient needed assistance: Wife assist with most ADLs     Mental Health Status: Unknown        Chemical Dependency Status:  Chemical Dependency Status: Current Concern; chronic alcohol abuse           Values/Beliefs:  Spiritual, Cultural Beliefs, Mandaen Practices, Values that affect care: Unknown                Additional Information:  SARMAD attempted to meet with pt to complete initial assessment and he declined to meet with writer. He gave permission to call his wife to assist in completing this assessment but she did not return VM.     SARMAD is following for TCU referrals at discharge.     **Reach out to me today at [phone].**    LANETTE Lanza, LGSW  [facility]   [facility]  Ph: [phone]//Pager: [phone]  Email: [email or web address]  Pronouns: she/her/hers

## 2022-02-12 NOTE — PROGRESS NOTES
Care Management Follow Up    Length of Stay (days): 4    Expected Discharge Date: 02/13/2022     Concerns to be Addressed: discharge planning       Patient plan of care discussed at interdisciplinary rounds: Yes    Anticipated Discharge Disposition: TCU       Anticipated Discharge Services: Therapy    Anticipated Discharge DME: TBD    Patient/family educated on Medicare website which has current facility and service quality ratings: Yes  Education Provided on the Discharge Plan: Yes   Patient/Family in Agreement with the Plan: Yes      Referrals Placed by CM/SW: Post-acute facilities   Private pay costs discussed: Not applicable    Additional Information:  SW made follow-up calls for pending TCU referrals.    Pending referrals:  [facility] - Ph: [phone]  2/12: VM left for admissions     [facility] - Ph: [phone]  2/12: Attempted to call but no VM available     [facility] - Ph: [phone]  2/12: VM left for admissions    [facility] - Ph: [phone]  2/12: Admissions not in today, left [facility] - Ph: [phone]  2/12: Admissions not in on weekend, left [facility] - Ph: [phone]  2/12: Admissions to review today    Julissa Shell, LANETTE, LGSW  [facility]   [facility]  Ph: [phone]//Pager: [phone]  Email: [email or web address]  Pronouns: she/her/hers

## 2022-02-12 NOTE — PROGRESS NOTES
[facility]  Progress Note - [facility]       Date of Admission:  2/7/2022    Family Medicine Progress Note - [facility]       Main Plans for Today     - cont PO fluconazole   - monitor daily BMP for electrolytes  - increase dose of lasix to 40   - Dispo planning, awaiting TCU placement    Assessment & Plan     Fer Latham is a 71 year old male with PMH recurrent UTI's, BPH, neurogenic bladder w/chronic ferguson, DM2 (diet controlled), HTN, ESRD on dialysis (twice weekly), chronic atrial fibrillation (on eliquis), alcohol abuse and T12 compression fracture admitted for UTI.     # Catheter-associated UTI  # Candidal cystitis  # Neurogenic bladder w/chronic indwelling ferguson  # BPH with LUTS  Ferguson placed by urology June 2021. Has been on oxybutytnin and trospium but stopped 2/2 cost. Hx candidal cystitis June 2021-teated with fluconazole PO. Ferguson exchanged 2/8 (new size) and functioning well.      Recent admission 2/2-2/3, treated with zosyn and ceftriaxone. Discharged with cefdinir 300mg every other day x 7 days, reports adherence. Returned due to suprapubic/groin pain and increased drainage into catheter bag. Patient was initially started on ceftriaxone during this hospitalization based on UA results and previous Ucx, but Ucx from this hospitalization only growing Candida, no bacteria consistent with Candida cystitis vs. colonization.      -PO fluconazole 200mg daily x14 days (2/9-)  -daily bmp    Prior urine cultures:  -2/8/2022: 10-50,000 candida  -2/2/22: pansensitive E coli  -7/12/21: >100,000 candida  -6/28/21: 10-50,000 candida  -6/15/21: mixed urogenital jerzy  -5/14/20: mixed urogenital jerzy  -10/2/19: 10-50,000 enterococcus faecalis, pansensitive    # Lactic Acidosis, resolved  # Concern for acute encephalopathy  On initial evaluation and extended physical therapy visit, distinct confusion noted with cognitive decline from  baseline and previous admission. Likely resulting from existing infection. Worsening creatinine in the setting of ESRD. Family members expressing decreasing ability to complete ADLs at home independently and desire for TCU placement after hospitalization. Patient has had negative experiences with TCU's in the past, suggesting a different TCU this time may be met with a better result. AOx2 on this provider's examination, and endorses confusion with reason for hospitalization and plan of care.   - Per OT cognitive assessment on 2/9: A/Ox3, moderate deficits in safety, memory, and attention. May benefit from further cognitive testing, but likely has cognitive deficits at baseline, with limited insight.     # Tinea cruris  # Chronic groin/buttocks wounds  Perianal wounds, L buttocks wound along with erythema in groin per ED exam.  -PTA miconazole powder, nystatin cream   -WOC consulted, appreciate following     # T12 compression fracture  # low back pain  Worsening of known T12 compression fx w/periverterbral edema. No fever/chills to suggest infection. Denies recent falls. MRI lumbar spine 2/08 with moderate spinal canal narrowing at T11-T12. Neurosurgery consulted, no neurosurgical intervention planned.   -tylenol TID  -lidocaine patch for back  -PO oxycodone 5 mg q6hr prn  -up with assist and TLSO brace (until able to assess gait/strength)  -PT/OT consulted     # ESRD 2/2 IgA nephropathy on dialysis   Dialyzes 2x/week at [facility] on Tues/Thurs. Electrolyte and kidney function stable, continues to make urine. Cystatin C suggests gfr of 23 ml/min which suggests he may be able to hold on outpt dialysis per Nephrology. Due to uptrending K (5.2 yesterday AM), given 1x dose of PO lasix 20mg. Repeat K today was 5.3.   -Nephrology consulted    - no dialysis indicated now, coordinated to outpatient nephrologist to have patient follow up with CKD clinic  - increased PO lasix to 40 daily, re-evaluate daily  -daily bmp  -I&O,  daily weights  ____________________________________________     # hx DVT  # hx PE  # atrial fibrillation  Not on rate/rhythm control due to concerns for hypotension in the past. Was in RVR on admission, resolved with pain control. [id number] 4, therefore, needs anticoagulation though notably had not been taking Eliquis at home due to cost.   -Eliquis 5mg BID  -Discussed outpatient anticoagulation plan with patient's wife, who declined eliquis due to costs and warfarin due to inconvenience of frequent labs     # anxiety  # MDD  # chronic insomnia  -hold PTA valium 5mg Q12hr PRN (last filled Oct 2021 per PDMP)  -PTA melatonin  -PTA remeron 15mg at bedtime  -PTA seroquel 25mg PRN agitation     # hx alcohol abuse  Drinks alcohol, unclear when last drink was. Became agitated when asked.  -PTA folic acid & multivitamin  -PO thiamine daily  -consider adding on CIWA     # DM2: Not on medications, A1C 5.1% on admission.     # Anemia of chronic disease: Baseline hgb ~11. Likely 2/2 alcohol use and CKD.     # Constipation  -daily miralax  -senna prn  -dulcolax suppository prn     Diet: Combination Diet Regular Diet Adult    DVT Prophylaxis: Eliquis, PTA  Brown Catheter: PRESENT, indication: Neurogenic Bladder, Neurogenic Bladder  Fluids: PO  Central Lines: PRESENT  CVC Double Lumen Right Internal jugular Tunneled;Non - valved (open ended)-Site Assessment: WDL    Cardiac Monitoring: None  Code Status: Full Code      Disposition Plan   Expected Discharge: 2-3 days     Anticipated discharge location: TCU     The patient's care was discussed with the Attending Physician, Dr. Paez.    Abbey Puente MD  [facility]  [facility]  Securely message with the Vocera Web Console (learn more here)  Text page via [employer] Paging/Directory   Please see signed in provider for up to date coverage information    ____________________________________________________    Interval  History   No acute events. Adequate UOP via catheter. Denies abdominal pain, notes some mild back pain.    Data reviewed today: I reviewed all medications, new labs and imaging results over the last 24 hours.     Physical Exam   Vital Signs: Temp: 98.6  F (37  C) Temp src: Oral BP: 134/75 Pulse: 76   Resp: 16 SpO2: 99 % O2 Device: None (Room air)    Weight: 255 lbs 8.21 oz  Physical Exam  Vitals reviewed.   Constitutional:       General: He is not in acute distress.     Appearance: Normal appearance. He is well-developed. He is not diaphoretic.   HENT:      Head: Normocephalic.   Cardiovascular:      Rate and Rhythm: Normal rate.      Heart sounds: Normal heart sounds.   Pulmonary:      Effort: Pulmonary effort is normal. No respiratory distress.      Breath sounds: Normal breath sounds.   Abdominal:      General: Bowel sounds are normal.   Neurological:      Mental Status: He is alert.   Psychiatric:         Mood and Affect: Affect is angry.         Behavior: Behavior is uncooperative.

## 2022-02-12 NOTE — PLAN OF CARE
HD6  -H/o ecurrent UTI's, BPH, neurogenic bladder w/chronic ferguson, DM2 (diet controlled), HTN, ESRD on dialysis (twice weekly), chronic atrial fibrillation (on eliquis), alcohol abuse and T12 compression fracture admitted for UTI(per MD note).    VSS. Alert, disoriented to time.  Pt was hesitant with cares, needs convincing with turning and perineal/ferguson cares.  Regular diet, good appetite.   LS clear.  Incontinent of stool x1, ferguson with good uop.  Assist of 2 with turning.  Groin, scrotum with open areas(known), barrier cream and sched antifungal powder/cream applied.  PIV sl'd.  Continue to monitor progress and continue with POC.

## 2022-02-12 NOTE — PLAN OF CARE
"Last evening made a statement to nursing assistance that didn't make sense in current situation. Stated he was going to \"call 911 because there was too much noise in the living room (indicated the room next door)\". When RN assessed patient shortly after, he was alert/oriented with no confusion or illogical statements made.  He did express frustration at the expense of his Eliquis medication copay, refused cream and powder for groin stating \"they don't seem to help at all\". Was agreeable to reposition in the evening but did not want to be bothered during the night.  AVSS. Not OOB during this shift.  Tylenol and oxycodone given for chronic back pain, pt appeared to sleep comfortably after.  Chronic ferguson in place with adequate UOP.  Regular diet.  LBM 2/11.  R chest HD port and PIV.  "

## 2022-02-13 ENCOUNTER — HEALTH MAINTENANCE LETTER (OUTPATIENT)
Age: 72
End: 2022-02-13

## 2022-02-13 LAB
BACTERIA BLD CULT: NO GROWTH
BACTERIA BLD CULT: NO GROWTH

## 2022-02-13 PROCEDURE — 250N000013 HC RX MED GY IP 250 OP 250 PS 637: Performed by: STUDENT IN AN ORGANIZED HEALTH CARE EDUCATION/TRAINING PROGRAM

## 2022-02-13 PROCEDURE — 99232 SBSQ HOSP IP/OBS MODERATE 35: CPT | Mod: GC | Performed by: FAMILY MEDICINE

## 2022-02-13 PROCEDURE — 120N000002 HC R&B MED SURG/OB UMMC

## 2022-02-13 RX ORDER — QUETIAPINE FUMARATE 25 MG/1
25 TABLET, FILM COATED ORAL DAILY PRN
Status: DISCONTINUED | OUTPATIENT
Start: 2022-02-13 | End: 2022-02-17

## 2022-02-13 RX ORDER — MIRTAZAPINE 15 MG/1
15 TABLET, FILM COATED ORAL AT BEDTIME
Status: DISCONTINUED | OUTPATIENT
Start: 2022-02-13 | End: 2022-03-02 | Stop reason: HOSPADM

## 2022-02-13 RX ADMIN — CALCITONIN SALMON 1 SPRAY: 200 SPRAY, METERED NASAL at 08:54

## 2022-02-13 RX ADMIN — MIRTAZAPINE 15 MG: 15 TABLET, FILM COATED ORAL at 22:14

## 2022-02-13 RX ADMIN — APIXABAN 5 MG: 5 TABLET, FILM COATED ORAL at 08:54

## 2022-02-13 RX ADMIN — FOLIC ACID 1 MG: 1 TABLET ORAL at 08:54

## 2022-02-13 RX ADMIN — OXYCODONE HYDROCHLORIDE 5 MG: 5 TABLET ORAL at 22:14

## 2022-02-13 RX ADMIN — THIAMINE HCL TAB 100 MG 100 MG: 100 TAB at 08:54

## 2022-02-13 RX ADMIN — FUROSEMIDE 40 MG: 20 TABLET ORAL at 08:54

## 2022-02-13 RX ADMIN — RENO CAPS 1 CAPSULE: 100; 1.5; 1.7; 20; 10; 1; 150; 5; 6 CAPSULE ORAL at 08:54

## 2022-02-13 RX ADMIN — MICONAZOLE NITRATE: 20 POWDER TOPICAL at 08:54

## 2022-02-13 RX ADMIN — FLUCONAZOLE 200 MG: 100 TABLET ORAL at 09:06

## 2022-02-13 RX ADMIN — APIXABAN 5 MG: 5 TABLET, FILM COATED ORAL at 22:14

## 2022-02-13 ASSESSMENT — ACTIVITIES OF DAILY LIVING (ADL)
ADLS_ACUITY_SCORE: 21
ADLS_ACUITY_SCORE: 23
ADLS_ACUITY_SCORE: 23
ADLS_ACUITY_SCORE: 21
ADLS_ACUITY_SCORE: 21
ADLS_ACUITY_SCORE: 23
ADLS_ACUITY_SCORE: 23
ADLS_ACUITY_SCORE: 21
ADLS_ACUITY_SCORE: 23
ADLS_ACUITY_SCORE: 21
ADLS_ACUITY_SCORE: 23
ADLS_ACUITY_SCORE: 21
ADLS_ACUITY_SCORE: 23
ADLS_ACUITY_SCORE: 21
ADLS_ACUITY_SCORE: 23
ADLS_ACUITY_SCORE: 21

## 2022-02-13 NOTE — PROVIDER NOTIFICATION
Provider paged due to refusing labs.     Will see patient on rounds.     Plan for labs tomorrow per patient preference.

## 2022-02-13 NOTE — PROGRESS NOTES
[facility]  Progress Note - [facility]       Date of Admission:  2/7/2022    Family Medicine Progress Note - [facility]       Main Plans for Today     - declined labs today - agreed to recheck K+ 2/14 at 10:00  - attempted to reach wife to discuss dispo needs - left VM  - restart home mirtazapine 15 mg at HS  - cont PO fluconazole   - Lasix 40 mg daily  - Dispo planning: considering other dispo options than TCU    Assessment & Plan     Fer Latham is a 71 year old male with PMH recurrent UTI's, BPH, neurogenic bladder w/chronic ferguson, DM2 (diet controlled), HTN, ESRD on dialysis (twice weekly), chronic atrial fibrillation (on eliquis), alcohol abuse and T12 compression fracture admitted for UTI.     # Catheter-associated UTI  # Candidal cystitis  # Neurogenic bladder w/chronic indwelling ferguson  # BPH with LUTS  Ferguson placed by urology June 2021. Has been on oxybutytnin and trospium but stopped 2/2 cost. Hx candidal cystitis June 2021-teated with fluconazole PO. Ferguson exchanged 2/8 (new size) and functioning well.      Recent admission 2/2-2/3, treated with zosyn and ceftriaxone. Discharged with cefdinir 300mg every other day x 7 days, reports adherence. Returned due to suprapubic/groin pain and increased drainage into catheter bag. Patient was initially started on ceftriaxone during this hospitalization based on UA results and previous Ucx, but Ucx from this hospitalization only growing Candida, no bacteria consistent with Candida cystitis vs. colonization.      -PO fluconazole 200mg daily x14 days (2/9-)  -daily bmp    Prior urine cultures:  -2/8/2022: 10-50,000 candida  -2/2/22: pansensitive E coli  -7/12/21: >100,000 candida  -6/28/21: 10-50,000 candida  -6/15/21: mixed urogenital jerzy  -5/14/20: mixed urogenital jerzy  -10/2/19: 10-50,000 enterococcus faecalis, pansensitive    # Lactic Acidosis, resolved  # Concern for acute  "encephalopathy vs long-term dementia  On initial evaluation and extended physical therapy visit, distinct confusion noted with cognitive decline from baseline and previous admission. Likely resulting from existing infection. Worsening creatinine in the setting of ESRD. Family members expressing decreasing ability to complete ADLs at home independently and desire for TCU placement after hospitalization. Patient has had negative experiences with TCU's in the past, suggesting a different TCU this time may be met with a better result.   Per OT cognitive assessment on 2/9: A/Ox3, moderate deficits in safety, memory, and attention. May benefit from further cognitive testing, but likely has cognitive deficits at baseline, with limited insight.    2/13, patient admits to not working with PT/OT and not communicating incontinence. Says he has \"just given up\". Concern for MDD with underlying baseline cognitive deficit.  - Re-consulted OT requesting SLUMS vs MOCA assesment  - Attempt contact with wife again on 2/14. May need to consider further evaluation for capacity, determine dispo next steps.    # Tinea cruris  # Chronic groin/buttocks wounds  # Incontinence of bowel  Perianal wounds, L buttocks wound along with erythema in groin per ED exam. Complicated by what appears to be new incontinence of stool.  -PTA miconazole powder, nystatin cream   -WOC consulted, appreciate following     # T12 compression fracture  # low back pain  Worsening of known T12 compression fx w/periverterbral edema. No fever/chills to suggest infection. Denies recent falls. MRI lumbar spine 2/08 with moderate spinal canal narrowing at T11-T12. Neurosurgery consulted, no neurosurgical intervention planned.   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 Electrolyte and kidney function stable, continues to make urine. Cystatin C suggests gfr of 23 ml/min which suggests he may be able to hold on outpt dialysis per Nephrology. Due to uptrending K (5.2 12/11 AM), given 1x dose of PO lasix 20mg. Repeat K 2/12 was 5.3.   -Nephrology consulted    - no dialysis indicated now, coordinated to outpatient nephrologist to have patient follow up with CKD clinic  -PO lasix 40 daily, re-evaluate daily  -daily bmp  -I&O, daily weights    # anxiety  # MDD  # chronic insomnia  PTA Remeron has been NOT been given in-hospital - PharmD med rec earlier indicated he had not been taking it. Though, outside records show he had been on mirtazapine for at least several months.   -Restart Remeron 15 mg at HS  -hold PTA valium 5mg Q12hr PRN (last filled Oct 2021 per PDMP)  -PTA melatonin  ____________________________________________     # hx DVT  # hx PE  # atrial fibrillation  Not on rate/rhythm control due to concerns for hypotension in the past. Was in RVR on admission, resolved with pain control. [id number], therefore, needs anticoagulation though notably had not been taking Eliquis at home due to cost.   -Eliquis 5mg BID  -Discussed outpatient anticoagulation plan with patient's wife, who declined eliquis due to costs and warfarin due to inconvenience of frequent labs     # hx alcohol abuse  Drinks alcohol, unclear when last drink was.   -PTA folic acid & multivitamin  -PO thiamine daily  -consider adding on CIWA     # DM2: Not on medications, A1C 5.1% on admission.     # Anemia of chronic disease: Baseline hgb ~11. Likely 2/2 alcohol use and CKD.     # Constipation  -daily miralax  -senna prn  -dulcolax suppository prn     Diet: Combination Diet Regular Diet Adult  Room Service    DVT Prophylaxis: Eliquis, PTA  Brown Catheter: PRESENT, indication: Neurogenic Bladder, Neurogenic Bladder  Fluids: PO  Central Lines: PRESENT  CVC Double Lumen  "Right Internal jugular Tunneled;Non - valved (open ended)-Site Assessment: WDL    Cardiac Monitoring: None  Code Status: Full Code      Disposition Plan   Expected Discharge: 2-3 days     Anticipated discharge location: TBD - LTC vs home vs TCU     The patient's care was discussed with the Attending Physician, Dr. Paez.    Bianca Lemus MD  [facility]  [facility]  Securely message with the Vocera Web Console (learn more here)  Text page via [employer] Paging/Directory   Please see signed in provider for up to date coverage information    ____________________________________________________    Interval History     Had episode of bowel incontinence that was not communicated to nursing staff, was discovered later when patient was being turned.  Declining labs this AM; agreeable to labs tomorrow AM at 10:00. Endorses mild back pain.     When discussing lack of engagement with PT/OT as well as lack of communication with nursing staff regarding needs, patient opens up that he \"doesn't even care anymore\". He is sick of being in the hospital so many times. Discussed need to figure out goals, options, as he is not wanting to engage in TCU services. Attempted to call wife in room, she did not .     Called wife again this afternoon, no answer.    Data reviewed today: I reviewed all medications, new labs and imaging results over the last 24 hours.     Physical Exam   Vital Signs: Temp: 98.8  F (37.1  C) Temp src: Temporal BP: 122/61 Pulse: 63   Resp: 16 SpO2: 96 % O2 Device: None (Room air)    Weight: 255 lbs 8.21 oz  Physical Exam  Vitals reviewed.   Constitutional:       General: He is not in acute distress.     Appearance: Normal appearance. He is well-developed. He is not ill-appearing.      Comments: Lying in bed, not engaged in any activity   HENT:      Head: Normocephalic.   Eyes:      Extraocular Movements: Extraocular movements intact.      " Conjunctiva/sclera: Conjunctivae normal.   Cardiovascular:      Rate and Rhythm: Normal rate.      Heart sounds: Normal heart sounds.   Pulmonary:      Effort: Pulmonary effort is normal. No respiratory distress.      Breath sounds: Normal breath sounds.   Neurological:      Mental Status: He is alert.   Psychiatric:         Attention and Perception: Attention normal.         Mood and Affect: Mood is depressed. Affect is flat.         Behavior: Behavior is withdrawn. Behavior is cooperative.

## 2022-02-13 NOTE — PLAN OF CARE
AVSS. Alert, oriented and calm but refused skin assessment, repositioning. Has been refusing to get out of bed since admitted.  Reg diet, did not eat supper that came up around 1900.  Brown with adequate UOP, refused cath cares. Incontinent BM yesterday.  Oxycodone 1x for back pain.  Appeared to rest comfortably, did not want to be bothered overnight.

## 2022-02-13 NOTE — PROGRESS NOTES
"Patient refusing majority of cares. Does not tell nursing staff if he is incontinent of stool when brief changes are offered, is insistent that he is not soiled. Patient is aware of when he is going to the bathroom per patient. Patient states he wants to be left alone. Patient refusing ferguson cares, changing clothes, cleaning up (bath, brushing teeth and hair or washing hair or beard).     Told providers he doesn't know \"how much longer I can do this\" about going to the hospital and home and back again. Plan for goals of care discussion with wife, but providers unable to get her on the phone so far. Wife did call  and had completed part of the evaluation, but became upset on the phone and hung up.  is recommending a psych evaluation (see her personal note). This RN completed a suicide risk assessment, per answers is not a risk at this time.     Will continue with monitor patient for risk to harm self or others.   "

## 2022-02-13 NOTE — PROGRESS NOTES
"Pulsate mattress offered due to skin breakdown and refusal to get out of bed. Patient refused and said he would not use it and he \"hates those things.\" Risk for skin break down explained. Patient verbalized understanding.   "

## 2022-02-13 NOTE — PROGRESS NOTES
Care Management Initial Consult    General Information  Assessment completed with: Spouse or significant other, Ailin  Type of CM/SW Visit: Initial Assessment    Primary Care Provider verified and updated as needed: Yes   Readmission within the last 30 days: other (see comments)   Return Category: Progression of disease  Reason for Consult: discharge planning  Advance Care Planning:            Communication Assessment  Patient's communication style: spoken language (English or Bilingual)    Hearing Difficulty or Deaf: no   Wear Glasses or Blind: yes    Cognitive  Cognitive/Neuro/Behavioral: .WDL except  Level of Consciousness: confused  Arousal Level: opens eyes spontaneously  Orientation: disoriented to,situation  Mood/Behavior: labile,uncooperative  Best Language: 0 - No aphasia  Speech: clear    Living Environment:   People in home: spouse   (Ailin)  Current living Arrangements: apartment      Able to return to prior arrangements: yes  Living Arrangement Comments:  (Pt he is going to TCU then plan to go home.)    Family/Social Support:  Care provided by: spouse/significant other  Provides care for: no one, unable/limited ability to care for self  Marital Status:   Wife,Children,Neighbor  Ailin       Description of Support System: Supportive,Involved    Support Assessment: Adequate family and caregiver support,Adequate social supports    Current Resources:   Patient receiving home care services:       Community Resources:    Equipment currently used at home: walker, rolling,wheelchair, manual,grab bar, toilet  Supplies currently used at home:      Employment/Financial:  Employment Status: disabled        Financial Concerns: insurance, none   Referral to Financial Counselor: No  Finance Comments:  (Ailin became agitated while asking if needing assistance. )    Lifestyle & Psychosocial Needs:  Social Determinants of Health     Tobacco Use: Medium Risk     Smoking Tobacco Use: Former Smoker     Smokeless Tobacco  "Use: Never Used   Alcohol Use: Not on file   Financial Resource Strain: Not on file   Food Insecurity: Not on file   Transportation Needs: Not on file   Physical Activity: Not on file   Stress: Not on file   Social Connections: Not on file   Intimate Partner Violence: Not on file   Depression: Not at risk     PHQ-2 Score: 0   Housing Stability: Not on file       Functional Status:  Prior to admission patient needed assistance:              Mental Health Status:  Mental Health Status: Current Concern       Chemical Dependency Status:  Chemical Dependency Status: Current Concern             Values/Beliefs:  Spiritual, Cultural Beliefs, Holiness Practices, Values that affect care: other (see comments)       Cultural/Holiness Practices Patient Routinely Participates In:  (No assessed)  Values/Beliefs Comment:  (Not assessed)    Additional Information:  SW received call from Pt's wife looking for other SW (used name \"Azeb\") SW informed that SW was not in on Sunday. Wife stated that she had to be, because \"she just got a msg from \". This SW stated that other SW was not available until tomorrow and that there was no SW on duty with that name today. Wife expressed displeasure with the answer SARMAD offered. SW read the notes from other days and asked if Pt's wife would willing to complete an assessment with the SW. Throughout the assessment from Flowsheet tab, Pt's wife was sarcastic, short and agitated. When SW came to the questions about Financial assistance, Pt's wife stated that her 's insurance lapsed and he currently did not have any. She then started talking about [employer]. SW reasked the question and the reason for the question and explained that  Financial Counseling is available to her and her  if she felt she needed help with finances. Pt's wife then stated to SARMAD: \"Are you dumb or something! Haven't you been listening to me! I said that I am working on the paperwork myself.\" SARMAD again attempted to " "explain that Financial Counselors were here to assist her and her  with any financial issues that they may be having. Pt's wife became agitated and yelled again at the  and said \"Goodbye!\" Call ended and remainder of the assessment did not get completed.     SW then received a call from MD Team (Kadi's Team MD), who talked about Pt not cooperating with PT/OT intervention and how this would be a hindrance/barrier for discharge. SW concurred that if Pt not cooperating with PT/OT then TCU may very well deny Pt from admission. Further the MD discussed Pt's depression and unwillingness to participate in cares and Therapies. SW suggested Psych evaluation. And d/t Pt's wifes interaction on the telephone, that home did not seem like a safe discharge either. MD stated that idea of doing a Psych eval would be deferred to staff during the week but felt that it may not be necessary.     SW asked for Psych evaluation d/t Pt's unwillingness to comply with any intervention that would benefit/improve his current baseline. SW also wanted to note for weekly SW that if Pt discharges home, that a Adult Protection order may be needed as wife indicated that she is the primary caregiver for her  and caring for him is \"tough. It is a 24 hour job and [I] am tired.\" Further, wife seemed unnecesarily defensive, erratic in her behaviors and responses to  SW's questions during assessment.       Later, Bedside RN came to  voicing concern that Pt has expressed depression verbally and has not allowed any cares (i.e. cleaning his bedding after he had soiled himself, and discussing him maybe having a few good years left and not wanting to participate with any intervention by nursing staff.). SARMAD then paged MD team again requesting Psych consult for Pt's depressive behaviors.    SARMAD will ask Floor SW to review this note for context and possible intervention.     No other concerns for this Pt today.     Linda López, BSW, " [facility]  [facility]   PH#: [phone]  Pager#: [phone]  [facility]

## 2022-02-13 NOTE — PLAN OF CARE
Assumed care of the patient from [id number]. Patient refused turns, and OOB. Refused to allow staff to check bottom for stool. Was incontinent early this AM and was changed with morning assessment while back was being assessed. Sore identified on scrotum and buttocks, barrier paste applied. Patient expressed sadness to this nurse and providers, see previous note. Brown with adequate output. Brown cares completed x1, refused afternoon cares. Refused morning labs. Cognitive Eval added to OT consult and night time remeron added. Will continue with plan of care.

## 2022-02-14 ENCOUNTER — APPOINTMENT (OUTPATIENT)
Dept: PHYSICAL THERAPY | Facility: CLINIC | Age: 72
DRG: 698 | End: 2022-02-14
Payer: MEDICARE

## 2022-02-14 ENCOUNTER — APPOINTMENT (OUTPATIENT)
Dept: CT IMAGING | Facility: CLINIC | Age: 72
DRG: 698 | End: 2022-02-14
Payer: MEDICARE

## 2022-02-14 LAB
ANION GAP SERPL CALCULATED.3IONS-SCNC: 6 MMOL/L (ref 3–14)
BUN SERPL-MCNC: 35 MG/DL (ref 7–30)
CALCIUM SERPL-MCNC: 8.9 MG/DL (ref 8.5–10.1)
CHLORIDE BLD-SCNC: 106 MMOL/L (ref 94–109)
CO2 SERPL-SCNC: 27 MMOL/L (ref 20–32)
CREAT SERPL-MCNC: 2.62 MG/DL (ref 0.66–1.25)
GFR SERPL CREATININE-BSD FRML MDRD: 25 ML/MIN/1.73M2
GLUCOSE BLD-MCNC: 92 MG/DL (ref 70–99)
HOLD SPECIMEN: NORMAL
PHOSPHATE SERPL-MCNC: 4.2 MG/DL (ref 2.5–4.5)
POTASSIUM BLD-SCNC: 4.6 MMOL/L (ref 3.4–5.3)
SODIUM SERPL-SCNC: 139 MMOL/L (ref 133–144)

## 2022-02-14 PROCEDURE — 250N000013 HC RX MED GY IP 250 OP 250 PS 637: Performed by: STUDENT IN AN ORGANIZED HEALTH CARE EDUCATION/TRAINING PROGRAM

## 2022-02-14 PROCEDURE — 82310 ASSAY OF CALCIUM: CPT | Performed by: STUDENT IN AN ORGANIZED HEALTH CARE EDUCATION/TRAINING PROGRAM

## 2022-02-14 PROCEDURE — 82610 CYSTATIN C: CPT | Performed by: CLINICAL NURSE SPECIALIST

## 2022-02-14 PROCEDURE — 97530 THERAPEUTIC ACTIVITIES: CPT | Mod: GP

## 2022-02-14 PROCEDURE — 70450 CT HEAD/BRAIN W/O DYE: CPT | Mod: 26 | Performed by: RADIOLOGY

## 2022-02-14 PROCEDURE — 99232 SBSQ HOSP IP/OBS MODERATE 35: CPT | Mod: FS | Performed by: INTERNAL MEDICINE

## 2022-02-14 PROCEDURE — 36415 COLL VENOUS BLD VENIPUNCTURE: CPT | Performed by: STUDENT IN AN ORGANIZED HEALTH CARE EDUCATION/TRAINING PROGRAM

## 2022-02-14 PROCEDURE — G1004 CDSM NDSC: HCPCS

## 2022-02-14 PROCEDURE — 99207 PR NON-BILLABLE SERV PER CHARTING: CPT | Performed by: CLINICAL NURSE SPECIALIST

## 2022-02-14 PROCEDURE — 99232 SBSQ HOSP IP/OBS MODERATE 35: CPT | Mod: GC | Performed by: FAMILY MEDICINE

## 2022-02-14 PROCEDURE — 120N000002 HC R&B MED SURG/OB UMMC

## 2022-02-14 PROCEDURE — G1004 CDSM NDSC: HCPCS | Performed by: RADIOLOGY

## 2022-02-14 PROCEDURE — 84100 ASSAY OF PHOSPHORUS: CPT | Performed by: CLINICAL NURSE SPECIALIST

## 2022-02-14 PROCEDURE — 82947 ASSAY GLUCOSE BLOOD QUANT: CPT | Performed by: STUDENT IN AN ORGANIZED HEALTH CARE EDUCATION/TRAINING PROGRAM

## 2022-02-14 RX ADMIN — RENO CAPS 1 CAPSULE: 100; 1.5; 1.7; 20; 10; 1; 150; 5; 6 CAPSULE ORAL at 10:42

## 2022-02-14 RX ADMIN — FLUCONAZOLE 200 MG: 100 TABLET ORAL at 10:42

## 2022-02-14 RX ADMIN — MIRTAZAPINE 15 MG: 15 TABLET, FILM COATED ORAL at 22:23

## 2022-02-14 RX ADMIN — THIAMINE HCL TAB 100 MG 100 MG: 100 TAB at 10:42

## 2022-02-14 RX ADMIN — CALCITONIN SALMON 1 SPRAY: 200 SPRAY, METERED NASAL at 10:45

## 2022-02-14 RX ADMIN — FOLIC ACID 1 MG: 1 TABLET ORAL at 10:42

## 2022-02-14 RX ADMIN — APIXABAN 5 MG: 5 TABLET, FILM COATED ORAL at 10:42

## 2022-02-14 RX ADMIN — MICONAZOLE NITRATE: 20 POWDER TOPICAL at 10:44

## 2022-02-14 RX ADMIN — OXYCODONE HYDROCHLORIDE 5 MG: 5 TABLET ORAL at 17:19

## 2022-02-14 RX ADMIN — FUROSEMIDE 40 MG: 20 TABLET ORAL at 10:42

## 2022-02-14 RX ADMIN — APIXABAN 5 MG: 5 TABLET, FILM COATED ORAL at 20:42

## 2022-02-14 RX ADMIN — OXYCODONE HYDROCHLORIDE 5 MG: 5 TABLET ORAL at 23:35

## 2022-02-14 RX ADMIN — QUETIAPINE FUMARATE 25 MG: 25 TABLET ORAL at 00:38

## 2022-02-14 RX ADMIN — ACETAMINOPHEN 975 MG: 325 TABLET, FILM COATED ORAL at 14:58

## 2022-02-14 RX ADMIN — ACETAMINOPHEN 975 MG: 325 TABLET, FILM COATED ORAL at 23:35

## 2022-02-14 ASSESSMENT — ACTIVITIES OF DAILY LIVING (ADL)
ADLS_ACUITY_SCORE: 19
ADLS_ACUITY_SCORE: 23
ADLS_ACUITY_SCORE: 19
ADLS_ACUITY_SCORE: 23
ADLS_ACUITY_SCORE: 19
ADLS_ACUITY_SCORE: 23
ADLS_ACUITY_SCORE: 23
ADLS_ACUITY_SCORE: 19
ADLS_ACUITY_SCORE: 23
ADLS_ACUITY_SCORE: 19
ADLS_ACUITY_SCORE: 23
ADLS_ACUITY_SCORE: 19
ADLS_ACUITY_SCORE: 23
ADLS_ACUITY_SCORE: 19

## 2022-02-14 NOTE — PLAN OF CARE
Alert, disoriented to time. Talked to wife on the phone, he was wondering where she was.  Allowed repositioning in late evening with ceiling lift.  Did not want to be repositioned overnight unless he requested.  Brown with adequate UOP.  Declined cath cares and oniel cream/powder.  Groin reddened.  No BM.  Reg diet, fair appetite.  Oxycodone for back pain, PTA med remeron restarted, prn seroquel given.    Pt has been resting comfortably.     Calling for lab results.

## 2022-02-14 NOTE — PROGRESS NOTES
Nephrology Progress Note  02/14/2022           Fer Latham is a 71 yom with hx of HTN, Aflutter (not on AC), DMII, ESRD 2/2 IgA nephropathy, and chronic alcohol abuse, admitted with two weeks of weakness and recurrent falls with new shortness of breath secondary to atrial fibrillation with RVR, frequent UTI's, T12 compression fx and RLE DVT who is admitted with UTI.       Interval History :   Mr Latham's Cr is up a bit over the weekend to 2.6, continues with robust UOP of ~3L yesterday.  Rechecking cystatin-c today as Cr was 2.0 when gfr was 23 so will recheck this, tentatively still planning to follow up in CKD clinic.  Will flush line prior to discharge to ensure it stays patent.      Assessment & Recommendations:   ESRD-dialyzes 2x/week at [facility] on Tues/Thurs. Run time: 3 hrs 45 min but often shortens runs. Access: tunneled RIJ (failed AVF). EDW: 119 kg (need to reduce).                -Has not needed HD this admission, appears to have enough gfr to be off of HD as an outpatient.                  -Cystatin C suggests gfr of 23 ml/min, rechecking cystatin-c again today as Cr has gone up since our last check.     -Plan for CKD clinic in ~1 week after discharge.  Flushing tunneled line weekly.       Volume-Makes significant amount of UOP (3L yesterday), no significant edema on exam.       Electrolytes-K 4.6, bicarb 27, no acute issues.        BMD-Ca 8.9, Mg and Phos not checked recently, added today.       Anemia-Hgb 9.9 yesterday, not drawn today.        Nutrition-Taking PO, intake minimal currently.      Time spent: 25 minutes on this date of encounter for chart review, physical exam, medical decision making and co-ordination of care.     Seen and discussed with Dr Maru Bean, [facility] CNS  Clinical Nurse Specialist  [phone]    Review of Systems:   I reviewed the following systems:  Gen: No fevers or chills  CV: No CP at rest  Resp: No SOB at rest  GI: No N/V    Physical Exam:   I/O last 3  completed shifts:  In: 220 [P.O.:220]  Out: 2725 [Urine:2725]   /78 (BP Location: Left arm)   Pulse 77   Temp 99.3  F (37.4  C) (Oral)   Resp 16   Wt 109.5 kg (241 lb 6.5 oz)   SpO2 94%   BMI 30.99 kg/m       GENERAL APPEARANCE: alert and no distress  EYES: No scleral icterus  NECK:  No JVD  Pulmonary: lungs clear to auscultation with equal breath sounds bilaterally, no clubbing or cyanosis  CV: Regular rhythm, normal rate, no rub   - Edema trace  GI: soft, nontender, normal bowel sounds  MS: no evidence of inflammation in joints, no muscle tenderness  : + Brown  SKIN: no rash, warm, dry  NEURO: No focal deficits.     Labs:   All labs reviewed by me  Electrolytes/Renal - Recent Labs   Lab Test 02/14/22  1001 02/12/22  1025 02/11/22  0941 06/20/21  1123 06/19/21  0811 06/17/21  1656 06/15/21  1303 10/08/20  1030 05/14/20  1722 06/06/19  0136 05/17/19  1231 05/12/19  0728 05/11/19  1253    138 139   < > 134   < > 131*   < > 128*   < > 131*   < >  --    POTASSIUM 4.6 5.3 5.2   < > 5.4*   < > 3.5   < > 3.6   < > 3.8   < > 3.1*   CHLORIDE 106 109 111*   < > 105   < > 100   < > 98   < > 96   < >  --    CO2 27 22 22   < > 24   < > 19*   < > 21   < > 28   < >  --    BUN 35* 34* 35*   < > 46*   < > 49*   < > 21   < > 35*   < >  --    CR 2.62* 2.51* 2.27*   < > 2.54*   < > 3.57*   < > 2.42*   < > 3.10*   < >  --    GLC 92 70 68*   < > 152*   < > 153*   < > 100*   < > 159*   < >  --    COLUMBA 8.9 9.0 8.5   < > 8.5   < > 8.9   < > 7.9*   < > 9.0   < >  --    MAG  --   --   --   --  2.1  --  2.2  --  1.8  --   --    < > 1.7   PHOS  --   --   --   --   --   --  4.2  --   --   --  3.0  --  2.6    < > = values in this interval not displayed.       CBC -   Recent Labs   Lab Test 02/11/22  0941 02/09/22  0732 02/08/22  1006   WBC 4.8 4.9 5.2   HGB 9.9* 9.4* 10.2*    223 229       LFTs -   Recent Labs   Lab Test 02/07/22 2128 02/04/22  0549 06/19/21  0811   ALKPHOS 140 100 81   BILITOTAL 0.3 0.4 0.5   ALT 13  10 11   AST 17 12 17   PROTTOTAL 8.5 6.4* 7.2   ALBUMIN 3.0* 2.3* 2.7*       Iron Panel - No lab results found.        Current Medications:    apixaban ANTICOAGULANT  5 mg Oral BID     calcitonin (salmon)  1 spray Alternating Nostrils Daily     fluconazole  200 mg Oral Daily     folic acid  1 mg Oral Daily     furosemide  40 mg Oral Daily     lidocaine  1-2 patch Transdermal Q24H     lidocaine   Transdermal Q8H     miconazole   Topical BID     mirtazapine  15 mg Oral At Bedtime     multivitamin RENAL  1 capsule Oral Daily     nystatin   Topical BID     polyethylene glycol  8.5 g Oral Daily     sodium chloride (PF)  3 mL Intracatheter Q8H     thiamine  100 mg Oral Daily       - MEDICATION INSTRUCTIONS -

## 2022-02-14 NOTE — PROGRESS NOTES
"Care Management Follow Up    Length of Stay (days): 6    Expected Discharge Date: 02/15/2022     Concerns to be Addressed: discharge planning, TCU placement   Patient plan of care discussed at interdisciplinary rounds: Yes    Anticipated Discharge Disposition: TCU     Anticipated Discharge Services: TBD  Anticipated Discharge DME: TBD     Patient/family educated on Medicare website which has current facility and service quality ratings: yes   Education Provided on the Discharge Plan:    Patient/Family in Agreement with the Plan: unable to assess    Referrals Placed by CM/SW:      Pending:     - [facility] - Ph: [phone]  - [facility] - Ph: [phone]   - [facility] - Ph: [phone]    Declined/Discontinued:     - [facility] - no bed availability due to staffing   - [facility] - declined due to \"payer issues\" and no bed availability   - [facility] - declining due to refusing PT/OT and MD assessments       Private pay costs discussed: Not applicable    Additional Information:    Pt declines to talk with SW today. SW will follow up with pending TCU options tomorrow. More TCU preferences are likely needed as pt is difficult to place due to refusal of assessments, and financial difficulties, including \"payer issues\".     SW read note from weekend SARMAD Mckeon. It is noted that if pt discharges home, an adult protection order may be needed as pt's wife is reporting not being able to care for him properly.     SW will continue to follow for discharge planning. SW to check in with MD team to come up with a plan if more TCU preferences are needed as pt is being declined from original TCU referrals.       LANETTE Madrid, AVELINOSW   7C    Phone: [phone]  Pager: [phone]      "

## 2022-02-14 NOTE — PROGRESS NOTES
[facility]  Progress Note - [facility]       Date of Admission:  2/7/2022    Family Medicine Progress Note - [facility]       Main Plans for Today     - recheck K  - capacity assessment + OT SLUMS   - cont. Lasix 40 and talk to Nephro if still increasing  - attempt to reach wife to discuss dispo needs  - Dispo planning: considering other dispo options than TCU    Assessment & Plan     Fer Latham is a 71 year old male with PMH recurrent UTI's, BPH, neurogenic bladder w/chronic ferguson, DM2 (diet controlled), HTN, ESRD on dialysis (twice weekly), chronic atrial fibrillation (on eliquis), alcohol abuse and T12 compression fracture admitted for UTI.     # Catheter-associated UTI  # Candidal cystitis  # Neurogenic bladder w/chronic indwelling ferguson  # BPH with LUTS  Ferguson placed by urology June 2021. Has been on oxybutytnin and trospium but stopped 2/2 cost. Hx candidal cystitis June 2021-teated with fluconazole PO. Ferguson exchanged 2/8 (new size) and functioning well.      Recent admission 2/2-2/3, treated with zosyn and ceftriaxone. Discharged with cefdinir 300mg every other day x 7 days, reports adherence. Returned due to suprapubic/groin pain and increased drainage into catheter bag. Patient was initially started on ceftriaxone during this hospitalization based on UA results and previous Ucx, but Ucx from this hospitalization only growing Candida, no bacteria consistent with Candida cystitis vs. colonization.      -PO fluconazole 200mg daily x14 days (2/9-)  -daily bmp    Prior urine cultures:  -2/8/2022: 10-50,000 candida  -2/2/22: pansensitive E coli  -7/12/21: >100,000 candida  -6/28/21: 10-50,000 candida  -6/15/21: mixed urogenital jerzy  -5/14/20: mixed urogenital jerzy  -10/2/19: 10-50,000 enterococcus faecalis, pansensitive    # Lactic Acidosis, resolved  # Concern for acute encephalopathy vs long-term dementia  On initial  "evaluation and extended physical therapy visit, distinct confusion noted with cognitive decline from baseline and previous admission. Likely resulting from existing infection. Worsening creatinine in the setting of ESRD. Family members expressing decreasing ability to complete ADLs at home independently and desire for TCU placement after hospitalization. Patient has had negative experiences with TCU's in the past, suggesting a different TCU this time may be met with a better result.   Per OT cognitive assessment on 2/9: A/Ox3, moderate deficits in safety, memory, and attention. May benefit from further cognitive testing, but likely has cognitive deficits at baseline, with limited insight.  2/13, patient admits to not working with PT/OT and not communicating incontinence. Says he has \"just given up\". Concern for MDD with underlying baseline cognitive deficit. Patient's BUN has been uptrending throughout the hospitalization form 27 on 2/8/22 to   - Re-consulted OT requesting SLUMS vs MOCA assesment  - care conference with patient's wife scheduled for 1pm on 2/15/22  - talk to nephro about possibility of uremic encephalopathy     # Tinea cruris  # Chronic groin/buttocks wounds  # Incontinence of bowel  Perianal wounds, L buttocks wound along with erythema in groin per ED exam. Complicated by what appears to be new incontinence of stool.  -PTA miconazole powder, nystatin cream   -WOC consulted, appreciate following     # T12 compression fracture  # low back pain  Worsening of known T12 compression fx w/periverterbral edema. No fever/chills to suggest infection. Denies recent falls. MRI lumbar spine 2/08 with moderate spinal canal narrowing at T11-T12. Neurosurgery consulted, no neurosurgical intervention planned.   -tylenol TID  -lidocaine patch for back  -PO oxycodone 5 mg q6hr prn  -up with assist and TLSO brace (until able to assess gait/strength)  -PT/OT consulted - has not been working with them, since 2/10, but " appeared to be working with them on 2/14.      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 Electrolyte and kidney function stable, continues to make urine. Cystatin C suggests gfr of 23 ml/min which suggests he may be able to hold on outpt dialysis per Nephrology. Due to uptrending K (5.2 12/11 AM), given 1x dose of PO lasix 20mg. Repeat K 2/12 was 5.3.   -Nephrology consulted    - no dialysis indicated now, coordinated to outpatient nephrologist to have patient follow up with CKD clinic   - repeat cystatin C ordered 2/14 since Cr increased from 2.0 to 2.6 over the weekend   -PO lasix 40 daily, re-evaluate daily  -daily bmp  -I&O, daily weights    # anxiety  # MDD  # chronic insomnia  PTA Remeron has been NOT been given in-hospital - PharmD med rec earlier indicated he had not been taking it. Though, outside records show he had been on mirtazapine for at least several months.   -Restart Remeron 15 mg at HS  -hold PTA valium 5mg Q12hr PRN (last filled Oct 2021 per PDMP)  -PTA melatonin  ____________________________________________     # hx DVT  # hx PE  # atrial fibrillation  Not on rate/rhythm control due to concerns for hypotension in the past. Was in RVR on admission, resolved with pain control. [id number], therefore, needs anticoagulation though notably had not been taking Eliquis at home due to cost.   -Eliquis 5mg BID  -Discussed outpatient anticoagulation plan with patient's wife, who declined eliquis due to costs and warfarin due to inconvenience of frequent labs     # hx alcohol abuse  Drinks alcohol, unclear when last drink was.   -PTA folic acid & multivitamin  -PO thiamine daily  -consider adding on CIWA     # DM2: Not on medications, A1C 5.1% on admission.     # Anemia of chronic disease: Baseline hgb ~11. Likely 2/2 alcohol use and CKD.     # Constipation  -daily miralax  -senna prn  -dulcolax suppository prn     Diet: Combination Diet Regular Diet  "Adult  Room Service    DVT Prophylaxis: Eliquis, PTA  Brown Catheter: PRESENT, indication: Neurogenic Bladder, Neurogenic Bladder  Fluids: PO  Central Lines: PRESENT  CVC Double Lumen Right Internal jugular Tunneled;Non - valved (open ended)-Site Assessment: WDL    Cardiac Monitoring: None  Code Status: Full Code      Disposition Plan   Expected Discharge: 2-3 days     Anticipated discharge location: TBD - LTC vs home vs TCU     The patient's care was discussed with the Attending Physician, Dr. Paez.    Abbey Puente MD  [facility]  [facility]  Securely message with the Vocera Web Console (learn more here)  Text page via [facility] Paging/Directory   Please see signed in provider for up to date coverage information    ____________________________________________________    Interval History     No acute changes today. Initially refused labs and PT when I saw him this morning but later was seen working with PT and had his labs drawn. Was able to speak with wife today, who is agreeable to a care conference tomorrow at 1pm. She mentioned that she was at the hospital for a \"few minutes\" this morning, and was alarmed that patient did not recognize her. When speaking to RN later in the day, he mentioned that no one has seen the wife on the floor or in the patient's room.    Data reviewed today: I reviewed all medications, new labs and imaging results over the last 24 hours.     Physical Exam   Vital Signs: Temp: 99.3  F (37.4  C) Temp src: Oral BP: 107/78 Pulse: 77   Resp: 16 SpO2: 94 % O2 Device: None (Room air)    Weight: 255 lbs 8.21 oz  Physical Exam  Vitals reviewed.   Constitutional:       General: He is not in acute distress.     Appearance: Normal appearance. He is well-developed. He is not ill-appearing.      Comments: Lying in bed, not engaged in any activity   HENT:      Head: Normocephalic.   Eyes:      Extraocular Movements: Extraocular " movements intact.      Conjunctiva/sclera: Conjunctivae normal.   Cardiovascular:      Rate and Rhythm: Normal rate.      Heart sounds: Normal heart sounds.   Pulmonary:      Effort: Pulmonary effort is normal. No respiratory distress.      Breath sounds: Normal breath sounds.   Neurological:      Mental Status: He is alert.   Psychiatric:         Attention and Perception: Attention normal.         Mood and Affect: Mood is depressed. Affect is flat.         Behavior: Behavior is withdrawn. Behavior is cooperative.

## 2022-02-15 ENCOUNTER — APPOINTMENT (OUTPATIENT)
Dept: PHYSICAL THERAPY | Facility: CLINIC | Age: 72
DRG: 698 | End: 2022-02-15
Payer: MEDICARE

## 2022-02-15 LAB
ALBUMIN SERPL-MCNC: 2.8 G/DL (ref 3.4–5)
ANION GAP SERPL CALCULATED.3IONS-SCNC: 4 MMOL/L (ref 3–14)
BUN SERPL-MCNC: 38 MG/DL (ref 7–30)
CALCIUM SERPL-MCNC: 9.2 MG/DL (ref 8.5–10.1)
CHLORIDE BLD-SCNC: 105 MMOL/L (ref 94–109)
CO2 SERPL-SCNC: 28 MMOL/L (ref 20–32)
CREAT SERPL-MCNC: 2.85 MG/DL (ref 0.66–1.25)
CYSTATIN C SERPL-MCNC: 2.8 MG/L
ERYTHROCYTE [DISTWIDTH] IN BLOOD BY AUTOMATED COUNT: 13.9 % (ref 10–15)
GFR SERPL CREATININE-BSD FRML MDRD: 23 ML/MIN/1.73M2
GFR/BSA.PRED SERPLBLD CYS-BASED-ARV: 19 ML/MIN/BSA
GLUCOSE BLD-MCNC: 99 MG/DL (ref 70–99)
HCT VFR BLD AUTO: 32.6 % (ref 40–53)
HGB BLD-MCNC: 10.5 G/DL (ref 13.3–17.7)
HOLD SPECIMEN: NORMAL
MCH RBC QN AUTO: 31.4 PG (ref 26.5–33)
MCHC RBC AUTO-ENTMCNC: 32.2 G/DL (ref 31.5–36.5)
MCV RBC AUTO: 98 FL (ref 78–100)
PLATELET # BLD AUTO: 202 10E3/UL (ref 150–450)
POTASSIUM BLD-SCNC: 4.7 MMOL/L (ref 3.4–5.3)
RBC # BLD AUTO: 3.34 10E6/UL (ref 4.4–5.9)
SARS-COV-2 RNA RESP QL NAA+PROBE: NEGATIVE
SODIUM SERPL-SCNC: 137 MMOL/L (ref 133–144)
WBC # BLD AUTO: 6.1 10E3/UL (ref 4–11)

## 2022-02-15 PROCEDURE — 82310 ASSAY OF CALCIUM: CPT | Performed by: CLINICAL NURSE SPECIALIST

## 2022-02-15 PROCEDURE — 250N000013 HC RX MED GY IP 250 OP 250 PS 637: Performed by: STUDENT IN AN ORGANIZED HEALTH CARE EDUCATION/TRAINING PROGRAM

## 2022-02-15 PROCEDURE — 120N000002 HC R&B MED SURG/OB UMMC

## 2022-02-15 PROCEDURE — 36415 COLL VENOUS BLD VENIPUNCTURE: CPT | Performed by: CLINICAL NURSE SPECIALIST

## 2022-02-15 PROCEDURE — 97530 THERAPEUTIC ACTIVITIES: CPT | Mod: GP

## 2022-02-15 PROCEDURE — 99207 PR SERVICE NOT STAFFED W/SUPERV PROV: CPT | Performed by: NEUROLOGICAL SURGERY

## 2022-02-15 PROCEDURE — 99232 SBSQ HOSP IP/OBS MODERATE 35: CPT | Mod: FS | Performed by: INTERNAL MEDICINE

## 2022-02-15 PROCEDURE — 999N000197 HC STATISTIC WOC PT EDUCATION, 0-15 MIN

## 2022-02-15 PROCEDURE — U0003 INFECTIOUS AGENT DETECTION BY NUCLEIC ACID (DNA OR RNA); SEVERE ACUTE RESPIRATORY SYNDROME CORONAVIRUS 2 (SARS-COV-2) (CORONAVIRUS DISEASE [COVID-19]), AMPLIFIED PROBE TECHNIQUE, MAKING USE OF HIGH THROUGHPUT TECHNOLOGIES AS DESCRIBED BY CMS-2020-01-R: HCPCS | Performed by: STUDENT IN AN ORGANIZED HEALTH CARE EDUCATION/TRAINING PROGRAM

## 2022-02-15 PROCEDURE — 82040 ASSAY OF SERUM ALBUMIN: CPT | Performed by: CLINICAL NURSE SPECIALIST

## 2022-02-15 PROCEDURE — 99232 SBSQ HOSP IP/OBS MODERATE 35: CPT | Mod: GC | Performed by: FAMILY MEDICINE

## 2022-02-15 PROCEDURE — 85027 COMPLETE CBC AUTOMATED: CPT | Performed by: CLINICAL NURSE SPECIALIST

## 2022-02-15 RX ORDER — LIDOCAINE 4 G/G
1-2 PATCH TOPICAL
Status: DISCONTINUED | OUTPATIENT
Start: 2022-02-16 | End: 2022-03-02 | Stop reason: HOSPADM

## 2022-02-15 RX ORDER — FUROSEMIDE 20 MG
40 TABLET ORAL DAILY
Status: DISCONTINUED | OUTPATIENT
Start: 2022-02-16 | End: 2022-02-22

## 2022-02-15 RX ORDER — FOLIC ACID 1 MG/1
1 TABLET ORAL DAILY
Status: DISCONTINUED | OUTPATIENT
Start: 2022-02-16 | End: 2022-03-02 | Stop reason: HOSPADM

## 2022-02-15 RX ORDER — FLUCONAZOLE 100 MG/1
200 TABLET ORAL DAILY
Status: COMPLETED | OUTPATIENT
Start: 2022-02-16 | End: 2022-02-22

## 2022-02-15 RX ORDER — OXYCODONE HYDROCHLORIDE 5 MG/1
5 TABLET ORAL
Status: DISCONTINUED | OUTPATIENT
Start: 2022-02-15 | End: 2022-02-15

## 2022-02-15 RX ORDER — CALCITONIN SALMON 200 [IU]/.09ML
1 SPRAY, METERED NASAL DAILY
Status: DISCONTINUED | OUTPATIENT
Start: 2022-02-16 | End: 2022-02-16

## 2022-02-15 RX ADMIN — APIXABAN 5 MG: 5 TABLET, FILM COATED ORAL at 11:18

## 2022-02-15 RX ADMIN — APIXABAN 5 MG: 5 TABLET, FILM COATED ORAL at 20:25

## 2022-02-15 RX ADMIN — MIRTAZAPINE 15 MG: 15 TABLET, FILM COATED ORAL at 20:25

## 2022-02-15 RX ADMIN — RENO CAPS 1 CAPSULE: 100; 1.5; 1.7; 20; 10; 1; 150; 5; 6 CAPSULE ORAL at 11:19

## 2022-02-15 RX ADMIN — FUROSEMIDE 40 MG: 20 TABLET ORAL at 11:19

## 2022-02-15 RX ADMIN — FOLIC ACID 1 MG: 1 TABLET ORAL at 11:18

## 2022-02-15 RX ADMIN — THIAMINE HCL TAB 100 MG 100 MG: 100 TAB at 11:20

## 2022-02-15 RX ADMIN — FLUCONAZOLE 200 MG: 100 TABLET ORAL at 11:18

## 2022-02-15 ASSESSMENT — ACTIVITIES OF DAILY LIVING (ADL)
ADLS_ACUITY_SCORE: 19
ADLS_ACUITY_SCORE: 21
ADLS_ACUITY_SCORE: 19
ADLS_ACUITY_SCORE: 19
ADLS_ACUITY_SCORE: 21
ADLS_ACUITY_SCORE: 19
ADLS_ACUITY_SCORE: 21
ADLS_ACUITY_SCORE: 19
ADLS_ACUITY_SCORE: 21
ADLS_ACUITY_SCORE: 21
ADLS_ACUITY_SCORE: 19
ADLS_ACUITY_SCORE: 21
ADLS_ACUITY_SCORE: 21
ADLS_ACUITY_SCORE: 19

## 2022-02-15 NOTE — PROGRESS NOTES
[facility]  Progress Note - [facility]       Date of Admission:  2/7/2022    Family Medicine Progress Note - [facility]       Main Plans for Today     - monitor K  - cont. Lasix 40   - care conference today at 1pm  - talk neurosurgery about repeat MRI    Assessment & Plan     Fer Latham is a 71 year old male with PMH recurrent UTI's, BPH, neurogenic bladder w/chronic ferguson, DM2 (diet controlled), HTN, ESRD on dialysis (twice weekly), chronic atrial fibrillation (on eliquis), alcohol abuse and T12 compression fracture admitted for UTI.     # Catheter-associated UTI  # Candidal cystitis  # Neurogenic bladder w/chronic indwelling ferguson  # BPH with LUTS  Ferguson placed by urology June 2021. Has been on oxybutytnin and trospium but stopped 2/2 cost. Hx candidal cystitis June 2021-teated with fluconazole PO. Ferguson exchanged 2/8 (new size) and functioning well.      Recent admission 2/2-2/3, treated with zosyn and ceftriaxone. Discharged with cefdinir 300mg every other day x 7 days, reports adherence. Returned due to suprapubic/groin pain and increased drainage into catheter bag. Patient was initially started on ceftriaxone during this hospitalization based on UA results and previous Ucx, but Ucx from this hospitalization only growing Candida, no bacteria consistent with Candida cystitis vs. colonization.      -PO fluconazole 200mg daily x14 days (2/9-)  -daily bmp    Prior urine cultures:  -2/8/2022: 10-50,000 candida  -2/2/22: pansensitive E coli  -7/12/21: >100,000 candida  -6/28/21: 10-50,000 candida  -6/15/21: mixed urogenital jerzy  -5/14/20: mixed urogenital jerzy  -10/2/19: 10-50,000 enterococcus faecalis, pansensitive    # Lactic Acidosis, resolved  # Concern for acute encephalopathy vs long-term dementia  On initial evaluation and extended physical therapy visit, distinct confusion noted with cognitive decline from baseline and  "previous admission. Likely resulting from existing infection. Worsening creatinine in the setting of ESRD. Family members expressing decreasing ability to complete ADLs at home independently and desire for TCU placement after hospitalization. Patient has had negative experiences with TCU's in the past, suggesting a different TCU this time may be met with a better result.   Per OT cognitive assessment on 2/9: A/Ox3, moderate deficits in safety, memory, and attention. May benefit from further cognitive testing, but likely has cognitive deficits at baseline, with limited insight.  2/13, patient admits to not working with PT/OT and not communicating incontinence. Says he has \"just given up\". Concern for MDD with underlying baseline cognitive deficit. Patient's BUN has been uptrending throughout the hospitalization from 27 on 2/8/22 to 38 on 2/15/22.   - Re-consulted OT requesting SLUMS vs MOCA assesment  - care conference with patient's wife scheduled completed today  - talk to nephro about possibility of uremic encephalopathy     # Tinea cruris  # Chronic groin/buttocks wounds  # Incontinence of bowel  Perianal wounds, L buttocks wound along with erythema in groin per ED exam. Complicated by what appears to be new incontinence of stool, first notified on 2/13. Incontinent again on 2/14. Rectal exam on 2/14 was concerning for decreased tone, and neurosurgery was contacted to discuss the need for repeat MRI. It is possible that this is a chronic problem; there are nursing notes from a June 2021 hospitalization that note bowel incontinence. Also unclear if patient has decrease rectal sensation/control or is having accidents 2/2 to depressed mood and delirium.   - PTA miconazole powder, nystatin cream   - WOC consulted, appreciate following  - Follow Neurosurgery recs re. repeat MRI     # T12 compression fracture  # low back pain  Worsening of known T12 compression fx w/periverterbral edema. No fever/chills to suggest " infection. Denies recent falls. MRI lumbar spine 2/08 with moderate spinal canal narrowing at T11-T12. Neurosurgery consulted, no neurosurgical intervention planned.   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 Electrolyte and kidney function stable, continues to make urine. Cystatin C suggests gfr of 23 ml/min which suggests he may be able to hold on outpt dialysis per Nephrology. Due to uptrending K (5.2 12/11 AM), given 1x dose of PO lasix 20mg. Repeat K 2/12 was 5.3.   - Nephrology consulted    - no dialysis indicated now, coordinated to outpatient nephrologist to have patient follow up with CKD clinic   - repeat cystatin C ordered 2/14 since Cr increased from 2.0 to 2.6 over the weekend   - PO lasix 40 daily, re-evaluate daily  - daily bmp  - I&O, daily weights    # anxiety  # MDD  # chronic insomnia  PTA Remeron has been NOT been given in-hospital - PharmD med rec earlier indicated he had not been taking it. Though, outside records show he had been on mirtazapine for at least several months.   - Restart Remeron 15 mg at HS  - hold PTA valium 5mg Q12hr PRN (last filled Oct 2021 per PDMP)  - PTA melatonin  ____________________________________________     # hx DVT  # hx PE  # atrial fibrillation  Not on rate/rhythm control due to concerns for hypotension in the past. Was in RVR on admission, resolved with pain control. [id number], therefore, needs anticoagulation though notably had not been taking Eliquis at home due to cost.   - Eliquis 5mg BID  - Discussed outpatient anticoagulation plan with patient's wife, who declined eliquis due to costs and warfarin due to inconvenience  of frequent labs     # hx alcohol abuse  Drinks alcohol, unclear when last drink was.   - PTA folic acid & multivitamin  - PO thiamine daily  - consider adding on CIWA     # DM2: Not on medications, A1C 5.1% on admission.     # Anemia of chronic disease: Baseline hgb ~11. Likely 2/2 alcohol use and CKD.     # Constipation  -daily miralax  -senna prn  -dulcolax suppository prn     Diet: Combination Diet Regular Diet Adult  Room Service    DVT Prophylaxis: Eliquis, PTA  Brown Catheter: PRESENT, indication: Neurogenic Bladder, Neurogenic Bladder  Fluids: PO  Central Lines: PRESENT  CVC Double Lumen Right Internal jugular Tunneled;Non - valved (open ended)-Site Assessment: WDL    Cardiac Monitoring: None  Code Status: Full Code      Disposition Plan   Expected Discharge: medically ready, awaiting TCU placement      Anticipated discharge location: TBD - LTC vs home vs TCU     The patient's care was discussed with the Attending Physician, Dr. Paez.    Abbey Puente MD  [place]  [facility]  Securely message with the Vocera Web Console (learn more here)  Text page via [facility] Paging/Directory   Please see signed in provider for up to date coverage information    ____________________________________________________    Interval History     No acute changes today. This morning patient worked well with physical therapy. Was refusing medications initially, but later agreeable to taking them. Discussed changing medications to be given at 10:00am instead of 8am. Care conference with wife today at 1pm, where she expressed concern about the possibility of Fer coming home, as she is not able to provide the level of care he needs. We discussed that if Fer continues to participate in physical therapy, TCU remains a viable option. Patient expresses some financial concern about paying for TCU/accumulated medical bills thus far. Wife attempted to reassure him,  stating that she has filed appropriate paperwork with [facility].     Data reviewed today: I reviewed all medications, new labs and imaging results over the last 24 hours.     Physical Exam   Vital Signs: Temp: 97.5  F (36.4  C) Temp src: Oral BP: 105/55 Pulse: 71   Resp: 16 SpO2: 96 % O2 Device: None (Room air)    Weight: 241 lbs 6.46 oz  Physical Exam  Vitals reviewed.   Constitutional:       General: He is not in acute distress.     Appearance: Normal appearance. He is well-developed. He is not ill-appearing.      Comments: Lying in bed, participated in physical therapy today   HENT:      Head: Normocephalic.   Eyes:      Extraocular Movements: Extraocular movements intact.      Conjunctiva/sclera: Conjunctivae normal.   Cardiovascular:      Rate and Rhythm: Normal rate.      Heart sounds: Normal heart sounds.   Pulmonary:      Effort: Pulmonary effort is normal. No respiratory distress.      Breath sounds: Normal breath sounds.   Neurological:      Mental Status: He is alert.   Psychiatric:         Attention and Perception: Attention normal.         Mood and Affect: Mood is depressed. Affect is flat.         Behavior: Behavior is withdrawn. Behavior is cooperative.

## 2022-02-15 NOTE — PLAN OF CARE
Pt can answer questions of orientation but does appear to be confused w/ situational conversations, wife states she visited and he didn't recognize her and also doesn't remember anyone visiting him, though writer and other nursing staff don't recall seeing the patient's wife this AM and her memory and cognition baseline is not know as well. Patient continues to refuse repositioning, catheter cares, CHG, and other cares that would be a benefit to him. Pt allowed PT to work w/ him today and he was able to slowly w/ effect sit himself on the side of the bed but not 100% upright. Catheter cares, CHG, daily weights, meals done when patient allows which is often later into the morning. Patient doesn't present w/ nonverbal ques of pain at rest but states that he is in pain of a moderate level. PRN tylenol and Oxycodone given for pain. Patient didn't allow for his AM meds to be taken until 1300. PIV is intact and patent w/ blood return. Pt had large incontinent BM. Pt ate 50% of two meals and denied a third, weight was noticeably down though edema has improved to BLE since starting lasix. Drs made aware of 6kg drop and nutrition consult ordered. Care conference w/ wife in attendance to take place tomorrow at 1000. Wife states on the phone that he had fallen and hit his head prior to hospitalization, which wasn't known about, informed Drs and head CT was ordered, resulted WDL for hx/age. Pt to use TLSO brace when out of bed. /61 (BP Location: Right arm)   Pulse 80   Temp 97.9  F (36.6  C) (Temporal)   Resp 16   Wt 109.5 kg (241 lb 6.5 oz)   SpO2 95%   BMI 30.99 kg/m

## 2022-02-15 NOTE — CONSULTS
"WO Nurse Inpatient Wound Assessment   Reason for consultation: Evaluate and treat  Buttocks and groin wounds    Assessment  L) posterior thigh/scrotum wounds due to Friction and Moisture Associated Skin Damage (MASD)   BL groin- intertrigo    Status: initial assessment and healing     2/15- Attempted to follow up on wounds but pt declined, per pt \"RN had just seen it and its fine\". WOC will attempt on Thursday. Spoke with RN and recommended to continue with same POC. Per RN he has been refusing all other cares as well and MD notified.    Treatment Plan  L) posterior thigh/scrotum wounds: Daily  and PRN   Cleanse gently and apply No sting film barrier daily. Make sure skin surfaces are  to allow the barrier to dry completely before allowing skin to return to the normal position. Coat twice for better protection, ensure to dry first layer before applying second layer.     Bl groin- Cleanse with abram cleanse and protect. Apply antifungal powder per MD order.    Orders Written  Recommended provider order: None, at this time  WOC Nurse follow-up plan:Thursday  Nursing to notify the Provider(s) and re-consult the WOC Nurse if wound(s) deteriorates or new skin concern.    Patient History  According to provider note(s): Fer Latham is a 71 year old male with PMH recurrent UTI's, BPH, neurogenic bladder w/chronic ferguson, DM2 (diet controlled), HTN, ESRD on dialysis, chronic atrial fibrillation (on eliquis), alcohol abuse and T12 compression fracture who is admitted for UTI.    Objective Data  Containment of urine/stool: Indwelling catheter    Active Diet Order  Orders Placed This Encounter      Combination Diet Regular Diet Adult      Output:   I/O last 3 completed shifts:  In: -   Out: 800 [Urine:800]    Risk Assessment:   Sensory Perception: 3-->slightly limited  Moisture: 3-->occasionally moist  Activity: 1-->bedfast  Mobility: 2-->very limited  Nutrition: 2-->probably inadequate  Friction and Shear: " 2-->potential problem  Gabriel Score: 13                          Labs:   Recent Labs   Lab 02/11/22  0941   HGB 9.9*   WBC 4.8       Physical Exam  Areas of skin assessed: focused coccyx, sacrum, BL buttocks and groin    Wound Location: L) posterior thigh/scrotum      Date of last photo 2/8  Wound History: POA    Wound Base: 100 % dermis and superficial scab     Palpation of the wound bed: normal      Drainage: none     Description of drainage: none     Measurements (length x width x depth, in cm) 3  x 0.2  x  0.1 cm and 0.5x0.3x0.0 cm scab     Tunneling N/A     Undermining N/A  Periwound skin: intact and evidence of healed friction wound      Color: dusky      Temperature: normal   Odor: none  Pain: absent, none  Pain intervention prior to dressing change:     2. BL groin  Wound History: POA    Wound Base: 100 % scattered satellite lesions     Palpation of the wound bed: normal      Drainage: none     Description of drainage: none     Measurements (length x width x depth, in cm)      Tunneling N/A     Undermining N/A  Periwound skin: intact and moist      Color: dusky      Temperature: normal   Odor: none  Pain: absent, none  Pain intervention prior to dressing change:       Interventions  Visual inspection and assessment completed   Wound Care Rationale Provide protection   Wound Care: done per plan of care  Supplies: floor stock and discussed with RN  Current off-loading measures: Pillows  Current support surface: Standard  Atmos Air mattress  Education provided to: importance of repositioning, plan of care, wound progress, Moisture management and Off-loading pressure  Discussed plan of care with Patient and Nurse    Camilla Vásquez RN

## 2022-02-15 NOTE — PLAN OF CARE
Patient refusing to take any scheduled medications this a.m. Attempted x3 to see if he would change his mind. Pt became frustrated. Patient also refused to let writer and WOC RN look at groin wounds/do cares. Refusing to be repositioned and turned. Provider paged. Waiting for response.

## 2022-02-15 NOTE — CONSULTS
[facility]       NEUROSURGERY CONSULTATION    This consultation was requested by the family medicine service.     Reason for Consultation: rectal incontinence      HPI: Fer Latham is a 71 year old male who was seen for  Rectal incontinence. He has  a PMH recurrent UTI's, BPH, neurogenic bladder w/chronic ferguson, DM2 (diet controlled), HTN, ESRD on dialysis (twice weekly), chronic atrial fibrillation (on eliquis), alcohol abuse and T12 compression fracture. The T12 compression fracture was sustained after a traumatic fall on 6/15/2021.  Given his significant comorbidities, he was not considered a surgical candidate at that time and he was managed conservatively with a TLSO brace. Now he presents with 2 day history of bowel incontinence along with active issues of ;Catheter-associated UTI  # Candidal cystitis; # Neurogenic bladder w/chronic indwelling ferguson; # BPH with LUTS  Also it is unclear if patient has decrease rectal sensation/control or is having accidents 2/2 to depressed mood and delirium. He has no new c/o of weakness of legs or any sharp shooting pain in his legs. No c/o numbness in his legs. He is on a chronic indwelling ferguson catheter.     PAST MEDICAL HISTORY:   Past Medical History:   Diagnosis Date    Anemia     Arrhythmia     atrial fib    Arthritis 1990's    gout    BPH (benign prostatic hyperplasia)     Current moderate episode of major depressive disorder without prior episode (H) 4/30/2019    Depression     Diabetes mellitus (H)     Type 2  IDDM    Diverticulosis     ESRD (end stage renal disease) on dialysis (H)     Hematuria     HTN (hypertension)     HTN (hypertension)     IgA nephropathy     MI, old     Pneumonia     Rhabdomyolysis     Weakness 5/10/2019       PAST SURGICAL HISTORY:   Past Surgical History:   Procedure Laterality Date    ABDOMEN SURGERY      APPENDECTOMY  1970's    ARTHROSCOPY KNEE  6/27/2013    Procedure: ARTHROSCOPY KNEE;   Right Knee Arthroscopy, Debridment Of Medial Meniscal Tear.  ;  Surgeon: Tomás Wong MD;  Location: [facility]    BACK SURGERY  1999    L5 S1 decompression    BIOPSY  2017    skin    COLONOSCOPY  2013    [facility]    CORONARY ANGIOGRAPHY ADULT ORDER  2018    EYE SURGERY Bilateral 2004    Lens replacement    IR CVC TUNNEL PLACEMENT > 5 YRS OF AGE  11/26/2019    IR CVC TUNNEL REMOVAL RIGHT  11/26/2019    IR CVC TUNNEL REVISION RIGHT  2/10/2020    IR CVC TUNNEL REVISION RIGHT  10/8/2020    IR CVC TUNNEL REVISION RIGHT  6/17/2021    VASCULAR SURGERY         FAMILY HISTORY:   Family History   Problem Relation Age of Onset    Cancer Father        SOCIAL HISTORY:   Social History     Tobacco Use    Smoking status: Former Smoker    Smokeless tobacco: Never Used    Tobacco comment: quit 35 years ago   Substance Use Topics    Alcohol use: Yes     Comment: 1 to 2 drinks a day (Occasional)       MEDICATIONS:  Medications Prior to Admission   Medication Sig Dispense Refill Last Dose    aspirin (ASA) 325 MG EC tablet Take 325 mg by mouth daily   Past Week at Unknown time    cefdinir (OMNICEF) 300 MG capsule Take 1 capsule (300 mg) by mouth every other day for 14 days 7 capsule 0 Past Week at Unknown time    polyethylene glycol (MIRALAX) 17 GM/Dose powder Take 8.5 g by mouth daily as needed    Unknown at Unknown time    senna (SENOKOT) 8.6 MG tablet Take 17.2 mg by mouth 2 times daily as needed   Unknown at Unknown time    Vitamin D3 (CHOLECALCIFEROL) 125 MCG (5000 UT) tablet Take 1 tablet by mouth daily   Past Week at Unknown time    acetaminophen (TYLENOL) 500 MG tablet Take 2 tablets (1,000 mg) by mouth 3 times daily       apixaban ANTICOAGULANT (ELIQUIS) 5 MG tablet Take 1 tablet (5 mg) by mouth 2 times daily       bisacodyl (DULCOLAX) 10 MG suppository Place 1 suppository (10 mg) rectally daily as needed for constipation       diphenhydrAMINE (BENADRYL) 50 MG capsule Take 1 capsule (50 mg) by mouth every 6 hours as needed for  itching or allergies Administer 30 min - 2 hours pre - IV contrast injection 1 capsule 0     folic acid (FOLVITE) 1 MG tablet Take 1 tablet (1 mg) by mouth daily       melatonin 5 MG tablet Take 1 tablet (5 mg) by mouth nightly as needed for sleep       miconazole (MICATIN) 2 % external powder Apply topically 2 times daily       multivitamin RENAL (NEPHROCAPS/TRIPHROCAPS) 1 MG capsule Take 1 capsule by mouth daily       nystatin (MYCOSTATIN) 979295 UNIT/GM external cream Apply topically 2 times daily       QUEtiapine (SEROQUEL) 25 MG tablet Take 1 tablet (25 mg) by mouth daily as needed (agitation)          Allergies:  Allergies   Allergen Reactions    Ciprofloxacin     Diagnostic X-Ray Materials      Other reaction(s): Unknown    Sulfa Drugs Hives     Other reaction(s): Unknown  PN: LW Reaction: Rash, Generalized         ROS: 10 point ROS of systems including Constitutional, Eyes, Respiratory, Cardiovascular, Gastroenterology, Genitourinary, Integumentary, Muscularskeletal, Psychiatric were all negative except for pertinent positives noted in my HPI.    Physical exam:   Blood pressure 131/75, pulse 73, temperature (!) 96.6  F (35.9  C), temperature source Oral, resp. rate 18, weight 109.5 kg (241 lb 6.5 oz), SpO2 97 %.  CV: RRR, no murmurs, rubs, or gallops  PULM: breathing comfortably on room air  ABD: soft, non-distended,    NEUROLOGIC:  -- Awake; Alert;  -- Follows commands briskly  -- Speech fluent, spontaneous. No aphasia or dysarthria.  -- no gaze preference. No apparent hemineglect.  Cranial Nerves:  --PERRL 3-2mm bilat and brisk, extraocular movements intact  -- face symmetrical, tongue midline  -- sensory V1-V3 intact bilaterally  -- hearing grossly intact bilat  -- Trapezii 5/5 strength bilat symmetric  --   Motor:  Normal bulk / tone; no tremor, rigidity, or bradykinesia.  No muscle wasting or fasciculations  No Pronator Drift     Delt Bi Tri Hand Flexion/  Extension Iliopsoas Quadriceps Hamstrings  Tibialis Anterior Gastroc    C5 C6 C7 C8/T1 L2 L3 L4-S1 L4 S1   R 5 5 5 5 4 4 5 5 5   L 5 5 5 5 5 5 5 5 5   Sensory: decreased sensation over right leg    Reflexes:     Bi Tri BR Marilee Pat Ach Bab    C5-6 C7-8 C6 UMN L2-4 S1 UMN   R 2+ 2+ 2+ Norm 2+ 2+ Norm   L 2+ 2+ 2+ Norm 2+ 2+ Norm       LABS:  Last Comprehensive Metabolic Panel:  Sodium   Date Value Ref Range Status   02/15/2022 137 133 - 144 mmol/L Final   07/01/2021 135 133 - 144 mmol/L Final     Potassium   Date Value Ref Range Status   02/15/2022 4.7 3.4 - 5.3 mmol/L Final   07/01/2021 4.0 3.4 - 5.3 mmol/L Final     Chloride   Date Value Ref Range Status   02/15/2022 105 94 - 109 mmol/L Final   07/01/2021 103 94 - 109 mmol/L Final     Carbon Dioxide   Date Value Ref Range Status   07/01/2021 26 20 - 32 mmol/L Final     Carbon Dioxide (CO2)   Date Value Ref Range Status   02/15/2022 28 20 - 32 mmol/L Final     Anion Gap   Date Value Ref Range Status   02/15/2022 4 3 - 14 mmol/L Final   07/01/2021 6 3 - 14 mmol/L Final     Glucose   Date Value Ref Range Status   02/15/2022 99 70 - 99 mg/dL Final   07/01/2021 73 70 - 99 mg/dL Final     Urea Nitrogen   Date Value Ref Range Status   02/15/2022 38 (H) 7 - 30 mg/dL Final   07/01/2021 31 (H) 7 - 30 mg/dL Final     Creatinine   Date Value Ref Range Status   02/15/2022 2.85 (H) 0.66 - 1.25 mg/dL Final   07/01/2021 2.95 (H) 0.66 - 1.25 mg/dL Final     GFR Estimate   Date Value Ref Range Status   02/15/2022 23 (L) >60 mL/min/1.73m2 Final     Comment:     Effective December 21, 2021 eGFRcr in adults is calculated using the 2021 CKD-EPI creatinine equation which includes age and gender (Chacha martínze al., NEJM, DOI: 10.1056/NSHKiv6712565)   07/01/2021 21 (L) >60 mL/min/[1.73_m2] Final     Comment:     Non  GFR Calc  Starting 12/18/2018, serum creatinine based estimated GFR (eGFR) will be   calculated using the Chronic Kidney Disease Epidemiology Collaboration   (CKD-EPI) equation.       Calcium   Date Value Ref  Range Status   02/15/2022 9.2 8.5 - 10.1 mg/dL Final   07/01/2021 8.5 8.5 - 10.1 mg/dL Final     Lab Results   Component Value Date    WBC 6.1 02/15/2022    WBC 6.6 07/01/2021     Lab Results   Component Value Date    RBC 3.34 02/15/2022    RBC 2.74 07/01/2021     Lab Results   Component Value Date    HGB 10.5 02/15/2022    HGB 8.8 07/01/2021     Lab Results   Component Value Date    HCT 32.6 02/15/2022    HCT 27.0 07/01/2021     Lab Results   Component Value Date    MCV 98 02/15/2022    MCV 99 07/01/2021     Lab Results   Component Value Date    MCH 31.4 02/15/2022    MCH 32.1 07/01/2021     Lab Results   Component Value Date    MCHC 32.2 02/15/2022    MCHC 32.6 07/01/2021     Lab Results   Component Value Date    RDW 13.9 02/15/2022    RDW 14.8 07/01/2021     Lab Results   Component Value Date     02/15/2022     07/01/2021       IMAGING: MRI lumbar spine: Compression deformity of T12 vertebral body with decreased but persistent bone marrow signal. There is bone marrow signal in T11  anterior vertebral body and anterior wedging compression deformity.  Compared to 6/26/2021, paravertebral soft tissue edema and thickening  increased. Findings may represent superimposed infection, although  absence of disc signal or absence of epidural abnormality makes  infection less likely.       ASSESSMENT: Fer Latham is a 71 year old male who was seen for  Rectal incontinence. He has  a PMH recurrent UTI's, BPH, neurogenic bladder w/chronic ferguson, DM2 (diet controlled), HTN, ESRD on dialysis (twice weekly), chronic atrial fibrillation (on eliquis), alcohol abuse and T12 compression fracture. His rectal incotinence may be due to other causes such as his depressed mood or may be due to other causes. No other signs of spinal cord compression. However we recommend a new set of MRI thoracic spine to evaluate for signs of new cord compression.     Clinically Significant Risk Factors Present on Admission                      RECOMMENDATIONS:  No neurosurgical intervention indicated at this time   Awaiting new imaging MRI of Thoracolumbar spine    The patient was discussed with Dr. Walls neurosurgery chief resident, and she agrees with the above.    Chelsea DURAND      Neurosurgery    I didn't see this patient, nor discussed with the resident. AMP

## 2022-02-15 NOTE — PROGRESS NOTES
"CLINICAL NUTRITION SERVICES - ASSESSMENT NOTE     Nutrition Prescription    RECOMMENDATIONS FOR MDs/PROVIDERS TO ORDER:  If no longer requires dialysis, recommend switch Nephrocaps (MVI for dialysis pts) to a standard MVI w/ minerals    Malnutrition Status:    Moderate malnutrition in the context of acute on chronic illness    Recommendations already ordered by Registered Dietitian (RD):  2pm snack: cheddar cheese and saltine crackers    Future/Additional Recommendations:  Encourage patient to consume at least 75% of meals TID or the equivalent with snacks/supplements.  If consuming less than this offer supplements, additional scheduled snacks, and/or consider ordering calorie counts to assess PO intake adequacy.       REASON FOR ASSESSMENT  Fer Latham is a/an 71 year old male assessed by the dietitian for LOS and Provider Order - \"6kg weight loss over 1 week, poor PO intake\"    NUTRITION HISTORY  Pt reports ok appetite PTA, but has had recent hospitalizations before this one and appetite has been variable because of that.      PMH includes T2DM, HTN, and per Nephrology note yesterday, pt with \"ESKD previously on dialysis but now likely CKD 4\"; has not needed HD this admission.  Pt admit with UTI.    CURRENT NUTRITION ORDERS  Diet: Regular + Room Service with Assist  Intake/Tolerance: Pt says he is ordering 3 meals per day, but sometimes doesn't eat very much.  Pt denies any pain hindering PO, just not very hungry all the time.  Per RN documentation since admission, appetite and intake has been variable (eating mostly 50% of meals, but ranging % per flowsheets)    LABS  - BUN 38 (H), Cr 2.85 (H), GFR 23 (L)     MEDICATIONS  Medications reviewed  - Folic acid, thiamine  - Nephrocaps  - Lasix  - Remeron    ANTHROPOMETRICS  Height: 188 cm (6' 2\")  Most Recent Weight: 109.5 kg (241 lb 6.5 oz)    IBW: 86.4 kg   BMI: Obesity Grade I BMI 30-34.9  Weight History: Overall up from 8 months ago, but limited wt hx in " the interim available.  See weight trends this admission below (2.4% wt loss)  Wt Readings from Last 10 Encounters:   02/14/22 109.5 kg (241 lb 6.5 oz)   02/02/22 117.9 kg (260 lb)   06/30/21 102 kg (224 lb 13.9 oz)   04/27/21 120.2 kg (265 lb)   10/08/20 129.3 kg (285 lb)   05/14/20 135.2 kg (298 lb)   02/10/20 122.5 kg (270 lb)   11/26/19 122.5 kg (270 lb 1 oz)   10/02/19 122.5 kg (270 lb)   09/13/19 122.8 kg (270 lb 11.6 oz)      Weights this admission: 6 lb (2.6 kg) wt loss overall since 2/8 (2.4% wt loss).  Suspect some of this could be fluid related (on lasix)  02/14/22 1100 109.5 kg (241 lb 6.5 oz)   02/11/22 1000 115.9 kg (255 lb 8.2 oz)   02/09/22 1200 115.9 kg (255 lb 8.2 oz)   02/08/22 1515 112.1 kg (247 lb 2.2 oz)     Dosing Weight: 92 kg (adjusted based on actual wt 109.5 kg on 2/14 and IBW)    ASSESSED NUTRITION NEEDS  Estimated Energy Needs: 5026-9942 kcals/day (25 - 30 kcals/kg)  Justification: Maintenance  Estimated Protein Needs: 55-74 grams protein/day (0.6 - 0.8 grams of pro/kg)  Justification: ESRD vs stage 4 CKD not on dialysis  Estimated Fluid Needs: (1 mL/kcal)   Justification: Maintenance, or other per provider pending fluid status    PHYSICAL FINDINGS  See malnutrition section below.    MALNUTRITION  % Intake: < 75% for > 7 days (moderate)  % Weight Loss: Difficult to assess  Subcutaneous Fat Loss: Facial region: mild  Muscle Loss: Temporal and Lower arm  (forearm): mild  Fluid Accumulation/Edema: None noted per chart review  Malnutrition Diagnosis: Moderate malnutrition in the context of acute on chronic illness    NUTRITION DIAGNOSIS  Inadequate oral intake related to variable/decreased appetite as evidenced by eating mostly 50% of meals documented the past week but ranging % and pt reports variable/fair appetite      INTERVENTIONS  Implementation  Nutrition Education: Provided education on role of RD and nutrition POC.  Encouraged snacks between meals if intake at meal times is  lower.   Modify composition of meals/snacks     Goals  Patient to consume % of nutritionally adequate meal trays TID, or the equivalent with supplements/snacks.     Monitoring/Evaluation  Progress toward goals will be monitored and evaluated per protocol.     Chelita Kidd RD, , LD  Weekday Pager: [phone]  Weekday Units covered: 7C (all beds) and 5A (beds 5201 through 5211-2)  Weekend/Holiday RD Pager: [phone]

## 2022-02-15 NOTE — PLAN OF CARE
A&Ox3, sometimes disoriented to situation. No pain or nausea reported. Ate majority of breakfast and lunch. Passing gas. Medium, incontinent BM. Brown in place with adequate urine output. Refusing to let writer and WOC nurse look at groin wounds/do cares. Also refused to take meds and reposition. After care conference at 1300, patient willing to take morning meds but did not explain why he did not want to take them earlier. Still refusing to be repositioned. Wife at bedside. Continue to monitor and follow POC.

## 2022-02-15 NOTE — PLAN OF CARE
/55   Pulse 71   Temp 97.5  F (36.4  C) (Oral)   Resp 16   Wt 109.5 kg (241 lb 6.5 oz)   SpO2 96%   BMI 30.99 kg/m       Patient continue to refuse cares and meds. Did not want to be repositioned overnight. Brown in place with adequate UOP. Refused cath cares and oniel cream/powder. No BM this shift. Pain was managed with prn oral oxycodone and tylenol. VSS on RA. Continue with plan of cares.

## 2022-02-16 ENCOUNTER — APPOINTMENT (OUTPATIENT)
Dept: MRI IMAGING | Facility: CLINIC | Age: 72
DRG: 698 | End: 2022-02-16
Payer: MEDICARE

## 2022-02-16 PROCEDURE — G1004 CDSM NDSC: HCPCS | Mod: GC | Performed by: RADIOLOGY

## 2022-02-16 PROCEDURE — 250N000013 HC RX MED GY IP 250 OP 250 PS 637: Performed by: STUDENT IN AN ORGANIZED HEALTH CARE EDUCATION/TRAINING PROGRAM

## 2022-02-16 PROCEDURE — 99232 SBSQ HOSP IP/OBS MODERATE 35: CPT | Performed by: INTERNAL MEDICINE

## 2022-02-16 PROCEDURE — 72146 MRI CHEST SPINE W/O DYE: CPT | Mod: ME

## 2022-02-16 PROCEDURE — 99232 SBSQ HOSP IP/OBS MODERATE 35: CPT | Mod: GC | Performed by: FAMILY MEDICINE

## 2022-02-16 PROCEDURE — 120N000002 HC R&B MED SURG/OB UMMC

## 2022-02-16 PROCEDURE — 99232 SBSQ HOSP IP/OBS MODERATE 35: CPT | Performed by: PHYSICIAN ASSISTANT

## 2022-02-16 PROCEDURE — 72148 MRI LUMBAR SPINE W/O DYE: CPT | Mod: ME

## 2022-02-16 PROCEDURE — 72148 MRI LUMBAR SPINE W/O DYE: CPT | Mod: 26 | Performed by: RADIOLOGY

## 2022-02-16 PROCEDURE — 72146 MRI CHEST SPINE W/O DYE: CPT | Mod: 26 | Performed by: RADIOLOGY

## 2022-02-16 RX ADMIN — FOLIC ACID 1 MG: 1 TABLET ORAL at 10:53

## 2022-02-16 RX ADMIN — FLUCONAZOLE 200 MG: 100 TABLET ORAL at 10:53

## 2022-02-16 RX ADMIN — APIXABAN 5 MG: 5 TABLET, FILM COATED ORAL at 20:55

## 2022-02-16 RX ADMIN — FUROSEMIDE 40 MG: 20 TABLET ORAL at 10:53

## 2022-02-16 RX ADMIN — APIXABAN 5 MG: 5 TABLET, FILM COATED ORAL at 10:53

## 2022-02-16 RX ADMIN — MIRTAZAPINE 15 MG: 15 TABLET, FILM COATED ORAL at 20:55

## 2022-02-16 RX ADMIN — ACETAMINOPHEN 975 MG: 325 TABLET, FILM COATED ORAL at 10:53

## 2022-02-16 RX ADMIN — THIAMINE HCL TAB 100 MG 100 MG: 100 TAB at 10:53

## 2022-02-16 RX ADMIN — Medication 1 CAPSULE: at 10:53

## 2022-02-16 RX ADMIN — Medication 1 MG: at 15:21

## 2022-02-16 ASSESSMENT — ACTIVITIES OF DAILY LIVING (ADL)
ADLS_ACUITY_SCORE: 19
ADLS_ACUITY_SCORE: 21
ADLS_ACUITY_SCORE: 19
ADLS_ACUITY_SCORE: 21
ADLS_ACUITY_SCORE: 19
ADLS_ACUITY_SCORE: 19
ADLS_ACUITY_SCORE: 21
ADLS_ACUITY_SCORE: 21
ADLS_ACUITY_SCORE: 19
ADLS_ACUITY_SCORE: 21
ADLS_ACUITY_SCORE: 19
ADLS_ACUITY_SCORE: 21
ADLS_ACUITY_SCORE: 19
ADLS_ACUITY_SCORE: 21
ADLS_ACUITY_SCORE: 19
ADLS_ACUITY_SCORE: 21
ADLS_ACUITY_SCORE: 21

## 2022-02-16 NOTE — PROGRESS NOTES
"Physician Attestation   I, Gracie Mahoney, saw and evaluated Fer Latham as part of a shared visit.  I have reviewed and discussed with the advanced practice provider their history, physical and plan.    I personally reviewed the vital signs, medications, labs, and imaging.  Subjective: He has no complaint today.  No chest pain or short of breath or fever.    10 reviewed of systems are negative except as mentioned above.   Objective:  /62 (BP Location: Right arm)   Pulse 84   Temp 97.4  F (36.3  C) (Oral)   Resp 16   Wt 109.5 kg (241 lb 6.5 oz)   SpO2 96%   BMI 30.99 kg/m      Intake/Output Summary (Last 24 hours) at 2/16/2022 1419  Last data filed at 2/16/2022 1315  Gross per 24 hour   Intake 120 ml   Output 1425 ml   Net -1305 ml       My key history or physical exam findings:   GA: Alert, no distress.   Heart: RRR  Lung:CTABL  MSK: No edema  Access: Rt internal jugular tunnel; no sign of infection       Problem list &Plan  # ESKD previously on dialysis but now likely CKD 4  # Intermittent hyperkalemia  Cystatin C from 2/14 has come back with the Cystatin C of 2.8 and EGFR of 19 compared to a creatinine of 2.62 with EGFR by creatinine of 23 so they are roughly the same.  Essentially, there is no indication for dialysis for this patient.  There is no lab today to assess the trend of his creatinine may be he reached plateau today. Please make sure to get renal panel tomorrow.  # On calcitonin  Unclear the reason why the patient is on calcitonin.  Calcitonin was on hold since 2/15/2022.  Consider checking calcium.   # Mild confusion  Less likely uremia.  # Anemia from CKD  Hb 10.5 no need ESAs at this time.       I spent 20 minutes face-to-face and/or coordinating care. Over 50% of our time on the unit was spent counseling the patient and/or coordinating care regarding diagnosis, laboratory/imaging results treatment plan.\"     Gracie Mahoney  Date of Service (when I saw the patient): February 16, " 2022

## 2022-02-16 NOTE — PLAN OF CARE
15:30-23:30    Temp: (!) 96.6  F (35.9  C) Temp src: Oral BP: 131/75 Pulse: 73   Resp: 18 SpO2: 97 % O2 Device: None (Room air)      Status:  Admitted on 02/7 from ED for recurrent UTI    -intermittently confused to situation and time  -denies pain  -on Regular diet with poor appetite; did not eat anything for supper  -denies nausea  -ferguson patent with good urine output; refused ferguson care  -incontinent of stool x 1 today  -declined to visualize groin wound  -up with a lift and heavy assist of 2  -refused spine MRI ( MD notified and talked to patient which patient eventually agreed and signed consent but when MRI called for him, he refused again )  -refused repositioning       Continue with plan of care.  Continue with bed-alarm

## 2022-02-16 NOTE — PROGRESS NOTES
[facility], [place]   02/16/2022  Neurosurgery Progress Note:    Assessment:  Fer Latham is a 71 year old male who was seen for bowel incontinence in the setting of known T12 fracture. He has  a PMH recurrent UTI's, BPH, neurogenic bladder w/chronic ferguson, DM2 (diet controlled), HTN, ESRD on dialysis (twice weekly), chronic atrial fibrillation (on eliquis), alcohol abuse and T12 compression fracture.       Clinically Significant Risk Factors Present on Admission                     Plan:  - Recommend updated MRI to evaluate for any canal stenosis in setting of bowel incontinence.  He states he is amenable to do it.    - Low suspicion of spinal cause of incontinence.  He states he is aware when he is having BM, no perineal numbness.  No hyperreflexia.  He seems unmotivated to get up to restroom.    -----------------------------------  Kelley Pettit PA-C  2/16/2022 9:56 AM   Neurosurgery  [phone]   -----------------------------------    Total time spent with the patient was 25 minutes of which more than 50% was used in counseling time, including discussion of prognosis and various surgical and non-surgical treatment options and associated risks.     Interval History: States when he has bowel incontinence he is aware/feels urge to go, he just doesn't get up to restroom.  Denies perineal/saddle anesthesia.      Objective:   Temp:  [96.6  F (35.9  C)-97.4  F (36.3  C)] 97.4  F (36.3  C)  Pulse:  [73-75] 75  Resp:  [16-18] 16  BP: (117-131)/(62-75) 117/62  SpO2:  [95 %-97 %] 95 %  I/O last 3 completed shifts:  In: 480 [P.O.:480]  Out: 1175 [Urine:1175]    Gen: Appears comfortable, NAD  Neurologic:  - Alert & Oriented to person, place, time, and situation  - Follows commands briskly  - Speech fluent, spontaneous. No aphasia or dysarthria.  - No gaze preference. No apparent hemineglect.  - PERRL, EOMI  - Face symmetric with sensation intact to light touch  - Palate elevates  symmetrically, uvula midline, tongue protrudes midline  - Trapezii muscles 5/5 bilaterally  - No pronator drift     Del Tr Bi WE WF Gr   R 5 5 5 5 5 5   L 5 5 5 5 5 5    HF KE KF DF PF EHL   R 5 5 5 5 5 5   L 5 5 5 4 5 5     Reflexes 2+  No clonus    Sensation diminished to feet bilaterally; otherwise intact and symmetric to light touch throughout  Stasis changes in ankles/feet    LABS:  Recent Labs   Lab 02/15/22  0755 02/14/22  1001 02/12/22  1025    139 138   POTASSIUM 4.7 4.6 5.3   CHLORIDE 105 106 109   CO2 28 27 22   ANIONGAP 4 6 7   GLC 99 92 70   BUN 38* 35* 34*   CR 2.85* 2.62* 2.51*   COLUMBA 9.2 8.9 9.0       Recent Labs   Lab 02/15/22  0755   WBC 6.1   RBC 3.34*   HGB 10.5*   HCT 32.6*   MCV 98   MCH 31.4   MCHC 32.2   RDW 13.9          IMAGING:  No results found for this or any previous visit (from the past 24 hour(s)).

## 2022-02-16 NOTE — PROGRESS NOTES
[facility]  Progress Note - [facility]       Date of Admission:  2/7/2022    Family Medicine Progress Note - [facility]       Main Plans for Today     - monitor K  - cont. Lasix 40   - repeat MRI    Assessment & Plan     Fer Latham is a 71 year old male with PMH recurrent UTI's, BPH, neurogenic bladder w/chronic ferguson, DM2 (diet controlled), HTN, ESRD on dialysis (twice weekly), chronic atrial fibrillation (on eliquis), alcohol abuse and T12 compression fracture admitted for UTI.     # Catheter-associated UTI  # Candidal cystitis  # Neurogenic bladder w/chronic indwelling ferguson  # BPH with LUTS  Ferguson placed by urology June 2021. Has been on oxybutytnin and trospium but stopped 2/2 cost. Hx candidal cystitis June 2021-teated with fluconazole PO. Ferguson exchanged 2/8 (new size) and functioning well.      Recent admission 2/2-2/3, treated with zosyn and ceftriaxone. Discharged with cefdinir 300mg every other day x 7 days, reports adherence. Returned due to suprapubic/groin pain and increased drainage into catheter bag. Patient was initially started on ceftriaxone during this hospitalization based on UA results and previous Ucx, but Ucx from this hospitalization only growing Candida, no bacteria consistent with Candida cystitis vs. colonization.      -PO fluconazole 200mg daily x14 days (2/9-)  -daily bmp    Prior urine cultures:  -2/8/2022: 10-50,000 candida  -2/2/22: pansensitive E coli  -7/12/21: >100,000 candida  -6/28/21: 10-50,000 candida  -6/15/21: mixed urogenital jerzy  -5/14/20: mixed urogenital jerzy  -10/2/19: 10-50,000 enterococcus faecalis, pansensitive    # Lactic Acidosis, resolved  # Concern for acute encephalopathy vs long-term dementia  On initial evaluation and extended physical therapy visit, distinct confusion noted with cognitive decline from baseline and previous admission. Likely resulting from existing infection.  "Worsening creatinine in the setting of ESRD. Family members expressing decreasing ability to complete ADLs at home independently and desire for TCU placement after hospitalization. Patient has had negative experiences with TCU's in the past, suggesting a different TCU this time may be met with a better result.   Per OT cognitive assessment on 2/9: A/Ox3, moderate deficits in safety, memory, and attention. May benefit from further cognitive testing, but likely has cognitive deficits at baseline, with limited insight.  2/13, patient admits to not working with PT/OT and not communicating incontinence. Says he has \"just given up\". Concern for MDD with underlying baseline cognitive deficit. Patient's BUN has been uptrending throughout the hospitalization from 27 on 2/8/22 to 38 on 2/15/22.   - Re-consulted OT requesting SLUMS vs MOCA assesment  - care conference with patient's wife scheduled completed today  - talk to nephro about possibility of uremic encephalopathy     # Tinea cruris  # Chronic groin/buttocks wounds  # Incontinence of bowel  Perianal wounds, L buttocks wound along with erythema in groin per ED exam. Complicated by what appears to be new incontinence of stool, first notified on 2/13. Incontinent again on 2/14. Rectal exam on 2/14 was concerning for decreased tone, and neurosurgery was contacted to discuss the need for repeat MRI. It is possible that this is a chronic problem; there are nursing notes from a June 2021 hospitalization that note bowel incontinence. Also unclear if patient has decrease rectal sensation/control or is having accidents 2/2 to depressed mood and delirium.   - PTA miconazole powder, nystatin cream   - WOC consulted, appreciate following  - Neurosurgery consulted: repeat thoracolumbar MRI 2/17      # T12 compression fracture  # low back pain  Worsening of known T12 compression fx w/periverterbral edema. No fever/chills to suggest infection. Denies recent falls. MRI lumbar spine " 2/08 with moderate spinal canal narrowing at T11-T12. Neurosurgery consulted, no neurosurgical intervention planned.   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 Electrolyte and kidney function stable, continues to make urine. Cystatin C suggests gfr of 23 ml/min which suggests he may be able to hold on outpt dialysis per Nephrology. Due to uptrending K (5.2 12/11 AM), given 1x dose of PO lasix 20mg. Repeat K 2/12 was 5.3.   - Nephrology consulted    - no dialysis indicated now, coordinated to outpatient nephrologist to have patient follow up with CKD clinic   - repeat cystatin C ordered 2/14 since Cr increased from 2.0 to 2.6 over the weekend   - PO lasix 40 daily, re-evaluate daily  - daily bmp  - I&O, daily weights    # anxiety  # MDD  # chronic insomnia  PTA Remeron has been NOT been given in-hospital - PharmD med rec earlier indicated he had not been taking it. Though, outside records show he had been on mirtazapine for at least several months.   - Restart Remeron 15 mg at HS   - Psych consult to assist with depression, capacity determination and future placement  - hold PTA valium 5mg Q12hr PRN (last filled Oct 2021 per PDMP)  - PTA melatonin  ____________________________________________     # hx DVT  # hx PE  # atrial fibrillation  Not on rate/rhythm control due to concerns for hypotension in the past. Was in RVR on admission, resolved with pain control. [id number], therefore, needs anticoagulation though notably had not been taking Eliquis at home due to cost.   - Eliquis 5mg BID  - Discussed outpatient anticoagulation plan with patient's wife, who declined eliquis due to  costs and warfarin due to inconvenience of frequent labs     # hx alcohol abuse  Drinks alcohol, unclear when last drink was.   - PTA folic acid & multivitamin  - PO thiamine daily  - consider adding on CIWA     # DM2: Not on medications, A1C 5.1% on admission.     # Anemia of chronic disease: Baseline hgb ~11. Likely 2/2 alcohol use and CKD.     # Constipation  -daily miralax  -senna prn  -dulcolax suppository prn     Diet: Combination Diet Regular Diet Adult  Room Service  Snacks/Supplements Adult: Other; 2pm: cheddar cheese and saltine crackers; Between Meals    DVT Prophylaxis: Eliquis, PTA  Brown Catheter: PRESENT, indication: Neurogenic Bladder, Retention  Fluids: PO  Central Lines: PRESENT  CVC Double Lumen Right Internal jugular Tunneled;Non - valved (open ended)-Site Assessment: WDL    Cardiac Monitoring: None  Code Status: Full Code      Disposition Plan   Expected Discharge: medically ready, awaiting TCU placement      Anticipated discharge location: TBD - LTC vs home vs TCU     The patient's care was discussed with the Attending Physician, Dr. Paez.    Abbey Puente MD  [facility]  [facility]  Securely message with the Vocera Web Console (learn more here)  Text page via [facility] Paging/Directory   Please see signed in provider for up to date coverage information    ____________________________________________________    Interval History     Patient agreeable to taking medications today, as well as to undergoing MRI.     Data reviewed today: I reviewed all medications, new labs and imaging results over the last 24 hours.     Physical Exam   Vital Signs: Temp: 97.4  F (36.3  C) Temp src: Oral BP: 106/62 Pulse: 84   Resp: 16 SpO2: 96 % O2 Device: None (Room air)    Weight: 241 lbs 6.46 oz  Physical Exam  Vitals reviewed.   Constitutional:       General: He is not in acute distress.     Appearance: Normal appearance. He is well-developed. He  is not ill-appearing.      Comments: Lying in bed   HENT:      Head: Normocephalic.   Eyes:      Extraocular Movements: Extraocular movements intact.      Conjunctiva/sclera: Conjunctivae normal.   Cardiovascular:      Rate and Rhythm: Normal rate.      Heart sounds: Normal heart sounds.   Pulmonary:      Effort: Pulmonary effort is normal. No respiratory distress.      Breath sounds: Normal breath sounds.   Neurological:      Mental Status: He is alert.   Psychiatric:         Attention and Perception: Attention normal.         Mood and Affect: Mood is depressed. Affect is flat.         Behavior: Behavior is withdrawn. Behavior is cooperative.

## 2022-02-16 NOTE — DOWNTIME EVENT NOTE
The EMR was down for 3 hours on 2/16/2022.    Barbara Atwood RN was responsible for completing the paper charting during this time period.     The following information was re-entered into the system by Barbara Atwood RN: no updates were needed at this time    The following information will remain in the paper chart: none    Barbara Atwood RN  2/16/2022  
No

## 2022-02-16 NOTE — PLAN OF CARE
Pt is Aox4, but irritable and refusing cares. CHG, cath cares, and repositioning w/ cleaning of a Sm incontinent BM occurred this AM when patient was more willing to cooperate. Pt is agreeable to doing MRI today, slated for 1500. One time pain med ordered prior to MRI. Adequate urine output. Pt ate <25% of breakfast. Pysch consult was ordered and came to do an assessment this afternoon. Wife called and was updated w/ plan for MRI today. /62 (BP Location: Right arm)   Pulse 84   Temp 97.4  F (36.3  C) (Oral)   Resp 16   Wt 109.5 kg (241 lb 6.5 oz)   SpO2 96%   BMI 30.99 kg/m

## 2022-02-16 NOTE — PROGRESS NOTES
Nephrology Progress Note  02/16/2022       Fer Latham is a 71 yom with hx of HTN, Aflutter (not on AC), DMII, ESRD 2/2 IgA nephropathy, and chronic alcohol abuse, admitted with two weeks of weakness and recurrent falls with new shortness of breath secondary to atrial fibrillation with RVR, frequent UTI's, T12 compression fx and RLE DVT who is admitted with UTI.       Interval History :   Mr Latham did not have labs drawn today but Cystatin-C did come back again with gfr of 19 ml/min suggesting he has enough gfr to be off of dialysis.  Will continue to follow, plan is still to follow up in CKD clinic after discharge and keep tunneled HD line for now.       Assessment & Recommendations:   ESRD-dialyzes 2x/week at [facility] on Tues/Thurs. Run time: 3 hrs 45 min but often shortens runs. Access: tunneled RIJ (failed AVF). EDW: 119 kg (need to reduce).                -Has not needed HD this admission, appears to have enough gfr to be off of HD as an outpatient.                  -Cystatin C suggests gfr of 23 ml/min, rechecking cystatin-c again as Cr has gone up since our last check.                 -Plan for CKD clinic in ~1 week after discharge.  Flushing tunneled line weekly.       Volume-Makes significant amount of UOP, no significant edema on exam.       Electrolytes-Not drawn today.        BMD-Not drawn today     Anemia-Not drawn today.         Nutrition-Taking PO, intake minimal currently.      Time spent: 25 minutes on this date of encounter for chart review, physical exam, medical decision making and co-ordination of care.      Seen and discussed with OCTAVIO Aguilar CNS  Clinical Nurse Specialist  [phone]    Review of Systems:   I reviewed the following systems:  Gen: No fevers or chills  CV: No CP at rest  Resp: No SOB at rest  GI: No N/V    Physical Exam:   I/O last 3 completed shifts:  In: 480 [P.O.:480]  Out: 1175 [Urine:1175]   /62 (BP Location: Right arm)   Pulse 84    Temp 97.4  F (36.3  C) (Oral)   Resp 16   Wt 109.5 kg (241 lb 6.5 oz)   SpO2 96%   BMI 30.99 kg/m       GENERAL APPEARANCE: alert and no distress  EYES: No scleral icterus  NECK:  No JVD  Pulmonary: lungs clear to auscultation with equal breath sounds bilaterally, no clubbing or cyanosis  CV: Regular rhythm, normal rate, no rub   - Edema trace  GI: soft, nontender, normal bowel sounds  MS: no evidence of inflammation in joints, no muscle tenderness  : + Brown  SKIN: no rash, warm, dry  NEURO: No focal deficits.     Labs:   All labs reviewed by me  Electrolytes/Renal - Recent Labs   Lab Test 02/15/22  0755 02/14/22  1001 02/12/22  1025 06/20/21  1123 06/19/21  0811 06/17/21  1656 06/15/21  1303 10/08/20  1030 05/14/20  1722 06/06/19  0136 05/17/19  1231    139 138   < > 134   < > 131*   < > 128*   < > 131*   POTASSIUM 4.7 4.6 5.3   < > 5.4*   < > 3.5   < > 3.6   < > 3.8   CHLORIDE 105 106 109   < > 105   < > 100   < > 98   < > 96   CO2 28 27 22   < > 24   < > 19*   < > 21   < > 28   BUN 38* 35* 34*   < > 46*   < > 49*   < > 21   < > 35*   CR 2.85* 2.62* 2.51*   < > 2.54*   < > 3.57*   < > 2.42*   < > 3.10*   GLC 99 92 70   < > 152*   < > 153*   < > 100*   < > 159*   COLUMBA 9.2 8.9 9.0   < > 8.5   < > 8.9   < > 7.9*   < > 9.0   MAG  --   --   --   --  2.1  --  2.2  --  1.8  --   --    PHOS  --  4.2  --   --   --   --  4.2  --   --   --  3.0    < > = values in this interval not displayed.       CBC -   Recent Labs   Lab Test 02/15/22  0755 02/11/22  0941 02/09/22  0732   WBC 6.1 4.8 4.9   HGB 10.5* 9.9* 9.4*    200 223       LFTs -   Recent Labs   Lab Test 02/15/22  0755 02/07/22  2128 02/04/22  0549 06/19/21  0811   ALKPHOS  --  140 100 81   BILITOTAL  --  0.3 0.4 0.5   ALT  --  13 10 11   AST  --  17 12 17   PROTTOTAL  --  8.5 6.4* 7.2   ALBUMIN 2.8* 3.0* 2.3* 2.7*       Iron Panel - No lab results found.        Current Medications:    apixaban ANTICOAGULANT  5 mg Oral BID     fluconazole  200 mg Oral  Daily     folic acid  1 mg Oral Daily     furosemide  40 mg Oral Daily     HYDROmorphone  1 mg Oral Once     lidocaine  1-2 patch Transdermal Q24H     lidocaine   Transdermal Q8H     miconazole   Topical BID     mirtazapine  15 mg Oral At Bedtime     multivitamin RENAL  1 capsule Oral Daily     nystatin   Topical BID     [Held by provider] polyethylene glycol  8.5 g Oral Daily     sodium chloride (PF)  3 mL Intracatheter Q8H     thiamine  100 mg Oral Daily       - MEDICATION INSTRUCTIONS -

## 2022-02-16 NOTE — PLAN OF CARE
"Reason for Admission: UTI  History: DMT2, HTN, ETOH abuse, A-fib  IV Access: R CVC, PIV  Incisions/Drains: groin wound  Temp: (!) 96.6  F (35.9  C) Temp src: Oral BP: 131/75 Pulse: 73   Resp: 18 SpO2: 97 % O2 Device: None (Room air)    Diet: Regular, no appetite on shift  Activity: Assist x2 with lift  Pain Management: declined pain medication  GI/: ferguson draining adequately, +gas, -BM shift  Gonzalo: Unable to assess  Team: [facility]    Shift Summary:  Pt refused some assessments including groin wound assessment. When reviewing Gonzalo, Pt unable to state date and became frustrated and said \"I already did this, I don't want to do this again!\" Pt stated he did not want to be woken up and checked in on.     Barbara Atwood, RN on 2/16/2022 at 6:49 AM      "

## 2022-02-16 NOTE — CONSULTS
"      Initial Psychiatric Consult   Consult date: February 16, 2022         Reason for Consult, requesting source:      Requesting source: Karmen Paez    Psychiatry IP Consult: Severe depression - emily psych appropriate? And capacity evaluation          HPI:      Per initial H&P 2-8-22. Fer Latham is a 71 year old male with PMH recurrent UTI's, BPH, neurogenic bladder w/chronic ferguson, DM2, HTN, ESRD, chronic atrial fibrillation, alcohol abuse and T12 compression fracutre who presented to ED for UTI.  Per chart review, complains of pain\" behind the balls\" that radiates up into the suprapubic area. Recently had ferguson exchanged last week which improved his pain. Denies flank pain, CVA tenderness, CP, SOB, fever/chills, n/v/d/c.  Pt was admitted 2/2-2/3 for UTI. Ferguson was changed this admission. Was discharged with cefdinir 300mg every other day for 14 days and opium-belladonna rectal suppositories for bladder spasms. Declined dialysis during this admission.   Currently pt is frustrated and angry. He declined an extensive physical exam. Declined having the lights on, and provided one word answers to my questions. Complaining only of back pain. Denies recent falls. Lives w/his wife. States he drinks alcohol but does not comment on how much. Became angry when asked.    On my interview, the patient was a somewhat vague and poor historian.  He did state that he felt depressed and had some thoughts of hopelessness.  He was worried that he would not get better.  He had concerns about going to a TCU.  He denied any psychotic symptoms or suicidal ideation.  When I discussed his recent refusal of treatments and medications as well as his MRI he stated that he had change his mind that he wants to get his MRI and he stated that he did not think he refused any medications.  The patient was oriented to being at the [facility] and being here for  back and bladder problems.  He stated it was 2022.  When I began to ask " "him more specific questions having to do with orientation he became very angry and said \"that is it were done\".  I was able to reengage him.  He was able to review some of his recent symptoms of being \"sad\", again denying suicidal ideation stated he was sleeping okay denied hallucinations.  He said that he had been thinking about rehab and it might be a good idea.  When asked more details about his illness and possible plans for going to a rehab type facility he became defensive and angry and stated \"nobody told me about those things.    I then discussed his recent visit with his wife for apparently that became upset and he really did not comment on this at all.  I asked him if I could talk to his wife or his son and he stated \"I have to ask them first\".        Past Psychiatric History:   Depression unspecified per history.  Moderate episode of major depression per history.        Substance Use and History:   Current alcohol use.  Denies drug use.        Past Medical History:   PAST MEDICAL HISTORY:   Past Medical History:   Diagnosis Date     Anemia      Arrhythmia     atrial fib     Arthritis 1990's    gout     BPH (benign prostatic hyperplasia)      Current moderate episode of major depressive disorder without prior episode (H) 4/30/2019     Depression      Diabetes mellitus (H)     Type 2  IDDM     Diverticulosis      ESRD (end stage renal disease) on dialysis (H)      Hematuria      HTN (hypertension)      HTN (hypertension)      IgA nephropathy      MI, old      Pneumonia      Rhabdomyolysis      Weakness 5/10/2019       PAST SURGICAL HISTORY:   Past Surgical History:   Procedure Laterality Date     ABDOMEN SURGERY       APPENDECTOMY  1970's     ARTHROSCOPY KNEE  6/27/2013    Procedure: ARTHROSCOPY KNEE;  Right Knee Arthroscopy, Debridment Of Medial Meniscal Tear.  ;  Surgeon: Tomás Wong MD;  Location[facility]     BACK SURGERY  1999    L5 S1 decompression     BIOPSY  2017    skin     COLONOSCOPY  2013 "    [facility]     CORONARY ANGIOGRAPHY ADULT ORDER  2018     EYE SURGERY Bilateral 2004    Lens replacement     IR CVC TUNNEL PLACEMENT > 5 YRS OF AGE  11/26/2019     IR CVC TUNNEL REMOVAL RIGHT  11/26/2019     IR CVC TUNNEL REVISION RIGHT  2/10/2020     IR CVC TUNNEL REVISION RIGHT  10/8/2020     IR CVC TUNNEL REVISION RIGHT  6/17/2021     VASCULAR SURGERY               Family History:   FAMILY HISTORY:   Family History   Problem Relation Age of Onset     Cancer Father            Social History:   SOCIAL HISTORY:   Social History     Tobacco Use     Smoking status: Former Smoker     Smokeless tobacco: Never Used     Tobacco comment: quit 35 years ago   Substance Use Topics     Alcohol use: Yes     Comment: 1 to 2 drinks a day (Occasional)                Physical ROS:   The 10 point Review of Systems is negative other than noted in the HPI or here.           Medications:       apixaban ANTICOAGULANT  5 mg Oral BID     fluconazole  200 mg Oral Daily     folic acid  1 mg Oral Daily     furosemide  40 mg Oral Daily     lidocaine  1-2 patch Transdermal Q24H     lidocaine   Transdermal Q8H     miconazole   Topical BID     mirtazapine  15 mg Oral At Bedtime     multivitamin RENAL  1 capsule Oral Daily     nystatin   Topical BID     [Held by provider] polyethylene glycol  8.5 g Oral Daily     sodium chloride (PF)  3 mL Intracatheter Q8H     thiamine  100 mg Oral Daily              Allergies:     Allergies   Allergen Reactions     Ciprofloxacin      Diagnostic X-Ray Materials      Other reaction(s): Unknown     Sulfa Drugs Hives     Other reaction(s): Unknown  PN: LW Reaction: Rash, Generalized            Labs:     Recent Results (from the past 48 hour(s))   Asymptomatic COVID-19 Virus (Coronavirus) by PCR Nose    Collection Time: 02/15/22  6:59 AM    Specimen: Nose; Swab   Result Value Ref Range    SARS CoV2 PCR Negative Negative, Testing sent to reference lab. Results will be returned via unsolicited result   Basic  "metabolic panel    Collection Time: 02/15/22  7:55 AM   Result Value Ref Range    Sodium 137 133 - 144 mmol/L    Potassium 4.7 3.4 - 5.3 mmol/L    Chloride 105 94 - 109 mmol/L    Carbon Dioxide (CO2) 28 20 - 32 mmol/L    Anion Gap 4 3 - 14 mmol/L    Urea Nitrogen 38 (H) 7 - 30 mg/dL    Creatinine 2.85 (H) 0.66 - 1.25 mg/dL    Calcium 9.2 8.5 - 10.1 mg/dL    Glucose 99 70 - 99 mg/dL    GFR Estimate 23 (L) >60 mL/min/1.73m2   Extra Purple Top Tube    Collection Time: 02/15/22  7:55 AM   Result Value Ref Range    Hold Specimen JIC    CBC with platelets    Collection Time: 02/15/22  7:55 AM   Result Value Ref Range    WBC Count 6.1 4.0 - 11.0 10e3/uL    RBC Count 3.34 (L) 4.40 - 5.90 10e6/uL    Hemoglobin 10.5 (L) 13.3 - 17.7 g/dL    Hematocrit 32.6 (L) 40.0 - 53.0 %    MCV 98 78 - 100 fL    MCH 31.4 26.5 - 33.0 pg    MCHC 32.2 31.5 - 36.5 g/dL    RDW 13.9 10.0 - 15.0 %    Platelet Count 202 150 - 450 10e3/uL   Albumin level    Collection Time: 02/15/22  7:55 AM   Result Value Ref Range    Albumin 2.8 (L) 3.4 - 5.0 g/dL          Physical and Psychiatric Examination:     /62 (BP Location: Right arm)   Pulse 84   Temp 97.4  F (36.3  C) (Oral)   Resp 16   Wt 109.5 kg (241 lb 6.5 oz)   SpO2 96%   BMI 30.99 kg/m    Weight is 241 lbs 6.46 oz  Body mass index is 30.99 kg/m .    Physical Exam:  I have reviewed the physical exam as documented by by the medical team and agree with findings and assessment and have no additional findings to add at this time.    Mental Status Exam:  Lying quietly in the hospital bed, is unkempt, has long hair and beard, is very difficult to engage.  He becomes quite irritable and angry when topics come up that he does not wish to speak about. Speech fluent. Moves upper extremities without difficulty. Associations tight. Mood is \"depressed.\"  Affect congruent. Thought process linear. Thought content negative for suicidal thoughts or delusions. Oriented x2.  Recent and remote memory, " attention span and concentration are difficult to assess due to lack of cooperation fund of knowledge, use of language appropriate. Insight and judgment very limited.              DSM-5 Diagnosis:   Depression unspecified, rule out major depressive disorder  Alcohol use disorder per history  Neurocognitive disorder unspecified, rule out          Assessment:   The patient presents a 8-year-old gentleman who came into the hospital for problems with his bladder and his back.  He has been difficult to deal with in terms of the staff.  He has had a period of refusing to take his medications and to cooperate with getting MRI of his spine.  He appears quite depressed to those working with him, and has been quite irritable.  He does not appear overtly psychotic.  He is quite difficult to engage and becomes quite irritable and angry.  He denies suicidal ideation, he denies psychotic symptoms.  He seems to have some insight into the fact that he is ill and may need some rehab, he is somewhat guarded about certain things such as details about his life and his family.          Summary of Recommendations:     1.  Would recommend contacting the patient's wife and having her come in for a meeting.  We really know very little about this gentlemen's baseline functioning, and is unclear to what extent his current behaviors or evidence of a significant decompensation versus more baseline behaviors.  Would consider having psychiatry attend this meeting with the wife and her son agreed to come in.    2.  Continue to monitor patient's symptoms of depression and irritability.  He may benefit from medication to help with his symptoms although currently he refuses any medications.    3.  His ultimate disposition is unclear at this time.  We will need to look at the relative components of his physical disability and psychiatric symptoms in terms of trying to make decisions about going to a rehab facility versus possibly psychiatry.    4.   "Above discussed with family medicine service.    5.  Please call or reconsult with any questions concerns or change in the patient's status.    Vel Peterson MD      \"This dictation was performed with voice recognition software and may contain errors,  omissions and inadvertent word substitution.\"         "

## 2022-02-17 LAB
ANION GAP SERPL CALCULATED.3IONS-SCNC: 10 MMOL/L (ref 3–14)
BUN SERPL-MCNC: 43 MG/DL (ref 7–30)
CALCIUM SERPL-MCNC: 9.2 MG/DL (ref 8.5–10.1)
CHLORIDE BLD-SCNC: 102 MMOL/L (ref 94–109)
CO2 SERPL-SCNC: 24 MMOL/L (ref 20–32)
CREAT SERPL-MCNC: 2.96 MG/DL (ref 0.66–1.25)
GFR SERPL CREATININE-BSD FRML MDRD: 22 ML/MIN/1.73M2
GLUCOSE BLD-MCNC: 78 MG/DL (ref 70–99)
POTASSIUM BLD-SCNC: 4.8 MMOL/L (ref 3.4–5.3)
SODIUM SERPL-SCNC: 136 MMOL/L (ref 133–144)

## 2022-02-17 PROCEDURE — 99232 SBSQ HOSP IP/OBS MODERATE 35: CPT | Performed by: PSYCHIATRY & NEUROLOGY

## 2022-02-17 PROCEDURE — 99232 SBSQ HOSP IP/OBS MODERATE 35: CPT | Performed by: INTERNAL MEDICINE

## 2022-02-17 PROCEDURE — G0463 HOSPITAL OUTPT CLINIC VISIT: HCPCS

## 2022-02-17 PROCEDURE — 120N000002 HC R&B MED SURG/OB UMMC

## 2022-02-17 PROCEDURE — 250N000013 HC RX MED GY IP 250 OP 250 PS 637: Performed by: STUDENT IN AN ORGANIZED HEALTH CARE EDUCATION/TRAINING PROGRAM

## 2022-02-17 PROCEDURE — 80048 BASIC METABOLIC PNL TOTAL CA: CPT | Performed by: STUDENT IN AN ORGANIZED HEALTH CARE EDUCATION/TRAINING PROGRAM

## 2022-02-17 PROCEDURE — 99233 SBSQ HOSP IP/OBS HIGH 50: CPT | Mod: GC | Performed by: FAMILY MEDICINE

## 2022-02-17 PROCEDURE — 36415 COLL VENOUS BLD VENIPUNCTURE: CPT | Performed by: STUDENT IN AN ORGANIZED HEALTH CARE EDUCATION/TRAINING PROGRAM

## 2022-02-17 RX ORDER — ESCITALOPRAM OXALATE 10 MG/1
10 TABLET ORAL DAILY
Status: DISCONTINUED | OUTPATIENT
Start: 2022-02-17 | End: 2022-03-02 | Stop reason: HOSPADM

## 2022-02-17 RX ORDER — QUETIAPINE FUMARATE 25 MG/1
25 TABLET, FILM COATED ORAL
Status: DISCONTINUED | OUTPATIENT
Start: 2022-02-17 | End: 2022-02-18

## 2022-02-17 RX ADMIN — APIXABAN 5 MG: 5 TABLET, FILM COATED ORAL at 10:32

## 2022-02-17 RX ADMIN — APIXABAN 5 MG: 5 TABLET, FILM COATED ORAL at 20:23

## 2022-02-17 RX ADMIN — THIAMINE HCL TAB 100 MG 100 MG: 100 TAB at 10:32

## 2022-02-17 RX ADMIN — FUROSEMIDE 40 MG: 20 TABLET ORAL at 10:32

## 2022-02-17 RX ADMIN — Medication 1 CAPSULE: at 10:32

## 2022-02-17 RX ADMIN — ESCITALOPRAM OXALATE 10 MG: 10 TABLET ORAL at 20:23

## 2022-02-17 RX ADMIN — MIRTAZAPINE 15 MG: 15 TABLET, FILM COATED ORAL at 22:04

## 2022-02-17 RX ADMIN — FLUCONAZOLE 200 MG: 100 TABLET ORAL at 10:32

## 2022-02-17 RX ADMIN — FOLIC ACID 1 MG: 1 TABLET ORAL at 10:32

## 2022-02-17 ASSESSMENT — ACTIVITIES OF DAILY LIVING (ADL)
ADLS_ACUITY_SCORE: 23
ADLS_ACUITY_SCORE: 19
ADLS_ACUITY_SCORE: 23
ADLS_ACUITY_SCORE: 19
ADLS_ACUITY_SCORE: 19
ADLS_ACUITY_SCORE: 23
ADLS_ACUITY_SCORE: 23
ADLS_ACUITY_SCORE: 19
ADLS_ACUITY_SCORE: 23
ADLS_ACUITY_SCORE: 19
ADLS_ACUITY_SCORE: 23
ADLS_ACUITY_SCORE: 23
ADLS_ACUITY_SCORE: 21
ADLS_ACUITY_SCORE: 19
ADLS_ACUITY_SCORE: 23
ADLS_ACUITY_SCORE: 19
ADLS_ACUITY_SCORE: 23

## 2022-02-17 NOTE — PROGRESS NOTES
Nephrology Progress Note  02/17/2022         Fer Latham is a 71 yom with hx of HTN, Aflutter (not on AC), DMII, ESRD 2/2 IgA nephropathy, and chronic alcohol abuse, admitted with two weeks of weakness and recurrent falls with new shortness of breath secondary to atrial fibrillation with RVR, frequent UTI's, T12 compression fx and RLE DVT who is admitted with UTI.       Interval History :   Mr Yeison's Cr is up again slightly to 3.0, it is unusual that he has not settled out after ~2 weeks without HD, this suggests he has a mild MATT on his advanced CKD, treating UTI.  Cystatin-c last checked was 19ml/min, no signs of uremia.       Assessment & Recommendations:   ESRD-dialyzes 2x/week at [facility] on Tues/Thurs. Run time: 3 hrs 45 min but often shortens runs. Access: tunneled RIJ (failed AVF). EDW: 119 kg (need to reduce).                -Has not needed HD this admission, appears to have enough gfr to be off of HD as an outpatient.                  -Cystatin C suggests gfr of 23 ml/min, recheck was 19ml/min.                 -Plan for CKD clinic in ~1 week after discharge.  Flushing tunneled line weekly.       Volume-Makes significant amount of UOP, no significant edema on exam.       Electrolytes-K 4.8, bicarb 24, no acute issue.      BMD-Ca 9.2, Mg and Phos not checked today.      Anemia-Hgb 10.5 last check.        Nutrition-Taking PO, intake minimal currently.      Time spent: 25 minutes on this date of encounter for chart review, physical exam, medical decision making and co-ordination of care.      Seen and discussed with Dr Maru Bean, [facility]  Clinical Nurse Specialist  [phone]    Review of Systems:   I reviewed the following systems:  Gen: No fevers or chills  CV: No CP at rest  Resp: No SOB at rest  GI: No N/V    Physical Exam:   I/O last 3 completed shifts:  In: -   Out: 1700 [Urine:1700]   /61 (BP Location: Right arm)   Pulse 72   Temp 98.2  F (36.8  C) (Temporal)   Resp  18   Wt 106.5 kg (234 lb 12.6 oz)   SpO2 96%   BMI 30.15 kg/m       GENERAL APPEARANCE: alert and no distress  EYES: No scleral icterus  NECK:  No JVD  Pulmonary: lungs clear to auscultation with equal breath sounds bilaterally, no clubbing or cyanosis  CV: Regular rhythm, normal rate, no rub   - Edema trace  GI: soft, nontender, normal bowel sounds  MS: no evidence of inflammation in joints, no muscle tenderness  : + Brown  SKIN: no rash, warm, dry  NEURO: No focal deficits.     Labs:   All labs reviewed by me  Electrolytes/Renal - Recent Labs   Lab Test 02/17/22  0942 02/15/22  0755 02/14/22  1001 06/20/21  1123 06/19/21  0811 06/17/21  1656 06/15/21  1303 10/08/20  1030 05/14/20  1722 06/06/19  0136 05/17/19  1231    137 139   < > 134   < > 131*   < > 128*   < > 131*   POTASSIUM 4.8 4.7 4.6   < > 5.4*   < > 3.5   < > 3.6   < > 3.8   CHLORIDE 102 105 106   < > 105   < > 100   < > 98   < > 96   CO2 24 28 27   < > 24   < > 19*   < > 21   < > 28   BUN 43* 38* 35*   < > 46*   < > 49*   < > 21   < > 35*   CR 2.96* 2.85* 2.62*   < > 2.54*   < > 3.57*   < > 2.42*   < > 3.10*   GLC 78 99 92   < > 152*   < > 153*   < > 100*   < > 159*   COLUMBA 9.2 9.2 8.9   < > 8.5   < > 8.9   < > 7.9*   < > 9.0   MAG  --   --   --   --  2.1  --  2.2  --  1.8  --   --    PHOS  --   --  4.2  --   --   --  4.2  --   --   --  3.0    < > = values in this interval not displayed.       CBC -   Recent Labs   Lab Test 02/15/22  0755 02/11/22  0941 02/09/22  0732   WBC 6.1 4.8 4.9   HGB 10.5* 9.9* 9.4*    200 223       LFTs -   Recent Labs   Lab Test 02/15/22  0755 02/07/22  2128 02/04/22  0549 06/19/21  0811   ALKPHOS  --  140 100 81   BILITOTAL  --  0.3 0.4 0.5   ALT  --  13 10 11   AST  --  17 12 17   PROTTOTAL  --  8.5 6.4* 7.2   ALBUMIN 2.8* 3.0* 2.3* 2.7*       Iron Panel - No lab results found.        Current Medications:    apixaban ANTICOAGULANT  5 mg Oral BID     fluconazole  200 mg Oral Daily     folic acid  1 mg Oral  Daily     [Held by provider] furosemide  40 mg Oral Daily     lidocaine  1-2 patch Transdermal Q24H     lidocaine   Transdermal Q8H     miconazole   Topical BID     mirtazapine  15 mg Oral At Bedtime     multivitamin RENAL  1 capsule Oral Daily     nystatin   Topical BID     [Held by provider] polyethylene glycol  8.5 g Oral Daily     sodium chloride (PF)  3 mL Intracatheter Q8H     thiamine  100 mg Oral Daily       - MEDICATION INSTRUCTIONS -

## 2022-02-17 NOTE — DISCHARGE SUMMARY
[facility]  Discharge Summary - Family Medicine        Date of Admission:  2/7/2022  Date of Discharge:  3/2/2022  Discharging Provider: Dr. Los Gregory MD  Discharge Service: [facility]    Discharge Diagnoses      Acute cystitis with hematuria  Dermatitis  Presence of urogenital implant  Contact with and (suspected) exposure to covid-19  Moderate malnutrition (H)  Major depressive disorder with current active episode, unspecified depression episode severity, unspecified whether recurrent  Current moderate episode of major depressive disorder without prior episode (H)      Follow-ups Needed After Discharge   - Follow up with facility provider within 1 week of hospital discharge. Repeat BMP weekly to monitor potassium.   - Follow up with CKD clinic as previously planned.   - Reschedule missed appointment with Neurosurgery (Dr. Diop)    Unresulted Labs Ordered in the Past 30 Days of this Admission     No orders found from 1/8/2022 to 2/8/2022.      These results will be followed up by N/A    Discharge Disposition   Discharged to short-term care facility  Condition at discharge: Stable and improving  Patient ready to discharge to a skilled nursing facility as soon as possible in order to create capacity for patients related to the COVID-19 pandemic.    Hospital Course   Fre Latham was admitted on 2/7/2022 for catheter associated candidal cystitis and inability to fully care for self at home.    The following problems were addressed during his hospitalization:    # Catheter-associated candidal cystitis, resolved   # Neurogenic bladder w/chronic indwelling ferguson  # BPH with LUTS  Ferguson first placed by Urology June 2021. Had been on oxybutytnin and trospium but stopped 2/2 cost. Recent admission 2/2-2/3, treated with zosyn and ceftriaxone, discharged with cefdinir 300mg every other day x 7 days, reports adherence though returned due  to suprapubic/groin pain and increased drainage into catheter bag. Patient was initially started on ceftriaxone during this hospitalization based on UA and previous Ucx, but Ucx from this hospitalization only grew Candida, no bacteria consistent with Candida cystitis vs colonization so he was transitioned to PO fluconazole 200mg daily x14 days 2/9-2/22. Hx candidal cystitis June 2021-teated with fluconazole PO. Brown exchanged 2/8 (new size) and has since been functioning well.     Prior urine cultures:  -2/8/2022: 10-50,000 candida  -2/2/22: >100k E coli pansensitive   -7/12/21: >100,000 candida  -6/28/21: 10-50,000 candida  -6/15/21: mixed urogenital jerzy  -5/14/20: mixed urogenital jerzy  -10/2/19: 10-50,000 enterococcus faecalis, pansensitive    # Moderate malnutrition in the context of acute on chronic illness  % Intake: < 75% for > 7 days (moderate)  % Weight Loss: > 2% in 1 week (severe)  Subcutaneous Fat Loss: Facial region:  mild  Muscle Loss: Temporal and Lower arm  (forearm): mild   Fluid Accumulation/Edema: Does not meet criteria  Per RN report, now eating 100% of meals.  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 Nephrology consulted and HD held since admission given adequate UOP and stable electrolytes and kidney function. Cystatin C suggests gfr of 23 ml/min which suggests CKD4. Nephrology did discuss discontinuation of HD with his outpatient   Nephrologist who was in agreement. 2/11 did have uptrending K and weight gain, therefore started Lasix 40 mg daily (2/13-2/17). Discontinue Lasix due to uptrending Cr felt to be r/t MATT on CKD due to poor intake.   - Follow up with CKD clinic   - Goal weight of 98-99 kg  - Low K diet (with low K supplements)  - Repeat BMP on hospital follow up, and  weekly while in TCU to monitor potassium levels. If >5, can given 20 mg of oral lasix    # T12 compression fracture  # Low back pain  # Fecal incontinence   Found to have worsening of known T12 compression fx w/periverterbral edema on MRI lumbar spine 2/08 with moderate spinal canal narrowing at T11-T12. Denies recent falls.  No fever/chills to suggest infection. Neurosurgery consulted, no surgical intervention planned. He was fitted for a TLSO brace with recommendations to wear whenever out of bed. Due to new bowel incontinence 2/13 and poor rectal tone on exam, Neurosurgery reconsulted 2/14. Incontinence difficult to evaluate as suspect depression and cognitive issues contributing. Ultimately MR imaging of the thoracic and lumbar spine was repeated 2/16 and showed no significant change in T12 compression fracture without evidence of cauda equina. He did receive Tylenol, lidocaine and sparing opioids for back pain throughout his admission. No opioids since 2/14/22.     Followed at [facility] by Ailyn Lundberg NP/Farshad Diop MD, missed his last appointment, recommend outpatient follow up with them as they have been managing his fracture.  - utilize tylenol TID and lidocaine patches  - Up with assist and TLSO brace when out of bed  - continue PT/OT at TCU    # MDD  # Anxiety  # Chronic insomnia  # Delirium, resolved  # Cognitive impairment  Psychiatry was consulted during his admission and felt diagnosis consistent with MDD and delirium. Recommended starting lexapro 10 mg daily for significant depression and anxiety felt to be limiting his participation in therapies, cares, refusing to get out of bed, etc. OT evaluation 2/09 with SLUMS score of 8/30. Over the course of his hospitalization he did become engaged with therapies and cares with improvement in mental status and depression symptoms. Considered uremic encephalopathy with ESRD no longer on dialysis, felt to be unlikely in conversation with Nephrology.    - Lexapro 10 mg daily (started 2/17)   - PTA mirtazipine 15 mg at bedtime       Consultations This Hospital Stay   OCCUPATIONAL THERAPY ADULT IP CONSULT  PHYSICAL THERAPY ADULT IP CONSULT  NEPHROLOGY ESRD ADULT IP CONSULT  WOUND OSTOMY CONTINENCE NURSE  IP CONSULT  ORTHOSIS BRACE IP CONSULT  OCCUPATIONAL THERAPY ADULT IP CONSULT  NUTRITION SERVICES ADULT IP CONSULT  PSYCHIATRY IP CONSULT  OCCUPATIONAL THERAPY ADULT IP CONSULT  PSYCHIATRY IP CONSULT  INTERVENTIONAL RADIOLOGY ADULT/PEDS IP CONSULT  VASCULAR ACCESS CARE ADULT IP CONSULT  PHYSICAL THERAPY ADULT IP CONSULT  OCCUPATIONAL THERAPY ADULT IP CONSULT    Code Status   Full Code       The patient was discussed with MD Kadi Rushing's Service  [facility] [address] [place] [address]  Phone: [phone]  ______________________________________________________________________    Physical Exam   Vital Signs: Temp: 97.5  F (36.4  C) Temp src: Oral BP: 128/74 Pulse: 72   Resp: 18 SpO2: 97 % O2 Device: None (Room air)    Weight: 217 lbs 11.2 oz  Physical Exam  Constitutional:       General: He is not in acute distress.     Appearance: Normal appearance. He is not ill-appearing.   HENT:      Head: Normocephalic and atraumatic.   Eyes:      Extraocular Movements: Extraocular movements intact.      Conjunctiva/sclera: Conjunctivae normal.   Cardiovascular:      Rate and Rhythm: Normal rate and regular rhythm.      Pulses: Normal pulses.   Pulmonary:      Effort: Pulmonary effort is normal. No respiratory distress.      Breath sounds: Normal breath sounds. No wheezing.   Abdominal:      General: Bowel sounds are normal.   Musculoskeletal:      Right lower leg: No edema.      Left lower leg: No edema.   Skin:     General: Skin is warm and dry.   Neurological:      General: No focal deficit present.      Mental Status: He is alert. Mental status is at baseline.   Psychiatric:         Mood and Affect: Mood is  depressed (much improved).         Speech: Speech normal.         Behavior: Behavior normal.         Thought Content: Thought content normal.         Judgment: Judgment normal.           Primary Care Physician   Isrrael Ornelas    Discharge Orders      General info for SNF    Length of Stay Estimate: Short Term Care: Estimated # of Days <30  Condition at Discharge: Improving  Level of care:skilled   Rehabilitation Potential: Good  Admission H&P remains valid and up-to-date: Yes  Recent Chemotherapy: N/A  Use Nursing Home Standing Orders: Yes     Mantoux instructions    Give two-step Mantoux (PPD) Per Facility Policy Yes     Follow Up and recommended labs and tests    Follow up with primary care provider in 7-14 days.  No follow up labs or test are needed.  Follow up with chronic kidney disease clinic.     Reason for your hospital stay    You were here for a fungal urinary tract infection, which was fully treated     Brown catheter    To straight gravity drainage. Change catheter every 2 weeks and PRN for leaking or decreased uring output with signs of bladder distention. DO NOT change catheter without a specific MD order IF diagnosis of benign prostatic hypertrophy (BPH), neurogenic bladder, or other urological conditions     Activity - Up with nursing assistance     Activity - Ambulate in hallway    Every shift     Physical Therapy Adult Consult    Evaluate and treat as clinically indicated.    Reason: deconditioning, balance training;bed mobility training;gait training;lumbar stabilization;postural re-education;ROM (range of motion);stair training;strengthening;stretching;transfer training     Occupational Therapy Adult Consult    Evaluate and treat as clinically indicated.    Reason:  deconditioning     Contact Isolation     Diet    Follow this diet upon discharge: Orders Placed This Encounter      Room Service      Snacks/Supplements Adult: Other; 2pm: cheddar cheese and saltine crackers; Between Meals      Calorie  Counts      Snacks/Supplements Adult: Nepro Oral Supplement; With Meals      3 Gram K Diet       Significant Results and Procedures   Most Recent 3 CBC's:  Recent Labs   Lab Test 02/25/22  0940 02/15/22  0755 02/11/22  0941   WBC 5.8 6.1 4.8   HGB 11.7* 10.5* 9.9*   MCV 95 98 100    202 200     Most Recent 3 BMP's:  Recent Labs   Lab Test 03/02/22  0835 03/01/22  0935 02/28/22  0850    134 134   POTASSIUM 4.6 5.2 5.3   CHLORIDE 102 100 102   CO2 24 23 26   BUN 54* 54* 53*   CR 2.07* 2.16* 2.13*   ANIONGAP 8 11 6   COLUMBA 9.3 9.1 9.2   * 122* 92     Most Recent ESR & CRP:  Recent Labs   Lab Test 06/15/21  1303   CRP 34.0*   ,   Results for orders placed or performed during the hospital encounter of 02/07/22   CT Abdomen Pelvis w/o Contrast    Narrative    EXAM: CT ABDOMEN PELVIS W/O CONTRAST  LOCATION: [facility]  DATE/TIME: 2/7/2022 10:19 PM    INDICATION: Severe pelvic and suprapubic pain. Recent UTI. Recent Brown exchange.  COMPARISON: None.  TECHNIQUE: CT scan of the abdomen and pelvis was performed without IV contrast. Multiplanar reformats were obtained. Dose reduction techniques were used.  CONTRAST: None.    FINDINGS:   LOWER CHEST: Coronary artery calcification. Patchy areas of interstitial coarsening in the lung bases as seen in the right middle lobe and left lower lobe.    HEPATOBILIARY: Normal.    PANCREAS: Normal.    SPLEEN: Normal.    ADRENAL GLANDS: Normal.    KIDNEYS/BLADDER: No hydronephrosis. A Brown catheter is well-positioned in the urinary bladder, though there is bladder wall thickening and perivesicular edema.    BOWEL: Colonic diverticulosis. No bowel obstruction.    LYMPH NODES: Multiple scattered subcentimeter retroperitoneal lymph nodes.    VASCULATURE: Mild aortoiliac atherosclerotic calcification. No abdominal aortic aneurysm.    PELVIC ORGANS: Normal.    MUSCULOSKELETAL: Small fat-containing left inguinal hernia. Moderate  lumbar spine degenerative change. There is a fracture deformity through the superior endplate of the T12 vertebral body as seen on sagittal image 68. This appearance has evolved   compared to the MRI from 06/26/2021, with increasing compression deformity and new focal kyphosis. There is some edema in the paravertebral region at this level as well.      Impression    IMPRESSION:   1.  Brown catheter well-positioned in the urinary bladder. Bladder wall thickening and perivesicular edema suggestive of cystitis. No hydronephrosis.    2.  Patchy bibasilar pneumonitis.    3.  Interval evolution of previously seen T12 superior endplate compression fracture deformity with new focal kyphosis at this level. This could reflect sequela of a more recent superimposed injury. There is some paravertebral edema at this level which   could reflect posttraumatic blood products, though if patient has signs or symptoms of generalized infection, repeat MRI evaluation would be beneficial.     MR Lumbar Spine w/o Contrast    Narrative    MR LUMBAR SPINE W/O CONTRAST 2/8/2022 10:01 PM    Provided History: Low back pain, > 6 wks; Compression fracture,  L-spine; back pain found to have worsening T12 compression fracture  with perivertebral edema    Comparison: CT 2/7/2022 and MRI 6/26/2021    Technique: Sagittal T1-weighted, sagittal STIR, sagittal  diffusion-weighted (with ADC map), axial T1-weighted, and 3D  volumetric axial and sagittal reconstructed T2-weighted images of the  lumbar spine were obtained without intravenous contrast.     Findings: There are 5 lumbar-type vertebrae assumed for the purposes  of this dictation.  The tip of the conus medullaris is at L1.   Regarding alignment there is unchanged mild 2 mm retrolisthesis of L5  on S1. Unchanged Schmorl's node deformity involving the superior  endplate of L3. Unchanged 2.2 x 1.4 cm T1 and T2 hyperintense benign  hemangioma at the right lateral aspect of the L2 vertebral body.      Again seen wedge compression fracture of the T12 vertebral body, with  unchanged approximately 50-60% vertebral body height loss. There is  linear T1 and T2 hypointense signal just beneath the anterior superior  cortex. T2 hyperintense edema is seen throughout the bone marrow.  Retropulsion of the posterior-superior corner of the vertebral body by  approximately 4 mm. Associated moderate spinal canal narrowing at this  level. This also contributes to mild left and severe right neural  foraminal stenosis. There is anterior wedging compression deformity  and bone marrow edema in T11 anterior vertebral body, as well. In  addition, there is prevertebral soft tissue edema.      Impression    Impression:   1. Compression deformity of T12 vertebral body with decreased but  persistent bone marrow signal. There is bone marrow signal in T11  anterior vertebral body and anterior wedging compression deformity.  Compared to 6/26/2021, paravertebral soft tissue edema and thickening  increased. Findings may represent superimposed infection, although  absence of disc signal or absence of epidural abnormality makes  infection less likely.   2. Moderate spinal canal narrowing at T11-T12 secondary to above.  3. No significant change in degenerative findings at other levels.  Refer to 6/26/2021 dated MR for other findings.      I have personally reviewed the examination and initial interpretation  and I agree with the findings.    MANJEET CRAMER MD         SYSTEM ID:  [id number]   CT Head w/o Contrast    Narrative    Head CT without contrast    History: Mental status change, unknown cause.  Comparison: 6/18/2021    Technique: Axial images through the brain obtained without intravenous  contrast, reviewed in bone, brain, and subdural windows.    Findings: There is no evidence of intracranial hemorrhage. There is no  mass-effect or midline shift. The ventricles do not appear enlarged  out of proportion to the cerebral sulci.  Unchanged moderate  periventricular low-attenuation. Moderate cerebral volume loss is also  unchanged.    Trace bilateral mastoid fluid. Paranasal sinuses clear.      Impression    Impression:  No acute intracranial pathology. Unchanged moderate  cerebral volume loss and presumed chronic small vessel ischemic  disease.    NATALIE RITTER MD         SYSTEM ID:  [id number]   MR Lumbar Spine w/o Contrast    Narrative    Thoracic and Lumbar spine MRI without contrast 2/16/2022    History: Compression fracture, T-spine.    Comparison: MRI 2/8/2022, CT 2/17/2022, and CT 6/16/2021.    Technique: Multisequence multiplanar MRI of the thoracic and lumbar  spine was performed without the administration of intravenous  contrast.    Findings:  Thoracic Spine: T12 superior endplate deformity with approximately 50%  loss of vertebral body height and 5 mm retropulsion of the posterior  aspect into the ventral spinal canal with focal associated kyphosis  centered at T12, similar to multiple exams in the past week, with  moderate focal narrowing of the thoracic spinal canal.  There is  diffuse abnormal T1 hypointense signal throughout the majority of the  T12 vertebral body, anterior aspect of the T11 vertebral body, and in  the adjacent pre/paravertebral soft tissues.    No abnormal signal in the thoracic spinal cord at any level. Alignment  and bone marrow signal in the remaining thoracic spine are  unremarkable. No diffusion abnormality in the thoracic spine.    Lumbar Spine: Counting down from the craniocervical junction, there  are 5 lumbar-type vertebrae. The tip of the conus is at L1. Unchanged  retrolisthesis of L5 on S1. Alignment of the lumbar spine is otherwise  preserved. Stable 17 mm T1 hyperintense lesion in the L2 vertebral  body, likely a meningioma. There is T2 hyperintense signal within the  superior aspect of L3 at site of Schmorl's node. No abnormal signal  intensity in the remaining bone marrow. On a level by level  basis, the  findings are as follows:    L1-2: No spinal canal or neural foraminal narrowing.    L2-3: Posterior disc bulge and bilateral facet hypertrophy. Mild  spinal canal narrowing. Mild bilateral neural foraminal narrowing.     L3-4: Posterior disc bulge, bilateral facet hypertrophy, and  ligamentum flavum thickening. Moderate spinal canal stenosis. Mild  bilateral neural foraminal narrowing.     L4-5: Posterior disc bulge, bilateral facet hypertrophy, and  ligamentum flavum thickening. Mild spinal canal narrowing. Mild  bilateral neural foraminal narrowing.    L5-S1: Circumferential disc osteophyte complex. Trace retrolisthesis.  Left hemilaminectomy changes. Mild spinal canal narrowing. Mild  bilateral neural foraminal narrowing.    Extensive diffuse atrophy of the erector spinae musculature.    Partially evaluated small right pleural effusion.    Atrophic native kidneys and partially visualized left renal cystic  lesion.      Impression    Impression:   1. No significant change in T12 compression deformity compared to  2/8/2022 with roughly 5 mm retropulsion into the spinal canal and  moderate associated spinal canal stenosis. Diffuse T1-hypointensity of  the T12 bone marrow may represent edema secondary to subacute on  chronic fracture. No abnormal paraspinal collection or signal  abnormality in the adjacent disc space to suggest infection.  2. No myelopathic cord signal.  3. Multilevel lumbar spondylosis most pronounced at L3-4 where there  is moderate spinal canal stenosis.    I have personally reviewed the examination and initial interpretation  and I agree with the findings.    JOHN OLGUIN MD         SYSTEM ID:  [id number]   MR Thoracic Spine w/o Contrast    Narrative    Thoracic and Lumbar spine MRI without contrast 2/16/2022    History: Compression fracture, T-spine.    Comparison: MRI 2/8/2022, CT 2/17/2022, and CT 6/16/2021.    Technique: Multisequence multiplanar MRI of the thoracic and  lumbar  spine was performed without the administration of intravenous  contrast.    Findings:  Thoracic Spine: T12 superior endplate deformity with approximately 50%  loss of vertebral body height and 5 mm retropulsion of the posterior  aspect into the ventral spinal canal with focal associated kyphosis  centered at T12, similar to multiple exams in the past week, with  moderate focal narrowing of the thoracic spinal canal.  There is  diffuse abnormal T1 hypointense signal throughout the majority of the  T12 vertebral body, anterior aspect of the T11 vertebral body, and in  the adjacent pre/paravertebral soft tissues.    No abnormal signal in the thoracic spinal cord at any level. Alignment  and bone marrow signal in the remaining thoracic spine are  unremarkable. No diffusion abnormality in the thoracic spine.    Lumbar Spine: Counting down from the craniocervical junction, there  are 5 lumbar-type vertebrae. The tip of the conus is at L1. Unchanged  retrolisthesis of L5 on S1. Alignment of the lumbar spine is otherwise  preserved. Stable 17 mm T1 hyperintense lesion in the L2 vertebral  body, likely a meningioma. There is T2 hyperintense signal within the  superior aspect of L3 at site of Schmorl's node. No abnormal signal  intensity in the remaining bone marrow. On a level by level basis, the  findings are as follows:    L1-2: No spinal canal or neural foraminal narrowing.    L2-3: Posterior disc bulge and bilateral facet hypertrophy. Mild  spinal canal narrowing. Mild bilateral neural foraminal narrowing.     L3-4: Posterior disc bulge, bilateral facet hypertrophy, and  ligamentum flavum thickening. Moderate spinal canal stenosis. Mild  bilateral neural foraminal narrowing.     L4-5: Posterior disc bulge, bilateral facet hypertrophy, and  ligamentum flavum thickening. Mild spinal canal narrowing. Mild  bilateral neural foraminal narrowing.    L5-S1: Circumferential disc osteophyte complex. Trace  retrolisthesis.  Left hemilaminectomy changes. Mild spinal canal narrowing. Mild  bilateral neural foraminal narrowing.    Extensive diffuse atrophy of the erector spinae musculature.    Partially evaluated small right pleural effusion.    Atrophic native kidneys and partially visualized left renal cystic  lesion.      Impression    Impression:   1. No significant change in T12 compression deformity compared to  2/8/2022 with roughly 5 mm retropulsion into the spinal canal and  moderate associated spinal canal stenosis. Diffuse T1-hypointensity of  the T12 bone marrow may represent edema secondary to subacute on  chronic fracture. No abnormal paraspinal collection or signal  abnormality in the adjacent disc space to suggest infection.  2. No myelopathic cord signal.  3. Multilevel lumbar spondylosis most pronounced at L3-4 where there  is moderate spinal canal stenosis.    I have personally reviewed the examination and initial interpretation  and I agree with the findings.    JOHN OLGUIN MD         SYSTEM ID:  [id number]   IR CVC Tunnel Removal Right    Narrative    PROCEDURES 2/22/2022 11:02 AM:  1. Right tunneled central venous catheter removal.    CLINICAL HISTORY: line no longer needed.  RIJ placed 6/17//2021.     COMPARISONS: 6/17/2021.    ATTENDING RADIOLOGIST: SILVER Knowles MD    I, JORDIN KNOWLES MD, attest that I was present in the procedure room for  the entire procedure.    MEDICATIONS: The patient was placed on continuous monitoring. Vital  signs were monitored by the Interventional Radiology nurse under the  Attending Physician's supervision. No intravenous sedation  administered. The patient remained stable throughout the procedure.    ATTENDING FACE-TO-FACE SEDATION TIME: No intravenous sedation  administered.    PROCEDURE: The patient understood the limitations, alternatives, and  risks of the procedure and requested the procedure be performed. Both  written and verbal consent were obtained.    The right  neck, anterior chest, and existing tunneled central venous  catheter were prepped and draped in the usual sterile fashion.    1% lidocaine without epinephrine was used for local anesthesia. The  catheter cuff was freed. Catheter removed completely. Pressure held  over the venotomy site and subcutaneous track until hemostasis was  achieved. Catheter site closed with Steri-strips. Sterile dressing  applied. No immediate complication.    No images obtained nor saved.      Impression    IMPRESSION:  1. Right tunneled central venous catheter removed completely. No  images obtained nor saved.    2. Post procedural care and observation in the patient's inpatient  care unit. Outer dressing may be removed tomorrow. Steri-strips will  fall off on their own.    JORDIN ROSE MD         SYSTEM ID:  [id number]       Discharge Medications   Current Discharge Medication List      START taking these medications    Details   escitalopram (LEXAPRO) 10 MG tablet Take 1 tablet (10 mg) by mouth daily  Qty: 30 tablet, Refills: 0    Associated Diagnoses: Major depressive disorder with current active episode, unspecified depression episode severity, unspecified whether recurrent; Current moderate episode of major depressive disorder without prior episode (H)      mirtazapine (REMERON) 15 MG tablet Take 1 tablet (15 mg) by mouth At Bedtime  Qty: 30 tablet, Refills: 0    Associated Diagnoses: Major depressive disorder with current active episode, unspecified depression episode severity, unspecified whether recurrent; Current moderate episode of major depressive disorder without prior episode (H)      multivitamin, therapeutic (THERA-VIT) TABS tablet Take 1 tablet by mouth daily  Qty: 30 tablet, Refills: 0    Associated Diagnoses: Moderate malnutrition (H)      thiamine (B-1) 100 MG tablet Take 1 tablet (100 mg) by mouth daily  Qty: 30 tablet, Refills: 0    Associated Diagnoses: Moderate malnutrition (H)         CONTINUE these medications which  have NOT CHANGED    Details   aspirin (ASA) 325 MG EC tablet Take 325 mg by mouth daily      polyethylene glycol (MIRALAX) 17 GM/Dose powder Take 8.5 g by mouth daily as needed       senna (SENOKOT) 8.6 MG tablet Take 17.2 mg by mouth 2 times daily as needed      Vitamin D3 (CHOLECALCIFEROL) 125 MCG (5000 UT) tablet Take 1 tablet by mouth daily      acetaminophen (TYLENOL) 500 MG tablet Take 2 tablets (1,000 mg) by mouth 3 times daily  Qty:      Associated Diagnoses: Closed wedge compression fracture of T12 vertebra, initial encounter (H)      bisacodyl (DULCOLAX) 10 MG suppository Place 1 suppository (10 mg) rectally daily as needed for constipation  Qty:      Associated Diagnoses: Chronic idiopathic constipation      folic acid (FOLVITE) 1 MG tablet Take 1 tablet (1 mg) by mouth daily  Qty:      Associated Diagnoses: Weakness      melatonin 5 MG tablet Take 1 tablet (5 mg) by mouth nightly as needed for sleep  Qty:      Associated Diagnoses: Insomnia, unspecified type      miconazole (MICATIN) 2 % external powder Apply topically 2 times daily  Qty:      Associated Diagnoses: Fungal infection of the groin      nystatin (MYCOSTATIN) 581096 UNIT/GM external cream Apply topically 2 times daily  Qty:      Associated Diagnoses: Fungal infection of the groin; Balanitis         STOP taking these medications       apixaban ANTICOAGULANT (ELIQUIS) 5 MG tablet Comments:   Reason for Stopping:         cefdinir (OMNICEF) 300 MG capsule Comments:   Reason for Stopping:         diphenhydrAMINE (BENADRYL) 50 MG capsule Comments:   Reason for Stopping:         multivitamin RENAL (NEPHROCAPS/TRIPHROCAPS) 1 MG capsule Comments:   Reason for Stopping:         QUEtiapine (SEROQUEL) 25 MG tablet Comments:   Reason for Stopping:             Allergies   Allergies   Allergen Reactions     Ciprofloxacin      Diagnostic X-Ray Materials      Other reaction(s): Unknown     Sulfa Drugs Hives     Other reaction(s): Unknown  PN: LW Reaction:  Rash, Generalized

## 2022-02-17 NOTE — CONSULTS
[facility] Nurse Inpatient Wound Assessment   Reason for consultation: Evaluate and treat  Buttocks and groin wounds    Assessment  L) posterior thigh/scrotum wounds due to Friction and Moisture Associated Skin Damage (MASD)   BL groin- intertrigo    Status: healing and follow up       Treatment Plan  L) posterior thigh/scrotum wounds: Daily  and PRN   Cleanse gently and apply No sting film barrier daily. Make sure skin surfaces are  to allow the barrier to dry completely before allowing skin to return to the normal position. Coat twice for better protection, ensure to dry first layer before applying second layer.     Bl groin- Cleanse with abram cleanse and protect. Apply antifungal powder per MD order.    Orders Written  Recommended provider order: None, at this time  WOC Nurse follow-up plan:weekly  Nursing to notify the Provider(s) and re-consult the WOC Nurse if wound(s) deteriorates or new skin concern.    Patient History  According to provider note(s): Fer Latham is a 71 year old male with PMH recurrent UTI's, BPH, neurogenic bladder w/chronic ferguson, DM2 (diet controlled), HTN, ESRD on dialysis, chronic atrial fibrillation (on eliquis), alcohol abuse and T12 compression fracture who is admitted for UTI.    Objective Data  Containment of urine/stool: Indwelling catheter    Active Diet Order  Orders Placed This Encounter      Combination Diet Regular Diet Adult      Output:   I/O last 3 completed shifts:  In: -   Out: 1700 [Urine:1700]    Risk Assessment:   Sensory Perception: 3-->slightly limited  Moisture: 3-->occasionally moist  Activity: 1-->bedfast  Mobility: 2-->very limited  Nutrition: 2-->probably inadequate  Friction and Shear: 1-->problem  Gabriel Score: 12                          Labs:   Recent Labs   Lab 02/15/22  0755   ALBUMIN 2.8*   HGB 10.5*   WBC 6.1       Physical Exam  Areas of skin assessed: focused coccyx, sacrum, BL buttocks and groin    Wound Location: L) posterior  thigh/scrotum/intergluteal cleft        Date of last photo 2/8- pt declined  Wound History: POA. Pt has been refusing cares on and off.    Wound Base: 100 % dermis and superficial scab     Palpation of the wound bed: normal      Drainage: none     Description of drainage: none     Measurements (length x width x depth, in cm) thigh wound and scrotum wound has healed, has one small open area on intergluteal cleft 1x0.5x0.1cm, BL almita still moist     Tunneling N/A     Undermining N/A  Periwound skin: intact and evidence of healed friction wound      Color: dusky      Temperature: normal   Odor: none  Pain: absent, none  Pain intervention prior to dressing change:     2. BL groin  Wound History: POA    Wound Base: 100 % scattered satellite lesions- healing     Palpation of the wound bed: normal      Drainage: none     Description of drainage: none     Measurements (length x width x depth, in cm)      Tunneling N/A     Undermining N/A  Periwound skin: intact and moist      Color: dusky      Temperature: normal   Odor: none  Pain: absent, none  Pain intervention prior to dressing change:       Interventions  Visual inspection and assessment completed   Wound Care Rationale Provide protection   Wound Care: done per plan of care  Supplies: floor stock and discussed with RN  Current off-loading measures: Pillows  Current support surface: Standard  Atmos Air mattress  Education provided to: importance of repositioning, plan of care, wound progress, Moisture management and Off-loading pressure  Discussed plan of care with Patient and Nurse    Camilla Vásquez RN

## 2022-02-17 NOTE — PROGRESS NOTES
"[facility]  Progress Note - [facility]       Date of Admission:  2/7/2022    Family Medicine Progress Note - [facility]       Main Plans for Today     - cont. to monitor K  - hold Lasix 40 due to slightly lower pressures    Assessment & Plan     Fer Latham is a 71 year old male with PMH of MDD, anxiety, recurrent UTI's, BPH, neurogenic bladder w/chronic ferguson, DM2 (diet controlled), HTN, ESRD on dialysis (twice weekly), chronic atrial fibrillation (on eliquis), alcohol abuse and T12 compression fracture admitted for UTI.     # MDD  # anxiety  # chronic insomnia  # neurocognitive disorder, acute vs. chronic   Per OT cognitive assessment on 2/9: A/Ox3, moderate deficits in safety, memory, and attention. May benefit from further cognitive testing, but likely has cognitive deficits at baseline, with limited insight.  2/13, [facility] team was first notified of bowel incontinence. On evaluation, patient admitted to not communicating incontinence. Stated that he had \"just given up\". Concern for MDD with underlying baseline cognitive deficit. Patient does not appear to have neurological cause for fecal incontinence from exam/neurosurgery evaluation/MRI. Possible that incontinence may secondary to patient's severe depression. Throughout the hospitalization, patient has been withdrawn, occasionally refuses medications, and nursing/wound care. Previously was refusing PT/OT, though has been more consistently participating in therapy since 2/14. Patient's BUN has been up-trending throughout the hospitalization; most recent BUN of 43. Considered the possibility of uremic encephalopathy, but per Nephrology, this is unlikely.     PharmD med rec earlier indicated he had not been taking mirtazipine consistently prior to hospitalization. Though, outside records show he had been on mirtazapine for at least several months. Given worsening depression in the " hospital, decision was made to restart mirtazapine on 2/13.     - Cont. PTA mirtazipine 15 mg at HS   - Psych consult: would like to speak with wife to determine patient's baseline, dispo placement unclear at this time (psych vs. Rehab)  - hold PTA valium 5mg Q12hr PRN (last filled Oct 2021 per PDMP)  - PTA melatonin  - re-consult OT for SLUMS/MOCA    # Tinea cruris  # Chronic groin/buttocks wounds  # Fecal incontinence   Perianal wounds, L buttocks wound along with erythema in groin per ED exam. Complicated by what appears to be new incontinence of stool, first notified on 2/13. Incontinent again on 2/14. Rectal exam on 2/14 was concerning for decreased tone, and neurosurgery was contacted to discuss the need for repeat MRI. It is possible that this is a chronic problem; there are nursing notes from a June 2021 hospitalization that note fecal incontinence. Also unclear if patient has decrease rectal sensation/control or is having accidents 2/2 to depressed mood and delirium.   - PTA miconazole powder, nystatin cream   - WOC consulted, appreciate following  - Neurosurgery consulted, signed off   - repeat thoracolumbar MRI 2/16 demonstrated moderate spinal canal stenosis at L3/L4; per neurosurgery, no significant findings that would account for fecal incontinence.     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 Electrolyte and kidney function stable, continues to make urine. Initial Cystatin C from suggested gfr of 23 ml/min, with repeat suggesting gfr of 19 which suggests he may be able to hold on dialysis per Nephrology. Due to uptrending K (5.2 12/11 AM), given 1x dose of PO lasix 20mg. Repeat K 2/12 was 5.3, and lasix dose was increased to 40mg daily. Nephrology noted that they would have expected patient's creatinine to have stabilized after 2 weeks without HD. Since it continues to uptrend, they are concerned about a possible MATT on CKD. Given softer  blood pressures today, and possibility of MATT, recommend holding lasix.   - PO lasix 40 daily - hold   - daily bmp  - I&O, daily weights     # Catheter-associated UTI  # Candidal cystitis  # Lactic Acidosis, resolved  # Neurogenic bladder w/ chronic indwelling ferguson  # BPH with LUTS  Ferguson placed by urology June 2021. Has been on oxybutytnin and trospium but stopped 2/2 cost. Hx candidal cystitis June 2021-teated with fluconazole PO. Fegruson exchanged 2/8 (new size) and functioning well. Recent admission 2/2-2/3, treated with zosyn and ceftriaxone for E.Coli UTI. Discharged with cefdinir. Patient was initially started on ceftriaxone during this hospitalization based on UA results and previous Ucx, but Ucx from this hospitalization only growing Candida, no bacteria consistent with candida cystitis vs. colonization.   -PO fluconazole 200mg daily x14 days (2/9-2/22)  -daily bmp     # T12 compression fracture  # low back pain  Worsening of known T12 compression fx w/periverterbral edema. No fever/chills to suggest infection. Denies recent falls. MRI lumbar spine 2/08 with moderate spinal canal narrowing at T11-T12. Neurosurgery consulted, no neurosurgical intervention planned. Due to recent reported fecal incontinence (noted above), thoracolumbar MRI was obtained on 2/16, which showed no change in T12 compression deformity compared to 2/8 MRI.   - tylenol TID  - lidocaine patch for back  - replace PO oxycodone 5mg with PO dilaudid 1mg nightly PRN (per wife, tends to be confused with oxycodone)  - up with assist and TLSO brace when out of bed   - PT/OT consulted - has been working with PT consistently since 2/14       ____________________________________________     # hx DVT  # hx PE  # atrial fibrillation  Not on rate/rhythm control due to concerns for hypotension in the past. Was in RVR on admission, resolved with pain control. [id number] of 4, therefore, needs anticoagulation, though notably had not been taking Eliquis  at home due to cost.   - Eliquis 5mg BID  - Discussed outpatient anticoagulation plan with patient's wife, who declined eliquis due to costs and warfarin due to inconvenience of frequent labs.      # hx alcohol abuse  Drinks alcohol, unclear when last drink was.   - PTA folic acid & multivitamin  - PO thiamine daily  - consider adding on CIWA     # DM2: Not on medications, A1C 5.1% on admission.     # Anemia of chronic disease: Baseline hgb ~11. Likely 2/2 alcohol use and CKD.     # Constipation  - daily miralax  - senna prn  - dulcolax suppository prn     Diet: Combination Diet Regular Diet Adult  Room Service  Snacks/Supplements Adult: Other; 2pm: cheddar cheese and saltine crackers; Between Meals    DVT Prophylaxis: Eliquis, PTA  Brown Catheter: PRESENT, indication: Neurogenic Bladder, /GI/GYN Pelvic Procedure  Fluids: PO  Central Lines: PRESENT  CVC Double Lumen Right Internal jugular Tunneled;Non - valved (open ended)-Site Assessment: WDL    Cardiac Monitoring: None  Code Status: Full Code      Disposition Plan   Expected Discharge: medically ready, awaiting TCU placement      Anticipated discharge location: TBD - LTC vs home vs TCU     The patient's care was discussed with the Attending Physician, Dr. Paez.    Abbey Puente MD  [facility]  [facility]  Securely message with the Vocera Web Console (learn more here)  Text page via [facility] Paging/Directory   Please see signed in provider for up to date coverage information    ____________________________________________________    Interval History     Patient was more pleasant and interactive today. Was agreeable to having psychiatry speak with his wife to determine placement plan.     Data reviewed today: I reviewed all medications, new labs and imaging results over the last 24 hours.     Physical Exam   Vital Signs: Temp: 98.2  F (36.8  C) Temp src: Temporal BP: 109/61 Pulse: 72   Resp: 18  SpO2: 96 % O2 Device: None (Room air)    Weight: 241 lbs 6.46 oz  Physical Exam  Vitals reviewed.   Constitutional:       General: He is not in acute distress.     Appearance: Normal appearance. He is well-developed. He is not ill-appearing.      Comments: Lying in bed   HENT:      Head: Normocephalic.   Eyes:      Extraocular Movements: Extraocular movements intact.      Conjunctiva/sclera: Conjunctivae normal.   Cardiovascular:      Rate and Rhythm: Normal rate.      Heart sounds: Normal heart sounds.   Pulmonary:      Effort: Pulmonary effort is normal. No respiratory distress.      Breath sounds: Normal breath sounds.   Neurological:      Mental Status: He is alert.   Psychiatric:         Attention and Perception: Attention normal.         Mood and Affect: Mood is depressed. Affect is flat.         Behavior: Behavior is withdrawn.         Thought Content: Thought content does not include homicidal or suicidal ideation.

## 2022-02-17 NOTE — PROGRESS NOTES
[facility], [place]   02/17/2022  Neurosurgery Progress Note:    Assessment:  Fer Lahtam is a 71 year old male who was seen for bowel incontinence in the setting of known T12 fracture. He has  a PMH recurrent UTI's, BPH, neurogenic bladder w/chronic ferguson, DM2 (diet controlled), HTN, ESRD on dialysis (twice weekly), chronic atrial fibrillation (on eliquis), alcohol abuse and T12 compression fracture.       Clinically Significant Risk Factors Present on Admission                 Plan:  - Reviewed MRI--overall unchanged compared to prior, no significant canal stenosis that would account for fecal incontinence.   He states he is aware when he is having BM, no perineal numbness.  No hyperreflexia.  He seems unmotivated to get up to restroom.  - He has been following with [facility] Camille Schwarzrock NP/Farshad Diop MD, missed his last appointment, recommend follow up with them as they have been managing his fracture  - TLSO to be worn when out of bed  - Neurosurgery will sign off at this time    -----------------------------------  Kelley Pettit PA-C  2/17/2022 9:01 AM   Neurosurgery  [phone]   -------------    Total time spent with the patient was 25 minutes of which more than 50% was used in counseling time, including discussion of prognosis and various surgical and non-surgical treatment options and associated risks.     Interval History: No acute events.  No complaints this AM    Objective:   Temp:  [97.4  F (36.3  C)-98.2  F (36.8  C)] 98.2  F (36.8  C)  Pulse:  [72-86] 72  Resp:  [16-20] 18  BP: (106-112)/(60-62) 109/61  SpO2:  [96 %-97 %] 96 %  I/O last 3 completed shifts:  In: -   Out: 1700 [Urine:1700]    Gen: Appears comfortable, NAD  Neurologic:  - Alert & Oriented to person, place, time, and situation  - Follows commands   - Speech fluent, spontaneous. No aphasia or dysarthria.  - No gaze preference. No apparent hemineglect.  - PERRL, EOMI  - Face symmetric with  sensation intact to light touch  - Palate elevates symmetrically, uvula midline, tongue protrudes midline  - No pronator drift     Del Tr Bi WE WF Gr   R 5 5 5 5 5 5   L 5 5 5 5 5 5    HF KE KF DF PF EHL   R 5 5 5 5 5 5   L 5 5 5 4 5 5     Reflexes 2+  No clonus    Sensation diminished to feet bilaterally; otherwise intact and symmetric to light touch throughout  Stasis changes in ankles/feet    LABS:  Recent Labs   Lab 02/15/22  0755 02/14/22  1001 02/12/22  1025    139 138   POTASSIUM 4.7 4.6 5.3   CHLORIDE 105 106 109   CO2 28 27 22   ANIONGAP 4 6 7   GLC 99 92 70   BUN 38* 35* 34*   CR 2.85* 2.62* 2.51*   COLUMBA 9.2 8.9 9.0       Recent Labs   Lab 02/15/22  0755   WBC 6.1   RBC 3.34*   HGB 10.5*   HCT 32.6*   MCV 98   MCH 31.4   MCHC 32.2   RDW 13.9          IMAGING:  Recent Results (from the past 24 hour(s))   MR Lumbar Spine w/o Contrast    Narrative    Thoracic and Lumbar spine MRI without contrast 2/16/2022    History: Compression fracture, T-spine.    Comparison: MRI 2/8/2022, CT 2/17/2022, and CT 6/16/2021.    Technique: Multisequence multiplanar MRI of the thoracic and lumbar  spine was performed without the administration of intravenous  contrast.    Findings:  Thoracic Spine: T12 superior endplate deformity with approximately 50%  loss of vertebral body height and 5 mm retropulsion of the posterior  aspect into the ventral spinal canal with focal associated kyphosis  centered at T12, similar to multiple exams in the past week, with  moderate focal narrowing of the thoracic spinal canal.  There is  diffuse abnormal T1 hypointense signal throughout the majority of the  T12 vertebral body, anterior aspect of the T11 vertebral body, and in  the adjacent pre/paravertebral soft tissues.    No abnormal signal in the thoracic spinal cord at any level. Alignment  and bone marrow signal in the remaining thoracic spine are  unremarkable. No diffusion abnormality in the thoracic spine.    Lumbar Spine:  Counting down from the craniocervical junction, there  are 5 lumbar-type vertebrae. The tip of the conus is at L1. Unchanged  retrolisthesis of L5 on S1. Alignment of the lumbar spine is otherwise  preserved. Stable 17 mm T1 hyperintense lesion in the L2 vertebral  body, likely a meningioma. There is T2 hyperintense signal within the  superior aspect of L3 at site of Schmorl's node. No abnormal signal  intensity in the remaining bone marrow. On a level by level basis, the  findings are as follows:    L1-2: No spinal canal or neural foraminal narrowing.    L2-3: Posterior disc bulge and bilateral facet hypertrophy. Mild  spinal canal narrowing. Mild bilateral neural foraminal narrowing.     L3-4: Posterior disc bulge, bilateral facet hypertrophy, and  ligamentum flavum thickening. Moderate spinal canal stenosis. Mild  bilateral neural foraminal narrowing.     L4-5: Posterior disc bulge, bilateral facet hypertrophy, and  ligamentum flavum thickening. Mild spinal canal narrowing. Mild  bilateral neural foraminal narrowing.    L5-S1: Circumferential disc osteophyte complex. Trace retrolisthesis.  Left hemilaminectomy changes. Mild spinal canal narrowing. Mild  bilateral neural foraminal narrowing.    Extensive diffuse atrophy of the erector spinae musculature.    Partially evaluated small right pleural effusion.    Atrophic native kidneys and partially visualized left renal cystic  lesion.      Impression    Impression:   1. No significant change in T12 compression deformity compared to  2/8/2022 with roughly 5 mm retropulsion into the spinal canal and  moderate associated spinal canal stenosis. Diffuse T1-hypointensity of  the T12 bone marrow may represent edema secondary to subacute on  chronic fracture. No abnormal paraspinal collection or signal  abnormality in the adjacent disc space to suggest infection.  2. No myelopathic cord signal.  3. Multilevel lumbar spondylosis most pronounced at L3-4 where there  is  moderate spinal canal stenosis.    I have personally reviewed the examination and initial interpretation  and I agree with the findings.    JOHN OLGUIN MD         SYSTEM ID:  [id number]   MR Thoracic Spine w/o Contrast    Narrative    Thoracic and Lumbar spine MRI without contrast 2/16/2022    History: Compression fracture, T-spine.    Comparison: MRI 2/8/2022, CT 2/17/2022, and CT 6/16/2021.    Technique: Multisequence multiplanar MRI of the thoracic and lumbar  spine was performed without the administration of intravenous  contrast.    Findings:  Thoracic Spine: T12 superior endplate deformity with approximately 50%  loss of vertebral body height and 5 mm retropulsion of the posterior  aspect into the ventral spinal canal with focal associated kyphosis  centered at T12, similar to multiple exams in the past week, with  moderate focal narrowing of the thoracic spinal canal.  There is  diffuse abnormal T1 hypointense signal throughout the majority of the  T12 vertebral body, anterior aspect of the T11 vertebral body, and in  the adjacent pre/paravertebral soft tissues.    No abnormal signal in the thoracic spinal cord at any level. Alignment  and bone marrow signal in the remaining thoracic spine are  unremarkable. No diffusion abnormality in the thoracic spine.    Lumbar Spine: Counting down from the craniocervical junction, there  are 5 lumbar-type vertebrae. The tip of the conus is at L1. Unchanged  retrolisthesis of L5 on S1. Alignment of the lumbar spine is otherwise  preserved. Stable 17 mm T1 hyperintense lesion in the L2 vertebral  body, likely a meningioma. There is T2 hyperintense signal within the  superior aspect of L3 at site of Schmorl's node. No abnormal signal  intensity in the remaining bone marrow. On a level by level basis, the  findings are as follows:    L1-2: No spinal canal or neural foraminal narrowing.    L2-3: Posterior disc bulge and bilateral facet hypertrophy. Mild  spinal canal  narrowing. Mild bilateral neural foraminal narrowing.     L3-4: Posterior disc bulge, bilateral facet hypertrophy, and  ligamentum flavum thickening. Moderate spinal canal stenosis. Mild  bilateral neural foraminal narrowing.     L4-5: Posterior disc bulge, bilateral facet hypertrophy, and  ligamentum flavum thickening. Mild spinal canal narrowing. Mild  bilateral neural foraminal narrowing.    L5-S1: Circumferential disc osteophyte complex. Trace retrolisthesis.  Left hemilaminectomy changes. Mild spinal canal narrowing. Mild  bilateral neural foraminal narrowing.    Extensive diffuse atrophy of the erector spinae musculature.    Partially evaluated small right pleural effusion.    Atrophic native kidneys and partially visualized left renal cystic  lesion.      Impression    Impression:   1. No significant change in T12 compression deformity compared to  2/8/2022 with roughly 5 mm retropulsion into the spinal canal and  moderate associated spinal canal stenosis. Diffuse T1-hypointensity of  the T12 bone marrow may represent edema secondary to subacute on  chronic fracture. No abnormal paraspinal collection or signal  abnormality in the adjacent disc space to suggest infection.  2. No myelopathic cord signal.  3. Multilevel lumbar spondylosis most pronounced at L3-4 where there  is moderate spinal canal stenosis.    I have personally reviewed the examination and initial interpretation  and I agree with the findings.    JOHN OLGUIN MD         SYSTEM ID:  [id number]

## 2022-02-17 NOTE — PLAN OF CARE
Pt AoX3 but confused at times about situation and decision making ability is questionable. Pt has been refusing repositioning, pulsate mattress ordered for him, was placed on the bed at 1500. Brown cares and CHG bath completed. Urine output adequate, but PO intake of food and fluids is minimal, d/t patient refusing food and little fluids drank. Only 25% of breakfast and 2% of dinner eaten, refusing all other foods. Dialysis RN came and changed HD cath dressing and flushed it, nephro team continue to feel he doesn't need HD but did hold his lasix for tomorrow d/t soft BP this AM. Wife is now present this evening and psych was paged to come do another assessment to have a better understanding of baseline at home w/ cognition and function to make determination of whether to proceed w/ TCU or [facility]-Psych placement. WOCN assessed patient's bottom this AM and no changes to plan. /61 (BP Location: Right arm)   Pulse 72   Temp 98.2  F (36.8  C) (Temporal)   Resp 18   Wt 106.5 kg (234 lb 12.6 oz)   SpO2 96%   BMI 30.15 kg/m

## 2022-02-17 NOTE — PROGRESS NOTES
"Care Management Follow Up    Length of Stay (days): 9    Expected Discharge Date: 02/23/2022     Concerns to be Addressed: discharge planning, TCU placement   Patient plan of care discussed at interdisciplinary rounds: Yes    Anticipated Discharge Disposition: Other (Undetermined- d/c destination uncertain.)     Anticipated Discharge Services: Other (TBD)  Anticipated Discharge DME: TBD      Patient/family educated on Medicare website which has current facility and service quality ratings: yes  Education Provided on the Discharge Plan:    Patient/Family in Agreement with the Plan: unable to assess    Referrals Placed by CM/SW:    Pending: None, SW will need to obtain more TCU location preferences from pt or family      Declined/Discontinued:     - [facility] - no bed availability at this time   - [facility] - no TCU services provided at this facility    - [facility] - no outside admissions at this time, looking out about 2 weeks   - [facility] - no bed availability due to staffing   - [facility] - declined due to \"payer issues\" and no bed availability   - [facility] - declining due to refusing PT/OT and MD assessments       Private pay costs discussed: Not applicable    Additional Information:    SW spoke with pt's MD team who reports that it is still unclear if pt will be a candidate for psych. Resident reports that pt was just seen by psych yesterday and will still follow up on pt's disposition. Pt remains a TCU candidate and is medically ready.     Pt has been declined by all TCU referrals that have been made. Pt will need to give SW more preferences for placement.     SW will continue to refer to TCU and follow for discharge planning.     LANETTE Madrid, RADHA   7C    Phone: [phone]  Pager: [phone]      "

## 2022-02-17 NOTE — PLAN OF CARE
"Assumed cares of pt [id number].  Pt is AOVSS on RA. Denies pain or nausea. Pt is withdrawn and refusing to allow full assessment. Kevin is patent with adequate UOP. Asked pt if he knew when he has a BM and he said yes, asked if I could just make sure he was clean and dry and he refused to allow writer to look. I explained the importance of being cleaned right away and the risk of more skin break down and he became frustrated and stated \"I just want to be left alone and don't wake me for anything tonight\". Pt slept throughout the night. Attempted to check marissa for stool this am and pt refused saying he is dry.   "

## 2022-02-18 ENCOUNTER — APPOINTMENT (OUTPATIENT)
Dept: OCCUPATIONAL THERAPY | Facility: CLINIC | Age: 72
DRG: 698 | End: 2022-02-18
Payer: MEDICARE

## 2022-02-18 ENCOUNTER — APPOINTMENT (OUTPATIENT)
Dept: PHYSICAL THERAPY | Facility: CLINIC | Age: 72
DRG: 698 | End: 2022-02-18
Payer: MEDICARE

## 2022-02-18 LAB
ANION GAP SERPL CALCULATED.3IONS-SCNC: 8 MMOL/L (ref 3–14)
BUN SERPL-MCNC: 45 MG/DL (ref 7–30)
CALCIUM SERPL-MCNC: 9.6 MG/DL (ref 8.5–10.1)
CHLORIDE BLD-SCNC: 102 MMOL/L (ref 94–109)
CO2 SERPL-SCNC: 25 MMOL/L (ref 20–32)
CREAT SERPL-MCNC: 2.83 MG/DL (ref 0.66–1.25)
GFR SERPL CREATININE-BSD FRML MDRD: 23 ML/MIN/1.73M2
GLUCOSE BLD-MCNC: 101 MG/DL (ref 70–99)
HOLD SPECIMEN: NORMAL
MAGNESIUM SERPL-MCNC: 1.8 MG/DL (ref 1.8–2.6)
POTASSIUM BLD-SCNC: 4.6 MMOL/L (ref 3.4–5.3)
SODIUM SERPL-SCNC: 135 MMOL/L (ref 133–144)

## 2022-02-18 PROCEDURE — 97530 THERAPEUTIC ACTIVITIES: CPT | Mod: GO

## 2022-02-18 PROCEDURE — 97535 SELF CARE MNGMENT TRAINING: CPT | Mod: GO

## 2022-02-18 PROCEDURE — 82310 ASSAY OF CALCIUM: CPT | Performed by: STUDENT IN AN ORGANIZED HEALTH CARE EDUCATION/TRAINING PROGRAM

## 2022-02-18 PROCEDURE — 250N000013 HC RX MED GY IP 250 OP 250 PS 637: Performed by: STUDENT IN AN ORGANIZED HEALTH CARE EDUCATION/TRAINING PROGRAM

## 2022-02-18 PROCEDURE — 99233 SBSQ HOSP IP/OBS HIGH 50: CPT | Mod: GC | Performed by: FAMILY MEDICINE

## 2022-02-18 PROCEDURE — 83735 ASSAY OF MAGNESIUM: CPT | Performed by: FAMILY MEDICINE

## 2022-02-18 PROCEDURE — 99232 SBSQ HOSP IP/OBS MODERATE 35: CPT | Performed by: INTERNAL MEDICINE

## 2022-02-18 PROCEDURE — 93005 ELECTROCARDIOGRAM TRACING: CPT

## 2022-02-18 PROCEDURE — 93010 ELECTROCARDIOGRAM REPORT: CPT | Performed by: INTERNAL MEDICINE

## 2022-02-18 PROCEDURE — 120N000002 HC R&B MED SURG/OB UMMC

## 2022-02-18 PROCEDURE — 36415 COLL VENOUS BLD VENIPUNCTURE: CPT | Performed by: STUDENT IN AN ORGANIZED HEALTH CARE EDUCATION/TRAINING PROGRAM

## 2022-02-18 PROCEDURE — 97530 THERAPEUTIC ACTIVITIES: CPT | Mod: GP

## 2022-02-18 RX ADMIN — NYSTATIN: 100000 CREAM TOPICAL at 08:51

## 2022-02-18 RX ADMIN — MIRTAZAPINE 15 MG: 15 TABLET, FILM COATED ORAL at 21:47

## 2022-02-18 RX ADMIN — APIXABAN 5 MG: 5 TABLET, FILM COATED ORAL at 09:00

## 2022-02-18 RX ADMIN — ACETAMINOPHEN 975 MG: 325 TABLET, FILM COATED ORAL at 09:00

## 2022-02-18 RX ADMIN — FOLIC ACID 1 MG: 1 TABLET ORAL at 09:01

## 2022-02-18 RX ADMIN — THIAMINE HCL TAB 100 MG 100 MG: 100 TAB at 09:01

## 2022-02-18 RX ADMIN — LIDOCAINE 1 PATCH: 246 PATCH TOPICAL at 09:01

## 2022-02-18 RX ADMIN — ESCITALOPRAM OXALATE 10 MG: 10 TABLET ORAL at 09:01

## 2022-02-18 RX ADMIN — APIXABAN 5 MG: 5 TABLET, FILM COATED ORAL at 20:22

## 2022-02-18 RX ADMIN — FLUCONAZOLE 200 MG: 100 TABLET ORAL at 09:00

## 2022-02-18 RX ADMIN — Medication 1 CAPSULE: at 09:00

## 2022-02-18 RX ADMIN — Medication 1 MG: at 21:47

## 2022-02-18 ASSESSMENT — ACTIVITIES OF DAILY LIVING (ADL)
ADLS_ACUITY_SCORE: 23

## 2022-02-18 NOTE — PROGRESS NOTES
Nephrology Progress Note  02/18/2022         Fer Latham is a 71 yom with hx of HTN, Aflutter (not on AC), DMII, ESRD 2/2 IgA nephropathy, and chronic alcohol abuse, admitted with two weeks of weakness and recurrent falls with new shortness of breath secondary to atrial fibrillation with RVR, frequent UTI's, T12 compression fx and RLE DVT who is admitted with UTI.       Interval History :   Mr Latham's Cr is slightly improved yesterday, made 1.6L of UOP, can hold diuretic unless we have issues with hyperkalemia again and then can add back in at 20mg PO.  Will consult IR to pull tunneled line next week and watch Cr over the weekend, main barrier to discharge is his functional status (along with his unwillingness to work with therapy) and possible cognitive issues which are being evaluated.         Assessment & Recommendations:   ESRD-dialyzes 2x/week at [facility] on Tues/Thurs. Run time: 3 hrs 45 min but often shortens runs. Access: tunneled RIJ (failed AVF). EDW: 119 kg (need to reduce).                -Has not needed HD this admission, appears to have enough gfr to be off of HD as an outpatient.                  -Cystatin C suggests gfr of 23 ml/min, recheck was 19ml/min.                 -Plan for CKD clinic in ~1 week after discharge.  Flushing tunneled line weekly.       Volume-Makes significant amount of UOP, can hold diuretic unless K issues arise.        Electrolytes-K 4.6, bicarb 25, no acute issue.      BMD-Ca 9.6, Mg and Phos not checked today.      Anemia-Hgb 10.5 last check.        Nutrition-Taking PO, intake minimal currently.      Time spent: 25 minutes on this date of encounter for chart review, physical exam, medical decision making and co-ordination of care.      Seen and discussed with Dr Mahoney      Recommendations were communicated to team via note    OCTAVIO Ivy CNS  Clinical Nurse Specialist  [phone]      Review of Systems:   I reviewed the following systems:  Gen: No fevers or  chills  CV: No CP at rest  Resp: No SOB at rest  GI: No N/V       Physical Exam:   I/O last 3 completed shifts:  In: 468 [P.O.:468]  Out: 1050 [Urine:1050]   /61 (BP Location: Right arm)   Pulse 75   Temp 97.5  F (36.4  C) (Oral)   Resp 16   Wt 105.9 kg (233 lb 7.5 oz)   SpO2 99%   BMI 29.98 kg/m       GENERAL APPEARANCE: alert and no distress  EYES: No scleral icterus  NECK:  No JVD  Pulmonary: lungs clear to auscultation with equal breath sounds bilaterally, no clubbing or cyanosis  CV: Regular rhythm, normal rate, no rub   - Edema trace  GI: soft, nontender, normal bowel sounds  MS: no evidence of inflammation in joints, no muscle tenderness  : + Brown  SKIN: no rash, warm, dry  NEURO: No focal deficits.     Labs:   All labs reviewed by me  Electrolytes/Renal - Recent Labs   Lab Test 02/18/22  0734 02/17/22  0942 02/15/22  0755 02/14/22  1001 06/20/21  1123 06/19/21  0811 06/17/21  1656 06/15/21  1303 06/06/19  0136 05/17/19  1231    136 137 139   < > 134   < > 131*   < > 131*   POTASSIUM 4.6 4.8 4.7 4.6   < > 5.4*   < > 3.5   < > 3.8   CHLORIDE 102 102 105 106   < > 105   < > 100   < > 96   CO2 25 24 28 27   < > 24   < > 19*   < > 28   BUN 45* 43* 38* 35*   < > 46*   < > 49*   < > 35*   CR 2.83* 2.96* 2.85* 2.62*   < > 2.54*   < > 3.57*   < > 3.10*   * 78 99 92   < > 152*   < > 153*   < > 159*   COLUMBA 9.6 9.2 9.2 8.9   < > 8.5   < > 8.9   < > 9.0   MAG 1.8  --   --   --   --  2.1  --  2.2   < >  --    PHOS  --   --   --  4.2  --   --   --  4.2  --  3.0    < > = values in this interval not displayed.       CBC -   Recent Labs   Lab Test 02/15/22  0755 02/11/22  0941 02/09/22  0732   WBC 6.1 4.8 4.9   HGB 10.5* 9.9* 9.4*    200 223       LFTs -   Recent Labs   Lab Test 02/15/22  0755 02/07/22  2128 02/04/22  0549 06/19/21  0811   ALKPHOS  --  140 100 81   BILITOTAL  --  0.3 0.4 0.5   ALT  --  13 10 11   AST  --  17 12 17   PROTTOTAL  --  8.5 6.4* 7.2   ALBUMIN 2.8* 3.0* 2.3* 2.7*        Iron Panel - No lab results found.        Current Medications:    apixaban ANTICOAGULANT  5 mg Oral BID     escitalopram  10 mg Oral Daily     fluconazole  200 mg Oral Daily     folic acid  1 mg Oral Daily     [Held by provider] furosemide  40 mg Oral Daily     lidocaine  1-2 patch Transdermal Q24H     lidocaine   Transdermal Q8H     miconazole   Topical BID     mirtazapine  15 mg Oral At Bedtime     multivitamin RENAL  1 capsule Oral Daily     nystatin   Topical BID     [Held by provider] polyethylene glycol  8.5 g Oral Daily     sodium chloride (PF)  3 mL Intracatheter Q8H     thiamine  100 mg Oral Daily       - MEDICATION INSTRUCTIONS -

## 2022-02-18 NOTE — PROGRESS NOTES
"   02/18/22 1128   Cognitive Assessments   Cognitive Assessments Completed [facility] Mental Status Exam ([facility]):  Total Score out of /30 8/30   Norms 1-20 equals dementia   Domains assessed: orientation, memory, attention, executive functions     Pt lacking insight into deficits, stating he did \"pretty well\" when Th asked how he thought he scored. Recommend pt have 24/7 assist/supervision due to cognitive deficits.       "

## 2022-02-18 NOTE — PROGRESS NOTES
"[facility]  Progress Note - [facility]       Date of Admission:  2/7/2022    Family Medicine Progress Note - [facility] Service       Main Plans for Today     - cont. to monitor K  - hold Lasix 40 due to slightly lower pressures    Assessment & Plan     Fer Latham is a 71 year old male with PMH of MDD, anxiety, recurrent UTI's, BPH, neurogenic bladder w/chronic ferguson, DM2 (diet controlled), HTN, ESRD on dialysis (twice weekly), chronic atrial fibrillation (on eliquis), alcohol abuse and T12 compression fracture admitted for UTI.     # MDD  # anxiety  # chronic insomnia  # Delirium  Per OT cognitive assessment on 2/9: A/Ox3, moderate deficits in safety, memory, and attention. May benefit from further cognitive testing, but likely has cognitive deficits at baseline, with limited insight.  2/13, Kadi's team was first notified of bowel incontinence. On evaluation, patient admitted to not communicating incontinence. Stated that he had \"just given up\". Concern for MDD with underlying baseline cognitive deficit. Patient does not appear to have neurological cause for fecal incontinence from exam/neurosurgery evaluation/MRI. Possible that incontinence may secondary to patient's severe depression. Throughout the hospitalization, patient has been withdrawn, occasionally refuses medications, and nursing/wound care. Previously was refusing PT/OT, though has been more consistently participating in therapy since 2/14. Patient's BUN has been up-trending throughout the hospitalization; most recent BUN of 43. Considered the possibility of uremic encephalopathy, but per Nephrology, this is unlikely.     PharmD med rec earlier indicated he had not been taking mirtazipine consistently prior to hospitalization. Though, outside records show he had been on mirtazapine for at least several months. Given worsening depression in the hospital, decision was made to " restart mirtazapine on 2/13. Per Psychiatry assessment, patient's symptoms are most consistent with MDD and delirium. He seems to understand reason for TCU/rehab recommendations, and is agreeable to rehab.     - Psych consult: diagnosis of delirium and MDD   - cont. PTA mirtazipine 15 mg at HS    - lexapro 10mg daily   - seroquel 25mg bedtime (ordered PRN for agitation)  - hold PTA valium 5mg Q12hr PRN (last filled Oct 2021 per PDMP)  - PTA melatonin  - re-consult OT for SLUMS/MOCA  - given starting an additional QT prolonging medication (lexapro), will obtain an EKG.     # Tinea cruris  # Chronic groin/buttocks wounds  # Fecal incontinence   Perianal wounds, L buttocks wound along with erythema in groin per ED exam. Complicated by what appears to be new incontinence of stool, first notified on 2/13. Incontinent again on 2/14. Rectal exam on 2/14 was concerning for decreased tone, and neurosurgery was contacted to discuss the need for repeat MRI. It is possible that this is a chronic problem; there are nursing notes from a June 2021 hospitalization that note fecal incontinence. Also unclear if patient has decrease rectal sensation/control or is having accidents 2/2 to depressed mood and delirium.   - PTA miconazole powder, nystatin cream   - WOC consulted, appreciate following  - Neurosurgery consulted, signed off   - repeat thoracolumbar MRI 2/16 demonstrated moderate spinal canal stenosis at L3/L4; per neurosurgery, no significant findings that would account for fecal incontinence.     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 Electrolyte and kidney function stable, continues to make urine. Initial Cystatin C from suggested gfr of 23 ml/min, with repeat suggesting gfr of 19 which suggests he may be able to hold on dialysis per Nephrology. Due to uptrending K (5.2 12/11 AM), given 1x dose of PO lasix 20mg. Repeat K 2/12 was 5.3, and  lasix dose was increased to 40mg daily. Nephrology noted that they would have expected patient's creatinine to have stabilized after 2 weeks without HD. Since it continues to uptrend, they are concerned about a possible MATT on CKD. Given softer blood pressures 2/17, and possibility of MATT, recommend holding lasix.   - PO lasix 40 daily - hold   - daily bmp  - I&O, daily weights     # Catheter-associated UTI  # Candidal cystitis  # Lactic Acidosis, resolved  # Neurogenic bladder w/ chronic indwelling ferguson  # BPH with LUTS  Ferguson placed by urology June 2021. Has been on oxybutytnin and trospium but stopped 2/2 cost. Hx candidal cystitis June 2021-teated with fluconazole PO. Ferguson exchanged 2/8 (new size) and functioning well. Recent admission 2/2-2/3, treated with zosyn and ceftriaxone for E.Coli UTI. Discharged with cefdinir. Patient was initially started on ceftriaxone during this hospitalization based on UA results and previous Ucx, but Ucx from this hospitalization only growing Candida, no bacteria consistent with candida cystitis vs. colonization.   -PO fluconazole 200mg daily x14 days (2/9-2/22)  -daily bmp     # T12 compression fracture  # low back pain  Worsening of known T12 compression fx w/periverterbral edema. No fever/chills to suggest infection. Denies recent falls. MRI lumbar spine 2/08 with moderate spinal canal narrowing at T11-T12. Neurosurgery consulted, no neurosurgical intervention planned. Due to recent reported fecal incontinence (noted above), thoracolumbar MRI was obtained on 2/16, which showed no change in T12 compression deformity compared to 2/8 MRI.   - tylenol TID  - lidocaine patch for back  - PO dilaudid 1mg nightly PRN   - up with assist and TLSO brace when out of bed   - PT/OT consulted - has been working with PT consistently since 2/14       ____________________________________________     # hx DVT  # hx PE  # atrial fibrillation  Not on rate/rhythm control due to concerns for  hypotension in the past. Was in RVR on admission, resolved with pain control. [id number] of 4, therefore, needs anticoagulation, though notably had not been taking Eliquis at home due to cost.   - Eliquis 5mg BID  - Discussed outpatient anticoagulation plan with patient's wife, who declined eliquis due to costs and warfarin due to inconvenience of frequent labs.      # hx alcohol abuse  Drinks alcohol, unclear when last drink was.   - PTA folic acid & multivitamin  - PO thiamine daily  - consider adding on CIWA     # DM2: Not on medications, A1C 5.1% on admission.     # Anemia of chronic disease: Baseline hgb ~11. Likely 2/2 alcohol use and CKD.     # Constipation  - daily miralax  - senna prn  - dulcolax suppository prn     Diet: Combination Diet Regular Diet Adult  Room Service  Snacks/Supplements Adult: Other; 2pm: cheddar cheese and saltine crackers; Between Meals    DVT Prophylaxis: Eliquis, PTA  Brown Catheter: PRESENT, indication: Neurogenic Bladder, Retention  Fluids: PO  Central Lines: PRESENT  CVC Double Lumen Right Internal jugular Tunneled;Non - valved (open ended)-Site Assessment: WDL    Cardiac Monitoring: None  Code Status: Full Code      Disposition Plan   Expected Discharge: medically ready, awaiting TCU placement      Anticipated discharge location: TBD - LTC vs home vs TCU     The patient's care was discussed with the Attending Physician, Dr. Paez .    Abbey Puente MD  [place]  [facility]  Securely message with the Vocera Web Console (learn more here)  Text page via [facility] Paging/Directory   Please see signed in provider for up to date coverage information    ____________________________________________________    Interval History     Patient was more pleasant and interactive today. Seen sitting up in chair. Was agreeable to having EKG. No chest pain, shortness of breath, or headache. Is complaining of some nausea. Continues  to have decreased appetite.     Data reviewed today: I reviewed all medications, new labs and imaging results over the last 24 hours.     Physical Exam   Vital Signs:                    Weight: 234 lbs 12.64 oz  Physical Exam  Vitals reviewed.   Constitutional:       General: He is not in acute distress.     Appearance: Normal appearance. He is well-developed. He is not ill-appearing.      Comments: Lying in bed   HENT:      Head: Normocephalic.   Eyes:      Extraocular Movements: Extraocular movements intact.      Conjunctiva/sclera: Conjunctivae normal.   Cardiovascular:      Rate and Rhythm: Normal rate.      Heart sounds: Normal heart sounds.   Pulmonary:      Effort: Pulmonary effort is normal. No respiratory distress.      Breath sounds: Normal breath sounds.   Neurological:      Mental Status: He is alert.   Psychiatric:         Attention and Perception: Attention normal.         Mood and Affect: Mood is depressed. Affect is flat.         Behavior: Behavior is withdrawn.         Thought Content: Thought content does not include homicidal or suicidal ideation.

## 2022-02-18 NOTE — PLAN OF CARE
Assumed cares of pt [id number]. Pt is AOVSS on RA. Spouse at bedside until 2030. Patient more interactive and social with staff this evening. Denies pain or nausea. Poor appetite, dinner tray was eaten by spouse. Pt had one loose incontinent BM, allowed staff to change. Brown in place with adequate UOP. Pt refusing regular repositioning, expressed frustration with new pulsate mattress d/t noise, offered earplugs. Pt stated earlier in the evening he heard banging in the halls and that he thought he saw a person, he told his wife to go catch them. Asked him if he was still seeing people in his room or hearing any banging and he said no. Pt took all night meds and was able to sleep throughout the night. Continue POC

## 2022-02-18 NOTE — CONSULTS
Consult Date: 02/17/2022    PSYCHIATRIC CONSULTATION    IDENTIFICATION:  Mr. Fer Latham is a 71-year-old white male who is hospitalized with a compression fracture of his back as well as lower abdominal pain.  I am asked to evaluate his psychiatric status and recommend appropriate disposition by Dr. Puente.    HISTORY:  Prior to interviewing this patient, I had an opportunity to review the initial psychiatric consultation which was completed by Dr. Vel Peterson on 02/16/2022.  At that time the patient was not interactive.  He was not responding to questions and he was refusing cares.  Since he could not make his needs known, Dr. Peterson felt he did not have capacity at that time.    On interview today, I am seeing a much different gentleman.  He is now responding to my questions.  He has his wife at the bedside.  Both of them seem to have some short-term memory problems, but they are clearly quite supportive of each other.  The patient reports he is fine with going to rehab as long as they have good physical therapy and he does not just do physical therapy all day long.  His wife is very supportive of him going to rehabilitation.  Both agree that he has been depressed.  He says his mood has mainly been sad.  He has had poor interest, poor energy, occasional thoughts of suicide, but no current intent or plan.  His wife thinks he would be served by starting an antidepressant and I do recommend starting Lexapro 10 mg.  Given that he has had a waxing and waning mental status exam, at times he has been confused and he occasionally hears banging sounds.  It does seem like he is experiencing intermittent delirium.  His wife points out that he frequently gets delirious when he is on pain medications and he did recently have very significant sepsis.  Therefore, I also recommend Seroquel 25 mg at bedtime, and may repeat x 1.  I did discuss this with the family practice team.  If the patient continues to be compliant I think  the best disposition would be physical rehabilitation.    MENTAL STATUS EXAMINATION:  Today the patient was pleasant and cooperative.  His mood was reported as dysphoric.  His affect was somewhat restricted.  His speech was soft.  It was coherent, but there were long latencies of speech.  His associations were generally tight and his thought processes were usually logical and linear.  Content of thought was difficult to assess.  He has had intermittent thoughts of death, but no current suicidal plan or intent.  He also has heard some unusual banging sounds and reports that at home he hears poltergeists.  He has not experienced any poltergeists in the hospital, which is reassuring.  Short-term memory is somewhat impaired.  Longer-term memory is likely better preserved.  Fund of knowledge, use of language and concentration all appear to be at baseline.  His muscle strength and tone are decreased.  He is alert and oriented x 3.  Insight and judgment are improving.  Recent vital signs include a blood pressure of 109/61, temperature of 98.2, pulse of 72, respiration rate of 18 with 96% oxygen saturation.    ASSESSMENT:  Delirium as well as major depression.    RECOMMENDATIONS:  Lexapro 10 mg in the morning and Seroquel 25 mg at bedtime, may repeat x 1.      Nikita Moran MD        D: 2022   T: 2022   MT: [facility]    Name:     MARIA E REESE  MRN:      [id number]        Account:      [id number]   :      1950           Consult Date: 2022     Document: [id number]    cc:  MD Isrrael Vieira MD

## 2022-02-18 NOTE — PROGRESS NOTES
"Care Management Follow Up    Length of Stay (days): 10    Expected Discharge Date: 02/23/2022     Concerns to be Addressed: discharge planning, TCU placement   Patient plan of care discussed at interdisciplinary rounds: Yes    Anticipated Discharge Disposition: TBD     Anticipated Discharge Services: TBD  Anticipated Discharge DME: TBD    Patient/family educated on Medicare website which has current facility and service quality ratings: yes   Education Provided on the Discharge Plan:    Patient/Family in Agreement with the Plan: unable to assess    Referrals Placed by CM/SW:      Pending:      - [facility] (Ph: [phone])  - [facility] (Ph: [phone])  - [facility] (Ph: [phone])     Declined/Discontinued:      - [facility] - cannot meet his needs (see below for additional information)   - [facility] - no bed availability at this time   - [facility] - no TCU services provided at this facility    - [facility] - no outside admissions at this time, looking out about 2 weeks   - [facility] - no bed availability due to staffing   - [facility] - declined due to \"payer issues\" and no bed availability   - [facility] [facility] - declining due to refusing PT/OT and MD assessments     Private pay costs discussed: Not applicable    Additional Information:    Update: SW spoke with [facility]CU liaison and they have declined pt. They are concerned about where he would discharge to after he was done with TCU. They report that the concern is if pt's wife can take care of him at home. They are also concerned that he isn't engaging in PT/OT and refusing cares. They also report that pt has exhausted his SNF days and would need to see if he qualifies for Medical Assistance.     SARMAD met with pt in his room and gave him a new list of TCU preferences to choose from. SARMAD instructed pt to go " "through list and find more options to make referrals to. Pt is agreeable and will look at list tomorrow.     SW reached out to financial counseling to check in about pt's MA application. His MA application is pending and [facility] is waiting on an \"AVS inquiry\". Financial counseling liaison reports that the person who was working on pt's case (Saida TOLLIVER) is out of the office on Monday 2/21 and should have an update when she returns on Tuesday February 22nd.    SARMAD will continue to follow for TCU placement and discharge planning. SARMAD has added pt to weekend SW list for continued follow up.     LANETTE Madrid, RADHA       Phone: [phone]  Pager: [phone]        "

## 2022-02-19 LAB
ANION GAP SERPL CALCULATED.3IONS-SCNC: 7 MMOL/L (ref 3–14)
ATRIAL RATE - MUSE: 84 BPM
BUN SERPL-MCNC: 51 MG/DL (ref 7–30)
CALCIUM SERPL-MCNC: 9.8 MG/DL (ref 8.5–10.1)
CHLORIDE BLD-SCNC: 102 MMOL/L (ref 94–109)
CO2 SERPL-SCNC: 26 MMOL/L (ref 20–32)
CREAT SERPL-MCNC: 2.83 MG/DL (ref 0.66–1.25)
DIASTOLIC BLOOD PRESSURE - MUSE: NORMAL MMHG
GFR SERPL CREATININE-BSD FRML MDRD: 23 ML/MIN/1.73M2
GLUCOSE BLD-MCNC: 102 MG/DL (ref 70–99)
INTERPRETATION ECG - MUSE: NORMAL
P AXIS - MUSE: NORMAL DEGREES
POTASSIUM BLD-SCNC: 4.3 MMOL/L (ref 3.4–5.3)
PR INTERVAL - MUSE: 176 MS
QRS DURATION - MUSE: 96 MS
QT - MUSE: 344 MS
QTC - MUSE: 482 MS
R AXIS - MUSE: -18 DEGREES
SODIUM SERPL-SCNC: 135 MMOL/L (ref 133–144)
SYSTOLIC BLOOD PRESSURE - MUSE: NORMAL MMHG
T AXIS - MUSE: -7 DEGREES
VENTRICULAR RATE- MUSE: 118 BPM

## 2022-02-19 PROCEDURE — 250N000013 HC RX MED GY IP 250 OP 250 PS 637: Performed by: STUDENT IN AN ORGANIZED HEALTH CARE EDUCATION/TRAINING PROGRAM

## 2022-02-19 PROCEDURE — 80048 BASIC METABOLIC PNL TOTAL CA: CPT | Performed by: STUDENT IN AN ORGANIZED HEALTH CARE EDUCATION/TRAINING PROGRAM

## 2022-02-19 PROCEDURE — 99233 SBSQ HOSP IP/OBS HIGH 50: CPT | Mod: GC | Performed by: FAMILY MEDICINE

## 2022-02-19 PROCEDURE — 36415 COLL VENOUS BLD VENIPUNCTURE: CPT | Performed by: STUDENT IN AN ORGANIZED HEALTH CARE EDUCATION/TRAINING PROGRAM

## 2022-02-19 PROCEDURE — 120N000002 HC R&B MED SURG/OB UMMC

## 2022-02-19 RX ADMIN — APIXABAN 5 MG: 5 TABLET, FILM COATED ORAL at 09:52

## 2022-02-19 RX ADMIN — FOLIC ACID 1 MG: 1 TABLET ORAL at 08:26

## 2022-02-19 RX ADMIN — MIRTAZAPINE 15 MG: 15 TABLET, FILM COATED ORAL at 20:13

## 2022-02-19 RX ADMIN — THIAMINE HCL TAB 100 MG 100 MG: 100 TAB at 09:52

## 2022-02-19 RX ADMIN — ESCITALOPRAM OXALATE 10 MG: 10 TABLET ORAL at 08:26

## 2022-02-19 RX ADMIN — NYSTATIN: 100000 CREAM TOPICAL at 08:26

## 2022-02-19 RX ADMIN — FLUCONAZOLE 200 MG: 100 TABLET ORAL at 09:52

## 2022-02-19 RX ADMIN — Medication 1 CAPSULE: at 09:53

## 2022-02-19 RX ADMIN — APIXABAN 5 MG: 5 TABLET, FILM COATED ORAL at 20:13

## 2022-02-19 ASSESSMENT — ACTIVITIES OF DAILY LIVING (ADL)
ADLS_ACUITY_SCORE: 21
ADLS_ACUITY_SCORE: 23
ADLS_ACUITY_SCORE: 21
ADLS_ACUITY_SCORE: 23
ADLS_ACUITY_SCORE: 21
ADLS_ACUITY_SCORE: 23
ADLS_ACUITY_SCORE: 21
ADLS_ACUITY_SCORE: 23
ADLS_ACUITY_SCORE: 21
ADLS_ACUITY_SCORE: 23
ADLS_ACUITY_SCORE: 21
ADLS_ACUITY_SCORE: 23
ADLS_ACUITY_SCORE: 21
ADLS_ACUITY_SCORE: 23
ADLS_ACUITY_SCORE: 21
ADLS_ACUITY_SCORE: 23
ADLS_ACUITY_SCORE: 21
ADLS_ACUITY_SCORE: 23
ADLS_ACUITY_SCORE: 21

## 2022-02-19 NOTE — PROGRESS NOTES
"[facility]  Progress Note - [facility] Family Medicine Service       Date of Admission:  2/7/2022    Family Medicine Progress Note - [place]       Main Plans for Today   - medically ready for discharge  - no changes today    Assessment & Plan   Fer Latham is a 71 year old male with PMH of MDD, anxiety, recurrent UTI's, BPH, neurogenic bladder w/chronic ferguson, DM2 (diet controlled), HTN, ESRD on dialysis (twice weekly), chronic atrial fibrillation (on eliquis), alcohol abuse and T12 compression fracture admitted for UTI.     # MDD  # anxiety  # chronic insomnia  # delirium  Per OT cognitive assessment on 2/9: A/Ox3, moderate deficits in safety, memory, and attention. SLUMS score 8/30.  Concern for MDD with underlying baseline cognitive deficit. Pt is  withdrawn, occasionally refuses medications, and nursing/wound care. Previously was refusing PT/OT, though has been more consistently participating in therapy since 2/14. BUN has been up-trending; most recent BUN 51. Considered the possibility of uremic encephalopathy, but per Nephrology, this is unlikely. Per Psychiatry evaluation, patient's symptoms are consistent with MDD and delirium. Restarted mirtazapine on 2/13.  on 2/18 EKG. Pt is agreeable to rehab.   - Psych consulted   - PTA mirtazipine 15 mg QHS    - lexapro 10mg daily   - stopped seroquel  - hold PTA valium 5mg Q12hr PRN (last filled Oct 2021 per PDMP)  - PTA melatonin    # fecal incontinence  2/13, notified of bowel incontinence. On evaluation, patient admitted to not communicating incontinence. Stated that he had \"just given up\".  No neurological cause for fecal incontinence from exam/neurosurgery evaluation and MRI on 2/16. Incontinence may secondary to severe depression.   - Neurosurgery consulted, signed off    # Tinea cruris  # Chronic groin/buttocks wounds  Perianal wounds, L buttocks wound along with erythema in groin per ED exam. " Complicated by fecal incontinence.  - PTA miconazole powder, nystatin cream   - WOC consulted    # ESRD 2/2 IgA nephropathy on dialysis   # Possible MATT on CKD  Dialyzes 2x/week at [facility] on Tues/Thurs. Electrolyte and kidney function stable, continues to make urine. Initial Cystatin C suggested gfr of 23 ml/min, with repeat suggesting gfr of 19 which suggests he may be able to hold on dialysis per Nephrology. Had elevated K, however has been stable at 4.3-4.7 since 2/14. Creatinine stable 2.6-2.9 since 2/14. May have MATT on CKD due to poor PO intake and urine infection. Continue to hold lasix 2/2 MATT and soft Bps.  - holding PO lasix 40 daily  - daily bmp  - I&O, daily weights     # Catheter-associated UTI  # Candidal cystitis  # Lactic Acidosis, resolved  # Neurogenic bladder w/chronic indwelling ferguson  # BPH with LUTS  Ferguson placed by urology June 2021. Has been on oxybutytnin and trospium but stopped 2/2 cost. Hx candidal cystitis June 2021-teated with fluconazole PO. Ferguson exchanged 2/8 (new size) and functioning well. Recent admission 2/2-2/3, treated with zosyn and ceftriaxone for E.Coli UTI. Discharged with cefdinir. Patient was initially started on ceftriaxone during this hospitalization based on UA results and previous Ucx, but Ucx from this hospitalization only growing Candida, no bacteria which is consistent with candida cystitis vs. colonization.   -PO fluconazole 200mg daily x 14 days (2/9-2/22)  -daily bmp     # T12 compression fracture  # low back pain  Worsening of known T12 compression fx w/periverterbral edema. No fever/chills to suggest infection. Denies recent falls. MRI lumbar spine 2/8/22 with moderate spinal canal narrowing at T11-T12. Neurosurgery consulted, no neurosurgical intervention planned. Due to recent reported fecal incontinence (noted above), thoracolumbar MRI was obtained on 2/16, which showed no change in T12 compression deformity compared to 2/8 MRI.   - tylenol TID  -  lidocaine patch for back  - PO dilaudid 1mg nightly PRN   - up with assist and TLSO brace when out of bed   - PT/OT consulted - has been working with PT consistently since 2/14  ____________________________________________     # hx DVT  # hx PE  # atrial fibrillation  Not on rate/rhythm control due to concerns for hypotension in the past. Was in RVR on admission, resolved with pain control. [id number] of 4, therefore, needs anticoagulation, though notably had not been taking Eliquis at home due to cost.   - Eliquis 5mg BID  - Discussed outpatient anticoagulation plan with patient's wife, who declined eliquis due to costs and warfarin due to inconvenience of frequent labs.      # hx alcohol abuse  Drinks alcohol, unclear when last drink was.   - PTA folic acid & multivitamin  - PO thiamine daily     # DM2: Not on medications, A1C 5.1% on admission.    # Anemia of chronic disease: Baseline hgb ~11. Likely 2/2 alcohol use and CKD.       Diet: Combination Diet Regular Diet Adult  Room Service  Snacks/Supplements Adult: Other; 2pm: cheddar cheese and saltine crackers; Between Meals    DVT Prophylaxis: Eliquis, PTA  Ferguson Catheter: PRESENT, indication: Neurogenic Bladder, Retention;Other (Comment) (chronic ferguson )  Fluids: PO  Central Lines: PRESENT  CVC Double Lumen Right Internal jugular Tunneled;Non - valved (open ended)-Site Assessment: WDL    Cardiac Monitoring: None  Code Status: Full Code      Disposition Plan   Expected Discharge: medically ready, awaiting TCU placement      Anticipated discharge location: TBD - LTC vs home vs TCU     The patient's care was discussed with the Attending Physician, Dr. Paez.    DO [facility] [place]  [facility]     Securely message with the Vocera Web Console (learn more here)  Text page via [employer] Paging/Directory   Please see signed in provider for up to date coverage  information  ____________________________________________________    Interval History   No events overnight. Denies pain. Poor appetite and PO intake. Brown in place, minimal output. Refusing nursing cares at times, including repositioning. Confused per RN notes.    Data reviewed today: I reviewed all medications, new labs and imaging results over the last 24 hours.     Physical Exam   Vital Signs: Temp: (!) 96.1  F (35.6  C) Temp src: Oral BP: 137/72 Pulse: 75   Resp: 18 SpO2: 96 % O2 Device: None (Room air)    Weight: 233 lbs 7.47 oz     Physical Exam  Vitals reviewed.   Constitutional:       General: He is not in acute distress.     Appearance: Normal appearance. He is well-developed. He is not ill-appearing.      Comments: Lying in bed   HENT:      Head: Normocephalic.   Eyes:      Extraocular Movements: Extraocular movements intact.      Conjunctiva/sclera: Conjunctivae normal.   Cardiovascular:      Rate and Rhythm: Normal rate.      Heart sounds: Normal heart sounds.   Pulmonary:      Effort: Pulmonary effort is normal. No respiratory distress.      Breath sounds: Normal breath sounds.   Neurological:      General: No focal deficit present.      Mental Status: He is alert and oriented to person, place, and time.   Psychiatric:         Attention and Perception: Attention normal.         Mood and Affect: Mood is depressed. Affect is flat.         Behavior: Behavior is withdrawn.         Thought Content: Thought content does not include homicidal or suicidal ideation.

## 2022-02-19 NOTE — PROGRESS NOTES
Nephrology Chart Review 2/19/22    Per nursing notes patient remains disoriented and refusing cares    Renal function appears stable. Creat 2.8 today. Has been in the 2.8-2.9 range since 2/15/22  Cystatin C GFR ranging 19-23 ml/mn  Has not required HD this admission  UO has declined over last 24 hrs. He has a ferguson. Unclear if it is clogged and needs to be irrigated  B/Ps 130's/  Electrolytes are normal   Plan:       Tunneled HD line to be removed on Monday       Please be sure his ferguson is patent       Will continue to follow peripherally    Asia Garza [facility]  Nephrology

## 2022-02-19 NOTE — PLAN OF CARE
"Pt is A&O to self and place, month and year, in evening at 6pm the patient awoke from a nap and was very confused asking \"Where am I and how did I get here?\" OT had completed a SLUMS assessment and score him at a 8 out of 30. Pt had 2 incontinent Bms w/ the last one being a Lg loose stool. Pt worked w/ OT and PT and got out of bed w/ both once pivoting from the bed to chair w/ TLSO brace inplace and assist of 2 w/ walker and gait belt, patient needs to be directed step by step. Pt ate <10% of breakfast and 0% of lunch, minimal PO fluid intake. Skin is intact. /61 (BP Location: Right arm)   Pulse 75   Temp 97.5  F (36.4  C) (Oral)   Resp 16   Wt 105.9 kg (233 lb 7.5 oz)   SpO2 99%   BMI 29.98 kg/m      "

## 2022-02-19 NOTE — PLAN OF CARE
Patient cooperative today, up in chair at 1pm. Pivoted and took a few steps with 2 assist, gaitbelt and walker, TLSO brace on while up. Patient got to the edge of bed mostly by himself but needed strong 2 assist to get to standing. Sleeps in between cares, eating maybe 1/3 of meals. Ferguson patent, allowed ferguson/groin cares. No BM today. On a chair and bed alarm, is forgetful and a bit disoriented when suddenly wakened. Awaiting TCU placement.

## 2022-02-19 NOTE — CONSULTS
"INTERVENTIONAL RADIOLOGY CONSULT NOTE    Reason for referral:   \"Non-urgent, can pull tunneled HD line next week as he does not need long term HD.\"    History:   Fer Latham is a 71 year old male with PMH of MDD, anxiety, recurrent UTI's, BPH, neurogenic bladder w/chronic ferguson, DM2 (diet controlled), HTN, ESRD on dialysis (twice weekly), chronic atrial fibrillation (on eliquis), alcohol abuse and T12 compression fracture admitted for UTI. Patient will not require long term HD and will be able to have his line removed next week.    Imaging:   No imaging reviewed.    Assessment:   71 year old male with a tunneled HD catheter. Per nephrology, patient may no longer require long term hemodialysis.     Plan:   -Tentative plan to remove tunneled HD catheter next week. Date and time to be determined Monday morning.        Labs:  Lab Results   Component Value Date    HGB 10.5 02/15/2022    HGB 8.8 07/01/2021     Lab Results   Component Value Date     02/15/2022     07/01/2021     Lab Results   Component Value Date    WBC 6.1 02/15/2022    WBC 6.6 07/01/2021       Lab Results   Component Value Date    INR 1.36 06/18/2021       Lab Results   Component Value Date    PROTTOTAL 8.5 02/07/2022    PROTTOTAL 7.2 06/19/2021      Lab Results   Component Value Date    ALBUMIN 2.8 02/15/2022    ALBUMIN 2.7 06/19/2021     Lab Results   Component Value Date    BILITOTAL 0.3 02/07/2022    BILITOTAL 0.5 06/19/2021     No results found for: BILICONJ   Lab Results   Component Value Date    ALKPHOS 140 02/07/2022    ALKPHOS 81 06/19/2021     Lab Results   Component Value Date    AST 17 02/07/2022    AST 17 06/19/2021     Lab Results   Component Value Date    ALT 13 02/07/2022    ALT 11 06/19/2021       Lab Results   Component Value Date    CR 2.83 02/19/2022    CR 2.95 07/01/2021     Lab Results   Component Value Date    BUN 51 02/19/2022    BUN 31 07/01/2021         Pete Ruelas M.D.  Diagnostic/Interventional Radiology " PGY-3  IR pass pager: [phone]

## 2022-02-19 NOTE — PROGRESS NOTES
"Care Management Follow Up    Length of Stay (days): 11    Expected Discharge Date: 02/23/2022     Concerns to be Addressed: discharge planning, TCU placement   Patient plan of care discussed at interdisciplinary rounds: not today    Anticipated Discharge Disposition: TCU     Anticipated Discharge Services: Other (TBD)  Anticipated Discharge DME: TBD      Patient/family educated on Medicare website which has current facility and service quality ratings: yes  Education Provided on the Discharge Plan:  yes  Patient/Family in Agreement with the Plan: unable to assess    Referrals Placed by CM/SW:    Pending: None, SW will need to obtain more TCU location preferences from pt or family      Declined/Discontinued:     - [facility] - no bed availability at this time   - [facility] - no TCU services provided at this facility    - [facility] - no outside admissions at this time, looking out about 2 weeks   - [facility] - no bed availability due to staffing   - [facility] - declined due to \"payer issues\" and no bed availability   - [facility] - declining due to refusing PT/OT and MD assessments       Private pay costs discussed: Not applicable    Additional Information:    Met with patient to discuss his choices for TCU placements.  Patient unable/unwilling to discuss.  Stating I don't know any of those places by name.  Was able to say he didn't want to go back to [facility] where he was in the past, but was unable to give choices.  Irritable stating I don't even know why I have to go to one of those places. He agreed that I could speak to his wife.  Telephone call placed to her.  Message left requesting a call back.        "

## 2022-02-19 NOTE — PLAN OF CARE
Assumed cares of pt [id number]. Pt is A/Ox3, disorientated to situation. VSS on RA. Denies pain. Regular diet with poor appetite and intake. Brown in place with minimal output, averaging 30ml/hr. Pt refusing turn and repositioning overnight, was able to boost and reposition this am along with vitals.

## 2022-02-20 LAB
ANION GAP SERPL CALCULATED.3IONS-SCNC: 5 MMOL/L (ref 3–14)
BUN SERPL-MCNC: 50 MG/DL (ref 7–30)
CALCIUM SERPL-MCNC: 9.6 MG/DL (ref 8.5–10.1)
CHLORIDE BLD-SCNC: 103 MMOL/L (ref 94–109)
CO2 SERPL-SCNC: 26 MMOL/L (ref 20–32)
CREAT SERPL-MCNC: 2.57 MG/DL (ref 0.66–1.25)
GFR SERPL CREATININE-BSD FRML MDRD: 26 ML/MIN/1.73M2
GLUCOSE BLD-MCNC: 92 MG/DL (ref 70–99)
POTASSIUM BLD-SCNC: 4.3 MMOL/L (ref 3.4–5.3)
SODIUM SERPL-SCNC: 134 MMOL/L (ref 133–144)

## 2022-02-20 PROCEDURE — 258N000003 HC RX IP 258 OP 636

## 2022-02-20 PROCEDURE — 80048 BASIC METABOLIC PNL TOTAL CA: CPT | Performed by: STUDENT IN AN ORGANIZED HEALTH CARE EDUCATION/TRAINING PROGRAM

## 2022-02-20 PROCEDURE — 120N000002 HC R&B MED SURG/OB UMMC

## 2022-02-20 PROCEDURE — 36415 COLL VENOUS BLD VENIPUNCTURE: CPT | Performed by: STUDENT IN AN ORGANIZED HEALTH CARE EDUCATION/TRAINING PROGRAM

## 2022-02-20 PROCEDURE — 250N000013 HC RX MED GY IP 250 OP 250 PS 637: Performed by: STUDENT IN AN ORGANIZED HEALTH CARE EDUCATION/TRAINING PROGRAM

## 2022-02-20 PROCEDURE — 99233 SBSQ HOSP IP/OBS HIGH 50: CPT

## 2022-02-20 PROCEDURE — 99233 SBSQ HOSP IP/OBS HIGH 50: CPT | Mod: GC | Performed by: STUDENT IN AN ORGANIZED HEALTH CARE EDUCATION/TRAINING PROGRAM

## 2022-02-20 RX ADMIN — APIXABAN 5 MG: 5 TABLET, FILM COATED ORAL at 09:12

## 2022-02-20 RX ADMIN — NYSTATIN: 100000 CREAM TOPICAL at 08:07

## 2022-02-20 RX ADMIN — APIXABAN 5 MG: 5 TABLET, FILM COATED ORAL at 20:38

## 2022-02-20 RX ADMIN — DEXTROSE AND SODIUM CHLORIDE 1000 ML: 5; 450 INJECTION, SOLUTION INTRAVENOUS at 10:53

## 2022-02-20 RX ADMIN — MIRTAZAPINE 15 MG: 15 TABLET, FILM COATED ORAL at 20:37

## 2022-02-20 RX ADMIN — FOLIC ACID 1 MG: 1 TABLET ORAL at 08:07

## 2022-02-20 RX ADMIN — Medication 1 CAPSULE: at 09:12

## 2022-02-20 RX ADMIN — THIAMINE HCL TAB 100 MG 100 MG: 100 TAB at 09:12

## 2022-02-20 RX ADMIN — FLUCONAZOLE 200 MG: 100 TABLET ORAL at 09:12

## 2022-02-20 RX ADMIN — ESCITALOPRAM OXALATE 10 MG: 10 TABLET ORAL at 08:07

## 2022-02-20 ASSESSMENT — ACTIVITIES OF DAILY LIVING (ADL)
ADLS_ACUITY_SCORE: 21

## 2022-02-20 NOTE — PROGRESS NOTES
"Care Management Follow Up    Length of Stay (days): 12    Expected Discharge Date: 02/23/2022     Concerns to be Addressed: discharge planning, TCU placement  Patient plan of care discussed at interdisciplinary rounds: No    Anticipated Discharge Disposition: TCU TBD   Anticipated Discharge Services: Other (see comment) (TBD)  Anticipated Discharge DME:  tbd    Patient/family educated on Medicare website which has current facility and service quality ratings:  yes  Education Provided on the Discharge Plan:    Patient/Family in Agreement with the Plan: unable to assess    Referrals Placed by CM/SW:      Pending:       - [facility] (Ph: [phone])  - [facility] (Ph: [phone])  - [facility] (Ph: [phone])     Declined/Discontinued:      - [facility] - cannot meet his needs (see below for additional information)   - [facility] - no bed availability at this time   - [facility] - no TCU services provided at this facility    - [facility] - no outside admissions at this time, looking out about 2 weeks   - [facility] - no bed availability due to staffing   - [facility] - declined due to \"payer issues\" and no bed availability   - [facility] - declining due to refusing PT/OT and MD assessments     Private pay costs discussed: not at this encounter.    Additional Information:  Need to get clarification if there are pending referrals still to the 3 above. 2/18 SW note states there is, 2/19 SW note state there is not. Will ask Monday SW to follow up.    Writer spoke with patients spouse, Ailin, to get additional TCU recs. She stated they have given several and expressed frustration. Writer explained that additional choices are needed d/t the chosen facilities being unable to accept. She was agreeable to writer leaving another Medicare list with patient. Writer " asked her to call/discuss with her  tonight and would have SW f/u tomorrow so additional recs can be sent. She stated that they only want 4 star and above. SW sent another Medicare list of TCU choices and crossed out ones that denied.     SW will f/u with patient for discharge planning and additional TCU recs    LANETTE Guan, [facility]  Weekend   [phone]      For weekend social work needs, contact information below and avail on [employer]:  4A, 4C, 4E, 5A, 5B pager [phone]  6A, 6B, 6C, 6D  pager [phone]  7A, 7B, 7C, 7D, 5C pager [phone]  on-call/after hours pager [phone]    weekend RN care coordinator pager [phone] (ID: [phone])  (home d/c with needs incl home care, assisted living facility returns, durable medical equip, IV antibiotics)

## 2022-02-20 NOTE — PROGRESS NOTES
[facility]  Progress Note - [facility]       Date of Admission:  2/7/2022    Family Medicine Progress Note - [facility]       Main Plans for Today   - 1L fluid bolus; monitor urine output  - plan for tunneled line removal on 2/21; re-eval in the AM.     Assessment & Plan   Fer Latham is a 71 year old male with PMH of MDD, anxiety, recurrent UTI's, BPH, neurogenic bladder w/chronic ferguson, DM2 (diet controlled), HTN, ESRD on dialysis (twice weekly), chronic atrial fibrillation (on eliquis), alcohol abuse and T12 compression fracture admitted for UTI.     # MDD  # anxiety  # chronic insomnia  # delirium  Per OT cognitive assessment on 2/9: A/Ox3, moderate deficits in safety, memory, and attention. SLUMS score 8/30.  Concern for MDD with underlying baseline cognitive deficit. Pt is  withdrawn, occasionally refuses medications, and nursing/wound care. Previously was refusing PT/OT, though has been more consistently participating in therapy since 2/14. BUN has been generally up-trending; most recent BUN 50, down from 51 on 2/19. Considered the possibility of uremic encephalopathy, but per Nephrology, this is unlikely. Per Psychiatry evaluation, patient's symptoms are consistent with MDD and delirium. Restarted mirtazapine on 2/13.  on 2/18 EKG. Pt is agreeable to rehab.   - Psych consulted   - PTA mirtazipine 15 mg QHS    - lexapro 10mg daily   - stopped seroquel  - hold PTA valium 5mg Q12hr PRN (last filled Oct 2021 per PDMP)  - PTA melatonin    # CKD IV  # MATT on CKD  # Hx of ESRD on dialysis, has not required dialysis since admission per nephrology assessment  Dialyzes 2x/week at [facility] on Tues/Thurs. Electrolyte and kidney function stable, continues to make urine. Initial Cystatin C suggested gfr of 23 ml/min, with repeat suggesting gfr of 19 which suggests he may be able to hold on dialysis per Nephrology. Had elevated K,  "however has been stable at 4.3-4.7 since 2/14. Creatinine elevated to 2.96 on 2/17, but has been down-trending since lasix was stopped; most recently 2.57. May have MATT on CKD due to poor PO intake and urine infection. Continue to hold lasix 2/2 MATT and soft Bps. Decreased urine output since 2/18 --> 650, then 450 ml in 24 hrs. Ferguson is patent.  - Nephrology consulted, appreciate recs:   - 1L D5 1/2NS bolus, if continues decreased UOP, replace ferguson and obtain repeat UA/UC. Will re-evaluate removal of hemodialysis catheter 2/21 AM.   - holding PO lasix 40 daily  - daily bmp  - strict I&O, daily weights    # fecal incontinence  2/13, notified of fecal incontinence. On evaluation, patient admitted to not communicating incontinence. Stated that he had \"just given up\".  No neurological cause for fecal incontinence from exam/neurosurgery evaluation and MRI on 2/16. Incontinence may secondary to severe depression.   - Neurosurgery consulted, signed off    # Tinea cruris  # Chronic groin/buttocks wounds  Perianal wounds, L buttocks wound along with erythema in groin per ED exam. Complicated by fecal incontinence.  - PTA miconazole powder, nystatin cream   - WOC consulted     # Catheter-associated UTI  # Candidal cystitis  # Lactic Acidosis, resolved  # Neurogenic bladder w/chronic indwelling ferguson  # BPH with LUTS  Ferguson placed by urology June 2021. Has been on oxybutytnin and trospium but stopped 2/2 cost. Hx candidal cystitis June 2021-teated with fluconazole PO. Ferguson exchanged 2/8 (new size) and functioning well. Recent admission 2/2-2/3, treated with zosyn and ceftriaxone for E.Coli UTI. Discharged with cefdinir. Patient was initially started on ceftriaxone during this hospitalization based on UA results and previous Ucx, but Ucx from this hospitalization only growing Candida, no bacteria which is consistent with candida cystitis vs. colonization.   -PO fluconazole 200mg daily x 14 days (2/9-2/22)  -daily bmp     # " T12 compression fracture  # low back pain  Worsening of known T12 compression fx w/periverterbral edema. No fever/chills to suggest infection. Denies recent falls. MRI lumbar spine 2/8/22 with moderate spinal canal narrowing at T11-T12. Neurosurgery consulted, no neurosurgical intervention planned. Due to recent reported fecal incontinence (noted above), thoracolumbar MRI was obtained on 2/16, which showed no change in T12 compression deformity compared to 2/8 MRI.   - tylenol TID  - lidocaine patch for back  - PO dilaudid 1mg nightly PRN   - up with assist and TLSO brace when out of bed   - PT/OT consulted - has been working with PT consistently since 2/14  ____________________________________________     # hx DVT  # hx PE  # atrial fibrillation  Not on rate/rhythm control due to concerns for hypotension in the past. Was in RVR on admission, resolved with pain control. [id number] of 4, therefore, needs anticoagulation, though notably had not been taking Eliquis at home due to cost.   - Eliquis 5mg BID  - Discussed outpatient anticoagulation plan with patient's wife, who declined eliquis due to costs and warfarin due to inconvenience of frequent labs.      # hx alcohol abuse  Drinks alcohol, unclear when last drink was.   - PTA folic acid & multivitamin  - PO thiamine daily     # DM2: Not on medications, A1C 5.1% on admission.    # Anemia of chronic disease: Baseline hgb ~11. Likely 2/2 alcohol use and CKD.       Diet: Combination Diet Regular Diet Adult  Room Service  Snacks/Supplements Adult: Other; 2pm: cheddar cheese and saltine crackers; Between Meals    DVT Prophylaxis: Eliquis, PTA  Brown Catheter: PRESENT, indication: Neurogenic Bladder, Retention  Fluids: PO  Central Lines: PRESENT  CVC Double Lumen Right Internal jugular Tunneled;Non - valved (open ended)-Site Assessment: WDL    Cardiac Monitoring: None  Code Status: Full Code      Disposition Plan   Expected Discharge: medically ready, awaiting TCU  placement      Anticipated discharge location: TBD - LTC vs home vs TCU     The patient's care was discussed with the Attending Physician, Dr. Paez.    Abbey Puente MD  [facility]  [facility]     Securely message with the Vocera Web Console (learn more here)  Text page via [facility] Paging/Directory   Please see signed in provider for up to date coverage information  ____________________________________________________    Interval History   No events overnight. Denies pain. Poor appetite and PO intake. Brown in place, minimal output. Refusing nursing cares at times, including repositioning.     Data reviewed today: I reviewed all medications, new labs and imaging results over the last 24 hours.     Physical Exam   Vital Signs: Temp: 97.3  F (36.3  C) Temp src: Axillary BP: 126/75 Pulse: 67   Resp: 16 SpO2: 98 % O2 Device: None (Room air)    Weight: 232 lbs 9.36 oz     Physical Exam  Vitals reviewed.   Constitutional:       General: He is not in acute distress.     Appearance: Normal appearance. He is well-developed. He is not ill-appearing.      Comments: Lying in bed   HENT:      Head: Normocephalic.   Eyes:      Extraocular Movements: Extraocular movements intact.      Conjunctiva/sclera: Conjunctivae normal.   Cardiovascular:      Rate and Rhythm: Normal rate.      Heart sounds: Normal heart sounds.   Pulmonary:      Effort: Pulmonary effort is normal. No respiratory distress.      Breath sounds: Normal breath sounds.   Neurological:      General: No focal deficit present.      Mental Status: He is alert and oriented to person, place, and time.   Psychiatric:         Attention and Perception: Attention normal.         Mood and Affect: Mood is depressed. Affect is flat.         Behavior: Behavior is withdrawn.         Thought Content: Thought content does not include homicidal or suicidal ideation.

## 2022-02-20 NOTE — PLAN OF CARE
"Assumed cares from [id number]. VSS on RA, lethargic and disoriented to time. Denied pain and nausea throughout shift. Minimal oral intake from late lunch and dinner. Remained in bed throughout shift, stating he is \"too tired to get up and will try again tomorrow\". Refusing repositioning and perineal wound cares throughout shift. Brown catheter patent with adequate UOP. One incontinent formed bowel movement this shift. Dialysis catheter CDI, PIV saline locked. Continue with POC.          "

## 2022-02-20 NOTE — PROGRESS NOTES
Nephrology Progress Note  02/20/2022     Fer Latham is a 71 yom with hx of HTN, Aflutter (not on AC), DMII, ESRD 2/2 IgA nephropathy, and chronic alcohol abuse, admitted with two weeks of weakness and recurrent falls with new shortness of breath secondary to atrial fibrillation with RVR, frequent UTI's, T12 compression fx and RLE DVT who is admitted with UTI.        Assessment & Recommendations:     1. MATT/CKD4 - Unclear amt of renal function. Creat today is 2.5, down from 2.8-2.9 since 2/15/22. He has not required dialysis since admission on 2/7/22. Previously was dialyzing 2 times per week in OP setting ( not sure how long he has been dialyzing)   - GFR per Cystatin C was 19 ml/mn on 2/14/22   - Urine output began declining 2/18 and down to 450 ml over last 24 hrs.    - Bladder scan today was 10 ml   - Will give a liter of IVF today. If no improvement in urine output recommend changing out his ferguson   - Scheduled to have tunneled line removed tomorrow. Would reassess in AM.     2. Volume status - Dry? Arguing against hypovolemia is b/p of 120-130/ and declining creat. No edema/hypoxia. Weight 105.5 kg. Admission weight 112.1 kg. Intake only 120 ml.  ml   - IVF today   - Encourage fluid intake   - Strict I/O    3. Urinary retention with chronic ferguson, recurrent UTI - Ferguson just changed on 2/8/22.    - If no improvement in UO with IVF would change ferguson and send a new UA/UC given somnolence    4. Electrolytes- No acute concerns. K 4.3, Na 134    Recommendations were communicated to primary team via progress note    Asia Wise, NP   Division of Renal Disease and Hypertension  [facility]  Bilibot Web Console    Interval History :   Nursing and provider notes from last 24 hours reviewed.  Urine output continues to decline  Nursing notes that patient does not drink fluids adequately and just sleeps all day  Has ferguson  Creat declining    Review of Systems:   I reviewed the following systems:  GI:  tolerating diet when awake enough to eat/drink    Neuro:  lethargic  Constitutional:  no fever or chills  CV: No dyspnea, CP or edema.    : Brown    Physical Exam:   I/O last 3 completed shifts:  In: 120 [P.O.:120]  Out: 275 [Urine:275]   /75 (BP Location: Right arm)   Pulse 67   Temp 97.3  F (36.3  C) (Axillary)   Resp 16   Wt 105.5 kg (232 lb 9.4 oz)   SpO2 98%   BMI 29.86 kg/m       GENERAL APPEARANCE: Lethargic, falls back to sleep immediately after brief awakening  EYES:  no scleral icterus, pupils equal  PULM: lungs CTA. Not on supplemental oxygen   CV: RRR     -edema none  GI: soft, NT, Non distended  INTEGUMENT: no cyanosis  NEURO:  Lethargic  : Brown present  Access- Right TDC    Labs:   All labs reviewed by me  Electrolytes/Renal - Recent Labs   Lab Test 02/20/22  0943 02/19/22  1027 02/18/22  0734 02/15/22  0755 02/14/22  1001 06/20/21  1123 06/19/21  0811 06/17/21  1656 06/15/21  1303 06/06/19  0136 05/17/19  1231    135 135   < > 139   < > 134   < > 131*   < > 131*   POTASSIUM 4.3 4.3 4.6   < > 4.6   < > 5.4*   < > 3.5   < > 3.8   CHLORIDE 103 102 102   < > 106   < > 105   < > 100   < > 96   CO2 26 26 25   < > 27   < > 24   < > 19*   < > 28   BUN 50* 51* 45*   < > 35*   < > 46*   < > 49*   < > 35*   CR 2.57* 2.83* 2.83*   < > 2.62*   < > 2.54*   < > 3.57*   < > 3.10*   GLC 92 102* 101*   < > 92   < > 152*   < > 153*   < > 159*   COLUMBA 9.6 9.8 9.6   < > 8.9   < > 8.5   < > 8.9   < > 9.0   MAG  --   --  1.8  --   --   --  2.1  --  2.2   < >  --    PHOS  --   --   --   --  4.2  --   --   --  4.2  --  3.0    < > = values in this interval not displayed.       CBC -   Recent Labs   Lab Test 02/15/22  0755 02/11/22  0941 02/09/22  0732   WBC 6.1 4.8 4.9   HGB 10.5* 9.9* 9.4*    200 223       LFTs -   Recent Labs   Lab Test 02/15/22  0755 02/07/22  2128 02/04/22  0549 06/19/21  0811   ALKPHOS  --  140 100 81   BILITOTAL  --  0.3 0.4 0.5   ALT  --  13 10 11   AST  --  17 12 17    PROTTOTAL  --  8.5 6.4* 7.2   ALBUMIN 2.8* 3.0* 2.3* 2.7*       Iron Panel - No lab results found.      Imaging:      Current Medications:    apixaban ANTICOAGULANT  5 mg Oral BID     dextrose 5% and 0.45% NaCl  1,000 mL Intravenous Once     escitalopram  10 mg Oral Daily     fluconazole  200 mg Oral Daily     folic acid  1 mg Oral Daily     [Held by provider] furosemide  40 mg Oral Daily     lidocaine  1-2 patch Transdermal Q24H     lidocaine   Transdermal Q8H     miconazole   Topical BID     mirtazapine  15 mg Oral At Bedtime     multivitamin RENAL  1 capsule Oral Daily     nystatin   Topical BID     [Held by provider] polyethylene glycol  8.5 g Oral Daily     sodium chloride (PF)  3 mL Intracatheter Q8H     thiamine  100 mg Oral Daily       - MEDICATION INSTRUCTIONS -       Asia Wise, NP

## 2022-02-20 NOTE — PLAN OF CARE
Plan of Care Note    Reason for Admission: Acute cystitis with hematuria/UTI  Procedures: None  IV Access/Incisions/Drains/Wounds:   Peripheral IV 02/07/22 Anterior;Right Lower forearm (Active)   Site Assessment WDL 02/20/22 0000   Line Status Saline locked 02/20/22 0000   Phlebitis Scale 0-->no symptoms 02/20/22 0000   Infiltration Scale 0 02/20/22 0000   Infiltration Site Treatment Method  None 02/19/22 1700   Number of days: 13       Hemodialysis Vascular Access Arteriovenous fistula Left Forearm (Active)   Number of days:        CVC Double Lumen Right Internal jugular Tunneled;Non - valved (open ended) (Active)   Site Assessment WDL 02/20/22 0000   Dressing Type Chlorhexidine sponge;Transparent 02/20/22 0000   Dressing Status clean;dry;intact 02/20/22 0000   Dressing Intervention other (comment) 02/17/22 0800   Dressing Change Due 02/17/22 02/17/22 0800   Line Necessity yes, meets criteria 02/20/22 0000   Phlebitis Scale 0-->no symptoms 02/20/22 0000   Infiltration? no 02/20/22 0000   Infiltration Scale 0 02/20/22 0000   Infiltration Site Treatment Method  None 02/20/22 0000   Was a vesicant infusing? no 02/10/22 1238   Number of days: 248       Urethral Catheter 16 fr (Active)   Tube Description Positional 02/20/22 0000   Catheter Care Done;Catheter wipes 02/19/22 1000   Collection Container Standard;Patent 02/20/22 0000   Securement Method Securing device (Describe) 02/19/22 1700   Rationale for Continued Use Retention 02/20/22 0000   Urine Output 175 mL 02/20/22 0107   Number of days: 12       Wound Back Friction injury (Active)   Number of days: 244   IVF: None  VS: /72 (BP Location: Right arm)   Pulse 75   Temp (!) 96.1  F (35.6  C) (Oral)   Resp 18   Wt 105.5 kg (232 lb 9.4 oz)   SpO2 96%   BMI 29.86 kg/m    Diet: Combination Diet Regular Diet Adult  Room Service  Snacks/Supplements Adult: Other; 2pm: cheddar cheese and saltine crackers; Between Meals    Activity: Total/ Heavy 2 A w/GB &  walker  Pain Management: Denies  GI/: Voiding with ferguson catheter, +BM (evening shift), +Flatus, -Nausea  Neuro: A&Ox? ([facility])  Team: Adarsh  Pertinent Labs: None      Shift Summary  Patient slept well on shift. Patient refused skin assessment, Repositioning, and other cares. Plan for patient to transition to a TCU when social work can find a facility that will accept him. Continue to attempt POC.

## 2022-02-21 ENCOUNTER — APPOINTMENT (OUTPATIENT)
Dept: OCCUPATIONAL THERAPY | Facility: CLINIC | Age: 72
DRG: 698 | End: 2022-02-21
Attending: STUDENT IN AN ORGANIZED HEALTH CARE EDUCATION/TRAINING PROGRAM
Payer: MEDICARE

## 2022-02-21 DIAGNOSIS — T82.898A PROBLEM WITH DIALYSIS ACCESS, INITIAL ENCOUNTER (H): ICD-10-CM

## 2022-02-21 DIAGNOSIS — R79.89 ELEVATED SERUM CREATININE: ICD-10-CM

## 2022-02-21 DIAGNOSIS — Z99.2 ESRD (END STAGE RENAL DISEASE) ON DIALYSIS (H): Primary | ICD-10-CM

## 2022-02-21 DIAGNOSIS — N18.6 ESRD (END STAGE RENAL DISEASE) ON DIALYSIS (H): Primary | ICD-10-CM

## 2022-02-21 LAB
ALBUMIN UR-MCNC: 50 MG/DL
ANION GAP SERPL CALCULATED.3IONS-SCNC: 5 MMOL/L (ref 3–14)
APPEARANCE UR: CLEAR
BILIRUB UR QL STRIP: NEGATIVE
BUN SERPL-MCNC: 45 MG/DL (ref 7–30)
CALCIUM SERPL-MCNC: 9.3 MG/DL (ref 8.5–10.1)
CHLORIDE BLD-SCNC: 104 MMOL/L (ref 94–109)
CO2 SERPL-SCNC: 26 MMOL/L (ref 20–32)
COLOR UR AUTO: ABNORMAL
CREAT SERPL-MCNC: 2.38 MG/DL (ref 0.66–1.25)
GFR SERPL CREATININE-BSD FRML MDRD: 29 ML/MIN/1.73M2
GLUCOSE BLD-MCNC: 98 MG/DL (ref 70–99)
GLUCOSE UR STRIP-MCNC: NEGATIVE MG/DL
HGB UR QL STRIP: ABNORMAL
HOLD SPECIMEN: NORMAL
HYALINE CASTS: 1 /LPF
KETONES UR STRIP-MCNC: NEGATIVE MG/DL
LEUKOCYTE ESTERASE UR QL STRIP: NEGATIVE
MUCOUS THREADS #/AREA URNS LPF: PRESENT /LPF
NITRATE UR QL: NEGATIVE
PH UR STRIP: 6 [PH] (ref 5–7)
POTASSIUM BLD-SCNC: 4.3 MMOL/L (ref 3.4–5.3)
RBC URINE: 1 /HPF
SODIUM SERPL-SCNC: 135 MMOL/L (ref 133–144)
SP GR UR STRIP: 1.01 (ref 1–1.03)
UROBILINOGEN UR STRIP-MCNC: NORMAL MG/DL
WBC URINE: 7 /HPF

## 2022-02-21 PROCEDURE — 97530 THERAPEUTIC ACTIVITIES: CPT | Mod: GO

## 2022-02-21 PROCEDURE — 80048 BASIC METABOLIC PNL TOTAL CA: CPT | Performed by: STUDENT IN AN ORGANIZED HEALTH CARE EDUCATION/TRAINING PROGRAM

## 2022-02-21 PROCEDURE — 81001 URINALYSIS AUTO W/SCOPE: CPT | Performed by: STUDENT IN AN ORGANIZED HEALTH CARE EDUCATION/TRAINING PROGRAM

## 2022-02-21 PROCEDURE — 87086 URINE CULTURE/COLONY COUNT: CPT | Performed by: STUDENT IN AN ORGANIZED HEALTH CARE EDUCATION/TRAINING PROGRAM

## 2022-02-21 PROCEDURE — 250N000013 HC RX MED GY IP 250 OP 250 PS 637: Performed by: STUDENT IN AN ORGANIZED HEALTH CARE EDUCATION/TRAINING PROGRAM

## 2022-02-21 PROCEDURE — 99233 SBSQ HOSP IP/OBS HIGH 50: CPT | Mod: GC | Performed by: FAMILY MEDICINE

## 2022-02-21 PROCEDURE — 97535 SELF CARE MNGMENT TRAINING: CPT | Mod: GO

## 2022-02-21 PROCEDURE — 36415 COLL VENOUS BLD VENIPUNCTURE: CPT | Performed by: STUDENT IN AN ORGANIZED HEALTH CARE EDUCATION/TRAINING PROGRAM

## 2022-02-21 PROCEDURE — 120N000002 HC R&B MED SURG/OB UMMC

## 2022-02-21 RX ORDER — SIMETHICONE 80 MG
80 TABLET,CHEWABLE ORAL EVERY 6 HOURS PRN
Status: DISCONTINUED | OUTPATIENT
Start: 2022-02-21 | End: 2022-03-02 | Stop reason: HOSPADM

## 2022-02-21 RX ADMIN — NYSTATIN: 100000 CREAM TOPICAL at 08:55

## 2022-02-21 RX ADMIN — THIAMINE HCL TAB 100 MG 100 MG: 100 TAB at 09:04

## 2022-02-21 RX ADMIN — MIRTAZAPINE 15 MG: 15 TABLET, FILM COATED ORAL at 20:29

## 2022-02-21 RX ADMIN — FLUCONAZOLE 200 MG: 100 TABLET ORAL at 09:04

## 2022-02-21 RX ADMIN — ACETAMINOPHEN 975 MG: 325 TABLET, FILM COATED ORAL at 08:55

## 2022-02-21 RX ADMIN — Medication 1 CAPSULE: at 09:04

## 2022-02-21 RX ADMIN — APIXABAN 5 MG: 5 TABLET, FILM COATED ORAL at 09:04

## 2022-02-21 RX ADMIN — APIXABAN 5 MG: 5 TABLET, FILM COATED ORAL at 20:29

## 2022-02-21 RX ADMIN — LIDOCAINE 1 PATCH: 246 PATCH TOPICAL at 09:04

## 2022-02-21 RX ADMIN — FOLIC ACID 1 MG: 1 TABLET ORAL at 08:56

## 2022-02-21 RX ADMIN — ESCITALOPRAM OXALATE 10 MG: 10 TABLET ORAL at 08:54

## 2022-02-21 ASSESSMENT — ACTIVITIES OF DAILY LIVING (ADL)
ADLS_ACUITY_SCORE: 21
ADLS_ACUITY_SCORE: 22
ADLS_ACUITY_SCORE: 22
ADLS_ACUITY_SCORE: 21
ADLS_ACUITY_SCORE: 22
ADLS_ACUITY_SCORE: 22
ADLS_ACUITY_SCORE: 21
ADLS_ACUITY_SCORE: 22
ADLS_ACUITY_SCORE: 21
ADLS_ACUITY_SCORE: 22
ADLS_ACUITY_SCORE: 21
ADLS_ACUITY_SCORE: 22
ADLS_ACUITY_SCORE: 22
ADLS_ACUITY_SCORE: 21
ADLS_ACUITY_SCORE: 22
ADLS_ACUITY_SCORE: 21
ADLS_ACUITY_SCORE: 22
ADLS_ACUITY_SCORE: 21

## 2022-02-21 NOTE — PROGRESS NOTES
Brief Nephrology Note      Pt was busy with therapy this am and there are no pressing issues so did not see in formal capacity today.  Cr much improved this am with IVF yesterday suggesting he has poor intake which is contributing to some prerenal on CKD 4, improved enough to proceed with removing tunneled line which is planned for tomorrow as it mainly is risk (infection) at this point as we have not used it in close to 3 weeks.  Will continue to follow labs, main barriers to discharge are functional at this time.     Naveed Bean, APRN CNS  Clinical Nurse Specialist  [phone]

## 2022-02-21 NOTE — PROGRESS NOTES
"Care Management Follow Up    Length of Stay (days): 13    Expected Discharge Date: 02/23/2022     Concerns to be Addressed: discharge planning, TCU placement   Patient plan of care discussed at interdisciplinary rounds: Yes    Anticipated Discharge Disposition: TBD     Anticipated Discharge Services: TBD  Anticipated Discharge DME: TBD    Patient/family educated on Medicare website which has current facility and service quality ratings:    Education Provided on the Discharge Plan:    Patient/Family in Agreement with the Plan: unable to assess    Referrals Placed by CM/SW:      Pending:       - Global referral for [place] facilities sent  - Global referral for [facility] sent   - [facility] (Ph: [phone]) - voicemail left with admissions at 12:50PM   - [facility] (Ph: [phone]) - voicemail left with admissions at 12:54PM.      Declined/Discontinued:      - [facility] - no appropriate bed at this time   - [facility] - cannot meet his needs (see below for additional information)   - [facility] - no bed availability at this time   - [facility] - no TCU services provided at this facility    - [facility] - no outside admissions at this time, looking out about 2 weeks   - [facility] - no bed availability due to staffing   - [facility] - declined due to \"payer issues\" and no bed availability   - [facility] - declining due to refusing PT/OT and MD assessments     Private pay costs discussed: Not applicable    Additional Information:    New referrals have been sent. SW will continue to follow for discharge planning and TCU placement.     LANETTE Madrid, RADHA   7C    Phone: [phone]  Pager: [phone]      "

## 2022-02-21 NOTE — PLAN OF CARE
Plan of Care Note     Reason for Admission: Acute cystitis with hematuria/UTI  Procedures: None  IV Access/Incisions/Drains/Wounds:   Peripheral IV 02/07/22 Anterior;Right Lower forearm (Active)   Site Assessment WDL 02/21/22 0000   Line Status Saline locked 02/21/22 0000   Phlebitis Scale 0-->no symptoms 02/21/22 0000   Infiltration Scale 0 02/21/22 0000   Infiltration Site Treatment Method  None 02/21/22 0000   Number of days: 14       Hemodialysis Vascular Access Arteriovenous fistula Left Forearm (Active)   Number of days:        CVC Double Lumen Right Internal jugular Tunneled;Non - valved (open ended) (Active)   Site Assessment WDL 02/21/22 0000   Dressing Type Chlorhexidine sponge;Transparent 02/20/22 0000   Dressing Status clean;dry;intact 02/21/22 0000   Dressing Intervention other (comment) 02/17/22 0800   Dressing Change Due 02/17/22 02/17/22 0800   Line Necessity yes, meets criteria 02/20/22 0000   Phlebitis Scale 0-->no symptoms 02/21/22 0000   Infiltration? no 02/21/22 0000   Infiltration Scale 0 02/21/22 0000   Infiltration Site Treatment Method  None 02/21/22 0000   Was a vesicant infusing? no 02/10/22 1238   Number of days: 249       Urethral Catheter 16 fr (Active)   Tube Description Positional 02/21/22 0000   Catheter Care Done 02/20/22 1600   Collection Container Patent 02/21/22 0000   Securement Method Securing device (Describe) 02/20/22 1600   Rationale for Continued Use Retention 02/21/22 0000   Urine Output 150 mL 02/21/22 0000   Number of days: 13       Wound Back Friction injury (Active)   Number of days: 245   IVF: None  VS: /75 (BP Location: Right arm)   Pulse 67   Temp 97.3  F (36.3  C) (Axillary)   Resp 16   Wt 105.5 kg (232 lb 9.4 oz)   SpO2 98%   BMI 29.86 kg/m     Diet: Combination Diet Regular Diet Adult  Room Service  Snacks/Supplements Adult: Other; 2pm: cheddar cheese and saltine crackers; Between Meals    Activity: Total/ Heavy 2 A w/GB & walker  Pain Management:  Denies  GI/: Voiding with ferguson catheter, +BM (evening shift), +Flatus, -Nausea  Neuro: A&Ox? ([facility])  Team: Adarsh  Pertinent Labs: None        Shift Summary  Patient slept well on shift. Patient refused skin assessment, Repositioning, and other cares again for me tonight. Patient seems to be more active during day and early evening shift per RN report. Plan for patient to transition to a TCU when social work can find a facility that will accept him. Continue to attempt POC.

## 2022-02-21 NOTE — PROGRESS NOTES
[facility]  Progress Note - [facility]       Date of Admission:  2/7/2022    Family Medicine Progress Note - [facility]       Main Plans for Today   - plan for tunneled line removal on 2/22  - Repeat UA/UCx ordered 2/21   - fluconazole course - ends tomorrow     Assessment & Plan   Fer Latham is a 71 year old male with PMH of MDD, anxiety, recurrent UTI's, BPH, neurogenic bladder w/chronic ferguson, DM2 (diet controlled), HTN, ESRD on dialysis (twice weekly), chronic atrial fibrillation (on eliquis), alcohol abuse and T12 compression fracture admitted for UTI.     # MDD  # anxiety  # chronic insomnia  # delirium  Per OT cognitive assessment on 2/9: A/Ox3, moderate deficits in safety, memory, and attention. SLUMS score 8/30.  Concern for MDD with underlying baseline cognitive deficit. Pt is  withdrawn, occasionally refuses medications, and nursing/wound care. Previously was refusing PT/OT, though has been more consistently participating in therapy since 2/14. BUN has been generally up-trending; most recent BUN 50, down from 51 on 2/19. Considered the possibility of uremic encephalopathy, but per Nephrology, this is unlikely. Per Psychiatry evaluation, patient's symptoms are consistent with MDD and delirium. Restarted mirtazapine on 2/13.  on 2/18 EKG. Pt is agreeable to rehab.   - Psych consulted   - PTA mirtazipine 15 mg QHS    - lexapro 10mg daily   - stopped seroquel  - hold PTA valium 5mg Q12hr PRN (last filled Oct 2021 per PDMP)  - PTA melatonin    # CKD IV  # MATT on CKD  # Hx of ESRD on dialysis, has not required dialysis since admission per nephrology assessment  Dialyzes 2x/week at [facility] on Tues/Thurs. Electrolyte and kidney function stable, continues to make urine. Initial Cystatin C suggested gfr of 23 ml/min, with repeat suggesting gfr of 19 which suggests he may be able to hold on dialysis per Nephrology. Had  "elevated K, however has been stable at 4.3-4.7 since 2/14. Creatinine elevated to 2.96 on 2/17, but has been down-trending since lasix was stopped; most recently 2.57. May have MATT on CKD due to poor PO intake and urine infection. Continue to hold lasix 2/2 MATT and soft Bps.   Decreased urine output since 2/18: 650cc, 2/19: 450cc, 2/20: 760cc.      - Nephrology consulted, appreciate recs:   - urine output appears to be consistent with PO intake   - still plan for catheter removal 2/22 AM.   - holding PO lasix 40 daily  - daily bmp  - strict I&O, daily weights    # Catheter-associated UTI  # Candidal cystitis  # Lactic Acidosis, resolved  # Neurogenic bladder w/chronic indwelling ferguson  # BPH with LUTS  Ferguson placed by urology June 2021. Has been on oxybutytnin and trospium but stopped 2/2 cost. Hx candidal cystitis June 2021-teated with fluconazole PO. Ferguson exchanged 2/8 (new size) and functioning well. Recent admission 2/2-2/3, treated with zosyn and ceftriaxone for E.Coli UTI. Discharged with cefdinir. Patient was initially started on ceftriaxone during this hospitalization based on UA results and previous Ucx, but Ucx from this hospitalization only growing Candida, no bacteria which is consistent with candida cystitis vs. colonization. 2/21: patient is complaining of suprapubic pain. Bladder scan demonstrated 16cc of urine, not retaining. With history of repeated UTIs and chronic indwelling ferguson catheter, there is concern for recurrent UTI.      - Repeat UA and UCx on 2/21.  - PO fluconazole 200mg daily x 14 days (2/9-2/22)  - daily bmp    # fecal incontinence  2/13, notified of fecal incontinence. On evaluation, patient admitted to not communicating incontinence. Stated that he had \"just given up\".  No neurological cause for fecal incontinence from exam/neurosurgery evaluation and MRI on 2/16. Incontinence may secondary to severe depression.   - Neurosurgery consulted, signed off    # Tinea cruris  # Chronic " groin/buttocks wounds  Perianal wounds, L buttocks wound along with erythema in groin per ED exam. Complicated by fecal incontinence.  - PTA miconazole powder, nystatin cream   - WOC consulted     # T12 compression fracture  # low back pain  Worsening of known T12 compression fx w/periverterbral edema. No fever/chills to suggest infection. Denies recent falls. MRI lumbar spine 2/8/22 with moderate spinal canal narrowing at T11-T12. Neurosurgery consulted, no neurosurgical intervention planned. Due to recent reported fecal incontinence (noted above), thoracolumbar MRI was obtained on 2/16, which showed no change in T12 compression deformity compared to 2/8 MRI.   - tylenol TID  - lidocaine patch for back  - PO dilaudid 1mg nightly PRN   - up with assist and TLSO brace when out of bed   - PT/OT consulted - has been working with PT consistently since 2/14  ____________________________________________     # hx DVT  # hx PE  # atrial fibrillation  Not on rate/rhythm control due to concerns for hypotension in the past. Was in RVR on admission, resolved with pain control. [id number] of 4, therefore, needs anticoagulation, though notably had not been taking Eliquis at home due to cost.   - Eliquis 5mg BID  - Discussed outpatient anticoagulation plan with patient's wife, who declined eliquis due to costs and warfarin due to inconvenience of frequent labs.      # hx alcohol abuse  Drinks alcohol, unclear when last drink was.   - PTA folic acid & multivitamin  - PO thiamine daily     # DM2: Not on medications, A1C 5.1% on admission.    # Anemia of chronic disease: Baseline hgb ~11. Likely 2/2 alcohol use and CKD.       Diet: Combination Diet Regular Diet Adult  Room Service  Snacks/Supplements Adult: Other; 2pm: cheddar cheese and saltine crackers; Between Meals    DVT Prophylaxis: Eliquis, PTA  Brown Catheter: PRESENT, indication: Neurogenic Bladder, Retention  Fluids: PO  Central Lines: PRESENT  CVC Double Lumen Right  Internal jugular Tunneled;Non - valved (open ended)-Site Assessment: WDL    Cardiac Monitoring: None  Code Status: Full Code      Disposition Plan   Expected Discharge: medically ready, awaiting TCU placement      Anticipated discharge location: TBD - LTC vs home vs TCU     The patient's care was discussed with the Attending Physician, Dr. Paez.    Abbey Puente MD  [facility] Medicine Service  [facility]     Securely message with the Vocera Web Console (learn more here)  Text page via [facility] Paging/Directory   Please see signed in provider for up to date coverage information  ____________________________________________________    Interval History   No events overnight. Noting some suprapubic pain, 5/10 in severity. Poor appetite and PO intake. Brown in place, minimal output. Refusing nursing cares at times, including repositioning.    Data reviewed today: I reviewed all medications, new labs and imaging results over the last 24 hours.     Physical Exam   Vital Signs:                    Weight: 232 lbs 9.36 oz     Physical Exam  Vitals reviewed.   Constitutional:       General: He is not in acute distress.     Appearance: Normal appearance. He is well-developed. He is not ill-appearing.      Comments: Lying in bed   HENT:      Head: Normocephalic.   Eyes:      Extraocular Movements: Extraocular movements intact.      Conjunctiva/sclera: Conjunctivae normal.   Cardiovascular:      Rate and Rhythm: Normal rate.      Heart sounds: Normal heart sounds.   Pulmonary:      Effort: Pulmonary effort is normal. No respiratory distress.      Breath sounds: Normal breath sounds.   Neurological:      General: No focal deficit present.      Mental Status: He is alert and oriented to person, place, and time.   Psychiatric:         Attention and Perception: Attention normal.         Mood and Affect: Mood is depressed. Affect is flat.         Behavior: Behavior is withdrawn.          Thought Content: Thought content does not include homicidal or suicidal ideation.

## 2022-02-21 NOTE — PROGRESS NOTES
Interventional Radiology Progress Note  02/21/22     Spoke with Abbey Puente MD who spoke with nephrology and line is no longer needed for dialysis.  Will place on IR schedule for tomorrow 2/22/2022. Will not need to be NPO for this procedure as patient will not require sedation for removal.     Frida Zuniga PA-C  Interventional Radiology  Pager: [phone]

## 2022-02-21 NOTE — PLAN OF CARE
Assumed cares from [id number]. VSS on RA, lethargic and disoriented to time. Denied pain and nausea throughout shift. Minimal oral intake from late lunch and dinner. Up in chair at the beginning of shift and transferred with Ax2, gait belt, and walker to bed. Refusing repositioning and perineal wound cares throughout shift. Brown catheter patent with adequate UOP, catheter cares completed. One incontinent soft bowel movement this shift. Dialysis catheter CDI, PIV saline locked. Continue with POC.

## 2022-02-22 ENCOUNTER — APPOINTMENT (OUTPATIENT)
Dept: PHYSICAL THERAPY | Facility: CLINIC | Age: 72
DRG: 698 | End: 2022-02-22
Payer: MEDICARE

## 2022-02-22 ENCOUNTER — APPOINTMENT (OUTPATIENT)
Dept: INTERVENTIONAL RADIOLOGY/VASCULAR | Facility: CLINIC | Age: 72
DRG: 698 | End: 2022-02-22
Attending: PHYSICIAN ASSISTANT
Payer: MEDICARE

## 2022-02-22 LAB
ANION GAP SERPL CALCULATED.3IONS-SCNC: 6 MMOL/L (ref 3–14)
BACTERIA UR CULT: NO GROWTH
BUN SERPL-MCNC: 45 MG/DL (ref 7–30)
CALCIUM SERPL-MCNC: 9.4 MG/DL (ref 8.5–10.1)
CHLORIDE BLD-SCNC: 105 MMOL/L (ref 94–109)
CO2 SERPL-SCNC: 25 MMOL/L (ref 20–32)
CREAT SERPL-MCNC: 2.26 MG/DL (ref 0.66–1.25)
GFR SERPL CREATININE-BSD FRML MDRD: 30 ML/MIN/1.73M2
GLUCOSE BLD-MCNC: 101 MG/DL (ref 70–99)
HOLD SPECIMEN: NORMAL
POTASSIUM BLD-SCNC: 4.6 MMOL/L (ref 3.4–5.3)
SARS-COV-2 RNA RESP QL NAA+PROBE: NEGATIVE
SODIUM SERPL-SCNC: 136 MMOL/L (ref 133–144)

## 2022-02-22 PROCEDURE — 36589 REMOVAL TUNNELED CV CATH: CPT

## 2022-02-22 PROCEDURE — 250N000013 HC RX MED GY IP 250 OP 250 PS 637: Performed by: STUDENT IN AN ORGANIZED HEALTH CARE EDUCATION/TRAINING PROGRAM

## 2022-02-22 PROCEDURE — 120N000002 HC R&B MED SURG/OB UMMC

## 2022-02-22 PROCEDURE — 05PY3YZ REMOVAL OF OTHER DEVICE FROM UPPER VEIN, PERCUTANEOUS APPROACH: ICD-10-PCS | Performed by: RADIOLOGY

## 2022-02-22 PROCEDURE — 36589 REMOVAL TUNNELED CV CATH: CPT | Performed by: RADIOLOGY

## 2022-02-22 PROCEDURE — 250N000009 HC RX 250: Performed by: RADIOLOGY

## 2022-02-22 PROCEDURE — 80048 BASIC METABOLIC PNL TOTAL CA: CPT | Performed by: STUDENT IN AN ORGANIZED HEALTH CARE EDUCATION/TRAINING PROGRAM

## 2022-02-22 PROCEDURE — U0005 INFEC AGEN DETEC AMPLI PROBE: HCPCS | Performed by: STUDENT IN AN ORGANIZED HEALTH CARE EDUCATION/TRAINING PROGRAM

## 2022-02-22 PROCEDURE — 36415 COLL VENOUS BLD VENIPUNCTURE: CPT | Performed by: STUDENT IN AN ORGANIZED HEALTH CARE EDUCATION/TRAINING PROGRAM

## 2022-02-22 PROCEDURE — 99232 SBSQ HOSP IP/OBS MODERATE 35: CPT | Mod: GC | Performed by: FAMILY MEDICINE

## 2022-02-22 PROCEDURE — 97530 THERAPEUTIC ACTIVITIES: CPT | Mod: GP | Performed by: REHABILITATION PRACTITIONER

## 2022-02-22 RX ORDER — LIDOCAINE HYDROCHLORIDE 10 MG/ML
1-30 INJECTION, SOLUTION EPIDURAL; INFILTRATION; INTRACAUDAL; PERINEURAL
Status: COMPLETED | OUTPATIENT
Start: 2022-02-22 | End: 2022-02-22

## 2022-02-22 RX ORDER — POLYETHYLENE GLYCOL 3350 17 G/17G
8.5 POWDER, FOR SOLUTION ORAL DAILY PRN
Status: DISCONTINUED | OUTPATIENT
Start: 2022-02-22 | End: 2022-02-23

## 2022-02-22 RX ADMIN — APIXABAN 5 MG: 5 TABLET, FILM COATED ORAL at 09:29

## 2022-02-22 RX ADMIN — NYSTATIN: 100000 CREAM TOPICAL at 09:30

## 2022-02-22 RX ADMIN — ACETAMINOPHEN 975 MG: 325 TABLET, FILM COATED ORAL at 09:29

## 2022-02-22 RX ADMIN — LIDOCAINE HYDROCHLORIDE 8 ML: 10 INJECTION, SOLUTION EPIDURAL; INFILTRATION; INTRACAUDAL; PERINEURAL at 11:11

## 2022-02-22 RX ADMIN — MIRTAZAPINE 15 MG: 15 TABLET, FILM COATED ORAL at 20:09

## 2022-02-22 RX ADMIN — FOLIC ACID 1 MG: 1 TABLET ORAL at 09:29

## 2022-02-22 RX ADMIN — THIAMINE HCL TAB 100 MG 100 MG: 100 TAB at 09:29

## 2022-02-22 RX ADMIN — LIDOCAINE 1 PATCH: 246 PATCH TOPICAL at 09:30

## 2022-02-22 RX ADMIN — FLUCONAZOLE 200 MG: 100 TABLET ORAL at 09:29

## 2022-02-22 RX ADMIN — Medication 1 CAPSULE: at 09:29

## 2022-02-22 RX ADMIN — ESCITALOPRAM OXALATE 10 MG: 10 TABLET ORAL at 09:29

## 2022-02-22 RX ADMIN — APIXABAN 5 MG: 5 TABLET, FILM COATED ORAL at 20:09

## 2022-02-22 ASSESSMENT — ACTIVITIES OF DAILY LIVING (ADL)
ADLS_ACUITY_SCORE: 22

## 2022-02-22 NOTE — IR NOTE
Patient Name: Fer Latham  Medical Record Number: [id number]  Today's Date: 2/22/2022    Procedure: Removal of Double Lumen Large Bore central Tunneled Catheter  Proceduralist: Dr Jimenez Knowles  Pathology present: No    Procedure Start: 1057  Procedure end: 1105  Sedation medications administered: None     Report given to: Marquis NICOLE  : No    Other Notes: Pt arrived to IR room 2 from Unit. Consent reviewed. Pt denies any questions or concerns regarding procedure. Pt positioned supine and monitored per protocol. Pt tolerated procedure without any noted complications. Pt transferred back to Unit 7C.  Bed rest x 1 hour.

## 2022-02-22 NOTE — PROGRESS NOTES
[facility]  Progress Note - [facility]       Date of Admission:  2/7/2022    Family Medicine Progress Note - [facility]       Main Plans for Today   - tunneled catheter removed today   - Repeat UA/UCx ordered 2/21 - UA negative, UCx pending  - fluconazole course - completed today   - medically ready for discharge - awaiting placement     Assessment & Plan   Fer Latham is a 71 year old male with PMH of MDD, anxiety, recurrent UTI's, BPH, neurogenic bladder w/chronic ferguson, DM2 (diet controlled), HTN, ESRD on dialysis (twice weekly), chronic atrial fibrillation (on eliquis), alcohol abuse and T12 compression fracture admitted for UTI.     # MDD  # anxiety  # chronic insomnia  # delirium  Per OT cognitive assessment on 2/9: A/Ox3, moderate deficits in safety, memory, and attention. SLUMS score 8/30.  Concern for MDD with underlying baseline cognitive deficit. Pt is  withdrawn, occasionally refuses medications, and nursing/wound care. Previously was refusing PT/OT, though has been more consistently participating in therapy since 2/14.Per Psychiatry evaluation, patient's symptoms are consistent with MDD and delirium. Restarted mirtazapine on 2/13.  on 2/18 EKG. Pt is agreeable to rehab.   - Psych consulted   - PTA mirtazipine 15 mg QHS    - lexapro 10mg daily   - stopped seroquel  - hold PTA valium 5mg Q12hr PRN (last filled Oct 2021 per PDMP)  - PTA melatonin    #Severe malnutrition in the context of acute illness  Patient has been ordering only 2 meals daily, and per RN report, only appears to be eating 10% of each meal. Poor PO intake is likely secondary to patient's severe depression.   - Nutrition consulted, appreciate recs   - calorie counts x3days   - ensure enlive supplement   - Mirtazipine 15 mg at bedtime. Could consider increasing this dose, but would do so gradually and monitor for oversedation given decreased renal  function.  - Increase     # Catheter-associated UTI  # Candidal cystitis  # Lactic Acidosis, resolved  # Neurogenic bladder w/chronic indwelling ferguson  # BPH with LUTS  Ferguson placed by urology June 2021. Has been on oxybutytnin and trospium but stopped 2/2 cost. Hx candidal cystitis June 2021-teated with fluconazole PO. Ferguson exchanged 2/8 (new size) and functioning well. Recent admission 2/2-2/3, treated with zosyn and ceftriaxone for E.Coli UTI. Discharged with cefdinir. Patient was initially started on ceftriaxone during this hospitalization based on UA results and previous Ucx, but Ucx from this hospitalization only growing Candida, no bacteria which is consistent with candida cystitis vs. colonization. 2/21: patient is complaining of suprapubic pain. Bladder scan demonstrated 16cc of urine, not retaining. With history of repeated UTIs and chronic indwelling ferguson catheter, there is concern for recurrent UTI.      - Repeat UA and UCx on 2/21; UA negative, UCx pending   - PO fluconazole 200mg daily x 14 days (2/9-2/22)  - daily bmp    # CKD IV  # MATT on CKD  # Hx of ESRD on dialysis, has not required dialysis since admission per nephrology assessment  Dialyzes 2x/week at [facility] on Tues/Thurs. Electrolyte and kidney function stable, continues to make urine. Initial Cystatin C suggested gfr of 23 ml/min, with repeat suggesting gfr of 19 which suggests he may be able to hold on dialysis per Nephrology. Had elevated K, however has been stable at 4.3-4.7 since 2/14. Creatinine elevated to 2.96 on 2/17, but has been down-trending since lasix was stopped; most recently 2.57. May have MATT on CKD due to poor PO intake and urine infection. Continue to hold lasix 2/2 MATT and soft Bps. Decreased urine output 2/18: 650cc, 2/19: 450cc, 2/20: 760cc. Improved 2/21 to 1L.      - Nephrology consulted, appreciate recs:   - urine output appears to be consistent with PO intake   - dialysis catheter removed 2/22 AM.   -  "discontinued PO lasix 40 since K has been stable   - daily bmp  - strict I&O, daily weights    # fecal incontinence  2/13, notified of fecal incontinence. On evaluation, patient admitted to not communicating incontinence. Stated that he had \"just given up\".  No neurological cause for fecal incontinence from exam/neurosurgery evaluation and MRI on 2/16. Incontinence may secondary to severe depression.   - Neurosurgery consulted, signed off    # Tinea cruris  # Chronic groin/buttocks wounds  Perianal wounds, L buttocks wound along with erythema in groin per ED exam. Complicated by fecal incontinence.  - PTA miconazole powder, nystatin cream   - WOC consulted     # T12 compression fracture  # low back pain  Worsening of known T12 compression fx w/periverterbral edema. No fever/chills to suggest infection. Denies recent falls. MRI lumbar spine 2/8/22 with moderate spinal canal narrowing at T11-T12. Neurosurgery consulted, no neurosurgical intervention planned. Due to recent reported fecal incontinence (noted above), thoracolumbar MRI was obtained on 2/16, which showed no change in T12 compression deformity compared to 2/8 MRI.   - tylenol TID  - lidocaine patch for back  - PO dilaudid 1mg nightly PRN   - up with assist and TLSO brace when out of bed   - PT/OT consulted - has been working with PT consistently since 2/14  ____________________________________________     # hx DVT  # hx PE  # atrial fibrillation  Not on rate/rhythm control due to concerns for hypotension in the past. Was in RVR on admission, resolved with pain control. [id number] of 4, therefore, needs anticoagulation, though notably had not been taking Eliquis at home due to cost.   - Eliquis 5mg BID  - Discussed outpatient anticoagulation plan with patient's wife, who declined eliquis due to costs and warfarin due to inconvenience of frequent labs.      # hx alcohol abuse  Drinks alcohol, unclear when last drink was.   - PTA folic acid & multivitamin  - " PO thiamine daily     # DM2: Not on medications, A1C 5.1% on admission.    # Anemia of chronic disease: Baseline hgb ~11. Likely 2/2 alcohol use and CKD.       Diet: Combination Diet Regular Diet Adult  Room Service  Snacks/Supplements Adult: Other; 2pm: cheddar cheese and saltine crackers; Between Meals    DVT Prophylaxis: Eliquis, PTA  Brown Catheter: PRESENT, indication: Neurogenic Bladder, Neurogenic Bladder  Fluids: PO  Central Lines: PRESENT  CVC Double Lumen Right Internal jugular Tunneled;Non - valved (open ended)-Site Assessment: WDL    Cardiac Monitoring: None  Code Status: Full Code      Disposition Plan   Expected Discharge: medically ready, awaiting TCU placement      Anticipated discharge location: TBD - LTC vs home vs TCU     The patient's care was discussed with the Attending Physician, Dr. Harden.    Abbey Puente MD  [facility]  [facility]     Securely message with the Vocera Web Console (learn more here)  Text page via [facility] Paging/Directory   Please see signed in provider for up to date coverage information  ____________________________________________________    Interval History   No events overnight. Poor appetite and PO intake. Brown in place, decent output. Refusing nursing cares at times, including repositioning. 10-point ROS otherwise negative.     Data reviewed today: I reviewed all medications, new labs and imaging results over the last 24 hours.     Physical Exam   Vital Signs: Temp: 99.4  F (37.4  C) Temp src: Oral BP: 133/79 Pulse: 94   Resp: 16 SpO2: 99 % O2 Device: None (Room air)    Weight: 228 lbs 2.82 oz     Physical Exam  Vitals reviewed.   Constitutional:       General: He is not in acute distress.     Appearance: Normal appearance. He is well-developed. He is not ill-appearing.      Comments: Lying in bed   HENT:      Head: Normocephalic.   Eyes:      Extraocular Movements: Extraocular movements intact.       Conjunctiva/sclera: Conjunctivae normal.   Cardiovascular:      Rate and Rhythm: Normal rate.      Heart sounds: Normal heart sounds.   Pulmonary:      Effort: Pulmonary effort is normal. No respiratory distress.      Breath sounds: Normal breath sounds.   Neurological:      General: No focal deficit present.      Mental Status: He is alert and oriented to person, place, and time.   Psychiatric:         Attention and Perception: Attention normal.         Mood and Affect: Mood is depressed. Affect is flat.         Behavior: Behavior is withdrawn.         Thought Content: Thought content does not include homicidal or suicidal ideation.

## 2022-02-22 NOTE — PLAN OF CARE
Pt is Aox3, but forgetful and confused to time and intermittently situation. Decision making poor, patient refusing repositioning and cares at times and have to re approach later to get them done. CHG, catheter cares, completed this AM. Pt worked w/ OT and washed up and did personal hygiene cares. Pt OOB at 11 to armchair for late breakfast and again for dinner. Pt rolling, getting to sitting position and standing w/ 2A w/ much more ease than before, doing most of the work himself. Pt denies dizziness. Pt had 50% of breakfast and dinner. Writer spoke w/ wife and she said we was going to come in tomorrow to help motivate the patient to work w/ PT/OT and do exercises. UA/UC was sent today but was negative. /74 (BP Location: Right arm)   Pulse 77   Temp 97.1  F (36.2  C) (Oral)   Resp 16   Wt 103.5 kg (228 lb 2.8 oz)   SpO2 100%   BMI 29.30 kg/m

## 2022-02-22 NOTE — PROGRESS NOTES
Brief Nephrology note      Mr Yeison's Cr is again a bit improved after some IVF on 2/20 suggesting he is a bit volume sensitive, appreciate IR pulling tunneled line today.  Will follow peripherally but we are not actively managing any renal issue while admitted.  Plan is to follow up in CKD clinic.      OCTAVIO Ivy CNS  Clinical Nurse Specialist  [phone]

## 2022-02-22 NOTE — PROGRESS NOTES
CLINICAL NUTRITION SERVICES - REASSESSMENT NOTE     Nutrition Prescription    RECOMMENDATIONS FOR MDs/PROVIDERS TO ORDER:  Recommend switch Nephrocaps (MVI for dialysis patients) to a standard MVI w/ minerals    Malnutrition Status:    Severe malnutrition in the context of acute illness    Recommendations already ordered by Registered Dietitian (RD):  Offer Ensure Enlive (or other supplement) with meals    Future/Additional Recommendations:  Encourage patient to consume at least 75% of meals TID or the equivalent with snacks/supplements.  If consuming less than this offer supplements, additional scheduled snacks, and/or consider ordering calorie counts to assess PO intake adequacy.       EVALUATION OF THE PROGRESS TOWARD GOALS   Diet: Regular + Room Service with Assist + 2 pm snack (cheddar cheese and saltine crackers)    Intake: Mostly poor appetite the past week, eating 25-50% of meals documented       NEW FINDINGS   Weight: continues to trend down since admit, however per I/Os pt is -14,462 mL since admit so suspect a fluid related component involved (especially considering how quickly wt loss has occurred, 19 lb in 13 days)    02/21/22 0900 103.5 kg (228 lb 2.8 oz)   02/19/22 1424 105.5 kg (232 lb 9.4 oz)   02/18/22 0846 105.9 kg (233 lb 7.5 oz)   02/17/22 0836 106.5 kg (234 lb 12.6 oz)   02/14/22 1100 109.5 kg (241 lb 6.5 oz)   02/11/22 1000 115.9 kg (255 lb 8.2 oz)   02/09/22 1200 115.9 kg (255 lb 8.2 oz)   02/08/22 1515 112.1 kg (247 lb 2.2 oz)     Labs:   - BUN 45 (H)  - Cr 2.26 (H), trending down  - GFR 30 (L), trending up    Meds:   - Folic acid, thiamine, remeron, Nephrocaps  - Lasix discontinued today (had been held since 2/18)    Nephrology: Per Nephrology note yesterday, pt with prerenal on CKD4, and plan for HD catheter removal this morning    Dispo: per provider notes, pt is medically ready for discharge and is awaiting TCU placement    MALNUTRITION  % Intake: </= 50% for >/= 5 days (severe)  %  Weight Loss: Difficult to assess, possibly >2% in 1 week (severe)  Subcutaneous Fat Loss: Facial region: mild per RD note on 2/15  Muscle Loss: Temporal and Lower arm  (forearm): mild per RD note on 2/15  Fluid Accumulation/Edema: Trace to mild per flowsheets  Malnutrition Diagnosis: Severe malnutrition in the context of acute illness    Previous Goals   Patient to consume % of nutritionally adequate meal trays TID, or the equivalent with supplements/snacks.  Evaluation: Not met    Previous Nutrition Diagnosis  Inadequate oral intake related to variable/decreased appetite as evidenced by eating mostly 50% of meals documented the past week but ranging % and pt reports variable/fair appetite    Evaluation: No change, updated    CURRENT NUTRITION DIAGNOSIS  Inadequate oral intake related to decreased appetite as evidenced by eating mostly 25-50% of meals documented the past week     INTERVENTIONS  Implementation  Medical food supplement therapy  Patient with other cares during attempts to visit    Goals  Patient to consume % of nutritionally adequate meal trays TID, or the equivalent with supplements/snacks.    Monitoring/Evaluation  Progress toward goals will be monitored and evaluated per protocol.     Chelita Kidd, RD, , LD  Weekday Pager: [phone]  Weekday Units covered: 7C (all beds) and 5A (beds 5201 through 5211-2)  Weekend/Holiday RD Pager: [phone]

## 2022-02-22 NOTE — PROCEDURES
[facility]    Procedure: IR Procedure Note    Date/Time: 2/22/2022 11:11 AM  Performed by: Jimenez Knowles MD  Authorized by: Jimenez Knowles MD       UNIVERSAL PROTOCOL   Site Marked: NA  Prior Images Obtained and Reviewed:  Yes  Required items: Required blood products, implants, devices and special equipment available    Patient identity confirmed:  Verbally with patient, arm band, provided demographic data and hospital-assigned identification number  NA - No sedation, light sedation, or local anesthesia  Confirmation Checklist:  Patient's identity using two indicators, relevant allergies, procedure was appropriate and matched the consent or emergent situation and correct equipment/implants were available  Time out: Immediately prior to the procedure a time out was called    Universal Protocol: the Joint Commission Universal Protocol was followed    Preparation: Patient was prepped and draped in usual sterile fashion    ESBL (mL):  2     ANESTHESIA    Anesthesia: Local infiltration  Local Anesthetic:  Lidocaine 1% without epinephrine  Anesthetic Total (mL):  10      SEDATION    Patient Sedated: No    See dictated procedure note for full details.  Findings: Right tunneled dialysis catheter removed completely. Pressure held until hemostasis achieved. Site closed with Steri-strips and sterile dressing.    Specimens: none    Complications: None    Condition: Stable      PROCEDURE    Patient Tolerance:  Patient tolerated the procedure well with no immediate complications  Length of time physician/provider present for 1:1 monitoring during sedation: 0

## 2022-02-22 NOTE — PLAN OF CARE
Alert, oriented with occasional confusion, calm.  Asked if I saw his wife around, was reminded that it was night, that she was at home and will be here in the morning.  AVSS.  Denies pain.    Sat in chair in the evening, then to bed for the night. Allowed repositioning 1x, did not want to be turned after that but is able to turn self easily.  Tolerating regular diet.  Brown with adequate output.  No BM this shift.

## 2022-02-22 NOTE — PROGRESS NOTES
"Care Management Follow Up    Length of Stay (days): 14    Expected Discharge Date: 02/23/2022     Concerns to be Addressed: discharge planning and TCU placement   Patient plan of care discussed at interdisciplinary rounds: Yes    Anticipated Discharge Disposition: TBD     Anticipated Discharge Services: TBD  Anticipated Discharge DME: TBD     Patient/family educated on Medicare website which has current facility and service quality ratings: yes   Education Provided on the Discharge Plan:    Patient/Family in Agreement with the Plan: unable to assess    Referrals Placed by CM/SW:      Pending:      - Global referral for [facility] sent - [facility] will attempt to find placement for pt that is close to where he lives and knows that pt needs a private room   - [facility] (Ph: [phone]) - voicemail left with admissions at 2:49PM.      Declined/Discontinued:      - [facility] - declined due to noncompliance with PT/OT   - [facility] - declined due to \"low participation in therapies\", potential for LTC, and secondary payer issues   - [facility] - no appropriate bed at this time   - [facility] - cannot meet his needs (see below for additional information)   - [facility] - no bed availability at this time   - [facility] - no TCU services provided at this facility    - [facility] - no outside admissions at this time, looking out about 2 weeks   - [facility] - no bed availability due to staffing   - [facility] - declined due to \"payer issues\" and no bed availability   - [facility] - declining due to refusing PT/OT and MD assessments     Private pay costs discussed: Not applicable    Additional Information:    SW will continue to follow for discharge planning and TCU placement. [facility] is working on placing pt " within their system. SARMAD will follow up with Jade, as well as Ky Baker tomorrow for updates. Pt continues to be a difficult placement due to noncompliance with PT/OT and cares. No further questions or concerns at this time.     LANETTE Madrid, [address]    Phone: [phone]  Pager: [phone]

## 2022-02-23 ENCOUNTER — APPOINTMENT (OUTPATIENT)
Dept: PHYSICAL THERAPY | Facility: CLINIC | Age: 72
DRG: 698 | End: 2022-02-23
Payer: MEDICARE

## 2022-02-23 LAB
ANION GAP SERPL CALCULATED.3IONS-SCNC: 8 MMOL/L (ref 3–14)
BUN SERPL-MCNC: 51 MG/DL (ref 7–30)
CALCIUM SERPL-MCNC: 9.4 MG/DL (ref 8.5–10.1)
CHLORIDE BLD-SCNC: 102 MMOL/L (ref 94–109)
CO2 SERPL-SCNC: 25 MMOL/L (ref 20–32)
CREAT SERPL-MCNC: 2.22 MG/DL (ref 0.66–1.25)
GFR SERPL CREATININE-BSD FRML MDRD: 31 ML/MIN/1.73M2
GLUCOSE BLD-MCNC: 128 MG/DL (ref 70–99)
HOLD SPECIMEN: NORMAL
POTASSIUM BLD-SCNC: 5 MMOL/L (ref 3.4–5.3)
SODIUM SERPL-SCNC: 135 MMOL/L (ref 133–144)

## 2022-02-23 PROCEDURE — 250N000013 HC RX MED GY IP 250 OP 250 PS 637: Performed by: STUDENT IN AN ORGANIZED HEALTH CARE EDUCATION/TRAINING PROGRAM

## 2022-02-23 PROCEDURE — 99232 SBSQ HOSP IP/OBS MODERATE 35: CPT | Mod: GC | Performed by: FAMILY MEDICINE

## 2022-02-23 PROCEDURE — 97530 THERAPEUTIC ACTIVITIES: CPT | Mod: GP

## 2022-02-23 PROCEDURE — 80048 BASIC METABOLIC PNL TOTAL CA: CPT | Performed by: STUDENT IN AN ORGANIZED HEALTH CARE EDUCATION/TRAINING PROGRAM

## 2022-02-23 PROCEDURE — 36415 COLL VENOUS BLD VENIPUNCTURE: CPT | Performed by: STUDENT IN AN ORGANIZED HEALTH CARE EDUCATION/TRAINING PROGRAM

## 2022-02-23 PROCEDURE — 120N000002 HC R&B MED SURG/OB UMMC

## 2022-02-23 PROCEDURE — 97116 GAIT TRAINING THERAPY: CPT | Mod: GP

## 2022-02-23 PROCEDURE — G0463 HOSPITAL OUTPT CLINIC VISIT: HCPCS

## 2022-02-23 RX ORDER — POLYETHYLENE GLYCOL 3350 17 G/17G
17 POWDER, FOR SOLUTION ORAL DAILY
Status: DISCONTINUED | OUTPATIENT
Start: 2022-02-23 | End: 2022-02-23

## 2022-02-23 RX ORDER — POLYETHYLENE GLYCOL 3350 17 G/17G
8.5 POWDER, FOR SOLUTION ORAL DAILY PRN
Status: DISCONTINUED | OUTPATIENT
Start: 2022-02-23 | End: 2022-03-02 | Stop reason: HOSPADM

## 2022-02-23 RX ORDER — POLYETHYLENE GLYCOL 3350 17 G/17G
8.5 POWDER, FOR SOLUTION ORAL DAILY
Status: DISCONTINUED | OUTPATIENT
Start: 2022-02-23 | End: 2022-02-23

## 2022-02-23 RX ORDER — MULTIVITAMIN,THERAPEUTIC
1 TABLET ORAL DAILY
Status: DISCONTINUED | OUTPATIENT
Start: 2022-02-23 | End: 2022-03-02 | Stop reason: HOSPADM

## 2022-02-23 RX ADMIN — ESCITALOPRAM OXALATE 10 MG: 10 TABLET ORAL at 09:36

## 2022-02-23 RX ADMIN — APIXABAN 5 MG: 5 TABLET, FILM COATED ORAL at 20:30

## 2022-02-23 RX ADMIN — ACETAMINOPHEN 975 MG: 325 TABLET, FILM COATED ORAL at 09:36

## 2022-02-23 RX ADMIN — MIRTAZAPINE 15 MG: 15 TABLET, FILM COATED ORAL at 20:32

## 2022-02-23 RX ADMIN — FOLIC ACID 1 MG: 1 TABLET ORAL at 09:36

## 2022-02-23 RX ADMIN — Medication 1 CAPSULE: at 09:36

## 2022-02-23 RX ADMIN — NYSTATIN: 100000 CREAM TOPICAL at 09:37

## 2022-02-23 RX ADMIN — APIXABAN 5 MG: 5 TABLET, FILM COATED ORAL at 09:36

## 2022-02-23 RX ADMIN — LIDOCAINE 1 PATCH: 246 PATCH TOPICAL at 09:36

## 2022-02-23 RX ADMIN — THERA TABS 1 TABLET: TAB at 12:38

## 2022-02-23 RX ADMIN — THIAMINE HCL TAB 100 MG 100 MG: 100 TAB at 09:36

## 2022-02-23 ASSESSMENT — ACTIVITIES OF DAILY LIVING (ADL)
ADLS_ACUITY_SCORE: 20
ADLS_ACUITY_SCORE: 22
ADLS_ACUITY_SCORE: 20
ADLS_ACUITY_SCORE: 22
ADLS_ACUITY_SCORE: 20
ADLS_ACUITY_SCORE: 22
ADLS_ACUITY_SCORE: 22
ADLS_ACUITY_SCORE: 20
ADLS_ACUITY_SCORE: 22
ADLS_ACUITY_SCORE: 20
ADLS_ACUITY_SCORE: 22
ADLS_ACUITY_SCORE: 20
ADLS_ACUITY_SCORE: 22
ADLS_ACUITY_SCORE: 22
ADLS_ACUITY_SCORE: 20
ADLS_ACUITY_SCORE: 20
ADLS_ACUITY_SCORE: 22
ADLS_ACUITY_SCORE: 20

## 2022-02-23 NOTE — PROGRESS NOTES
Pt returned to his bed from sitting in his chair for late lunch for 2hours. Pt ate 90% of his dinner that was a hamburger, and drink drank 100% of his OJ, milk and some water. Pt's mood and willingness to participate w/ activities remains positive.

## 2022-02-23 NOTE — PROGRESS NOTES
Patient 100% of meal of Oatmeal w/ brown sugar, 2 slices of velazquez, 8oz of OJ, 8oz coffe, and 8oz Whole milk.     After patient felt a need to use the restroom. Walked w/ staff to bathroom, and had a continent small brown BM, then called and walked w/ staff back to his bed.     Mood and motivation to work w/ PT/OT and nursing staff is much better today. Weight had slight increase today w/o increase in edema noted.   /74 (BP Location: Left arm)   Pulse 72   Temp 97  F (36.1  C) (Axillary)   Resp 18   Wt 106.8 kg (235 lb 7.2 oz)   SpO2 99%   BMI 30.23 kg/m

## 2022-02-23 NOTE — PLAN OF CARE
AVSS. Alert, oriented, calm, sometimes forgetful.   Talked to his wife on the phone.  On pulsate mattress, declining repositioning, encouraged shifting position in bed.  Denies pain, appeared to sleep comfortably.  Reg diet, poor appetite.  Last evening had 3/4 piece of cake, glass of OJ, one whole Ensure. Needs encouragement to drink fluids.  Brown with adequate output.  Passing gas, LBM 2/21.  PLAN: Needs TCU but having trouble finding a bed d/t noncompliance with therapies and cares.

## 2022-02-23 NOTE — PROGRESS NOTES
"SPIRITUAL HEALTH SERVICES  [facility] 7C  REFERRAL SOURCE: Staff request, which pt affirmed.     Introduced spiritual health services and listened.    Pt slowly began to share a few things about himself. Pt shares words that suggest he is struggling with healing in conjunction with being 71, that the process is slow and he may be wondering -- what is the purpose. He quietly and slowly shares his thoughts and told of a friend who had lost his home through foreclosure . Pt describes his own life possessions as basic, \"not worth much to a .\"     PLAN: Will follow-up with pt tomorrow.     Rev. Ailin Malin MDiv, [facility]  Staff    Pager [phone]  * [facility] remains available 24/7 for emergent requests/referrals, either by having the switchboard page the on-call  or by entering an ASAP/STAT consult in Epic (this will also page the on-call ).*     "

## 2022-02-23 NOTE — PLAN OF CARE
Pt is Aox4 w/ intermittent forgetfulness today. VSS, RA. Skin intact. Brown cares, CVC CHG bath completed. HD catheter was removed at 1000 and patient returned to floor at 1100 and was in bedrest for over and hour, no changes to his dressing site, no complications noted, remained VSS. PIV patent to [facility]. Pt agreed to work w/ PT and was able to roll into sitting position on side of bed, stand w/ TLSO, gait belt, walker and 2A, then walk to the door and back to his chair before stay up for an hour. Adequate UOP today. Minimal PO hydration or food intake, Drs made aware, ensure supplements added, wife asked to bring snack or foods that the patient likes next time she comes. /66 (BP Location: Left arm)   Pulse 66   Temp 99.4  F (37.4  C) (Oral)   Resp 18   Wt 106.5 kg (234 lb 12.6 oz)   SpO2 98%   BMI 30.15 kg/m

## 2022-02-23 NOTE — PROGRESS NOTES
[facility]  Progress Note - [place]       Date of Admission:  2/7/2022    Family Medicine Progress Note - [place] Service       Main Plans for Today   - Scheduled miralax today; last BM 2/21  - Medically ready for discharge since 2/15 - awaiting placement   Has been more consistently participating in therapy since 2/14. Pt is agreeable to rehab.     Assessment & Plan   Fer Latham is a 71 year old male with PMH of MDD, anxiety, recurrent UTI's, BPH, neurogenic bladder w/chronic ferguson, DM2 (diet controlled), HTN, ESRD on dialysis (twice weekly), chronic atrial fibrillation (on eliquis), alcohol abuse and T12 compression fracture admitted for UTI.     # MDD  # anxiety  # chronic insomnia  Per OT cognitive assessment on 2/9: A/Ox3, moderate deficits in safety, memory, and attention. SLUMS score 8/30. Concern for MDD with underlying baseline cognitive deficit. Pt is withdrawn, occasionally refuses medications, and nursing/wound care. Has been more consistently participating in therapy since 2/14. Per psychiatry evaluation, patient's symptoms are consistent with MDD and delirium. Restarted mirtazapine on 2/13.  on 2/18 EKG. Has been more consistently participating in therapy since 2/14. Pt is agreeable to rehab.  - Psych consulted   - PTA mirtazipine 15 mg QHS   - Lexapro 10 mg started 2/17   - Stopped seroquel  - Consider increasing Lexapro after 1-2 weeks  - Hold PTA valium 5mg Q12hr PRN (last filled Oct 2021 per PDMP)  - PTA melatonin    #Severe malnutrition in the context of acute illness  Patient has been ordering only 2 meals daily, and per RN report, only appears to be eating 10% of each meal. Poor PO intake is likely secondary to patient's severe depression.   - Nutrition consulted, appreciate recs   - Calorie counts x 3 days   - Ensure enlive supplement   - Mirtazipine 15 mg at bedtime. Could consider increasing this dose, but  would do so gradually and monitor for oversedation given decreased renal function    # Catheter-associated UTI, resolved  # Candidal cystitis, completed treatment  # Lactic Acidosis, resolved  # Neurogenic bladder w/chronic indwelling ferguson  # BPH with LUTS  Ferguson placed by urology June 2021. Had been on oxybutytnin and trospium but stopped 2/2 cost. Hx candidal cystitis June 2021-teated with fluconazole PO. Ferguson exchanged 2/8 (new size) and functioning well. Recent admission 2/2-2/3, treated with zosyn and ceftriaxone for E.Coli UTI. Discharged with cefdinir. Patient was initially started on ceftriaxone during this hospitalization based on UA results and previous Ucx, but Ucx from this hospitalization only growing Candida, no bacteria which is consistent with candida cystitis vs. colonization. 2/21: patient is complaining of suprapubic pain. Bladder scan demonstrated 16cc of urine, not retaining. With history of repeated UTIs and chronic indwelling ferguson catheter, repeat UA and UCx on 2/21; UA negative, UCx negative  - Completed fluconazole 200mg daily x 14 days (2/9-2/22)  - Daily bmp    # CKD IV  # MATT on CKD  # Hx of ESRD on dialysis, has not required dialysis since admission per nephrology assessment  Dialyzed 2x/week at [facility] on Tues/Thurs. Electrolyte and kidney function stable, continues to make urine. Initial Cystatin C suggested gfr of 23 ml/min, with repeat suggesting gfr of 19 which suggests he may be able to hold on dialysis per Nephrology. Initially elevated K, however now stable since 2/14. Creatinine elevated to 2.96 on 2/17, but improving since lasix was stopped. May have MATT on CKD due to poor PO intake and urine infection. Continue to hold lasix 2/2 MATT and soft BPs.   - Nephrology consulted, appreciate recs:   - Urine output appears to be consistent with PO intake   - Dialysis catheter removed 2/22 AM.    - If creatinine bumps and patient appears dry, consider 500 mL LR   - If potassium  "greater than 5.0, PO lasix 20 mg +/- IVF pending fluid status  - Daily bmp  - Strict I&O, daily weights    # Constipation  Last BM 2/21. Scheduled miralax 2/23.    # Fecal incontinence, resolved  2/13, notified of fecal incontinence. On evaluation, patient admitted to not communicating incontinence. Stated that he had \"just given up\".  No neurological cause for fecal incontinence from exam/neurosurgery evaluation and MRI on 2/16. Incontinence may secondary to severe depression. Neurosurgery consulted, signed off    # Tinea cruris  # Chronic groin/buttocks wounds  Perianal wounds, L buttocks wound along with erythema in groin per ED exam. Complicated by fecal incontinence.  - PTA miconazole powder, nystatin cream   - WOC consulted     # T12 compression fracture  # low back pain  Worsening of known T12 compression fx w/periverterbral edema. No fever/chills to suggest infection. Denies recent falls. MRI lumbar spine 2/8/22 with moderate spinal canal narrowing at T11-T12. Neurosurgery consulted, no neurosurgical intervention planned. Due to recent reported fecal incontinence (noted above), thoracolumbar MRI was obtained on 2/16, which showed no change in T12 compression deformity compared to 2/8 MRI.   - Tylenol TID  - Lidocaine patch for back  - PO dilaudid 1mg nightly PRN   - up with assist and TLSO brace when out of bed   - PT/OT consulted - has been working with PT consistently since 2/14  ____________________________________________     # hx DVT  # hx PE  # atrial fibrillation  Not on rate/rhythm control due to concerns for hypotension in the past. Was in RVR on admission, resolved with pain control. [id number] of 4, therefore, needs anticoagulation, though notably had not been taking Eliquis at home due to cost.   - Eliquis 5mg BID  - Discussed outpatient anticoagulation plan with patient's wife, who declined eliquis due to costs and warfarin due to inconvenience of frequent labs.      # hx alcohol abuse  Drinks " alcohol, unclear when last drink was.   - PTA folic acid & multivitamin  - PO thiamine daily     # DM2: Not on medications, A1C 5.1% on admission.    # Anemia of chronic disease: Baseline hgb ~11. Likely 2/2 alcohol use and CKD.       Diet: Combination Diet Regular Diet Adult  Room Service  Snacks/Supplements Adult: Other; 2pm: cheddar cheese and saltine crackers; Between Meals  Snacks/Supplements Adult: Other; Please offer Ensure Enlive (or other supplements) with meals; With Meals  Snacks/Supplements Adult: Ensure Enlive; Between Meals  Calorie Counts  Snacks/Supplements Adult: Ensure Enlive; With Meals    DVT Prophylaxis: Eliquis, PTA  Brown Catheter: PRESENT, indication: Neurogenic Bladder, Retention  Fluids: PO  Central Lines: None    Cardiac Monitoring: None  Code Status: Full Code      Disposition Plan   Expected Discharge: medically ready, awaiting TCU placement      Anticipated discharge location: TBD - LTC vs TCU     The patient's care was discussed with the Attending Physician, Dr. Harden.    Niharika Boswell MD  [place]  [facility]     Securely message with the Vocera Web Console (learn more here)  Text page via [facility] Paging/Directory   Please see signed in provider for up to date coverage information  ____________________________________________________    Interval History   No events overnight. Noting back pain - possibly 2/2 bed. Declined examination. 10-point ROS otherwise negative.    PM Update per RN: patient has been up to chair with meals, working with PT, had a continent stool, and eating meals.    Data reviewed today: I reviewed all medications, new labs and imaging results over the last 24 hours.     Physical Exam   Vital Signs: Temp: 97  F (36.1  C) Temp src: Axillary BP: 125/74 Pulse: 72   Resp: 18 SpO2: 99 % O2 Device: None (Room air)    Weight: 235 lbs 7.22 oz     Physical Exam  Vitals reviewed.   Constitutional:        General: He is not in acute distress.     Appearance: Normal appearance. He is well-developed. He is not ill-appearing.      Comments: Lying in bed   HENT:      Head: Normocephalic.   Eyes:      Extraocular Movements: Extraocular movements intact.      Conjunctiva/sclera: Conjunctivae normal.   Cardiovascular:      Rate and Rhythm: Normal rate.      Heart sounds: Normal heart sounds.   Pulmonary:      Effort: Pulmonary effort is normal. No respiratory distress.      Breath sounds: Normal breath sounds.   Abdominal:      General: Bowel sounds are normal.   Musculoskeletal:      Right lower leg: No edema.      Left lower leg: No edema.   Neurological:      General: No focal deficit present.      Mental Status: He is alert and oriented to person, place, and time.   Psychiatric:         Attention and Perception: Attention normal.         Mood and Affect: Mood is depressed. Affect is flat.         Behavior: Behavior is withdrawn.         Thought Content: Thought content does not include homicidal or suicidal ideation.

## 2022-02-23 NOTE — PROGRESS NOTES
"Care Management Follow Up    Length of Stay (days): 15    Expected Discharge Date: 02/23/2022     Concerns to be Addressed: TCU placement, discharge planning  Patient plan of care discussed at interdisciplinary rounds: Yes    Anticipated Discharge Disposition: TCU      Anticipated Discharge Services: TBD  Anticipated Discharge DME: TBD    Patient/family educated on Medicare website which has current facility and service quality ratings:    Education Provided on the Discharge Plan:    Patient/Family in Agreement with the Plan: unable to assess    Referrals Placed by CM/SW:      Pending:       - [facility] (Ph: [phone])      Declined/Discontinued:      - [facility] - declined due to no private room availability   - [facility] - declined due to noncompliance with PT/OT   - [facility] - declined due to \"low participation in therapies\", potential for LTC, and secondary payer issues   - [facility] - no appropriate bed at this time   - [facility] - cannot meet his needs  - [facility] - no bed availability at this time   - [facility] - no TCU services provided at this facility    - [facility] - no outside admissions at this time, looking out about 2 weeks   - [facility] - no bed availability due to staffing   - [facility] - declined due to \"payer issues\" and no bed availability   - [facility] - declining due to refusing PT/OT and MD assessments     Private pay costs discussed: Not applicable    Additional Information:    SARMAD received an email from financial counseling that pt's MA is now active as of 6/1/21 and coverage has been added.     SARMAD will continue to follow for discharge planning and TCU placement.     LANETTE Madrid, LGSW   7C    Phone: [phone]  Pager: [phone]      "

## 2022-02-23 NOTE — PROGRESS NOTES
Patient walked w/ PT and RN from his bed into the hallway about 35ft with use of his TLSO brace, gait belt and walker. Pt needs mild assistance to stand from air mattress but none to walk. Pt rested in w/c in hallway before walking back to room to sit in his chair for breakfast. Pt was in recliner for 2hrs. /74 (BP Location: Left arm)   Pulse 72   Temp 97  F (36.1  C) (Axillary)   Resp 18   Wt 106.8 kg (235 lb 7.2 oz)   SpO2 99%   BMI 30.23 kg/m

## 2022-02-23 NOTE — CONSULTS
[facility] Nurse Inpatient Wound Assessment   Reason for consultation: Evaluate and treat  Buttocks and groin wounds    Assessment  L) posterior thigh/scrotum wounds due to Friction and Moisture Associated Skin Damage (MASD)   BL groin- intertrigo    Status: healed and follow up       Treatment Plan  L) posterior thigh/scrotum wounds: Daily  and PRN   Cleanse gently and apply No sting film barrier daily. Make sure skin surfaces are  to allow the barrier to dry completely before allowing skin to return to the normal position. Coat twice for better protection, ensure to dry first layer before applying second layer.     Bl groin- Cleanse with abram cleanse and protect and continue with incontinence protocol    Orders Written  Recommended provider order: None, at this time  WO Nurse follow-up plan:signing off  Nursing to notify the Provider(s) and re-consult the WO Nurse if wound(s) deteriorates or new skin concern.    Patient History  According to provider note(s): Fer Latham is a 71 year old male with PMH recurrent UTI's, BPH, neurogenic bladder w/chronic ferguson, DM2 (diet controlled), HTN, ESRD on dialysis, chronic atrial fibrillation (on eliquis), alcohol abuse and T12 compression fracture who is admitted for UTI.    Objective Data  Containment of urine/stool: Indwelling catheter    Active Diet Order  Orders Placed This Encounter      Combination Diet Regular Diet Adult      Output:   I/O last 3 completed shifts:  In: 515 [P.O.:515]  Out: 1075 [Urine:1075]    Risk Assessment:   Sensory Perception: 3-->slightly limited  Moisture: 4-->rarely moist  Activity: 3-->walks occasionally  Mobility: 3-->slightly limited  Nutrition: 2-->probably inadequate  Friction and Shear: 1-->problem  Gabriel Score: 16                          Labs:   No lab results found in last 7 days.    Invalid input(s): GLUCOMBO    Physical Exam  Areas of skin assessed: focused coccyx, sacrum, BL buttocks and groin    Wound Location: L)  posterior thigh/scrotum/intergluteal cleft        Date of last photo 2/8- pt declined  Wound History: POA. Pt has been refusing cares on and off.    Wound Base: healed    2. BL groin  Wound History: POA    Wound Base:healed    Interventions  Visual inspection and assessment completed   Wound Care Rationale Provide protection   Wound Care: done per plan of care  Supplies: floor stock and discussed with RN  Current off-loading measures: Pillows  Current support surface: Standard  Atmos Air mattress  Education provided to: importance of repositioning, plan of care, wound progress, Moisture management and Off-loading pressure, recommend to discontinue antifungal powder  Discussed plan of care with Patient and Nurse    Camilla Vásquez RN

## 2022-02-24 ENCOUNTER — APPOINTMENT (OUTPATIENT)
Dept: PHYSICAL THERAPY | Facility: CLINIC | Age: 72
DRG: 698 | End: 2022-02-24
Payer: MEDICARE

## 2022-02-24 ENCOUNTER — APPOINTMENT (OUTPATIENT)
Dept: OCCUPATIONAL THERAPY | Facility: CLINIC | Age: 72
DRG: 698 | End: 2022-02-24
Payer: MEDICARE

## 2022-02-24 LAB
ANION GAP SERPL CALCULATED.3IONS-SCNC: 7 MMOL/L (ref 3–14)
BUN SERPL-MCNC: 56 MG/DL (ref 7–30)
CALCIUM SERPL-MCNC: 9.9 MG/DL (ref 8.5–10.1)
CHLORIDE BLD-SCNC: 102 MMOL/L (ref 94–109)
CO2 SERPL-SCNC: 25 MMOL/L (ref 20–32)
CREAT SERPL-MCNC: 2.32 MG/DL (ref 0.66–1.25)
GFR SERPL CREATININE-BSD FRML MDRD: 29 ML/MIN/1.73M2
GLUCOSE BLD-MCNC: 154 MG/DL (ref 70–99)
HOLD SPECIMEN: NORMAL
POTASSIUM BLD-SCNC: 5.1 MMOL/L (ref 3.4–5.3)
SODIUM SERPL-SCNC: 134 MMOL/L (ref 133–144)

## 2022-02-24 PROCEDURE — 250N000013 HC RX MED GY IP 250 OP 250 PS 637: Performed by: STUDENT IN AN ORGANIZED HEALTH CARE EDUCATION/TRAINING PROGRAM

## 2022-02-24 PROCEDURE — 99232 SBSQ HOSP IP/OBS MODERATE 35: CPT | Mod: GC | Performed by: FAMILY MEDICINE

## 2022-02-24 PROCEDURE — 97535 SELF CARE MNGMENT TRAINING: CPT | Mod: GO

## 2022-02-24 PROCEDURE — 36415 COLL VENOUS BLD VENIPUNCTURE: CPT | Performed by: STUDENT IN AN ORGANIZED HEALTH CARE EDUCATION/TRAINING PROGRAM

## 2022-02-24 PROCEDURE — 97530 THERAPEUTIC ACTIVITIES: CPT | Mod: GP

## 2022-02-24 PROCEDURE — 80048 BASIC METABOLIC PNL TOTAL CA: CPT | Performed by: STUDENT IN AN ORGANIZED HEALTH CARE EDUCATION/TRAINING PROGRAM

## 2022-02-24 PROCEDURE — 120N000002 HC R&B MED SURG/OB UMMC

## 2022-02-24 RX ADMIN — THIAMINE HCL TAB 100 MG 100 MG: 100 TAB at 10:06

## 2022-02-24 RX ADMIN — APIXABAN 5 MG: 5 TABLET, FILM COATED ORAL at 10:06

## 2022-02-24 RX ADMIN — ESCITALOPRAM OXALATE 10 MG: 10 TABLET ORAL at 08:32

## 2022-02-24 RX ADMIN — MIRTAZAPINE 15 MG: 15 TABLET, FILM COATED ORAL at 20:00

## 2022-02-24 RX ADMIN — APIXABAN 5 MG: 5 TABLET, FILM COATED ORAL at 19:55

## 2022-02-24 RX ADMIN — NYSTATIN: 100000 CREAM TOPICAL at 08:32

## 2022-02-24 RX ADMIN — FOLIC ACID 1 MG: 1 TABLET ORAL at 08:32

## 2022-02-24 RX ADMIN — THERA TABS 1 TABLET: TAB at 08:32

## 2022-02-24 ASSESSMENT — ACTIVITIES OF DAILY LIVING (ADL)
ADLS_ACUITY_SCORE: 20

## 2022-02-24 NOTE — PROGRESS NOTES
Blood pressure 116/68, pulse 68, temperature 97.5  F (36.4  C), temperature source Oral, resp. rate 18, weight 106.8 kg (235 lb 7.2 oz), SpO2 97 %.      Activity: assist of 2 with mobiity.  Neuros: A & O x3 . Denies numbness or tingling.  Cardiac: HR 68, regular rate and rhythm. /68 stable.  Respiratory: LS clear upper lobe  Diminished lower lobe.  Denies SOB. Unlabored. R 20.  GI/: BS+adequate urine output.  Diet:Regular diet carb account   Skin: . Mepilex intact on coccyx. Redness in perineal area, applied barrier cream.  Lines:R PIV SL  Incisions: None.   Pain/nausea:. Denies nausea and pain   New changes this shift: None.   Plan: Continue POC.

## 2022-02-24 NOTE — PROGRESS NOTES
"Care Management Follow Up    Length of Stay (days): 16    Expected Discharge Date: 02/23/2022     Concerns to be Addressed: discharge planning, TCU placement   Patient plan of care discussed at interdisciplinary rounds: Yes    Anticipated Discharge Disposition: Transitional Care      Anticipated Discharge Services: TBD  Anticipated Discharge DME: TBD    Patient/family educated on Medicare website which has current facility and service quality ratings: yes   Education Provided on the Discharge Plan:    Patient/Family in Agreement with the Plan: unable to assess    Referrals Placed by CM/SW:      Pending:       - [facility] (Ph: [phone]) - voicemail left at 10:52AM.      Declined/Discontinued:      - [facility] - declined due to no private room availability   - [facility] - declined due to noncompliance with PT/OT   - [facility] - declined due to \"low participation in therapies\", potential for LTC, and secondary payer issues   - [facility] - no appropriate bed at this time   - [facility] - cannot meet his needs  - [facility] - no bed availability at this time   - [facility] - no TCU services provided at this facility    - [facility] - no outside admissions at this time, looking out about 2 weeks   - [facility] - no bed availability due to staffing   - [facility] - declined due to \"payer issues\" and no bed availability   - [facility] - declining due to refusing PT/OT and MD assessments     Private pay costs discussed: Not applicable    Additional Information:    SW met with pt in his room. He was sitting up in his chair. SW requested the lists that had been given for TCU choices. Pt reports his wife has been handling this. Pt doesn't know if his wife will be here today. SW will follow up with a phone call if she does not visit. Pt asks SW if " "there is \"any luck?\". SW assured pt that writer is still looking for TCU placement and more referrals will be made.     SW asked pt if he is vaccinated for COVID. Pt reports he is NOT vaccinated. This has caused difficulty with placing pt because he also has not been continuously participating in therapies.    SW will continue to follow for discharge planning and TCU placement.     He could potentially be reassessed by some TCUs he was denied from now that he has MA and is more participatory. See previous notes on pending/declined referrals that have been placed.     LANETTE Madrid, LGSW   7C    Phone: [phone]  Pager: [phone]      "

## 2022-02-24 NOTE — PROGRESS NOTES
Calorie Count  Intake recorded for: 2/23  Total Kcals: 910 Total Protein: 30g  Kcals from Hospital Food: 910  Protein: 30g  Kcals from Outside Food (average):0 Protein: 0g  # Meals Ordered from Kitchen: 3 meals   # Meals Recorded: 2 meals (First - 100% oatmeal w/ brown sugar, 2 strips velazquez, coffee, orange juice, whole milk)      (Second - 100% raisin bran w/ whole milk)  # Supplements Recorded: 0

## 2022-02-24 NOTE — PROGRESS NOTES
[facility]  Progress Note - [place]       Date of Admission:  2/7/2022    Family Medicine Progress Note - [place]       Main Plans for Today   - Medically ready for discharge since 2/15 - awaiting placement   Has been more consistently participating in therapy since 2/14. Pt is agreeable to rehab.     Assessment & Plan   Fer Latham is a 71 year old male with PMH of MDD, anxiety, recurrent UTI's, BPH, neurogenic bladder w/chronic ferguson, DM2 (diet controlled), HTN, ESRD on dialysis (twice weekly), chronic atrial fibrillation (on eliquis), alcohol abuse and T12 compression fracture admitted for UTI.     # MDD  # anxiety  # chronic insomnia  Per OT cognitive assessment on 2/9: A/Ox3, moderate deficits in safety, memory, and attention. SLUMS score 8/30. Concern for MDD with underlying baseline cognitive deficit. Pt is withdrawn, occasionally refuses medications, and nursing/wound care. Has been more consistently participating in therapy since 2/14. Per psychiatry evaluation, patient's symptoms are consistent with MDD and delirium. Restarted mirtazapine on 2/13.  on 2/18 EKG. Has been more consistently participating in therapy since 2/14. Pt is agreeable to rehab.  - Psych consulted   - PTA mirtazipine 15 mg QHS   - Lexapro 10 mg started 2/17   - Stopped seroquel  - Consider increasing Lexapro after 1-2 weeks  - Hold PTA valium 5mg Q12hr PRN (last filled Oct 2021 per PDMP)  - PTA melatonin    #Severe malnutrition in the context of acute illness  Patient has been ordering only 2 meals daily, and per RN report, only appears to be eating 10% of each meal. Poor PO intake is likely secondary to patient's severe depression.   - Nutrition consulted, appreciate recs   - Calorie counts x 3 days   - Ensure enlive supplement   - Mirtazipine 15 mg at bedtime. Could consider increasing this dose, but would do so gradually and monitor for  oversedation given decreased renal function    # Catheter-associated UTI, resolved  # Candidal cystitis, completed treatment  # Lactic Acidosis, resolved  # Neurogenic bladder w/chronic indwelling ferguson  # BPH with LUTS  Ferguson placed by urology June 2021. Had been on oxybutytnin and trospium but stopped 2/2 cost. Hx candidal cystitis June 2021-teated with fluconazole PO. Ferguson exchanged 2/8 (new size) and functioning well. Recent admission 2/2-2/3, treated with zosyn and ceftriaxone for E.Coli UTI. Discharged with cefdinir. Patient was initially started on ceftriaxone during this hospitalization based on UA results and previous Ucx, but Ucx from this hospitalization only growing Candida, no bacteria which is consistent with candida cystitis vs. colonization. 2/21: patient is complaining of suprapubic pain. Bladder scan demonstrated 16cc of urine, not retaining. With history of repeated UTIs and chronic indwelling ferguson catheter, repeat UA and UCx on 2/21; UA negative, UCx negative  - Completed fluconazole 200mg daily x 14 days (2/9-2/22)  - Daily bmp    # CKD IV  # MATT on CKD  # Hx of ESRD on dialysis, has not required dialysis since admission per nephrology assessment  Dialyzed 2x/week at [facility] on Tues/Thurs. Electrolyte and kidney function stable, continues to make urine. Initial Cystatin C suggested gfr of 23 ml/min, with repeat suggesting gfr of 19 which suggests he may be able to hold on dialysis per Nephrology. Initially elevated K, however now stable since 2/14. Creatinine elevated to 2.96 on 2/17, but improving since lasix was stopped. May have MATT on CKD due to poor PO intake and urine infection. Continue to hold lasix 2/2 MATT and soft BPs.   - Nephrology consulted, appreciate recs:   - Urine output appears to be consistent with PO intake   - Dialysis catheter removed 2/22 AM.    - If creatinine bumps and patient appears dry, consider 500 mL LR   - If potassium greater than 5.0, PO lasix 20 mg +/- IVF  "pending fluid status  - Daily bmp  - Strict I&O, daily weights    # Fecal incontinence, resolved  2/13, notified of fecal incontinence. On evaluation, patient admitted to not communicating incontinence. Stated that he had \"just given up\".  No neurological cause for fecal incontinence from exam/neurosurgery evaluation and MRI on 2/16. Incontinence may secondary to severe depression. Neurosurgery consulted, signed off    # Tinea cruris  # Chronic groin/buttocks wounds  Perianal wounds, L buttocks wound along with erythema in groin per ED exam. Complicated by fecal incontinence.  - PTA miconazole powder, nystatin cream   - WOC consulted     # T12 compression fracture  # low back pain  Worsening of known T12 compression fx w/periverterbral edema. No fever/chills to suggest infection. Denies recent falls. MRI lumbar spine 2/8/22 with moderate spinal canal narrowing at T11-T12. Neurosurgery consulted, no neurosurgical intervention planned. Due to recent reported fecal incontinence (noted above), thoracolumbar MRI was obtained on 2/16, which showed no change in T12 compression deformity compared to 2/8 MRI.   - Tylenol TID  - Lidocaine patch for back  - PO dilaudid 1mg nightly PRN   - up with assist and TLSO brace when out of bed   - PT/OT consulted - has been working with PT consistently since 2/14  ____________________________________________     # hx DVT  # hx PE  # atrial fibrillation  Not on rate/rhythm control due to concerns for hypotension in the past. Was in RVR on admission, resolved with pain control. [id number] of 4, therefore, needs anticoagulation, though notably had not been taking Eliquis at home due to cost.   - Eliquis 5mg BID  - Discussed outpatient anticoagulation plan with patient's wife, who declined eliquis due to costs and warfarin due to inconvenience of frequent labs.      # hx alcohol abuse  Drinks alcohol, unclear when last drink was.   - PTA folic acid & multivitamin  - PO thiamine daily     # " DM2: Not on medications, A1C 5.1% on admission.    # Anemia of chronic disease: Baseline hgb ~11. Likely 2/2 alcohol use and CKD.       Diet: Combination Diet Regular Diet Adult  Room Service  Snacks/Supplements Adult: Other; 2pm: cheddar cheese and saltine crackers; Between Meals  Snacks/Supplements Adult: Other; Please offer Ensure Enlive (or other supplements) with meals; With Meals  Snacks/Supplements Adult: Ensure Enlive; Between Meals  Calorie Counts  Snacks/Supplements Adult: Ensure Enlive; With Meals    DVT Prophylaxis: Eliquis, PTA  Brown Catheter: PRESENT, indication: Neurogenic Bladder, Retention  Fluids: PO  Central Lines: None    Cardiac Monitoring: None  Code Status: Full Code      Disposition Plan   Expected Discharge: medically ready, awaiting TCU placement      Anticipated discharge location: TBD - LTC vs TCU     The patient's care was discussed with the Attending Physician, Dr. Schuster.    Niharika Boswell MD  [facility]  [facility]     Securely message with the Vocera Web Console (learn more here)  Text page via [employer] Paging/Directory   Please see signed in provider for up to date coverage information  ____________________________________________________    Interval History   No events overnight. Patient feels like he had a good day yesterday - hopeful today will be a good day too. 10-point ROS otherwise negative.    Data reviewed today: I reviewed all medications, new labs and imaging results over the last 24 hours.     Physical Exam   Vital Signs: Temp: 98.1  F (36.7  C) Temp src: Oral BP: 119/63 Pulse: 80   Resp: 18 SpO2: 97 % O2 Device: None (Room air)    Weight: 235 lbs 7.22 oz     Physical Exam  Vitals reviewed.   Constitutional:       General: He is not in acute distress.     Appearance: Normal appearance. He is well-developed. He is not ill-appearing.      Comments: Lying in bed   HENT:      Head: Normocephalic.   Cardiovascular:       Rate and Rhythm: Normal rate.   Pulmonary:      Effort: Pulmonary effort is normal. No respiratory distress.   Musculoskeletal:      Right lower leg: No edema.      Left lower leg: No edema.   Neurological:      General: No focal deficit present.      Mental Status: He is alert.   Psychiatric:         Attention and Perception: Attention normal.         Mood and Affect: Mood is depressed (improving).         Behavior: Behavior is cooperative.         Thought Content: Thought content does not include homicidal or suicidal ideation.

## 2022-02-24 NOTE — PROGRESS NOTES
SPIRITUAL HEALTH SERVICES  [facility] ([place]  REFERRAL SOURCE: Follow-up     Pt's spouse was present and pt deferred to spouse if she would like to visit with . Spouse stated she was visiting with pt. Pt affirmed he was open to a check-in visit on Friday.     PLAN: Will follow-up with pt on Friday.     Rev. Ailin Malin MDiv, [facility]  Staff    Pager [phone]  * [facility] remains available 24/7 for emergent requests/referrals, either by having the switchboard page the on-call  or by entering an ASAP/STAT consult in Epic (this will also page the on-call ).*

## 2022-02-24 NOTE — PROGRESS NOTES
Assumed care of pt from [id number]. VSS on RA. Denied pain and nausea. Tolerating regular diet. Brown patent w good output. Up w assist of 2 and walker. Calorie count.  On low pulsate mattress, refused repositioning. PIV saline locked. Transferred to .

## 2022-02-24 NOTE — PLAN OF CARE
Time: 1500 - 1930    Goal Outcome Evaluation:    Plan of Care Reviewed With: patient, spouse     Overall Patient Progress: no change    Outcome Evaluation: Pt transfered to  from  at 1500. VSS, denies pain, Brown with clear pink/red output. Awaiting TCU placement, SW following. Follow POC.    Reason for admit: Urinary tract infection.  Vitals: VSS on RA, afebrile.   Activity: Assist of 2 with walker and GB.   Neuro: A&Ox4, forgetful.   Mood/Behavior: Calm, cooperative.  Lines/Drains: R PIV WDL, saline locked.  Cardiac: WDL.   Respiratory: WDL.   Diet: Regular diet, tolerating well. Calorie counts.   GI/: Brown catheter in place with clear pink/red output. No BM this shift. Denies nausea.   Skin: Scrotal redness. Pt declined 4 eyes assessment.   Pain: Denies.

## 2022-02-25 LAB
ANION GAP SERPL CALCULATED.3IONS-SCNC: 6 MMOL/L (ref 3–14)
BUN SERPL-MCNC: 56 MG/DL (ref 7–30)
CALCIUM SERPL-MCNC: 9.4 MG/DL (ref 8.5–10.1)
CHLORIDE BLD-SCNC: 102 MMOL/L (ref 94–109)
CO2 SERPL-SCNC: 24 MMOL/L (ref 20–32)
CREAT SERPL-MCNC: 2.11 MG/DL (ref 0.66–1.25)
ERYTHROCYTE [DISTWIDTH] IN BLOOD BY AUTOMATED COUNT: 13.2 % (ref 10–15)
FERRITIN SERPL-MCNC: 162 NG/ML (ref 26–388)
GFR SERPL CREATININE-BSD FRML MDRD: 33 ML/MIN/1.73M2
GLUCOSE BLD-MCNC: 108 MG/DL (ref 70–99)
HCT VFR BLD AUTO: 35.9 % (ref 40–53)
HGB BLD-MCNC: 11.7 G/DL (ref 13.3–17.7)
HOLD SPECIMEN: NORMAL
IRON SATN MFR SERPL: 28 % (ref 15–46)
IRON SERPL-MCNC: 85 UG/DL (ref 35–180)
MCH RBC QN AUTO: 31 PG (ref 26.5–33)
MCHC RBC AUTO-ENTMCNC: 32.6 G/DL (ref 31.5–36.5)
MCV RBC AUTO: 95 FL (ref 78–100)
PLATELET # BLD AUTO: 246 10E3/UL (ref 150–450)
POTASSIUM BLD-SCNC: 5.4 MMOL/L (ref 3.4–5.3)
RBC # BLD AUTO: 3.77 10E6/UL (ref 4.4–5.9)
SODIUM SERPL-SCNC: 132 MMOL/L (ref 133–144)
TIBC SERPL-MCNC: 309 UG/DL (ref 240–430)
WBC # BLD AUTO: 5.8 10E3/UL (ref 4–11)

## 2022-02-25 PROCEDURE — 36415 COLL VENOUS BLD VENIPUNCTURE: CPT | Performed by: STUDENT IN AN ORGANIZED HEALTH CARE EDUCATION/TRAINING PROGRAM

## 2022-02-25 PROCEDURE — 82728 ASSAY OF FERRITIN: CPT | Performed by: STUDENT IN AN ORGANIZED HEALTH CARE EDUCATION/TRAINING PROGRAM

## 2022-02-25 PROCEDURE — 250N000013 HC RX MED GY IP 250 OP 250 PS 637: Performed by: STUDENT IN AN ORGANIZED HEALTH CARE EDUCATION/TRAINING PROGRAM

## 2022-02-25 PROCEDURE — 99232 SBSQ HOSP IP/OBS MODERATE 35: CPT | Mod: GC | Performed by: FAMILY MEDICINE

## 2022-02-25 PROCEDURE — 85014 HEMATOCRIT: CPT | Performed by: STUDENT IN AN ORGANIZED HEALTH CARE EDUCATION/TRAINING PROGRAM

## 2022-02-25 PROCEDURE — 80048 BASIC METABOLIC PNL TOTAL CA: CPT | Performed by: STUDENT IN AN ORGANIZED HEALTH CARE EDUCATION/TRAINING PROGRAM

## 2022-02-25 PROCEDURE — 120N000002 HC R&B MED SURG/OB UMMC

## 2022-02-25 PROCEDURE — 83550 IRON BINDING TEST: CPT | Performed by: STUDENT IN AN ORGANIZED HEALTH CARE EDUCATION/TRAINING PROGRAM

## 2022-02-25 PROCEDURE — 258N000003 HC RX IP 258 OP 636: Performed by: STUDENT IN AN ORGANIZED HEALTH CARE EDUCATION/TRAINING PROGRAM

## 2022-02-25 RX ORDER — FUROSEMIDE 20 MG
20 TABLET ORAL ONCE
Status: COMPLETED | OUTPATIENT
Start: 2022-02-25 | End: 2022-02-25

## 2022-02-25 RX ADMIN — APIXABAN 5 MG: 5 TABLET, FILM COATED ORAL at 20:34

## 2022-02-25 RX ADMIN — ESCITALOPRAM OXALATE 10 MG: 10 TABLET ORAL at 09:10

## 2022-02-25 RX ADMIN — SODIUM CHLORIDE, POTASSIUM CHLORIDE, SODIUM LACTATE AND CALCIUM CHLORIDE 500 ML: 600; 310; 30; 20 INJECTION, SOLUTION INTRAVENOUS at 15:23

## 2022-02-25 RX ADMIN — MIRTAZAPINE 15 MG: 15 TABLET, FILM COATED ORAL at 20:34

## 2022-02-25 RX ADMIN — SODIUM CHLORIDE, POTASSIUM CHLORIDE, SODIUM LACTATE AND CALCIUM CHLORIDE 500 ML: 600; 310; 30; 20 INJECTION, SOLUTION INTRAVENOUS at 16:03

## 2022-02-25 RX ADMIN — FUROSEMIDE 20 MG: 20 TABLET ORAL at 14:28

## 2022-02-25 RX ADMIN — FOLIC ACID 1 MG: 1 TABLET ORAL at 09:10

## 2022-02-25 RX ADMIN — THIAMINE HCL TAB 100 MG 100 MG: 100 TAB at 10:53

## 2022-02-25 RX ADMIN — NYSTATIN: 100000 CREAM TOPICAL at 09:09

## 2022-02-25 RX ADMIN — THERA TABS 1 TABLET: TAB at 09:10

## 2022-02-25 RX ADMIN — APIXABAN 5 MG: 5 TABLET, FILM COATED ORAL at 10:53

## 2022-02-25 RX ADMIN — LIDOCAINE 1 PATCH: 246 PATCH TOPICAL at 09:08

## 2022-02-25 ASSESSMENT — ACTIVITIES OF DAILY LIVING (ADL)
ADLS_ACUITY_SCORE: 20

## 2022-02-25 NOTE — PROGRESS NOTES
[facility]  Progress Note - [place]       Date of Admission:  2/7/2022    Family Medicine Progress Note - [place]       Main Plans for Today   - Medically ready for discharge since 2/15 - awaiting placement   Has been consistently participating in therapy since 2/14. Working on nutrition. Reports eating yesterday (2/24). Pt is agreeable to rehab  - IV  bolus  - Lasix 20 PO  - Daily standing weights    Assessment & Plan   Fer Latham is a 71 year old male with PMH of MDD, anxiety, recurrent UTI's, BPH, neurogenic bladder w/chronic ferguson, DM2 (diet controlled), HTN, ESRD on dialysis (twice weekly), chronic atrial fibrillation (on eliquis), alcohol abuse and T12 compression fracture admitted for UTI.     # MDD  # anxiety  # chronic insomnia  Per OT cognitive assessment on 2/9: A/Ox3, moderate deficits in safety, memory, and attention. SLUMS score 8/30. Concern for MDD with underlying baseline cognitive deficit. Pt is withdrawn, occasionally refuses medications, and nursing/wound care. Has been more consistently participating in therapy since 2/14. Per psychiatry evaluation, patient's symptoms are consistent with MDD and delirium. Restarted mirtazapine on 2/13.  on 2/18 EKG. Has been more consistently participating in therapy since 2/14. Pt is agreeable to rehab.  - Psych consulted, appreciate recs   - PTA mirtazipine 15 mg QHS   - Lexapro 10 mg started 2/17   - Stopped seroquel  - Hold PTA valium 5mg Q12hr PRN (last filled Oct 2021 per PDMP)  - PTA melatonin    #Severe malnutrition in the context of acute illness  Patient has been ordering only 2 meals daily, and per RN report, only appears to be eating 10% of each meal. Poor PO intake is likely secondary to patient's severe depression.   - Nutrition consulted, appreciate recs   - Calorie counts x 3 days   - Ensure enlive supplement   - Mirtazipine 15 mg at bedtime. Could  consider increasing this dose, but would do so gradually and monitor for oversedation given decreased renal function    # Catheter-associated UTI, resolved  # Candidal cystitis, completed treatment  # Lactic Acidosis, resolved  # Neurogenic bladder w/chronic indwelling ferguson  # BPH with LUTS  Ferguson placed by urology June 2021. Had been on oxybutytnin and trospium but stopped 2/2 cost. Hx candidal cystitis June 2021-teated with fluconazole PO. Ferguson exchanged 2/8 (new size) and functioning well. Recent admission 2/2-2/3, treated with zosyn and ceftriaxone for E.Coli UTI. Discharged with cefdinir. Patient was initially started on ceftriaxone during this hospitalization based on UA results and previous Ucx, but Ucx from this hospitalization only growing Candida, no bacteria which is consistent with candida cystitis vs. colonization. 2/21: patient is complaining of suprapubic pain. Bladder scan demonstrated 16cc of urine, not retaining. With history of repeated UTIs and chronic indwelling ferguson catheter, repeat UA and UCx on 2/21; UA negative, UCx negative  - Completed fluconazole 200mg daily x 14 days (2/9-2/22)  - Daily bmp    # CKD IV  # MATT on CKD  # Hx of ESRD on dialysis, has not required dialysis since admission per nephrology assessment  Dialyzed 2x/week at [facility] on Tues/Thurs. Electrolyte and kidney function stable, continues to make urine. Initial Cystatin C suggested gfr of 23 ml/min, with repeat suggesting gfr of 19 which suggests he may be able to hold on dialysis per Nephrology. Initially elevated K, however now stable since 2/14. Creatinine elevated to 2.96 on 2/17, but improving since lasix was stopped. May have MATT on CKD due to poor PO intake and urine infection. Continue to hold lasix 2/2 MATT and soft BPs.   - Nephrology consulted, appreciate recs:   - Can give low dose PO lasix to increase excretion +/- some IVF    - If he does not take in enough solutes, may need some IVF supplementation   -  "Dialysis catheter removed 2/22 AM.    - If creatinine bumps and patient appears dry, consider 500 mL LR   - If potassium greater than 5.0, PO lasix 20 mg +/- IVF pending fluid status  - Daily bmp  - Strict I&O, daily weights    # Fecal incontinence, resolved  2/13, notified of fecal incontinence. On evaluation, patient admitted to not communicating incontinence. Stated that he had \"just given up\".  No neurological cause for fecal incontinence from exam/neurosurgery evaluation and MRI on 2/16. Incontinence may secondary to severe depression. Neurosurgery consulted, signed off    # Tinea cruris  # Chronic groin/buttocks wounds  Perianal wounds, L buttocks wound along with erythema in groin per ED exam. Complicated by fecal incontinence.  - PTA miconazole powder, nystatin cream   - WOC consulted     # T12 compression fracture  # low back pain  Worsening of known T12 compression fx w/periverterbral edema. No fever/chills to suggest infection. Denies recent falls. MRI lumbar spine 2/8/22 with moderate spinal canal narrowing at T11-T12. Neurosurgery consulted, no neurosurgical intervention planned. Due to recent reported fecal incontinence (noted above), thoracolumbar MRI was obtained on 2/16, which showed no change in T12 compression deformity compared to 2/8 MRI.   - Tylenol TID  - Lidocaine patch for back  - PO dilaudid 1mg nightly PRN   - up with assist and TLSO brace when out of bed   - PT/OT consulted - has been working with PT consistently since 2/14  ____________________________________________     # hx DVT  # hx PE  # atrial fibrillation  Not on rate/rhythm control due to concerns for hypotension in the past. Was in RVR on admission, resolved with pain control. [id number] of 4, therefore, needs anticoagulation, though notably had not been taking Eliquis at home due to cost.   - Eliquis 5mg BID  - Discussed outpatient anticoagulation plan with patient's wife, who declined eliquis due to costs and warfarin due to " inconvenience of frequent labs.      # hx alcohol abuse  Drinks alcohol, unclear when last drink was.   - PTA folic acid & multivitamin  - PO thiamine daily     # DM2: Not on medications, A1C 5.1% on admission.    # Anemia of chronic disease: Baseline hgb ~11. Likely 2/2 alcohol use and CKD.       Diet: Combination Diet Regular Diet Adult  Room Service  Snacks/Supplements Adult: Other; 2pm: cheddar cheese and saltine crackers; Between Meals  Snacks/Supplements Adult: Other; Please offer Ensure Enlive (or other supplements) with meals; With Meals  Snacks/Supplements Adult: Ensure Enlive; Between Meals  Calorie Counts  Snacks/Supplements Adult: Ensure Enlive; With Meals    DVT Prophylaxis: Eliquis, PTA  Ferguson Catheter: PRESENT, indication: Neurogenic Bladder, Retention  Fluids: PO  Central Lines: None    Cardiac Monitoring: None  Code Status: Full Code      Disposition Plan   Expected Discharge: medically ready, awaiting TCU placement      Anticipated discharge location: TBD - LTC vs TCU     The patient's care was discussed with the Attending Physician, Dr. Harden.    Niharika Boswell MD  [facility]  [facility]     Securely message with the Vocera Web Console (learn more here)  Text page via [facility] Paging/Directory   Please see signed in provider for up to date coverage information  ____________________________________________________    Interval History   No events overnight. Patient feels like he had a good day yesterday - states he ate yesterday. Did not empty his ferguson that was not recorded. Wondering what he is doing here - expands on feeling some frustration at still being in the hospital instead at a TCU working towards going home. We discussed that him working with rehab here like he has been doing is helping lay foundation. Continues to share he will work with rehab as well as work on his nutrition.    10-point ROS otherwise negative.    Data  reviewed today: I reviewed all medications, new labs and imaging results over the last 24 hours.     Physical Exam   Vital Signs: Temp: 97.6  F (36.4  C) Temp src: Oral BP: 133/67 Pulse: 71   Resp: 19 SpO2: 98 % O2 Device: None (Room air)    Weight: 219 lbs 9.6 oz     Physical Exam  Vitals reviewed.   Constitutional:       General: He is not in acute distress.     Appearance: Normal appearance. He is well-developed. He is not ill-appearing.      Comments: Lying in bed   HENT:      Head: Normocephalic.   Eyes:      Conjunctiva/sclera: Conjunctivae normal.   Cardiovascular:      Rate and Rhythm: Normal rate and regular rhythm.      Heart sounds: Normal heart sounds. No murmur heard.  Pulmonary:      Effort: Pulmonary effort is normal. No respiratory distress.      Breath sounds: Normal breath sounds. No wheezing.   Abdominal:      General: Bowel sounds are normal. There is no distension.      Tenderness: There is no abdominal tenderness.   Musculoskeletal:      Right lower leg: No edema.      Left lower leg: No edema.   Skin:     General: Skin is warm and dry.   Neurological:      General: No focal deficit present.      Mental Status: He is alert.   Psychiatric:         Attention and Perception: Attention normal.         Mood and Affect: Mood is anxious (consistent) and depressed (improving).         Behavior: Behavior is cooperative.         Thought Content: Thought content does not include homicidal or suicidal ideation.

## 2022-02-25 NOTE — PLAN OF CARE
Goal Outcome Evaluation:    Plan of Care Reviewed With: patient    Overall Patient Progress: no change    Outcome Evaluation: VSS AxOx4, Medically ready for D/C, awaiting placement.

## 2022-02-25 NOTE — PROGRESS NOTES
Brief Nephrology Note      Mr Yeison's Cr remains stable/slightly improved at 2.1, K is mildly up at 5.4.  Can give low dose PO lasix to increase excretion +/- some IVF to avoid hypovolemia.  Intake appears low based on nutrition documentation, if he does not take in enough solutes he may need some IVF supplementation.        Naveed Bean, APRN CNS  Clinical Nurse Specialist  [phone]

## 2022-02-25 NOTE — PROGRESS NOTES
Calorie Count  Intake recorded for: 2/24  Total Kcals: 0 Total Protein: 0g  Kcals from Hospital Food: 0   Protein: 0g  Kcals from Outside Food (average):0 Protein: 0g  # Meals Ordered from Kitchen: 2 meals  # Meals Recorded: No food intake recorded  # Supplements Recorded: No food intake recorded

## 2022-02-26 LAB
ANION GAP SERPL CALCULATED.3IONS-SCNC: 6 MMOL/L (ref 3–14)
BUN SERPL-MCNC: 56 MG/DL (ref 7–30)
CALCIUM SERPL-MCNC: 9.4 MG/DL (ref 8.5–10.1)
CHLORIDE BLD-SCNC: 104 MMOL/L (ref 94–109)
CO2 SERPL-SCNC: 25 MMOL/L (ref 20–32)
CREAT SERPL-MCNC: 2.08 MG/DL (ref 0.66–1.25)
GFR SERPL CREATININE-BSD FRML MDRD: 34 ML/MIN/1.73M2
GLUCOSE BLD-MCNC: 107 MG/DL (ref 70–99)
HOLD SPECIMEN: NORMAL
POTASSIUM BLD-SCNC: 5.2 MMOL/L (ref 3.4–5.3)
SODIUM SERPL-SCNC: 135 MMOL/L (ref 133–144)

## 2022-02-26 PROCEDURE — 120N000002 HC R&B MED SURG/OB UMMC

## 2022-02-26 PROCEDURE — 250N000013 HC RX MED GY IP 250 OP 250 PS 637: Performed by: STUDENT IN AN ORGANIZED HEALTH CARE EDUCATION/TRAINING PROGRAM

## 2022-02-26 PROCEDURE — 36415 COLL VENOUS BLD VENIPUNCTURE: CPT | Performed by: STUDENT IN AN ORGANIZED HEALTH CARE EDUCATION/TRAINING PROGRAM

## 2022-02-26 PROCEDURE — 99232 SBSQ HOSP IP/OBS MODERATE 35: CPT | Mod: GC | Performed by: FAMILY MEDICINE

## 2022-02-26 PROCEDURE — 80048 BASIC METABOLIC PNL TOTAL CA: CPT | Performed by: STUDENT IN AN ORGANIZED HEALTH CARE EDUCATION/TRAINING PROGRAM

## 2022-02-26 RX ADMIN — MIRTAZAPINE 15 MG: 15 TABLET, FILM COATED ORAL at 20:37

## 2022-02-26 RX ADMIN — FOLIC ACID 1 MG: 1 TABLET ORAL at 09:12

## 2022-02-26 RX ADMIN — THERA TABS 1 TABLET: TAB at 09:12

## 2022-02-26 RX ADMIN — THIAMINE HCL TAB 100 MG 100 MG: 100 TAB at 10:23

## 2022-02-26 RX ADMIN — NYSTATIN: 100000 CREAM TOPICAL at 09:19

## 2022-02-26 RX ADMIN — APIXABAN 5 MG: 5 TABLET, FILM COATED ORAL at 20:37

## 2022-02-26 RX ADMIN — APIXABAN 5 MG: 5 TABLET, FILM COATED ORAL at 09:14

## 2022-02-26 RX ADMIN — ESCITALOPRAM OXALATE 10 MG: 10 TABLET ORAL at 09:12

## 2022-02-26 ASSESSMENT — ACTIVITIES OF DAILY LIVING (ADL)
ADLS_ACUITY_SCORE: 20

## 2022-02-26 NOTE — PLAN OF CARE
Goal Outcome Evaluation:    Plan of Care Reviewed With: patient     Overall Patient Progress: no change    Outcome Evaluation: awaiting placement    BP (!) 141/78 (BP Location: Right arm)   Pulse 78   Temp 97.6  F (36.4  C) (Oral)   Resp 18   Wt 99.6 kg (219 lb 9.6 oz)   SpO2 98%   BMI 28.19 kg/m      No acute changes overnight. Brown in place w/ good output. Stable on RA, denies SOB. Denies pain. Continuing to follow POC and encourage PO intake.

## 2022-02-26 NOTE — PROGRESS NOTES
Calorie Count  Intake recorded for: 2/25  Total Kcals: 982 Total Protein: 37g  Kcals from Hospital Food: 982   Protein: 37g  Kcals from Outside Food (average):0 Protein: 0g  # Meals Ordered from Kitchen: 2 meals  # Meals Recorded: 2 meals (first - 100% bhupinder food cake, 75% roast beef sandwich w/ cheese)      (second - 100% pollock, 8oz orange juice, mac & cheese, jello, 50% wheat toast)  # Supplements Recorded: 0

## 2022-02-26 NOTE — PROGRESS NOTES
"Care Management Follow Up    Length of Stay (days): 18    Expected Discharge Date:       Concerns to be Addressed: discharge planning, TCU placement  Patient plan of care discussed at interdisciplinary rounds: No    Anticipated Discharge Disposition: Transitional Care   Anticipated Discharge Services: Other (see comment) (TBD)  Anticipated Discharge DME:  TBD    Patient/family educated on Medicare website which has current facility and service quality ratings:  yes  Education Provided on the Discharge Plan:    Patient/Family in Agreement with the Plan: unable to assess    Referrals Placed by CM/SW:    - [facility] (Ph: [phone]) -  left VM @ 923 with admissions.    -2/26/22: [facility] global referral (ph [phone], fax [phone])- resent referral  now that patient has been participating in therapies and has active MA.     Declined/Discontinued:      - [facility] - declined due to no private room availability   - [facility] - declined due to noncompliance with PT/OT   - [facility] - declined due to \"low participation in therapies\", potential for LTC, and secondary payer issues   - [facility] - no appropriate bed at this time   - [facility] - cannot meet his needs  - [facility] - no bed availability at this time   - [facility] - no TCU services provided at this facility    - [facility] - no outside admissions at this time, looking out about 2 weeks   - [facility] - no bed availability due to staffing   - [facility] - declined due to \"payer issues\" and no bed availability   - [facility] - declining due to refusing PT/OT and MD assessments     Private pay costs discussed: Not applicable    Additional Information:  Per chart review and notes, patient has been participating in therapies more and has active MA. Referrals resent to " [facility] liaison for global referral. SW will continue to follow and get additional recs if needed/resend more referrals.    LANETTE Guan, [facility]  Adult Casual Weekend   Pager: [phone]  [email or web address]

## 2022-02-26 NOTE — PROGRESS NOTES
[facility]  Progress Note - [place]       Date of Admission:  2/7/2022    Family Medicine Progress Note - [place]       Main Plans for Today   - Medically ready for discharge since 2/15 - awaiting placement   Consistently participating in therapy since 2/14. Working on nutrition. Agreeable to rehab  - Daily standing weights  - Switch from ensure to magic cup (to encourage intake)    Assessment & Plan   Fer Latham is a 71 year old male with PMH of MDD, anxiety, recurrent UTI's, BPH, neurogenic bladder w/chronic ferguson, DM2 (diet controlled), HTN, ESRD on dialysis (twice weekly), chronic atrial fibrillation (on eliquis), alcohol abuse and T12 compression fracture admitted for UTI.     # MDD  # anxiety  # chronic insomnia  Per OT cognitive assessment on 2/9: A/Ox3, moderate deficits in safety, memory, and attention. SLUMS score 8/30. Concern for MDD with underlying baseline cognitive deficit. Pt has been consistently participating in therapy since 2/14. Per psychiatry evaluation, patient's symptoms are consistent with MDD and delirium. Restarted mirtazapine on 2/13.  on 2/18 EKG. Pt is agreeable to rehab.  - Psych consulted, appreciate recs   - PTA mirtazipine 15 mg QHS   - Lexapro 10 mg started 2/17   - Stopped seroquel  - Hold PTA valium 5mg Q12hr PRN (last filled Oct 2021 per PDMP)  - PTA melatonin    #Severe malnutrition in the context of acute illness  Patient has been ordering only 2 meals daily, and per RN report, has now been eating 100% of meals.    - Nutrition consulted, appreciate recs   - Calorie counts x 3 days   - Ensure enlive supplement   - Mirtazipine 15 mg at bedtime. Could consider increasing this dose, but would do so gradually and monitor for oversedation given decreased renal function    # CKD IV  # MATT on CKD  # Hx of ESRD on dialysis, has not required dialysis since admission per nephrology  assessment  Dialyzed 2x/week at [facility] on Tues/Thurs. Electrolyte and kidney function stable, continues to make urine. Initial Cystatin C suggested gfr of 23 ml/min, with repeat suggesting gfr of 19 which suggests he may be able to hold on dialysis per Nephrology. Initially elevated K, however now stable since 2/14. Creatinine elevated to 2.96 on 2/17, but improving since lasix was stopped. May have MATT on CKD due to poor PO intake and urine infection. Continue to hold lasix 2/2 MATT and soft BPs.   - Nephrology consulted, appreciate recs:   - Dialysis catheter removed 2/22 AM.    - If creatinine bumps and patient appears dry, consider 500 mL LR   - If potassium greater than 5.0, PO lasix 20 mg +/- IVF pending fluid status  - Daily bmp  - Strict I&O, daily weights    # Catheter-associated UTI, resolved  # Candidal cystitis, completed treatment  # Lactic Acidosis, resolved  # Neurogenic bladder w/chronic indwelling ferguson  # BPH with LUTS  Ferguson placed by urology June 2021. Had been on oxybutytnin and trospium but stopped 2/2 cost. Hx candidal cystitis June 2021-teated with fluconazole PO. Ferguson exchanged 2/8 (new size) and functioning well. Recent admission 2/2-2/3, treated with zosyn and ceftriaxone for E.Coli UTI. Discharged with cefdinir. Patient was initially started on ceftriaxone during this hospitalization based on UA results and previous Ucx, but Ucx from this hospitalization only growing Candida, no bacteria which is consistent with candida cystitis vs. colonization. Completed fluconazole 200 mg daily x 14 days (2/9-2/22). 2/21: patient is complaining of suprapubic pain. Bladder scan demonstrated 16cc of urine, not retaining. With history of repeated UTIs and chronic indwelling ferguson catheter, repeat UA and UCx on 2/21 both negative.  - Completed fluconazole 200mg daily x 14 days (2/9-2/22)    # Fecal incontinence, resolved  2/13, notified of fecal incontinence. On evaluation, patient admitted to not  "communicating incontinence. Stated that he had \"just given up\".  No neurological cause for fecal incontinence from exam/neurosurgery evaluation and MRI on 2/16. Incontinence may secondary to severe depression. Neurosurgery consulted, signed off    # Tinea cruris  # Chronic groin/buttocks wounds  Perianal wounds, L buttocks wound along with erythema in groin per ED exam. Complicated by fecal incontinence.  - PTA miconazole powder, nystatin cream   - WOC consulted     # T12 compression fracture  # low back pain  Worsening of known T12 compression fx w/periverterbral edema. No fever/chills to suggest infection. Denies recent falls. MRI lumbar spine 2/8/22 with moderate spinal canal narrowing at T11-T12. Neurosurgery consulted, no neurosurgical intervention planned. Due to recent reported fecal incontinence (noted above), thoracolumbar MRI was obtained on 2/16, which showed no change in T12 compression deformity compared to 2/8 MRI.   - Tylenol TID  - Lidocaine patch for back  - PO dilaudid 1mg nightly PRN   - up with assist and TLSO brace when out of bed   - PT/OT consulted - has been working with PT consistently since 2/14  ____________________________________________     # hx DVT  # hx PE  # atrial fibrillation  Not on rate/rhythm control due to concerns for hypotension in the past. Was in RVR on admission, resolved with pain control. [id number] of 4, therefore, needs anticoagulation, though notably had not been taking Eliquis at home due to cost.   - Eliquis 5mg BID  - Discussed outpatient anticoagulation plan with patient's wife, who declined eliquis due to costs and warfarin due to inconvenience of frequent labs.      # hx alcohol abuse  Drinks alcohol, unclear when last drink was.   - PTA folic acid & multivitamin  - PO thiamine daily     # DM2: Not on medications, A1C 5.1% on admission.    # Anemia of chronic disease: Baseline hgb ~11. Likely 2/2 alcohol use and CKD.       Diet: Combination Diet Regular Diet " Adult  Room Service  Snacks/Supplements Adult: Other; 2pm: cheddar cheese and saltine crackers; Between Meals  Snacks/Supplements Adult: Magic Cup; Between Meals  Snacks/Supplements Adult: Magic Cup; With Meals    DVT Prophylaxis: Eliquis, PTA  Brown Catheter: PRESENT, indication: Neurogenic Bladder, Neurogenic Bladder  Fluids: PO  Central Lines: None    Cardiac Monitoring: None  Code Status: Full Code      Disposition Plan   Expected Discharge: medically ready, awaiting TCU placement      Anticipated discharge location: TBD - LTC vs TCU     The patient's care was discussed with the Attending Physician, Dr. Harden.    Niharika Boswell MD  [facility]  [facility]     Securely message with the Vocera Web Console (learn more here)  Text page via [employer] Paging/Directory   Please see signed in provider for up to date coverage information  ____________________________________________________    Interval History   No events overnight. Patient feels like he had a good day yesterday. Ate both of his meals yesterday. Has trouble with getting more meals with timing with kitchen and when he is hungry. Doesn't like the ensure; interested in trying magic cup. Continues to share he will work with rehab as well as work on his nutrition.    10-point ROS otherwise negative.    Data reviewed today: I reviewed all medications, new labs and imaging results over the last 24 hours.     Physical Exam   Vital Signs: Temp: 97.6  F (36.4  C) Temp src: Oral BP: (!) 141/78 Pulse: 78   Resp: 18 SpO2: 98 % O2 Device: None (Room air)    Weight: 219 lbs 9.6 oz     Physical Exam  Vitals reviewed.   Constitutional:       General: He is not in acute distress.     Appearance: Normal appearance. He is well-developed. He is not ill-appearing.      Comments: Lying in bed   HENT:      Head: Normocephalic and atraumatic.   Eyes:      Conjunctiva/sclera: Conjunctivae normal.   Cardiovascular:       Rate and Rhythm: Normal rate.   Pulmonary:      Effort: Pulmonary effort is normal. No respiratory distress.   Musculoskeletal:      Right lower leg: No edema.      Left lower leg: No edema.   Skin:     General: Skin is warm and dry.   Neurological:      General: No focal deficit present.      Mental Status: He is alert.   Psychiatric:         Attention and Perception: Attention normal.         Mood and Affect: Mood is depressed (improving).         Behavior: Behavior is cooperative.         Thought Content: Thought content does not include homicidal or suicidal ideation.

## 2022-02-26 NOTE — PLAN OF CARE
Goal Outcome Evaluation:    Plan of Care Reviewed With: patient     Overall Patient Progress: no change    Outcome Evaluation: awaiting placement

## 2022-02-27 LAB
ANION GAP SERPL CALCULATED.3IONS-SCNC: 6 MMOL/L (ref 3–14)
BUN SERPL-MCNC: 55 MG/DL (ref 7–30)
CALCIUM SERPL-MCNC: 9.4 MG/DL (ref 8.5–10.1)
CHLORIDE BLD-SCNC: 106 MMOL/L (ref 94–109)
CO2 SERPL-SCNC: 24 MMOL/L (ref 20–32)
CREAT SERPL-MCNC: 2.12 MG/DL (ref 0.66–1.25)
GFR SERPL CREATININE-BSD FRML MDRD: 33 ML/MIN/1.73M2
GLUCOSE BLD-MCNC: 94 MG/DL (ref 70–99)
HOLD SPECIMEN: NORMAL
POTASSIUM BLD-SCNC: 5.1 MMOL/L (ref 3.4–5.3)
SODIUM SERPL-SCNC: 136 MMOL/L (ref 133–144)

## 2022-02-27 PROCEDURE — 250N000013 HC RX MED GY IP 250 OP 250 PS 637: Performed by: STUDENT IN AN ORGANIZED HEALTH CARE EDUCATION/TRAINING PROGRAM

## 2022-02-27 PROCEDURE — 99232 SBSQ HOSP IP/OBS MODERATE 35: CPT | Mod: GC | Performed by: FAMILY MEDICINE

## 2022-02-27 PROCEDURE — 36415 COLL VENOUS BLD VENIPUNCTURE: CPT | Performed by: STUDENT IN AN ORGANIZED HEALTH CARE EDUCATION/TRAINING PROGRAM

## 2022-02-27 PROCEDURE — 120N000002 HC R&B MED SURG/OB UMMC

## 2022-02-27 PROCEDURE — 80048 BASIC METABOLIC PNL TOTAL CA: CPT | Performed by: STUDENT IN AN ORGANIZED HEALTH CARE EDUCATION/TRAINING PROGRAM

## 2022-02-27 RX ORDER — FUROSEMIDE 20 MG
20 TABLET ORAL ONCE
Status: COMPLETED | OUTPATIENT
Start: 2022-02-27 | End: 2022-02-27

## 2022-02-27 RX ADMIN — APIXABAN 5 MG: 5 TABLET, FILM COATED ORAL at 08:52

## 2022-02-27 RX ADMIN — FOLIC ACID 1 MG: 1 TABLET ORAL at 08:52

## 2022-02-27 RX ADMIN — MIRTAZAPINE 15 MG: 15 TABLET, FILM COATED ORAL at 21:11

## 2022-02-27 RX ADMIN — APIXABAN 5 MG: 5 TABLET, FILM COATED ORAL at 21:11

## 2022-02-27 RX ADMIN — NYSTATIN: 100000 CREAM TOPICAL at 08:56

## 2022-02-27 RX ADMIN — THERA TABS 1 TABLET: TAB at 08:52

## 2022-02-27 RX ADMIN — FUROSEMIDE 20 MG: 20 TABLET ORAL at 11:24

## 2022-02-27 RX ADMIN — ESCITALOPRAM OXALATE 10 MG: 10 TABLET ORAL at 08:52

## 2022-02-27 RX ADMIN — THIAMINE HCL TAB 100 MG 100 MG: 100 TAB at 08:52

## 2022-02-27 ASSESSMENT — ACTIVITIES OF DAILY LIVING (ADL)
ADLS_ACUITY_SCORE: 18
ADLS_ACUITY_SCORE: 20
ADLS_ACUITY_SCORE: 18
ADLS_ACUITY_SCORE: 20

## 2022-02-27 NOTE — PROGRESS NOTES
[facility]  Progress Note - [facility]       Date of Admission:  2/7/2022    Family Medicine Progress Note - [place]       Main Plans for Today   - Medically ready for discharge since 2/15 - awaiting placement: Consistently participating in therapy since 2/14. Working on nutrition. Agreeable to rehab  - Lasix 20 mg x1     Assessment & Plan   Fer Latham is a 71 year old male with history of  BPH, neurogenic bladder w/chronic indwelling ferguson, recurrent UTI's, DM2 (diet controlled), HTN, ESRD previously on dialysis (twice weekly), chronic atrial fibrillation, T12 compression fracture, alcohol use, depression and anxiety admitted for UTI and concerns for safety at home.     # MDD  # Anxiety  # Chronic insomnia  # Deconditioning  OT cognitive assessment 2/9: A/Ox3, moderate deficits in safety, memory, and attention, SLUMS 8/30. Concern for MDD with underlying baseline cognitive deficit. Pt has been consistently participating in therapy since 2/14. Per psychiatry evaluation, patient's symptoms are consistent with MDD and delirium. Restarted mirtazapine on 2/13, started lexapro 2/17. QTc 344 on 2/18 EKG. Pt agreeable to rehab.   - PT/OT  - Encourage OOB, requesting to be ambulating more though challenging with level of assistance required   - If improving with therapy here and unable to find placement, would revisit possibility of discharge home with therapies, though certainly will defer to PT/OT recommendations   - PTA mirtazipine 15 mg at bedtime, lexapro 10 mg  - PTA melatonin    # Severe malnutrition in the context of acute illness  Patient has been ordering only 2 meals daily, and per RN report, now eating 100% of meals.    - Nutrition consulted, appreciate recs   - Calorie counts x 3 days   - Supplements  - Mirtazipine as above, hopeful for appetite stimulation. May consider increasing dose, would do so gradually and monitor for  oversedation given decreased renal function    # CKD IV  # MATT on CKD, resolved   # Hx of ESRD previously on dialysis, has not required dialysis since admission per nephrology assessment  # Hyperkalemia, intermittent  Prior to admission was dialyzed 2x/week at [facility]. Nephrology consulted and Cystatin C suggested GFR 23 ml/min. Patient continues to make urine, electrolytes, weight and kidney function generally have remained stable throughout this admission (without dialysis), therefore, decision was ultimately made to discontinue dialysis (with discussion with outpatient nephrologist) and catheter was removed 2/22. Developed MATT after daily Lasix dosing. Now having intermittent hyperK requiring doses of Lasix.   - Lasix 20 mg x1 today given K 5.1 -- will need to develop outpatient plan for Lasix/K management, potentially Lasix twice weekly  - Daily bmp  - If creatinine bumps and patient appears dry, consider 500 mL LR  - Strict I&O, daily weights    # Candidal cystitis, resolved  # Neurogenic bladder w/chronic indwelling ferguson  # BPH with LUTS  Has had indwelling ferguson since June 2021. Had been on oxybutytnin and trospium but stopped 2/2 cost.   Ferguson last exchanged 2/8 (new size) and functioning well.   Completed fluconazole (2/9-2/22)    # Tinea cruris  # Chronic groin/buttocks friction wounds  Perianal wounds, L buttocks wound along with erythema in groin.  - PTA miconazole powder, nystatin cream  - Appreciate WOC  following     # T12 compression fracture, chronic   # Low back pain, chronic   # Fecal incontinence, resolved  Imaging on admission showed worsening of known T12 compression fx w/periverterbral edema. Neurosurgery consulted, no surgical intervention planned. Due to recent reported fecal incontinence 2/13, thoracolumbar MRI was obtained on 2/16, showed no change in T12 compression deformity compared to 2/8 MRI. Fecal incontinence felt to be due to patient's underlying depression, cognitive  impairment, not communicating with staff and has resolved.  - Pain: Tylenol TID, lidocaine patch  - Up with assist and TLSO brace when out of bed   - PT/OT consulted - has been working with PT consistently since 2/14       Diet: Combination Diet Regular Diet Adult  Room Service  Snacks/Supplements Adult: Other; 2pm: cheddar cheese and saltine crackers; Between Meals  Snacks/Supplements Adult: Magic Cup; Between Meals  Snacks/Supplements Adult: Magic Cup; With Meals    DVT Prophylaxis: Eliquis, PTA  Brown Catheter: PRESENT, indication: Neurogenic Bladder, Neurogenic Bladder  Fluids: PO  Central Lines: None    Cardiac Monitoring: None  Code Status: Full Code      Disposition Plan   Expected Discharge: medically ready, awaiting TCU placement      Anticipated discharge location: TBD - LTC vs TCU     The patient's care was discussed with the Attending Physician, Dr. Harden.    Lilly Coffman MD  [facility]  [facility]     Securely message with the Vocera Web Console (learn more here)  Text page via [employer] Paging/Directory   Please see signed in provider for up to date coverage information  _________________________________________    Interval History   No events overnight. Better appetite. Adequate UOP and had a bowel movement yesterday. Has been up to chair, wondering if he can walk the halls. No fevers/chills. No complaints of pain.     Data reviewed today: I reviewed all medications, new labs and imaging results over the last 24 hours.     Physical Exam   Vital Signs: Temp: 97.4  F (36.3  C) Temp src: Oral BP: 127/70 Pulse: 70   Resp: 20 SpO2: 99 % O2 Device: None (Room air)    Weight: 219 lbs 4.8 oz     Physical Exam  Vitals reviewed.   Constitutional:       General: He is not in acute distress.     Appearance: Normal appearance. He is well-developed. He is not ill-appearing.      Comments: Lying in bed eating lunch    Eyes:       Conjunctiva/sclera: Conjunctivae normal.   Cardiovascular:      Rate and Rhythm: Normal rate.   Pulmonary:      Effort: Pulmonary effort is normal. No respiratory distress.   Musculoskeletal:      Right lower leg: No edema.      Left lower leg: No edema.   Neurological:      Mental Status: He is alert.   Psychiatric:         Attention and Perception: Attention normal.         Mood and Affect: Mood is depressed (improving).         Behavior: Behavior is cooperative.         Thought Content: Thought content does not include homicidal or suicidal ideation.      Comments: Much more alert and interactive, engaged compared to my prior interactions with patient

## 2022-02-27 NOTE — PLAN OF CARE
Goal Outcome Evaluation:    Plan of Care Reviewed With: patient     Overall Patient Progress: no change    Outcome Evaluation: No acute changes overnight. Pt sleeping well between cares. Continuing to encourage intake (magic cups now available for pt to try). Brown in place with good output. Last BM 2/26. Continues to be stable on RA and denies pain overnight. Per last social work note, referral out to TCU.

## 2022-02-28 ENCOUNTER — APPOINTMENT (OUTPATIENT)
Dept: PHYSICAL THERAPY | Facility: CLINIC | Age: 72
DRG: 698 | End: 2022-02-28
Payer: MEDICARE

## 2022-02-28 LAB
ANION GAP SERPL CALCULATED.3IONS-SCNC: 6 MMOL/L (ref 3–14)
BUN SERPL-MCNC: 53 MG/DL (ref 7–30)
CALCIUM SERPL-MCNC: 9.2 MG/DL (ref 8.5–10.1)
CHLORIDE BLD-SCNC: 102 MMOL/L (ref 94–109)
CO2 SERPL-SCNC: 26 MMOL/L (ref 20–32)
CREAT SERPL-MCNC: 2.13 MG/DL (ref 0.66–1.25)
GFR SERPL CREATININE-BSD FRML MDRD: 33 ML/MIN/1.73M2
GLUCOSE BLD-MCNC: 92 MG/DL (ref 70–99)
HOLD SPECIMEN: NORMAL
POTASSIUM BLD-SCNC: 5.3 MMOL/L (ref 3.4–5.3)
SODIUM SERPL-SCNC: 134 MMOL/L (ref 133–144)

## 2022-02-28 PROCEDURE — 250N000013 HC RX MED GY IP 250 OP 250 PS 637: Performed by: STUDENT IN AN ORGANIZED HEALTH CARE EDUCATION/TRAINING PROGRAM

## 2022-02-28 PROCEDURE — 120N000002 HC R&B MED SURG/OB UMMC

## 2022-02-28 PROCEDURE — 97116 GAIT TRAINING THERAPY: CPT | Mod: GP | Performed by: REHABILITATION PRACTITIONER

## 2022-02-28 PROCEDURE — 99232 SBSQ HOSP IP/OBS MODERATE 35: CPT | Mod: GC | Performed by: FAMILY MEDICINE

## 2022-02-28 PROCEDURE — 97530 THERAPEUTIC ACTIVITIES: CPT | Mod: GP | Performed by: REHABILITATION PRACTITIONER

## 2022-02-28 PROCEDURE — 36415 COLL VENOUS BLD VENIPUNCTURE: CPT | Performed by: STUDENT IN AN ORGANIZED HEALTH CARE EDUCATION/TRAINING PROGRAM

## 2022-02-28 PROCEDURE — 80048 BASIC METABOLIC PNL TOTAL CA: CPT | Performed by: STUDENT IN AN ORGANIZED HEALTH CARE EDUCATION/TRAINING PROGRAM

## 2022-02-28 RX ADMIN — APIXABAN 5 MG: 5 TABLET, FILM COATED ORAL at 20:54

## 2022-02-28 RX ADMIN — MIRTAZAPINE 15 MG: 15 TABLET, FILM COATED ORAL at 20:54

## 2022-02-28 RX ADMIN — FOLIC ACID 1 MG: 1 TABLET ORAL at 09:47

## 2022-02-28 RX ADMIN — NYSTATIN: 100000 CREAM TOPICAL at 09:50

## 2022-02-28 RX ADMIN — THERA TABS 1 TABLET: TAB at 09:47

## 2022-02-28 RX ADMIN — ESCITALOPRAM OXALATE 10 MG: 10 TABLET ORAL at 09:47

## 2022-02-28 RX ADMIN — SENNOSIDES 2 TABLET: 8.6 TABLET ORAL at 15:31

## 2022-02-28 RX ADMIN — POLYETHYLENE GLYCOL 3350 8.5 G: 17 POWDER, FOR SOLUTION ORAL at 09:46

## 2022-02-28 RX ADMIN — APIXABAN 5 MG: 5 TABLET, FILM COATED ORAL at 09:47

## 2022-02-28 RX ADMIN — THIAMINE HCL TAB 100 MG 100 MG: 100 TAB at 09:47

## 2022-02-28 ASSESSMENT — ACTIVITIES OF DAILY LIVING (ADL)
ADLS_ACUITY_SCORE: 18
ADLS_ACUITY_SCORE: 16
ADLS_ACUITY_SCORE: 18
ADLS_ACUITY_SCORE: 18
ADLS_ACUITY_SCORE: 16
ADLS_ACUITY_SCORE: 18
ADLS_ACUITY_SCORE: 16
ADLS_ACUITY_SCORE: 18
ADLS_ACUITY_SCORE: 16
ADLS_ACUITY_SCORE: 16
ADLS_ACUITY_SCORE: 18
ADLS_ACUITY_SCORE: 18
ADLS_ACUITY_SCORE: 16
ADLS_ACUITY_SCORE: 18
ADLS_ACUITY_SCORE: 18
ADLS_ACUITY_SCORE: 16
ADLS_ACUITY_SCORE: 16
ADLS_ACUITY_SCORE: 18
ADLS_ACUITY_SCORE: 16
ADLS_ACUITY_SCORE: 18
ADLS_ACUITY_SCORE: 16
ADLS_ACUITY_SCORE: 16

## 2022-02-28 NOTE — PLAN OF CARE
Goal Outcome Evaluation:    Plan of Care Reviewed With: patient     Overall Patient Progress: no change    Outcome Evaluation: No acute changes overnight. Pt was more talkative in the evening and participating with some cares and more thorough w/ assessment questions. Continuing to encourage out of bed activity and snacks between meals. Brown in place with good output. R PIV fell out this AM, currently no access. Order put in for new IV this AM.      Evening vitals: /57 (BP Location: Right arm)   Pulse 72   Temp 98.1  F (36.7  C) (Axillary)   Resp 20   Wt 98.3 kg (216 lb 11.2 oz)   SpO2 98%   BMI 27.82 kg/m

## 2022-02-28 NOTE — PROGRESS NOTES
[facility]  Progress Note - [facility]       Date of Admission:  2/7/2022    Family Medicine Progress Note - [place]       Main Plans for Today   - Medically ready for discharge since 2/15 - awaiting placement: Consistently participating in therapy since 2/14. Working on nutrition. Agreeable to rehab  - Low potassium diet  - Possible changing supplement to one for renal insufficiency    Assessment & Plan   Fer Latham is a 71 year old male with history of  BPH, neurogenic bladder w/chronic indwelling ferguson, recurrent UTI's, DM2 (diet controlled), HTN, ESRD previously on dialysis (twice weekly), chronic atrial fibrillation, T12 compression fracture, alcohol use, depression and anxiety admitted for UTI and concerns for safety at home.     # MDD  # Anxiety  # Chronic insomnia  # Deconditioning  OT cognitive assessment 2/9: A/Ox3, moderate deficits in safety, memory, and attention, SLUMS 8/30. Concern for MDD with underlying baseline cognitive deficit. Pt has been consistently participating in therapy since 2/14. Per psychiatry evaluation, patient's symptoms are consistent with MDD and delirium. Restarted mirtazapine on 2/13, started lexapro 2/17. QTc 344 on 2/18 EKG. Pt agreeable to rehab.   - PT/OT  - Encourage OOB, requesting to be ambulating more though challenging with level of assistance required   - If improving with therapy here and unable to find placement, would revisit possibility of discharge home with therapies, though certainly will defer to PT/OT recommendations   - PTA mirtazipine 15 mg at bedtime, lexapro 10 mg  - PTA melatonin    # Severe malnutrition in the context of acute illness  Patient has been ordering only 2 meals daily, and per RN report, now eating 100% of meals.    - Nutrition consulted, appreciate recs   - Calorie counts x 3 days   - Supplements  - Mirtazipine as above, hopeful for appetite stimulation. May  consider increasing dose, would do so gradually and monitor for oversedation given decreased renal function    # CKD IV  # MATT on CKD, resolved   # Hx of ESRD previously on dialysis, has not required dialysis since admission per nephrology assessment  # Hyperkalemia, intermittent  Prior to admission was dialyzed 2x/week at [facility]. Nephrology consulted and Cystatin C suggested GFR 23 ml/min. Patient continues to make urine, electrolytes, weight and kidney function generally have remained stable throughout this admission (without dialysis), therefore, decision was ultimately made to discontinue dialysis (with discussion with outpatient nephrologist) and catheter was removed 2/22. Developed MATT after daily Lasix dosing. Now having intermittent hyperK requiring doses of Lasix.   - Per nephrology,    - no lasix needed today   - goal weight around 98-99 kg   - will also readdress outpatient at CKD clinic   - OK with low potassium diet  - If creatinine bumps and patient appears dry, consider 500 mL LR  - Daily bmp  - Strict I&O, daily weights    # Candidal cystitis, resolved  # Neurogenic bladder w/chronic indwelling ferguson  # BPH with LUTS  Has had indwelling ferguson since June 2021. Had been on oxybutytnin and trospium but stopped 2/2 cost.   Ferguson last exchanged 2/8 (new size) and functioning well.   Completed fluconazole (2/9-2/22)    # Tinea cruris  # Chronic groin/buttocks friction wounds  Perianal wounds, L buttocks wound along with erythema in groin.  - PTA miconazole powder, nystatin cream  - Appreciate WOC  following     # T12 compression fracture, chronic   # Low back pain, chronic   # Fecal incontinence, resolved  Imaging on admission showed worsening of known T12 compression fx w/periverterbral edema. Neurosurgery consulted, no surgical intervention planned. Due to recent reported fecal incontinence 2/13, thoracolumbar MRI was obtained on 2/16, showed no change in T12 compression deformity compared to 2/8  MRI. Fecal incontinence felt to be due to patient's underlying depression, cognitive impairment, not communicating with staff and has resolved.  - Pain: Tylenol TID, lidocaine patch  - Up with assist and TLSO brace when out of bed   - PT/OT consulted - has been working with PT consistently since 2/14       Diet: Room Service  Snacks/Supplements Adult: Other; 2pm: cheddar cheese and saltine crackers; Between Meals  Snacks/Supplements Adult: Magic Cup; Between Meals  Snacks/Supplements Adult: Nepro Oral Supplement; With Meals  3 Gram K Diet    DVT Prophylaxis: Eliquis, PTA  Brown Catheter: PRESENT, indication: Neurogenic Bladder, Retention  Fluids: PO  Central Lines: None    Cardiac Monitoring: None  Code Status: Full Code      Disposition Plan   Expected Discharge: medically ready, awaiting TCU placement      Anticipated discharge location: TBD - LTC vs TCU     The patient's care was discussed with the Attending Physician, Dr. Harden.    Niharika Boswell MD  [place]  [facility]     Securely message with the Vocera Web Console (learn more here)  Text page via [facility] Paging/Directory   Please see signed in provider for up to date coverage information  _________________________________________    Interval History   No events overnight. Better appetite. Adequate UOP. Has been up to chair, wondering if he can walk with walker. No fevers/chills. No complaints of pain.     Data reviewed today: I reviewed all medications, new labs and imaging results over the last 24 hours.     Physical Exam   Vital Signs: Temp: 98.2  F (36.8  C) Temp src: Oral BP: 128/63 Pulse: 83   Resp: 18 SpO2: 97 % O2 Device: None (Room air)    Weight: 216 lbs 11.2 oz     Physical Exam  Vitals reviewed.   Constitutional:       General: He is not in acute distress.     Appearance: Normal appearance. He is well-developed. He is not ill-appearing.      Comments: Up in chair   Eyes:       Conjunctiva/sclera: Conjunctivae normal.   Cardiovascular:      Rate and Rhythm: Normal rate.   Pulmonary:      Effort: Pulmonary effort is normal. No respiratory distress.   Musculoskeletal:      Right lower leg: No edema.      Left lower leg: No edema.   Neurological:      General: No focal deficit present.      Mental Status: He is alert.   Psychiatric:         Attention and Perception: Attention normal.         Mood and Affect: Mood is depressed (improving).         Behavior: Behavior normal. Behavior is cooperative.         Thought Content: Thought content normal. Thought content does not include homicidal or suicidal ideation.      Comments: Much more alert and interactive, engaged compared to my prior interactions with patient

## 2022-03-01 ENCOUNTER — APPOINTMENT (OUTPATIENT)
Dept: OCCUPATIONAL THERAPY | Facility: CLINIC | Age: 72
DRG: 698 | End: 2022-03-01
Payer: MEDICARE

## 2022-03-01 PROBLEM — E44.0 MODERATE MALNUTRITION (H): Status: ACTIVE | Noted: 2022-03-01

## 2022-03-01 LAB
ANION GAP SERPL CALCULATED.3IONS-SCNC: 11 MMOL/L (ref 3–14)
BUN SERPL-MCNC: 54 MG/DL (ref 7–30)
CALCIUM SERPL-MCNC: 9.1 MG/DL (ref 8.5–10.1)
CHLORIDE BLD-SCNC: 100 MMOL/L (ref 94–109)
CO2 SERPL-SCNC: 23 MMOL/L (ref 20–32)
CREAT SERPL-MCNC: 2.16 MG/DL (ref 0.66–1.25)
GFR SERPL CREATININE-BSD FRML MDRD: 32 ML/MIN/1.73M2
GLUCOSE BLD-MCNC: 122 MG/DL (ref 70–99)
HOLD SPECIMEN: NORMAL
POTASSIUM BLD-SCNC: 5.2 MMOL/L (ref 3.4–5.3)
SARS-COV-2 RNA RESP QL NAA+PROBE: NEGATIVE
SODIUM SERPL-SCNC: 134 MMOL/L (ref 133–144)

## 2022-03-01 PROCEDURE — 250N000013 HC RX MED GY IP 250 OP 250 PS 637: Performed by: STUDENT IN AN ORGANIZED HEALTH CARE EDUCATION/TRAINING PROGRAM

## 2022-03-01 PROCEDURE — 97530 THERAPEUTIC ACTIVITIES: CPT | Mod: GO

## 2022-03-01 PROCEDURE — 97110 THERAPEUTIC EXERCISES: CPT | Mod: GO

## 2022-03-01 PROCEDURE — U0003 INFECTIOUS AGENT DETECTION BY NUCLEIC ACID (DNA OR RNA); SEVERE ACUTE RESPIRATORY SYNDROME CORONAVIRUS 2 (SARS-COV-2) (CORONAVIRUS DISEASE [COVID-19]), AMPLIFIED PROBE TECHNIQUE, MAKING USE OF HIGH THROUGHPUT TECHNOLOGIES AS DESCRIBED BY CMS-2020-01-R: HCPCS | Performed by: STUDENT IN AN ORGANIZED HEALTH CARE EDUCATION/TRAINING PROGRAM

## 2022-03-01 PROCEDURE — 99231 SBSQ HOSP IP/OBS SF/LOW 25: CPT | Mod: GC | Performed by: FAMILY MEDICINE

## 2022-03-01 PROCEDURE — 82310 ASSAY OF CALCIUM: CPT | Performed by: STUDENT IN AN ORGANIZED HEALTH CARE EDUCATION/TRAINING PROGRAM

## 2022-03-01 PROCEDURE — 36415 COLL VENOUS BLD VENIPUNCTURE: CPT | Performed by: STUDENT IN AN ORGANIZED HEALTH CARE EDUCATION/TRAINING PROGRAM

## 2022-03-01 PROCEDURE — 120N000002 HC R&B MED SURG/OB UMMC

## 2022-03-01 RX ADMIN — FOLIC ACID 1 MG: 1 TABLET ORAL at 08:59

## 2022-03-01 RX ADMIN — POLYETHYLENE GLYCOL 3350 8.5 G: 17 POWDER, FOR SOLUTION ORAL at 10:47

## 2022-03-01 RX ADMIN — THIAMINE HCL TAB 100 MG 100 MG: 100 TAB at 08:59

## 2022-03-01 RX ADMIN — LIDOCAINE 1 PATCH: 246 PATCH TOPICAL at 09:00

## 2022-03-01 RX ADMIN — APIXABAN 5 MG: 5 TABLET, FILM COATED ORAL at 20:59

## 2022-03-01 RX ADMIN — ESCITALOPRAM OXALATE 10 MG: 10 TABLET ORAL at 08:59

## 2022-03-01 RX ADMIN — MIRTAZAPINE 15 MG: 15 TABLET, FILM COATED ORAL at 20:58

## 2022-03-01 RX ADMIN — APIXABAN 5 MG: 5 TABLET, FILM COATED ORAL at 08:59

## 2022-03-01 RX ADMIN — THERA TABS 1 TABLET: TAB at 08:59

## 2022-03-01 ASSESSMENT — ACTIVITIES OF DAILY LIVING (ADL)
ADLS_ACUITY_SCORE: 16
ADLS_ACUITY_SCORE: 18
ADLS_ACUITY_SCORE: 16
ADLS_ACUITY_SCORE: 18
ADLS_ACUITY_SCORE: 16
ADLS_ACUITY_SCORE: 18
ADLS_ACUITY_SCORE: 18
ADLS_ACUITY_SCORE: 16
ADLS_ACUITY_SCORE: 18
ADLS_ACUITY_SCORE: 18
ADLS_ACUITY_SCORE: 16
ADLS_ACUITY_SCORE: 16
ADLS_ACUITY_SCORE: 18
ADLS_ACUITY_SCORE: 16

## 2022-03-01 NOTE — PLAN OF CARE
Goal Outcome Evaluation:    Plan of Care Reviewed With: patient     Overall Patient Progress: improving    Outcome Evaluation: awaiting placement    Vitals: /65 (BP Location: Right arm)   Pulse 73   Temp 98.3  F (36.8  C) (Axillary)   Resp 18   Wt 98.3 kg (216 lb 11.2 oz)   SpO2 99%   BMI 27.82 kg/m      Activity: assist x2,walker, GB  Pain: low back pain, has lidocaine patch in place  Neuro: forgetful, A&Ox4  Cardiac: WDL  Respiratory: WDL  GI/: ferguson in place, 1 large BM today, size of a baseball  Diet: 3g K diet  Lines: no IV access  Skin/Wounds: penile, scrotal redness - gave miconazole powder, held nystatin cream - WOC suggests doing powder over cream, and not both  Labs/Imaging:none this shift

## 2022-03-01 NOTE — PROGRESS NOTES
CLINICAL NUTRITION SERVICES - REASSESSMENT NOTE     Nutrition Prescription    RECOMMENDATIONS FOR MDs/PROVIDERS TO ORDER:  None at this time     Malnutrition Status:    Moderate malnutrition in the context of acute on chronic illness    Recommendations already ordered by Registered Dietitian (RD):  1. Discontinue magic cup (higher in K+). Continue Nepro and snack at 2 pm     Future/Additional Recommendations:  -- monitor oral intake      EVALUATION OF THE PROGRESS TOWARD GOALS   Diet: 3 g Potassium with Nepro with meals, magic cup between melas and snack at 2 pm (cheddar cheese and saltine crackers)  Intake: PO intake 100% per nursing documentation. Appetite good.   Calorie counts:  2/25: Total Kcals: 982       Total Protein: 37g   2/24: Total Kcals: 0           Total Protein: 0g  2/23: Total Kcals: 910       Total Protein: 30g    2 day average intake per calorie counts: 946 kcals (41% or 10 kcals/kg) and 34 g protein (62% or 0.4 g/kg) per dosing weight 92 kg     Patient's intake is improving.  Last 24 hours of meals ordered via [employer]  3/1: breakfast: oatmeal, coffee, wheat toast, whole milk  2/28: Dinner: chicken, mashed potatoes, wheat bread, caesar salad, bhupinder food cake  2/28: breakfast: oatmeal, orange juice and whole milk     Patient eating 2 meals daily.      NEW FINDINGS   Labs (3/1): K+ 5.2 mmol/L, BUN 54 mg/dL (H), Cr 2.16 mg/dL (H)    Meds:   Folvite  thera-vit  Thiamine     GI: LBM (2/26) x 2     Weight: weight decreased since admission weight. Now 98.3 kg (2/27)    MALNUTRITION  % Intake: < 75% for > 7 days (moderate)  % Weight Loss: > 2% in 1 week (severe)  Subcutaneous Fat Loss: Facial region:  mild  Muscle Loss: Temporal and Lower arm  (forearm): mild   Fluid Accumulation/Edema: Does not meet criteria  Malnutrition Diagnosis: Moderate malnutrition in the context of acute on chronic illness    Previous Goals   Patient to consume % of nutritionally adequate meal trays TID, or the equivalent  with supplements/snacks.  Evaluation: Not met    Previous Nutrition Diagnosis  Inadequate oral intake  Evaluation: Improving    CURRENT NUTRITION DIAGNOSIS  Inadequate oral intake related to variable intake as evidenced by current calorie counts and healthtouch       INTERVENTIONS  Implementation  Medical food supplement therapy - discontinue magic cup    Goals  Patient to consume % of nutritionally adequate meal trays TID, or the equivalent with supplements/snacks.    Monitoring/Evaluation  Progress toward goals will be monitored and evaluated per protocol.    Jeanette Frazier, MS/RD/LD  5A (829-)/5B RD pager: [phone]  Weekend/Holiday RD pager: [phone]

## 2022-03-01 NOTE — PROGRESS NOTES
Care Management Follow Up    Length of Stay (days): 21    Expected Discharge Date:  3/2     Concerns to be Addressed: mental health  MD stated that Pt has Depression- SARMAD asked for Psych eval. MD declined.  Patient plan of care discussed at interdisciplinary rounds:yes    Anticipated Discharge Disposition: TCU      Anticipated Discharge Services: TCU  Anticipated Discharge DME:  TBD    Patient/family educated on Medicare website which has current facility and service quality ratings:  Yes  Education Provided on the Discharge Plan: Yes   Patient/Family in Agreement with the Plan:Yes  Referrals Placed by CM/SW:  TCU referrals      Additional Information:  SW resent and contacted all TCU referrals previously sent.   messages left for all to call [phone]. Two Add'l TCU's sent as well. SARMAD will continue to monitor.      OK Krause

## 2022-03-01 NOTE — PLAN OF CARE
AVSS. Pt doing well. Pt talkative today. Seems to have better spirits. Wife at bedside this evening. Pt denies pain. Up to recliner with assist of 2, gb, and walker. Brace on when out of bed. Pt using bedpan this am. Ordered commode if needed. C/o constipation. Miralax and senna given. Good appetite. Diet changed from regular to 3 gm K d/t k trending up. K 5.3. No piv access. Team aware. Worked with PT today. Up to chair x 1. Awaiting TCU placement. Continue with poc.

## 2022-03-01 NOTE — PLAN OF CARE
Goal: Plan of Care Review    Outcome: Ongoing, Progressing      Plan of Care Reviewed With: patient  Outcome Evaluation: Patient awaiting placement. Up to chair this shift and completing hygeine cares. Fair appetite. Oriented x4. Falls precautions, calls appropriately. Education reinforced on proper use of back brace, patient refused x1. Writer noted irregular apical pulse on assessment, vitally stable. Team informed, history of arrthymias. Denies chest pain and cardiac symptoms. Skin surrounding ferguson irritated, reddened with peeling noted. Scheduled creams and wound care. Urine WDL.   Overall Patient Progress: no change

## 2022-03-01 NOTE — PLAN OF CARE
Goal Outcome Evaluation:  No change, Adequate for transitional care.     Outcome Evaluation: No acute changes, Awaiting placement.

## 2022-03-01 NOTE — PROGRESS NOTES
[facility]  Progress Note - [facility]       Date of Admission:  2/7/2022    Family Medicine Progress Note - [facility]       Main Plans for Today   - Medically ready for discharge since 2/15 - awaiting placement: Consistently participating in therapy since 2/14. Working on nutrition. Agreeable to rehab  - Renal insufficiency supplement added by RD    Assessment & Plan   Fer Latham is a 71 year old male with history of  BPH, neurogenic bladder w/chronic indwelling ferguson, recurrent UTI's, DM2 (diet controlled), HTN, ESRD previously on dialysis (twice weekly), chronic atrial fibrillation, T12 compression fracture, alcohol use, depression and anxiety admitted for UTI and concerns for safety at home.     # MDD  # Anxiety  # Chronic insomnia  # Deconditioning  OT cognitive assessment 2/9: A/Ox3, moderate deficits in safety, memory, and attention, SLUMS 8/30. Concern for MDD with underlying baseline cognitive deficit. Pt has been consistently participating in therapy since 2/14. Per psychiatry evaluation, patient's symptoms are consistent with MDD and delirium. Restarted mirtazapine on 2/13, started lexapro 2/17. QTc 344 on 2/18 EKG. Pt agreeable to rehab.   - PT/OT  - Encourage OOB, requesting to be ambulating more though challenging with level of assistance required   - If improving with therapy here and unable to find placement, would revisit possibility of discharge home with therapies, though certainly will defer to PT/OT recommendations   - PTA mirtazipine 15 mg at bedtime, lexapro 10 mg  - PTA melatonin    # Moderate malnutrition in the context of acute on chronic illness  % Intake: < 75% for > 7 days (moderate)  % Weight Loss: > 2% in 1 week (severe)  Subcutaneous Fat Loss: Facial region:  mild  Muscle Loss: Temporal and Lower arm  (forearm): mild   Fluid Accumulation/Edema: Does not meet criteria  Per RN report, now eating 100% of  meals.  - Nutrition consulted, appreciate recs   - Supplements  - Mirtazipine as above, hopeful for appetite stimulation. May consider increasing dose, would do so gradually and monitor for oversedation given decreased renal function    # CKD IV  # MATT on CKD, resolved   # Hx of ESRD previously on dialysis, has not required dialysis since admission per nephrology assessment  # Hyperkalemia, intermittent  Prior to admission was dialyzed 2x/week at [facility]. Nephrology consulted and Cystatin C suggested GFR 23 ml/min. Patient continues to make urine, electrolytes, weight and kidney function generally have remained stable throughout this admission (without dialysis), therefore, decision was ultimately made to discontinue dialysis (with discussion with outpatient nephrologist) and catheter was removed 2/22. Developed MATT after daily Lasix dosing. Now having intermittent hyperK requiring doses of Lasix.   - Per nephrology,    - goal weight around 98-99 kg   - will also readdress outpatient at CKD clinic   - OK with low potassium diet  - If creatinine bumps and patient appears dry, consider 500 mL LR  - Daily bmp  - Strict I&O, daily weights    # Candidal cystitis, resolved  # Neurogenic bladder w/chronic indwelling ferguson  # BPH with LUTS  Has had indwelling ferguson since June 2021. Had been on oxybutytnin and trospium but stopped 2/2 cost.   Ferguson last exchanged 2/8 (new size) and functioning well.   Completed fluconazole (2/9-2/22)    # Tinea cruris  # Chronic groin/buttocks friction wounds  Perianal wounds, L buttocks wound along with erythema in groin.  - PTA miconazole powder, nystatin cream  - Appreciate WOC  following     # T12 compression fracture, chronic   # Low back pain, chronic   # Fecal incontinence, resolved  Imaging on admission showed worsening of known T12 compression fx w/periverterbral edema. Neurosurgery consulted, no surgical intervention planned. Due to recent reported fecal incontinence 2/13,  thoracolumbar MRI was obtained on 2/16, showed no change in T12 compression deformity compared to 2/8 MRI. Fecal incontinence felt to be due to patient's underlying depression, cognitive impairment, not communicating with staff and has resolved.  - Pain: Tylenol TID, lidocaine patch  - Up with assist and TLSO brace when out of bed   - PT/OT consulted - has been working with PT consistently since 2/14       Diet: Room Service  Snacks/Supplements Adult: Other; 2pm: cheddar cheese and saltine crackers; Between Meals  Snacks/Supplements Adult: Nepro Oral Supplement; With Meals  3 Gram K Diet    DVT Prophylaxis: Eliquis, PTA  Brown Catheter: PRESENT, indication: Neurogenic Bladder, Retention  Fluids: PO  Central Lines: None    Cardiac Monitoring: None  Code Status: Full Code      Disposition Plan   Expected Discharge: medically ready, awaiting TCU placement      Anticipated discharge location: TBD - LTC vs TCU     The patient's care was discussed with the Attending Physician, Dr. Gregory.    Niharika Boswell MD  [facility]  [facility]     Text page via [facility] Paging/Directory   Please see signed in provider for up to date coverage information  _________________________________________    Interval History   No events overnight. Better appetite. Adequate UOP. Has been up to chair, wanting to leave the hospital. Ready for a day of therapy! No fevers/chills. No complaints of pain.     Data reviewed today: I reviewed all medications, new labs and imaging results over the last 24 hours.     Physical Exam   Vital Signs: Temp: 98.3  F (36.8  C) Temp src: Axillary BP: 125/65 Pulse: 73   Resp: 18 SpO2: 99 % O2 Device: None (Room air)    Weight: 216 lbs 11.2 oz     Physical Exam  Vitals reviewed.   Constitutional:       General: He is not in acute distress.     Appearance: Normal appearance. He is well-developed. He is not ill-appearing.      Comments: Lying in bed   Eyes:       Conjunctiva/sclera: Conjunctivae normal.   Cardiovascular:      Rate and Rhythm: Normal rate.   Pulmonary:      Effort: Pulmonary effort is normal. No respiratory distress.   Musculoskeletal:      Right lower leg: No edema.      Left lower leg: No edema.   Neurological:      General: No focal deficit present.      Mental Status: He is alert.   Psychiatric:         Attention and Perception: Attention normal.         Mood and Affect: Mood is depressed (improving).         Behavior: Behavior normal. Behavior is cooperative.         Thought Content: Thought content normal. Thought content does not include homicidal or suicidal ideation.      Comments: Much more alert and interactive, engaged

## 2022-03-02 VITALS
WEIGHT: 217.7 LBS | HEART RATE: 72 BPM | RESPIRATION RATE: 18 BRPM | SYSTOLIC BLOOD PRESSURE: 128 MMHG | BODY MASS INDEX: 27.95 KG/M2 | TEMPERATURE: 97.5 F | OXYGEN SATURATION: 97 % | DIASTOLIC BLOOD PRESSURE: 74 MMHG

## 2022-03-02 LAB
ANION GAP SERPL CALCULATED.3IONS-SCNC: 8 MMOL/L (ref 3–14)
BUN SERPL-MCNC: 54 MG/DL (ref 7–30)
CALCIUM SERPL-MCNC: 9.3 MG/DL (ref 8.5–10.1)
CHLORIDE BLD-SCNC: 102 MMOL/L (ref 94–109)
CO2 SERPL-SCNC: 24 MMOL/L (ref 20–32)
CREAT SERPL-MCNC: 2.07 MG/DL (ref 0.66–1.25)
GFR SERPL CREATININE-BSD FRML MDRD: 34 ML/MIN/1.73M2
GLUCOSE BLD-MCNC: 105 MG/DL (ref 70–99)
HOLD SPECIMEN: NORMAL
POTASSIUM BLD-SCNC: 4.6 MMOL/L (ref 3.4–5.3)
SODIUM SERPL-SCNC: 134 MMOL/L (ref 133–144)

## 2022-03-02 PROCEDURE — 36415 COLL VENOUS BLD VENIPUNCTURE: CPT | Performed by: STUDENT IN AN ORGANIZED HEALTH CARE EDUCATION/TRAINING PROGRAM

## 2022-03-02 PROCEDURE — 250N000013 HC RX MED GY IP 250 OP 250 PS 637: Performed by: STUDENT IN AN ORGANIZED HEALTH CARE EDUCATION/TRAINING PROGRAM

## 2022-03-02 PROCEDURE — 80048 BASIC METABOLIC PNL TOTAL CA: CPT | Performed by: STUDENT IN AN ORGANIZED HEALTH CARE EDUCATION/TRAINING PROGRAM

## 2022-03-02 PROCEDURE — 99238 HOSP IP/OBS DSCHRG MGMT 30/<: CPT | Mod: GC | Performed by: FAMILY MEDICINE

## 2022-03-02 RX ORDER — ESCITALOPRAM OXALATE 10 MG/1
10 TABLET ORAL DAILY
Qty: 30 TABLET | Refills: 0 | DISCHARGE
Start: 2022-03-03 | End: 2024-08-14

## 2022-03-02 RX ORDER — LANOLIN ALCOHOL/MO/W.PET/CERES
100 CREAM (GRAM) TOPICAL DAILY
Qty: 30 TABLET | Refills: 0 | DISCHARGE
Start: 2022-03-03 | End: 2024-08-14

## 2022-03-02 RX ORDER — MIRTAZAPINE 15 MG/1
15 TABLET, FILM COATED ORAL AT BEDTIME
Qty: 30 TABLET | Refills: 0 | DISCHARGE
Start: 2022-03-02 | End: 2024-08-14

## 2022-03-02 RX ORDER — MULTIVITAMIN,THERAPEUTIC
1 TABLET ORAL DAILY
Qty: 30 TABLET | Refills: 0 | DISCHARGE
Start: 2022-03-03

## 2022-03-02 RX ADMIN — THERA TABS 1 TABLET: TAB at 08:09

## 2022-03-02 RX ADMIN — ESCITALOPRAM OXALATE 10 MG: 10 TABLET ORAL at 08:09

## 2022-03-02 RX ADMIN — THIAMINE HCL TAB 100 MG 100 MG: 100 TAB at 08:09

## 2022-03-02 RX ADMIN — FOLIC ACID 1 MG: 1 TABLET ORAL at 08:09

## 2022-03-02 RX ADMIN — APIXABAN 5 MG: 5 TABLET, FILM COATED ORAL at 08:09

## 2022-03-02 RX ADMIN — NYSTATIN: 100000 CREAM TOPICAL at 08:13

## 2022-03-02 ASSESSMENT — ACTIVITIES OF DAILY LIVING (ADL)
ADLS_ACUITY_SCORE: 18

## 2022-03-02 NOTE — PLAN OF CARE
Goal Outcome Evaluation:    Outcome Evaluation: Patient  discharged to care facility, w/c transport providid. VSS patient left with all belongings, packet sent with Pt.

## 2022-03-02 NOTE — PLAN OF CARE
Physical Therapy Discharge Summary    Reason for therapy discharge:    Discharged to transitional care facility.    Progress towards therapy goal(s). See goals on Care Plan in [facility] electronic health record for goal details.  Goals partially met.  Barriers to achieving goals:   discharge from facility.    Therapy recommendation(s):    Continued therapy is recommended.  Rationale/Recommendations:  Patient currently needing physical assist for safe mobility; recommend further therapy to progress functional mobility.

## 2022-03-02 NOTE — PLAN OF CARE
Problem: Adult Inpatient Plan of Care  Goal: Plan of Care Review  Outcome: Adequate for Care Transition  Flowsheets  Taken 3/2/2022 0514 by Dimas Powell, RN  Outcome Evaluation: Patient VSS, denies pain, On RA. Slept well ovenight. Good UO. No bm overnght. Patient awaiting TCU placement.  Overall Patient Progress: no change

## 2022-03-02 NOTE — DISCHARGE INSTRUCTIONS
IMM reviewed with patient's spouse Ailin by phone on 3/2/22 at 1220pm by RN Care Coordinator Shamika Morrison

## 2022-03-02 NOTE — PLAN OF CARE
Occupational Therapy Discharge Summary    Reason for therapy discharge:    Discharged to transitional care facility.    Progress towards therapy goal(s). See goals on Care Plan in [facility] electronic health record for goal details.  Goals partially met.  Barriers to achieving goals:   discharge from facility.    Therapy recommendation(s):    Continued therapy is recommended.  Rationale/Recommendations:  Would benefit from skilled OT to maximize ADL I and mobility prior to return to natural living environment.

## 2022-03-02 NOTE — PROGRESS NOTES
Care Management Discharge Note    Discharge Date: 03/02/2022     Discharge Disposition: Transitional Care    [facility]   [address] [place], [place], [address]  Phone: [phone]  Fax: [phone]  [facility], ph: [phone]   (pt has been accepted for a private room)    Discharge Services: PT, OT, RN, at TCU    Discharge DME: None    Discharge Transportation: [facility] (has his own wheelchair), no oxygen, arranged for  today at 1pm    Private pay costs discussed: Not applicable (medicare & medicaid)     PAS Confirmation Code:  [id number]    Patient/family educated on Medicare website which has current facility and service quality ratings: yes    Education Provided on the Discharge Plan:  Yes    Persons Notified of Discharge Plans: patient and spouse Ailin (she plans to meet Fer at the facility later this evening)    Patient/Family in Agreement with the Plan: yes    Handoff Referral Completed: Yes        Shamika Morrison RN, BSN, PHN  Care Coordinator  [facility]  [place]  [address]  Desk phone & confidential voicemail: [phone]  Pager: [phone]

## 2022-03-03 ENCOUNTER — PATIENT OUTREACH (OUTPATIENT)
Dept: CARE COORDINATION | Facility: CLINIC | Age: 72
End: 2022-03-03
Payer: MEDICARE

## 2022-03-03 DIAGNOSIS — Z71.89 OTHER SPECIFIED COUNSELING: ICD-10-CM

## 2022-03-03 NOTE — PROGRESS NOTES
Clinic Care Coordination Contact    Background: Care Coordination referral placed from [facility] discharge report for reason of patient meeting criteria for a TCM outreach call by [facility] team.    Assessment: Upon chart review, [facility] Team member will cancel/close the referral for TCM outreach due to reason below:    Patient is not established within [facility] Primary [facility]. Upon chart review, CCRC Team member noted patient discharged to TCU    Plan: Care Coordination referral for TCM outreach canceled.    Pina Lay MA  [facility], [facility]

## 2022-03-04 ENCOUNTER — LAB REQUISITION (OUTPATIENT)
Dept: LAB | Facility: CLINIC | Age: 72
End: 2022-03-04
Payer: MEDICARE

## 2022-03-04 DIAGNOSIS — R31.9 HEMATURIA, UNSPECIFIED: ICD-10-CM

## 2022-03-04 DIAGNOSIS — N39.0 URINARY TRACT INFECTION, SITE NOT SPECIFIED: ICD-10-CM

## 2022-03-04 LAB
ALBUMIN UR-MCNC: 100 MG/DL
APPEARANCE UR: ABNORMAL
BILIRUB UR QL STRIP: NEGATIVE
COLOR UR AUTO: ABNORMAL
GLUCOSE UR STRIP-MCNC: NEGATIVE MG/DL
HGB UR QL STRIP: ABNORMAL
KETONES UR STRIP-MCNC: NEGATIVE MG/DL
LEUKOCYTE ESTERASE UR QL STRIP: ABNORMAL
NITRATE UR QL: NEGATIVE
PH UR STRIP: 5.5 [PH] (ref 5–7)
RBC URINE: >182 /HPF
SP GR UR STRIP: 1.02 (ref 1–1.03)
UROBILINOGEN UR STRIP-MCNC: <2 MG/DL
WBC CLUMPS #/AREA URNS HPF: PRESENT /HPF
WBC URINE: >182 /HPF

## 2022-03-04 PROCEDURE — 87086 URINE CULTURE/COLONY COUNT: CPT | Mod: ORL | Performed by: NURSE PRACTITIONER

## 2022-03-04 PROCEDURE — 81003 URINALYSIS AUTO W/O SCOPE: CPT | Performed by: NURSE PRACTITIONER

## 2022-03-05 LAB — BACTERIA UR CULT: NO GROWTH

## 2022-03-07 ENCOUNTER — LAB REQUISITION (OUTPATIENT)
Dept: LAB | Facility: CLINIC | Age: 72
End: 2022-03-07
Payer: MEDICARE

## 2022-03-07 DIAGNOSIS — F10.10 ALCOHOL ABUSE, UNCOMPLICATED: ICD-10-CM

## 2022-03-07 DIAGNOSIS — E11.9 TYPE 2 DIABETES MELLITUS WITHOUT COMPLICATIONS (H): ICD-10-CM

## 2022-03-08 LAB
ANION GAP SERPL CALCULATED.3IONS-SCNC: 11 MMOL/L (ref 5–18)
BUN SERPL-MCNC: 51 MG/DL (ref 8–28)
CALCIUM SERPL-MCNC: 10 MG/DL (ref 8.5–10.5)
CHLORIDE BLD-SCNC: 102 MMOL/L (ref 98–107)
CO2 SERPL-SCNC: 25 MMOL/L (ref 22–31)
CREAT SERPL-MCNC: 2.35 MG/DL (ref 0.7–1.3)
GFR SERPL CREATININE-BSD FRML MDRD: 29 ML/MIN/1.73M2
GLUCOSE BLD-MCNC: 115 MG/DL (ref 70–125)
HBA1C MFR BLD: 5.2 %
POTASSIUM BLD-SCNC: 4.9 MMOL/L (ref 3.5–5)
SODIUM SERPL-SCNC: 138 MMOL/L (ref 136–145)

## 2022-03-08 PROCEDURE — 36415 COLL VENOUS BLD VENIPUNCTURE: CPT | Performed by: FAMILY MEDICINE

## 2022-03-08 PROCEDURE — P9603 ONE-WAY ALLOW PRORATED MILES: HCPCS | Mod: ORL | Performed by: FAMILY MEDICINE

## 2022-03-08 PROCEDURE — 80048 BASIC METABOLIC PNL TOTAL CA: CPT | Mod: ORL | Performed by: FAMILY MEDICINE

## 2022-03-08 PROCEDURE — 83036 HEMOGLOBIN GLYCOSYLATED A1C: CPT | Performed by: FAMILY MEDICINE

## 2022-03-12 ENCOUNTER — LAB REQUISITION (OUTPATIENT)
Dept: LAB | Facility: CLINIC | Age: 72
End: 2022-03-12
Payer: MEDICARE

## 2022-03-12 DIAGNOSIS — N18.6 END STAGE RENAL DISEASE (H): ICD-10-CM

## 2022-03-12 DIAGNOSIS — I10 ESSENTIAL (PRIMARY) HYPERTENSION: ICD-10-CM

## 2022-03-14 LAB
ANION GAP SERPL CALCULATED.3IONS-SCNC: 11 MMOL/L (ref 5–18)
BUN SERPL-MCNC: 52 MG/DL (ref 8–28)
CALCIUM SERPL-MCNC: 9.7 MG/DL (ref 8.5–10.5)
CHLORIDE BLD-SCNC: 104 MMOL/L (ref 98–107)
CO2 SERPL-SCNC: 24 MMOL/L (ref 22–31)
CREAT SERPL-MCNC: 2.16 MG/DL (ref 0.7–1.3)
GFR SERPL CREATININE-BSD FRML MDRD: 32 ML/MIN/1.73M2
GLUCOSE BLD-MCNC: 79 MG/DL (ref 70–125)
POTASSIUM BLD-SCNC: 4.9 MMOL/L (ref 3.5–5)
SODIUM SERPL-SCNC: 139 MMOL/L (ref 136–145)

## 2022-03-14 PROCEDURE — 80048 BASIC METABOLIC PNL TOTAL CA: CPT | Mod: ORL | Performed by: NURSE PRACTITIONER

## 2022-03-14 PROCEDURE — 36415 COLL VENOUS BLD VENIPUNCTURE: CPT | Performed by: NURSE PRACTITIONER

## 2022-03-14 PROCEDURE — P9603 ONE-WAY ALLOW PRORATED MILES: HCPCS | Mod: ORL | Performed by: NURSE PRACTITIONER

## 2022-03-31 NOTE — PLAN OF CARE
"Patient got an IV fluid flush for low urine volumes. 200cc emptied from ferguson immediately after flush, creatinine was 2.57 today. Encouraged fluid intake, ordered trays for patient, needs a lot of encouragement otherwise just sleeps in bed. This afternoon patient expressed concern that he wants to go home and not get \"shipped off\" to another facility, \"feels like a prisoner\". Explained need for a safe discharge plan and  is looking into this. Patient moved himself into a sitting postion, needs 2 assist, walker, gait belt and back brace to take a few steps to chair. Bed & chair alarms on for safety, patient is using call light appropriately but is forgetful.               " 96

## 2022-05-21 ENCOUNTER — HOSPITAL ENCOUNTER (EMERGENCY)
Facility: CLINIC | Age: 72
Discharge: HOME | End: 2022-05-21
Attending: EMERGENCY MEDICINE | Admitting: EMERGENCY MEDICINE
Payer: MEDICARE

## 2022-05-21 ENCOUNTER — APPOINTMENT (OUTPATIENT)
Dept: GENERAL RADIOLOGY | Facility: CLINIC | Age: 72
End: 2022-05-21
Attending: EMERGENCY MEDICINE
Payer: MEDICARE

## 2022-05-21 VITALS
SYSTOLIC BLOOD PRESSURE: 120 MMHG | HEART RATE: 86 BPM | DIASTOLIC BLOOD PRESSURE: 77 MMHG | WEIGHT: 217 LBS | BODY MASS INDEX: 27.85 KG/M2 | RESPIRATION RATE: 16 BRPM | OXYGEN SATURATION: 99 % | TEMPERATURE: 98.6 F | HEIGHT: 74 IN

## 2022-05-21 DIAGNOSIS — B37.49 CANDIDIASIS OF PERINEUM: ICD-10-CM

## 2022-05-21 DIAGNOSIS — N48.89 PENILE PAIN: ICD-10-CM

## 2022-05-21 DIAGNOSIS — Z46.6 ENCOUNTER FOR FOLEY CATHETER REPLACEMENT: ICD-10-CM

## 2022-05-21 LAB
ALBUMIN SERPL-MCNC: 2.9 G/DL (ref 3.4–5)
ALBUMIN UR-MCNC: 100 MG/DL
ALP SERPL-CCNC: 121 U/L (ref 40–150)
ALT SERPL W P-5'-P-CCNC: 22 U/L (ref 0–70)
ANION GAP SERPL CALCULATED.3IONS-SCNC: 11 MMOL/L (ref 3–14)
APPEARANCE UR: ABNORMAL
AST SERPL W P-5'-P-CCNC: 45 U/L (ref 0–45)
BACTERIA #/AREA URNS HPF: ABNORMAL /HPF
BASOPHILS # BLD AUTO: 0.1 10E3/UL (ref 0–0.2)
BASOPHILS NFR BLD AUTO: 1 %
BILIRUB SERPL-MCNC: 1.2 MG/DL (ref 0.2–1.3)
BILIRUB UR QL STRIP: NEGATIVE
BUN SERPL-MCNC: 33 MG/DL (ref 7–30)
CALCIUM SERPL-MCNC: 8.6 MG/DL (ref 8.5–10.1)
CHLORIDE BLD-SCNC: 104 MMOL/L (ref 94–109)
CO2 SERPL-SCNC: 23 MMOL/L (ref 20–32)
COLOR UR AUTO: YELLOW
CREAT SERPL-MCNC: 1.7 MG/DL (ref 0.66–1.25)
EOSINOPHIL # BLD AUTO: 0.1 10E3/UL (ref 0–0.7)
EOSINOPHIL NFR BLD AUTO: 1 %
ERYTHROCYTE [DISTWIDTH] IN BLOOD BY AUTOMATED COUNT: 15 % (ref 10–15)
GFR SERPL CREATININE-BSD FRML MDRD: 43 ML/MIN/1.73M2
GLUCOSE BLD-MCNC: 209 MG/DL (ref 70–99)
GLUCOSE UR STRIP-MCNC: NEGATIVE MG/DL
HCT VFR BLD AUTO: 35.7 % (ref 40–53)
HGB BLD-MCNC: 12 G/DL (ref 13.3–17.7)
HGB UR QL STRIP: ABNORMAL
HOLD SPECIMEN: NORMAL
HOLD SPECIMEN: NORMAL
IMM GRANULOCYTES # BLD: 0 10E3/UL
IMM GRANULOCYTES NFR BLD: 1 %
KETONES UR STRIP-MCNC: NEGATIVE MG/DL
LEUKOCYTE ESTERASE UR QL STRIP: ABNORMAL
LIPASE SERPL-CCNC: 87 U/L (ref 73–393)
LYMPHOCYTES # BLD AUTO: 0.8 10E3/UL (ref 0.8–5.3)
LYMPHOCYTES NFR BLD AUTO: 11 %
MCH RBC QN AUTO: 31.9 PG (ref 26.5–33)
MCHC RBC AUTO-ENTMCNC: 33.6 G/DL (ref 31.5–36.5)
MCV RBC AUTO: 95 FL (ref 78–100)
MONOCYTES # BLD AUTO: 0.4 10E3/UL (ref 0–1.3)
MONOCYTES NFR BLD AUTO: 6 %
MUCOUS THREADS #/AREA URNS LPF: PRESENT /LPF
NEUTROPHILS # BLD AUTO: 5.8 10E3/UL (ref 1.6–8.3)
NEUTROPHILS NFR BLD AUTO: 80 %
NITRATE UR QL: POSITIVE
NRBC # BLD AUTO: 0 10E3/UL
NRBC BLD AUTO-RTO: 0 /100
PH UR STRIP: 5.5 [PH] (ref 5–7)
PLATELET # BLD AUTO: 172 10E3/UL (ref 150–450)
POTASSIUM BLD-SCNC: 4.9 MMOL/L (ref 3.4–5.3)
PROT SERPL-MCNC: 7.7 G/DL (ref 6.8–8.8)
RBC # BLD AUTO: 3.76 10E6/UL (ref 4.4–5.9)
RBC URINE: 6 /HPF
SODIUM SERPL-SCNC: 138 MMOL/L (ref 133–144)
SP GR UR STRIP: 1.01 (ref 1–1.03)
UROBILINOGEN UR STRIP-MCNC: NORMAL MG/DL
WBC # BLD AUTO: 7.1 10E3/UL (ref 4–11)
WBC URINE: >182 /HPF

## 2022-05-21 PROCEDURE — 85025 COMPLETE CBC W/AUTO DIFF WBC: CPT | Performed by: EMERGENCY MEDICINE

## 2022-05-21 PROCEDURE — 83690 ASSAY OF LIPASE: CPT | Performed by: EMERGENCY MEDICINE

## 2022-05-21 PROCEDURE — 81001 URINALYSIS AUTO W/SCOPE: CPT | Performed by: EMERGENCY MEDICINE

## 2022-05-21 PROCEDURE — 73630 X-RAY EXAM OF FOOT: CPT | Mod: 26 | Performed by: RADIOLOGY

## 2022-05-21 PROCEDURE — 250N000013 HC RX MED GY IP 250 OP 250 PS 637: Performed by: EMERGENCY MEDICINE

## 2022-05-21 PROCEDURE — 80053 COMPREHEN METABOLIC PANEL: CPT | Performed by: EMERGENCY MEDICINE

## 2022-05-21 PROCEDURE — 36415 COLL VENOUS BLD VENIPUNCTURE: CPT | Performed by: EMERGENCY MEDICINE

## 2022-05-21 PROCEDURE — 250N000009 HC RX 250: Performed by: EMERGENCY MEDICINE

## 2022-05-21 PROCEDURE — 99284 EMERGENCY DEPT VISIT MOD MDM: CPT | Performed by: EMERGENCY MEDICINE

## 2022-05-21 PROCEDURE — 87186 SC STD MICRODIL/AGAR DIL: CPT | Performed by: EMERGENCY MEDICINE

## 2022-05-21 PROCEDURE — 51702 INSERT TEMP BLADDER CATH: CPT

## 2022-05-21 PROCEDURE — 73630 X-RAY EXAM OF FOOT: CPT | Mod: LT

## 2022-05-21 PROCEDURE — 99284 EMERGENCY DEPT VISIT MOD MDM: CPT | Mod: 25

## 2022-05-21 RX ORDER — NYSTATIN 100000 [USP'U]/G
POWDER TOPICAL 2 TIMES DAILY
Qty: 15 G | Refills: 0 | Status: SHIPPED | OUTPATIENT
Start: 2022-05-21

## 2022-05-21 RX ORDER — HYDROCODONE BITARTRATE AND ACETAMINOPHEN 5; 325 MG/1; MG/1
2 TABLET ORAL ONCE
Status: COMPLETED | OUTPATIENT
Start: 2022-05-21 | End: 2022-05-21

## 2022-05-21 RX ORDER — LIDOCAINE HYDROCHLORIDE 20 MG/ML
10 JELLY TOPICAL ONCE
Status: COMPLETED | OUTPATIENT
Start: 2022-05-21 | End: 2022-05-21

## 2022-05-21 RX ADMIN — HYDROCODONE BITARTRATE AND ACETAMINOPHEN 2 TABLET: 5; 325 TABLET ORAL at 15:04

## 2022-05-21 RX ADMIN — LIDOCAINE HYDROCHLORIDE 10 ML: 20 JELLY TOPICAL at 14:48

## 2022-05-21 ASSESSMENT — ENCOUNTER SYMPTOMS
SHORTNESS OF BREATH: 0
FEVER: 0
NAUSEA: 0
EYES NEGATIVE: 1
CHILLS: 1
HEADACHES: 0
PSYCHIATRIC NEGATIVE: 1
WOUND: 1
VOMITING: 0
ABDOMINAL PAIN: 0
ARTHRALGIAS: 1

## 2022-05-21 NOTE — PROGRESS NOTES
Fer came with ferguson from home, which was placed at [facility] , per patient. Ferguson was removed and replaced at 1449.     1500; patient discharge home, no family member home to received patient. Called spouse multiple times, no response. Call son Giovany, who answered.  Per Giovany, response from his mother, patient spouse. Per Giovany[place]. Updated charged nurse and social work. charge nurse called police to perform a wellness check. Awaiting call back from police.    1830: charge nurse received call from police, patient spouse is home. Will call, health transport to discharge home

## 2022-05-21 NOTE — PROGRESS NOTES
"  Brief Social Work Note    Expected Discharge Date:  5/21/2022     Concerns to be Addressed:    Discharge transportation    Additional Information:    SARMAD spoke with ED RN Carissa who asked SW to arrange discharge transportation for pt.    1611 SW contacted [facility] Transportation (Ph: [phone]), spoke with Alexa and secured Wheelchair transportation for a \"Will Call\" pt pickup. During call it was found that pt does not have his apartment key and he has been unable to contact his wife. Once he has been able to contact her, SW will call [facility] and finalize transport time.    1625 SW updated ED RN who agreed to call son in an attempt to contact pt's wife.     1658 SW spoke with RN who reported that son was unable to contact his mother or anyone else regarding his father. RN told SW that she would attempt to have [place] [employer] do a welfare check on pt's wife in an attempt locate her.    1708 SW called pt's wife and LVM asking for a call back    SW will continue to follow as needed.    LANETTE Cerrato, LGSW  ED/OBS   [facility]  Phone: [phone]  Pager: [phone]  Fax: [phone]     On-call pager, [phone], 4:00 pm to midnight.          "

## 2022-05-21 NOTE — CONSULTS
"Urology Consult    Name: Fer Latham    MRN: [id number]   YOB: 1950               Chief Complaint:   \"Infected ferguson\"    History is obtained from the patient and chart review          History of Present Illness:   Fer Latham is a 71M poor historian, with remote history of SCI, neurogenic bladder, chronic ferguson, presenting for as complaint of \"infected ferguson.\" Denies fevers, chill, any other issues. He reports he had urology follow up with [facility] next week but decided to come here because he was concerned about his infection.          Past Medical History:     Past Medical History:   Diagnosis Date     Anemia      Arrhythmia     atrial fib     Arthritis 1990's    gout     BPH (benign prostatic hyperplasia)      Current moderate episode of major depressive disorder without prior episode (H) 4/30/2019     Depression      Diabetes mellitus (H)     Type 2  IDDM     Diverticulosis      ESRD (end stage renal disease) on dialysis (H)      Hematuria      HTN (hypertension)      HTN (hypertension)      IgA nephropathy      MI, old      Pneumonia      Rhabdomyolysis      Weakness 5/10/2019            Past Surgical History:     Past Surgical History:   Procedure Laterality Date     ABDOMEN SURGERY       APPENDECTOMY  1970's     ARTHROSCOPY KNEE  6/27/2013    Procedure: ARTHROSCOPY KNEE;  Right Knee Arthroscopy, Debridment Of Medial Meniscal Tear.  ;  Surgeon: Tomás Wong MD;  Location: [facility]     BACK SURGERY  1999    L5 S1 decompression     BIOPSY  2017    skin     COLONOSCOPY  2013    [facility]     CORONARY ANGIOGRAPHY ADULT ORDER  2018     EYE SURGERY Bilateral 2004    Lens replacement     IR CVC TUNNEL PLACEMENT > 5 YRS OF AGE  11/26/2019     IR CVC TUNNEL REMOVAL RIGHT  11/26/2019     IR CVC TUNNEL REMOVAL RIGHT  2/22/2022     IR CVC TUNNEL REVISION RIGHT  2/10/2020     IR CVC TUNNEL REVISION RIGHT  10/8/2020     IR CVC TUNNEL REVISION RIGHT  6/17/2021     VASCULAR SURGERY              " Social History:     Social History     Tobacco Use     Smoking status: Former Smoker     Smokeless tobacco: Never Used     Tobacco comment: quit 35 years ago   Substance Use Topics     Alcohol use: Yes     Comment: 1 to 2 drinks a day (Occasional)            Family History:     Family History   Problem Relation Age of Onset     Cancer Father             Allergies:     Allergies   Allergen Reactions     Ciprofloxacin      Diagnostic X-Ray Materials      Other reaction(s): Unknown     Sulfa Drugs Hives     Other reaction(s): Unknown  PN: LW Reaction: Rash, Generalized              Medications:     Current Facility-Administered Medications   Medication     lidocaine (XYLOCAINE) 2 % external gel 10 mL     Current Outpatient Medications   Medication Sig     aspirin (ASA) 325 MG EC tablet Take 325 mg by mouth daily     escitalopram (LEXAPRO) 10 MG tablet Take 1 tablet (10 mg) by mouth daily     mirtazapine (REMERON) 15 MG tablet Take 1 tablet (15 mg) by mouth At Bedtime     multivitamin, therapeutic (THERA-VIT) TABS tablet Take 1 tablet by mouth daily     nystatin (MYCOSTATIN) 909409 UNIT/GM external cream Apply topically 2 times daily     polyethylene glycol (MIRALAX) 17 GM/Dose powder Take 8.5 g by mouth daily as needed      senna (SENOKOT) 8.6 MG tablet Take 17.2 mg by mouth 2 times daily as needed     thiamine (B-1) 100 MG tablet Take 1 tablet (100 mg) by mouth daily     acetaminophen (TYLENOL) 500 MG tablet Take 2 tablets (1,000 mg) by mouth 3 times daily     bisacodyl (DULCOLAX) 10 MG suppository Place 1 suppository (10 mg) rectally daily as needed for constipation     folic acid (FOLVITE) 1 MG tablet Take 1 tablet (1 mg) by mouth daily     melatonin 5 MG tablet Take 1 tablet (5 mg) by mouth nightly as needed for sleep     miconazole (MICATIN) 2 % external powder Apply topically 2 times daily     Vitamin D3 (CHOLECALCIFEROL) 125 MCG (5000 UT) tablet Take 1 tablet by mouth daily             Review of Systems:    ROS:  "10 point ROS neg other than the symptoms noted above in the HPI           Physical Exam:   VS:  T: 98.7    HR: 78    BP: 110/76    RR: 18   GEN:  AOx3.  NAD.    CV:  Non cyanotic  LUNGS: Non-labored breathing.   BACK:  No midline or CVA tenderness.  :  See images for details, glans, surrounding shaft skin appear irritated and erythematous, fungal cream noted around scrotum and penis.       SKIN:  Warm.  Dry.  No rashes.  NEURO:  CN grossly intact.            Data:   All laboratory data reviewed:    No lab results found in last 7 days.  Recent Labs   Lab 05/21/22  1319      POTASSIUM 4.9   CHLORIDE 104   CO2 23   BUN 33*   CR 1.70*   *   COLUMBA 8.6     No lab results found in last 7 days.    Invalid input(s): URINEBLOOD    All pertinent imaging reviewed:         Impression and Plan:   Impression:   Fer Latham is a 71M with neurogenic bladder, chronic ferguson, coming in with concerns of \"infected ferguson\". Recently saw urology at [facility] April 2022. Afebrile, WBC WNL, no other issues.     Of note, on  exam, patient appears to have fungal infection that he reports he puts \"cream\" on. He doesn't know how long he has had this infection. See images for details.      Plan:     - Given it has been ~1 month since catheter last exchanged, okay to exchange today in ED.    - Per chart review of [facility], patient due for renal ultrasound. Can either obtain here or with primary urologist when follows up.    - Given unclear how long patient has irritated glans penis and surrounding skin from reported \"fungal infection\" on chart review, recommend following up with primary urologist for consideration of biopsy to rule out malignancy if infection not resolving. Until then, can continue current antifungal cream therapy.  - Page urology if patient desires to follow up with urology and [facility]. Upon discussion bedside, he appeared to want to continue follow up with [facility] urology.    - Urology " will sign off. Please contact resident/PA on call with any questions or concerns.       This patient's exam findings, labs, and imaging discussed with urology staff surgeon Dr. Wadsworth, who developed the treatment plan.    Mariano Dover MD  Urology Resident

## 2022-05-21 NOTE — ED PROVIDER NOTES
"    [facility] [facility]ERGENC[facility] DE[facility]MENT ([facility])  5/21/22  History     Chief Complaint   Patient presents with     UTI     The history is provided by the patient.     Fer Latham is a 71 year old male who has a significant medical history of recurrent UTI's, BPH, neurogenic bladder, type II diabetes, hypertension, ESRD, chronic atrial fibrillation, and alcohol abuse who comes via EMS to the Emergency Department with c/o worsening penile pain for 2 weeks. Here in the ED, he reports that his catheter may be infected and he has had intermittent penile discomfort for 2 weeks.  States that his catheter was last changed a month ago, he has had a history of UTI and reports knowing that his penile pain will become persistent if he continues to wait for care.  He denies fever, but states that EMS told him that he felt hot.  He reports feeling chilly while in the ambulance.  Reports abdominal tenderness and increased abdominal gas that he describes as \"bubbling.\"  He states that his penile pain increases once this abdominal gas resolves.  The patient states he has an appointment on Monday, in 2 days for Brown catheter exchange.  He presented to the emergency department this Saturday and will likely need Brown exchange so he does not have to go to this appointment.      The patient was injured during his transfer with EMS, he reports falling directly onto his knees while being transferred from his wheelchair to the ambulance gurney.  He has injury to his left toes and his knees from this fall.  He reports a history of falling.      Past Medical History  Past Medical History:   Diagnosis Date     Anemia      Arrhythmia     atrial fib     Arthritis 1990's    gout     BPH (benign prostatic hyperplasia)      Current moderate episode of major depressive disorder without prior episode (H) 4/30/2019     Depression      Diabetes mellitus (H)     Type 2  IDDM     Diverticulosis      ESRD (end stage renal disease) on dialysis " (H)      Hematuria      HTN (hypertension)      HTN (hypertension)      IgA nephropathy      MI, old      Pneumonia      Rhabdomyolysis      Weakness 5/10/2019     Past Surgical History:   Procedure Laterality Date     ABDOMEN SURGERY       APPENDECTOMY  1970's     ARTHROSCOPY KNEE  6/27/2013    Procedure: ARTHROSCOPY KNEE;  Right Knee Arthroscopy, Debridment Of Medial Meniscal Tear.  ;  Surgeon: Tomás Wong MD;  Location: [facility]     BACK SURGERY  1999    L5 S1 decompression     BIOPSY  2017    skin     COLONOSCOPY  2013    [facility]     CORONARY ANGIOGRAPHY ADULT ORDER  2018     EYE SURGERY Bilateral 2004    Lens replacement     IR CVC TUNNEL PLACEMENT > 5 YRS OF AGE  11/26/2019     IR CVC TUNNEL REMOVAL RIGHT  11/26/2019     IR CVC TUNNEL REMOVAL RIGHT  2/22/2022     IR CVC TUNNEL REVISION RIGHT  2/10/2020     IR CVC TUNNEL REVISION RIGHT  10/8/2020     IR CVC TUNNEL REVISION RIGHT  6/17/2021     VASCULAR SURGERY       aspirin (ASA) 325 MG EC tablet  escitalopram (LEXAPRO) 10 MG tablet  mirtazapine (REMERON) 15 MG tablet  multivitamin, therapeutic (THERA-VIT) TABS tablet  nystatin (MYCOSTATIN) 650759 UNIT/GM external cream  polyethylene glycol (MIRALAX) 17 GM/Dose powder  senna (SENOKOT) 8.6 MG tablet  thiamine (B-1) 100 MG tablet  acetaminophen (TYLENOL) 500 MG tablet  bisacodyl (DULCOLAX) 10 MG suppository  folic acid (FOLVITE) 1 MG tablet  melatonin 5 MG tablet  miconazole (MICATIN) 2 % external powder  Vitamin D3 (CHOLECALCIFEROL) 125 MCG (5000 UT) tablet      Allergies   Allergen Reactions     Ciprofloxacin      Diagnostic X-Ray Materials      Other reaction(s): Unknown     Sulfa Drugs Hives     Other reaction(s): Unknown  PN: LW Reaction: Rash, Generalized       Family History  Family History   Problem Relation Age of Onset     Cancer Father      Social History   Social History     Tobacco Use     Smoking status: Former Smoker     Smokeless tobacco: Never Used     Tobacco comment: quit 35 years ago  "  Substance Use Topics     Alcohol use: Yes     Comment: 1 to 2 drinks a day (Occasional)     Drug use: No      Past medical history, past surgical history, medications, allergies, family history, and social history were reviewed with the patient. No additional pertinent items.     I have reviewed the Medications, Allergies, Past Medical and Surgical History, and Social History in the Epic system.    Review of Systems   Constitutional: Positive for chills. Negative for fever.   Eyes: Negative.    Respiratory: Negative for shortness of breath.    Cardiovascular: Negative for chest pain.   Gastrointestinal: Negative for abdominal pain, nausea and vomiting.   Genitourinary: Positive for penile pain.   Musculoskeletal: Positive for arthralgias.   Skin: Positive for wound.   Neurological: Negative for headaches.   Psychiatric/Behavioral: Negative.    All other systems reviewed and are negative.        Physical Exam   BP: 110/76  Pulse: 78  Temp: 98.7  F (37.1  C)  Resp: 18  Height: 188 cm (6' 2\")  Weight: 98.4 kg (217 lb)  SpO2: 100 %      Physical Exam  Physical Exam   Constitutional:   well nourished, well developed, resting comfortably   HENT:   Head: Normocephalic and atraumatic.   Eyes: Conjunctivae are normal. Pupils are equal, round, and reactive to light.    pharynx has no erythema or exudate, mucous membranes are moist  Neck:   no adenopathy, no bony tenderness  Cardiovascular: regular rate and rhythm without murmurs or gallops  Pulmonary/Chest: Clear to auscultation bilaterally, with no wheezes or retractions. No respiratory distress.  GI: Soft with good bowel sounds.  Non-tender, non-distended, with no guarding, no rebound, no peritoneal signs. : Brown is in place.  Penis is inverted.  There is erythema and dermatitis of the groin area  : Inverted penis with erythema, glans penis erythematous, partially fused to skin with fungal rash.  Back:  No bony or CVA tenderness   Musculoskeletal: 1 to 2 mm pitting " edema of the lower extremities, fading ecchymoses bilateral lower extremities, left greater than right--see photographs below.  Abrasions to knees, no appreciable swelling.  Left foot tenderness over the distal toes.  Palpable distal pulses  Skin: Skin is warm and dry. No rash noted.   Neurological: alert and oriented to person, place, and time. Nonfocal exam  Psychiatric:  normal mood and affect.     Lower extremity ecchymosis        ED Course     At 1:01 PM the patient was seen and examined by Shawna Martínez MD in Room ED28.        Procedures               The medical record was reviewed and interpreted.  Current labs reviewed and interpreted.  Previous labs reviewed and interpreted.     Results for orders placed or performed during the hospital encounter of 05/21/22 (from the past 24 hour(s))   Comprehensive metabolic panel   Result Value Ref Range    Sodium 138 133 - 144 mmol/L    Potassium 4.9 3.4 - 5.3 mmol/L    Chloride 104 94 - 109 mmol/L    Carbon Dioxide (CO2) 23 20 - 32 mmol/L    Anion Gap 11 3 - 14 mmol/L    Urea Nitrogen 33 (H) 7 - 30 mg/dL    Creatinine 1.70 (H) 0.66 - 1.25 mg/dL    Calcium 8.6 8.5 - 10.1 mg/dL    Glucose 209 (H) 70 - 99 mg/dL    Alkaline Phosphatase 121 40 - 150 U/L    AST 45 0 - 45 U/L    ALT 22 0 - 70 U/L    Protein Total 7.7 6.8 - 8.8 g/dL    Albumin 2.9 (L) 3.4 - 5.0 g/dL    Bilirubin Total 1.2 0.2 - 1.3 mg/dL    GFR Estimate 43 (L) >60 mL/min/1.73m2   Lipase   Result Value Ref Range    Lipase 87 73 - 393 U/L   [place] Draw    Narrative    The following orders were created for panel order [place] Draw.  Procedure                               Abnormality         Status                     ---------                               -----------         ------                     Extra Blue Top Tube[[id number]]                              Final result               Extra Green Top (Lithium...[[id number]]                                                 Extra Purple Top Tube[[id number]]                                                          Please view results for these tests on the individual orders.   Extra Blue Top Tube   Result Value Ref Range    Hold Specimen JIC    XR Foot Port Left 3 Views    Narrative    EXAM: XR FOOT PORT LEFT 3 VIEWS  LOCATION: [facility]  DATE/TIME: 5/21/2022 1:13 PM    INDICATION: fall during EMS transfer  COMPARISON: None.      Impression    IMPRESSION: Skin laceration of the great toe distally with soft tissue swelling. No acute fracture is identified. There is normal joint alignment. Minimal degenerative changes. Osteopenia. Plantar calcaneal enthesophyte. Vascular calcification.   CBC with platelets differential    Narrative    The following orders were created for panel order CBC with platelets differential.  Procedure                               Abnormality         Status                     ---------                               -----------         ------                     CBC with platelets and d...[[id number]]  Abnormal            Final result                 Please view results for these tests on the individual orders.   CBC with platelets and differential   Result Value Ref Range    WBC Count 7.1 4.0 - 11.0 10e3/uL    RBC Count 3.76 (L) 4.40 - 5.90 10e6/uL    Hemoglobin 12.0 (L) 13.3 - 17.7 g/dL    Hematocrit 35.7 (L) 40.0 - 53.0 %    MCV 95 78 - 100 fL    MCH 31.9 26.5 - 33.0 pg    MCHC 33.6 31.5 - 36.5 g/dL    RDW 15.0 10.0 - 15.0 %    Platelet Count 172 150 - 450 10e3/uL    % Neutrophils 80 %    % Lymphocytes 11 %    % Monocytes 6 %    % Eosinophils 1 %    % Basophils 1 %    % Immature Granulocytes 1 %    NRBCs per 100 WBC 0 <1 /100    Absolute Neutrophils 5.8 1.6 - 8.3 10e3/uL    Absolute Lymphocytes 0.8 0.8 - 5.3 10e3/uL    Absolute Monocytes 0.4 0.0 - 1.3 10e3/uL    Absolute Eosinophils 0.1 0.0 - 0.7 10e3/uL    Absolute Basophils 0.1 0.0 - 0.2 10e3/uL    Absolute Immature Granulocytes 0.0 <=0.4 10e3/uL     Absolute NRBCs 0.0 10e3/uL   Extra Tube    Narrative    The following orders were created for panel order Extra Tube.  Procedure                               Abnormality         Status                     ---------                               -----------         ------                     Extra Green Top (Lithium...[[id number]]                      In process                   Please view results for these tests on the individual orders.     Medications   lidocaine (XYLOCAINE) 2 % external gel 10 mL (10 mLs Urethral Given 5/21/22 1298)   HYDROcodone-acetaminophen (NORCO) 5-325 MG per tablet 2 tablet (2 tablets Oral Given 5/21/22 1504)             Assessments & Plan (with Medical Decision Making)       I have reviewed the nursing notes.  Emergency Department course:  The patient was seen and examined at 1301 pm..     Left foot x-ray shows IMPRESSION: Skin laceration of the great toe distally with soft tissue swelling. No acute fracture is identified. There is normal joint alignment. Minimal degenerative changes. Osteopenia. Plantar calcaneal enthesophyte. Vascular calcification.    Laboratory studies show chronic kidney disease, with creatinine of 1.70 and a GFR of 43, improved from prior studies.  Glucose is slightly high at 209.  Lipase is normal at 87.  CBC shows mild anemia, with a hemoglobin of 12.0.  WBC is normal at 7.1.    The patient's Brown catheter was exchanged.  Urine culture from the new Brown is pending.  The UA will likely be contaminated given that it is obtained from an indwelling Brown.  Treatment will be based on urine culture results.    Fer Latham is a 71 year old male who presents with penile pain around his Brown catheter and request for Brown exchange.  The Brown has been exchanged in the ED.  Laboratory studies are consistent with prior.  Urine culture is pending.  The patient will be discharged from the ED.  He should follow-up with his regular doctor for results of urine culture.   Treatment will be based on urine culture results.  He also had an incidental fall while EMS was loading him on the ambulance.  He does have new abrasions to his left foot.  He should keep this area clean and dry and wash it at least twice a day..    Addendum: I was told by the nurse that approximately 1630 pm that we were unable to reach the patient's wife.  His son was contacted but does not want to involve himself in the patient's care.  Police were called to perform a health and welfare check on the patient's wife.  If we are unable to find family members to discharge the patient home, he will be admitted to the ED observation unit until we are able to find placement for the patient.    The police apparently were able to contact the patient's wife and she is willing to have him be discharged home to her.  We are now working on patient discharged by ambulance back home. (19:00 pm)    I have reviewed the findings, diagnosis, plan and need for follow up with the patient.    Current Discharge Medication List          Final diagnoses:   Penile pain   Candidiasis of perineum   Encounter for Brown catheter replacement       I, Jayson Vela am serving as a trained medical scribe to document services personally performed by Shawna Martínez, based on the provider's statements to me.      IShawna, was physically present and have reviewed and verified the accuracy of this note documented by Jayson Vela.   This note was created in part by the use of Dragon voice recognition dictation system. Inadvertent grammatical errors and typographical errors may still exist.  MD Shawna Ruiz MD  5/21/2022   [facility] EMERGENCY DEPARTMENT    Shawna Martínez MD  05/21/22 1900

## 2022-05-21 NOTE — ED TRIAGE NOTES
BIBA for worsening penile pain x 2 weeks. Brown placed a month ago. Fall during transfer with EMS. Left toes injuried, dressing placed by EMS

## 2022-05-21 NOTE — DISCHARGE INSTRUCTIONS
Your Brown catheter has been replaced.  Your urine has been sent for culture.  The culture results should be ready in 1 to 2 days.  You will need to follow-up with your regular doctor for results.  Please use nystatin powder and miconazole powder to your perineal area to treat this area.  You have a new bruise to your toe.  Please wash this area frequently and keep it clean and dry.  Your glans penis will need further follow up---it may need a biopsy if it fails to heal. Follow up with your primary urologist at [facility]

## 2022-05-22 ENCOUNTER — TELEPHONE (OUTPATIENT)
Dept: EMERGENCY MEDICINE | Facility: CLINIC | Age: 72
End: 2022-05-22
Payer: MEDICARE

## 2022-05-22 NOTE — PROGRESS NOTES
1900 patient spouse called staff after police made a wellness check.staff explained to spouse about fer being discharge home. Patient was hesitant about  Fer being discharge home.  Called [facility] for transport. Social work plan transport form wheel chair however, [facility] staff offered stretcher base on patient condition. Later [facility] called spouse , Ailin and a decision for wheel chair as a mode of transport via [facility]. Transport will be available in 25-30  Minutes. Spouse will be waiting for patient    COREY Mcguire   no

## 2022-05-22 NOTE — TELEPHONE ENCOUNTER
"[facility] Emergency Department Lab result notification [Adult-Male]    [place] ED lab result protocol used  Urine Culture    Reason for call  Notify of lab results, assess symptoms,  review ED providers recommendations/discharge instructions (if necessary) and advise per ED lab result f/u protocol    Lab Result (including Rx patient on, if applicable)  Preliminary urine culture report on 5/22/22 shows the presence of bacteria(s):  >100,000 CFU/mL Escherichia coli Abnormal   Emergency Dept/Urgent Care discharge antibiotic: None  Recommendations per [facility] ED Lab result Urine culture protocol.    Information table from Emergency Dept Provider visit on 5/21/22  Symptoms reported at ED visit (Chief complaint, HPI) UTI      The history is provided by the patient.      Fer Latham is a 71 year old male who has a significant medical history of recurrent UTI's, BPH, neurogenic bladder, type II diabetes, hypertension, ESRD, chronic atrial fibrillation, and alcohol abuse who comes via EMS to the Emergency Department with c/o worsening penile pain for 2 weeks. Here in the ED, he reports that his catheter may be infected and he has had intermittent penile discomfort for 2 weeks.  States that his catheter was last changed a month ago, he has had a history of UTI and reports knowing that his penile pain will become persistent if he continues to wait for care.  He denies fever, but states that EMS told him that he felt hot.  He reports feeling chilly while in the ambulance.  Reports abdominal tenderness and increased abdominal gas that he describes as \"bubbling.\"  He states that his penile pain increases once this abdominal gas resolves.  The patient states he has an appointment on Monday, in 2 days for Brown catheter exchange.  He presented to the emergency department this Saturday and will likely need Brown exchange so he does not have to go to this appointment.        The patient was injured during his " transfer with EMS, he reports falling directly onto his knees while being transferred from his wheelchair to the ambulance gurney.  He has injury to his left toes and his knees from this fall.  He reports a history of falling.          Significant Medical hx, if applicable (i.e. CKD, diabetes) Type 2 diabetes    Allergies Allergies   Allergen Reactions     Ciprofloxacin      Diagnostic X-Ray Materials      Other reaction(s): Unknown     Sulfa Drugs Hives     Other reaction(s): Unknown  PN: LW Reaction: Rash, Generalized        Weight, if applicable Wt Readings from Last 2 Encounters:   05/21/22 98.4 kg (217 lb)   03/01/22 98.7 kg (217 lb 11.2 oz)      Coumadin/Warfarin [Yes /No] No   Creatinine Level (mg/dl) Creatinine   Date Value Ref Range Status   05/21/2022 1.70 (H) 0.66 - 1.25 mg/dL Final   07/01/2021 2.95 (H) 0.66 - 1.25 mg/dL Final      Creatinine clearance (ml/min), if applicable Serum creatinine: 1.7 mg/dL (H) 05/21/22 1319  Estimated creatinine clearance: 56.3 mL/min (A)   ED providers Impression and Plan (applicable information) I have reviewed the nursing notes.  Emergency Department course:  The patient was seen and examined at 1301 pm..      Left foot x-ray shows IMPRESSION: Skin laceration of the great toe distally with soft tissue swelling. No acute fracture is identified. There is normal joint alignment. Minimal degenerative changes. Osteopenia. Plantar calcaneal enthesophyte. Vascular calcification.     Laboratory studies show chronic kidney disease, with creatinine of 1.70 and a GFR of 43, improved from prior studies.  Glucose is slightly high at 209.  Lipase is normal at 87.  CBC shows mild anemia, with a hemoglobin of 12.0.  WBC is normal at 7.1.     The patient's Brown catheter was exchanged.  Urine culture from the new Brown is pending.  The UA will likely be contaminated given that it is obtained from an indwelling Brown.  Treatment will be based on urine culture results.     Fer Latham  is a 71 year old male who presents with penile pain around his Brown catheter and request for Brown exchange.  The Brown has been exchanged in the ED.  Laboratory studies are consistent with prior.  Urine culture is pending.  The patient will be discharged from the ED.  He should follow-up with his regular doctor for results of urine culture.  Treatment will be based on urine culture results.  He also had an incidental fall while EMS was loading him on the ambulance.  He does have new abrasions to his left foot.  He should keep this area clean and dry and wash it at least twice a day..     Addendum: I was told by the nurse that approximately 1630 pm that we were unable to reach the patient's wife.  His son was contacted but does not want to involve himself in the patient's care.  Police were called to perform a health and welfare check on the patient's wife.  If we are unable to find family members to discharge the patient home, he will be admitted to the ED observation unit until we are able to find placement for the patient.     The police apparently were able to contact the patient's wife and she is willing to have him be discharged home to her.  We are now working on patient discharged by ambulance back home. (19:00 pm)     I have reviewed the findings, diagnosis, plan and need for follow up with the patient.      ED diagnosis Penile pain   Candidiasis of perineum   Encounter for Brown catheter replacement        ED provider Shawna Martínez MD      RN Assessment (Patient s current Symptoms), include time called.  [Insert Left message here if message left]  12:23p  Left voicemail message requesting a call back to [facility] ED Lab Result RN at [phone].  RN is available every day between 9 a.m. and 5:30 p.m.      Laquita Gaines, COREY  [facility] [address] [place]  Emergency Dept Lab Result RN  Ph# [phone]     Copy of Lab result

## 2022-05-23 LAB — BACTERIA UR CULT: ABNORMAL

## 2022-05-23 RX ORDER — CEFPODOXIME PROXETIL 200 MG/1
200 TABLET, FILM COATED ORAL 2 TIMES DAILY
Qty: 14 TABLET | Refills: 0 | Status: SHIPPED | OUTPATIENT
Start: 2022-05-23 | End: 2022-05-30

## 2022-05-23 NOTE — TELEPHONE ENCOUNTER
"[facility] Emergency Department/Urgent Care Lab result notification     Patient/parent Name  Fer - patient    RN Assessment (Patient s current Symptoms), include time called.   1:58 PM - \"I don't think I've improved this much\" - spasm behind scrotum real intense and then goes away - denies swelling - this is the same since assessment  Genitourinary symptoms:  No  Catheter: No   Fever:  No  Urine:  \"I don't know I haven't look at it\"  Nausea/vomiting/diarrhea:  No  Lab result (if applicable):  Final urine culture on 5/23/22 shows the presence of bacteria(s): >100,000 CFU/mL Escherichia coli  [facility] Emergency Dept discharge antibiotic: None  Recommendations in treatment per [facility] ED lab result Urine Culture protocol.    RN Recommendations/Instructions per [place] ED lab result protocol  Patient notified of lab result and treatment recommendations.  Rx for Cefpodoxime (Vantin) 200 MG tablet, 1 tablet (200 mg) by mouth 2 times daily for 7 days sent to [Pharmacy - [place], [place]].  RN reviewed information about UTI Prevention, probiotic, when to be seen.     Please Contact your PCP clinic or return to the Emergency department if your:    Symptoms return.    Symptoms do not improve after 3 days on antibiotic.    Symptoms do not resolve after completing antibiotic.    Symptoms worsen or other concerning symptom's.    PCP follow-up Questions asked: YES       Dayami Negro RN  [facility] [place]  Emergency Dept Lab Result RN  # [phone]     Copy of Lab result   Contains abnormal data Urine Culture  Order: [id number]   Status: Final result     Visible to patient: Yes (not seen)    Specimen Information: Urine, Brown Catheter         3 Result Notes    Culture >100,000 CFU/mL Escherichia coli Abnormal             Resulting Agency: [facility]       Susceptibility      Escherichia coli (1)    Antibiotic Interpretation Sensitivity  Method Status    Ampicillin " Susceptible <=2.0 ug/mL PK Final    Ampicillin/ Sulbactam Susceptible <=2.0 ug/mL PK Final    Piperacillin/Tazobactam Susceptible <=4.0 ug/mL PK Final    Cefazolin Susceptible <=4.0 ug/mL PK Final     Cefazolin PK breakpoints are for the treatment of uncomplicated urinary tract infections. For the treatment of systemic infections, please contact the laboratory for additional testing.       Cefoxitin Susceptible <=4.0 ug/mL PK Final    Ceftazidime Susceptible <=1.0 ug/mL PK Final    Ceftriaxone Susceptible <=1.0 ug/mL PK Final    Cefepime Susceptible <=1.0 ug/mL PK Final    Gentamicin Susceptible <=1.0 ug/mL PK Final    Tobramycin Susceptible <=1.0 ug/mL PK Final    Ciprofloxacin Susceptible <=0.25 ug/mL PK Final    Levofloxacin Susceptible <=0.12 ug/mL PK Final    Nitrofurantoin Susceptible <=16.0 ug/mL PK Final    Trimethoprim/Sulfamethoxazole Susceptible <=1/19 ug/mL PK Final          Specimen Collected: 05/21/22  2:52 PM Last Resulted: 05/23/22  4:17 AM

## 2022-05-25 NOTE — TELEPHONE ENCOUNTER
[facility] Emergency Department[facility]Urgent Care Lab result notification     Patient/parent Name  Ailin (patient's spouse)    RN Assessment (Patient s current Symptoms), include time called.  [Insert Left message here if message left]  Ailin spouse is returning call from ED lab results department. Upon review of chart. Call was to relay urine culture results. Fer was contacted by ED results team 5/23/22 and treatment initiated. Ailin states that Fer has been taking his antibiotic and feels better.   She will have him follow up with his primary at [facility].    Laquita Gaines, COREY  [facility] [employer] [place]  Emergency Dept Lab Result RN  Ph# [phone]     Copy of Lab result

## 2022-05-30 ENCOUNTER — HOSPITAL ENCOUNTER (EMERGENCY)
Facility: CLINIC | Age: 72
Discharge: HOME | End: 2022-05-30
Attending: EMERGENCY MEDICINE | Admitting: EMERGENCY MEDICINE
Payer: MEDICARE

## 2022-05-30 ENCOUNTER — APPOINTMENT (OUTPATIENT)
Dept: CT IMAGING | Facility: CLINIC | Age: 72
End: 2022-05-30
Attending: EMERGENCY MEDICINE
Payer: MEDICARE

## 2022-05-30 VITALS
RESPIRATION RATE: 16 BRPM | HEART RATE: 85 BPM | OXYGEN SATURATION: 98 % | SYSTOLIC BLOOD PRESSURE: 125 MMHG | TEMPERATURE: 98.1 F | DIASTOLIC BLOOD PRESSURE: 74 MMHG

## 2022-05-30 DIAGNOSIS — R10.84 ABDOMINAL PAIN, GENERALIZED: ICD-10-CM

## 2022-05-30 DIAGNOSIS — N30.01 ACUTE CYSTITIS WITH HEMATURIA: ICD-10-CM

## 2022-05-30 LAB
ALBUMIN SERPL-MCNC: 2.8 G/DL (ref 3.4–5)
ALBUMIN UR-MCNC: 50 MG/DL
ALP SERPL-CCNC: 107 U/L (ref 40–150)
ALT SERPL W P-5'-P-CCNC: 11 U/L (ref 0–70)
ANION GAP SERPL CALCULATED.3IONS-SCNC: 9 MMOL/L (ref 3–14)
APPEARANCE UR: ABNORMAL
AST SERPL W P-5'-P-CCNC: 16 U/L (ref 0–45)
ATRIAL RATE - MUSE: 468 BPM
BASOPHILS # BLD AUTO: 0.1 10E3/UL (ref 0–0.2)
BASOPHILS NFR BLD AUTO: 1 %
BILIRUB SERPL-MCNC: 0.7 MG/DL (ref 0.2–1.3)
BILIRUB UR QL STRIP: NEGATIVE
BUN SERPL-MCNC: 25 MG/DL (ref 7–30)
CALCIUM SERPL-MCNC: 8.5 MG/DL (ref 8.5–10.1)
CHLORIDE BLD-SCNC: 104 MMOL/L (ref 94–109)
CO2 SERPL-SCNC: 21 MMOL/L (ref 20–32)
COLOR UR AUTO: ABNORMAL
CREAT SERPL-MCNC: 1.73 MG/DL (ref 0.66–1.25)
DIASTOLIC BLOOD PRESSURE - MUSE: NORMAL MMHG
EOSINOPHIL # BLD AUTO: 0.2 10E3/UL (ref 0–0.7)
EOSINOPHIL NFR BLD AUTO: 4 %
ERYTHROCYTE [DISTWIDTH] IN BLOOD BY AUTOMATED COUNT: 13.9 % (ref 10–15)
GFR SERPL CREATININE-BSD FRML MDRD: 42 ML/MIN/1.73M2
GLUCOSE BLD-MCNC: 81 MG/DL (ref 70–99)
GLUCOSE UR STRIP-MCNC: NEGATIVE MG/DL
HCT VFR BLD AUTO: 33.8 % (ref 40–53)
HGB BLD-MCNC: 11.8 G/DL (ref 13.3–17.7)
HGB UR QL STRIP: ABNORMAL
IMM GRANULOCYTES # BLD: 0 10E3/UL
IMM GRANULOCYTES NFR BLD: 0 %
INTERPRETATION ECG - MUSE: NORMAL
KETONES UR STRIP-MCNC: NEGATIVE MG/DL
LEUKOCYTE ESTERASE UR QL STRIP: ABNORMAL
LYMPHOCYTES # BLD AUTO: 1.4 10E3/UL (ref 0.8–5.3)
LYMPHOCYTES NFR BLD AUTO: 25 %
MCH RBC QN AUTO: 32.4 PG (ref 26.5–33)
MCHC RBC AUTO-ENTMCNC: 34.9 G/DL (ref 31.5–36.5)
MCV RBC AUTO: 93 FL (ref 78–100)
MONOCYTES # BLD AUTO: 0.7 10E3/UL (ref 0–1.3)
MONOCYTES NFR BLD AUTO: 12 %
NEUTROPHILS # BLD AUTO: 3.3 10E3/UL (ref 1.6–8.3)
NEUTROPHILS NFR BLD AUTO: 58 %
NITRATE UR QL: NEGATIVE
NRBC # BLD AUTO: 0 10E3/UL
NRBC BLD AUTO-RTO: 0 /100
P AXIS - MUSE: NORMAL DEGREES
PH UR STRIP: 5.5 [PH] (ref 5–7)
PLATELET # BLD AUTO: 171 10E3/UL (ref 150–450)
POTASSIUM BLD-SCNC: 4 MMOL/L (ref 3.4–5.3)
PR INTERVAL - MUSE: NORMAL MS
PROT SERPL-MCNC: 7.2 G/DL (ref 6.8–8.8)
QRS DURATION - MUSE: 96 MS
QT - MUSE: 358 MS
QTC - MUSE: 459 MS
R AXIS - MUSE: 12 DEGREES
RBC # BLD AUTO: 3.64 10E6/UL (ref 4.4–5.9)
RBC URINE: 3 /HPF
SODIUM SERPL-SCNC: 134 MMOL/L (ref 133–144)
SP GR UR STRIP: 1.01 (ref 1–1.03)
SYSTOLIC BLOOD PRESSURE - MUSE: NORMAL MMHG
T AXIS - MUSE: 17 DEGREES
TROPONIN I SERPL HS-MCNC: 9 NG/L
UROBILINOGEN UR STRIP-MCNC: NORMAL MG/DL
VENTRICULAR RATE- MUSE: 99 BPM
WBC # BLD AUTO: 5.6 10E3/UL (ref 4–11)
WBC URINE: 36 /HPF

## 2022-05-30 PROCEDURE — 84484 ASSAY OF TROPONIN QUANT: CPT | Performed by: EMERGENCY MEDICINE

## 2022-05-30 PROCEDURE — 99285 EMERGENCY DEPT VISIT HI MDM: CPT | Performed by: EMERGENCY MEDICINE

## 2022-05-30 PROCEDURE — 96361 HYDRATE IV INFUSION ADD-ON: CPT

## 2022-05-30 PROCEDURE — 85004 AUTOMATED DIFF WBC COUNT: CPT | Performed by: EMERGENCY MEDICINE

## 2022-05-30 PROCEDURE — 87088 URINE BACTERIA CULTURE: CPT | Performed by: EMERGENCY MEDICINE

## 2022-05-30 PROCEDURE — 74176 CT ABD & PELVIS W/O CONTRAST: CPT | Mod: 26 | Performed by: RADIOLOGY

## 2022-05-30 PROCEDURE — 80053 COMPREHEN METABOLIC PANEL: CPT | Performed by: EMERGENCY MEDICINE

## 2022-05-30 PROCEDURE — 36415 COLL VENOUS BLD VENIPUNCTURE: CPT | Performed by: EMERGENCY MEDICINE

## 2022-05-30 PROCEDURE — 99285 EMERGENCY DEPT VISIT HI MDM: CPT | Mod: 25

## 2022-05-30 PROCEDURE — 93005 ELECTROCARDIOGRAM TRACING: CPT

## 2022-05-30 PROCEDURE — 258N000003 HC RX IP 258 OP 636: Performed by: EMERGENCY MEDICINE

## 2022-05-30 PROCEDURE — 81001 URINALYSIS AUTO W/SCOPE: CPT | Performed by: EMERGENCY MEDICINE

## 2022-05-30 PROCEDURE — G1004 CDSM NDSC: HCPCS

## 2022-05-30 PROCEDURE — G1004 CDSM NDSC: HCPCS | Performed by: RADIOLOGY

## 2022-05-30 PROCEDURE — 96360 HYDRATION IV INFUSION INIT: CPT

## 2022-05-30 RX ORDER — SODIUM CHLORIDE 9 MG/ML
INJECTION, SOLUTION INTRAVENOUS CONTINUOUS
Status: DISCONTINUED | OUTPATIENT
Start: 2022-05-30 | End: 2022-05-30

## 2022-05-30 RX ADMIN — SODIUM CHLORIDE 1000 ML: 9 INJECTION, SOLUTION INTRAVENOUS at 04:40

## 2022-05-30 ASSESSMENT — ENCOUNTER SYMPTOMS
CONFUSION: 0
SHORTNESS OF BREATH: 0
NAUSEA: 0
DYSURIA: 0
BRUISES/BLEEDS EASILY: 0
BACK PAIN: 0
COUGH: 0
VOMITING: 0
WEAKNESS: 0
FEVER: 0
RHINORRHEA: 0
HEADACHES: 0
ABDOMINAL PAIN: 1

## 2022-05-30 NOTE — ED PROVIDER NOTES
ED Provider Note  [facility]      History     Chief Complaint   Patient presents with     Abdominal Pain     UTI     HPI  Fer Latham is a 71 year old male who has a significant medical history of recurrent UTI's, BPH, neurogenic bladder, type II diabetes, hypertension, ESRD, chronic atrial fibrillation, and alcohol abuse who comes via EMS to the Emergency Department with complaints of abdominal pain.  Patient reports he has a chronic Brown catheter in place and states that approximately 1 weeks ago he was diagnosed with a urinary tract infection due to E. coli.  Patient states that he was on antibiotics however stopped taking the medication because he believes it was not working and made him feel sick.  Patient reports some lower abdominal pain which is described as a intermittent sharp pain with no radiation, no aggravating, no relieving factors.  Patient reports that his urine color seems to be better and more clear.  Patient denies any fever, chills, nausea, vomiting, chest pain, shortness of breath, cough, hematuria, no other complaints.    Per chart review patient seen in the emergency department on 5/21/2022 and at this time Brown catheter was exchanged.  Patient was followed up on 5/23 with E. coli in his urine he was prescribed cefpodoxime for 7 days.    Past Medical History  Past Medical History:   Diagnosis Date     Anemia      Arrhythmia     atrial fib     Arthritis 1990's    gout     BPH (benign prostatic hyperplasia)      Current moderate episode of major depressive disorder without prior episode (H) 4/30/2019     Depression      Diabetes mellitus (H)     Type 2  IDDM     Diverticulosis      ESRD (end stage renal disease) on dialysis (H)      Hematuria      HTN (hypertension)      HTN (hypertension)      IgA nephropathy      MI, old      Pneumonia      Rhabdomyolysis      Weakness 5/10/2019     Past Surgical History:   Procedure Laterality Date     ABDOMEN SURGERY        APPENDECTOMY  1970's     ARTHROSCOPY KNEE  6/27/2013    Procedure: ARTHROSCOPY KNEE;  Right Knee Arthroscopy, Debridment Of Medial Meniscal Tear.  ;  Surgeon: Tomás Wong MD;  Location: [facility]     BACK SURGERY  1999    L5 S1 decompression     BIOPSY  2017    skin     COLONOSCOPY  2013    [facility]     CORONARY ANGIOGRAPHY ADULT ORDER  2018     EYE SURGERY Bilateral 2004    Lens replacement     IR CVC TUNNEL PLACEMENT > 5 YRS OF AGE  11/26/2019     IR CVC TUNNEL REMOVAL RIGHT  11/26/2019     IR CVC TUNNEL REMOVAL RIGHT  2/22/2022     IR CVC TUNNEL REVISION RIGHT  2/10/2020     IR CVC TUNNEL REVISION RIGHT  10/8/2020     IR CVC TUNNEL REVISION RIGHT  6/17/2021     VASCULAR SURGERY       acetaminophen (TYLENOL) 500 MG tablet  aspirin (ASA) 325 MG EC tablet  bisacodyl (DULCOLAX) 10 MG suppository  cefpodoxime (VANTIN) 200 MG tablet  escitalopram (LEXAPRO) 10 MG tablet  folic acid (FOLVITE) 1 MG tablet  melatonin 5 MG tablet  miconazole (MICATIN) 2 % external powder  miconazole (MICATIN) 2 % external powder  mirtazapine (REMERON) 15 MG tablet  multivitamin, therapeutic (THERA-VIT) TABS tablet  nystatin (MYCOSTATIN) 893828 UNIT/GM external cream  nystatin (MYCOSTATIN) 560501 UNIT/GM external powder  polyethylene glycol (MIRALAX) 17 GM/Dose powder  senna (SENOKOT) 8.6 MG tablet  thiamine (B-1) 100 MG tablet  Vitamin D3 (CHOLECALCIFEROL) 125 MCG (5000 UT) tablet      Allergies   Allergen Reactions     Ciprofloxacin      Diagnostic X-Ray Materials      Other reaction(s): Unknown     Sulfa Drugs Hives     Other reaction(s): Unknown  PN: LW Reaction: Rash, Generalized       Family History  Family History   Problem Relation Age of Onset     Cancer Father      Social History   Social History     Tobacco Use     Smoking status: Former Smoker     Smokeless tobacco: Never Used     Tobacco comment: quit 35 years ago   Substance Use Topics     Alcohol use: Yes     Comment: 1 to 2 drinks a day (Occasional)     Drug use: No       Past medical history, past surgical history, medications, allergies, family history, and social history were reviewed with the patient. No additional pertinent items.       Review of Systems   Constitutional: Negative for fever.   HENT: Negative for rhinorrhea.    Eyes: Negative for visual disturbance.   Respiratory: Negative for cough and shortness of breath.    Cardiovascular: Negative for chest pain and leg swelling.   Gastrointestinal: Positive for abdominal pain. Negative for nausea and vomiting.   Endocrine: Negative for polyuria.   Genitourinary: Negative for dysuria.   Musculoskeletal: Negative for back pain.   Skin: Negative for rash.   Allergic/Immunologic: Negative for immunocompromised state.   Neurological: Negative for weakness and headaches.   Hematological: Does not bruise/bleed easily.   Psychiatric/Behavioral: Negative for confusion.     A complete review of systems was performed with pertinent positives and negatives noted in the HPI, and all other systems negative.    Physical Exam   BP: 118/73  Pulse: 90  Temp: 98.7  F (37.1  C)  Resp: 18  SpO2: 98 %  Physical Exam  General: Afebrile, no acute distress   HEENT: Normocephalic, atraumatic, conjunctivae normal. MMM  Neck: non-tender, supple  Cardio: regular rate. regular rhythm   Resp: Normal work of breathing, no respiratory distress, lungs clear bilaterally, no wheezing, rhonchi, rales  Chest/Back: no visual signs of trauma, no CVA tenderness   Abdomen: soft, non distension, no tenderness, no rebound, no guarding, no peritoneal signs; Brown catheter in place with clear yellow urine  Neuro: alert and fully oriented. CN II-XII grossly intact. Grossly normal strength and sensation in all extremities.   MSK: no deformities. Normal range of motion  Integumentary/Skin: no rash visualized, normal color  Psych: normal affect, normal behavior  ED Course      Procedures       Results for orders placed or performed during the hospital encounter of  05/30/22   CT Abdomen Pelvis w/o Contrast     Status: None    Narrative    EXAM: CT ABDOMEN PELVIS W/O CONTRAST  LOCATION: [facility]  DATE/TIME: 5/30/2022 5:23 AM    INDICATION: Abdominal pain, acute, nonlocalized. Recent UTI.  COMPARISON: 02/07/2022  TECHNIQUE: CT scan of the abdomen and pelvis was performed without IV contrast. Multiplanar reformats were obtained. Dose reduction techniques were used.  CONTRAST: None.    FINDINGS:   LOWER CHEST: Chronic basilar infiltrates and calcified pleural plaque.    HEPATOBILIARY: Normal.    PANCREAS: Atrophic.    SPLEEN: Normal.    ADRENAL GLANDS: Normal.    KIDNEYS/BLADDER: No hydronephrosis. 1.4 cm isodense left renal lesion not excluded series 3 image 191. Bladder is decompressed with Brown. Bladder wall thickening and perivesicular haziness.    BOWEL: Large amount of rectal stool. Presacral edema. Diverticulosis.    LYMPH NODES: Normal.    VASCULATURE: Atherosclerotic vascular calcification. Ectatic iliac arteries.    PELVIC ORGANS: Stable.    MUSCULOSKELETAL: Degenerative change osseous structures. Chronic compression fracture T12.      Impression    IMPRESSION:   1.  Bladder is decompressed with Brown catheter. Bladder wall thickening and perivesicular edema suggesting cystitis.  2.  Large amount of rectal stool and presacral edema.  3.  1.4 cm isodense lesion not excluded left kidney. Complex cyst or solid lesion not excluded. Nonurgent ultrasound follow-up recommended.     Comprehensive metabolic panel     Status: Abnormal   Result Value Ref Range    Sodium 134 133 - 144 mmol/L    Potassium 4.0 3.4 - 5.3 mmol/L    Chloride 104 94 - 109 mmol/L    Carbon Dioxide (CO2) 21 20 - 32 mmol/L    Anion Gap 9 3 - 14 mmol/L    Urea Nitrogen 25 7 - 30 mg/dL    Creatinine 1.73 (H) 0.66 - 1.25 mg/dL    Calcium 8.5 8.5 - 10.1 mg/dL    Glucose 81 70 - 99 mg/dL    Alkaline Phosphatase 107 40 - 150 U/L    AST 16 0 - 45 U/L    ALT 11 0 - 70  U/L    Protein Total 7.2 6.8 - 8.8 g/dL    Albumin 2.8 (L) 3.4 - 5.0 g/dL    Bilirubin Total 0.7 0.2 - 1.3 mg/dL    GFR Estimate 42 (L) >60 mL/min/1.73m2   Troponin I     Status: Normal   Result Value Ref Range    Troponin I High Sensitivity 9 <79 ng/L   UA with Microscopic reflex to Culture     Status: Abnormal    Specimen: Drain; Urine   Result Value Ref Range    Color Urine Light Yellow Colorless, Straw, Light Yellow, Yellow    Appearance Urine Slightly Cloudy (A) Clear    Glucose Urine Negative Negative mg/dL    Bilirubin Urine Negative Negative    Ketones Urine Negative Negative mg/dL    Specific Gravity Urine 1.008 1.003 - 1.035    Blood Urine Small (A) Negative    pH Urine 5.5 5.0 - 7.0    Protein Albumin Urine 50  (A) Negative mg/dL    Urobilinogen Urine Normal Normal, 2.0 mg/dL    Nitrite Urine Negative Negative    Leukocyte Esterase Urine Large (A) Negative    RBC Urine 3 (H) <=2 /HPF    WBC Urine 36 (H) <=5 /HPF    Narrative    Urine Culture ordered based on laboratory criteria   CBC with platelets and differential     Status: Abnormal   Result Value Ref Range    WBC Count 5.6 4.0 - 11.0 10e3/uL    RBC Count 3.64 (L) 4.40 - 5.90 10e6/uL    Hemoglobin 11.8 (L) 13.3 - 17.7 g/dL    Hematocrit 33.8 (L) 40.0 - 53.0 %    MCV 93 78 - 100 fL    MCH 32.4 26.5 - 33.0 pg    MCHC 34.9 31.5 - 36.5 g/dL    RDW 13.9 10.0 - 15.0 %    Platelet Count 171 150 - 450 10e3/uL    % Neutrophils 58 %    % Lymphocytes 25 %    % Monocytes 12 %    % Eosinophils 4 %    % Basophils 1 %    % Immature Granulocytes 0 %    NRBCs per 100 WBC 0 <1 /100    Absolute Neutrophils 3.3 1.6 - 8.3 10e3/uL    Absolute Lymphocytes 1.4 0.8 - 5.3 10e3/uL    Absolute Monocytes 0.7 0.0 - 1.3 10e3/uL    Absolute Eosinophils 0.2 0.0 - 0.7 10e3/uL    Absolute Basophils 0.1 0.0 - 0.2 10e3/uL    Absolute Immature Granulocytes 0.0 <=0.4 10e3/uL    Absolute NRBCs 0.0 10e3/uL   EKG 12 lead     Status: None   Result Value Ref Range    Systolic Blood Pressure   mmHg    Diastolic Blood Pressure  mmHg    Ventricular Rate 99 BPM    Atrial Rate 468 BPM    SC Interval  ms    QRS Duration 96 ms     ms    QTc 459 ms    P Axis  degrees    R AXIS 12 degrees    T Axis 17 degrees    Interpretation ECG       Atrial fibrillation with premature ventricular or aberrantly conducted complexes  Indeterminate axis  Low voltage QRS  Incomplete right bundle branch block  Nonspecific ST and T wave abnormality  Abnormal ECG     CBC with platelets differential     Status: Abnormal    Narrative    The following orders were created for panel order CBC with platelets differential.  Procedure                               Abnormality         Status                     ---------                               -----------         ------                     CBC with platelets and d...[[id number]]  Abnormal            Final result                 Please view results for these tests on the individual orders.     Medications   0.9% sodium chloride BOLUS (0 mLs Intravenous Stopped 5/30/22 0623)        Assessments & Plan (with Medical Decision Making)   Fer Latham is a 71 year old male who has a significant medical history of recurrent UTI's, BPH, neurogenic bladder, type II diabetes, hypertension, ESRD, chronic atrial fibrillation, and alcohol abuse who comes via EMS to the Emergency Department with complaints of abdominal pain.  Upon arrival patient is well-appearing, afebrile, no distress.  Patient hemodynamically stable and vital signs within normal limits.  Of note patient is in atrial fibrillation however patient reports he has chronic A. fib and is not on any medications.  Patient denies any chest pain, shortness of breath, palpitations, weakness.    Per chart review patient seen in the emergency department on 5/21/2022 and at this time Brown catheter was exchanged.  Patient was followed up on 5/23 with E. coli in his urine he was prescribed cefpodoxime for 7 days.  Patient reports that he  started take the medication however stopped taking it because he did not feel like it was working.  Patient now with worsening abdominal pain.  Patient denies any fever, nausea, vomiting.  Abdomen is soft, nontender, nondistended, no peritoneal signs.    Differential diagnosis includes but is not limited to urinary retention versus cystitis versus pyelonephritis versus constipation versus colitis versus intra-abdominal infection among others.  Patient does not want anything for pain at this time.    I reviewed comprehensive labs which are remarkable for white blood cell count of 5.6, hemoglobin 11.8, no acute metabolic or electrolyte abnormality, creatinine mildly elevated at 1.73 (prior 1.7 - 2.3), urinalysis with small amount of blood, large leukocyte Estrace, 3 red blood cells and 36 white blood cells which is improved from prior.  Given his sample is from the Brown catheter, will follow-up with urine culture.  Patient is afebrile, no flank pain, and overall nontoxic..  I reviewed CT scan of abdomen pelvis which was performed to further evaluate patient's abdominal pain which demonstrates bladder wall thickening and perivesicular edema suggestive of cystitis, there is a large amount of rectal/sacral edema, also a 1.4 cm lesion concerning for complex cyst or solid lesion of the left kidney, recommend nonemergent ultrasound.  I discussed findings with the patient, recommend patient to continue taking his previously prescribed antibiotics cefpodoxime, as well as a stool softener/laxative.  Also discussed outpatient ultrasound of his kidneys by his primary care provider.  Patient understands and agrees with the plan and is agreeable to discharge.  Return cautions discussed if high fever, severe pain, persistent vomiting, or worsening symptoms.  Patient understands agrees with plan.    I have reviewed the nursing notes. I have reviewed the findings, diagnosis, plan and need for follow up with the patient.    Discharge  Medication List as of 5/30/2022  9:28 AM          Final diagnoses:   Abdominal pain, generalized   Acute cystitis with hematuria       --  Samra Juarez MD  [facility] EMERGENCY DEPARTMENT  5/30/2022     Samra Juarez MD  05/30/22 2050

## 2022-05-30 NOTE — ED TRIAGE NOTES
"  Pt arrives via EMS from home for E. Coli UTI X 1 week. Stopped taking his antibiotic because it \"wasn't working and made me feel sick.\" Did not speak to PCP about this. Now has worsening perineal and lower abdominal pain. Denies fever. VSS w/ exception of new A fib RVR w/ frequent PVC's per EMS. 500ml NS Bolus w/ EMS. BG 89. Hx broken back, minimally ambulatory. Chronic ferguson, last changed \"a couple weeks ago.\"   "

## 2022-05-30 NOTE — DISCHARGE INSTRUCTIONS
Thank you for your patience today.  Please follow-up with your regular doctor in the next 2-3 days for further evaluation and follow-up care.  Please call to schedule an appointment.  Please continue your own medications.  We recommend you continue taking your antibiotics and to finish the entire course of antibiotics.  Please drink plenty of water.  Please take a stool softener or laxative to help with your bowel movements.  Please return to the ER if you develop high fever, severe pain, persistent vomiting, or any worsening of your current symptoms.  It was a pleasure taking care of you today.  We hope you feel better soon.

## 2022-05-31 ENCOUNTER — TELEPHONE (OUTPATIENT)
Dept: EMERGENCY MEDICINE | Facility: CLINIC | Age: 72
End: 2022-05-31
Payer: MEDICARE

## 2022-05-31 DIAGNOSIS — N30.01 ACUTE CYSTITIS WITH HEMATURIA: ICD-10-CM

## 2022-05-31 NOTE — TELEPHONE ENCOUNTER
[facility] Emergency Department Lab result notification [Adult-Male]    [place] ED lab result protocol used  Urine Culture    Reason for call  Notify of lab results, assess symptoms,  review ED providers recommendations/discharge instructions (if necessary) and advise per ED lab result f/u protocol    Lab Result (including Rx patient on, if applicable)  Preliminary urine culture report on 5/31/22 shows the presence of bacteria(s):  10,000 to 50,000 colonies/mL Enterococcus faecalis  Emergency Dept/Urgent Care discharge antibiotic: None  Recommendations per [facility] ED Lab result Urine culture protocol.    Information table from Emergency Dept Provider visit on 5/30/22  Symptoms reported at ED visit (Chief complaint, HPI) Chief Complaint   Patient presents with     Abdominal Pain     UTI      HPI  Fer Latham is a 71 year old male who has a significant medical history of recurrent UTI's, BPH, neurogenic bladder, type II diabetes, hypertension, ESRD, chronic atrial fibrillation, and alcohol abuse who comes via EMS to the Emergency Department with complaints of abdominal pain.  Patient reports he has a chronic Brown catheter in place and states that approximately 1 weeks ago he was diagnosed with a urinary tract infection due to E. coli.  Patient states that he was on antibiotics however stopped taking the medication because he believes it was not working and made him feel sick.  Patient reports some lower abdominal pain which is described as a intermittent sharp pain with no radiation, no aggravating, no relieving factors.  Patient reports that his urine color seems to be better and more clear.  Patient denies any fever, chills, nausea, vomiting, chest pain, shortness of breath, cough, hematuria, no other complaints.     Per chart review patient seen in the emergency department on 5/21/2022 and at this time Brown catheter was exchanged.  Patient was followed up on 5/23 with E. coli in his urine  he was prescribed cefpodoxime for 7 days.     Significant Medical hx, if applicable (i.e. CKD, diabetes) DMII, End stage renal disease   Allergies Allergies   Allergen Reactions     Ciprofloxacin      Diagnostic X-Ray Materials      Other reaction(s): Unknown     Sulfa Drugs Hives     Other reaction(s): Unknown  PN: LW Reaction: Rash, Generalized        Weight, if applicable Wt Readings from Last 2 Encounters:   05/21/22 98.4 kg (217 lb)   03/01/22 98.7 kg (217 lb 11.2 oz)      Coumadin/Warfarin [Yes /No] NO   Creatinine Level (mg/dl) Creatinine   Date Value Ref Range Status   05/30/2022 1.73 (H) 0.66 - 1.25 mg/dL Final   07/01/2021 2.95 (H) 0.66 - 1.25 mg/dL Final      Creatinine clearance (ml/min), if applicable Serum creatinine: 1.73 mg/dL (H) 05/30/22 0415  Estimated creatinine clearance: 55.3 mL/min (A)   ED providers Impression and Plan (applicable information) Assessments & Plan (with Medical Decision Making)   Fer Latham is a 71 year old male who has a significant medical history of recurrent UTI's, BPH, neurogenic bladder, type II diabetes, hypertension, ESRD, chronic atrial fibrillation, and alcohol abuse who comes via EMS to the Emergency Department with complaints of abdominal pain.  Upon arrival patient is well-appearing, afebrile, no distress.  Patient hemodynamically stable and vital signs within normal limits.  Of note patient is in atrial fibrillation however patient reports he has chronic A. fib and is not on any medications.  Patient denies any chest pain, shortness of breath, palpitations, weakness.     Per chart review patient seen in the emergency department on 5/21/2022 and at this time Brown catheter was exchanged.  Patient was followed up on 5/23 with E. coli in his urine he was prescribed cefpodoxime for 7 days.  Patient reports that he started take the medication however stopped taking it because he did not feel like it was working.  Patient now with worsening abdominal pain.  Patient  denies any fever, nausea, vomiting.  Abdomen is soft, nontender, nondistended, no peritoneal signs.     Differential diagnosis includes but is not limited to urinary retention versus cystitis versus pyelonephritis versus constipation versus colitis versus intra-abdominal infection among others.  Patient does not want anything for pain at this time.     I reviewed comprehensive labs which are remarkable for white blood cell count of 5.6, hemoglobin 11.8, no acute metabolic or electrolyte abnormality, creatinine mildly elevated at 1.73 (prior 1.7 - 2.3), urinalysis with small amount of blood, large leukocyte Estrace, 3 red blood cells and 36 white blood cells which is improved from prior.  Given his sample is from the Brown catheter, will follow-up with urine culture.  Patient is afebrile, no flank pain, and overall nontoxic..  I reviewed CT scan of abdomen pelvis which was performed to further evaluate patient's abdominal pain which demonstrates bladder wall thickening and perivesicular edema suggestive of cystitis, there is a large amount of rectal/sacral edema, also a 1.4 cm lesion concerning for complex cyst or solid lesion of the left kidney, recommend nonemergent ultrasound.  I discussed findings with the patient, recommend patient to continue taking his previously prescribed antibiotics cefpodoxime, as well as a stool softener/laxative.  Also discussed outpatient ultrasound of his kidneys by his primary care provider.  Patient understands and agrees with the plan and is agreeable to discharge.  Return cautions discussed if high fever, severe pain, persistent vomiting, or worsening symptoms.  Patient understands agrees with plan.     I have reviewed the nursing notes. I have reviewed the findings, diagnosis, plan and need for follow up with the patient.     ED diagnosis  Abdominal pain, generalized  Acute cystitis with hematuria   ED provider  Samra Juarez MD      RN Assessment (Patient s current  Symptoms), include time called.    12:35 PM Left voicemail message requesting a call back to [facility] ED Lab Result RN at [phone].  RN is available every day between 9 a.m. and 5:30 p.m.    Writer will note patient had recent UTI E.coli Vantin x 7 days per ED note patient reports he stopped antibiotic - never completed          Dayami Negro RN  [id number] [place]  Emergency Dept Lab Result RN  Ph# [phone]     Copy of Lab result  Urine Culture  Order: [id number] - Reflex for Order [id number]   Status: Preliminary result     Visible to patient: No (not released)    Specimen Information: Urine, Brown Catheter         1 Result Note    Culture Culture in progress       10,000-50,000 CFU/mL Enterococcus faecalis Abnormal        <10,000 CFU/mL Urogenital jerzy            Resulting Agency: [facility]           Specimen Collected: 05/30/22  5:50 AM Last Resulted: 05/31/22 11:51 AM

## 2022-06-01 LAB
BACTERIA UR CULT: ABNORMAL
BACTERIA UR CULT: ABNORMAL

## 2022-06-02 NOTE — TELEPHONE ENCOUNTER
"[facility] Emergency Department/Urgent Care Lab result notification     Patient/parent Name  Ailin - Wife - she exhibits the following behaviors - initially refuses to say who she is or who she is calling about - she does have consent on file    RN Assessment (Patient s current Symptoms), include time called.  10:52 AM - \"doing a lot better\"   Symptoms: no abdominal discomfort symptoms  Antibiotic:  Patient restarted antibiotic  Pain:  No  Fever:  No  Flank pain:  No  Urine:  \"normal\"    Advised Ailin - would consult with Team/MD as needed as call back with recommendation  Huddle with Team:  OK to change to augmentin    11:16 AM - call back to Ailin - Left voicemail message requesting a call back to [facility] ED Lab Result RN at [phone].  RN is available every day between 9 a.m. and 5:30 p.m.  See Telephone encounter.      Lab result (if applicable):  Final urine culture on 10,000-50,000 CFU/mL Enterococcus faecalis shows the presence of bacteria(s): 10,000-50,000 CFU/mL Enterococcus faecalis   [facility] Emergency Dept discharge antibiotic: None  Recommendations in treatment per [facility] ED lab result Urine Culture protocol.      Dayami Negro RN  [facility] [employer] [place]  Emergency Dept Lab Result RN  Ph# [phone]     Copy of Lab result   Urine Culture  Order: [id number] - Reflex for Order [id number]   Status: Final result     Visible to patient: Yes (not seen)    Specimen Information: Urine, Brown Catheter         3 Result Notes    Culture 10,000-50,000 CFU/mL Enterococcus faecalis Abnormal        <10,000 CFU/mL Urogenital jerzy            Resulting Agency: [facility]       Susceptibility      Enterococcus faecalis (1)    Antibiotic Interpretation Sensitivity  Method Status    Penicillin Susceptible 2.0 ug/mL PK Final    Ampicillin Susceptible <=2.0 ug/mL PK Final    Vancomycin Susceptible 1.0 ug/mL PK Final    Nitrofurantoin Susceptible <=16.0 ug/mL PK " Final          Specimen Collected: 05/30/22  5:50 AM Last Resulted: 06/01/22 10:24 PM

## 2022-06-04 NOTE — TELEPHONE ENCOUNTER
"[facility] Emergency Department/Urgent Care Lab result notification      Patient/parent Name  Ailin - Wife - she exhibits the following behaviors - initially refuses to say who she is or who she is calling about - she does have consent on file     RN Assessment (Patient s current Symptoms), include time called.  10:52 AM - \"doing a lot better\"   Symptoms: no abdominal discomfort symptoms  Antibiotic:  Patient restarted antibiotic  Pain:  No  Fever:  No  Flank pain:  No  Urine:  \"normal\"     Advised Ailin - would consult with Team/MD as needed as call back with recommendation  Huddle with Team:  OK to change to augmentin    RN Assessment (Patient s current Symptoms), include time called.  [Insert Left message here if message lef  Ailin said Fer was sleeping and doing ok.     RN Recommendations/Instructions per [facility] ED lab result protocol  Patient notified of lab result and treatment recommendations.To stop current antibiotic and start  Rx for Amoxicillin-Clavulanate (Augmentin) 875-125 mg PO tablet, 1 tablet by mouth 2 times daily for 7 days   sent to [Pharmacy - [facility]].  RN Advised to finish full course of antibiotics as prescribed and drink plenty of fluids. And follow up with your PCP as the ED provider recommended. Ailinvoices understanding and is in agreement with this plan.         Please Contact your PCP clinic or return to the Emergency department if your:    Symptoms return.    Symptoms do not improve after 3 days on antibiotic.    Symptoms do not resolve after completing antibiotic.    Symptoms worsen or other concerning symptom's.      Trini Jeong RN  [facility]  Emergency Dept Lab Result RN  Ph# [phone]       "

## 2022-06-21 ENCOUNTER — TELEPHONE (OUTPATIENT)
Dept: UROLOGY | Facility: CLINIC | Age: 72
End: 2022-06-21

## 2022-06-21 NOTE — TELEPHONE ENCOUNTER
[facility] Call Center    Phone Message    May a detailed message be left on voicemail: yes     Reason for Call: Other: . pts wife negrito is calling, pt is over due for a cath change, he is going on 31 days, writer checked next available appt and that's 7/8/22. Negrito stated Fer is prone to infections and needs it changed asap, please call negrito, thank you    Action Taken: Message routed to:  [facility] uro    Travel Screening: Not Applicable

## 2022-06-22 ENCOUNTER — PATIENT OUTREACH (OUTPATIENT)
Dept: UROLOGY | Facility: CLINIC | Age: 72
End: 2022-06-22

## 2022-06-22 DIAGNOSIS — N39.0 URINARY TRACT INFECTION: Primary | ICD-10-CM

## 2022-06-22 NOTE — TELEPHONE ENCOUNTER
"Reached out to pts wife Ailin who called the call center questioning pt ferguson exchange.     Per Ailin, pt is bed/house ridden who broke his back and has been in and out of care facilities. Per notes in chart, pt has home care orders for a nurse to exchange this in the home. Ailin indicates that she verbally \"fired them\" yesterday because they aren't doing anything for the patient. Ailin expresses that she does not know who she spoke with and does not have a phone number.     Ailin stated that she does not know how she would be able to get the pt to the clinic for a nurse visit. Noting the state of the pts physical health, writer indicated that it was likely not safe for pt to come into the clinic. Ailin is not able to lift pt into the wheelchair and pt is too weak to put himself into the wheelchair.     Writer stated that we need to send in another referral for home care.   "

## 2022-06-23 NOTE — TELEPHONE ENCOUNTER
[facility] Call Center    Phone Message    May a detailed message be left on voicemail: yes     Reason for Call: Other: Ailin calling back again hoping there is an update and someone can call her.  Ailin is very worried the pt will get an infection.   Please give Ailin a call back to discuss      Action Taken: Message routed to:  [facility] Curt    Travel Screening: Not Applicable

## 2022-06-23 NOTE — TELEPHONE ENCOUNTER
Pts wife called stating they called [facility] Care regarding the referral and orders and [facility] Care stated they have not received anything, please refax information and let pt and pts wife know so they can call and get someone out to do pts cath change ASAP, thanks!

## 2022-06-23 NOTE — TELEPHONE ENCOUNTER
[facility] Call Center    Phone Message    May a detailed message be left on voicemail: yes     Reason for Call: Other: Ailin called back to see if there are any updates from [place] regarding having a cath change for the pt.  Please give Ailin a call back to discuss any updates.  Thank you     Action Taken: Message routed to:  [facility] URO    Travel Screening: Not Applicable

## 2022-06-23 NOTE — TELEPHONE ENCOUNTER
Writer called and spoke to Ailin. Writer inquired about the new [place] appts open for tomorrow but pt has a broken back. They are requested home care. The order is in place. Writer faxed to [facility] care [phone]. This is a referral and order.

## 2022-06-24 ENCOUNTER — TELEPHONE (OUTPATIENT)
Dept: UROLOGY | Facility: CLINIC | Age: 72
End: 2022-06-24

## 2022-06-24 NOTE — TELEPHONE ENCOUNTER
Chichi called from [facility] were orders were sent. She stated that the patients wife called them, but they had to decline because they just don't have the staff to take him on as a patient. They wanted clinic to be aware, so that another another option for care can be determined. Please call Pt's wife Ailin to discuss.

## 2022-06-27 NOTE — TELEPHONE ENCOUNTER
Writer aware and documented in a new encounter. Writer spoke to Ailin and is working on another solution.

## 2022-06-27 NOTE — TELEPHONE ENCOUNTER
Orders faxed to [facility] at [phone].    Shahana Good LPN  Urology Clinic Service   [facility] Urology Clinic

## 2022-07-01 VITALS
SYSTOLIC BLOOD PRESSURE: 111 MMHG | RESPIRATION RATE: 16 BRPM | DIASTOLIC BLOOD PRESSURE: 66 MMHG | HEART RATE: 88 BPM | TEMPERATURE: 97.5 F | OXYGEN SATURATION: 99 %

## 2022-07-01 PROCEDURE — 99284 EMERGENCY DEPT VISIT MOD MDM: CPT | Performed by: EMERGENCY MEDICINE

## 2022-07-01 PROCEDURE — 99283 EMERGENCY DEPT VISIT LOW MDM: CPT | Performed by: EMERGENCY MEDICINE

## 2022-07-02 ENCOUNTER — HOSPITAL ENCOUNTER (EMERGENCY)
Facility: CLINIC | Age: 72
Discharge: HOME | End: 2022-07-02
Attending: EMERGENCY MEDICINE | Admitting: EMERGENCY MEDICINE
Payer: MEDICARE

## 2022-07-02 DIAGNOSIS — Z46.6 ENCOUNTER FOR FOLEY CATHETER REPLACEMENT: ICD-10-CM

## 2022-07-02 DIAGNOSIS — N32.89 BLADDER SPASMS: ICD-10-CM

## 2022-07-02 PROCEDURE — 250N000013 HC RX MED GY IP 250 OP 250 PS 637: Performed by: EMERGENCY MEDICINE

## 2022-07-02 RX ORDER — LIDOCAINE 40 MG/G
CREAM TOPICAL
Status: DISCONTINUED
Start: 2022-07-02 | End: 2022-07-02 | Stop reason: HOSPADM

## 2022-07-02 RX ORDER — ACETAMINOPHEN 325 MG/1
975 TABLET ORAL ONCE
Status: COMPLETED | OUTPATIENT
Start: 2022-07-02 | End: 2022-07-02

## 2022-07-02 RX ORDER — ACETAMINOPHEN 325 MG/1
650 TABLET ORAL ONCE
Status: COMPLETED | OUTPATIENT
Start: 2022-07-02 | End: 2022-07-02

## 2022-07-02 RX ORDER — OXYBUTYNIN CHLORIDE 5 MG/1
5 TABLET ORAL 3 TIMES DAILY
Qty: 30 TABLET | Refills: 0 | Status: SHIPPED | OUTPATIENT
Start: 2022-07-02 | End: 2022-07-12

## 2022-07-02 RX ORDER — OXYCODONE HYDROCHLORIDE 5 MG/1
5 TABLET ORAL ONCE
Status: COMPLETED | OUTPATIENT
Start: 2022-07-02 | End: 2022-07-02

## 2022-07-02 RX ORDER — LIDOCAINE HYDROCHLORIDE 20 MG/ML
10 JELLY TOPICAL ONCE
Status: DISCONTINUED | OUTPATIENT
Start: 2022-07-02 | End: 2022-07-02 | Stop reason: HOSPADM

## 2022-07-02 RX ADMIN — ACETAMINOPHEN 650 MG: 325 TABLET, FILM COATED ORAL at 04:16

## 2022-07-02 RX ADMIN — ACETAMINOPHEN 975 MG: 325 TABLET, FILM COATED ORAL at 01:52

## 2022-07-02 RX ADMIN — OXYCODONE HYDROCHLORIDE 5 MG: 5 TABLET ORAL at 01:51

## 2022-07-02 ASSESSMENT — ENCOUNTER SYMPTOMS
HEADACHES: 0
NECK STIFFNESS: 0
EYE REDNESS: 0
FEVER: 0
ARTHRALGIAS: 0
COLOR CHANGE: 0
SHORTNESS OF BREATH: 0
DIFFICULTY URINATING: 0
ABDOMINAL PAIN: 0
CONFUSION: 0

## 2022-07-02 NOTE — DISCHARGE INSTRUCTIONS
Your catheter was replaced  Start oxybutynin as directed  Follow-up with your primary care provider and urologist they will need to start managing this for you

## 2022-07-02 NOTE — ED TRIAGE NOTES
Pt came to the ED via EMS for catheter change. Pt has had catheter for 44 days.      Triage Assessment     Row Name 07/01/22 9067       Triage Assessment (Adult)    Airway WDL WDL       Respiratory WDL    Respiratory WDL WDL       Skin Circulation/Temperature WDL    Skin Circulation/Temperature WDL WDL       Cardiac WDL    Cardiac WDL WDL       Peripheral/Neurovascular WDL    Peripheral Neurovascular WDL WDL       Cognitive/Neuro/Behavioral WDL    Cognitive/Neuro/Behavioral WDL WDL

## 2022-07-02 NOTE — ED PROVIDER NOTES
ED Provider Note  [facility] EMERGENCY DEPARTMENT ([facility])  7/02/22      History     Chief Complaint   Patient presents with     Catheter Problem     HPI  Fer Latham is a 72 year old male with a Brown catheter in place. He is here saying it needs to be replaced, has been in for 44 days. He also complains of burning pain behind his testicles.  It looks as though he has not been getting followed with his primary care provider or with urology.  My early plan is to replace the Brown and start him on medication for bladder spasms and send a urine culture.     This part of the medical record was transcribed by Christy Santiago Medical Scribe, from a dictation done by Олег Graham MD.      Past Medical History  Past Medical History:   Diagnosis Date     Anemia      Arrhythmia     atrial fib     Arthritis 1990's    gout     BPH (benign prostatic hyperplasia)      Current moderate episode of major depressive disorder without prior episode (H) 4/30/2019     Depression      Diabetes mellitus (H)     Type 2  IDDM     Diverticulosis      ESRD (end stage renal disease) on dialysis (H)      Hematuria      HTN (hypertension)      HTN (hypertension)      IgA nephropathy      MI, old      Pneumonia      Rhabdomyolysis      Weakness 5/10/2019     Past Surgical History:   Procedure Laterality Date     ABDOMEN SURGERY       APPENDECTOMY  1970's     ARTHROSCOPY KNEE  6/27/2013    Procedure: ARTHROSCOPY KNEE;  Right Knee Arthroscopy, Debridment Of Medial Meniscal Tear.  ;  Surgeon: Tomás Wong MD;  Location: [facility]     BACK SURGERY  1999    L5 S1 decompression     BIOPSY  2017    skin     COLONOSCOPY  2013    [facility]     CORONARY ANGIOGRAPHY ADULT ORDER  2018     EYE SURGERY Bilateral 2004    Lens replacement     IR CVC TUNNEL PLACEMENT > 5 YRS OF AGE  11/26/2019     IR CVC TUNNEL REMOVAL RIGHT  11/26/2019     IR CVC TUNNEL REMOVAL RIGHT  2/22/2022     IR CVC TUNNEL  REVISION RIGHT  2/10/2020     IR CVC TUNNEL REVISION RIGHT  10/8/2020     IR CVC TUNNEL REVISION RIGHT  6/17/2021     VASCULAR SURGERY       oxybutynin (DITROPAN) 5 MG tablet  acetaminophen (TYLENOL) 500 MG tablet  aspirin (ASA) 325 MG EC tablet  bisacodyl (DULCOLAX) 10 MG suppository  escitalopram (LEXAPRO) 10 MG tablet  folic acid (FOLVITE) 1 MG tablet  melatonin 5 MG tablet  miconazole (MICATIN) 2 % external powder  miconazole (MICATIN) 2 % external powder  mirtazapine (REMERON) 15 MG tablet  multivitamin, therapeutic (THERA-VIT) TABS tablet  nystatin (MYCOSTATIN) 790876 UNIT/GM external cream  nystatin (MYCOSTATIN) 832121 UNIT/GM external powder  polyethylene glycol (MIRALAX) 17 GM/Dose powder  senna (SENOKOT) 8.6 MG tablet  thiamine (B-1) 100 MG tablet  Vitamin D3 (CHOLECALCIFEROL) 125 MCG (5000 UT) tablet      Allergies   Allergen Reactions     Ciprofloxacin      Diagnostic X-Ray Materials      Other reaction(s): Unknown     Sulfa Drugs Hives     Other reaction(s): Unknown  PN: LW Reaction: Rash, Generalized       Family History  Family History   Problem Relation Age of Onset     Cancer Father      Social History   Social History     Tobacco Use     Smoking status: Former Smoker     Smokeless tobacco: Never Used     Tobacco comment: quit 35 years ago   Substance Use Topics     Alcohol use: Yes     Comment: 1 to 2 drinks a day (Occasional)     Drug use: No      Past medical history, past surgical history, medications, allergies, family history, and social history were reviewed with the patient. No additional pertinent items.       Review of Systems   Constitutional: Negative for fever.   HENT: Negative for congestion.    Eyes: Negative for redness.   Respiratory: Negative for shortness of breath.    Cardiovascular: Negative for chest pain.   Gastrointestinal: Negative for abdominal pain.   Genitourinary: Positive for testicular pain (behind). Negative for difficulty urinating.   Musculoskeletal: Negative for  arthralgias and neck stiffness.   Skin: Negative for color change.   Neurological: Negative for headaches.   Psychiatric/Behavioral: Negative for confusion.     A complete review of systems was performed with pertinent positives and negatives noted in the HPI, and all other systems negative.    Physical Exam   BP: 111/66  Pulse: 88  Temp: 97.5  F (36.4  C)  Resp: 16  SpO2: (!) 86 %  Physical Exam  Vitals and nursing note reviewed.   Constitutional:       General: He is not in acute distress.  Cardiovascular:      Rate and Rhythm: Normal rate and regular rhythm.      Heart sounds: Normal heart sounds.   Pulmonary:      Effort: Pulmonary effort is normal.      Breath sounds: Normal breath sounds.   Abdominal:      General: There is no distension.      Palpations: Abdomen is soft.      Tenderness: There is no abdominal tenderness.   Musculoskeletal:      Cervical back: Normal range of motion and neck supple.   Skin:     General: Skin is warm.   Neurological:      General: No focal deficit present.      Mental Status: He is alert and oriented to person, place, and time.   Psychiatric:         Mood and Affect: Mood normal.         Behavior: Behavior normal.         ED Course      Procedures       Nurse replaced his Brown       No results found for any visits on 07/02/22.  Medications   acetaminophen (TYLENOL) tablet 975 mg (975 mg Oral Given 7/2/22 0152)   oxyCODONE (ROXICODONE) tablet 5 mg (5 mg Oral Given 7/2/22 0151)   acetaminophen (TYLENOL) tablet 650 mg (650 mg Oral Given 7/2/22 0416)        Assessments & Plan (with Medical Decision Making)   The patient presented complaining that he needed his Brown catheter changed as its been 44 days he also complained of bladder spasms.  Catheter was exchanged and we will start him on oxybutynin.  He needs close follow-up with his primary care provider and with urology.    I have reviewed the nursing notes. I have reviewed the findings, diagnosis, plan and need for follow up  with the patient.    Discharge Medication List as of 7/2/2022  3:50 AM      START taking these medications    Details   oxybutynin (DITROPAN) 5 MG tablet Take 1 tablet (5 mg) by mouth 3 times daily for 30 doses, Disp-30 tablet, R-0, Local Print             Final diagnoses:   Encounter for Brown catheter replacement   Bladder spasms     IChristy, am serving as a trained medical scribe to document services personally performed by Олег Graham MD based on the provider's statements to me on July 2, 2022.  This document has been checked and approved by the attending provider.    I, Олег Graham MD, was physically present and have reviewed and verified the accuracy of this note documented by Christy Santiago, medical scribe.      Оелг Graham MD      --  Олег Graham MD  [facility] EMERGENCY DEPARTMENT  7/1/2022     Олег Graham MD  07/02/22 1902

## 2022-07-07 ENCOUNTER — LAB REQUISITION (OUTPATIENT)
Dept: LAB | Facility: CLINIC | Age: 72
End: 2022-07-07
Payer: MEDICARE

## 2022-07-07 DIAGNOSIS — R76.12 NONSPECIFIC REACTION TO CELL MEDIATED IMMUNITY MEASUREMENT OF GAMMA INTERFERON ANTIGEN RESPONSE WITHOUT ACTIVE TUBERCULOSIS: ICD-10-CM

## 2022-07-10 ENCOUNTER — LAB REQUISITION (OUTPATIENT)
Dept: LAB | Facility: CLINIC | Age: 72
End: 2022-07-10
Payer: MEDICARE

## 2022-07-10 DIAGNOSIS — E87.1 HYPO-OSMOLALITY AND HYPONATREMIA: ICD-10-CM

## 2022-07-12 LAB
ANION GAP SERPL CALCULATED.3IONS-SCNC: 10 MMOL/L (ref 7–15)
BUN SERPL-MCNC: 21.2 MG/DL (ref 8–23)
CALCIUM SERPL-MCNC: 9.3 MG/DL (ref 8.8–10.2)
CHLORIDE SERPL-SCNC: 88 MMOL/L (ref 98–107)
CREAT SERPL-MCNC: 1.66 MG/DL (ref 0.67–1.17)
DEPRECATED HCO3 PLAS-SCNC: 23 MMOL/L (ref 22–29)
ERYTHROCYTE [DISTWIDTH] IN BLOOD BY AUTOMATED COUNT: 12.5 % (ref 10–15)
GFR SERPL CREATININE-BSD FRML MDRD: 44 ML/MIN/1.73M2
GLUCOSE SERPL-MCNC: 75 MG/DL (ref 70–99)
HCT VFR BLD AUTO: 37.8 % (ref 40–53)
HGB BLD-MCNC: 13 G/DL (ref 13.3–17.7)
MCH RBC QN AUTO: 32.4 PG (ref 26.5–33)
MCHC RBC AUTO-ENTMCNC: 34.4 G/DL (ref 31.5–36.5)
MCV RBC AUTO: 94 FL (ref 78–100)
PLATELET # BLD AUTO: 199 10E3/UL (ref 150–450)
POTASSIUM SERPL-SCNC: 4.6 MMOL/L (ref 3.4–5.3)
RBC # BLD AUTO: 4.01 10E6/UL (ref 4.4–5.9)
SODIUM SERPL-SCNC: 121 MMOL/L (ref 136–145)
WBC # BLD AUTO: 4.8 10E3/UL (ref 4–11)

## 2022-07-12 PROCEDURE — 80048 BASIC METABOLIC PNL TOTAL CA: CPT | Performed by: FAMILY MEDICINE

## 2022-07-12 PROCEDURE — P9604 ONE-WAY ALLOW PRORATED TRIP: HCPCS | Performed by: FAMILY MEDICINE

## 2022-07-12 PROCEDURE — 85027 COMPLETE CBC AUTOMATED: CPT | Performed by: FAMILY MEDICINE

## 2022-07-12 PROCEDURE — 36415 COLL VENOUS BLD VENIPUNCTURE: CPT | Performed by: FAMILY MEDICINE

## 2022-07-12 PROCEDURE — 86481 TB AG RESPONSE T-CELL SUSP: CPT | Performed by: FAMILY MEDICINE

## 2022-07-13 LAB
GAMMA INTERFERON BACKGROUND BLD IA-ACNC: 0 IU/ML
M TB IFN-G BLD-IMP: NEGATIVE
M TB IFN-G CD4+ BCKGRND COR BLD-ACNC: 3.62 IU/ML
MITOGEN IGNF BCKGRD COR BLD-ACNC: 0 IU/ML
MITOGEN IGNF BCKGRD COR BLD-ACNC: 0 IU/ML
QUANTIFERON MITOGEN: 3.62 IU/ML
QUANTIFERON NIL TUBE: 0 IU/ML
QUANTIFERON TB1 TUBE: 0 IU/ML
QUANTIFERON TB2 TUBE: 0

## 2022-07-18 ENCOUNTER — LAB REQUISITION (OUTPATIENT)
Dept: LAB | Facility: CLINIC | Age: 72
End: 2022-07-18
Payer: MEDICARE

## 2022-07-18 DIAGNOSIS — E87.1 HYPO-OSMOLALITY AND HYPONATREMIA: ICD-10-CM

## 2022-07-19 LAB
ANION GAP SERPL CALCULATED.3IONS-SCNC: 10 MMOL/L (ref 7–15)
BUN SERPL-MCNC: 28.1 MG/DL (ref 8–23)
CALCIUM SERPL-MCNC: 9.1 MG/DL (ref 8.8–10.2)
CHLORIDE SERPL-SCNC: 94 MMOL/L (ref 98–107)
CREAT SERPL-MCNC: 1.68 MG/DL (ref 0.67–1.17)
DEPRECATED HCO3 PLAS-SCNC: 23 MMOL/L (ref 22–29)
GFR SERPL CREATININE-BSD FRML MDRD: 43 ML/MIN/1.73M2
GLUCOSE SERPL-MCNC: 82 MG/DL (ref 70–99)
POTASSIUM SERPL-SCNC: 4.8 MMOL/L (ref 3.4–5.3)
SODIUM SERPL-SCNC: 127 MMOL/L (ref 136–145)

## 2022-07-19 PROCEDURE — 36415 COLL VENOUS BLD VENIPUNCTURE: CPT | Performed by: NURSE PRACTITIONER

## 2022-07-19 PROCEDURE — 80048 BASIC METABOLIC PNL TOTAL CA: CPT | Performed by: NURSE PRACTITIONER

## 2022-07-19 PROCEDURE — P9604 ONE-WAY ALLOW PRORATED TRIP: HCPCS | Performed by: NURSE PRACTITIONER

## 2022-07-24 ENCOUNTER — LAB REQUISITION (OUTPATIENT)
Dept: LAB | Facility: CLINIC | Age: 72
End: 2022-07-24
Payer: MEDICARE

## 2022-07-24 DIAGNOSIS — N18.30 CHRONIC KIDNEY DISEASE, STAGE 3 UNSPECIFIED (H): ICD-10-CM

## 2022-07-25 LAB
ANION GAP SERPL CALCULATED.3IONS-SCNC: 14 MMOL/L (ref 7–15)
BUN SERPL-MCNC: 31.2 MG/DL (ref 8–23)
CALCIUM SERPL-MCNC: 9.5 MG/DL (ref 8.8–10.2)
CHLORIDE SERPL-SCNC: 95 MMOL/L (ref 98–107)
CREAT SERPL-MCNC: 1.61 MG/DL (ref 0.67–1.17)
DEPRECATED HCO3 PLAS-SCNC: 24 MMOL/L (ref 22–29)
GFR SERPL CREATININE-BSD FRML MDRD: 45 ML/MIN/1.73M2
GLUCOSE SERPL-MCNC: 114 MG/DL (ref 70–99)
POTASSIUM SERPL-SCNC: 5.5 MMOL/L (ref 3.4–5.3)
SODIUM SERPL-SCNC: 133 MMOL/L (ref 136–145)

## 2022-07-25 PROCEDURE — 36415 COLL VENOUS BLD VENIPUNCTURE: CPT | Performed by: NURSE PRACTITIONER

## 2022-07-25 PROCEDURE — 80048 BASIC METABOLIC PNL TOTAL CA: CPT | Performed by: NURSE PRACTITIONER

## 2022-07-25 PROCEDURE — P9604 ONE-WAY ALLOW PRORATED TRIP: HCPCS | Performed by: NURSE PRACTITIONER

## 2022-07-31 ENCOUNTER — HEALTH MAINTENANCE LETTER (OUTPATIENT)
Age: 72
End: 2022-07-31

## 2022-08-03 ENCOUNTER — LAB REQUISITION (OUTPATIENT)
Dept: LAB | Facility: CLINIC | Age: 72
End: 2022-08-03
Payer: COMMERCIAL

## 2022-08-03 DIAGNOSIS — E87.1 HYPO-OSMOLALITY AND HYPONATREMIA: ICD-10-CM

## 2022-08-22 ENCOUNTER — LAB REQUISITION (OUTPATIENT)
Dept: LAB | Facility: CLINIC | Age: 72
End: 2022-08-22
Payer: MEDICARE

## 2022-08-22 DIAGNOSIS — E87.1 HYPO-OSMOLALITY AND HYPONATREMIA: ICD-10-CM

## 2022-08-23 ENCOUNTER — LAB REQUISITION (OUTPATIENT)
Dept: LAB | Facility: CLINIC | Age: 72
End: 2022-08-23
Payer: MEDICARE

## 2022-08-23 ENCOUNTER — LAB REQUISITION (OUTPATIENT)
Dept: LAB | Facility: CLINIC | Age: 72
End: 2022-08-23
Payer: COMMERCIAL

## 2022-08-23 DIAGNOSIS — E87.1 HYPO-OSMOLALITY AND HYPONATREMIA: ICD-10-CM

## 2022-08-23 DIAGNOSIS — R30.0 DYSURIA: ICD-10-CM

## 2022-08-23 PROCEDURE — 87086 URINE CULTURE/COLONY COUNT: CPT | Mod: ORL | Performed by: FAMILY MEDICINE

## 2022-08-23 PROCEDURE — 81001 URINALYSIS AUTO W/SCOPE: CPT | Mod: ORL | Performed by: FAMILY MEDICINE

## 2022-08-24 ENCOUNTER — LAB REQUISITION (OUTPATIENT)
Dept: LAB | Facility: CLINIC | Age: 72
End: 2022-08-24
Payer: COMMERCIAL

## 2022-08-24 DIAGNOSIS — E87.1 HYPO-OSMOLALITY AND HYPONATREMIA: ICD-10-CM

## 2022-08-24 LAB
ALBUMIN UR-MCNC: 200 MG/DL
APPEARANCE UR: ABNORMAL
BACTERIA #/AREA URNS HPF: ABNORMAL /HPF
BILIRUB UR QL STRIP: NEGATIVE
COLOR UR AUTO: YELLOW
GLUCOSE UR STRIP-MCNC: NEGATIVE MG/DL
HGB UR QL STRIP: ABNORMAL
KETONES UR STRIP-MCNC: NEGATIVE MG/DL
LEUKOCYTE ESTERASE UR QL STRIP: ABNORMAL
NITRATE UR QL: NEGATIVE
PH UR STRIP: 6 [PH] (ref 5–7)
RBC URINE: >182 /HPF
SP GR UR STRIP: 1.02 (ref 1–1.03)
TRANSITIONAL EPI: 1 /HPF
UROBILINOGEN UR STRIP-MCNC: NORMAL MG/DL
WBC CLUMPS #/AREA URNS HPF: PRESENT /HPF
WBC URINE: >182 /HPF

## 2022-08-25 LAB
ANION GAP SERPL CALCULATED.3IONS-SCNC: 11 MMOL/L (ref 7–15)
BUN SERPL-MCNC: 46.4 MG/DL (ref 8–23)
CALCIUM SERPL-MCNC: 9.2 MG/DL (ref 8.8–10.2)
CHLORIDE SERPL-SCNC: 103 MMOL/L (ref 98–107)
CREAT SERPL-MCNC: 1.95 MG/DL (ref 0.67–1.17)
DEPRECATED HCO3 PLAS-SCNC: 23 MMOL/L (ref 22–29)
GFR SERPL CREATININE-BSD FRML MDRD: 36 ML/MIN/1.73M2
GLUCOSE SERPL-MCNC: 93 MG/DL (ref 70–99)
POTASSIUM SERPL-SCNC: 4.6 MMOL/L (ref 3.4–5.3)
SODIUM SERPL-SCNC: 137 MMOL/L (ref 136–145)

## 2022-08-25 PROCEDURE — P9603 ONE-WAY ALLOW PRORATED MILES: HCPCS | Mod: ORL | Performed by: FAMILY MEDICINE

## 2022-08-25 PROCEDURE — 36415 COLL VENOUS BLD VENIPUNCTURE: CPT | Mod: ORL | Performed by: FAMILY MEDICINE

## 2022-08-25 PROCEDURE — 80048 BASIC METABOLIC PNL TOTAL CA: CPT | Mod: ORL | Performed by: FAMILY MEDICINE

## 2022-08-26 LAB
BACTERIA UR CULT: ABNORMAL
BACTERIA UR CULT: ABNORMAL

## 2022-09-28 ENCOUNTER — LAB REQUISITION (OUTPATIENT)
Dept: LAB | Facility: CLINIC | Age: 72
End: 2022-09-28
Payer: COMMERCIAL

## 2022-09-28 DIAGNOSIS — I10 ESSENTIAL (PRIMARY) HYPERTENSION: ICD-10-CM

## 2022-09-30 LAB
ANION GAP SERPL CALCULATED.3IONS-SCNC: 14 MMOL/L (ref 7–15)
BUN SERPL-MCNC: 50.7 MG/DL (ref 8–23)
CALCIUM SERPL-MCNC: 8.8 MG/DL (ref 8.8–10.2)
CHLORIDE SERPL-SCNC: 102 MMOL/L (ref 98–107)
CREAT SERPL-MCNC: 2.12 MG/DL (ref 0.67–1.17)
DEPRECATED HCO3 PLAS-SCNC: 22 MMOL/L (ref 22–29)
GFR SERPL CREATININE-BSD FRML MDRD: 33 ML/MIN/1.73M2
GLUCOSE SERPL-MCNC: 95 MG/DL (ref 70–99)
POTASSIUM SERPL-SCNC: 4.2 MMOL/L (ref 3.4–5.3)
SODIUM SERPL-SCNC: 138 MMOL/L (ref 136–145)

## 2022-09-30 PROCEDURE — P9604 ONE-WAY ALLOW PRORATED TRIP: HCPCS | Mod: ORL | Performed by: NURSE PRACTITIONER

## 2022-09-30 PROCEDURE — 80048 BASIC METABOLIC PNL TOTAL CA: CPT | Mod: ORL | Performed by: NURSE PRACTITIONER

## 2022-09-30 PROCEDURE — 36415 COLL VENOUS BLD VENIPUNCTURE: CPT | Mod: ORL | Performed by: NURSE PRACTITIONER

## 2022-10-06 NOTE — DISCHARGE SUMMARY
Patient verbalized understanding of discharge instructions.  PIV removed.  Belongings and medication with pt.  Pt transported to main lobby doors via wheelchair (from home) for ride with [employer] & spouse.   1 pair

## 2022-10-16 ENCOUNTER — HEALTH MAINTENANCE LETTER (OUTPATIENT)
Age: 72
End: 2022-10-16

## 2022-10-19 ENCOUNTER — LAB REQUISITION (OUTPATIENT)
Dept: LAB | Facility: CLINIC | Age: 72
End: 2022-10-19
Payer: COMMERCIAL

## 2022-10-19 DIAGNOSIS — E11.9 TYPE 2 DIABETES MELLITUS WITHOUT COMPLICATIONS (H): ICD-10-CM

## 2022-10-20 LAB
ANION GAP SERPL CALCULATED.3IONS-SCNC: 22 MMOL/L (ref 7–15)
BUN SERPL-MCNC: 52.8 MG/DL (ref 8–23)
CALCIUM SERPL-MCNC: 9.1 MG/DL (ref 8.8–10.2)
CHLORIDE SERPL-SCNC: 111 MMOL/L (ref 98–107)
CREAT SERPL-MCNC: 2.19 MG/DL (ref 0.67–1.17)
DEPRECATED HCO3 PLAS-SCNC: 14 MMOL/L (ref 22–29)
ERYTHROCYTE [DISTWIDTH] IN BLOOD BY AUTOMATED COUNT: 13.9 % (ref 10–15)
GFR SERPL CREATININE-BSD FRML MDRD: 31 ML/MIN/1.73M2
GLUCOSE SERPL-MCNC: 97 MG/DL (ref 70–99)
HBA1C MFR BLD: 5.3 %
HCT VFR BLD AUTO: 32.2 % (ref 40–53)
HGB BLD-MCNC: 10.5 G/DL (ref 13.3–17.7)
MCH RBC QN AUTO: 31.6 PG (ref 26.5–33)
MCHC RBC AUTO-ENTMCNC: 32.6 G/DL (ref 31.5–36.5)
MCV RBC AUTO: 97 FL (ref 78–100)
PLATELET # BLD AUTO: 382 10E3/UL (ref 150–450)
POTASSIUM SERPL-SCNC: 5.6 MMOL/L (ref 3.4–5.3)
RBC # BLD AUTO: 3.32 10E6/UL (ref 4.4–5.9)
SODIUM SERPL-SCNC: 147 MMOL/L (ref 136–145)
WBC # BLD AUTO: 8.9 10E3/UL (ref 4–11)

## 2022-10-20 PROCEDURE — 36415 COLL VENOUS BLD VENIPUNCTURE: CPT | Mod: ORL | Performed by: FAMILY MEDICINE

## 2022-10-20 PROCEDURE — 80048 BASIC METABOLIC PNL TOTAL CA: CPT | Mod: ORL | Performed by: FAMILY MEDICINE

## 2022-10-20 PROCEDURE — 85027 COMPLETE CBC AUTOMATED: CPT | Mod: ORL | Performed by: FAMILY MEDICINE

## 2022-10-20 PROCEDURE — P9604 ONE-WAY ALLOW PRORATED TRIP: HCPCS | Mod: ORL | Performed by: FAMILY MEDICINE

## 2022-10-20 PROCEDURE — 83036 HEMOGLOBIN GLYCOSYLATED A1C: CPT | Mod: ORL | Performed by: FAMILY MEDICINE

## 2022-12-07 ENCOUNTER — LAB REQUISITION (OUTPATIENT)
Dept: LAB | Facility: CLINIC | Age: 72
End: 2022-12-07
Payer: COMMERCIAL

## 2022-12-07 DIAGNOSIS — E11.9 TYPE 2 DIABETES MELLITUS WITHOUT COMPLICATIONS (H): ICD-10-CM

## 2023-03-26 ENCOUNTER — HEALTH MAINTENANCE LETTER (OUTPATIENT)
Age: 73
End: 2023-03-26

## 2023-08-26 ENCOUNTER — HEALTH MAINTENANCE LETTER (OUTPATIENT)
Age: 73
End: 2023-08-26

## 2023-10-03 NOTE — PROGRESS NOTES
Care Management Follow Up    Length of Stay (days): 11    Expected Discharge Date: 06/26/21     Concerns to be Addressed: discharge planning     Patient plan of care discussed at interdisciplinary rounds: Yes    Anticipated Discharge Disposition: Transitional Care     Anticipated Discharge Services: OP HD  Anticipated Discharge DME:  NA    Patient/family educated on Medicare website which has current facility and service quality ratings:  Yes  Education Provided on the Discharge Plan:  Yes  Patient/Family in Agreement with the Plan:  Yes    Referrals Placed by CM/SW:    -  TCU *[id number]  Referral sent, paged admissions regarding referral today 6/25/21    - [facility]  [phone] f: [phone]   Referral sent via Epic   Private pay costs discussed: transportation costs    Additional Information:  Followed up with [facility].  They will review for potential admission, but have a long waiting list.  Also follow up today with [facility].  [phone].  They received patient's referral, but no one had reviewed it yet.. They will review and page weekend .             Khushboo Wasserman 1991 [id number]     The patient was seen for continuing care and pharmacotherapy. Since his last visit, he has had several episodes of aggressive behavior towards his parents which includes pushing, bull rushing and head butting. During to his mother the behaviors started towards the end of August.  The last incident was 2 nights ago. When he is alone with his father, his behavior is very different and he is calm but he constantly wants his mother's attention and behaviors to start when his demands are not met immediately. A behavioral specialist will be assisting the family        ROS:  As above  Current Mental Status Evaluation:  Appearance:  Adequate hygiene and grooming and Poor eye contact   Behavior:  Restless and Fidgety   Mood:  Uncertain   Affect: labile   Speech: Disarticulate, Sparse and Dysarthric   Thought Process:  Unable to assess due to scant verbalization   Thought Content:  Cannot be assessed due to patient factors   Perceptual Disturbances: Cannot be assessed due to patient factors   Risk Potential: Potentially aggresive and violent   Orientation:        No diagnosis found. Current Outpatient Medications   Medication Instructions   • acetaminophen (TYLENOL) 975 mg, Oral, Every 6 hours PRN   • ciprofloxacin-dexamethasone (CIPRODEX) otic suspension 4 drops, Left Ear, 2 times daily   • ciprofloxacin-dexamethasone (CIPRODEX) otic suspension 4 drops, Left Ear, 2 times daily   • ergocalciferol (VITAMIN D2) 50,000 units TAKE 1 CAPSULE BY MOUTH ONE TIME PER WEEK   • escitalopram (LEXAPRO) 10 mg tablet TAKE 1.5 TABLETS BY MOUTH DAILY   • pyridoxine (B-6) 100 mg, Oral, Daily   • zonisamide (Zonegran) 100 mg capsule Take three capsules by mouth at night time. Treatment Recommendations- Risks Benefits    Lorazepam 1 mg every 6 hours as needed-increase Lexapro 20 mg daily. Follow-up 6 weeks  Risks, Benefits And Possible Side Effects Of Medications:   The risks and benefits of medications and proposed treatment plan were discussed with the patient's caregiver/POA/guardian. The individual understands and agrees    VIRTUAL VISIT DISCLAIMER    Dewey Gallagher verbally agrees to participate in [employer]. Pt is aware that [employer] could be limited without vital signs or the ability to perform a full hands-on physical exam. Dewey Gallagher understands he or the provider may request at any time to terminate the video visit and request the patient to seek care or treatment in person.      Bruce Arguelles MD 10/03/23

## 2024-01-13 ENCOUNTER — HEALTH MAINTENANCE LETTER (OUTPATIENT)
Age: 74
End: 2024-01-13

## 2024-06-01 ENCOUNTER — HEALTH MAINTENANCE LETTER (OUTPATIENT)
Age: 74
End: 2024-06-01

## 2024-08-01 ENCOUNTER — LAB REQUISITION (OUTPATIENT)
Dept: LAB | Facility: CLINIC | Age: 74
End: 2024-08-01
Payer: MEDICAID

## 2024-08-01 DIAGNOSIS — Z11.1 ENCOUNTER FOR SCREENING FOR RESPIRATORY TUBERCULOSIS: ICD-10-CM

## 2024-08-02 ENCOUNTER — DOCUMENTATION ONLY (OUTPATIENT)
Dept: GERIATRICS | Facility: CLINIC | Age: 74
End: 2024-08-02
Payer: MEDICARE

## 2024-08-02 PROCEDURE — 36415 COLL VENOUS BLD VENIPUNCTURE: CPT | Performed by: NURSE PRACTITIONER

## 2024-08-02 PROCEDURE — P9604 ONE-WAY ALLOW PRORATED TRIP: HCPCS | Performed by: NURSE PRACTITIONER

## 2024-08-02 PROCEDURE — 86481 TB AG RESPONSE T-CELL SUSP: CPT | Mod: ORL | Performed by: NURSE PRACTITIONER

## 2024-08-03 LAB
GAMMA INTERFERON BACKGROUND BLD IA-ACNC: 0 IU/ML
M TB IFN-G BLD-IMP: NEGATIVE
M TB IFN-G CD4+ BCKGRND COR BLD-ACNC: 7.98 IU/ML
MITOGEN IGNF BCKGRD COR BLD-ACNC: 0.03 IU/ML
MITOGEN IGNF BCKGRD COR BLD-ACNC: 0.05 IU/ML
QUANTIFERON MITOGEN: 7.98 IU/ML
QUANTIFERON NIL TUBE: 0 IU/ML
QUANTIFERON TB1 TUBE: 0.05 IU/ML
QUANTIFERON TB2 TUBE: 0.03

## 2024-08-06 ENCOUNTER — TRANSITIONAL CARE UNIT VISIT (OUTPATIENT)
Dept: GERIATRICS | Facility: CLINIC | Age: 74
End: 2024-08-06
Payer: MEDICARE

## 2024-08-06 VITALS
TEMPERATURE: 98 F | HEIGHT: 74 IN | HEART RATE: 73 BPM | RESPIRATION RATE: 18 BRPM | OXYGEN SATURATION: 97 % | BODY MASS INDEX: 27.8 KG/M2 | DIASTOLIC BLOOD PRESSURE: 76 MMHG | SYSTOLIC BLOOD PRESSURE: 119 MMHG | WEIGHT: 216.6 LBS

## 2024-08-06 DIAGNOSIS — Z87.81 HISTORY OF VERTEBRAL FRACTURE: Primary | ICD-10-CM

## 2024-08-06 DIAGNOSIS — Z87.440 HISTORY OF RECURRENT UTI (URINARY TRACT INFECTION): ICD-10-CM

## 2024-08-06 DIAGNOSIS — L89.620 PRESSURE SORE ON HEEL, LEFT, UNSTAGEABLE (H): ICD-10-CM

## 2024-08-06 DIAGNOSIS — I48.91 ATRIAL FIBRILLATION, UNSPECIFIED TYPE (H): ICD-10-CM

## 2024-08-06 DIAGNOSIS — E11.29 TYPE 2 DIABETES MELLITUS WITH OTHER DIABETIC KIDNEY COMPLICATION (H): ICD-10-CM

## 2024-08-06 DIAGNOSIS — Z86.718 HISTORY OF DEEP VENOUS THROMBOSIS: ICD-10-CM

## 2024-08-06 DIAGNOSIS — N31.9 NEUROGENIC BLADDER: ICD-10-CM

## 2024-08-06 DIAGNOSIS — Z86.711 HISTORY OF PULMONARY EMBOLISM: ICD-10-CM

## 2024-08-06 DIAGNOSIS — Z97.8 CHRONIC INDWELLING FOLEY CATHETER: ICD-10-CM

## 2024-08-06 PROBLEM — N18.6 END-STAGE RENAL DISEASE (H): Status: ACTIVE | Noted: 2024-08-06

## 2024-08-06 PROBLEM — N32.81 OVERACTIVE BLADDER: Status: ACTIVE | Noted: 2020-08-19

## 2024-08-06 PROBLEM — R53.1 WEAKNESS: Status: ACTIVE | Noted: 2021-08-12

## 2024-08-06 PROBLEM — E55.9 VITAMIN D DEFICIENCY: Status: ACTIVE | Noted: 2021-09-09

## 2024-08-06 PROBLEM — N28.9 LESION OF LEFT NATIVE KIDNEY: Status: ACTIVE | Noted: 2021-08-23

## 2024-08-06 PROBLEM — F32.9 MAJOR DEPRESSION: Status: ACTIVE | Noted: 2019-04-30

## 2024-08-06 PROBLEM — S31.809A: Status: ACTIVE | Noted: 2022-09-19

## 2024-08-06 PROBLEM — D64.9 ANEMIA: Status: ACTIVE | Noted: 2022-04-26

## 2024-08-06 PROBLEM — R29.6 FALLS FREQUENTLY: Status: ACTIVE | Noted: 2024-05-08

## 2024-08-06 PROBLEM — I26.99 PULMONARY EMBOLISM (H): Status: ACTIVE | Noted: 2022-09-19

## 2024-08-06 PROBLEM — I82.409 DEEP VENOUS THROMBOSIS (H): Status: ACTIVE | Noted: 2022-09-19

## 2024-08-06 PROBLEM — G31.84 MCI (MILD COGNITIVE IMPAIRMENT): Status: ACTIVE | Noted: 2021-09-14

## 2024-08-06 PROBLEM — M54.6 PAIN IN THORACIC SPINE: Status: ACTIVE | Noted: 2022-09-19

## 2024-08-06 PROBLEM — M84.48XA PATHOLOGICAL FRACTURE OF VERTEBRA: Status: ACTIVE | Noted: 2022-09-19

## 2024-08-06 PROCEDURE — 99310 SBSQ NF CARE HIGH MDM 45: CPT | Performed by: NURSE PRACTITIONER

## 2024-08-06 NOTE — LETTER
2024      Fer Latham  [address]  [facility] [address]        [facility]    Name:   Fer Latham  :   1950  MRN:    [id number]     Facility:   [facility] ([facility]) [[id number]]   Room: TCU 3572  Code Status: FULL CODE -     DOS:  2024    PCP:  Isrrael Ornelas MD     CHIEF COMPLAINT / REASON FOR VISIT:  Chief Complaint   Patient presents with     Hospital F/U     Malnutrition, confusion, and back pain          HPI: Fer is a 74 year old male with a PMHx significant for atrial fibrillation, 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  MRI reassuring against cauda equina syndrome.  NSGY evaluated the patient and offered nonemergent posterior fusion in the future to prevent further progression of kyphosis, which she declined.  He was subsequently transferred to the TCU for rehab and nursing care.    Hospital course excerpted from discharge summary:  History of recurrent UTI/prostatitis  Chronic indwelling ferguson catheter 2/2 neurogenic bladder  History of BPH  Chronic Ferguson in place. Had discussed his urinary retention and how Duloxentine may affect this. Ultimately decided to continue PTA duloxetine. Patient was encouraged to increase PO fluid intake and UOP was closely monitored.   - Ferguson changed   - Continue PTA duloxetine 30mg daily     H/o T12 compression fracture  Acute on chronic Back pain and BLE weakness  Deconditioning  Frequent falls   Unable to care for self at home  Evaluated by NSGY in the past. Had Acute on chronic pain this admission though MRI w/o clear cord compression, however, vertebral edema suggests acute/subacute component. NSGY offered non-emergent posterior spinal fusion in the outpatient setting to prevent further  worsening/neurologic sequelae, but patient is currently not interested given his age.  - NSGY consulted, appreciate recs   - Recommend brace when HOB >30*, but not required for PT/OT  - Pain control: duloxetine 30 mg daily, lidocaine patch, acetaminophen prn  - Plan outpatient DEXA scan  - Continue PT/OT     Alternating constipation and diarrhea, resolved  possible stercoral colitis, recurrent   Abdomen soft, non-tender on exam this AM. Per nursing patient continues to have daily BM and afebrile. Patient refused labs for infectious workup but ok with not pursuing further investigation due to improved examination.   - Surgery consulted - recommended strong oral bowel regimen  - PRN bowel regiment senna BID prn, milk of mag prn.   - Changed Miralax from PRN to daily  - Zofran as needed     Cognitive impairment  MDD, exacerbated   Left his nursing home 4/30 against recommendations under unclear circumstances (?financial). Baseline unclear, per NH notes requires a Karthikeyan lift and was bed-bound during that week at home. Patient noted improved cognition since stopping oxycodone.   - Started 30mg duloxetine daily for depression due to being in the hospital and to address pain.   - On 6/11, he scored 12/30 on a MoCA evaluation and 4/15 memory index score.  - Psychology consulted - note 6/21/24 on discharge for follow-up recommendations   - spend extra time explaining decision-making w/ frequent updates/reminders about progress  - encourage engagement in activities while in the hospital   - encourage visits outside the room when possible  - refer to neuropsychology on d/c - could consider inpatient to assess cognitive impairment   - may benefit from psychiatric care post-d/c including psychotherapy and medications    Skin breakdown, chronic wounds  Chronic pressure ulcerations of bilateral heels, improving  Bilateral DP pulses detected by doppler on 5/31. Right foot XR showed no osseous involvement on 5/31. RLE arterial  ultrasound showed patent arteries with some small vessel disease.  - Present on admission  - Stable with no signs of acute infection  - Dressing changes as recommended by podiatry  - Activity: off load heels while in bed/chair, elevate heels off bed with pillows. Recommend prevalon off loading boots.     Malnutrition of moderate degree  Type II DM   Nutrition seen and multivitamin started. Electrolytes were monitored closely.     CKD4, IgA nephropathy:   - Creatinine 1.65 --> 1.67. baseline ~1.6   - managed with low potassium diet     Likely atelectasis: monitor  Lower extremity edema: prolonged bed rest. Recommend ace wraps with mild compression of both legs   Systolic murmur: History of tricuspid regurgitation. Asymptomatic. Monitor.   Hx DVT/PE: PTA apixaban.   A-fib: Currently rate controlled. Apixaban as above.   Chronic anemia of inflammation: Hgb at baseline.   DMT2: Diet controlled. Glucose well controlled.     Malnutrition  Malnutrition Characteristics  Etiology: Chronic illness  Energy Intake: Not assessed  Weight loss:: None  Body fat:: mild depletion  Muscle mass:: moderate depletion  Fluid accumulation:: None  Malnutrition Diagnosis: Malnutrition of a moderate degree  Weight: 100.8 kg (222 lb 3.6 oz)  Wt Change from Previous: 0 Kg  Wt Change from Admit: 0 Kg  % Wt Change from Adm: 0 %  Ideal Body Wt (IBW) Male (kg): 82.24 kg    PERTINENT STUDIES & CONSULTS:   Neurosurgery consulted  Nutrition Consulted  Podiatry consulted   PT/OT consulted  Wound Care Consulted     CT Chest/Abdmen/Pelvis w/o contrast 5/8/24    IMPRESSION   Impression:   1. No acute sequela of trauma in the chest, abdomen, pelvis.   2. There are a few small opacities in the right upper lobe which could be   infectious or inflammatory.   3. Decompressed urinary bladder by a Brown catheter with adjacent fat   stranding. Correlate for cystitis.   4. Moderately distended rectum with perirectal fat stranding. Stercoral   colitis would be a  consideration.   5. Chronic moderate to severe compression deformity of the T12 vertebral   body.     CT Cervical spine w/o contrast 5/8/24    IMPRESSION   Impression:   1. No acute fracture or subluxation of the cervical vertebrae.   2. Multilevel cervical spondylosis without high-grade spinal canal   stenosis and greatest at C5-6 where there is moderate left neural   foraminal stenosis.     CT head w/o contrast 5/8/24    IMPRESSION   Impression:   1. No acute intracranial abnormality.   2. Moderate atrophy and chronic small vessel ischemic disease. Chronic   right temporal infarction.     Xray knee 5/8/24    IMPRESSION   Impression: No acute osseous abnormality. Significant soft tissue swelling   of the medial aspect of the knee.     Xray hip 5/8/24    IMPRESSION   Impression:   No acute fracture or dislocation.     CT T-spine 5/9/24:    Impression:   1. Chronic severe compression deformity of T12 with associated   retropulsion causing mild to moderate spinal canal stenosis. Height of the   T12 vertebra has slightly further decreased from comparison exam.   2. Approximately 35 degrees thoracic kyphosis secondary to the vertebral   body fracture at T12.   3. Multilevel thoracic spondylosis, greatest degenerative changes centered   at T11-T12 level.   4. No high-grade spinal canal neural foraminal stenosis.     CT Lumbar spine 5/9/24    IMPRESSION   Impression:     1. Chronic severe T12 compression fracture deformity with mild   retropulsion and associated mild to moderate spinal canal narrowing.   Slight further loss of height of disc compression fracture when compared   to previous CT of the chest, abdomen pelvis.   2. No acute fracture of the lumbosacral spine.   3. Severe multilevel lumbar spondylosis with further progression of bulky   bridging osteophytes and facet arthropathy. Findings are most   significantly at L5 -S1.     MRI Lumbar Spine 5/10/24    IMPRESSION   Impression: Compression fracture deformity  "of T12 with associated bone   marrow edema along the superior endplate is suggestive of an acute on   chronic or subacute compression fracture. There is significant loss of   height with retropulsion resulting in mild spinal canal stenosis.   Multilevel degenerative disc disease and facet arthropathy, most in   significantly at L5-S1 as described above.         CURRENT/RECENT TCU ISSUES    Disposition  -- He tells me he has been in and out of the hospital.  -- He tells me he had a bad case of sepsis years ago, saying, \"I retired, and I got fired, and I got sepsis.\"  He then continued with \"weird health things.\"  -- He has a history of morbid obesity and has lost 330 pounds.  -- His goal is to \"get out of this place and go home.\"  He also tells me, \"my problem is that I want to be able to walk normally.\"  -- He is retired, and his wife is retired with a medical disability.    Confusion  -- In addition to diagnoses listed above, he seems to have some memory issues.    Chronic severe T12 compression fracture deformity  -- The cause of most of his pain.    Diabetes mellitus type 2  -- He tells me, \"I just reached a point of a shot the day in the morning and pills at night.\"    Neurogenic bladder  Urinary retention  Chronic indwelling Brown catheter  Recurrent UTIs  -- Urine is clear nicole.    Foot wounds  -- Heel wounds with hammertoes.  -- Black spots on the tips of his toes.  -- Wearing blue boots in bed.      ROS: 10 point ROS of systems including Constitutional, Eyes, Respiratory, Cardiovascular, Gastroenterology, Genitourinary, Integumentary, Musculoskeletal, and Psychiatric were all negative except for pertinent positives noted in my HPI.      Past Medical History:   Diagnosis Date     Anemia      Arrhythmia     atrial fib     Arthritis 1990's    gout     BPH (benign prostatic hyperplasia)      Current moderate episode of major depressive disorder without prior episode (H) 4/30/2019     Depression      Diabetes " mellitus (H)     Type 2  IDDM     Diverticulosis      ESRD (end stage renal disease) on dialysis (H)      Hematuria      HTN (hypertension)      HTN (hypertension)      IgA nephropathy      MI, old      Pneumonia      Rhabdomyolysis      Weakness 5/10/2019              Family History   Problem Relation Age of Onset     Cancer Father      Social History     Socioeconomic History     Marital status:      Spouse name: None     Number of children: None     Years of education: None     Highest education level: None   Tobacco Use     Smoking status: Former     Smokeless tobacco: Never     Tobacco comments:     quit 35 years ago   Substance and Sexual Activity     Alcohol use: Yes     Comment: 1 to 2 drinks a day (Occasional)     Drug use: No     Social Determinants of Health     Financial Resource Strain: Patient Declined (5/8/2024)    Received from [facility]    Overall Financial Resource Strain (CARDIA)      Difficulty of Paying Living Expenses: Patient declined   Food Insecurity: Patient Declined (5/8/2024)    Received from [facility]    Hunger Vital Sign      Worried About Running Out of Food in the Last Year: Patient declined      Ran Out of Food in the Last Year: Patient declined   Transportation Needs: Patient Declined (5/8/2024)    Received from [facility]    PRAPARE - Transportation      Lack of Transportation (Medical): Patient declined      Lack of Transportation (Non-Medical): Patient declined   Interpersonal Safety: Patient Declined (5/8/2024)    Received from [facility]    Humiliation, Afraid, Rape, and Kick questionnaire      Fear of Current or Ex-Partner: Patient declined      Emotionally Abused: Patient declined      Physically Abused: Patient declined      Sexually Abused: Patient declined   Housing Stability: Low Risk  (5/8/2024)    Received from [facility]    Housing Stability      What is your housing situation today?: 3 - I have housing        MEDICATIONS: Reviewed from the MAR, physician orders, and/or earlier progress notes.[id number]}  Post Medication Reconciliation Status:     Current Outpatient Medications   Medication Sig Dispense Refill     acetaminophen (TYLENOL) 500 MG tablet Take 2 tablets (1,000 mg) by mouth 3 times daily       aspirin (ASA) 325 MG EC tablet Take 325 mg by mouth daily       bisacodyl (DULCOLAX) 10 MG suppository Place 1 suppository (10 mg) rectally daily as needed for constipation       escitalopram (LEXAPRO) 10 MG tablet Take 1 tablet (10 mg) by mouth daily 30 tablet 0     folic acid (FOLVITE) 1 MG tablet Take 1 tablet (1 mg) by mouth daily       melatonin 5 MG tablet Take 1 tablet (5 mg) by mouth nightly as needed for sleep       miconazole (MICATIN) 2 % external powder Apply topically as needed for itching or other (irritation of perineum) 43 g 0     miconazole (MICATIN) 2 % external powder Apply topically 2 times daily       mirtazapine (REMERON) 15 MG tablet Take 1 tablet (15 mg) by mouth At Bedtime 30 tablet 0     multivitamin, therapeutic (THERA-VIT) TABS tablet Take 1 tablet by mouth daily 30 tablet 0     nystatin (MYCOSTATIN) 596566 UNIT/GM external cream Apply topically 2 times daily       nystatin (MYCOSTATIN) 675149 UNIT/GM external powder Apply topically 2 times daily 15 g 0     polyethylene glycol (MIRALAX) 17 GM/Dose powder Take 8.5 g by mouth daily as needed        senna (SENOKOT) 8.6 MG tablet Take 17.2 mg by mouth 2 times daily as needed       thiamine (B-1) 100 MG tablet Take 1 tablet (100 mg) by mouth daily 30 tablet 0     Vitamin D3 (CHOLECALCIFEROL) 125 MCG (5000 UT) tablet Take 1 tablet by mouth daily       No current facility-administered medications for this visit.     ALLERGIES:   Allergies   Allergen Reactions     Iodinated Contrast Media Unknown     Other reaction(s): Unknown     Sulfa Antibiotics Hives and Rash     Other reaction(s): Unknown    PN: LW Reaction: Rash, Generalized     Ciprofloxacin  "Unknown       DIET: Regular, regular texture, thin liquids.    Vitals:    08/06/24 1511   BP: 119/76   Pulse: 73   Resp: 18   Temp: 98  F (36.7  C)   SpO2: 97%   Weight: 98.2 kg (216 lb 9.6 oz)   Height: 1.88 m (6' 2\")     Wt Readings from Last 4 Encounters:   08/09/24 98.2 kg (216 lb 9.6 oz)   08/06/24 98.2 kg (216 lb 9.6 oz)   05/21/22 98.4 kg (217 lb)   03/01/22 98.7 kg (217 lb 11.2 oz)     Body mass index is 27.81 kg/m .  Body surface area is 2.26 meters squared.    EXAMINATION:   General: Pleasant elderly mal, lying in bed, in no apparent distress. He has a long finn.  Head: Normocephalic and atraumatic.   Eyes: PERRLA, sclerae clear.   ENT: Moist oral mucosa. He has his own teeth, though they are in poor repair.  Cardiovascular: Regular rate and rhythm with intermittent ectopy and a 3/6 OBDULIO at the LSB..   Respiratory: Lungs clear to auscultation bilaterally.   Abdomen: Nondistended.  Indwelling Brown catheter.  Musculoskeletal/Extremities: Heel wounds and hammertoes of the feet.  Also, black spots on the tips of the toes.  Integument: No rashes, clinically significant lesions, or skin breakdown.   Cognitive/Psychiatric: Some apparent confusion with euthymic affect.    DIAGNOSTICS:   Recent Results (from the past 240 hour(s))   Quantiferon TB Gold Plus Grey Tube    Collection Time: 08/02/24  6:48 AM    Specimen: Peripheral Blood   Result Value Ref Range    Quantiferon Nil Tube 0.00 IU/mL   Quantiferon TB Gold Plus Green Tube    Collection Time: 08/02/24  6:48 AM    Specimen: Peripheral Blood   Result Value Ref Range    Quantiferon TB1 Tube 0.05 IU/mL   Quantiferon TB Gold Plus Yellow Tube    Collection Time: 08/02/24  6:48 AM    Specimen: Peripheral Blood   Result Value Ref Range    Quantiferon TB2 Tube 0.03    Quantiferon TB Gold Plus Purple Tube    Collection Time: 08/02/24  6:48 AM    Specimen: Peripheral Blood   Result Value Ref Range    Quantiferon Mitogen 7.98 IU/mL   Quantiferon TB Gold Plus    " Collection Time: 08/02/24  6:48 AM    Specimen: Peripheral Blood   Result Value Ref Range    Quantiferon-TB Gold Plus Negative Negative    TB1 Ag minus Nil Value 0.05 IU/mL    TB2 Ag minus Nil Value 0.03 IU/mL    Mitogen minus Nil Result 7.98 IU/mL    Nil Result 0.00 IU/mL       ASSESSMENT/Plan:      ICD-10-CM    1. History of vertebral fracture  Z87.81       2. Atrial fibrillation, unspecified type (H)  I48.91       3. Neurogenic bladder  N31.9       4. Chronic indwelling Brown catheter  Z97.8       5. Morbid obesity (H)  E66.01       6. Type 2 diabetes mellitus with other diabetic kidney complication (H)  E11.29       7. Pressure sore on heel, left, unstageable (H)  L89.620       8. History of deep venous thrombosis  Z86.718       9. History of pulmonary embolism  Z86.711           CHANGES:    None.    CARE PLAN:    The care plan, medications, vital signs, orders, and nursing notes have been reviewed, and all orders signed. Changes to care plan, if any, as noted. Otherwise, continue current plan of care. Total time for visit was 45 minutes including, but not limited to, non-face-to-face time spent reviewing records, counseling, and coordination of care.    The above has been created using voice recognition software. Please be aware that this may unintentionally  produce inaccuracies and/or nonsensical sentences.      Electronically signed by: OCTAVIO Newsome CNP      Sincerely,        OCTAVIO Newsome CNP

## 2024-08-06 NOTE — PROGRESS NOTES
[facility]    PRIMARY CARE PROVIDER AND CLINIC:  Isrrael Ornelas MD, [address] [facility] / [facility] [id number]***  Chief Complaint   Patient presents with    Hospital F/U      [facility] Record Number:  [id number]  Place of Service where encounter took place:  [facility] ([facility]) [[id number]]    Fer Latham  is a 74 year old  (1950), admitted to the above facility from  [facility]. Hospital stay 5/8/2024 through 8/1/2024..   HPI:    ***    CODE STATUS/ADVANCE DIRECTIVES DISCUSSION:  Prior  CPR/Full code   ALLERGIES:   Allergies   Allergen Reactions    Iodinated Contrast Media Unknown     Other reaction(s): Unknown    Sulfa Antibiotics Hives and Rash     Other reaction(s): Unknown    PN: LW Reaction: Rash, Generalized    Ciprofloxacin Unknown      PAST MEDICAL HISTORY:   Past Medical History:   Diagnosis Date    Anemia     Arrhythmia     atrial fib    Arthritis 1990's    gout    BPH (benign prostatic hyperplasia)     Current moderate episode of major depressive disorder without prior episode (H) 4/30/2019    Depression     Diabetes mellitus (H)     Type 2  IDDM    Diverticulosis     ESRD (end stage renal disease) on dialysis (H)     Hematuria     HTN (hypertension)     HTN (hypertension)     IgA nephropathy     MI, old     Pneumonia     Rhabdomyolysis     Weakness 5/10/2019      PAST SURGICAL HISTORY:   has a past surgical history that includes appendectomy (1970's); Abdomen surgery; biopsy (2017); vascular surgery; Coronary Angiography Adult Order (2018); colonoscopy (2013); Eye surgery (Bilateral, 2004); Arthroscopy knee (6/27/2013); back surgery (1999); IR CVC Tunnel Placement > 5 Yrs of Age (11/26/2019); IR CVC Tunnel Removal Right (11/26/2019); IR CVC Tunnel Revision Right (2/10/2020); IR CVC Tunnel Revision Right (10/8/2020); IR CVC Tunnel Revision Right (6/17/2021); and IR CVC Tunnel Removal Right (2/22/2022).  FAMILY HISTORY: family history includes Cancer in his  father.  SOCIAL HISTORY:   reports that he has quit smoking. He has never used smokeless tobacco. He reports current alcohol use. He reports that he does not use drugs.  Patient's living condition: {LIVES WITH (NURSING HOME):145655}    Post Discharge Medication Reconciliation Status:   MED REC REQUIRED{TIP  Click the link below to document or use med rec list, use list to pull in response :923564}  Post Medication Reconciliation Status: {MED REC LIST:719813}       Current Outpatient Medications   Medication Sig Dispense Refill    acetaminophen (TYLENOL) 500 MG tablet Take 2 tablets (1,000 mg) by mouth 3 times daily      aspirin (ASA) 325 MG EC tablet Take 325 mg by mouth daily      bisacodyl (DULCOLAX) 10 MG suppository Place 1 suppository (10 mg) rectally daily as needed for constipation      escitalopram (LEXAPRO) 10 MG tablet Take 1 tablet (10 mg) by mouth daily 30 tablet 0    folic acid (FOLVITE) 1 MG tablet Take 1 tablet (1 mg) by mouth daily      melatonin 5 MG tablet Take 1 tablet (5 mg) by mouth nightly as needed for sleep      miconazole (MICATIN) 2 % external powder Apply topically as needed for itching or other (irritation of perineum) 43 g 0    miconazole (MICATIN) 2 % external powder Apply topically 2 times daily      mirtazapine (REMERON) 15 MG tablet Take 1 tablet (15 mg) by mouth At Bedtime 30 tablet 0    multivitamin, therapeutic (THERA-VIT) TABS tablet Take 1 tablet by mouth daily 30 tablet 0    nystatin (MYCOSTATIN) 988458 UNIT/GM external cream Apply topically 2 times daily      nystatin (MYCOSTATIN) 631449 UNIT/GM external powder Apply topically 2 times daily 15 g 0    polyethylene glycol (MIRALAX) 17 GM/Dose powder Take 8.5 g by mouth daily as needed       senna (SENOKOT) 8.6 MG tablet Take 17.2 mg by mouth 2 times daily as needed      thiamine (B-1) 100 MG tablet Take 1 tablet (100 mg) by mouth daily 30 tablet 0    Vitamin D3 (CHOLECALCIFEROL) 125 MCG (5000 UT) tablet Take 1 tablet by mouth  "daily       No current facility-administered medications for this visit.       ROS:  {ROS FGS:373697}    Vitals:  /76   Pulse 73   Temp 98  F (36.7  C)   Resp 18   Ht 1.88 m (6' 2\")   Wt 98.2 kg (216 lb 9.6 oz)   SpO2 97%   BMI 27.81 kg/m    Exam:  {Nursing home physical exam :077071}    Lab/Diagnostic data:  {fgslab:381084}    ASSESSMENT/PLAN:    {FGS DX2:423294}    Orders:  {fgsorders:465213}  ***    Electronically signed by:  Piyush Abrams ***                "

## 2024-08-09 ENCOUNTER — TRANSITIONAL CARE UNIT VISIT (OUTPATIENT)
Dept: GERIATRICS | Facility: CLINIC | Age: 74
End: 2024-08-09
Payer: MEDICARE

## 2024-08-09 VITALS
HEART RATE: 78 BPM | BODY MASS INDEX: 27.8 KG/M2 | DIASTOLIC BLOOD PRESSURE: 78 MMHG | TEMPERATURE: 97.1 F | RESPIRATION RATE: 18 BRPM | HEIGHT: 74 IN | WEIGHT: 216.6 LBS | OXYGEN SATURATION: 92 % | SYSTOLIC BLOOD PRESSURE: 126 MMHG

## 2024-08-09 DIAGNOSIS — Z97.8 CHRONIC INDWELLING FOLEY CATHETER: ICD-10-CM

## 2024-08-09 DIAGNOSIS — L89.620 PRESSURE SORE ON HEEL, LEFT, UNSTAGEABLE (H): Primary | ICD-10-CM

## 2024-08-09 DIAGNOSIS — N31.9 NEUROGENIC BLADDER: ICD-10-CM

## 2024-08-09 DIAGNOSIS — Z87.81 HISTORY OF VERTEBRAL FRACTURE: ICD-10-CM

## 2024-08-09 DIAGNOSIS — I48.91 ATRIAL FIBRILLATION, UNSPECIFIED TYPE (H): ICD-10-CM

## 2024-08-09 DIAGNOSIS — G30.9 ALZHEIMER'S DEMENTIA, UNSPECIFIED DEMENTIA SEVERITY, UNSPECIFIED TIMING OF DEMENTIA ONSET, UNSPECIFIED WHETHER BEHAVIORAL, PSYCHOTIC, OR MOOD DISTURBANCE OR ANXIETY (H): ICD-10-CM

## 2024-08-09 DIAGNOSIS — F02.80 ALZHEIMER'S DEMENTIA, UNSPECIFIED DEMENTIA SEVERITY, UNSPECIFIED TIMING OF DEMENTIA ONSET, UNSPECIFIED WHETHER BEHAVIORAL, PSYCHOTIC, OR MOOD DISTURBANCE OR ANXIETY (H): ICD-10-CM

## 2024-08-09 DIAGNOSIS — F33.1 MODERATE EPISODE OF RECURRENT MAJOR DEPRESSIVE DISORDER (H): ICD-10-CM

## 2024-08-09 PROCEDURE — 99310 SBSQ NF CARE HIGH MDM 45: CPT | Performed by: NURSE PRACTITIONER

## 2024-08-09 NOTE — LETTER
8/9/2024      Fer Latham  [address]  [facility] [address]        [facility] GERIATRICS    Chief Complaint   Patient presents with     RECHECK     HPI:  Fer Latham is a 74 year old  (1950), who is being seen today for an episodic care visit at: [facility] ([facility]) [[id number]]. Today's concern is:     Fer was visited today while in his room resting in bed.  He shares that he currently lives in an apartment with his wife of 50 years.  He is eager to get back home as it has been quite sometime since he has been there.  His goal is to be able to sit up soon.  He shares that he did not eat well while at [facility] due to food preference and his appetite has improved here.  He does not have complaints of chest pain or shortness of breath.  Has a catheter in which is not bothersome to him.  Having regular bowel movements.    After further review of his history in epic, noted that he had an [facility] stay back in 2022 then went to [facility] transitional care unit for rehab following that.  Does not appear that he was stronger to go home therefore he was in the long-term care facility at [facility] until April 2024 when he discharged home with his wife.  He was unable to care for himself which led to this most recent prolonged hospitalization. Please see below for his functioning when he was discharged from [facility] in April 2024:  Mobility: Non-ambulatory, uses wheelchair with maximum assistance.  ADLs: Upper Extremity Dressing: maximum assistance  Lower Extremity Dressing: dependent  Toileting: dependent  Hygiene and Grooming: dependent  Cognition: Brief Interview of Mental Status (BIMS) 6/15 (13-15 within normal limits) Feb 2024  [employer] Mental Status (SLUMS) 13/30 (27-30 within normal limits) Aug 2022  Cognitive Performance Test (CPT) 4.5/5.6 Aug 2022      Allergies, and PMH/PSH reviewed in [facility] today.  REVIEW OF SYSTEMS:  4 point ROS  "including Respiratory, CV, GI and , other than that noted in the HPI,  is negative    Objective:   /78   Pulse 78   Temp 97.1  F (36.2  C)   Resp 18   Ht 1.88 m (6' 2\")   Wt 98.2 kg (216 lb 9.6 oz)   SpO2 92%   BMI 27.81 kg/m    GENERAL APPEARANCE:  Alert, in no distress, pleasant, cooperative  EYES: no discharge or mattering on lids or lashes noted  ENT:  moist mucous membranes, hearing acuity intact  NECK: supple, symmetrical  RESP: no respiratory distress, Lung sounds clear, patient is on room air  CV:  rate and rhythm regular, no murmur. Edema none in bilateral lower extremities. VASCULAR: warm extremities without open areas.  ABDOMEN: normal bowel sounds, soft, nontender.  : catheter with mild amount of sediment   M/S:   Gait and station: dependent with cares, no tenderness or swelling of the joints; able to move all extremities   SKIN:  Inspection and palpation of skin and subcutaneous tissue: skin warm, dry, UTV wound on RLE  NEURO: no facial asymmetry, no speech deficits and able to follow directions, moves all extremities symmetrically  PSYCH:  insight, judgement, and memory impaired affect and mood normal    Reviewed in epic    Assessment/Plan:  H/o T12 compression fracture  Acute on chronic Back pain and BLE weakness  MRI w/o clear cord compression, however, vertebral edema suggests acute/subacute component. NSGY offered non-emergent posterior spinal fusion in the outpatient setting to prevent further worsening/neurologic sequelae, but patient is currently not interested given his age.  - duloxetine 30 mg daily, lidocaine patch, acetaminophen prn  - Continue PT/OT but suspect that he will not progress well. Please see above for his functioning when he was discharged from LTC in April 2024 (he was dependent with cares)      Dementia  MDD  Started 30mg duloxetine daily for depression due to being in the hospital and to address pain. On 6/11/2024, he scored 12/30 on a MoCA evaluation and " 4/15 memory index score.  Brief Interview of Mental Status (BIMS) 6/15 (13-15 within normal limits) Feb 2024, [employer] Mental Status (SLUMS) 13/30 (27-30 within normal limits) Aug 2022, Cognitive Performance Test (CPT) 4.5/5.6 Aug 2022.  -- will likely need LTC placement    Chronic pressure ulcerations of bilateral heels  Bilateral DP pulses detected by doppler on 5/31. Right foot XR showed no osseous involvement on 5/31. RLE arterial ultrasound showed patent arteries with some small vessel disease.  --to be followed by in house wound MD if he agrees.   -- For now it appears that the wound needs to be debrided so we will complete the following wound care:  Wash the area with saline and pat dry  Use Skin-Prep from surrounding periwound skin  Apply calcium alginate to the wound bed only  Cover with not Adaptic dressing and wrapped with Kerlix     CKD4  IgA nephropathy  Has required dialysis in the past. Creatinine baseline ~1.6   --Avoid nephrotoxic medications. Recheck BMP periodically    A-fib  Currently rate controlled without meds, HR 60-80s.   --continue with Apixaban 5 mg BID    Chronic anemia of inflammation  Hgb 10-11.  --follow clinically.     Chronic indwelling ferguson catheter 2/2 neurogenic bladder  History of BPH  Chronic Ferguson in place. No s/sx of infection today.   - Ferguson changed 7/24    Physical deconditioning  Secondary to recent hospitalization and underlying medical conditions.   --ongoing PT/OT for strengthening.       MED REC REQUIRED  Post Medication Reconciliation Status: discharge medications reconciled, continue medications without change    Total time spent with patient visit was 45 min including patient visit and review of past records (30 minutes). Greater than 50% of total time spent with counseling and coordinating care due to current function and discharge planning, wound care plan, a fib, deconditioning.       Electronically signed by: OCTAVIO Harden CNP          Sincerely,        OCTAVIO Harden CNP

## 2024-08-09 NOTE — PROGRESS NOTES
[facility] GERIATRICS    Chief Complaint   Patient presents with    RECHECK     HPI:  Fer Latham is a 74 year old  (1950), who is being seen today for an episodic care visit at: [facility] ([facility]) [[id number]]. Today's concern is:     Fer was visited today while in his room resting in bed.  He shares that he currently lives in an apartment with his wife of 50 years.  He is eager to get back home as it has been quite sometime since he has been there.  His goal is to be able to sit up soon.  He shares that he did not eat well while at [facility] due to food preference and his appetite has improved here.  He does not have complaints of chest pain or shortness of breath.  Has a catheter in which is not bothersome to him.  Having regular bowel movements.    After further review of his history in epic, noted that he had an [facility] stay back in 2022 then went to [facility] transitional care unit for rehab following that.  Does not appear that he was stronger to go home therefore he was in the long-term care facility at [facility] until April 2024 when he discharged home with his wife.  He was unable to care for himself which led to this most recent prolonged hospitalization. Please see below for his functioning when he was discharged from [facility] in April 2024:  Mobility: Non-ambulatory, uses wheelchair with maximum assistance.  ADLs: Upper Extremity Dressing: maximum assistance  Lower Extremity Dressing: dependent  Toileting: dependent  Hygiene and Grooming: dependent  Cognition: Brief Interview of Mental Status (BIMS) 6/15 (13-15 within normal limits) Feb 2024  [employer] Mental Status (UMS) 13/30 (27-30 within normal limits) Aug 2022  Cognitive Performance Test (CPT) 4.5/5.6 Aug 2022      Allergies, and PMH/PSH reviewed in [facility] today.  REVIEW OF SYSTEMS:  4 point ROS including Respiratory, CV, GI and , other than that noted in the HPI,  is negative    Objective:  "  /78   Pulse 78   Temp 97.1  F (36.2  C)   Resp 18   Ht 1.88 m (6' 2\")   Wt 98.2 kg (216 lb 9.6 oz)   SpO2 92%   BMI 27.81 kg/m    GENERAL APPEARANCE:  Alert, in no distress, pleasant, cooperative  EYES: no discharge or mattering on lids or lashes noted  ENT:  moist mucous membranes, hearing acuity intact  NECK: supple, symmetrical  RESP: no respiratory distress, Lung sounds clear, patient is on room air  CV:  rate and rhythm regular, no murmur. Edema none in bilateral lower extremities. VASCULAR: warm extremities without open areas.  ABDOMEN: normal bowel sounds, soft, nontender.  : catheter with mild amount of sediment   M/S:   Gait and station: dependent with cares, no tenderness or swelling of the joints; able to move all extremities   SKIN:  Inspection and palpation of skin and subcutaneous tissue: skin warm, dry, UTV wound on RLE  NEURO: no facial asymmetry, no speech deficits and able to follow directions, moves all extremities symmetrically  PSYCH:  insight, judgement, and memory impaired affect and mood normal    Reviewed in epic    Assessment/Plan:  H/o T12 compression fracture  Acute on chronic Back pain and BLE weakness  MRI w/o clear cord compression, however, vertebral edema suggests acute/subacute component. NSGY offered non-emergent posterior spinal fusion in the outpatient setting to prevent further worsening/neurologic sequelae, but patient is currently not interested given his age.  - duloxetine 30 mg daily, lidocaine patch, acetaminophen prn  - Continue PT/OT but suspect that he will not progress well. Please see above for his functioning when he was discharged from LTC in April 2024 (he was dependent with cares)      Dementia  MDD  Started 30mg duloxetine daily for depression due to being in the hospital and to address pain. On 6/11/2024, he scored 12/30 on a MoCA evaluation and 4/15 memory index score.  Brief Interview of Mental Status (BIMS) 6/15 (13-15 within normal limits) " Feb 2024, [employer] Mental Status (UMS) 13/30 (27-30 within normal limits) Aug 2022, Cognitive Performance Test (CPT) 4.5/5.6 Aug 2022.  -- will likely need LTC placement    Chronic pressure ulcerations of bilateral heels  Bilateral DP pulses detected by doppler on 5/31. Right foot XR showed no osseous involvement on 5/31. RLE arterial ultrasound showed patent arteries with some small vessel disease.  --to be followed by in house wound MD if he agrees.   -- For now it appears that the wound needs to be debrided so we will complete the following wound care:  Wash the area with saline and pat dry  Use Skin-Prep from surrounding periwound skin  Apply calcium alginate to the wound bed only  Cover with not Adaptic dressing and wrapped with Kerlix     CKD4  IgA nephropathy  Has required dialysis in the past. Creatinine baseline ~1.6   --Avoid nephrotoxic medications. Recheck BMP periodically    A-fib  Currently rate controlled without meds, HR 60-80s.   --continue with Apixaban 5 mg BID    Chronic anemia of inflammation  Hgb 10-11.  --follow clinically.     Chronic indwelling ferguson catheter 2/2 neurogenic bladder  History of BPH  Chronic Ferguson in place. No s/sx of infection today.   - Ferguson changed 7/24    Physical deconditioning  Secondary to recent hospitalization and underlying medical conditions.   --ongoing PT/OT for strengthening.       MED REC REQUIRED  Post Medication Reconciliation Status: discharge medications reconciled, continue medications without change    Total time spent with patient visit was 45 min including patient visit and review of past records (30 minutes). Greater than 50% of total time spent with counseling and coordinating care due to current function and discharge planning, wound care plan, a fib, deconditioning.       Electronically signed by: OCTAVIO Harden CNP

## 2024-08-10 PROBLEM — N18.6 END-STAGE RENAL DISEASE (H): Status: RESOLVED | Noted: 2024-08-06 | Resolved: 2024-08-10

## 2024-08-10 PROBLEM — I26.99 PULMONARY EMBOLISM (H): Status: RESOLVED | Noted: 2022-09-19 | Resolved: 2024-08-10

## 2024-08-10 PROBLEM — I26.99 ACUTE MASSIVE PULMONARY EMBOLISM (H): Status: RESOLVED | Noted: 2021-07-08 | Resolved: 2024-08-10

## 2024-08-10 PROBLEM — N18.6 ESRD (END STAGE RENAL DISEASE) ON DIALYSIS (H): Status: RESOLVED | Noted: 2018-08-03 | Resolved: 2024-08-10

## 2024-08-10 PROBLEM — N17.9 ACUTE RENAL FAILURE ON DIALYSIS (H): Status: RESOLVED | Noted: 2018-07-18 | Resolved: 2024-08-10

## 2024-08-10 PROBLEM — Z99.2 ACUTE RENAL FAILURE ON DIALYSIS (H): Status: RESOLVED | Noted: 2018-07-18 | Resolved: 2024-08-10

## 2024-08-10 PROBLEM — Z99.2 ESRD (END STAGE RENAL DISEASE) ON DIALYSIS (H): Status: RESOLVED | Noted: 2018-08-03 | Resolved: 2024-08-10

## 2024-08-10 NOTE — PROGRESS NOTES
[facility]    Name:   Fer Latham  :   1950  MRN:    [id number]     Facility:   [facility] ([facility]) [[id number]]   Room: -2  Code Status: FULL CODE -     DOS:  2024    PCP:  Isrrael Ornelas MD     CHIEF COMPLAINT / REASON FOR VISIT:  Chief Complaint   Patient presents with    Hospital F/U     Malnutrition, confusion, and back pain          HPI: Fer is a 74 year old male with a PMHx significant for atrial fibrillation, 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  MRI reassuring against cauda equina syndrome.  NSGY evaluated the patient and offered nonemergent posterior fusion in the future to prevent further progression of kyphosis, which she declined.  He was subsequently transferred to the TCU for rehab and nursing care.    Hospital course excerpted from discharge summary:  History of recurrent UTI/prostatitis  Chronic indwelling ferguson catheter 2/2 neurogenic bladder  History of BPH  Chronic Ferguson in place. Had discussed his urinary retention and how Duloxentine may affect this. Ultimately decided to continue PTA duloxetine. Patient was encouraged to increase PO fluid intake and UOP was closely monitored.   - Ferguson changed   - Continue PTA duloxetine 30mg daily     H/o T12 compression fracture  Acute on chronic Back pain and BLE weakness  Deconditioning  Frequent falls   Unable to care for self at home  Evaluated by NSGY in the past. Had Acute on chronic pain this admission though MRI w/o clear cord compression, however, vertebral edema suggests acute/subacute component. NSGY offered non-emergent posterior spinal fusion in the outpatient setting to prevent further worsening/neurologic sequelae, but patient is currently not interested given his age.  - NSGY  consulted, appreciate recs   - Recommend brace when HOB >30*, but not required for PT/OT  - Pain control: duloxetine 30 mg daily, lidocaine patch, acetaminophen prn  - Plan outpatient DEXA scan  - Continue PT/OT     Alternating constipation and diarrhea, resolved  possible stercoral colitis, recurrent   Abdomen soft, non-tender on exam this AM. Per nursing patient continues to have daily BM and afebrile. Patient refused labs for infectious workup but ok with not pursuing further investigation due to improved examination.   - Surgery consulted - recommended strong oral bowel regimen  - PRN bowel regiment senna BID prn, milk of mag prn.   - Changed Miralax from PRN to daily  - Zofran as needed     Cognitive impairment  MDD, exacerbated   Left his nursing home 4/30 against recommendations under unclear circumstances (?financial). Baseline unclear, per NH notes requires a Karthikeyan lift and was bed-bound during that week at home. Patient noted improved cognition since stopping oxycodone.   - Started 30mg duloxetine daily for depression due to being in the hospital and to address pain.   - On 6/11, he scored 12/30 on a MoCA evaluation and 4/15 memory index score.  - Psychology consulted - note 6/21/24 on discharge for follow-up recommendations   - spend extra time explaining decision-making w/ frequent updates/reminders about progress  - encourage engagement in activities while in the hospital   - encourage visits outside the room when possible  - refer to neuropsychology on d/c - could consider inpatient to assess cognitive impairment   - may benefit from psychiatric care post-d/c including psychotherapy and medications    Skin breakdown, chronic wounds  Chronic pressure ulcerations of bilateral heels, improving  Bilateral DP pulses detected by doppler on 5/31. Right foot XR showed no osseous involvement on 5/31. RLE arterial ultrasound showed patent arteries with some small vessel disease.  - Present on admission  - Stable  with no signs of acute infection  - Dressing changes as recommended by podiatry  - Activity: off load heels while in bed/chair, elevate heels off bed with pillows. Recommend prevalon off loading boots.     Malnutrition of moderate degree  Type II DM   Nutrition seen and multivitamin started. Electrolytes were monitored closely.     CKD4, IgA nephropathy:   - Creatinine 1.65 --> 1.67. baseline ~1.6   - managed with low potassium diet     Likely atelectasis: monitor  Lower extremity edema: prolonged bed rest. Recommend ace wraps with mild compression of both legs   Systolic murmur: History of tricuspid regurgitation. Asymptomatic. Monitor.   Hx DVT/PE: PTA apixaban.   A-fib: Currently rate controlled. Apixaban as above.   Chronic anemia of inflammation: Hgb at baseline.   DMT2: Diet controlled. Glucose well controlled.     Malnutrition  Malnutrition Characteristics  Etiology: Chronic illness  Energy Intake: Not assessed  Weight loss:: None  Body fat:: mild depletion  Muscle mass:: moderate depletion  Fluid accumulation:: None  Malnutrition Diagnosis: Malnutrition of a moderate degree  Weight: 100.8 kg (222 lb 3.6 oz)  Wt Change from Previous: 0 Kg  Wt Change from Admit: 0 Kg  % Wt Change from Adm: 0 %  Ideal Body Wt (IBW) Male (kg): 82.24 kg    PERTINENT STUDIES & CONSULTS:   Neurosurgery consulted  Nutrition Consulted  Podiatry consulted   PT/OT consulted  Wound Care Consulted     CT Chest/Abdmen/Pelvis w/o contrast 5/8/24    IMPRESSION   Impression:   1. No acute sequela of trauma in the chest, abdomen, pelvis.   2. There are a few small opacities in the right upper lobe which could be   infectious or inflammatory.   3. Decompressed urinary bladder by a Brown catheter with adjacent fat   stranding. Correlate for cystitis.   4. Moderately distended rectum with perirectal fat stranding. Stercoral   colitis would be a consideration.   5. Chronic moderate to severe compression deformity of the T12 vertebral   body.      CT Cervical spine w/o contrast 5/8/24    IMPRESSION   Impression:   1. No acute fracture or subluxation of the cervical vertebrae.   2. Multilevel cervical spondylosis without high-grade spinal canal   stenosis and greatest at C5-6 where there is moderate left neural   foraminal stenosis.     CT head w/o contrast 5/8/24    IMPRESSION   Impression:   1. No acute intracranial abnormality.   2. Moderate atrophy and chronic small vessel ischemic disease. Chronic   right temporal infarction.     Xray knee 5/8/24    IMPRESSION   Impression: No acute osseous abnormality. Significant soft tissue swelling   of the medial aspect of the knee.     Xray hip 5/8/24    IMPRESSION   Impression:   No acute fracture or dislocation.     CT T-spine 5/9/24:    Impression:   1. Chronic severe compression deformity of T12 with associated   retropulsion causing mild to moderate spinal canal stenosis. Height of the   T12 vertebra has slightly further decreased from comparison exam.   2. Approximately 35 degrees thoracic kyphosis secondary to the vertebral   body fracture at T12.   3. Multilevel thoracic spondylosis, greatest degenerative changes centered   at T11-T12 level.   4. No high-grade spinal canal neural foraminal stenosis.     CT Lumbar spine 5/9/24    IMPRESSION   Impression:     1. Chronic severe T12 compression fracture deformity with mild   retropulsion and associated mild to moderate spinal canal narrowing.   Slight further loss of height of disc compression fracture when compared   to previous CT of the chest, abdomen pelvis.   2. No acute fracture of the lumbosacral spine.   3. Severe multilevel lumbar spondylosis with further progression of bulky   bridging osteophytes and facet arthropathy. Findings are most   significantly at L5 -S1.     MRI Lumbar Spine 5/10/24    IMPRESSION   Impression: Compression fracture deformity of T12 with associated bone   marrow edema along the superior endplate is suggestive of an acute on  "  chronic or subacute compression fracture. There is significant loss of   height with retropulsion resulting in mild spinal canal stenosis.   Multilevel degenerative disc disease and facet arthropathy, most in   significantly at L5-S1 as described above.         CURRENT/RECENT TCU ISSUES    Disposition  -- He tells me he has been in and out of the hospital.  -- He tells me he had a bad case of sepsis years ago, saying, \"I retired, and I got fired, and I got sepsis.\"  He then continued with \"weird health things.\"  -- He has a history of morbid obesity and has lost 330 pounds.  -- His goal is to \"get out of this place and go home.\"  He also tells me, \"my problem is that I want to be able to walk normally.\"  -- He is retired, and his wife is retired with a medical disability.    Confusion  -- In addition to diagnoses listed above, he seems to have some memory issues.    Chronic severe T12 compression fracture deformity  -- The cause of most of his pain.    Diabetes mellitus type 2  -- He tells me, \"I just reached a point of a shot the day in the morning and pills at night.\"    Neurogenic bladder  Urinary retention  Chronic indwelling Brown catheter  Recurrent UTIs  -- Urine is clear nicole.    Foot wounds  -- Heel wounds with hammertoes.  -- Black spots on the tips of his toes.  -- Wearing blue boots in bed.      ROS: 10 point ROS of systems including Constitutional, Eyes, Respiratory, Cardiovascular, Gastroenterology, Genitourinary, Integumentary, Musculoskeletal, and Psychiatric were all negative except for pertinent positives noted in my HPI.      Past Medical History:   Diagnosis Date    Anemia     Arrhythmia     atrial fib    Arthritis 1990's    gout    BPH (benign prostatic hyperplasia)     Current moderate episode of major depressive disorder without prior episode (H) 4/30/2019    Depression     Diabetes mellitus (H)     Type 2  IDDM    Diverticulosis     ESRD (end stage renal disease) on dialysis (H)     " Hematuria     HTN (hypertension)     HTN (hypertension)     IgA nephropathy     MI, old     Pneumonia     Rhabdomyolysis     Weakness 5/10/2019              Family History   Problem Relation Age of Onset    Cancer Father      Social History     Socioeconomic History    Marital status:      Spouse name: None    Number of children: None    Years of education: None    Highest education level: None   Tobacco Use    Smoking status: Former    Smokeless tobacco: Never    Tobacco comments:     quit 35 years ago   Substance and Sexual Activity    Alcohol use: Yes     Comment: 1 to 2 drinks a day (Occasional)    Drug use: No     Social Determinants of Health     Financial Resource Strain: Patient Declined (5/8/2024)    Received from [facility]    Overall Financial Resource Strain (CARDIA)     Difficulty of Paying Living Expenses: Patient declined   Food Insecurity: Patient Declined (5/8/2024)    Received from [facility]    Hunger Vital Sign     Worried About Running Out of Food in the Last Year: Patient declined     Ran Out of Food in the Last Year: Patient declined   Transportation Needs: Patient Declined (5/8/2024)    Received from [facility]    PRAPARE - Transportation     Lack of Transportation (Medical): Patient declined     Lack of Transportation (Non-Medical): Patient declined   Interpersonal Safety: Patient Declined (5/8/2024)    Received from [facility]    Humiliation, Afraid, Rape, and Kick questionnaire     Fear of Current or Ex-Partner: Patient declined     Emotionally Abused: Patient declined     Physically Abused: Patient declined     Sexually Abused: Patient declined   Housing Stability: Low Risk  (5/8/2024)    Received from [facility]    Housing Stability     What is your housing situation today?: 3 - I have housing       MEDICATIONS: Reviewed from the MAR, physician orders, and/or earlier progress notes.[id number]}  Post Medication Reconciliation Status:      Current Outpatient Medications   Medication Sig Dispense Refill    acetaminophen (TYLENOL) 500 MG tablet Take 2 tablets (1,000 mg) by mouth 3 times daily      aspirin (ASA) 325 MG EC tablet Take 325 mg by mouth daily      bisacodyl (DULCOLAX) 10 MG suppository Place 1 suppository (10 mg) rectally daily as needed for constipation      escitalopram (LEXAPRO) 10 MG tablet Take 1 tablet (10 mg) by mouth daily 30 tablet 0    folic acid (FOLVITE) 1 MG tablet Take 1 tablet (1 mg) by mouth daily      melatonin 5 MG tablet Take 1 tablet (5 mg) by mouth nightly as needed for sleep      miconazole (MICATIN) 2 % external powder Apply topically as needed for itching or other (irritation of perineum) 43 g 0    miconazole (MICATIN) 2 % external powder Apply topically 2 times daily      mirtazapine (REMERON) 15 MG tablet Take 1 tablet (15 mg) by mouth At Bedtime 30 tablet 0    multivitamin, therapeutic (THERA-VIT) TABS tablet Take 1 tablet by mouth daily 30 tablet 0    nystatin (MYCOSTATIN) 647706 UNIT/GM external cream Apply topically 2 times daily      nystatin (MYCOSTATIN) 905200 UNIT/GM external powder Apply topically 2 times daily 15 g 0    polyethylene glycol (MIRALAX) 17 GM/Dose powder Take 8.5 g by mouth daily as needed       senna (SENOKOT) 8.6 MG tablet Take 17.2 mg by mouth 2 times daily as needed      thiamine (B-1) 100 MG tablet Take 1 tablet (100 mg) by mouth daily 30 tablet 0    Vitamin D3 (CHOLECALCIFEROL) 125 MCG (5000 UT) tablet Take 1 tablet by mouth daily       No current facility-administered medications for this visit.     ALLERGIES:   Allergies   Allergen Reactions    Iodinated Contrast Media Unknown     Other reaction(s): Unknown    Sulfa Antibiotics Hives and Rash     Other reaction(s): Unknown    PN: LW Reaction: Rash, Generalized    Ciprofloxacin Unknown       DIET: Regular, regular texture, thin liquids.    Vitals:    08/06/24 1511   BP: 119/76   Pulse: 73   Resp: 18   Temp: 98  F (36.7  C)   SpO2:  "97%   Weight: 98.2 kg (216 lb 9.6 oz)   Height: 1.88 m (6' 2\")     Wt Readings from Last 4 Encounters:   08/09/24 98.2 kg (216 lb 9.6 oz)   08/06/24 98.2 kg (216 lb 9.6 oz)   05/21/22 98.4 kg (217 lb)   03/01/22 98.7 kg (217 lb 11.2 oz)     Body mass index is 27.81 kg/m .  Body surface area is 2.26 meters squared.    EXAMINATION:   General: Pleasant elderly mal, lying in bed, in no apparent distress. He has a long finn.  Head: Normocephalic and atraumatic.   Eyes: PERRLA, sclerae clear.   ENT: Moist oral mucosa. He has his own teeth, though they are in poor repair.  Cardiovascular: Regular rate and rhythm with intermittent ectopy and a 3/6 OBDULIO at the LSB..   Respiratory: Lungs clear to auscultation bilaterally.   Abdomen: Nondistended.  Indwelling Brown catheter.  Musculoskeletal/Extremities: Heel wounds and hammertoes of the feet.  Also, black spots on the tips of the toes.  Integument: No rashes, clinically significant lesions, or skin breakdown.   Cognitive/Psychiatric: Some apparent confusion with euthymic affect.    DIAGNOSTICS:   Recent Results (from the past 240 hour(s))   Quantiferon TB Gold Plus Grey Tube    Collection Time: 08/02/24  6:48 AM    Specimen: Peripheral Blood   Result Value Ref Range    Quantiferon Nil Tube 0.00 IU/mL   Quantiferon TB Gold Plus Green Tube    Collection Time: 08/02/24  6:48 AM    Specimen: Peripheral Blood   Result Value Ref Range    Quantiferon TB1 Tube 0.05 IU/mL   Quantiferon TB Gold Plus Yellow Tube    Collection Time: 08/02/24  6:48 AM    Specimen: Peripheral Blood   Result Value Ref Range    Quantiferon TB2 Tube 0.03    Quantiferon TB Gold Plus Purple Tube    Collection Time: 08/02/24  6:48 AM    Specimen: Peripheral Blood   Result Value Ref Range    Quantiferon Mitogen 7.98 IU/mL   Quantiferon TB Gold Plus    Collection Time: 08/02/24  6:48 AM    Specimen: Peripheral Blood   Result Value Ref Range    Quantiferon-TB Gold Plus Negative Negative    TB1 Ag minus Nil Value " 0.05 IU/mL    TB2 Ag minus Nil Value 0.03 IU/mL    Mitogen minus Nil Result 7.98 IU/mL    Nil Result 0.00 IU/mL       ASSESSMENT/Plan:      ICD-10-CM    1. History of vertebral fracture  Z87.81       2. Atrial fibrillation, unspecified type (H)  I48.91       3. Neurogenic bladder  N31.9       4. Chronic indwelling Brown catheter  Z97.8       5. Morbid obesity (H)  E66.01       6. Type 2 diabetes mellitus with other diabetic kidney complication (H)  E11.29       7. Pressure sore on heel, left, unstageable (H)  L89.620       8. History of deep venous thrombosis  Z86.718       9. History of pulmonary embolism  Z86.711           CHANGES:    None.    CARE PLAN:    The care plan, medications, vital signs, orders, and nursing notes have been reviewed, and all orders signed. Changes to care plan, if any, as noted. Otherwise, continue current plan of care. Total time for visit was 45 minutes including, but not limited to, non-face-to-face time spent reviewing records, counseling, and coordination of care.    The above has been created using voice recognition software. Please be aware that this may unintentionally  produce inaccuracies and/or nonsensical sentences.      Electronically signed by: OCTAVIO Newsome CNP

## 2024-08-13 ENCOUNTER — TRANSITIONAL CARE UNIT VISIT (OUTPATIENT)
Dept: GERIATRICS | Facility: CLINIC | Age: 74
End: 2024-08-13
Payer: MEDICARE

## 2024-08-13 VITALS
HEART RATE: 88 BPM | OXYGEN SATURATION: 96 % | RESPIRATION RATE: 18 BRPM | BODY MASS INDEX: 27.81 KG/M2 | DIASTOLIC BLOOD PRESSURE: 87 MMHG | SYSTOLIC BLOOD PRESSURE: 123 MMHG | HEIGHT: 74 IN | TEMPERATURE: 97.1 F

## 2024-08-13 DIAGNOSIS — I48.91 ATRIAL FIBRILLATION, UNSPECIFIED TYPE (H): ICD-10-CM

## 2024-08-13 DIAGNOSIS — F33.1 MODERATE EPISODE OF RECURRENT MAJOR DEPRESSIVE DISORDER (H): ICD-10-CM

## 2024-08-13 DIAGNOSIS — L89.620 PRESSURE SORE ON HEEL, LEFT, UNSTAGEABLE (H): Primary | ICD-10-CM

## 2024-08-13 DIAGNOSIS — Z87.81 HISTORY OF VERTEBRAL FRACTURE: ICD-10-CM

## 2024-08-13 DIAGNOSIS — R53.81 PHYSICAL DECONDITIONING: ICD-10-CM

## 2024-08-13 DIAGNOSIS — F03.B0 MODERATE DEMENTIA, UNSPECIFIED DEMENTIA TYPE, UNSPECIFIED WHETHER BEHAVIORAL, PSYCHOTIC, OR MOOD DISTURBANCE OR ANXIETY (H): ICD-10-CM

## 2024-08-13 DIAGNOSIS — N31.9 NEUROGENIC BLADDER: ICD-10-CM

## 2024-08-13 DIAGNOSIS — Z97.8 CHRONIC INDWELLING FOLEY CATHETER: ICD-10-CM

## 2024-08-13 PROCEDURE — 99309 SBSQ NF CARE MODERATE MDM 30: CPT | Performed by: NURSE PRACTITIONER

## 2024-08-13 NOTE — LETTER
8/13/2024      Fer Latham  [address]  [facility] [id number]        [facility]    Chief Complaint   Patient presents with     RECHECK     HPI:  Fer Latham is a 74 year old  (1950), who is being seen today for an episodic care visit at: [facility]) [[id number]]. Today's concern is:     Fer was visited today while in his room resting in bed.  His appetite has been good and his weight is up 6 pounds which he is not happy about as he does not want to gain weight.  He does not have complaints of chest pain or shortness of breath.  Has a catheter in which is not bothersome to him.  States that he has not had regular bowel movements, care attendants states that he has had small soft bowel movements.    After further review of his history in epic, noted that he had an [facility] stay back in 2022 then went to [facility] transitional care unit for rehab following that.  Does not appear that he was stronger to go home therefore he was in the long-term care facility at [facility] until April 2024 when he discharged home with his wife.  He was unable to care for himself which led to this most recent prolonged hospitalization. Please see below for his functioning when he was discharged from [facility] in April 2024:  Mobility: Non-ambulatory, uses wheelchair with maximum assistance.  ADLs: Upper Extremity Dressing: maximum assistance  Lower Extremity Dressing: dependent  Toileting: dependent  Hygiene and Grooming: dependent  Cognition: Brief Interview of Mental Status (BIMS) 6/15 (13-15 within normal limits) Feb 2024  [employer] Mental Status (UMS) 13/30 (27-30 within normal limits) Aug 2022  Cognitive Performance Test (CPT) 4.5/5.6 Aug 2022      Allergies, and PMH/PSH reviewed in [facility] today.  REVIEW OF SYSTEMS:  4 point ROS including Respiratory, CV, GI and , other than that noted in the HPI,  is negative    Objective:   BP  "123/87   Pulse 88   Temp 97.1  F (36.2  C)   Resp 18   Ht 1.88 m (6' 2\")   SpO2 96%   BMI 27.81 kg/m    GENERAL APPEARANCE:  Alert, in no distress, pleasant, cooperative  EYES: no discharge or mattering on lids or lashes noted  ENT:  moist mucous membranes, hearing acuity intact  NECK: supple, symmetrical  RESP: no respiratory distress, Lung sounds clear, patient is on room air  CV:  rate and rhythm regular, no murmur. Edema none in bilateral lower extremities. VASCULAR: warm extremities without open areas.  ABDOMEN: normal bowel sounds, soft, nontender.  : catheter with mild amount of sediment   M/S:   Gait and station: dependent with cares, no tenderness or swelling of the joints; able to move all extremities   SKIN:  Inspection and palpation of skin and subcutaneous tissue: skin warm, dry, UTV wound on RLE  NEURO: no facial asymmetry, no speech deficits and able to follow directions, moves all extremities symmetrically  PSYCH:  insight, judgement, and memory impaired affect and mood normal    Reviewed in epic    Assessment/Plan:  H/o T12 compression fracture  Acute on chronic Back pain and BLE weakness  MRI w/o clear cord compression, however, vertebral edema suggests acute/subacute component. NSGY offered non-emergent posterior spinal fusion in the outpatient setting to prevent further worsening/neurologic sequelae, but patient is currently not interested given his age.  - duloxetine 30 mg daily, lidocaine patch, acetaminophen prn  - Continue PT/OT but suspect that he will not progress well. Please see above for his functioning when he was discharged from LTC in April 2024 (he was dependent with cares)      Dementia  MDD  Started 30mg duloxetine daily for depression due to being in the hospital and to address pain. On 6/11/2024, he scored 12/30 on a MoCA evaluation and 4/15 memory index score.  Brief Interview of Mental Status (BIMS) 6/15 (13-15 within normal limits) Feb 2024, [employer] " Mental Status (SLUMS) 13/30 (27-30 within normal limits) Aug 2022, Cognitive Performance Test (CPT) 4.5/5.6 Aug 2022.  -- will likely need LTC placement    Chronic pressure ulcerations of bilateral heels  Bilateral DP pulses detected by doppler on 5/31. Right foot XR showed no osseous involvement on 5/31. RLE arterial ultrasound showed patent arteries with some small vessel disease.  --to be followed by in house wound MD if he agrees.   -- For now it appears that the wound needs to be debrided so we will complete the following wound care:  Wash the area with saline and pat dry  Use Skin-Prep from surrounding periwound skin  Apply calcium alginate to the wound bed only  Cover with not Adaptic dressing and wrapped with Kerlix     CKD4  IgA nephropathy  Has required dialysis in the past. Creatinine baseline ~1.6   --Avoid nephrotoxic medications. Recheck BMP periodically    A-fib  Currently rate controlled without meds, HR 60-80s.   --continue with Apixaban 5 mg BID    Chronic anemia of inflammation  Hgb 10-11.  --follow clinically.     Chronic indwelling ferguson catheter 2/2 neurogenic bladder  History of BPH  Chronic Ferguson in place. No s/sx of infection today.   - Ferguson changed 7/24    Physical deconditioning  Secondary to recent hospitalization and underlying medical conditions.   --ongoing PT/OT for strengthening.       MED REC REQUIRED  Post Medication Reconciliation Status: discharge medications reconciled, continue medications without change    Electronically signed by: OCTAVIO Harden CNP           Sincerely,        OCTAVIO Harden CNP

## 2024-08-13 NOTE — PROGRESS NOTES
"[facility] GERIATRICS    Chief Complaint   Patient presents with    RECHECK     HPI:  Fer Latham is a 74 year old  (1950), who is being seen today for an episodic care visit at: [facility] ([facility]) [[id number]]. Today's concern is:     Fer was visited today while in his room resting in bed.  His appetite has been good and his weight is up 6 pounds which he is not happy about as he does not want to gain weight.  He does not have complaints of chest pain or shortness of breath.  Has a catheter in which is not bothersome to him.  States that he has not had regular bowel movements, care attendants states that he has had small soft bowel movements.    After further review of his history in epic, noted that he had an [facility] stay back in 2022 then went to [facility] transitional care unit for rehab following that.  Does not appear that he was stronger to go home therefore he was in the long-term care facility at [facility] until April 2024 when he discharged home with his wife.  He was unable to care for himself which led to this most recent prolonged hospitalization. Please see below for his functioning when he was discharged from [facility] in April 2024:  Mobility: Non-ambulatory, uses wheelchair with maximum assistance.  ADLs: Upper Extremity Dressing: maximum assistance  Lower Extremity Dressing: dependent  Toileting: dependent  Hygiene and Grooming: dependent  Cognition: Brief Interview of Mental Status (BIMS) 6/15 (13-15 within normal limits) Feb 2024  [employer] Mental Status (UMS) 13/30 (27-30 within normal limits) Aug 2022  Cognitive Performance Test (CPT) 4.5/5.6 Aug 2022      Allergies, and PMH/PSH reviewed in [facility] today.  REVIEW OF SYSTEMS:  4 point ROS including Respiratory, CV, GI and , other than that noted in the HPI,  is negative    Objective:   /87   Pulse 88   Temp 97.1  F (36.2  C)   Resp 18   Ht 1.88 m (6' 2\")   SpO2 96%   BMI " 27.81 kg/m    GENERAL APPEARANCE:  Alert, in no distress, pleasant, cooperative  EYES: no discharge or mattering on lids or lashes noted  ENT:  moist mucous membranes, hearing acuity intact  NECK: supple, symmetrical  RESP: no respiratory distress, Lung sounds clear, patient is on room air  CV:  rate and rhythm regular, no murmur. Edema none in bilateral lower extremities. VASCULAR: warm extremities without open areas.  ABDOMEN: normal bowel sounds, soft, nontender.  : catheter with mild amount of sediment   M/S:   Gait and station: dependent with cares, no tenderness or swelling of the joints; able to move all extremities   SKIN:  Inspection and palpation of skin and subcutaneous tissue: skin warm, dry, UTV wound on RLE  NEURO: no facial asymmetry, no speech deficits and able to follow directions, moves all extremities symmetrically  PSYCH:  insight, judgement, and memory impaired affect and mood normal    Reviewed in epic    Assessment/Plan:  H/o T12 compression fracture  Acute on chronic Back pain and BLE weakness  MRI w/o clear cord compression, however, vertebral edema suggests acute/subacute component. NSGY offered non-emergent posterior spinal fusion in the outpatient setting to prevent further worsening/neurologic sequelae, but patient is currently not interested given his age.  - duloxetine 30 mg daily, lidocaine patch, acetaminophen prn  - Continue PT/OT but suspect that he will not progress well. Please see above for his functioning when he was discharged from LTC in April 2024 (he was dependent with cares)      Dementia  MDD  Started 30mg duloxetine daily for depression due to being in the hospital and to address pain. On 6/11/2024, he scored 12/30 on a MoCA evaluation and 4/15 memory index score.  Brief Interview of Mental Status (BIMS) 6/15 (13-15 within normal limits) Feb 2024, [employer] Mental Status (SLUMS) 13/30 (27-30 within normal limits) Aug 2022, Cognitive Performance Test (CPT)  4.5/5.6 Aug 2022.  -- will likely need LTC placement    Chronic pressure ulcerations of bilateral heels  Bilateral DP pulses detected by doppler on 5/31. Right foot XR showed no osseous involvement on 5/31. RLE arterial ultrasound showed patent arteries with some small vessel disease.  --to be followed by in house wound MD if he agrees.   -- For now it appears that the wound needs to be debrided so we will complete the following wound care:  Wash the area with saline and pat dry  Use Skin-Prep from surrounding periwound skin  Apply calcium alginate to the wound bed only  Cover with not Adaptic dressing and wrapped with Kerlix     CKD4  IgA nephropathy  Has required dialysis in the past. Creatinine baseline ~1.6   --Avoid nephrotoxic medications. Recheck BMP periodically    A-fib  Currently rate controlled without meds, HR 60-80s.   --continue with Apixaban 5 mg BID    Chronic anemia of inflammation  Hgb 10-11.  --follow clinically.     Chronic indwelling ferguson catheter 2/2 neurogenic bladder  History of BPH  Chronic Ferguson in place. No s/sx of infection today.   - Ferguson changed 7/24    Physical deconditioning  Secondary to recent hospitalization and underlying medical conditions.   --ongoing PT/OT for strengthening.       MED REC REQUIRED  Post Medication Reconciliation Status: discharge medications reconciled, continue medications without change    Electronically signed by: OCTAVIO Harden CNP

## 2024-08-14 RX ORDER — DULOXETIN HYDROCHLORIDE 30 MG/1
30 CAPSULE, DELAYED RELEASE ORAL 2 TIMES DAILY
COMMUNITY
Start: 2024-08-14

## 2024-08-15 NOTE — PROGRESS NOTES
[facility] GERIATRICS    PRIMARY CARE PROVIDER AND CLINIC:  Isrrael Ornelas MD, [address] [facility] [id number]  Chief Complaint   Patient presents with    Hospital F/U      [place] Medical Record Number:  [id number]  Place of Service where encounter took place:  [facility] ([facility])     Fer Latham  is a 74 year old  (1950), admitted to the above facility from  [facility]. Hospital stay 5/8/24 through 8/1/24..     Hospital course was reviewed by me, is as per the hospital discharge summary and nurse practitioner note.    Patient has a history of dementia, depression, urinary retention with chronic indwelling Brown catheter, atrial fibrillation, chronic kidney disease stage III, T12 vertebral fracture (felt to be acute versus subacute based on MRI during hospitalization)    He was hospitalized for failure to thrive and back pain.  MRI spine did not reveal evidence of cauda equina syndrome.  Patient does have thoracic kyphosis.  He was seen by neurosurgery who recommended nonurgent posterior spinal fusion which the patient declined.  He has a history of frequent falls at home.  He had recently left nursing home the end of April against recommendations.  He is bedbound at baseline.  He has chronic wounds of both heels with adequate arterial flow per recent ultrasound  Slums 13/30  BIMS 6/15    Patient offers limited history, states he feels fine, denies pain in his back or legs.  He states his appetite has been fair  He denies cough, chest pain, shortness of breath.      CODE STATUS/ADVANCE DIRECTIVES DISCUSSION:  Prior  CPR/Full code   ALLERGIES:   Allergies   Allergen Reactions    Iodinated Contrast Media Unknown     Other reaction(s): Unknown    Sulfa Antibiotics Hives and Rash     Other reaction(s): Unknown    PN: LW Reaction: Rash, Generalized    Ciprofloxacin Unknown      PAST MEDICAL HISTORY:   Past Medical History:   Diagnosis Date    Anemia     Arrhythmia     atrial fib     Arthritis 1990's    gout    BPH (benign prostatic hyperplasia)     Current moderate episode of major depressive disorder without prior episode (H) 4/30/2019    Depression     Diabetes mellitus (H)     Type 2  IDDM    Diverticulosis     ESRD (end stage renal disease) on dialysis (H)     Hematuria     HTN (hypertension)     HTN (hypertension)     IgA nephropathy     MI, old     Pneumonia     Rhabdomyolysis     Weakness 5/10/2019      PAST SURGICAL HISTORY:   has a past surgical history that includes appendectomy (1970's); Abdomen surgery; biopsy (2017); vascular surgery; Coronary Angiography Adult Order (2018); colonoscopy (2013); Eye surgery (Bilateral, 2004); Arthroscopy knee (6/27/2013); back surgery (1999); IR CVC Tunnel Placement > 5 Yrs of Age (11/26/2019); IR CVC Tunnel Removal Right (11/26/2019); IR CVC Tunnel Revision Right (2/10/2020); IR CVC Tunnel Revision Right (10/8/2020); IR CVC Tunnel Revision Right (6/17/2021); and IR CVC Tunnel Removal Right (2/22/2022).  FAMILY HISTORY: family history includes Cancer in his father.  SOCIAL HISTORY:   reports that he has quit smoking. He has never used smokeless tobacco. He reports current alcohol use. He reports that he does not use drugs.  Patient's living condition: lives with spouse    Current medications reviewed by me today    Current Outpatient Medications   Medication Sig Dispense Refill    apixaban ANTICOAGULANT (ELIQUIS) 5 MG tablet Take 1 tablet (5 mg) by mouth 2 times daily      bisacodyl (DULCOLAX) 10 MG suppository Place 1 suppository (10 mg) rectally daily as needed for constipation      DULoxetine (CYMBALTA) 30 MG capsule Take 1 capsule (30 mg) by mouth 2 times daily      folic acid (FOLVITE) 1 MG tablet Take 1 tablet (1 mg) by mouth daily      miconazole (MICATIN) 2 % external powder Apply topically as needed for itching or other (irritation of perineum) 43 g 0    multivitamin, therapeutic (THERA-VIT) TABS tablet Take 1 tablet by mouth daily 30  "tablet 0    nystatin (MYCOSTATIN) 855322 UNIT/GM external powder Apply topically 2 times daily 15 g 0    polyethylene glycol (MIRALAX) 17 GM/Dose powder Take 8.5 g by mouth daily as needed       senna (SENOKOT) 8.6 MG tablet Take 17.2 mg by mouth 2 times daily as needed      Vitamin D3 (CHOLECALCIFEROL) 125 MCG (5000 UT) tablet Take 1 tablet by mouth daily       No current facility-administered medications for this visit.       ROS:  Limited secondary to cognitive impairment but today pt reports physical concerns    Vitals:  /70   Pulse 76   Temp 97.1  F (36.2  C)   Resp 18   Ht 1.88 m (6' 2\")   Wt 99.8 kg (220 lb)   SpO2 95%   BMI 28.25 kg/m    Exam:  Well-nourished appearing male, sitting up in his bed, eating breakfast  He appears comfortable  He is minimally conversant, seems suspicious of my intentions  HEENT: Fair dentition  Lungs clear  CV irregular, heart rate 70s.  Systolic murmur  Abdomen soft  Lower extremities both heels are in soft cradles.  Right heel was not examined  Neuro: Oriented to self and surroundings.  Affect blunted.  Face symmetric.  Generalized lower extremity weakness.    Lab/Diagnostic data:  Labs from 7/24/2024 remarkable for hemoglobin 10.8, normal MCV  Creatinine 1.67 BUN 31.  GFR estimated 43      ASSESSMENT/PLAN:    T12 compression fracture felt to be acute for subacute as of May 2024  Progressive kyphosis secondary to above  Chronic bilateral lower extremity weakness without evidence of cord compression on MRI  Recent discharge from long-term care facility against recommendations with subsequent failure to thrive at home  Neurosurgery has recommended nonurgent posterior spinal fusion which patient has declined  Plan: Duloxetine for pain.  Attempt therapies  Suspect patient will need long-term care based on functional impairment and dementia    Dementia  Chronic depression  Plan: Therapies.  Duloxetine    Chronic bilateral heel ulcers without evidence of significant " arterial disease  Likely secondary to immobility and possibly, small vessel arterial disease  Plan: Continue wound cares.  House wound MD consultation  Offload heels.    Chronic A-fib  Heart rate controlled without medications  Plan: Continue apixaban    History of neurogenic bladder with chronic indwelling Brown catheter  Plan: Continue Brown catheter, routine cares    Chronic anemia, likely secondary to chronic kidney disease and possibly chronic inflammation  Plan: Monitor labs.  Consider iron studies    Chronic kidney disease stage IV with history of IgA nephropathy requiring dialysis in the past  Renal function appears to be at baseline  Plan: Routine lab monitoring      Emiliano Gage MD

## 2024-08-16 ENCOUNTER — TRANSITIONAL CARE UNIT VISIT (OUTPATIENT)
Dept: GERIATRICS | Facility: CLINIC | Age: 74
End: 2024-08-16
Payer: MEDICARE

## 2024-08-16 VITALS
HEART RATE: 76 BPM | SYSTOLIC BLOOD PRESSURE: 117 MMHG | OXYGEN SATURATION: 95 % | DIASTOLIC BLOOD PRESSURE: 70 MMHG | TEMPERATURE: 97.1 F | RESPIRATION RATE: 18 BRPM | WEIGHT: 220 LBS | HEIGHT: 74 IN | BODY MASS INDEX: 28.23 KG/M2

## 2024-08-16 DIAGNOSIS — N31.9 NEUROGENIC BLADDER: ICD-10-CM

## 2024-08-16 DIAGNOSIS — I48.91 ATRIAL FIBRILLATION, UNSPECIFIED TYPE (H): ICD-10-CM

## 2024-08-16 DIAGNOSIS — Z87.81 HISTORY OF VERTEBRAL FRACTURE: ICD-10-CM

## 2024-08-16 DIAGNOSIS — F03.B0 MODERATE DEMENTIA, UNSPECIFIED DEMENTIA TYPE, UNSPECIFIED WHETHER BEHAVIORAL, PSYCHOTIC, OR MOOD DISTURBANCE OR ANXIETY (H): ICD-10-CM

## 2024-08-16 DIAGNOSIS — L89.620 PRESSURE SORE ON HEEL, LEFT, UNSTAGEABLE (H): Primary | ICD-10-CM

## 2024-08-16 PROCEDURE — 99305 1ST NF CARE MODERATE MDM 35: CPT | Performed by: INTERNAL MEDICINE

## 2024-08-16 NOTE — LETTER
8/16/2024      Fer Latham  [address]  [place] [id number]        [facility] GERIATRICS    PRIMARY CARE PROVIDER AND CLINIC:  Isrrael Ornelas MD, [address] [facility] [id number]  Chief Complaint   Patient presents with     Hospital F/U      [place] Medical Record Number:  [id number]  Place of Service where encounter took place:  [facility]     Fer Latham  is a 74 year old  (1950), admitted to the above facility from  [facility]. Hospital stay 5/8/24 through 8/1/24..     Hospital course was reviewed by me, is as per the hospital discharge summary and nurse practitioner note.    Patient has a history of dementia, depression, urinary retention with chronic indwelling Brown catheter, atrial fibrillation, chronic kidney disease stage III, T12 vertebral fracture (felt to be acute versus subacute based on MRI during hospitalization)    He was hospitalized for failure to thrive and back pain.  MRI spine did not reveal evidence of cauda equina syndrome.  Patient does have thoracic kyphosis.  He was seen by neurosurgery who recommended nonurgent posterior spinal fusion which the patient declined.  He has a history of frequent falls at home.  He had recently left nursing home the end of April against recommendations.  He is bedbound at baseline.  He has chronic wounds of both heels with adequate arterial flow per recent ultrasound  Slums 13/30  BIMS 6/15    Patient offers limited history, states he feels fine, denies pain in his back or legs.  He states his appetite has been fair  He denies cough, chest pain, shortness of breath.      CODE STATUS/ADVANCE DIRECTIVES DISCUSSION:  Prior  CPR/Full code   ALLERGIES:   Allergies   Allergen Reactions     Iodinated Contrast Media Unknown     Other reaction(s): Unknown     Sulfa Antibiotics Hives and Rash     Other reaction(s): Unknown    PN: LW Reaction: Rash, Generalized     Ciprofloxacin Unknown      PAST MEDICAL  HISTORY:   Past Medical History:   Diagnosis Date     Anemia      Arrhythmia     atrial fib     Arthritis 1990's    gout     BPH (benign prostatic hyperplasia)      Current moderate episode of major depressive disorder without prior episode (H) 4/30/2019     Depression      Diabetes mellitus (H)     Type 2  IDDM     Diverticulosis      ESRD (end stage renal disease) on dialysis (H)      Hematuria      HTN (hypertension)      HTN (hypertension)      IgA nephropathy      MI, old      Pneumonia      Rhabdomyolysis      Weakness 5/10/2019      PAST SURGICAL HISTORY:   has a past surgical history that includes appendectomy (1970's); Abdomen surgery; biopsy (2017); vascular surgery; Coronary Angiography Adult Order (2018); colonoscopy (2013); Eye surgery (Bilateral, 2004); Arthroscopy knee (6/27/2013); back surgery (1999); IR CVC Tunnel Placement > 5 Yrs of Age (11/26/2019); IR CVC Tunnel Removal Right (11/26/2019); IR CVC Tunnel Revision Right (2/10/2020); IR CVC Tunnel Revision Right (10/8/2020); IR CVC Tunnel Revision Right (6/17/2021); and IR CVC Tunnel Removal Right (2/22/2022).  FAMILY HISTORY: family history includes Cancer in his father.  SOCIAL HISTORY:   reports that he has quit smoking. He has never used smokeless tobacco. He reports current alcohol use. He reports that he does not use drugs.  Patient's living condition: lives with spouse    Current medications reviewed by me today    Current Outpatient Medications   Medication Sig Dispense Refill     apixaban ANTICOAGULANT (ELIQUIS) 5 MG tablet Take 1 tablet (5 mg) by mouth 2 times daily       bisacodyl (DULCOLAX) 10 MG suppository Place 1 suppository (10 mg) rectally daily as needed for constipation       DULoxetine (CYMBALTA) 30 MG capsule Take 1 capsule (30 mg) by mouth 2 times daily       folic acid (FOLVITE) 1 MG tablet Take 1 tablet (1 mg) by mouth daily       miconazole (MICATIN) 2 % external powder Apply topically as needed for itching or other  "(irritation of perineum) 43 g 0     multivitamin, therapeutic (THERA-VIT) TABS tablet Take 1 tablet by mouth daily 30 tablet 0     nystatin (MYCOSTATIN) 972791 UNIT/GM external powder Apply topically 2 times daily 15 g 0     polyethylene glycol (MIRALAX) 17 GM/Dose powder Take 8.5 g by mouth daily as needed        senna (SENOKOT) 8.6 MG tablet Take 17.2 mg by mouth 2 times daily as needed       Vitamin D3 (CHOLECALCIFEROL) 125 MCG (5000 UT) tablet Take 1 tablet by mouth daily       No current facility-administered medications for this visit.       ROS:  Limited secondary to cognitive impairment but today pt reports physical concerns    Vitals:  /70   Pulse 76   Temp 97.1  F (36.2  C)   Resp 18   Ht 1.88 m (6' 2\")   Wt 99.8 kg (220 lb)   SpO2 95%   BMI 28.25 kg/m    Exam:  Well-nourished appearing male, sitting up in his bed, eating breakfast  He appears comfortable  He is minimally conversant, seems suspicious of my intentions  HEENT: Fair dentition  Lungs clear  CV irregular, heart rate 70s.  Systolic murmur  Abdomen soft  Lower extremities both heels are in soft cradles.  Right heel was not examined  Neuro: Oriented to self and surroundings.  Affect blunted.  Face symmetric.  Generalized lower extremity weakness.    Lab/Diagnostic data:  Labs from 7/24/2024 remarkable for hemoglobin 10.8, normal MCV  Creatinine 1.67 BUN 31.  GFR estimated 43      ASSESSMENT/PLAN:    T12 compression fracture felt to be acute for subacute as of May 2024  Progressive kyphosis secondary to above  Chronic bilateral lower extremity weakness without evidence of cord compression on MRI  Recent discharge from long-term care facility against recommendations with subsequent failure to thrive at home  Neurosurgery has recommended nonurgent posterior spinal fusion which patient has declined  Plan: Duloxetine for pain.  Attempt therapies  Suspect patient will need long-term care based on functional impairment and " dementia    Dementia  Chronic depression  Plan: Therapies.  Duloxetine    Chronic bilateral heel ulcers without evidence of significant arterial disease  Likely secondary to immobility and possibly, small vessel arterial disease  Plan: Continue wound cares.  House wound MD consultation  Offload heels.    Chronic A-fib  Heart rate controlled without medications  Plan: Continue apixaban    History of neurogenic bladder with chronic indwelling Brown catheter  Plan: Continue Brown catheter, routine cares    Chronic anemia, likely secondary to chronic kidney disease and possibly chronic inflammation  Plan: Monitor labs.  Consider iron studies    Chronic kidney disease stage IV with history of IgA nephropathy requiring dialysis in the past  Renal function appears to be at baseline  Plan: Routine lab monitoring      Emiliano Gage MD            Sincerely,        Emiliano Gage MD

## 2024-08-23 ENCOUNTER — TRANSITIONAL CARE UNIT VISIT (OUTPATIENT)
Dept: GERIATRICS | Facility: CLINIC | Age: 74
End: 2024-08-23
Payer: MEDICARE

## 2024-08-23 VITALS
RESPIRATION RATE: 18 BRPM | HEIGHT: 74 IN | TEMPERATURE: 97.1 F | HEART RATE: 75 BPM | SYSTOLIC BLOOD PRESSURE: 123 MMHG | BODY MASS INDEX: 28.23 KG/M2 | OXYGEN SATURATION: 94 % | DIASTOLIC BLOOD PRESSURE: 75 MMHG | WEIGHT: 220 LBS

## 2024-08-23 DIAGNOSIS — N31.9 NEUROGENIC BLADDER: ICD-10-CM

## 2024-08-23 DIAGNOSIS — Z86.711 HISTORY OF PULMONARY EMBOLISM: ICD-10-CM

## 2024-08-23 DIAGNOSIS — Z87.81 HISTORY OF VERTEBRAL FRACTURE: Primary | ICD-10-CM

## 2024-08-23 DIAGNOSIS — Z97.8 CHRONIC INDWELLING FOLEY CATHETER: ICD-10-CM

## 2024-08-23 DIAGNOSIS — E66.01 MORBID OBESITY (H): ICD-10-CM

## 2024-08-23 DIAGNOSIS — L89.620 PRESSURE SORE ON HEEL, LEFT, UNSTAGEABLE (H): ICD-10-CM

## 2024-08-23 DIAGNOSIS — Z87.440 HISTORY OF RECURRENT UTI (URINARY TRACT INFECTION): ICD-10-CM

## 2024-08-23 DIAGNOSIS — Z86.718 HISTORY OF DEEP VENOUS THROMBOSIS: ICD-10-CM

## 2024-08-23 DIAGNOSIS — I48.91 ATRIAL FIBRILLATION, UNSPECIFIED TYPE (H): ICD-10-CM

## 2024-08-23 DIAGNOSIS — E11.29 TYPE 2 DIABETES MELLITUS WITH OTHER DIABETIC KIDNEY COMPLICATION (H): ICD-10-CM

## 2024-08-23 PROCEDURE — 99309 SBSQ NF CARE MODERATE MDM 30: CPT | Performed by: NURSE PRACTITIONER

## 2024-08-23 NOTE — LETTER
2024      Fer Latham  [address]  [facility] [id number]        [facility]    Name:   Fer Latham  :   1950  MRN:    [id number]     [facility]) [[id number]]   Room: TCU 3572  Code Status: FULL CODE -     DOS:  2024    PCP:  Isrrael Ornelas MD     CHIEF COMPLAINT / REASON FOR VISIT:  Chief Complaint   Patient presents with     Clinic Care Coordination - Follow-up     Malnutrition, confusion, back pain          HPI: Fer is a 74 year old male with a PMHx significant for atrial fibrillation, 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  MRI reassuring against cauda equina syndrome.  NSGY evaluated the patient and offered nonemergent posterior fusion in the future to prevent further progression of kyphosis, which she declined.  He was subsequently transferred to the TCU for rehab and nursing care.    Hospital course excerpted from discharge summary:  History of recurrent UTI/prostatitis  Chronic indwelling ferguson catheter 2/2 neurogenic bladder  History of BPH  Chronic Ferguson in place. Had discussed his urinary retention and how Duloxentine may affect this. Ultimately decided to continue PTA duloxetine. Patient was encouraged to increase PO fluid intake and UOP was closely monitored.   - Ferguson changed   - Continue PTA duloxetine 30mg daily     H/o T12 compression fracture  Acute on chronic Back pain and BLE weakness  Deconditioning  Frequent falls   Unable to care for self at home  Evaluated by NSGY in the past. Had Acute on chronic pain this admission though MRI w/o clear cord compression, however, vertebral edema suggests acute/subacute component. NSGY offered non-emergent posterior spinal fusion in the outpatient setting  to prevent further worsening/neurologic sequelae, but patient is currently not interested given his age.  - NSGY consulted, appreciate recs   - Recommend brace when HOB >30*, but not required for PT/OT  - Pain control: duloxetine 30 mg daily, lidocaine patch, acetaminophen prn  - Plan outpatient DEXA scan  - Continue PT/OT     Alternating constipation and diarrhea, resolved  possible stercoral colitis, recurrent   Abdomen soft, non-tender on exam this AM. Per nursing patient continues to have daily BM and afebrile. Patient refused labs for infectious workup but ok with not pursuing further investigation due to improved examination.   - Surgery consulted - recommended strong oral bowel regimen  - PRN bowel regiment senna BID prn, milk of mag prn.   - Changed Miralax from PRN to daily  - Zofran as needed     Cognitive impairment  MDD, exacerbated   Left his nursing home 4/30 against recommendations under unclear circumstances (?financial). Baseline unclear, per NH notes requires a Karthikeyan lift and was bed-bound during that week at home. Patient noted improved cognition since stopping oxycodone.   - Started 30mg duloxetine daily for depression due to being in the hospital and to address pain.   - On 6/11, he scored 12/30 on a MoCA evaluation and 4/15 memory index score.  - Psychology consulted - note 6/21/24 on discharge for follow-up recommendations   - spend extra time explaining decision-making w/ frequent updates/reminders about progress  - encourage engagement in activities while in the hospital   - encourage visits outside the room when possible  - refer to neuropsychology on d/c - could consider inpatient to assess cognitive impairment   - may benefit from psychiatric care post-d/c including psychotherapy and medications    Skin breakdown, chronic wounds  Chronic pressure ulcerations of bilateral heels, improving  Bilateral DP pulses detected by doppler on 5/31. Right foot XR showed no osseous involvement on 5/31.  RLE arterial ultrasound showed patent arteries with some small vessel disease.  - Present on admission  - Stable with no signs of acute infection  - Dressing changes as recommended by podiatry  - Activity: off load heels while in bed/chair, elevate heels off bed with pillows. Recommend prevalon off loading boots.     Malnutrition of moderate degree  Type II DM   Nutrition seen and multivitamin started. Electrolytes were monitored closely.     CKD4, IgA nephropathy:   - Creatinine 1.65 --> 1.67. baseline ~1.6   - managed with low potassium diet     Likely atelectasis: monitor  Lower extremity edema: prolonged bed rest. Recommend ace wraps with mild compression of both legs   Systolic murmur: History of tricuspid regurgitation. Asymptomatic. Monitor.   Hx DVT/PE: PTA apixaban.   A-fib: Currently rate controlled. Apixaban as above.   Chronic anemia of inflammation: Hgb at baseline.   DMT2: Diet controlled. Glucose well controlled.     Malnutrition  Malnutrition Characteristics  Etiology: Chronic illness  Energy Intake: Not assessed  Weight loss:: None  Body fat:: mild depletion  Muscle mass:: moderate depletion  Fluid accumulation:: None  Malnutrition Diagnosis: Malnutrition of a moderate degree  Weight: 100.8 kg (222 lb 3.6 oz)  Wt Change from Previous: 0 Kg  Wt Change from Admit: 0 Kg  % Wt Change from Adm: 0 %  Ideal Body Wt (IBW) Male (kg): 82.24 kg    PERTINENT STUDIES & CONSULTS:   Neurosurgery consulted  Nutrition Consulted  Podiatry consulted   PT/OT consulted  Wound Care Consulted     CT Chest/Abdmen/Pelvis w/o contrast 5/8/24    IMPRESSION   Impression:   1. No acute sequela of trauma in the chest, abdomen, pelvis.   2. There are a few small opacities in the right upper lobe which could be   infectious or inflammatory.   3. Decompressed urinary bladder by a Brown catheter with adjacent fat   stranding. Correlate for cystitis.   4. Moderately distended rectum with perirectal fat stranding. Stercoral   colitis  would be a consideration.   5. Chronic moderate to severe compression deformity of the T12 vertebral   body.     CT Cervical spine w/o contrast 5/8/24    IMPRESSION   Impression:   1. No acute fracture or subluxation of the cervical vertebrae.   2. Multilevel cervical spondylosis without high-grade spinal canal   stenosis and greatest at C5-6 where there is moderate left neural   foraminal stenosis.     CT head w/o contrast 5/8/24    IMPRESSION   Impression:   1. No acute intracranial abnormality.   2. Moderate atrophy and chronic small vessel ischemic disease. Chronic   right temporal infarction.     Xray knee 5/8/24    IMPRESSION   Impression: No acute osseous abnormality. Significant soft tissue swelling   of the medial aspect of the knee.     Xray hip 5/8/24    IMPRESSION   Impression:   No acute fracture or dislocation.     CT T-spine 5/9/24:    Impression:   1. Chronic severe compression deformity of T12 with associated   retropulsion causing mild to moderate spinal canal stenosis. Height of the   T12 vertebra has slightly further decreased from comparison exam.   2. Approximately 35 degrees thoracic kyphosis secondary to the vertebral   body fracture at T12.   3. Multilevel thoracic spondylosis, greatest degenerative changes centered   at T11-T12 level.   4. No high-grade spinal canal neural foraminal stenosis.     CT Lumbar spine 5/9/24    IMPRESSION   Impression:     1. Chronic severe T12 compression fracture deformity with mild   retropulsion and associated mild to moderate spinal canal narrowing.   Slight further loss of height of disc compression fracture when compared   to previous CT of the chest, abdomen pelvis.   2. No acute fracture of the lumbosacral spine.   3. Severe multilevel lumbar spondylosis with further progression of bulky   bridging osteophytes and facet arthropathy. Findings are most   significantly at L5 -S1.     MRI Lumbar Spine 5/10/24    IMPRESSION   Impression: Compression fracture  "deformity of T12 with associated bone   marrow edema along the superior endplate is suggestive of an acute on   chronic or subacute compression fracture. There is significant loss of   height with retropulsion resulting in mild spinal canal stenosis.   Multilevel degenerative disc disease and facet arthropathy, most in   significantly at L5-S1 as described above.         CURRENT/RECENT TCU ISSUES    Disposition  Background  -- No significant issues.  He is feeling well.  Pain is okay, though he has some stiffness in his hips and legs.  -- He did have some left hip pain a few days ago, occurring when bending his knee.  This has resolved without further intervention.  -- He has been in and out of the hospital multiple times.  -- He says, \"my being here was to do with my ability to stand, walk, and sit.\"  -- He previously told me he had a bad case of sepsis years ago, saying, \"I retired, and I got fired, and I got sepsis.\"  He then continued with \"weird health things.\"  -- He has a history of morbid obesity and has lost 130 pounds.  (He mistakenly said 330 pounds originally.)  -- He previously noted that his goal is to \"get out of this place and go home.\"  He also told me, \"my problem is that I want to be able to walk normally.\"  -- He is retired, and his wife is retired with a medical disability.  -- He spent 25 years as a .    Confusion  -- In addition to diagnoses listed above, he seems to have some memory issues and a bit of confusion (as noted above, he mistakenly said he had lost 330 pounds but he actually lost 130 pounds).    Chronic severe T12 compression fracture deformity  -- The cause of most of his pain.  -- He underwent L5-S1 decompression about 20 years ago.    Diabetes mellitus type 2  -- When last seen, he told off me, \"I just reached a point of a shot the day in the morning and pills at night.\"    Neurogenic bladder  Urinary retention  Chronic indwelling Brown catheter  Recurrent " UTIs  -- Urine is clear and medium yellow.    Foot wounds  -- Overall, feet look somewhat better than they did at my last visit on 08/06/2024.  -- Heel wounds with hammertoes.  -- Black spots on the tips of his toes.  -- Wearing blue boots while in bed.      ROS: 10 point ROS of systems including Constitutional, Eyes, Respiratory, Cardiovascular, Gastroenterology, Genitourinary, Integumentary, Musculoskeletal, and Psychiatric were all negative except for pertinent positives noted in my HPI.      Past Medical History:   Diagnosis Date     Anemia      Arrhythmia     atrial fib     Arthritis 1990's    gout     BPH (benign prostatic hyperplasia)      Current moderate episode of major depressive disorder without prior episode (H) 4/30/2019     Depression      Diabetes mellitus (H)     Type 2  IDDM     Diverticulosis      ESRD (end stage renal disease) on dialysis (H)      Hematuria      HTN (hypertension)      HTN (hypertension)      IgA nephropathy      MI, old      Pneumonia      Rhabdomyolysis      Weakness 5/10/2019              Family History   Problem Relation Age of Onset     Cancer Father      Social History     Socioeconomic History     Marital status:      Spouse name: None     Number of children: None     Years of education: None     Highest education level: None   Tobacco Use     Smoking status: Former     Smokeless tobacco: Never     Tobacco comments:     quit 35 years ago   Substance and Sexual Activity     Alcohol use: Yes     Comment: 1 to 2 drinks a day (Occasional)     Drug use: No     Social Determinants of Health     Financial Resource Strain: Patient Declined (5/8/2024)    Received from [facility]    Overall Financial Resource Strain (CARDIA)      Difficulty of Paying Living Expenses: Patient declined   Food Insecurity: Patient Declined (5/8/2024)    Received from [facility]    Hunger Vital Sign      Worried About Running Out of Food in the Last Year: Patient declined       Ran Out of Food in the Last Year: Patient declined   Transportation Needs: Patient Declined (5/8/2024)    Received from [facility]    PRAPARE - Transportation      Lack of Transportation (Medical): Patient declined      Lack of Transportation (Non-Medical): Patient declined   Interpersonal Safety: Patient Declined (5/8/2024)    Received from [facility]    Humiliation, Afraid, Rape, and Kick questionnaire      Fear of Current or Ex-Partner: Patient declined      Emotionally Abused: Patient declined      Physically Abused: Patient declined      Sexually Abused: Patient declined   Housing Stability: Low Risk  (5/8/2024)    Received from [facility]    Housing Stability      What is your housing situation today?: 3 - I have housing       MEDICATIONS: Reviewed from the MAR, physician orders, and/or earlier progress notes.[id number]}  Post Medication Reconciliation Status: Medication reconciliation previously completed during another office visit.    Current Outpatient Medications   Medication Sig Dispense Refill     apixaban ANTICOAGULANT (ELIQUIS) 5 MG tablet Take 1 tablet (5 mg) by mouth 2 times daily       bisacodyl (DULCOLAX) 10 MG suppository Place 1 suppository (10 mg) rectally daily as needed for constipation       DULoxetine (CYMBALTA) 30 MG capsule Take 1 capsule (30 mg) by mouth 2 times daily       folic acid (FOLVITE) 1 MG tablet Take 1 tablet (1 mg) by mouth daily       miconazole (MICATIN) 2 % external powder Apply topically as needed for itching or other (irritation of perineum) 43 g 0     multivitamin, therapeutic (THERA-VIT) TABS tablet Take 1 tablet by mouth daily 30 tablet 0     nystatin (MYCOSTATIN) 090326 UNIT/GM external powder Apply topically 2 times daily 15 g 0     polyethylene glycol (MIRALAX) 17 GM/Dose powder Take 8.5 g by mouth daily as needed        senna (SENOKOT) 8.6 MG tablet Take 17.2 mg by mouth 2 times daily as needed       Vitamin D3 (CHOLECALCIFEROL) 125 MCG (5000  "UT) tablet Take 1 tablet by mouth daily       No current facility-administered medications for this visit.     ALLERGIES:   Allergies   Allergen Reactions     Iodinated Contrast Media Unknown     Other reaction(s): Unknown     Sulfa Antibiotics Hives and Rash     Other reaction(s): Unknown    PN: LW Reaction: Rash, Generalized     Ciprofloxacin Unknown       DIET: Regular, regular texture, thin liquids.    Vitals:    08/23/24 1413   BP: 123/75   Pulse: 75   Resp: 18   Temp: 97.1  F (36.2  C)   SpO2: 94%   Weight: 99.8 kg (220 lb)   Height: 1.88 m (6' 2\")     Wt Readings from Last 4 Encounters:   08/23/24 99.8 kg (220 lb)   08/16/24 99.8 kg (220 lb)   08/09/24 98.2 kg (216 lb 9.6 oz)   08/06/24 98.2 kg (216 lb 9.6 oz)     Body mass index is 28.25 kg/m .  Body surface area is 2.28 meters squared.    EXAMINATION:   General: Pleasant elderly mal, lying in bed, in no apparent distress. He has a long finn.  Head: Normocephalic and atraumatic.   Eyes: PERRLA, sclerae clear.   ENT: Moist oral mucosa. He has his own teeth, though they are in poor repair.  Cardiovascular: RRR with intermittent ectopy and a 4/6 OBDULIO at the upper LSB.  Respiratory: Lungs clear to auscultation bilaterally.   Abdomen: Nondistended.  Indwelling Brown catheter.  Musculoskeletal/Extremities: Heel wounds and hammertoes of the feet.  Also, black spots or scabs on the tips of the toes.  Integument: See musculoskeletal/extremities.  Otherwise, no rashes, clinically significant lesions, or skin breakdown.  Cognitive/Psychiatric: Some intermittent confusion (?) with euthymic affect.    DIAGNOSTICS:   No results found for this or any previous visit (from the past 240 hour(s)).    Last Comprehensive Metabolic Panel:  Sodium   Date Value Ref Range Status   10/20/2022 147 (H) 136 - 145 mmol/L Final   07/01/2021 135 133 - 144 mmol/L Final     Potassium   Date Value Ref Range Status   10/20/2022 5.6 (H) 3.4 - 5.3 mmol/L Final   05/30/2022 4.0 3.4 - 5.3 mmol/L " Final   07/01/2021 4.0 3.4 - 5.3 mmol/L Final     Chloride   Date Value Ref Range Status   10/20/2022 111 (H) 98 - 107 mmol/L Final   05/30/2022 104 94 - 109 mmol/L Final   07/01/2021 103 94 - 109 mmol/L Final     Carbon Dioxide   Date Value Ref Range Status   07/01/2021 26 20 - 32 mmol/L Final     Carbon Dioxide (CO2)   Date Value Ref Range Status   10/20/2022 14 (L) 22 - 29 mmol/L Final   05/30/2022 21 20 - 32 mmol/L Final     Anion Gap   Date Value Ref Range Status   10/20/2022 22 (H) 7 - 15 mmol/L Final   05/30/2022 9 3 - 14 mmol/L Final   07/01/2021 6 3 - 14 mmol/L Final     Glucose   Date Value Ref Range Status   10/20/2022 97 70 - 99 mg/dL Final   05/30/2022 81 70 - 99 mg/dL Final   07/01/2021 73 70 - 99 mg/dL Final     Urea Nitrogen   Date Value Ref Range Status   10/20/2022 52.8 (H) 8.0 - 23.0 mg/dL Final   05/30/2022 25 7 - 30 mg/dL Final   07/01/2021 31 (H) 7 - 30 mg/dL Final     Creatinine   Date Value Ref Range Status   10/20/2022 2.19 (H) 0.67 - 1.17 mg/dL Final   07/01/2021 2.95 (H) 0.66 - 1.25 mg/dL Final     GFR Estimate   Date Value Ref Range Status   10/20/2022 31 (L) >60 mL/min/1.73m2 Final     Comment:     Effective December 21, 2021 eGFRcr in adults is calculated using the 2021 CKD-EPI creatinine equation which includes age and gender (Chacha et al., NEJM, DOI: 10.1056/VHQVnw5657992)   07/01/2021 21 (L) >60 mL/min/[1.73_m2] Final     Comment:     Non  GFR Calc  Starting 12/18/2018, serum creatinine based estimated GFR (eGFR) will be   calculated using the Chronic Kidney Disease Epidemiology Collaboration   (CKD-EPI) equation.       Calcium   Date Value Ref Range Status   10/20/2022 9.1 8.8 - 10.2 mg/dL Final   07/01/2021 8.5 8.5 - 10.1 mg/dL Final     Bilirubin Total   Date Value Ref Range Status   05/30/2022 0.7 0.2 - 1.3 mg/dL Final   06/19/2021 0.5 0.2 - 1.3 mg/dL Final     Alkaline Phosphatase   Date Value Ref Range Status   05/30/2022 107 40 - 150 U/L Final   06/19/2021 81  40 - 150 U/L Final     ALT   Date Value Ref Range Status   05/30/2022 11 0 - 70 U/L Final   06/19/2021 11 0 - 70 U/L Final     AST   Date Value Ref Range Status   05/30/2022 16 0 - 45 U/L Final   06/19/2021 17 0 - 45 U/L Final     Lab Results   Component Value Date    WBC 8.9 10/20/2022    WBC 6.6 07/01/2021     Lab Results   Component Value Date    RBC 3.32 10/20/2022    RBC 2.74 07/01/2021     Lab Results   Component Value Date    HGB 10.5 10/20/2022    HGB 8.8 07/01/2021     Lab Results   Component Value Date    HCT 32.2 10/20/2022    HCT 27.0 07/01/2021     Lab Results   Component Value Date    MCV 97 10/20/2022    MCV 99 07/01/2021     Lab Results   Component Value Date    MCH 31.6 10/20/2022    MCH 32.1 07/01/2021     Lab Results   Component Value Date    MCHC 32.6 10/20/2022    MCHC 32.6 07/01/2021     Lab Results   Component Value Date    RDW 13.9 10/20/2022    RDW 14.8 07/01/2021     Lab Results   Component Value Date     10/20/2022     07/01/2021       ASSESSMENT/Plan:      ICD-10-CM    1. History of vertebral fracture  Z87.81       2. Atrial fibrillation, unspecified type (H)  I48.91       3. Neurogenic bladder  N31.9       4. Chronic indwelling Brown catheter  Z97.8       5. History of recurrent UTI (urinary tract infection)  Z87.440       6. Morbid obesity (H)  E66.01       7. Type 2 diabetes mellitus with other diabetic kidney complication (H)  E11.29       8. Pressure sore on heel, left, unstageable (H)  L89.620       9. History of deep venous thrombosis  Z86.718       10. History of pulmonary embolism  Z86.711           CHANGES:    None.    CARE PLAN:    The care plan, medications, vital signs, orders, and nursing notes have been reviewed, and all orders signed. Changes to care plan, if any, as noted. Otherwise, continue current plan of care. Total time for visit was 35 minutes including, but not limited to, non-face-to-face time spent reviewing records, counseling, and coordination of  care.    The above has been created using voice recognition software. Please be aware that this may unintentionally  produce inaccuracies and/or nonsensical sentences.      Electronically signed by: OCTAVIO Newsome CNP      Sincerely,        OCTAVIO Newsome CNP

## 2024-08-24 NOTE — PROGRESS NOTES
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  MRI reassuring against cauda equina syndrome.  NSGY evaluated the patient and offered nonemergent posterior fusion in the future to prevent further progression of kyphosis, which she declined.  He was subsequently transferred to the TCU for rehab and nursing care.    Hospital course excerpted from discharge summary:  History of recurrent UTI/prostatitis  Chronic indwelling ferguson catheter 2/2 neurogenic bladder  History of BPH  Chronic Ferguson in place. Had discussed his urinary retention and how Duloxentine may affect this. Ultimately decided to continue PTA duloxetine. Patient was encouraged to increase PO fluid intake and UOP was closely monitored.   - Ferguson changed   - Continue PTA duloxetine 30mg daily     H/o T12 compression fracture  Acute on chronic Back pain and BLE weakness  Deconditioning  Frequent falls   Unable to care for self at home  Evaluated by NSGY in the past. Had Acute on chronic pain this admission though MRI w/o clear cord compression, however, vertebral edema suggests acute/subacute component. NSGY offered non-emergent posterior spinal fusion in the outpatient setting to prevent further worsening/neurologic sequelae, but patient is currently not interested  given his age.  - NSGY consulted, appreciate recs   - Recommend brace when HOB >30*, but not required for PT/OT  - Pain control: duloxetine 30 mg daily, lidocaine patch, acetaminophen prn  - Plan outpatient DEXA scan  - Continue PT/OT     Alternating constipation and diarrhea, resolved  possible stercoral colitis, recurrent   Abdomen soft, non-tender on exam this AM. Per nursing patient continues to have daily BM and afebrile. Patient refused labs for infectious workup but ok with not pursuing further investigation due to improved examination.   - Surgery consulted - recommended strong oral bowel regimen  - PRN bowel regiment senna BID prn, milk of mag prn.   - Changed Miralax from PRN to daily  - Zofran as needed     Cognitive impairment  MDD, exacerbated   Left his nursing home 4/30 against recommendations under unclear circumstances (?financial). Baseline unclear, per NH notes requires a Karthikeyan lift and was bed-bound during that week at home. Patient noted improved cognition since stopping oxycodone.   - Started 30mg duloxetine daily for depression due to being in the hospital and to address pain.   - On 6/11, he scored 12/30 on a MoCA evaluation and 4/15 memory index score.  - Psychology consulted - note 6/21/24 on discharge for follow-up recommendations   - spend extra time explaining decision-making w/ frequent updates/reminders about progress  - encourage engagement in activities while in the hospital   - encourage visits outside the room when possible  - refer to neuropsychology on d/c - could consider inpatient to assess cognitive impairment   - may benefit from psychiatric care post-d/c including psychotherapy and medications    Skin breakdown, chronic wounds  Chronic pressure ulcerations of bilateral heels, improving  Bilateral DP pulses detected by doppler on 5/31. Right foot XR showed no osseous involvement on 5/31. RLE arterial ultrasound showed patent arteries with some small vessel disease.  - Present  on admission  - Stable with no signs of acute infection  - Dressing changes as recommended by podiatry  - Activity: off load heels while in bed/chair, elevate heels off bed with pillows. Recommend prevalon off loading boots.     Malnutrition of moderate degree  Type II DM   Nutrition seen and multivitamin started. Electrolytes were monitored closely.     CKD4, IgA nephropathy:   - Creatinine 1.65 --> 1.67. baseline ~1.6   - managed with low potassium diet     Likely atelectasis: monitor  Lower extremity edema: prolonged bed rest. Recommend ace wraps with mild compression of both legs   Systolic murmur: History of tricuspid regurgitation. Asymptomatic. Monitor.   Hx DVT/PE: PTA apixaban.   A-fib: Currently rate controlled. Apixaban as above.   Chronic anemia of inflammation: Hgb at baseline.   DMT2: Diet controlled. Glucose well controlled.     Malnutrition  Malnutrition Characteristics  Etiology: Chronic illness  Energy Intake: Not assessed  Weight loss:: None  Body fat:: mild depletion  Muscle mass:: moderate depletion  Fluid accumulation:: None  Malnutrition Diagnosis: Malnutrition of a moderate degree  Weight: 100.8 kg (222 lb 3.6 oz)  Wt Change from Previous: 0 Kg  Wt Change from Admit: 0 Kg  % Wt Change from Adm: 0 %  Ideal Body Wt (IBW) Male (kg): 82.24 kg    PERTINENT STUDIES & CONSULTS:   Neurosurgery consulted  Nutrition Consulted  Podiatry consulted   PT/OT consulted  Wound Care Consulted     CT Chest/Abdmen/Pelvis w/o contrast 5/8/24    IMPRESSION   Impression:   1. No acute sequela of trauma in the chest, abdomen, pelvis.   2. There are a few small opacities in the right upper lobe which could be   infectious or inflammatory.   3. Decompressed urinary bladder by a Brown catheter with adjacent fat   stranding. Correlate for cystitis.   4. Moderately distended rectum with perirectal fat stranding. Stercoral   colitis would be a consideration.   5. Chronic moderate to severe compression deformity of the T12  vertebral   body.     CT Cervical spine w/o contrast 5/8/24    IMPRESSION   Impression:   1. No acute fracture or subluxation of the cervical vertebrae.   2. Multilevel cervical spondylosis without high-grade spinal canal   stenosis and greatest at C5-6 where there is moderate left neural   foraminal stenosis.     CT head w/o contrast 5/8/24    IMPRESSION   Impression:   1. No acute intracranial abnormality.   2. Moderate atrophy and chronic small vessel ischemic disease. Chronic   right temporal infarction.     Xray knee 5/8/24    IMPRESSION   Impression: No acute osseous abnormality. Significant soft tissue swelling   of the medial aspect of the knee.     Xray hip 5/8/24    IMPRESSION   Impression:   No acute fracture or dislocation.     CT T-spine 5/9/24:    Impression:   1. Chronic severe compression deformity of T12 with associated   retropulsion causing mild to moderate spinal canal stenosis. Height of the   T12 vertebra has slightly further decreased from comparison exam.   2. Approximately 35 degrees thoracic kyphosis secondary to the vertebral   body fracture at T12.   3. Multilevel thoracic spondylosis, greatest degenerative changes centered   at T11-T12 level.   4. No high-grade spinal canal neural foraminal stenosis.     CT Lumbar spine 5/9/24    IMPRESSION   Impression:     1. Chronic severe T12 compression fracture deformity with mild   retropulsion and associated mild to moderate spinal canal narrowing.   Slight further loss of height of disc compression fracture when compared   to previous CT of the chest, abdomen pelvis.   2. No acute fracture of the lumbosacral spine.   3. Severe multilevel lumbar spondylosis with further progression of bulky   bridging osteophytes and facet arthropathy. Findings are most   significantly at L5 -S1.     MRI Lumbar Spine 5/10/24    IMPRESSION   Impression: Compression fracture deformity of T12 with associated bone   marrow edema along the superior endplate is  "suggestive of an acute on   chronic or subacute compression fracture. There is significant loss of   height with retropulsion resulting in mild spinal canal stenosis.   Multilevel degenerative disc disease and facet arthropathy, most in   significantly at L5-S1 as described above.         CURRENT/RECENT TCU ISSUES    Disposition  Background  -- No significant issues.  He is feeling well.  Pain is okay, though he has some stiffness in his hips and legs.  -- He did have some left hip pain a few days ago, occurring when bending his knee.  This has resolved without further intervention.  -- He has been in and out of the hospital multiple times.  -- He says, \"my being here was to do with my ability to stand, walk, and sit.\"  -- He previously told me he had a bad case of sepsis years ago, saying, \"I retired, and I got fired, and I got sepsis.\"  He then continued with \"weird health things.\"  -- He has a history of morbid obesity and has lost 130 pounds.  (He mistakenly said 330 pounds originally.)  -- He previously noted that his goal is to \"get out of this place and go home.\"  He also told me, \"my problem is that I want to be able to walk normally.\"  -- He is retired, and his wife is retired with a medical disability.  -- He spent 25 years as a .    Confusion  -- In addition to diagnoses listed above, he seems to have some memory issues and a bit of confusion (as noted above, he mistakenly said he had lost 330 pounds but he actually lost 130 pounds).    Chronic severe T12 compression fracture deformity  -- The cause of most of his pain.  -- He underwent L5-S1 decompression about 20 years ago.    Diabetes mellitus type 2  -- When last seen, he told off me, \"I just reached a point of a shot the day in the morning and pills at night.\"    Neurogenic bladder  Urinary retention  Chronic indwelling Brown catheter  Recurrent UTIs  -- Urine is clear and medium yellow.    Foot wounds  -- Overall, feet look somewhat " better than they did at my last visit on 08/06/2024.  -- Heel wounds with hammertoes.  -- Black spots on the tips of his toes.  -- Wearing blue boots while in bed.      ROS: 10 point ROS of systems including Constitutional, Eyes, Respiratory, Cardiovascular, Gastroenterology, Genitourinary, Integumentary, Musculoskeletal, and Psychiatric were all negative except for pertinent positives noted in my HPI.      Past Medical History:   Diagnosis Date    Anemia     Arrhythmia     atrial fib    Arthritis 1990's    gout    BPH (benign prostatic hyperplasia)     Current moderate episode of major depressive disorder without prior episode (H) 4/30/2019    Depression     Diabetes mellitus (H)     Type 2  IDDM    Diverticulosis     ESRD (end stage renal disease) on dialysis (H)     Hematuria     HTN (hypertension)     HTN (hypertension)     IgA nephropathy     MI, old     Pneumonia     Rhabdomyolysis     Weakness 5/10/2019              Family History   Problem Relation Age of Onset    Cancer Father      Social History     Socioeconomic History    Marital status:      Spouse name: None    Number of children: None    Years of education: None    Highest education level: None   Tobacco Use    Smoking status: Former    Smokeless tobacco: Never    Tobacco comments:     quit 35 years ago   Substance and Sexual Activity    Alcohol use: Yes     Comment: 1 to 2 drinks a day (Occasional)    Drug use: No     Social Determinants of Health     Financial Resource Strain: Patient Declined (5/8/2024)    Received from [facility]    Overall Financial Resource Strain (CARDIA)     Difficulty of Paying Living Expenses: Patient declined   Food Insecurity: Patient Declined (5/8/2024)    Received from [facility]    Hunger Vital Sign     Worried About Running Out of Food in the Last Year: Patient declined     Ran Out of Food in the Last Year: Patient declined   Transportation Needs: Patient Declined (5/8/2024)    Received from  [facility]    PRAPARE - Transportation     Lack of Transportation (Medical): Patient declined     Lack of Transportation (Non-Medical): Patient declined   Interpersonal Safety: Patient Declined (5/8/2024)    Received from [facility]    Humiliation, Afraid, Rape, and Kick questionnaire     Fear of Current or Ex-Partner: Patient declined     Emotionally Abused: Patient declined     Physically Abused: Patient declined     Sexually Abused: Patient declined   Housing Stability: Low Risk  (5/8/2024)    Received from [facility]    Housing Stability     What is your housing situation today?: 3 - I have housing       MEDICATIONS: Reviewed from the MAR, physician orders, and/or earlier progress notes.[id number]}  Post Medication Reconciliation Status: Medication reconciliation previously completed during another office visit.    Current Outpatient Medications   Medication Sig Dispense Refill    apixaban ANTICOAGULANT (ELIQUIS) 5 MG tablet Take 1 tablet (5 mg) by mouth 2 times daily      bisacodyl (DULCOLAX) 10 MG suppository Place 1 suppository (10 mg) rectally daily as needed for constipation      DULoxetine (CYMBALTA) 30 MG capsule Take 1 capsule (30 mg) by mouth 2 times daily      folic acid (FOLVITE) 1 MG tablet Take 1 tablet (1 mg) by mouth daily      miconazole (MICATIN) 2 % external powder Apply topically as needed for itching or other (irritation of perineum) 43 g 0    multivitamin, therapeutic (THERA-VIT) TABS tablet Take 1 tablet by mouth daily 30 tablet 0    nystatin (MYCOSTATIN) 700224 UNIT/GM external powder Apply topically 2 times daily 15 g 0    polyethylene glycol (MIRALAX) 17 GM/Dose powder Take 8.5 g by mouth daily as needed       senna (SENOKOT) 8.6 MG tablet Take 17.2 mg by mouth 2 times daily as needed      Vitamin D3 (CHOLECALCIFEROL) 125 MCG (5000 UT) tablet Take 1 tablet by mouth daily       No current facility-administered medications for this visit.     ALLERGIES:   Allergies  "  Allergen Reactions    Iodinated Contrast Media Unknown     Other reaction(s): Unknown    Sulfa Antibiotics Hives and Rash     Other reaction(s): Unknown    PN: LW Reaction: Rash, Generalized    Ciprofloxacin Unknown       DIET: Regular, regular texture, thin liquids.    Vitals:    08/23/24 1413   BP: 123/75   Pulse: 75   Resp: 18   Temp: 97.1  F (36.2  C)   SpO2: 94%   Weight: 99.8 kg (220 lb)   Height: 1.88 m (6' 2\")     Wt Readings from Last 4 Encounters:   08/23/24 99.8 kg (220 lb)   08/16/24 99.8 kg (220 lb)   08/09/24 98.2 kg (216 lb 9.6 oz)   08/06/24 98.2 kg (216 lb 9.6 oz)     Body mass index is 28.25 kg/m .  Body surface area is 2.28 meters squared.    EXAMINATION:   General: Pleasant elderly mal, lying in bed, in no apparent distress. He has a long finn.  Head: Normocephalic and atraumatic.   Eyes: PERRLA, sclerae clear.   ENT: Moist oral mucosa. He has his own teeth, though they are in poor repair.  Cardiovascular: RRR with intermittent ectopy and a 4/6 OBDULIO at the upper LSB.  Respiratory: Lungs clear to auscultation bilaterally.   Abdomen: Nondistended.  Indwelling Brown catheter.  Musculoskeletal/Extremities: Heel wounds and hammertoes of the feet.  Also, black spots or scabs on the tips of the toes.  Integument: See musculoskeletal/extremities.  Otherwise, no rashes, clinically significant lesions, or skin breakdown.  Cognitive/Psychiatric: Some intermittent confusion (?) with euthymic affect.    DIAGNOSTICS:   No results found for this or any previous visit (from the past 240 hour(s)).    Last Comprehensive Metabolic Panel:  Sodium   Date Value Ref Range Status   10/20/2022 147 (H) 136 - 145 mmol/L Final   07/01/2021 135 133 - 144 mmol/L Final     Potassium   Date Value Ref Range Status   10/20/2022 5.6 (H) 3.4 - 5.3 mmol/L Final   05/30/2022 4.0 3.4 - 5.3 mmol/L Final   07/01/2021 4.0 3.4 - 5.3 mmol/L Final     Chloride   Date Value Ref Range Status   10/20/2022 111 (H) 98 - 107 mmol/L Final "   05/30/2022 104 94 - 109 mmol/L Final   07/01/2021 103 94 - 109 mmol/L Final     Carbon Dioxide   Date Value Ref Range Status   07/01/2021 26 20 - 32 mmol/L Final     Carbon Dioxide (CO2)   Date Value Ref Range Status   10/20/2022 14 (L) 22 - 29 mmol/L Final   05/30/2022 21 20 - 32 mmol/L Final     Anion Gap   Date Value Ref Range Status   10/20/2022 22 (H) 7 - 15 mmol/L Final   05/30/2022 9 3 - 14 mmol/L Final   07/01/2021 6 3 - 14 mmol/L Final     Glucose   Date Value Ref Range Status   10/20/2022 97 70 - 99 mg/dL Final   05/30/2022 81 70 - 99 mg/dL Final   07/01/2021 73 70 - 99 mg/dL Final     Urea Nitrogen   Date Value Ref Range Status   10/20/2022 52.8 (H) 8.0 - 23.0 mg/dL Final   05/30/2022 25 7 - 30 mg/dL Final   07/01/2021 31 (H) 7 - 30 mg/dL Final     Creatinine   Date Value Ref Range Status   10/20/2022 2.19 (H) 0.67 - 1.17 mg/dL Final   07/01/2021 2.95 (H) 0.66 - 1.25 mg/dL Final     GFR Estimate   Date Value Ref Range Status   10/20/2022 31 (L) >60 mL/min/1.73m2 Final     Comment:     Effective December 21, 2021 eGFRcr in adults is calculated using the 2021 CKD-EPI creatinine equation which includes age and gender (Chacha et al., NEJM, DOI: 10.1056/NOOAmr2703706)   07/01/2021 21 (L) >60 mL/min/[1.73_m2] Final     Comment:     Non  GFR Calc  Starting 12/18/2018, serum creatinine based estimated GFR (eGFR) will be   calculated using the Chronic Kidney Disease Epidemiology Collaboration   (CKD-EPI) equation.       Calcium   Date Value Ref Range Status   10/20/2022 9.1 8.8 - 10.2 mg/dL Final   07/01/2021 8.5 8.5 - 10.1 mg/dL Final     Bilirubin Total   Date Value Ref Range Status   05/30/2022 0.7 0.2 - 1.3 mg/dL Final   06/19/2021 0.5 0.2 - 1.3 mg/dL Final     Alkaline Phosphatase   Date Value Ref Range Status   05/30/2022 107 40 - 150 U/L Final   06/19/2021 81 40 - 150 U/L Final     ALT   Date Value Ref Range Status   05/30/2022 11 0 - 70 U/L Final   06/19/2021 11 0 - 70 U/L Final     AST    Date Value Ref Range Status   05/30/2022 16 0 - 45 U/L Final   06/19/2021 17 0 - 45 U/L Final     Lab Results   Component Value Date    WBC 8.9 10/20/2022    WBC 6.6 07/01/2021     Lab Results   Component Value Date    RBC 3.32 10/20/2022    RBC 2.74 07/01/2021     Lab Results   Component Value Date    HGB 10.5 10/20/2022    HGB 8.8 07/01/2021     Lab Results   Component Value Date    HCT 32.2 10/20/2022    HCT 27.0 07/01/2021     Lab Results   Component Value Date    MCV 97 10/20/2022    MCV 99 07/01/2021     Lab Results   Component Value Date    MCH 31.6 10/20/2022    MCH 32.1 07/01/2021     Lab Results   Component Value Date    MCHC 32.6 10/20/2022    MCHC 32.6 07/01/2021     Lab Results   Component Value Date    RDW 13.9 10/20/2022    RDW 14.8 07/01/2021     Lab Results   Component Value Date     10/20/2022     07/01/2021       ASSESSMENT/Plan:      ICD-10-CM    1. History of vertebral fracture  Z87.81       2. Atrial fibrillation, unspecified type (H)  I48.91       3. Neurogenic bladder  N31.9       4. Chronic indwelling Brown catheter  Z97.8       5. History of recurrent UTI (urinary tract infection)  Z87.440       6. Morbid obesity (H)  E66.01       7. Type 2 diabetes mellitus with other diabetic kidney complication (H)  E11.29       8. Pressure sore on heel, left, unstageable (H)  L89.620       9. History of deep venous thrombosis  Z86.718       10. History of pulmonary embolism  Z86.711           CHANGES:    None.    CARE PLAN:    The care plan, medications, vital signs, orders, and nursing notes have been reviewed, and all orders signed. Changes to care plan, if any, as noted. Otherwise, continue current plan of care. Total time for visit was 35 minutes including, but not limited to, non-face-to-face time spent reviewing records, counseling, and coordination of care.    The above has been created using voice recognition software. Please be aware that this may unintentionally  produce  inaccuracies and/or nonsensical sentences.      Electronically signed by: OCTAVIO Newsome CNP

## 2024-08-28 ENCOUNTER — TRANSITIONAL CARE UNIT VISIT (OUTPATIENT)
Dept: GERIATRICS | Facility: CLINIC | Age: 74
End: 2024-08-28
Payer: MEDICARE

## 2024-08-28 VITALS
RESPIRATION RATE: 19 BRPM | HEIGHT: 74 IN | WEIGHT: 219.1 LBS | TEMPERATURE: 97.1 F | DIASTOLIC BLOOD PRESSURE: 87 MMHG | SYSTOLIC BLOOD PRESSURE: 144 MMHG | OXYGEN SATURATION: 96 % | BODY MASS INDEX: 28.12 KG/M2 | HEART RATE: 76 BPM

## 2024-08-28 DIAGNOSIS — I48.91 ATRIAL FIBRILLATION, UNSPECIFIED TYPE (H): Primary | ICD-10-CM

## 2024-08-28 DIAGNOSIS — N31.9 NEUROGENIC BLADDER: ICD-10-CM

## 2024-08-28 DIAGNOSIS — F33.1 MODERATE EPISODE OF RECURRENT MAJOR DEPRESSIVE DISORDER (H): ICD-10-CM

## 2024-08-28 DIAGNOSIS — Z87.81 HISTORY OF VERTEBRAL FRACTURE: ICD-10-CM

## 2024-08-28 DIAGNOSIS — R53.81 PHYSICAL DECONDITIONING: ICD-10-CM

## 2024-08-28 DIAGNOSIS — L89.620 PRESSURE SORE ON HEEL, LEFT, UNSTAGEABLE (H): ICD-10-CM

## 2024-08-28 DIAGNOSIS — Z97.8 CHRONIC INDWELLING FOLEY CATHETER: ICD-10-CM

## 2024-08-28 PROCEDURE — 99309 SBSQ NF CARE MODERATE MDM 30: CPT | Performed by: NURSE PRACTITIONER

## 2024-08-28 NOTE — LETTER
8/28/2024      Fer Latham  [address]  [facility] [address]        [facility] G[facility]    Chief Complaint   Patient presents with     RECHECK     HPI:  Fer Latham is a 74 year old  (1950), who is being seen today for an episodic care visit at: [facility] [[id number]]. Today's concern is:     Fer was visited today while in his room resting in bed. He is worried about his wife who has an infection but otherwise feeling that things are going well. He does not have complaints of chest pain or shortness of breath.  Has a catheter in which is not bothersome to him, he is unsure of when it was changed last. Having regular bowel movements.     After further review of his history in epic, noted that he had an [facility] stay back in 2022 then went to [facility] transitional care unit for rehab following that.  Does not appear that he was stronger to go home therefore he was in the long-term care facility at [facility] until April 2024 when he discharged home with his wife.  He was unable to care for himself which led to this most recent prolonged hospitalization. Please see below for his functioning when he was discharged from [facility] in April 2024:  Mobility: Non-ambulatory, uses wheelchair with maximum assistance.  ADLs: Upper Extremity Dressing: maximum assistance  Lower Extremity Dressing: dependent  Toileting: dependent  Hygiene and Grooming: dependent  Cognition: Brief Interview of Mental Status (BIMS) 6/15 (13-15 within normal limits) Feb 2024  [employer] Mental Status (UMS) 13/30 (27-30 within normal limits) Aug 2022  Cognitive Performance Test (CPT) 4.5/5.6 Aug 2022    Allergies, and PMH/PSH reviewed in [facility] today.  REVIEW OF SYSTEMS:  4 point ROS including Respiratory, CV, GI and , other than that noted in the HPI,  is negative    Objective:   BP (!) 144/87   Pulse 76   Temp 97.1  F (36.2  C)   Resp 19   Ht 1.88 m  "(6' 2\")   Wt 99.4 kg (219 lb 1.6 oz)   SpO2 96%   BMI 28.13 kg/m    GENERAL APPEARANCE:  Alert, in no distress, pleasant, cooperative  EYES: no discharge or mattering on lids or lashes noted  ENT:  moist mucous membranes, hearing acuity intact  NECK: supple, symmetrical  RESP: no respiratory distress, Lung sounds clear, patient is on room air  CV:  rate and rhythm regular, no murmur. Edema none in bilateral lower extremities. VASCULAR: warm extremities without open areas.  ABDOMEN: normal bowel sounds, soft, nontender.  : catheter with mild amount of sediment, cloudy urine.  M/S:   Gait and station: dependent with cares, no tenderness or swelling of the joints; able to move all extremities   SKIN:  Inspection and palpation of skin and subcutaneous tissue: skin warm, dry, UTV wound on RLE  NEURO: no facial asymmetry, no speech deficits and able to follow directions, moves all extremities symmetrically  PSYCH:  insight, judgement, and memory impaired affect and mood normal    Reviewed in epic    Assessment/Plan:  H/o T12 compression fracture  Acute on chronic Back pain and BLE weakness  MRI w/o clear cord compression, however, vertebral edema suggests acute/subacute component. NSGY offered non-emergent posterior spinal fusion in the outpatient setting to prevent further worsening/neurologic sequelae, but patient is currently not interested given his age.  - duloxetine 30 mg daily, lidocaine patch, acetaminophen prn  - Continue PT/OT but suspect that he will not progress well. Please see above for his functioning when he was discharged from LTC in April 2024 (he was dependent with cares)  - SW looking for placement.     Dementia  MDD  Started 30mg duloxetine daily for depression due to being in the hospital and to address pain. On 6/11/2024, he scored 12/30 on a MoCA evaluation and 4/15 memory index score.  Brief Interview of Mental Status (BIMS) 6/15 (13-15 within normal limits) Feb 2024, [employer] " Mental Status (SLUMS) 13/30 (27-30 within normal limits) Aug 2022, Cognitive Performance Test (CPT) 4.5/5.6 Aug 2022.  -- will likely need LTC placement    Chronic pressure ulcerations of bilateral heels  Bilateral DP pulses detected by doppler on 5/31. Right foot XR showed no osseous involvement on 5/31. RLE arterial ultrasound showed patent arteries with some small vessel disease.  --to be followed by in house wound MD if he agrees.   -- For now it appears that the wound needs to be debrided so we will complete the following wound care:  Wash the area with saline and pat dry  Use Skin-Prep from surrounding periwound skin  Apply calcium alginate to the wound bed only  Cover with not Adaptic dressing and wrapped with Kerlix     CKD4  IgA nephropathy  Has required dialysis in the past. Creatinine baseline ~1.6   --Avoid nephrotoxic medications. Recheck BMP periodically    A-fib  Currently rate controlled without meds, HR 60-80s.   --continue with Apixaban 5 mg BID    Chronic anemia of inflammation  Hgb 10-11.  --follow clinically.     Chronic indwelling ferguson catheter 2/2 neurogenic bladder  History of BPH  Chronic Ferguson in place. No s/sx of infection today.   - Ferguson changed 7/24. Will ask staff to change out ferguson catheter today due to sediment in it.     Physical deconditioning  Secondary to recent hospitalization and underlying medical conditions.   --ongoing PT/OT for strengthening.       MED REC REQUIRED  Post Medication Reconciliation Status: discharge medications reconciled, continue medications without change    Electronically signed by: OCTAVIO Harden CNP           Sincerely,        OCTAVIO Harden CNP

## 2024-09-01 ENCOUNTER — TELEPHONE (OUTPATIENT)
Dept: GERIATRICS | Facility: CLINIC | Age: 74
End: 2024-09-01
Payer: MEDICARE

## 2024-09-01 NOTE — TELEPHONE ENCOUNTER
[place] [facility] TELEPHONE ENCOUNTER    Chief Complaint   Patient presents with    Pain       Fer Latham is a 74 year old  (1950),Nurse called today to report: Complaints of more pain to left hip and thigh area today. Unknown of trauma. No recent fall per staff.     ASSESSMENT/PLAN    X ray of left hip and femur    Electronically signed by:   OCTAVIO Becker CNP

## 2024-09-03 NOTE — PROGRESS NOTES
"[facility] GERIATRICS    Chief Complaint   Patient presents with    RECHECK     HPI:  Fer Latham is a 74 year old  (1950), who is being seen today for an episodic care visit at: [facility] [[id number]]. Today's concern is:     Fer was visited today while in his room resting in bed. He is worried about his wife who has an infection but otherwise feeling that things are going well. He does not have complaints of chest pain or shortness of breath.  Has a catheter in which is not bothersome to him, he is unsure of when it was changed last. Having regular bowel movements.     After further review of his history in epic, noted that he had an [facility] stay back in 2022 then went to [facility] transitional care unit for rehab following that.  Does not appear that he was stronger to go home therefore he was in the long-term care facility at [facility] until April 2024 when he discharged home with his wife.  He was unable to care for himself which led to this most recent prolonged hospitalization. Please see below for his functioning when he was discharged from [facility] in April 2024:  Mobility: Non-ambulatory, uses wheelchair with maximum assistance.  ADLs: Upper Extremity Dressing: maximum assistance  Lower Extremity Dressing: dependent  Toileting: dependent  Hygiene and Grooming: dependent  Cognition: Brief Interview of Mental Status (BIMS) 6/15 (13-15 within normal limits) Feb 2024  [employer] Mental Status (UMS) 13/30 (27-30 within normal limits) Aug 2022  Cognitive Performance Test (CPT) 4.5/5.6 Aug 2022    Allergies, and PMH/PSH reviewed in [facility] today.  REVIEW OF SYSTEMS:  4 point ROS including Respiratory, CV, GI and , other than that noted in the HPI,  is negative    Objective:   BP (!) 144/87   Pulse 76   Temp 97.1  F (36.2  C)   Resp 19   Ht 1.88 m (6' 2\")   Wt 99.4 kg (219 lb 1.6 oz)   SpO2 96%   BMI 28.13 kg/m    GENERAL APPEARANCE:  " Alert, in no distress, pleasant, cooperative  EYES: no discharge or mattering on lids or lashes noted  ENT:  moist mucous membranes, hearing acuity intact  NECK: supple, symmetrical  RESP: no respiratory distress, Lung sounds clear, patient is on room air  CV:  rate and rhythm regular, no murmur. Edema none in bilateral lower extremities. VASCULAR: warm extremities without open areas.  ABDOMEN: normal bowel sounds, soft, nontender.  : catheter with mild amount of sediment, cloudy urine.  M/S:   Gait and station: dependent with cares, no tenderness or swelling of the joints; able to move all extremities   SKIN:  Inspection and palpation of skin and subcutaneous tissue: skin warm, dry, UTV wound on RLE  NEURO: no facial asymmetry, no speech deficits and able to follow directions, moves all extremities symmetrically  PSYCH:  insight, judgement, and memory impaired affect and mood normal    Reviewed in epic    Assessment/Plan:  H/o T12 compression fracture  Acute on chronic Back pain and BLE weakness  MRI w/o clear cord compression, however, vertebral edema suggests acute/subacute component. NSGY offered non-emergent posterior spinal fusion in the outpatient setting to prevent further worsening/neurologic sequelae, but patient is currently not interested given his age.  - duloxetine 30 mg daily, lidocaine patch, acetaminophen prn  - Continue PT/OT but suspect that he will not progress well. Please see above for his functioning when he was discharged from LTC in April 2024 (he was dependent with cares)  - SW looking for placement.     Dementia  MDD  Started 30mg duloxetine daily for depression due to being in the hospital and to address pain. On 6/11/2024, he scored 12/30 on a MoCA evaluation and 4/15 memory index score.  Brief Interview of Mental Status (BIMS) 6/15 (13-15 within normal limits) Feb 2024, [employer] Mental Status (UMS) 13/30 (27-30 within normal limits) Aug 2022, Cognitive Performance Test  (CPT) 4.5/5.6 Aug 2022.  -- will likely need LTC placement    Chronic pressure ulcerations of bilateral heels  Bilateral DP pulses detected by doppler on 5/31. Right foot XR showed no osseous involvement on 5/31. RLE arterial ultrasound showed patent arteries with some small vessel disease.  --to be followed by in house wound MD if he agrees.   -- For now it appears that the wound needs to be debrided so we will complete the following wound care:  Wash the area with saline and pat dry  Use Skin-Prep from surrounding periwound skin  Apply calcium alginate to the wound bed only  Cover with not Adaptic dressing and wrapped with Kerlix     CKD4  IgA nephropathy  Has required dialysis in the past. Creatinine baseline ~1.6   --Avoid nephrotoxic medications. Recheck BMP periodically    A-fib  Currently rate controlled without meds, HR 60-80s.   --continue with Apixaban 5 mg BID    Chronic anemia of inflammation  Hgb 10-11.  --follow clinically.     Chronic indwelling ferguson catheter 2/2 neurogenic bladder  History of BPH  Chronic Ferguson in place. No s/sx of infection today.   - Ferguson changed 7/24. Will ask staff to change out ferguson catheter today due to sediment in it.     Physical deconditioning  Secondary to recent hospitalization and underlying medical conditions.   --ongoing PT/OT for strengthening.       MED REC REQUIRED  Post Medication Reconciliation Status: discharge medications reconciled, continue medications without change    Electronically signed by: OCTAVIO Harden CNP

## 2024-09-09 ENCOUNTER — TELEPHONE (OUTPATIENT)
Dept: GERIATRICS | Facility: CLINIC | Age: 74
End: 2024-09-09

## 2024-09-09 ENCOUNTER — TRANSITIONAL CARE UNIT VISIT (OUTPATIENT)
Dept: GERIATRICS | Facility: CLINIC | Age: 74
End: 2024-09-09
Payer: MEDICARE

## 2024-09-09 VITALS
BODY MASS INDEX: 27.53 KG/M2 | WEIGHT: 214.5 LBS | OXYGEN SATURATION: 96 % | RESPIRATION RATE: 18 BRPM | SYSTOLIC BLOOD PRESSURE: 133 MMHG | TEMPERATURE: 97.1 F | DIASTOLIC BLOOD PRESSURE: 81 MMHG | HEART RATE: 70 BPM | HEIGHT: 74 IN

## 2024-09-09 DIAGNOSIS — F33.1 MODERATE EPISODE OF RECURRENT MAJOR DEPRESSIVE DISORDER (H): ICD-10-CM

## 2024-09-09 DIAGNOSIS — L89.610: ICD-10-CM

## 2024-09-09 DIAGNOSIS — Z87.81 HISTORY OF VERTEBRAL FRACTURE: ICD-10-CM

## 2024-09-09 DIAGNOSIS — R53.81 PHYSICAL DECONDITIONING: ICD-10-CM

## 2024-09-09 DIAGNOSIS — I48.91 ATRIAL FIBRILLATION, UNSPECIFIED TYPE (H): ICD-10-CM

## 2024-09-09 DIAGNOSIS — F03.B0 MODERATE DEMENTIA, UNSPECIFIED DEMENTIA TYPE, UNSPECIFIED WHETHER BEHAVIORAL, PSYCHOTIC, OR MOOD DISTURBANCE OR ANXIETY (H): ICD-10-CM

## 2024-09-09 DIAGNOSIS — J18.9 COMMUNITY ACQUIRED PNEUMONIA OF LEFT LUNG, UNSPECIFIED PART OF LUNG: Primary | ICD-10-CM

## 2024-09-09 DIAGNOSIS — Z97.8 CHRONIC INDWELLING FOLEY CATHETER: ICD-10-CM

## 2024-09-09 DIAGNOSIS — N31.9 NEUROGENIC BLADDER: ICD-10-CM

## 2024-09-09 PROCEDURE — 99309 SBSQ NF CARE MODERATE MDM 30: CPT | Performed by: NURSE PRACTITIONER

## 2024-09-09 RX ORDER — ACETAMINOPHEN 325 MG/1
650 TABLET ORAL 3 TIMES DAILY
COMMUNITY

## 2024-09-09 NOTE — PROGRESS NOTES
[facility] GERIATRICS    PRIMARY CARE PROVIDER AND CLINIC:  Isrrael Ornelas MD, [address] [facility] [id number]  Chief Complaint   Patient presents with    Hospital F/U      [facility] Medical Record Number:  [id number]  Place of Service where encounter took place:  [facility] (TCU) [[id number]]    Fer Latham was hospitalized at [facility] 9/3/2024 through 9/6/2024 after being sent in from the TCU with complaints of chest pain. Found to have A fib with RVR which resolved on its own in the ED but also found to have L sided pulmonary opacities which was treated with IV abx then narrowed to cefuroxime for discharge. He did not allow for further blood work or ferguson catheter change out.      Fer was visited today while in his room resting in bed watching the television.  States that he no longer has the weird feeling in his chest.  No complaints of chest pain or shortness of breath.  The Ferguson catheter is in place and is not bothersome to him.  He has the wound to his right heel which can be tender at times if it is touched but otherwise it is not bothersome to him.  Appetite is fair but he does not like the food here at the facility.  Sleeping well at nighttime.    Staff does not have concerns at this time.  Per social work, there has been a long-term care bed secured for him here at [facility].    CODE STATUS/ADVANCE DIRECTIVES DISCUSSION:  Prior  CPR/Full code   ALLERGIES:   Allergies   Allergen Reactions    Iodinated Contrast Media Unknown     Other reaction(s): Unknown    Sulfa Antibiotics Hives and Rash     Other reaction(s): Unknown    PN: LW Reaction: Rash, Generalized    Ciprofloxacin Unknown      PAST MEDICAL HISTORY:   Past Medical History:   Diagnosis Date    Anemia     Arrhythmia     atrial fib    Arthritis 1990's    gout    BPH (benign prostatic hyperplasia)     Current moderate episode of major depressive disorder without prior episode (H) 4/30/2019    Depression     Diabetes  mellitus (H)     Type 2  IDDM    Diverticulosis     ESRD (end stage renal disease) on dialysis (H)     Hematuria     HTN (hypertension)     HTN (hypertension)     IgA nephropathy     MI, old     Pneumonia     Rhabdomyolysis     Weakness 5/10/2019      PAST SURGICAL HISTORY:   has a past surgical history that includes appendectomy (1970's); Abdomen surgery; biopsy (2017); vascular surgery; Coronary Angiography Adult Order (2018); colonoscopy (2013); Eye surgery (Bilateral, 2004); Arthroscopy knee (6/27/2013); back surgery (1999); IR CVC Tunnel Placement > 5 Yrs of Age (11/26/2019); IR CVC Tunnel Removal Right (11/26/2019); IR CVC Tunnel Revision Right (2/10/2020); IR CVC Tunnel Revision Right (10/8/2020); IR CVC Tunnel Revision Right (6/17/2021); and IR CVC Tunnel Removal Right (2/22/2022).  FAMILY HISTORY: family history includes Cancer in his father.  SOCIAL HISTORY:   reports that he has quit smoking. He has never used smokeless tobacco. He reports current alcohol use. He reports that he does not use drugs.  Patient's living condition: lives in a nursing home    Post Discharge Medication Reconciliation Status:   MED REC REQUIRED  Post Medication Reconciliation Status: discharge medications reconciled, continue medications without change       Current Outpatient Medications   Medication Sig Dispense Refill    apixaban ANTICOAGULANT (ELIQUIS) 5 MG tablet Take 1 tablet (5 mg) by mouth 2 times daily      bisacodyl (DULCOLAX) 10 MG suppository Place 1 suppository (10 mg) rectally daily as needed for constipation      DULoxetine (CYMBALTA) 30 MG capsule Take 1 capsule (30 mg) by mouth 2 times daily      folic acid (FOLVITE) 1 MG tablet Take 1 tablet (1 mg) by mouth daily      miconazole (MICATIN) 2 % external powder Apply topically as needed for itching or other (irritation of perineum) 43 g 0    multivitamin, therapeutic (THERA-VIT) TABS tablet Take 1 tablet by mouth daily 30 tablet 0    nystatin (MYCOSTATIN) 201167  "UNIT/GM external powder Apply topically 2 times daily 15 g 0    polyethylene glycol (MIRALAX) 17 GM/Dose powder Take 8.5 g by mouth daily as needed       senna (SENOKOT) 8.6 MG tablet Take 17.2 mg by mouth 2 times daily as needed      Vitamin D3 (CHOLECALCIFEROL) 125 MCG (5000 UT) tablet Take 1 tablet by mouth daily       No current facility-administered medications for this visit.       ROS:  10 point ROS of systems including Constitutional, Eyes, Respiratory, Cardiovascular, Gastroenterology, Genitourinary, Integumentary, Musculoskeletal, Psychiatric were all negative except for pertinent positives noted in my HPI.    Vitals:  /81   Pulse 70   Temp 97.1  F (36.2  C)   Resp 18   Ht 1.88 m (6' 2\")   Wt 97.3 kg (214 lb 8 oz)   SpO2 96%   BMI 27.54 kg/m    Exam:  GENERAL APPEARANCE:  Alert, in no distress, pleasant, cooperative  EYES: no discharge or mattering on lids or lashes noted  ENT:  moist mucous membranes, hearing acuity intact  NECK: supple, symmetrical  RESP: no respiratory distress, Lung sounds clear, patient is on room air  CV:  rate and rhythm regular, no murmur. Edema none in bilateral lower extremities. VASCULAR: warm extremities without open areas.  ABDOMEN: normal bowel sounds, soft, nontender.  : catheter with mild amount of sediment in urine otherwise clear yellow urine in bag.   M/S:   Gait and station: dependent with cares, no tenderness or swelling of the joints; able to move all extremities   SKIN:  Inspection and palpation of skin and subcutaneous tissue: skin warm, dry, wound on right heel with 50% eschar, no surrounding redness  NEURO: no facial asymmetry, no speech deficits and able to follow directions, moves all extremities symmetrically  PSYCH:  insight, judgement, and memory impaired affect and mood normal    Lab/Diagnostic data  Recent labs in [facility] reviewed by me today.     ASSESSMENT/PLAN:  CAP  Left opacitites. Has since completed abx. Clear lung sounds.  Oxygen " saturations have been 95 to 96%.  -- Continue to monitor respiratory status    H/o T12 compression fracture  Acute on chronic Back pain and BLE weakness  MRI w/o clear cord compression, however, vertebral edema suggests acute/subacute component. NSGY offered non-emergent posterior spinal fusion in the outpatient setting to prevent further worsening/neurologic sequelae, but patient is currently not interested given his age.  - duloxetine 30 mg daily, lidocaine patch, acetaminophen prn  - Continue PT/OT but suspect that he will not progress well. Please see above for his functioning when he was discharged from LTC in April 2024 (he was dependent with cares)    Dementia  MDD  Started 30mg duloxetine daily for depression due to being in the hospital and to address pain. On 6/11/2024, he scored 12/30 on a MoCA evaluation and 4/15 memory index score.  Brief Interview of Mental Status (BIMS) 6/15 (13-15 within normal limits) Feb 2024, [employer] Mental Status (UMS) 13/30 (27-30 within normal limits) Aug 2022, Cognitive Performance Test (CPT) 4.5/5.6 Aug 2022.  -- will likely need LTC placement    Chronic pressure ulcerations of bilateral heels  Bilateral DP pulses detected by doppler on 5/31. Right foot XR showed no osseous involvement on 5/31. RLE arterial ultrasound showed patent arteries with some small vessel disease.  --to be followed by in house wound MD if he agrees.   -- For now it appears that the wound needs to be debrided so we will complete the following wound care:  Wash the area with saline and pat dry  Use Skin-Prep from surrounding periwound skin  Apply calcium alginate to the wound bed only  Cover with not Adaptic dressing and wrapped with Kerlix     CKD4  IgA nephropathy  Has required dialysis in the past. Creatinine baseline ~1.6, was up to 2.4 in the ED, trended downward to 2.2 then declined further labs.   --Avoid nephrotoxic medications. Recheck BMP  this week here at the TCU if he  allows.     A-fib  Currently rate controlled without meds, had RVR while in the ED likely due to CAP. HR 60-80s.   --continue with Apixaban 5 mg BID    Chronic anemia of inflammation  Hgb 10-11.  --follow clinically.     Chronic indwelling ferguson catheter 2/2 neurogenic bladder  History of BPH  Chronic Ferguson in place. No s/sx of infection today.   - Ferguson changed 8/25.     Physical deconditioning  Secondary to recent hospitalization and underlying medical conditions.   --ongoing PT/OT for strengthening.     Total time spent with patient visit was 40 minutes including patient visit and review of past records. Greater than 50% of total time spent with counseling and coordinating care due to completed wound care with bedside nurse, me acquired pneumonia, Ferguson catheter, deconditioning, discharge planning..       Electronically signed by:  OCTAVIO Harden CNP

## 2024-09-09 NOTE — TELEPHONE ENCOUNTER
[facility] Geriatrics Triage Nurse Telephone Encounter    Provider: OCTAVIO Patel CNP   Facility: [facility]  Facility Type:  TCU    Caller: Jakub    Allergies:    Allergies   Allergen Reactions    Iodinated Contrast Media Unknown     Other reaction(s): Unknown    Sulfa Antibiotics Hives and Rash     Other reaction(s): Unknown    PN: LW Reaction: Rash, Generalized    Ciprofloxacin Unknown        Reason for call: Nurse manager would like to have scheduled Tylenol orders due to c/o mild back pain and not utilizing PRN Tylenol 650mg BID.    Verbal Order/Direction given by Provider:   - discontinue PRN Tylenol  - Tylenol 650mg PO TID    Provider giving Order:  OCTAVIO Patel CNP     Verbal Order given to: Jakub Lockett RN

## 2024-09-09 NOTE — LETTER
9/9/2024      Fer Latham  [address]  [facility] [address]        [facility] GERIATRICS    PRIMARY CARE PROVIDER AND CLINIC:  Isrrael Ornelas MD, [address] / [facility] [id number]  Chief Complaint   Patient presents with     Hospital F/U      [facility] Medical Record Number:  [id number]  Place of Service where encounter took place:  [facility] (TCU) [[id number]]    Fer Latham [place] [facility] [place] 9/6/2024 after being sent in from the TCU with complaints of chest pain. Found to have A fib with RVR which resolved on its own in the ED but also found to have L sided pulmonary opacities which was treated with IV abx then narrowed to cefuroxime for discharge. He did not allow for further blood work or ferguson catheter change out.      Fer was visited today while in his room resting in bed watching the television.  States that he no longer has the weird feeling in his chest.  No complaints of chest pain or shortness of breath.  The Ferguson catheter is in place and is not bothersome to him.  He has the wound to his right heel which can be tender at times if it is touched but otherwise it is not bothersome to him.  Appetite is fair but he does not like the food here at the facility.  Sleeping well at nighttime.    Staff does not have concerns at this time.  Per social work, there has been a long-term care bed secured for him here at [facility].    CODE STATUS/ADVANCE DIRECTIVES DISCUSSION:  Prior  CPR/Full code   ALLERGIES:   Allergies   Allergen Reactions     Iodinated Contrast Media Unknown     Other reaction(s): Unknown     Sulfa Antibiotics Hives and Rash     Other reaction(s): Unknown    PN: LW Reaction: Rash, Generalized     Ciprofloxacin Unknown      PAST MEDICAL HISTORY:   Past Medical History:   Diagnosis Date     Anemia      Arrhythmia     atrial fib     Arthritis 1990's    gout     BPH (benign prostatic hyperplasia)      Current moderate  episode of major depressive disorder without prior episode (H) 4/30/2019     Depression      Diabetes mellitus (H)     Type 2  IDDM     Diverticulosis      ESRD (end stage renal disease) on dialysis (H)      Hematuria      HTN (hypertension)      HTN (hypertension)      IgA nephropathy      MI, old      Pneumonia      Rhabdomyolysis      Weakness 5/10/2019      PAST SURGICAL HISTORY:   has a past surgical history that includes appendectomy (1970's); Abdomen surgery; biopsy (2017); vascular surgery; Coronary Angiography Adult Order (2018); colonoscopy (2013); Eye surgery (Bilateral, 2004); Arthroscopy knee (6/27/2013); back surgery (1999); IR CVC Tunnel Placement > 5 Yrs of Age (11/26/2019); IR CVC Tunnel Removal Right (11/26/2019); IR CVC Tunnel Revision Right (2/10/2020); IR CVC Tunnel Revision Right (10/8/2020); IR CVC Tunnel Revision Right (6/17/2021); and IR CVC Tunnel Removal Right (2/22/2022).  FAMILY HISTORY: family history includes Cancer in his father.  SOCIAL HISTORY:   reports that he has quit smoking. He has never used smokeless tobacco. He reports current alcohol use. He reports that he does not use drugs.  Patient's living condition: lives in a nursing home    Post Discharge Medication Reconciliation Status:   MED REC REQUIRED  Post Medication Reconciliation Status: discharge medications reconciled, continue medications without change       Current Outpatient Medications   Medication Sig Dispense Refill     apixaban ANTICOAGULANT (ELIQUIS) 5 MG tablet Take 1 tablet (5 mg) by mouth 2 times daily       bisacodyl (DULCOLAX) 10 MG suppository Place 1 suppository (10 mg) rectally daily as needed for constipation       DULoxetine (CYMBALTA) 30 MG capsule Take 1 capsule (30 mg) by mouth 2 times daily       folic acid (FOLVITE) 1 MG tablet Take 1 tablet (1 mg) by mouth daily       miconazole (MICATIN) 2 % external powder Apply topically as needed for itching or other (irritation of perineum) 43 g 0      "multivitamin, therapeutic (THERA-VIT) TABS tablet Take 1 tablet by mouth daily 30 tablet 0     nystatin (MYCOSTATIN) 071810 UNIT/GM external powder Apply topically 2 times daily 15 g 0     polyethylene glycol (MIRALAX) 17 GM/Dose powder Take 8.5 g by mouth daily as needed        senna (SENOKOT) 8.6 MG tablet Take 17.2 mg by mouth 2 times daily as needed       Vitamin D3 (CHOLECALCIFEROL) 125 MCG (5000 UT) tablet Take 1 tablet by mouth daily       No current facility-administered medications for this visit.       ROS:  10 point ROS of systems including Constitutional, Eyes, Respiratory, Cardiovascular, Gastroenterology, Genitourinary, Integumentary, Musculoskeletal, Psychiatric were all negative except for pertinent positives noted in my HPI.    Vitals:  /81   Pulse 70   Temp 97.1  F (36.2  C)   Resp 18   Ht 1.88 m (6' 2\")   Wt 97.3 kg (214 lb 8 oz)   SpO2 96%   BMI 27.54 kg/m    Exam:  GENERAL APPEARANCE:  Alert, in no distress, pleasant, cooperative  EYES: no discharge or mattering on lids or lashes noted  ENT:  moist mucous membranes, hearing acuity intact  NECK: supple, symmetrical  RESP: no respiratory distress, Lung sounds clear, patient is on room air  CV:  rate and rhythm regular, no murmur. Edema none in bilateral lower extremities. VASCULAR: warm extremities without open areas.  ABDOMEN: normal bowel sounds, soft, nontender.  : catheter with mild amount of sediment in urine otherwise clear yellow urine in bag.   M/S:   Gait and station: dependent with cares, no tenderness or swelling of the joints; able to move all extremities   SKIN:  Inspection and palpation of skin and subcutaneous tissue: skin warm, dry, wound on right heel with 50% eschar, no surrounding redness  NEURO: no facial asymmetry, no speech deficits and able to follow directions, moves all extremities symmetrically  PSYCH:  insight, judgement, and memory impaired affect and mood normal    Lab/Diagnostic data  Recent labs in " EPIC reviewed by me today.     ASSESSMENT/PLAN:  CAP  Left opacitites. Has since completed abx. Clear lung sounds.  Oxygen saturations have been 95 to 96%.  -- Continue to monitor respiratory status    H/o T12 compression fracture  Acute on chronic Back pain and BLE weakness  MRI w/o clear cord compression, however, vertebral edema suggests acute/subacute component. NSGY offered non-emergent posterior spinal fusion in the outpatient setting to prevent further worsening/neurologic sequelae, but patient is currently not interested given his age.  - duloxetine 30 mg daily, lidocaine patch, acetaminophen prn  - Continue PT/OT but suspect that he will not progress well. Please see above for his functioning when he was discharged from LTC in April 2024 (he was dependent with cares)    Dementia  MDD  Started 30mg duloxetine daily for depression due to being in the hospital and to address pain. On 6/11/2024, he scored 12/30 on a MoCA evaluation and 4/15 memory index score.  Brief Interview of Mental Status (BIMS) 6/15 (13-15 within normal limits) Feb 2024, [employer] Mental Status (UMS) 13/30 (27-30 within normal limits) Aug 2022, Cognitive Performance Test (CPT) 4.5/5.6 Aug 2022.  -- will likely need LTC placement    Chronic pressure ulcerations of bilateral heels  Bilateral DP pulses detected by doppler on 5/31. Right foot XR showed no osseous involvement on 5/31. RLE arterial ultrasound showed patent arteries with some small vessel disease.  --to be followed by in house wound MD if he agrees.   -- For now it appears that the wound needs to be debrided so we will complete the following wound care:  Wash the area with saline and pat dry  Use Skin-Prep from surrounding periwound skin  Apply calcium alginate to the wound bed only  Cover with not Adaptic dressing and wrapped with Kerlix     CKD4  IgA nephropathy  Has required dialysis in the past. Creatinine baseline ~1.6, was up to 2.4 in the ED, trended  downward to 2.2 then declined further labs.   --Avoid nephrotoxic medications. Recheck BMP  this week here at the TCU if he allows.     A-fib  Currently rate controlled without meds, had RVR while in the ED likely due to CAP. HR 60-80s.   --continue with Apixaban 5 mg BID    Chronic anemia of inflammation  Hgb 10-11.  --follow clinically.     Chronic indwelling ferguson catheter 2/2 neurogenic bladder  History of BPH  Chronic Ferguson in place. No s/sx of infection today.   - Ferguson changed 8/25.     Physical deconditioning  Secondary to recent hospitalization and underlying medical conditions.   --ongoing PT/OT for strengthening.     Total time spent with patient visit was 40 minutes including patient visit and review of past records. Greater than 50% of total time spent with counseling and coordinating care due to completed wound care with bedside nurse, me acquired pneumonia, Ferguson catheter, deconditioning, discharge planning..       Electronically signed by:  OCTAVIO Harden CNP                   Sincerely,        OCTAVIO Harden CNP

## 2024-09-10 ENCOUNTER — LAB REQUISITION (OUTPATIENT)
Dept: LAB | Facility: CLINIC | Age: 74
End: 2024-09-10
Payer: MEDICARE

## 2024-09-10 DIAGNOSIS — N39.0 URINARY TRACT INFECTION, SITE NOT SPECIFIED: ICD-10-CM

## 2024-09-10 DIAGNOSIS — R53.1 WEAKNESS: ICD-10-CM

## 2024-09-29 ENCOUNTER — DOCUMENTATION ONLY (OUTPATIENT)
Dept: GERIATRICS | Facility: CLINIC | Age: 74
End: 2024-09-29
Payer: MEDICARE

## 2024-10-03 ENCOUNTER — TELEPHONE (OUTPATIENT)
Dept: GERIATRICS | Facility: CLINIC | Age: 74
End: 2024-10-03
Payer: MEDICARE

## 2024-10-03 NOTE — TELEPHONE ENCOUNTER
[facility] Triage Nurse Telephone Encounter    Provider: OCTAVIO Patel CNP   Facility: [facility]  Facility Type:  LT    Caller: Nicol     Allergies:    Allergies   Allergen Reactions    Iodinated Contrast Media Unknown     Other reaction(s): Unknown    Sulfa Antibiotics Hives and Rash     Other reaction(s): Unknown    PN: LW Reaction: Rash, Generalized    Ciprofloxacin Unknown        Reason for call: Today the nurse is calling to report that the patient has a red yeasty groin area and the nurse is requesting nystatin powder.     MCS/FGS STANDING ORDER GIVEN:  Wash and dry the groin BID and apply nystatin powder BID until resolved.     Provider giving Order:  OCTAVIO Patel CNP     Verbal Order given to: Nicol Sofia RN

## 2024-10-19 ENCOUNTER — HEALTH MAINTENANCE LETTER (OUTPATIENT)
Age: 74
End: 2024-10-19

## 2024-10-21 ENCOUNTER — NURSING HOME VISIT (OUTPATIENT)
Dept: GERIATRICS | Facility: CLINIC | Age: 74
End: 2024-10-21
Payer: MEDICARE

## 2024-10-21 VITALS
HEIGHT: 74 IN | RESPIRATION RATE: 18 BRPM | BODY MASS INDEX: 26.31 KG/M2 | OXYGEN SATURATION: 93 % | SYSTOLIC BLOOD PRESSURE: 139 MMHG | TEMPERATURE: 97.9 F | HEART RATE: 80 BPM | DIASTOLIC BLOOD PRESSURE: 79 MMHG | WEIGHT: 205 LBS

## 2024-10-21 DIAGNOSIS — Z87.81 HISTORY OF VERTEBRAL FRACTURE: ICD-10-CM

## 2024-10-21 DIAGNOSIS — F03.B0 MODERATE DEMENTIA, UNSPECIFIED DEMENTIA TYPE, UNSPECIFIED WHETHER BEHAVIORAL, PSYCHOTIC, OR MOOD DISTURBANCE OR ANXIETY (H): ICD-10-CM

## 2024-10-21 DIAGNOSIS — N31.9 NEUROGENIC BLADDER: ICD-10-CM

## 2024-10-21 DIAGNOSIS — L89.620 PRESSURE SORE ON HEEL, LEFT, UNSTAGEABLE (H): ICD-10-CM

## 2024-10-21 DIAGNOSIS — I48.91 ATRIAL FIBRILLATION, UNSPECIFIED TYPE (H): ICD-10-CM

## 2024-10-21 DIAGNOSIS — J18.9 COMMUNITY ACQUIRED PNEUMONIA OF LEFT LUNG, UNSPECIFIED PART OF LUNG: Primary | ICD-10-CM

## 2024-10-21 PROCEDURE — 99309 SBSQ NF CARE MODERATE MDM 30: CPT | Performed by: INTERNAL MEDICINE

## 2024-10-21 NOTE — LETTER
10/21/2024      Fer Latham  [address]  [facility] [address]        [facility] GERIATRICS  Chief Complaint   Patient presents with     group home Regulatory     [place] Medical Record Number:  [id number]  Place of Service where encounter took place:  [facility] ([facility])     HPI:    Fer Latham  is 74 year old (1950), who is being seen today for a federally mandated E/M visit.     Course was reviewed in [facility]    Pt has moved to [facility] following TCU stay for acute T12 fracture, chronic back pain, chronic LE wounds, chronic Brown catheter, cognitive impairment.    During TCU stay, in early September, he was hospitalized for CAP and afib with RVR.    Patient states he is comfortable, feels well overall.  He denies pain today.  He states he spends most of the day in bed but does get into his wheelchair at times.  Denies cough, chest pain, shortness of breath fevers, chills      ALLERGIES:Iodinated contrast media, Sulfa antibiotics, and Ciprofloxacin  PAST MEDICAL HISTORY:   Past Medical History:   Diagnosis Date     Anemia      Arrhythmia     atrial fib     Arthritis 1990's    gout     BPH (benign prostatic hyperplasia)      Current moderate episode of major depressive disorder without prior episode (H) 4/30/2019     Depression      Diabetes mellitus (H)     Type 2  IDDM     Diverticulosis      ESRD (end stage renal disease) on dialysis (H)      Hematuria      HTN (hypertension)      HTN (hypertension)      IgA nephropathy      MI, old      Pneumonia      Rhabdomyolysis      Weakness 5/10/2019     PAST SURGICAL HISTORY:   has a past surgical history that includes appendectomy (1970's); Abdomen surgery; biopsy (2017); vascular surgery; Coronary Angiography Adult Order (2018); colonoscopy (2013); Eye surgery (Bilateral, 2004); Arthroscopy knee (6/27/2013); back surgery (1999); IR CVC Tunnel Placement > 5 Yrs of Age (11/26/2019); IR CVC Tunnel Removal Right (11/26/2019); IR  CVC Tunnel Revision Right (2/10/2020); IR CVC Tunnel Revision Right (10/8/2020); IR CVC Tunnel Revision Right (6/17/2021); and IR CVC Tunnel Removal Right (2/22/2022).  FAMILY HISTORY: family history includes Cancer in his father.  SOCIAL HISTORY:  reports that he has quit smoking. He has never used smokeless tobacco. He reports current alcohol use. He reports that he does not use drugs.    Medications reviewed by me today             Dose / Directions   acetaminophen 325 MG tablet  Commonly known as: TYLENOL      Dose: 650 mg  Take 650 mg by mouth 3 times daily.  Refills: 0     apixaban ANTICOAGULANT 5 MG tablet  Commonly known as: ELIQUIS      Dose: 5 mg  Take 1 tablet (5 mg) by mouth 2 times daily  Refills: 0     bisacodyl 10 MG suppository  Commonly known as: DULCOLAX  Used for: Chronic idiopathic constipation      Dose: 10 mg  Place 1 suppository (10 mg) rectally daily as needed for constipation  Quantity:    Refills: 0     DULoxetine 30 MG capsule  Commonly known as: CYMBALTA      Dose: 30 mg  Take 1 capsule (30 mg) by mouth 2 times daily  Refills: 0     folic acid 1 MG tablet  Commonly known as: FOLVITE  Used for: Weakness      Dose: 1 mg  Take 1 tablet (1 mg) by mouth daily  Quantity:    Refills: 0     miconazole 2 % external powder  Commonly known as: MICATIN      Apply topically as needed for itching or other (irritation of perineum)  Quantity: 43 g  Refills: 0     multivitamin, therapeutic Tabs tablet  Used for: Moderate malnutrition (H)      Dose: 1 tablet  Take 1 tablet by mouth daily  Quantity: 30 tablet  Refills: 0     nystatin 815567 UNIT/GM external powder  Commonly known as: MYCOSTATIN      Apply topically 2 times daily  Quantity: 15 g  Refills: 0     polyethylene glycol 17 GM/Dose powder  Commonly known as: MIRALAX      Dose: 8.5 g  Take 8.5 g by mouth daily as needed  Refills: 0     senna 8.6 MG tablet  Commonly known as: SENOKOT      Dose: 17.2 mg  Take 17.2 mg by mouth 2 times daily as  "needed  Refills: 0     Vitamin D3 125 MCG (5000 UT) tablet  Commonly known as: CHOLECALCIFEROL      Dose: 1 tablet  Take 1 tablet by mouth daily  Refills: 0            Vitals:  /79   Pulse 80   Temp 97.9  F (36.6  C)   Resp 18   Ht 1.88 m (6' 2\")   Wt 93 kg (205 lb)   SpO2 93%   BMI 26.32 kg/m    Body mass index is 26.32 kg/m .  Exam:  Pleasant male, lying in bed  Appears comfortable  HEENT: Oral mucosa moist  Lungs clear  CV irregular, heart rate 70s  Abdomen soft  Extremities: Right ankle wrapped in plastic as patient just had a bath  No open area seen on left ankle: Trace ankle edema bilaterally          ASSESSMENT/PLAN      Recent hospitalization for probable pneumonia  Clinically resolved  No current respiratory complaints.  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    History of chronic kidney disease stage IV related to IgA nephropathy, previously on dialysis  Plan: Monitor renal function, avoid nephrotoxic medications    Recent T12 fracture  Chronic back pain, appears stable  Continue duloxetine  If increased pain, consider referral back to neurosurgery    Neurogenic bladder with chronic Brown catheter in place  Plan: Routine catheter cares as patient permits    Chronic immobility with bilateral lower extremity ulcers, related to chronic pain, chronic medical illness, small vessel arterial disease likely, as noted on recent arterial Doppler  Plan: Continue wound cares.  Continue pressure-relief        Emiliano Gage MD      Sincerely,        Emiliano Gage MD      "

## 2024-10-21 NOTE — PROGRESS NOTES
[facility] GERIATRICS  Chief Complaint   Patient presents with    group home Regulatory     [place] Medical Record Number:  [id number]  Place of Service where encounter took place:  [facility] ([facility])     HPI:    Fer Latham  is 74 year old (1950), who is being seen today for a federally mandated E/M visit.     Course was reviewed in Epic    Pt has moved to [facility] following TCU stay for acute T12 fracture, chronic back pain, chronic LE wounds, chronic Brown catheter, cognitive impairment.    During TCU stay, in early September, he was hospitalized for CAP and afib with RVR.    Patient states he is comfortable, feels well overall.  He denies pain today.  He states he spends most of the day in bed but does get into his wheelchair at times.  Denies cough, chest pain, shortness of breath fevers, chills      ALLERGIES:Iodinated contrast media, Sulfa antibiotics, and Ciprofloxacin  PAST MEDICAL HISTORY:   Past Medical History:   Diagnosis Date    Anemia     Arrhythmia     atrial fib    Arthritis 1990's    gout    BPH (benign prostatic hyperplasia)     Current moderate episode of major depressive disorder without prior episode (H) 4/30/2019    Depression     Diabetes mellitus (H)     Type 2  IDDM    Diverticulosis     ESRD (end stage renal disease) on dialysis (H)     Hematuria     HTN (hypertension)     HTN (hypertension)     IgA nephropathy     MI, old     Pneumonia     Rhabdomyolysis     Weakness 5/10/2019     PAST SURGICAL HISTORY:   has a past surgical history that includes appendectomy (1970's); Abdomen surgery; biopsy (2017); vascular surgery; Coronary Angiography Adult Order (2018); colonoscopy (2013); Eye surgery (Bilateral, 2004); Arthroscopy knee (6/27/2013); back surgery (1999); IR CVC Tunnel Placement > 5 Yrs of Age (11/26/2019); IR CVC Tunnel Removal Right (11/26/2019); IR CVC Tunnel Revision Right (2/10/2020); IR CVC Tunnel Revision Right (10/8/2020); IR CVC Tunnel Revision Right  (6/17/2021); and IR CVC Tunnel Removal Right (2/22/2022).  FAMILY HISTORY: family history includes Cancer in his father.  SOCIAL HISTORY:  reports that he has quit smoking. He has never used smokeless tobacco. He reports current alcohol use. He reports that he does not use drugs.    Medications reviewed by me today             Dose / Directions   acetaminophen 325 MG tablet  Commonly known as: TYLENOL      Dose: 650 mg  Take 650 mg by mouth 3 times daily.  Refills: 0     apixaban ANTICOAGULANT 5 MG tablet  Commonly known as: ELIQUIS      Dose: 5 mg  Take 1 tablet (5 mg) by mouth 2 times daily  Refills: 0     bisacodyl 10 MG suppository  Commonly known as: DULCOLAX  Used for: Chronic idiopathic constipation      Dose: 10 mg  Place 1 suppository (10 mg) rectally daily as needed for constipation  Quantity:    Refills: 0     DULoxetine 30 MG capsule  Commonly known as: CYMBALTA      Dose: 30 mg  Take 1 capsule (30 mg) by mouth 2 times daily  Refills: 0     folic acid 1 MG tablet  Commonly known as: FOLVITE  Used for: Weakness      Dose: 1 mg  Take 1 tablet (1 mg) by mouth daily  Quantity:    Refills: 0     miconazole 2 % external powder  Commonly known as: MICATIN      Apply topically as needed for itching or other (irritation of perineum)  Quantity: 43 g  Refills: 0     multivitamin, therapeutic Tabs tablet  Used for: Moderate malnutrition (H)      Dose: 1 tablet  Take 1 tablet by mouth daily  Quantity: 30 tablet  Refills: 0     nystatin 506023 UNIT/GM external powder  Commonly known as: MYCOSTATIN      Apply topically 2 times daily  Quantity: 15 g  Refills: 0     polyethylene glycol 17 GM/Dose powder  Commonly known as: MIRALAX      Dose: 8.5 g  Take 8.5 g by mouth daily as needed  Refills: 0     senna 8.6 MG tablet  Commonly known as: SENOKOT      Dose: 17.2 mg  Take 17.2 mg by mouth 2 times daily as needed  Refills: 0     Vitamin D3 125 MCG (5000 UT) tablet  Commonly known as: CHOLECALCIFEROL      Dose: 1  "tablet  Take 1 tablet by mouth daily  Refills: 0            Vitals:  /79   Pulse 80   Temp 97.9  F (36.6  C)   Resp 18   Ht 1.88 m (6' 2\")   Wt 93 kg (205 lb)   SpO2 93%   BMI 26.32 kg/m    Body mass index is 26.32 kg/m .  Exam:  Pleasant male, lying in bed  Appears comfortable  HEENT: Oral mucosa moist  Lungs clear  CV irregular, heart rate 70s  Abdomen soft  Extremities: Right ankle wrapped in plastic as patient just had a bath  No open area seen on left ankle: Trace ankle edema bilaterally          ASSESSMENT/PLAN      Recent hospitalization for probable pneumonia  Clinically resolved  No current respiratory complaints.  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    History of chronic kidney disease stage IV related to IgA nephropathy, previously on dialysis  Plan: Monitor renal function, avoid nephrotoxic medications    Recent T12 fracture  Chronic back pain, appears stable  Continue duloxetine  If increased pain, consider referral back to neurosurgery    Neurogenic bladder with chronic Brown catheter in place  Plan: Routine catheter cares as patient permits    Chronic immobility with bilateral lower extremity ulcers, related to chronic pain, chronic medical illness, small vessel arterial disease likely, as noted on recent arterial Doppler  Plan: Continue wound cares.  Continue pressure-relief        Emiliano Gage MD    "

## 2024-10-24 ENCOUNTER — TELEPHONE (OUTPATIENT)
Dept: GERIATRICS | Facility: CLINIC | Age: 74
End: 2024-10-24
Payer: MEDICARE

## 2024-10-24 NOTE — TELEPHONE ENCOUNTER
"[facility]    Provider: OCTAVIO Patel CNP   Facility: [facility]  Facility Type:  LTC    Caller: Cherry   Call Back Number: [phone]    Allergies:    Allergies   Allergen Reactions    Iodinated Contrast Media Unknown     Other reaction(s): Unknown    Sulfa Antibiotics Hives and Rash     Other reaction(s): Unknown    PN: LW Reaction: Rash, Generalized    Ciprofloxacin Unknown        SBAR:     S-(situation): Patient is c/o of itching \"all over\" especially hands, back and legs.     B-(background): Patient has a hx of not wanting to be bathed, the patients first shower was on Monday. House lotion is applied daily.     A-(assessment): No rash noted, some redness from itching the hands, back and legs. Dry skin is noted.     R-(recommendations): Nursing requesting something strong for the dry and itchy skin, possibly sarna anti itch lotion?       Telephone encounter sent to:  OCTAVIO Patel CNP     Please send response/orders to \"Geriatrics Nurse Pool\"    Hermelinda Sofia RN      "

## 2024-10-24 NOTE — TELEPHONE ENCOUNTER
ORDERS GIVEN:   - Sarna anti-itch lotion bid prn for pruitis     Provider giving order: OCTAVIO Patel CNP     Order given to: Marixa Lockett RN

## 2024-11-07 ENCOUNTER — NURSING HOME VISIT (OUTPATIENT)
Dept: GERIATRICS | Facility: CLINIC | Age: 74
End: 2024-11-07
Payer: MEDICARE

## 2024-11-07 VITALS
HEART RATE: 86 BPM | OXYGEN SATURATION: 98 % | SYSTOLIC BLOOD PRESSURE: 115 MMHG | BODY MASS INDEX: 26.45 KG/M2 | WEIGHT: 206 LBS | DIASTOLIC BLOOD PRESSURE: 72 MMHG | TEMPERATURE: 97.6 F | RESPIRATION RATE: 18 BRPM

## 2024-11-07 DIAGNOSIS — G30.9 ALZHEIMER'S DEMENTIA, UNSPECIFIED DEMENTIA SEVERITY, UNSPECIFIED TIMING OF DEMENTIA ONSET, UNSPECIFIED WHETHER BEHAVIORAL, PSYCHOTIC, OR MOOD DISTURBANCE OR ANXIETY (H): Primary | ICD-10-CM

## 2024-11-07 DIAGNOSIS — N18.32 STAGE 3B CHRONIC KIDNEY DISEASE (H): ICD-10-CM

## 2024-11-07 DIAGNOSIS — M54.50 ACUTE MIDLINE LOW BACK PAIN WITHOUT SCIATICA: ICD-10-CM

## 2024-11-07 DIAGNOSIS — I10 ESSENTIAL HYPERTENSION: ICD-10-CM

## 2024-11-07 DIAGNOSIS — L29.9 ITCHING: ICD-10-CM

## 2024-11-07 DIAGNOSIS — R53.81 PHYSICAL DECONDITIONING: ICD-10-CM

## 2024-11-07 DIAGNOSIS — N02.B9 IGA NEPHROPATHY: ICD-10-CM

## 2024-11-07 DIAGNOSIS — F33.1 MODERATE EPISODE OF RECURRENT MAJOR DEPRESSIVE DISORDER (H): ICD-10-CM

## 2024-11-07 DIAGNOSIS — F10.91 ALCOHOL USE DISORDER IN REMISSION: ICD-10-CM

## 2024-11-07 DIAGNOSIS — F03.B0 MODERATE DEMENTIA, UNSPECIFIED DEMENTIA TYPE, UNSPECIFIED WHETHER BEHAVIORAL, PSYCHOTIC, OR MOOD DISTURBANCE OR ANXIETY (H): Primary | ICD-10-CM

## 2024-11-07 DIAGNOSIS — E11.29 TYPE 2 DIABETES MELLITUS WITH OTHER DIABETIC KIDNEY COMPLICATION (H): ICD-10-CM

## 2024-11-07 DIAGNOSIS — R60.9 DEPENDENT EDEMA: ICD-10-CM

## 2024-11-07 DIAGNOSIS — L89.610: ICD-10-CM

## 2024-11-07 DIAGNOSIS — D69.6 THROMBOCYTOPENIA (H): ICD-10-CM

## 2024-11-07 DIAGNOSIS — N31.9 NEUROGENIC BLADDER: ICD-10-CM

## 2024-11-07 DIAGNOSIS — E44.0 MODERATE MALNUTRITION (H): ICD-10-CM

## 2024-11-07 DIAGNOSIS — L89.620 PRESSURE SORE ON HEEL, LEFT, UNSTAGEABLE (H): ICD-10-CM

## 2024-11-07 DIAGNOSIS — Z97.8 CHRONIC INDWELLING FOLEY CATHETER: ICD-10-CM

## 2024-11-07 DIAGNOSIS — I48.20 CHRONIC ATRIAL FIBRILLATION (H): ICD-10-CM

## 2024-11-07 DIAGNOSIS — F02.80 ALZHEIMER'S DEMENTIA, UNSPECIFIED DEMENTIA SEVERITY, UNSPECIFIED TIMING OF DEMENTIA ONSET, UNSPECIFIED WHETHER BEHAVIORAL, PSYCHOTIC, OR MOOD DISTURBANCE OR ANXIETY (H): Primary | ICD-10-CM

## 2024-11-07 PROCEDURE — 99309 SBSQ NF CARE MODERATE MDM 30: CPT | Performed by: NURSE PRACTITIONER

## 2024-11-07 RX ORDER — VITAMIN B COMPLEX
1 TABLET ORAL DAILY
COMMUNITY

## 2024-11-07 RX ORDER — MULTIPLE VITAMINS W/ MINERALS TAB 9MG-400MCG
1 TAB ORAL DAILY
COMMUNITY
End: 2024-11-07

## 2024-11-07 RX ORDER — MINERAL OIL/HYDROPHIL PETROLAT
OINTMENT (GRAM) TOPICAL PRN
COMMUNITY

## 2024-11-07 RX ORDER — DULOXETIN HYDROCHLORIDE 30 MG/1
30 CAPSULE, DELAYED RELEASE ORAL DAILY
COMMUNITY
End: 2024-11-07

## 2024-11-07 RX ORDER — SODIUM CHLOR/HYPOCHLOROUS ACID 0.033 %
SOLUTION, IRRIGATION IRRIGATION
COMMUNITY

## 2024-11-07 RX ORDER — ONDANSETRON 4 MG/1
4 TABLET, FILM COATED ORAL EVERY 6 HOURS PRN
COMMUNITY

## 2024-11-07 RX ORDER — PRAMOXINE HYDROCHLORIDE 10 MG/ML
LOTION TOPICAL 2 TIMES DAILY
COMMUNITY
End: 2024-11-07

## 2024-11-07 RX ORDER — POLYETHYLENE GLYCOL 3350 17 G/17G
17 POWDER, FOR SOLUTION ORAL DAILY
COMMUNITY
End: 2024-11-07

## 2024-11-07 NOTE — LETTER
11/7/2024      Fer Latham  [address]  [facility] [id number]        [facility] GERIATRICS    PRIMARY CARE PROVIDER AND CLINIC:  OCTAVIO Harden CNP, [address] [facility] / [place] [address]  Chief Complaint   Patient presents with     [facility] Medical Record Number:  [id number]  Place of Service where encounter took place:  [facility] (formerly [facility]) ([facility]) [[id number]]    Fer Latham  is a 74 year old  (1950), admitted to LTC unit from TCU.  [facility]  9/3/24 through 9/6/24 after being sent in from the TCU with complaints of chest pain. Found to have A fib with RVR which resolved on its own in the ED but also found to have L sided pulmonary opacities which was treated with IV abx then narrowed to cefuroxime for discharge. He did not allow for further blood work or ferguson catheter change out.  Prior to September hospitalization, was also hospitalized for compression fractures and chronic wounds over the summer.  His goal was to discharge home from TCU but unable to demonstrate a safe discharge plan where he/his wife could adequately care for himself.  LTC was recommended.   In TCU had no acute issues.  Chronic wounds remain an issue- R heel, R 3rd toe, L 2nd toe, and L 4th toe.  [facility] following for wound care.       HPI:    Fer is seen resting in LTC bed.  Alert, oriented.  He denies any acute concerns today beyond itching.  Reports he has had itching to his hands, back and legs for weeks now.  Noted previously to have dry skin so has orders for Aquaphor but has not found relief with this.  Denies issues with seasonal allergies, no symptoms of rhinorrhea, congestion, itchy eyes/nose, etc.  Reports feelings of depression, wishes he was able to be in the community with his wife of 50+ years.  At his previous LTC facility was followed by [facility] and would be open to having them see him again here.  Denies feelings of suicidal ideation or self-harm.   Spends most of his time in his bed, does not go out to socialize with other residents.  States his wife does come visit him.  He denies pain today.  Denies constipation or diarrhea.  Brown catheter- tolerating well, no recent issues.  Denies dizziness or lightheadedness.  Reports his appetite is good.  Sleeping okay.     Vitals and nursing notes reviewed.  Nursing notes reveal he will sometimes refuse wound cares- [facility] planning to see him next week for recheck.  No other nursing concerns.  Vitals (done weekly): BP <145/80, SBP usually in the 110-130 range.  Weight  is down 10lbs compared to his admission weight on 8/1 (213 lbs now 203 lbs).      CODE STATUS/ADVANCE DIRECTIVES DISCUSSION:  Full Code  CPR/Full code   ALLERGIES:   Allergies   Allergen Reactions     Iodinated Contrast Media Unknown     Other reaction(s): Unknown     Sulfa Antibiotics Hives and Rash     Other reaction(s): Unknown    PN: LW Reaction: Rash, Generalized     Ciprofloxacin Unknown      PAST MEDICAL HISTORY:   Past Medical History:   Diagnosis Date     Anemia      Arrhythmia     atrial fib     Arthritis 1990's    gout     BPH (benign prostatic hyperplasia)      Current moderate episode of major depressive disorder without prior episode (H) 4/30/2019     Depression      Diabetes mellitus (H)     Type 2  IDDM     Diverticulosis      ESRD (end stage renal disease) on dialysis (H)      Hematuria      HTN (hypertension)      HTN (hypertension)      IgA nephropathy      MI, old      Pneumonia      Rhabdomyolysis      Weakness 5/10/2019      PAST SURGICAL HISTORY:   has a past surgical history that includes appendectomy (1970's); Abdomen surgery; biopsy (2017); vascular surgery; Coronary Angiography Adult Order (2018); colonoscopy (2013); Eye surgery (Bilateral, 2004); Arthroscopy knee (6/27/2013); back surgery (1999); IR CVC Tunnel Placement > 5 Yrs of Age (11/26/2019); IR CVC Tunnel Removal Right (11/26/2019); IR CVC Tunnel Revision Right  (2/10/2020); IR CVC Tunnel Revision Right (10/8/2020); IR CVC Tunnel Revision Right (6/17/2021); and IR CVC Tunnel Removal Right (2/22/2022).  FAMILY HISTORY: family history includes Cancer in his father.  SOCIAL HISTORY:   reports that he has quit smoking. He has never used smokeless tobacco. He reports current alcohol use. He reports that he does not use drugs.  Patient's living condition: lives in a skilled nursing facility    Post Discharge Medication Reconciliation Status:   MED REC REQUIRED  Post Medication Reconciliation Status:          Current Outpatient Medications   Medication Sig Dispense Refill     acetaminophen (TYLENOL) 325 MG tablet Take 650 mg by mouth 3 times daily.       apixaban ANTICOAGULANT (ELIQUIS) 5 MG tablet Take 1 tablet (5 mg) by mouth 2 times daily       DULoxetine (CYMBALTA) 30 MG capsule Take 30 mg by mouth daily.       hydrOXYzine HCl (ATARAX) 10 MG tablet Take 1 tablet (10 mg) by mouth at bedtime.       mineral oil-hydrophilic petrolatum (AQUAPHOR) external ointment Apply topically as needed.       multivitamin, therapeutic (THERA-VIT) TABS tablet Take 1 tablet by mouth daily 30 tablet 0     ondansetron (ZOFRAN) 4 MG tablet Take 4 mg by mouth every 6 hours as needed for nausea.       polyethylene glycol (MIRALAX) 17 GM/Dose powder Take 8.5 g by mouth daily as needed        pramoxine (SARNA SENSITIVE) 1 % LOTN lotion Place rectally 2 times daily.       Vitamin D3 (CHOLECALCIFEROL) 25 mcg (1000 units) tablet Take 1 tablet by mouth daily.       Wound Cleansers (VASHE CLEANSING) SOLN Externally apply topically. Apply to Wound on rt heel topically every day and evening shift for wound care Clean with Vashe gauze apply/pack with moisten       No current facility-administered medications for this visit.       ROS:  As noted in HPI    Vitals:  /72   Pulse 86   Temp 97.6  F (36.4  C)   Resp 18   Wt 93.4 kg (206 lb)   SpO2 98%   BMI 26.45 kg/m    Exam:  GENERAL APPEARANCE:  Alert,  in no distress, cooperative  ENT:  Mouth and posterior oropharynx normal, moist mucous membranes, normal hearing acuity, poor dentition  RESP:  respiratory effort and palpation of chest normal, lungs clear to auscultation   CV:  regular rate and rhythm, no murmur, rub, or gallop, trace edema to bilateral lower extremities  ABDOMEN:  normal bowel sounds, soft, nontender, no hepatosplenomegaly or other masses  M/S:   Mechanical lift with Ax2, Ax2 for bed mobility.  Non-ambulatory.  SKIN:  Wounds as noted in HPI- not assessed today, covered by dressings.  Nurse report they are stable/improving.  NEURO:   No facial droop.  No tremor.  Equal  strength L to R.    PSYCH:  oriented to person, place, memory impaired     Lab/Diagnostic data:  Most recent BMP 9/5/24    Most recent CBC 9/5/24      ASSESSMENT/PLAN:  (G30.9,  F02.80) Alzheimer's dementia, unspecified dementia severity, unspecified timing of dementia onset, unspecified whether behavioral, psychotic, or mood disturbance or anxiety (H)  (primary encounter diagnosis)  (R53.81) Physical deconditioning  Sequela.  Questioning of dementia diagnosis documented as far back as 2019 in [facility].  Hospitalization in Feb 2022 underwent cognitive testing with OT- showed deficits with SLUMS of 8/30 and the recommendation for 24/7 assist/supervision.  Moved into LTC unit ([facility]) in Sept 2022.  Eventually discharged from LTC ([facility]) and returned home for a short amount of time before he was admitted to hospital for health issues as he/his wife was unable to meet his care needs in the community.  He continues to remain hopeful to return home but lacks insight into his care requirements.  BIMS score from 10/31/24 8/15 indicating moderate cognitive impairment.  Spends most of his time in bed, does not participate in activities.  No longer ambulatory.  Plan:   -Requires ongoing 24/7 supervision/assist to meet care needs   -Encourage participation in activities he may  enjoy  -Recommend blinds up and sunlight in room during daytime to promote wake/sleep cycle     (I10) Essential hypertension  (I48.20) Chronic atrial fibrillation (H)  (N18.32) Stage 3b chronic kidney disease (H)  Sequela.  Diagnosed hypertension 2004.  Was initially treated on diltiazem but review of records shows his Bps have been soft over the past few years, not requiring anti-hypertensives at present.  Facility vitals show he is <145/80 which is goal for his age/comorbidities.  Not in A Fib today.  On anticoagulation.  Previously ESRD requiring dialysis, off dialysis since ~2018.  Baseline creatinine 1.8-2.4.  No updated labs since September.  Plan:   -BMP 11/11  -Vitals monitoring per facility policy  -Encourage adequate fluid intake  -Continue apixaban 5mg BID      (F33.1) Moderate episode of recurrent major depressive disorder (H)  Acute on chronic.  Endorses feelings of depression, no self-harm or suicidal ideation.  Adjusting to new LTC setting and reality that he cannot safely be cared for in community setting.  Still wrapping his head around this.  At previous LTC was followed by [facility] team, is agreeable to resuming these services at present facility.  Last PHQ-9 done 10/31/24 with score 4, indicating mild depressive symptoms.  Plan:   -Continue duloxetine 30mg daily  -ACP to follow  -Continue Vit D3 1000IU daily  -Mood monitoring    (D69.6) Thrombocytopenia (H)  Sequela.  First noted 2020.  Platelets WNL over past year, baseline appears to be 200-280.  Plan:   -Recheck CBC annually and as clinically indicated  -Due for CBC now    (L89.610) Pressure sore on heel, right, unstageable (H)  (E44.0) Moderate malnutrition (H)  Acute on chronic.  Present prior to admission.  Followed by [facility].  Also has toe wounds (L foot 2nd and 4th toe, R foot 3rd toe).   Plan:   -[facility] to continue to follow for wound care orders/management  -Current R heel wound orders: Cleanse with Vashe moistened gauze. Apply/pack with VASHE  moistened (woven)gauze. Cover with dry gauze. Cover with ABD pad. Secure with Kerlix. Change once daily and as needed  -Current toe wound orders:  1. Paint w/ betadine (or povidine iodine) 2. Cover w/ dry dressing  -Nutritional support with Magic Cup daily, extra snack at bedtime  -Monitor weight    (N31.9) Neurogenic bladder  (Z97.8) Chronic indwelling Brown catheter  Sequela.  Brown for neurogenic bladder since 2021 following T10-T11 VCF and became essentially immobile.  Previously followed with Urology through [facility], last visit in April 2022.  No plans for him to discontinue Brown.  Good discussion with urologist and patient/patient's wife that given his chronic Brown, would only treat for UTI if he had symptoms given urine will always be abnormal with pyuria and bacteria.    Plan:   -Brown catheter cares per facility policy  -UA/UC for symptoms of UTI only (dysuria, hematuria, etc).      (M54.50) Acute midline low back pain without sciatica  Acute on chronic.  Presently denies pain.  H/o T10-T11 VCF in 2021.  Also notes in EPIC of thoracic spine pain in 2022.  Pain presently well managed with acetaminophen 650mg TID and duloxetine 30mg daily.  Had an updated back XR in August 2024 in TCU, negative for acute findings.      Plan:   -Encourage physical activity  -Continue acetaminophen TID, duloxetine 30mg daily  -If increase in back pain, could consider adding daily lidocaine patch    (R60.9) Dependent edema  Sequela.  Minimal today, seems to be better now that he is spending more time in bed with legs elevated rather than with legs in dependent position.  Plan:  -monitor for worsening edema     (L29.9) Itching  Acute.  Details in HPI.  Already tried Aquaphor emollient and Sarna lotion without much relief.  He reports it is most bothersome at night.  Will also check liver function to see if any abnormalities that may be contributing.  Plan:  -Hydroxyzine 10mg at HS  -Continue aquaphor and Sarna  daily  -LFT  -Monitor skin  -Notify provider if no improvements in itching with added hydroxyzine    (E11.29) Type 2 diabetes mellitus with other diabetic kidney complication (H)  Diagnosed 2004.  Not currently requiring insulin or medications, managing with diet alone.  Last A1C 4.8% (6/8/23)  Plan:  -Annual A1C checks, due now    -----------------------------------------------------------------------------------------------  Orders:  -CBC, BMP, LFT, A1C  -Hydroxyzine 10mg tab, 1 tab at bedtime for itching    Electronically signed by:  OCTAVIO Beard CNP                    Sincerely,        OCTAVIO Beard CNP

## 2024-11-10 ENCOUNTER — LAB REQUISITION (OUTPATIENT)
Dept: LAB | Facility: CLINIC | Age: 74
End: 2024-11-10
Payer: MEDICARE

## 2024-11-10 DIAGNOSIS — L29.89 OTHER PRURITUS: ICD-10-CM

## 2024-11-10 DIAGNOSIS — N18.31 CHRONIC KIDNEY DISEASE, STAGE 3A (H): ICD-10-CM

## 2024-11-13 PROBLEM — F10.939 ALCOHOL WITHDRAWAL (H): Status: RESOLVED | Noted: 2020-11-20 | Resolved: 2024-11-13

## 2024-11-13 PROBLEM — F03.B0 MODERATE DEMENTIA, UNSPECIFIED DEMENTIA TYPE, UNSPECIFIED WHETHER BEHAVIORAL, PSYCHOTIC, OR MOOD DISTURBANCE OR ANXIETY (H): Status: ACTIVE | Noted: 2024-11-13

## 2024-11-13 PROBLEM — I82.4Z1 ACUTE DEEP VEIN THROMBOSIS (DVT) OF DISTAL VEIN OF RIGHT LOWER EXTREMITY (H): Status: RESOLVED | Noted: 2021-07-08 | Resolved: 2024-11-13

## 2024-11-13 PROBLEM — I82.409 DEEP VENOUS THROMBOSIS (H): Status: RESOLVED | Noted: 2022-09-19 | Resolved: 2024-11-13

## 2024-11-13 RX ORDER — ONDANSETRON 4 MG/1
4 TABLET, FILM COATED ORAL EVERY 6 HOURS PRN
COMMUNITY
Start: 2024-11-07 | End: 2024-11-07

## 2024-11-13 RX ORDER — ACETAMINOPHEN 325 MG/1
650 TABLET ORAL 3 TIMES DAILY
COMMUNITY
Start: 2024-11-06 | End: 2024-11-07

## 2024-11-13 RX ORDER — HYDROXYZINE HYDROCHLORIDE 10 MG/1
10 TABLET, FILM COATED ORAL AT BEDTIME
COMMUNITY
Start: 2024-11-07

## 2024-11-13 RX ORDER — PRAMOXINE HYDROCHLORIDE 10 MG/ML
LOTION TOPICAL 2 TIMES DAILY
COMMUNITY
Start: 2024-11-07

## 2024-11-13 RX ORDER — MINERAL OIL/HYDROPHIL PETROLAT
OINTMENT (GRAM) TOPICAL DAILY
COMMUNITY
Start: 2024-11-07 | End: 2024-11-07

## 2024-11-13 NOTE — PROGRESS NOTES
[facility] GERIATRICS    PRIMARY CARE PROVIDER AND CLINIC:  OCTAVIO Harden CNP, [address] / [place] [address]  Chief Complaint   Patient presents with    [facility] Medical Record Number:  [id number]  Place of Service where encounter took place:  [facility] (formerly [facility]) ([facility]) [[id number]]    Fer Latham  is a 74 year old  (1950), admitted to LTC unit from TCU.  Hospitalization at [facility]  9/3/24 through 9/6/24 after being sent in from the TCU with complaints of chest pain. Found to have A fib with RVR which resolved on its own in the ED but also found to have L sided pulmonary opacities which was treated with IV abx then narrowed to cefuroxime for discharge. He did not allow for further blood work or ferguson catheter change out.  Prior to September hospitalization, was also hospitalized for compression fractures and chronic wounds over the summer.  His goal was to discharge home from TCU but unable to demonstrate a safe discharge plan where he/his wife could adequately care for himself.  LTC was recommended.   In TCU had no acute issues.  Chronic wounds remain an issue- R heel, R 3rd toe, L 2nd toe, and L 4th toe.  [facility] following for wound care.       HPI:    Fer is seen resting in LTC bed.  Alert, oriented.  He denies any acute concerns today beyond itching.  Reports he has had itching to his hands, back and legs for weeks now.  Noted previously to have dry skin so has orders for Aquaphor but has not found relief with this.  Denies issues with seasonal allergies, no symptoms of rhinorrhea, congestion, itchy eyes/nose, etc.  Reports feelings of depression, wishes he was able to be in the community with his wife of 50+ years.  At his previous LTC facility was followed by ACP and would be open to having them see him again here.  Denies feelings of suicidal ideation or self-harm.  Spends most of his time in his bed, does not go out to socialize with other residents.   States his wife does come visit him.  He denies pain today.  Denies constipation or diarrhea.  Brown catheter- tolerating well, no recent issues.  Denies dizziness or lightheadedness.  Reports his appetite is good.  Sleeping okay.     Vitals and nursing notes reviewed.  Nursing notes reveal he will sometimes refuse wound cares- AHI planning to see him next week for recheck.  No other nursing concerns.  Vitals (done weekly): BP <145/80, SBP usually in the 110-130 range.  Weight  is down 10lbs compared to his admission weight on 8/1 (213 lbs now 203 lbs).      CODE STATUS/ADVANCE DIRECTIVES DISCUSSION:  Full Code  CPR/Full code   ALLERGIES:   Allergies   Allergen Reactions    Iodinated Contrast Media Unknown     Other reaction(s): Unknown    Sulfa Antibiotics Hives and Rash     Other reaction(s): Unknown    PN: LW Reaction: Rash, Generalized    Ciprofloxacin Unknown      PAST MEDICAL HISTORY:   Past Medical History:   Diagnosis Date    Anemia     Arrhythmia     atrial fib    Arthritis 1990's    gout    BPH (benign prostatic hyperplasia)     Current moderate episode of major depressive disorder without prior episode (H) 4/30/2019    Depression     Diabetes mellitus (H)     Type 2  IDDM    Diverticulosis     ESRD (end stage renal disease) on dialysis (H)     Hematuria     HTN (hypertension)     HTN (hypertension)     IgA nephropathy     MI, old     Pneumonia     Rhabdomyolysis     Weakness 5/10/2019      PAST SURGICAL HISTORY:   has a past surgical history that includes appendectomy (1970's); Abdomen surgery; biopsy (2017); vascular surgery; Coronary Angiography Adult Order (2018); colonoscopy (2013); Eye surgery (Bilateral, 2004); Arthroscopy knee (6/27/2013); back surgery (1999); IR CVC Tunnel Placement > 5 Yrs of Age (11/26/2019); IR CVC Tunnel Removal Right (11/26/2019); IR CVC Tunnel Revision Right (2/10/2020); IR CVC Tunnel Revision Right (10/8/2020); IR CVC Tunnel Revision Right (6/17/2021); and IR CVC Tunnel  Removal Right (2/22/2022).  FAMILY HISTORY: family history includes Cancer in his father.  SOCIAL HISTORY:   reports that he has quit smoking. He has never used smokeless tobacco. He reports current alcohol use. He reports that he does not use drugs.  Patient's living condition: lives in a skilled nursing facility    Post Discharge Medication Reconciliation Status:   MED REC REQUIRED  Post Medication Reconciliation Status:          Current Outpatient Medications   Medication Sig Dispense Refill    acetaminophen (TYLENOL) 325 MG tablet Take 650 mg by mouth 3 times daily.      apixaban ANTICOAGULANT (ELIQUIS) 5 MG tablet Take 1 tablet (5 mg) by mouth 2 times daily      DULoxetine (CYMBALTA) 30 MG capsule Take 30 mg by mouth daily.      hydrOXYzine HCl (ATARAX) 10 MG tablet Take 1 tablet (10 mg) by mouth at bedtime.      mineral oil-hydrophilic petrolatum (AQUAPHOR) external ointment Apply topically as needed.      multivitamin, therapeutic (THERA-VIT) TABS tablet Take 1 tablet by mouth daily 30 tablet 0    ondansetron (ZOFRAN) 4 MG tablet Take 4 mg by mouth every 6 hours as needed for nausea.      polyethylene glycol (MIRALAX) 17 GM/Dose powder Take 8.5 g by mouth daily as needed       pramoxine (SARNA SENSITIVE) 1 % LOTN lotion Place rectally 2 times daily.      Vitamin D3 (CHOLECALCIFEROL) 25 mcg (1000 units) tablet Take 1 tablet by mouth daily.      Wound Cleansers (VASHE CLEANSING) SOLN Externally apply topically. Apply to Wound on rt heel topically every day and evening shift for wound care Clean with Vashe gauze apply/pack with moisten       No current facility-administered medications for this visit.       ROS:  As noted in HPI    Vitals:  /72   Pulse 86   Temp 97.6  F (36.4  C)   Resp 18   Wt 93.4 kg (206 lb)   SpO2 98%   BMI 26.45 kg/m    Exam:  GENERAL APPEARANCE:  Alert, in no distress, cooperative  ENT:  Mouth and posterior oropharynx normal, moist mucous membranes, normal hearing acuity, poor  dentition  RESP:  respiratory effort and palpation of chest normal, lungs clear to auscultation   CV:  regular rate and rhythm, no murmur, rub, or gallop, trace edema to bilateral lower extremities  ABDOMEN:  normal bowel sounds, soft, nontender, no hepatosplenomegaly or other masses  M/S:   Mechanical lift with Ax2, Ax2 for bed mobility.  Non-ambulatory.  SKIN:  Wounds as noted in HPI- not assessed today, covered by dressings.  Nurse report they are stable/improving.  NEURO:   No facial droop.  No tremor.  Equal  strength L to R.    PSYCH:  oriented to person, place, memory impaired     Lab/Diagnostic data:  Most recent BMP 9/5/24    Most recent CBC 9/5/24      ASSESSMENT/PLAN:  (G30.9,  F02.80) Alzheimer's dementia, unspecified dementia severity, unspecified timing of dementia onset, unspecified whether behavioral, psychotic, or mood disturbance or anxiety (H)  (primary encounter diagnosis)  (R53.81) Physical deconditioning  Sequela.  Questioning of dementia diagnosis documented as far back as 2019 in [facility].  Hospitalization in Feb 2022 underwent cognitive testing with OT- showed deficits with SLUMS of 8/30 and the recommendation for 24/7 assist/supervision.  Moved into LTC unit ([facility]) in Sept 2022.  Eventually discharged from LTC ([facility]) and returned home for a short amount of time before he was admitted to hospital for health issues as he/his wife was unable to meet his care needs in the community.  He continues to remain hopeful to return home but lacks insight into his care requirements.  BIMS score from 10/31/24 8/15 indicating moderate cognitive impairment.  Spends most of his time in bed, does not participate in activities.  No longer ambulatory.  Plan:   -Requires ongoing 24/7 supervision/assist to meet care needs   -Encourage participation in activities he may enjoy  -Recommend blinds up and sunlight in room during daytime to promote wake/sleep cycle     (I10) Essential  hypertension  (I48.20) Chronic atrial fibrillation (H)  (N18.32) Stage 3b chronic kidney disease (H)  Sequela.  Diagnosed hypertension 2004.  Was initially treated on diltiazem but review of records shows his Bps have been soft over the past few years, not requiring anti-hypertensives at present.  Facility vitals show he is <145/80 which is goal for his age/comorbidities.  Not in A Fib today.  On anticoagulation.  Previously ESRD requiring dialysis, off dialysis since ~2018.  Baseline creatinine 1.8-2.4.  No updated labs since September.  Plan:   -BMP 11/11  -Vitals monitoring per facility policy  -Encourage adequate fluid intake  -Continue apixaban 5mg BID      (F33.1) Moderate episode of recurrent major depressive disorder (H)  Acute on chronic.  Endorses feelings of depression, no self-harm or suicidal ideation.  Adjusting to new LTC setting and reality that he cannot safely be cared for in community setting.  Still wrapping his head around this.  At previous LTC was followed by ACP team, is agreeable to resuming these services at present facility.  Last PHQ-9 done 10/31/24 with score 4, indicating mild depressive symptoms.  Plan:   -Continue duloxetine 30mg daily  -ACP to follow  -Continue Vit D3 1000IU daily  -Mood monitoring    (D69.6) Thrombocytopenia (H)  Sequela.  First noted 2020.  Platelets WNL over past year, baseline appears to be 200-280.  Plan:   -Recheck CBC annually and as clinically indicated  -Due for CBC now    (L89.610) Pressure sore on heel, right, unstageable (H)  (E44.0) Moderate malnutrition (H)  Acute on chronic.  Present prior to admission.  Followed by [facility]HI.  Also has toe wounds (L foot 2nd and 4th toe, R foot 3rd toe).   Plan:   -AHI to continue to follow for wound care orders/management  -Current R heel wound orders: Cleanse with Vashe moistened gauze. Apply/pack with VASHE moistened (woven)gauze. Cover with dry gauze. Cover with ABD pad. Secure with Kerlix. Change once daily and as  needed  -Current toe wound orders:  1. Paint w/ betadine (or povidine iodine) 2. Cover w/ dry dressing  -Nutritional support with Magic Cup daily, extra snack at bedtime  -Monitor weight    (N31.9) Neurogenic bladder  (Z97.8) Chronic indwelling Brown catheter  Sequela.  Brown for neurogenic bladder since 2021 following T10-T11 VCF and became essentially immobile.  Previously followed with Urology through [facility], last visit in April 2022.  No plans for him to discontinue Brown.  Good discussion with urologist and patient/patient's wife that given his chronic Brown, would only treat for UTI if he had symptoms given urine will always be abnormal with pyuria and bacteria.    Plan:   -Brown catheter cares per facility policy  -UA/UC for symptoms of UTI only (dysuria, hematuria, etc).      (M54.50) Acute midline low back pain without sciatica  Acute on chronic.  Presently denies pain.  H/o T10-T11 VCF in 2021.  Also notes in EPIC of thoracic spine pain in 2022.  Pain presently well managed with acetaminophen 650mg TID and duloxetine 30mg daily.  Had an updated back XR in August 2024 in TCU, negative for acute findings.      Plan:   -Encourage physical activity  -Continue acetaminophen TID, duloxetine 30mg daily  -If increase in back pain, could consider adding daily lidocaine patch    (R60.9) Dependent edema  Sequela.  Minimal today, seems to be better now that he is spending more time in bed with legs elevated rather than with legs in dependent position.  Plan:  -monitor for worsening edema     (L29.9) Itching  Acute.  Details in HPI.  Already tried Aquaphor emollient and Sarna lotion without much relief.  He reports it is most bothersome at night.  Will also check liver function to see if any abnormalities that may be contributing.  Plan:  -Hydroxyzine 10mg at HS  -Continue aquaphor and Sarna daily  -LFT  -Monitor skin  -Notify provider if no improvements in itching with added hydroxyzine    (E11.29) Type 2  diabetes mellitus with other diabetic kidney complication (H)  Diagnosed 2004.  Not currently requiring insulin or medications, managing with diet alone.  Last A1C 4.8% (6/8/23)  Plan:  -Annual A1C checks, due now    -----------------------------------------------------------------------------------------------  Orders:  -CBC, BMP, LFT, A1C  -Hydroxyzine 10mg tab, 1 tab at bedtime for itching    Electronically signed by:  OCTAVIO Beard CNP

## 2024-11-18 PROBLEM — F03.B0 MODERATE DEMENTIA, UNSPECIFIED DEMENTIA TYPE, UNSPECIFIED WHETHER BEHAVIORAL, PSYCHOTIC, OR MOOD DISTURBANCE OR ANXIETY (H): Status: RESOLVED | Noted: 2024-11-13 | Resolved: 2024-11-18

## 2024-11-19 ENCOUNTER — LAB REQUISITION (OUTPATIENT)
Dept: LAB | Facility: CLINIC | Age: 74
End: 2024-11-19
Payer: MEDICARE

## 2024-11-19 DIAGNOSIS — D69.6 THROMBOCYTOPENIA, UNSPECIFIED (H): ICD-10-CM

## 2024-11-19 DIAGNOSIS — N18.9 CHRONIC KIDNEY DISEASE, UNSPECIFIED: ICD-10-CM

## 2024-12-06 ENCOUNTER — TELEPHONE (OUTPATIENT)
Dept: GERIATRICS | Facility: CLINIC | Age: 74
End: 2024-12-06
Payer: MEDICARE

## 2024-12-06 NOTE — TELEPHONE ENCOUNTER
Prior Authorization Retail Medication Request    Medication/Dose: hydrOXYzine HCl (ATARAX) 10 MG tablet Take 1 tablet (10 mg) by mouth at bedtime.      Diagnosis and ICD code (if different than what is on RX):    New/renewal/insurance change PA/secondary ins. PA:  Previously Tried and Failed:    Rationale:      Insurance   Primary: MEDICARE   Insurance ID:  [id number]     Secondary (if applicable):  Insurance ID:      MEDICAID [place]   [id number]        Pharmacy Information (if different than what is on RX)  Name:  [facility]   Phone:  [phone]  Fax: [phone]    Clinic Information  Preferred routing pool for dept communication:

## 2024-12-10 NOTE — TELEPHONE ENCOUNTER
Central Prior Authorization Team   Phone: [phone]    PA Initiation    Medication: hydrOXYzine HCl (ATARAX) 10 MG tablet Take 1 tablet (10 mg) by mouth at bedtime.      Insurance Company: [employer] - Phone [phone] Fax [phone]  Pharmacy Filling the Rx: [id number] - [place], [place] - [address]  Filling Pharmacy Phone: [phone]  Filling Pharmacy Fax:    Start Date: 12/10/2024

## 2024-12-10 NOTE — TELEPHONE ENCOUNTER
Prior Authorization Approval    Authorization Effective Date: 11/26/2024  Authorization Expiration Date: 12/31/2099  Medication: hydrOXYzine HCl (ATARAX) 10 MG tablet Take 1 tablet (10 mg) by mouth at bedtime.      Reference #:     Insurance Company: [employer] - [place] [phone] Fax [phone]  Which Pharmacy is filling the prescription (Not needed for infusion/clinic administered): [id number] - [facility] [address] [facility]  Pharmacy Notified: Yes  Patient Notified: Instructed pharmacy to notify patient when script is ready to /ship.

## 2024-12-11 ENCOUNTER — NURSING HOME VISIT (OUTPATIENT)
Dept: GERIATRICS | Facility: CLINIC | Age: 74
End: 2024-12-11
Payer: MEDICARE

## 2024-12-11 VITALS
TEMPERATURE: 97.8 F | RESPIRATION RATE: 16 BRPM | OXYGEN SATURATION: 95 % | SYSTOLIC BLOOD PRESSURE: 114 MMHG | HEART RATE: 78 BPM | HEIGHT: 74 IN | WEIGHT: 208 LBS | BODY MASS INDEX: 26.69 KG/M2 | DIASTOLIC BLOOD PRESSURE: 76 MMHG

## 2024-12-11 DIAGNOSIS — I48.11 LONGSTANDING PERSISTENT ATRIAL FIBRILLATION (H): ICD-10-CM

## 2024-12-11 DIAGNOSIS — F32.0 CURRENT MILD EPISODE OF MAJOR DEPRESSIVE DISORDER, UNSPECIFIED WHETHER RECURRENT (H): ICD-10-CM

## 2024-12-11 DIAGNOSIS — N31.9 NEUROGENIC BLADDER: ICD-10-CM

## 2024-12-11 DIAGNOSIS — E11.22 CONTROLLED TYPE 2 DIABETES MELLITUS WITH STAGE 3 CHRONIC KIDNEY DISEASE, WITHOUT LONG-TERM CURRENT USE OF INSULIN (H): ICD-10-CM

## 2024-12-11 DIAGNOSIS — N18.32 STAGE 3B CHRONIC KIDNEY DISEASE (H): ICD-10-CM

## 2024-12-11 DIAGNOSIS — D63.1 ANEMIA DUE TO STAGE 3A CHRONIC KIDNEY DISEASE (H): ICD-10-CM

## 2024-12-11 DIAGNOSIS — I10 ESSENTIAL HYPERTENSION: ICD-10-CM

## 2024-12-11 DIAGNOSIS — N18.31 ANEMIA DUE TO STAGE 3A CHRONIC KIDNEY DISEASE (H): ICD-10-CM

## 2024-12-11 DIAGNOSIS — N18.30 CONTROLLED TYPE 2 DIABETES MELLITUS WITH STAGE 3 CHRONIC KIDNEY DISEASE, WITHOUT LONG-TERM CURRENT USE OF INSULIN (H): ICD-10-CM

## 2024-12-11 DIAGNOSIS — R41.89 COGNITIVE IMPAIRMENT: Primary | ICD-10-CM

## 2024-12-11 PROBLEM — B99.9 FEVER DUE TO INFECTION: Status: ACTIVE | Noted: 2024-09-04

## 2024-12-11 NOTE — PROGRESS NOTES
[facility] GERIATRICS      Code Status:  FULL CODE  Visit Type: Nursing Home Regulatory     Facility:   [facility] [[id number]]         History of Present Illness: Fer Latham is a 74 year old male with a past medical history for anemia, BPH (chronic ferguson), Afib, hx of compression fracture, LE wounds, CKD, DM2, cognitive impairment,  Depression.      Patient has been living in LT facility for the last several months due to not able to care for self at home.      Today, he denies any issues.  He feels his chronic low back pain and pruritus is well controlled with current plan of care.  Wound provider managing his right foot and Left foot wounds.  Left foot wounds are healed.  Ferguson draining clear light yellow urine with mild amounts of sediment.  He refuses catheter changes and so it is prn only for catheter draining issues.      Mild weight loss 213lbs in August and now 208lbs.     No labs in the past year due to patient refusal.  Discussed with patient and he agrees to Lab draw next week.          Current Outpatient Medications   Medication Sig Dispense Refill    acetaminophen (TYLENOL) 325 MG tablet Take 650 mg by mouth 3 times daily.      apixaban ANTICOAGULANT (ELIQUIS) 5 MG tablet Take 1 tablet (5 mg) by mouth 2 times daily      DULoxetine (CYMBALTA) 30 MG capsule Take 30 mg by mouth daily.      hydrOXYzine HCl (ATARAX) 10 MG tablet Take 1 tablet (10 mg) by mouth at bedtime.      mineral oil-hydrophilic petrolatum (AQUAPHOR) external ointment Apply topically as needed.      multivitamin, therapeutic (THERA-VIT) TABS tablet Take 1 tablet by mouth daily 30 tablet 0    ondansetron (ZOFRAN) 4 MG tablet Take 4 mg by mouth every 6 hours as needed for nausea.      polyethylene glycol (MIRALAX) 17 GM/Dose powder Take 8.5 g by mouth daily as needed       pramoxine (SARNA SENSITIVE) 1 % LOTN lotion Place rectally  "2 times daily.      Vitamin D3 (CHOLECALCIFEROL) 25 mcg (1000 units) tablet Take 1 tablet by mouth daily.      Wound Cleansers (VASHE CLEANSING) SOLN Externally apply topically. Apply to Wound on rt heel topically every day and evening shift for wound care Clean with Vashe gauze apply/pack with moisten         Review of Systems   Patient denies fever, chills, headache, lightheadedness, dizziness, rhinorrhea, cough, congestion, shortness of breath, chest pain, palpitations, abdominal pain, n/v, diarrhea, constipation, change in appetite, change in sleep pattern, dysuria, frequency, burning or pain with urination.  Other than stated in HPI all other review of systems is negative.       Physical Exam  Vital signs:/76   Pulse 78   Temp 97.8  F (36.6  C)   Resp 16   Ht 1.88 m (6' 2\")   Wt 94.3 kg (208 lb)   SpO2 95%   BMI 26.71 kg/m     GENERAL APPEARANCE: elderly male,  in no acute distress.  HEENT: normocephalic, atraumatic  sclerae anicteric, conjunctivae clear and moist, EOM intact  LUNGS: Lung sounds CTA, no adventitious sounds, respiratory effort normal.  CARD: RRR, S1, S2, without murmurs, gallops, rubs  ABD: Soft, nondistended and nontender with normal bowel sounds.   MSK: Muscle strength and tone were equal bilaterally. Moves all extremities easily and intentionally.   EXTREMITIES: No cyanosis, clubbing or edema.  NEURO: Alert and oriented x 3. Face is symmetric.  SKIN: toes bilateral feet with mild scabbing. Heel wound with dressing in place.   PSYCH: euthymic        Labs:  no recent labs drawn      Assessment/Plan:  Cognitive impairment  SLUMS 8/30, will need a recheck.  Resistive to medical cares and orders.  Needing SNF level of care.     Current mild episode of major depressive disorder, unspecified whether recurrent (H)  Controlled, continue with Cymbalta.     Controlled type 2 diabetes mellitus with stage 3 chronic kidney disease, without long-term current use of insulin (H)  Last A1c in over " a year was 4.8%. Diet controlled only.  Patient is agreeable to checking an A1c.      Longstanding persistent atrial fibrillation (H)  Rate controlled, on no rate controlling medications.  Continue with Eliquis for anticoagulation.     Essential hypertension  Diet controlled.     Stage 3b chronic kidney disease (H)  Check CBC and BMP next week.      Neurogenic bladder  Brown, do not change unless clinically indicated.     Anemia due to stage 3a chronic kidney disease (H)  Check CBC.         Electronically signed by:Nicol Kumar NP

## 2024-12-11 NOTE — LETTER
12/11/2024      Fer Latham  [address]  [facility] [address]                                                                                           [facility] GERIATRICS      Code Status:  FULL CODE  Visit Type: Nursing Home Regulatory     Facility:   [facility] [[id number]]         History of Present Illness: Fer Latham is a 74 year old male with a past medical history for anemia, BPH (chronic ferguson), Afib, hx of compression fracture, LE wounds, CKD, DM2, cognitive impairment,  Depression.      Patient has been living in [facility] facility for the last several months due to not able to care for self at home.      Today, he denies any issues.  He feels his chronic low back pain and pruritus is well controlled with current plan of care.  Wound provider managing his right foot and Left foot wounds.  Left foot wounds are healed.  Ferguson draining clear light yellow urine with mild amounts of sediment.  He refuses catheter changes and so it is prn only for catheter draining issues.      Mild weight loss 213lbs in August and now 208lbs.     No labs in the past year due to patient refusal.  Discussed with patient and he agrees to Lab draw next week.          Current Outpatient Medications   Medication Sig Dispense Refill     acetaminophen (TYLENOL) 325 MG tablet Take 650 mg by mouth 3 times daily.       apixaban ANTICOAGULANT (ELIQUIS) 5 MG tablet Take 1 tablet (5 mg) by mouth 2 times daily       DULoxetine (CYMBALTA) 30 MG capsule Take 30 mg by mouth daily.       hydrOXYzine HCl (ATARAX) 10 MG tablet Take 1 tablet (10 mg) by mouth at bedtime.       mineral oil-hydrophilic petrolatum (AQUAPHOR) external ointment Apply topically as needed.       multivitamin, therapeutic (THERA-VIT) TABS tablet Take 1 tablet by mouth daily 30 tablet 0     ondansetron (ZOFRAN) 4 MG tablet Take 4 mg by mouth every 6 hours as needed for nausea.       polyethylene glycol (MIRALAX) 17 GM/Dose powder  "Take 8.5 g by mouth daily as needed        pramoxine (SARNA SENSITIVE) 1 % LOTN lotion Place rectally 2 times daily.       Vitamin D3 (CHOLECALCIFEROL) 25 mcg (1000 units) tablet Take 1 tablet by mouth daily.       Wound Cleansers (VASHE CLEANSING) SOLN Externally apply topically. Apply to Wound on rt heel topically every day and evening shift for wound care Clean with Vashe gauze apply/pack with moisten         Review of Systems   Patient denies fever, chills, headache, lightheadedness, dizziness, rhinorrhea, cough, congestion, shortness of breath, chest pain, palpitations, abdominal pain, n/v, diarrhea, constipation, change in appetite, change in sleep pattern, dysuria, frequency, burning or pain with urination.  Other than stated in HPI all other review of systems is negative.       Physical Exam  Vital signs:/76   Pulse 78   Temp 97.8  F (36.6  C)   Resp 16   Ht 1.88 m (6' 2\")   Wt 94.3 kg (208 lb)   SpO2 95%   BMI 26.71 kg/m     GENERAL APPEARANCE: elderly male,  in no acute distress.  HEENT: normocephalic, atraumatic  sclerae anicteric, conjunctivae clear and moist, EOM intact  LUNGS: Lung sounds CTA, no adventitious sounds, respiratory effort normal.  CARD: RRR, S1, S2, without murmurs, gallops, rubs  ABD: Soft, nondistended and nontender with normal bowel sounds.   MSK: Muscle strength and tone were equal bilaterally. Moves all extremities easily and intentionally.   EXTREMITIES: No cyanosis, clubbing or edema.  NEURO: Alert and oriented x 3. Face is symmetric.  SKIN: toes bilateral feet with mild scabbing. Heel wound with dressing in place.   PSYCH: euthymic        Labs:  no recent labs drawn      Assessment/Plan:  Cognitive impairment  SLUMS 8/30, will need a recheck.  Resistive to medical cares and orders.  Needing SNF level of care.     Current mild episode of major depressive disorder, unspecified whether recurrent (H)  Controlled, continue with Cymbalta.     Controlled type 2 diabetes " mellitus with stage 3 chronic kidney disease, without long-term current use of insulin (H)  Last A1c in over a year was 4.8%. Diet controlled only.  Patient is agreeable to checking an A1c.      Longstanding persistent atrial fibrillation (H)  Rate controlled, on no rate controlling medications.  Continue with Eliquis for anticoagulation.     Essential hypertension  Diet controlled.     Stage 3b chronic kidney disease (H)  Check CBC and BMP next week.      Neurogenic bladder  Brown, do not change unless clinically indicated.     Anemia due to stage 3a chronic kidney disease (H)  Check CBC.         Electronically signed by:Nicol Kumar NP              Sincerely,        Nicol Kumar, RAYNE       Adult

## 2024-12-17 ENCOUNTER — LAB REQUISITION (OUTPATIENT)
Dept: LAB | Facility: CLINIC | Age: 74
End: 2024-12-17
Payer: MEDICARE

## 2024-12-17 DIAGNOSIS — N18.9 CHRONIC KIDNEY DISEASE, UNSPECIFIED: ICD-10-CM

## 2024-12-17 DIAGNOSIS — E11.9 TYPE 2 DIABETES MELLITUS WITHOUT COMPLICATIONS (H): ICD-10-CM

## 2024-12-17 DIAGNOSIS — E87.5 HYPERKALEMIA: ICD-10-CM

## 2024-12-17 DIAGNOSIS — D64.9 ANEMIA, UNSPECIFIED: ICD-10-CM

## 2024-12-17 DIAGNOSIS — Z00.8 ENCOUNTER FOR OTHER GENERAL EXAMINATION: ICD-10-CM

## 2024-12-30 ENCOUNTER — TELEPHONE (OUTPATIENT)
Dept: GERIATRICS | Facility: CLINIC | Age: 74
End: 2024-12-30
Payer: MEDICARE

## 2024-12-30 NOTE — TELEPHONE ENCOUNTER
"[facility] Triage Call    Provider: OCTAVIO Garcia CNP  Facility: [facility]  Facility Type:  LTC    Caller: Cherry   Call Back Number: [phone]    Allergies:    Allergies   Allergen Reactions    Iodinated Contrast Media Unknown     Other reaction(s): Unknown    Sulfa Antibiotics Hives and Rash     Other reaction(s): Unknown    PN: LW Reaction: Rash, Generalized    Ciprofloxacin Unknown        SBAR:     S-(situation): Today the nurse is calling about a skin area on his Rt hip that looks to possibly be a boil.     B-(background): Pt has a hx of rash for the last month on the back, arms and chest, currently treating with aquaphor and anti itch cream.     A-(assessment):The area is painful, small scab in the middle, the area is hard. No warmth at this time.   Afebrile. Area is 3cmx 3.5cm. No drainage at this time.   R-(recommendations): any new orders?       Telephone encounter sent to:  Emiliano Gage MD    Please send response/orders to \"Geriatrics Nurse Pool\"    Hermelinda Sofia RN      "

## 2024-12-31 ENCOUNTER — TELEPHONE (OUTPATIENT)
Dept: GERIATRICS | Facility: CLINIC | Age: 74
End: 2024-12-31
Payer: MEDICARE

## 2024-12-31 NOTE — TELEPHONE ENCOUNTER
[facility]    Provider: OCTAVIO Patel CNP   Facility: [facility]  Facility Type:  LTC    Caller: Zarina  Call Back Number: [phone]    Allergies:    Allergies   Allergen Reactions    Iodinated Contrast Media Unknown     Other reaction(s): Unknown    Sulfa Antibiotics Hives and Rash     Other reaction(s): Unknown    PN: LW Reaction: Rash, Generalized    Ciprofloxacin Unknown        Reason for call: Pt has a boil on his hip. Today it is red around the boil and very tender to the touch. Pt is now refusing the warm compresses because they hurt too much. No drainage and boil is very firm. Vitals are stable.     Verbal Order/Direction given by Provider: Send to ED for further assessment.     Provider giving Order:  Emiliano Gage MD    Verbal Order given to: Zarina Freitas RN

## 2024-12-31 NOTE — TELEPHONE ENCOUNTER
"Nursing called to report the resident refused to go to the ED to have his right hip looked at due to suspecting infection.  \"Like a boil\" nursing stated.    Red, warm, and tender to touch.  No fever.    Reviewed allergies.  Will start on a ABX and then let primary provider know.    Orders:  Keflex 500mg po 3x a day for 10 days for abscess/infection.    Sangita Reeder, OCTAVIO CNP    "

## 2025-01-02 ENCOUNTER — NURSING HOME VISIT (OUTPATIENT)
Dept: GERIATRICS | Facility: CLINIC | Age: 75
End: 2025-01-02
Payer: MEDICARE

## 2025-01-02 VITALS
OXYGEN SATURATION: 92 % | DIASTOLIC BLOOD PRESSURE: 65 MMHG | SYSTOLIC BLOOD PRESSURE: 115 MMHG | HEART RATE: 65 BPM | WEIGHT: 208.9 LBS | RESPIRATION RATE: 18 BRPM | TEMPERATURE: 97.4 F | BODY MASS INDEX: 26.82 KG/M2

## 2025-01-02 DIAGNOSIS — L02.92 BOIL: Primary | ICD-10-CM

## 2025-01-02 NOTE — PROGRESS NOTES
"Patient was seen for an urgent visit at [facility] on the long-term care unit.  Multiple conversations with nursing home staff  Staff noticed a \"boil \"on his right lateral thigh several days ago.  This seemed to worsen with warm packs and with development of cellulitis.  He was then recommended to patient that he be seen in the ER however patient declined this and has been started empirically on oral antibiotics.    This morning, patient denies pain right lateral thigh area  No fevers or chills    Exam  Pleasant, no distress, sitting up comfortably in bed  Right lateral thigh with approximate 2 cm scabbed over open area without drainage.  There is a faint area of surrounding erythema.  No pain with palpation      Assessment    Right lateral thigh lesion, initially felt to be a boil.  Now appears to be scabbed over, without clear signs of infection.  No evidence of cellulitis.    Plan:  Continue warm packs  Complete course of Keflex  Wet-to-dry dressing and attempt to loosen scab area  Continue to monitor    Discussed with nursing staff and with patient      Emiliano Gage MD    "

## 2025-01-02 NOTE — LETTER
" 1/2/2025      Fer Latham  [address]  [place] [address]        Patient was seen for an urgent visit at [facility] on the long-term care unit.  Multiple conversations with nursing home staff  Staff noticed a \"boil \"on his right lateral thigh several days ago.  This seemed to worsen with warm packs and with development of cellulitis.  He was then recommended to patient that he be seen in the ER however patient declined this and has been started empirically on oral antibiotics.    This morning, patient denies pain right lateral thigh area  No fevers or chills    Exam  Pleasant, no distress, sitting up comfortably in bed  Right lateral thigh with approximate 2 cm scabbed over open area without drainage.  There is a faint area of surrounding erythema.  No pain with palpation      Assessment    Right lateral thigh lesion, initially felt to be a boil.  Now appears to be scabbed over, without clear signs of infection.  No evidence of cellulitis.    Plan:  Continue warm packs  Complete course of Keflex  Wet-to-dry dressing and attempt to loosen scab area  Continue to monitor    Discussed with nursing staff and with patient      Emiliano Gage MD      Sincerely,        Emiliano Gage MD    Electronically signed  "

## 2025-01-25 ENCOUNTER — HEALTH MAINTENANCE LETTER (OUTPATIENT)
Age: 75
End: 2025-01-25

## 2025-02-05 ENCOUNTER — LAB REQUISITION (OUTPATIENT)
Dept: LAB | Facility: CLINIC | Age: 75
End: 2025-02-05
Payer: MEDICARE

## 2025-02-05 DIAGNOSIS — Z00.00 ENCOUNTER FOR GENERAL ADULT MEDICAL EXAMINATION WITHOUT ABNORMAL FINDINGS: ICD-10-CM

## 2025-02-05 DIAGNOSIS — D64.9 ANEMIA, UNSPECIFIED: ICD-10-CM

## 2025-02-05 DIAGNOSIS — E11.9 TYPE 2 DIABETES MELLITUS WITHOUT COMPLICATIONS (H): ICD-10-CM

## 2025-02-05 DIAGNOSIS — E87.6 HYPOKALEMIA: ICD-10-CM

## 2025-02-05 DIAGNOSIS — N18.9 CHRONIC KIDNEY DISEASE, UNSPECIFIED: ICD-10-CM

## 2025-02-06 ENCOUNTER — NURSING HOME VISIT (OUTPATIENT)
Dept: GERIATRICS | Facility: CLINIC | Age: 75
End: 2025-02-06
Payer: MEDICARE

## 2025-02-06 VITALS
OXYGEN SATURATION: 93 % | HEIGHT: 74 IN | TEMPERATURE: 98.3 F | BODY MASS INDEX: 26.56 KG/M2 | SYSTOLIC BLOOD PRESSURE: 108 MMHG | WEIGHT: 207 LBS | HEART RATE: 77 BPM | DIASTOLIC BLOOD PRESSURE: 72 MMHG | RESPIRATION RATE: 16 BRPM

## 2025-02-06 DIAGNOSIS — D63.1 ANEMIA DUE TO STAGE 3A CHRONIC KIDNEY DISEASE (H): ICD-10-CM

## 2025-02-06 DIAGNOSIS — R41.89 COGNITIVE IMPAIRMENT: ICD-10-CM

## 2025-02-06 DIAGNOSIS — N18.31 ANEMIA DUE TO STAGE 3A CHRONIC KIDNEY DISEASE (H): ICD-10-CM

## 2025-02-06 DIAGNOSIS — N18.32 STAGE 3B CHRONIC KIDNEY DISEASE (H): ICD-10-CM

## 2025-02-06 DIAGNOSIS — N31.9 NEUROGENIC BLADDER: Primary | ICD-10-CM

## 2025-02-06 DIAGNOSIS — Z87.81 HISTORY OF VERTEBRAL FRACTURE: ICD-10-CM

## 2025-02-06 PROCEDURE — 99309 SBSQ NF CARE MODERATE MDM 30: CPT | Performed by: INTERNAL MEDICINE

## 2025-02-06 NOTE — LETTER
2/6/2025      Fer Latham  [address]  [place] [address]        No notes on file      Sincerely,        Emiliano Gage MD    Electronically signed   biopsy (2017); vascular surgery; Coronary Angiography Adult Order (2018); colonoscopy (2013); Eye surgery (Bilateral, 2004); Arthroscopy knee (6/27/2013); back surgery (1999); IR CVC Tunnel Placement > 5 Yrs of Age (11/26/2019); IR CVC Tunnel Removal Right (11/26/2019); IR CVC Tunnel Revision Right (2/10/2020); IR CVC Tunnel Revision Right (10/8/2020); IR CVC Tunnel Revision Right (6/17/2021); and IR CVC Tunnel Removal Right (2/22/2022).  FAMILY HISTORY: family history includes Cancer in his father.  SOCIAL HISTORY:  reports that he has quit smoking. He has never used smokeless tobacco. He reports current alcohol use. He reports that he does not use drugs.    Current medications reviewed by me today               Dose / Directions   acetaminophen 325 MG tablet  Commonly known as: TYLENOL      Dose: 650 mg  Take 650 mg by mouth 3 times daily.  Refills: 0     apixaban ANTICOAGULANT 5 MG tablet  Commonly known as: ELIQUIS      Dose: 5 mg  Take 1 tablet (5 mg) by mouth 2 times daily  Refills: 0     DULoxetine 30 MG capsule  Commonly known as: CYMBALTA      Dose: 30 mg  Take 30 mg by mouth daily.  Refills: 0     hydrOXYzine HCl 10 MG tablet  Commonly known as: ATARAX      Dose: 10 mg  Take 1 tablet (10 mg) by mouth at bedtime.  Refills: 0     mineral oil-hydrophilic petrolatum external ointment      Apply topically as needed.  Refills: 0     multivitamin, therapeutic Tabs tablet  Used for: Moderate malnutrition      Dose: 1 tablet  Take 1 tablet by mouth daily  Quantity: 30 tablet  Refills: 0     ondansetron 4 MG tablet  Commonly known as: ZOFRAN      Dose: 4 mg  Take 4 mg by mouth every 6 hours as needed for nausea.  Refills: 0     polyethylene glycol 17 GM/Dose powder  Commonly known as: MIRALAX      Dose: 8.5 g  Take 8.5 g by mouth daily as needed  Refills: 0     Sarna Sensitive 1 % Lotn lotion  Generic drug: pramoxine      Place rectally 2 times daily.  Refills: 0     Vashe Cleansing Soln      Externally apply  "topically. Apply to Wound on rt heel topically every day and evening shift for wound care Clean with Vashe gauze apply/pack with moisten  Refills: 0     Vitamin D3 25 mcg (1000 units) tablet  Commonly known as: CHOLECALCIFEROL      Dose: 1 tablet  Take 1 tablet by mouth daily.  Refills: 0              Vitals:  /72   Pulse 77   Temp 98.3  F (36.8  C)   Resp 16   Ht 1.88 m (6' 2\")   Wt 93.9 kg (207 lb)   SpO2 93%   BMI 26.58 kg/m    Body mass index is 26.58 kg/m .  Exam:  Well-nourished appearing male, lying comfortably in bed  He is pleasant and cooperative  Lungs clear  CV heart rate 70s  Abdomen soft nontender nondistended  Brown catheter in place  Extremities: Scabbed quarter sized open area right lateral thigh with moderate surrounding erythema and edema  Soft boots in place both feet and ankles.  Generalized lower extremity weakness.  Patient is able to wiggle toes.  Neuro: Alert, oriented to self and surroundings.  Impaired judgment        ASSESSMENT/PLAN    Right lateral thigh open area with concern for underlying infection/cellulitis  Followed by house wound team who noted purulent drainage 2 days ago.  None obvious today but suspect underlying infection  Plan: Wound care as per wound team.  Attempt culture of drainage.  Empirically start doxycycline 100 mg twice daily for 10 days, monitor    Chronic kidney disease stage IIIb  Patient has historically refused lab tests  Intake and urine output felt to be adequate  Plan: Attempt labs to monitor BMP    Chronic A-fib  Heart rate controlled on no medications  Patient remains on apixaban  Plan: Continue apixaban, monitor vitals    History of anemia secondary to chronic disease  No recent labs  No symptoms of progressive anemia  Plan: Monitor hemoglobin, as patient permits    Cognitive impairment  History of depression  Neurologic status appears stable on duloxetine      History of T10-T11 vertebral compression fractures with bilateral lower extremity " paresis, neurogenic bladder, history of chronic bilateral lower extremity wounds.  Per chart, left foot wound has healed.  Patient is being followed by wound team for right heel ulcer  Plan: Continue chronic Brown catheter.  Patient encouraged to allow staff to get him out of bed.  It is highly likely that he will be able to ambulate again however      Emiliano Gage MD      Sincerely,        Emiliano Gage MD    Electronically signed

## 2025-02-06 NOTE — PROGRESS NOTES
[facility] GERIATRICS  Chief Complaint   Patient presents with    correction Regulatory     [place] Medical Record Number:  [id number]  Place of Service where encounter took place:  [facility] [[id number]]    HPI:    Fer Latham  is 74 year old (1950), who is being seen today for a federally mandated E/M visit. Today's concerns are:  {FGS DX:126089}    ALLERGIES:Iodinated contrast media, Sulfa antibiotics, and Ciprofloxacin  PAST MEDICAL HISTORY:   Past Medical History:   Diagnosis Date    Anemia     Arrhythmia     atrial fib    Arthritis 1990's    gout    BPH (benign prostatic hyperplasia)     Current moderate episode of major depressive disorder without prior episode (H) 4/30/2019    Depression     Diabetes mellitus (H)     Type 2  IDDM    Diverticulosis     ESRD (end stage renal disease) on dialysis (H)     Hematuria     HTN (hypertension)     HTN (hypertension)     IgA nephropathy     MI, old     Pneumonia     Rhabdomyolysis     Weakness 5/10/2019     PAST SURGICAL HISTORY:   has a past surgical history that includes appendectomy (1970's); Abdomen surgery; biopsy (2017); vascular surgery; Coronary Angiography Adult Order (2018); colonoscopy (2013); Eye surgery (Bilateral, 2004); Arthroscopy knee (6/27/2013); back surgery (1999); IR CVC Tunnel Placement > 5 Yrs of Age (11/26/2019); IR CVC Tunnel Removal Right (11/26/2019); IR CVC Tunnel Revision Right (2/10/2020); IR CVC Tunnel Revision Right (10/8/2020); IR CVC Tunnel Revision Right (6/17/2021); and IR CVC Tunnel Removal Right (2/22/2022).  FAMILY HISTORY: family history includes Cancer in his father.  SOCIAL HISTORY:  reports that he has quit smoking. He has never used smokeless tobacco. He reports current alcohol use. He reports that he does not use drugs.    MEDICATIONS:  MED REC REQUIRED{TIP  Click the link below to document or use med rec list, use list to pull in response :452376}  Post Medication Reconciliation Status:  {MED REC LIST:727979}         Review of your medicines            Accurate as of February 6, 2025 12:59 PM. If you have any questions, ask your nurse or doctor.                CONTINUE these medicines which have NOT CHANGED        Dose / Directions   acetaminophen 325 MG tablet  Commonly known as: TYLENOL      Dose: 650 mg  Take 650 mg by mouth 3 times daily.  Refills: 0     apixaban ANTICOAGULANT 5 MG tablet  Commonly known as: ELIQUIS      Dose: 5 mg  Take 1 tablet (5 mg) by mouth 2 times daily  Refills: 0     DULoxetine 30 MG capsule  Commonly known as: CYMBALTA      Dose: 30 mg  Take 30 mg by mouth daily.  Refills: 0     hydrOXYzine HCl 10 MG tablet  Commonly known as: ATARAX      Dose: 10 mg  Take 1 tablet (10 mg) by mouth at bedtime.  Refills: 0     mineral oil-hydrophilic petrolatum external ointment      Apply topically as needed.  Refills: 0     multivitamin, therapeutic Tabs tablet  Used for: Moderate malnutrition      Dose: 1 tablet  Take 1 tablet by mouth daily  Quantity: 30 tablet  Refills: 0     ondansetron 4 MG tablet  Commonly known as: ZOFRAN      Dose: 4 mg  Take 4 mg by mouth every 6 hours as needed for nausea.  Refills: 0     polyethylene glycol 17 GM/Dose powder  Commonly known as: MIRALAX      Dose: 8.5 g  Take 8.5 g by mouth daily as needed  Refills: 0     Sarna Sensitive 1 % Lotn lotion  Generic drug: pramoxine      Place rectally 2 times daily.  Refills: 0     Vashe Cleansing Soln      Externally apply topically. Apply to Wound on rt heel topically every day and evening shift for wound care Clean with Vashe gauze apply/pack with moisten  Refills: 0     Vitamin D3 25 mcg (1000 units) tablet  Commonly known as: CHOLECALCIFEROL      Dose: 1 tablet  Take 1 tablet by mouth daily.  Refills: 0           ***    Case Management:  I have reviewed the care plan and MDS and do agree with the plan. Patient's desire to return to the community is {FGS RETURN TO COMMUNITY:143255}. Information reviewed:   "Medications, vital signs, orders, and nursing notes.    ROS:  {ROS FGS:043289}    Vitals:  /72   Pulse 77   Temp 98.3  F (36.8  C)   Resp 16   Ht 1.88 m (6' 2\")   Wt 93.9 kg (207 lb)   SpO2 93%   BMI 26.58 kg/m    Body mass index is 26.58 kg/m .  Exam:  {Nursing home physical exam :665384}    Lab/Diagnostic data:   {fgslab:574670}    ASSESSMENT/PLAN  {FGS DX2:358926}    {fgsorders:023145}  ***    Electronically signed by:  Josselin Barrios***        " healed.  Patient is being followed by wound team for right heel ulcer  Plan: Continue chronic Brown catheter.  Patient encouraged to allow staff to get him out of bed.  It is highly likely that he will be able to ambulate again however      Emiliano Gage MD

## 2025-02-07 ENCOUNTER — LAB REQUISITION (OUTPATIENT)
Dept: LAB | Facility: CLINIC | Age: 75
End: 2025-02-07
Payer: MEDICARE

## 2025-02-07 DIAGNOSIS — L08.9 LOCAL INFECTION OF THE SKIN AND SUBCUTANEOUS TISSUE, UNSPECIFIED: ICD-10-CM

## 2025-02-07 PROCEDURE — 87186 SC STD MICRODIL/AGAR DIL: CPT | Mod: ORL | Performed by: INTERNAL MEDICINE

## 2025-02-10 ENCOUNTER — TELEPHONE (OUTPATIENT)
Dept: GERIATRICS | Facility: CLINIC | Age: 75
End: 2025-02-10

## 2025-02-10 LAB
ALBUMIN SERPL BCG-MCNC: 3.1 G/DL (ref 3.5–5.2)
ALP SERPL-CCNC: 99 U/L (ref 40–150)
ALT SERPL W P-5'-P-CCNC: <5 U/L (ref 0–70)
ANION GAP SERPL CALCULATED.3IONS-SCNC: 14 MMOL/L (ref 7–15)
AST SERPL W P-5'-P-CCNC: 15 U/L (ref 0–45)
BILIRUB DIRECT SERPL-MCNC: <0.2 MG/DL (ref 0–0.3)
BILIRUB SERPL-MCNC: 0.2 MG/DL
BUN SERPL-MCNC: 37.9 MG/DL (ref 8–23)
CALCIUM SERPL-MCNC: 9 MG/DL (ref 8.8–10.4)
CHLORIDE SERPL-SCNC: 104 MMOL/L (ref 98–107)
CREAT SERPL-MCNC: 1.87 MG/DL (ref 0.67–1.17)
EGFRCR SERPLBLD CKD-EPI 2021: 37 ML/MIN/1.73M2
ERYTHROCYTE [DISTWIDTH] IN BLOOD BY AUTOMATED COUNT: 14 % (ref 10–15)
EST. AVERAGE GLUCOSE BLD GHB EST-MCNC: 105 MG/DL
GLUCOSE SERPL-MCNC: 89 MG/DL (ref 70–99)
HBA1C MFR BLD: 5.3 %
HCO3 SERPL-SCNC: 21 MMOL/L (ref 22–29)
HCT VFR BLD AUTO: 32.8 % (ref 40–53)
HGB BLD-MCNC: 11 G/DL (ref 13.3–17.7)
MAGNESIUM SERPL-MCNC: 1.8 MG/DL (ref 1.7–2.3)
MCH RBC QN AUTO: 32.6 PG (ref 26.5–33)
MCHC RBC AUTO-ENTMCNC: 33.5 G/DL (ref 31.5–36.5)
MCV RBC AUTO: 97 FL (ref 78–100)
PLATELET # BLD AUTO: 219 10E3/UL (ref 150–450)
POTASSIUM SERPL-SCNC: 4.8 MMOL/L (ref 3.4–5.3)
PROT SERPL-MCNC: 7.4 G/DL (ref 6.4–8.3)
RBC # BLD AUTO: 3.37 10E6/UL (ref 4.4–5.9)
SODIUM SERPL-SCNC: 139 MMOL/L (ref 135–145)
T4 FREE SERPL-MCNC: 1.08 NG/DL (ref 0.9–1.7)
TSH SERPL DL<=0.005 MIU/L-ACNC: 2.15 UIU/ML (ref 0.3–4.2)
WBC # BLD AUTO: 6.8 10E3/UL (ref 4–11)

## 2025-02-10 PROCEDURE — 84443 ASSAY THYROID STIM HORMONE: CPT | Mod: ORL | Performed by: INTERNAL MEDICINE

## 2025-02-10 PROCEDURE — 82248 BILIRUBIN DIRECT: CPT | Mod: ORL | Performed by: INTERNAL MEDICINE

## 2025-02-10 PROCEDURE — 85027 COMPLETE CBC AUTOMATED: CPT | Mod: ORL | Performed by: INTERNAL MEDICINE

## 2025-02-10 PROCEDURE — 83036 HEMOGLOBIN GLYCOSYLATED A1C: CPT | Mod: ORL | Performed by: INTERNAL MEDICINE

## 2025-02-10 PROCEDURE — 80053 COMPREHEN METABOLIC PANEL: CPT | Mod: ORL | Performed by: INTERNAL MEDICINE

## 2025-02-10 PROCEDURE — 36415 COLL VENOUS BLD VENIPUNCTURE: CPT | Mod: ORL | Performed by: INTERNAL MEDICINE

## 2025-02-10 PROCEDURE — 83735 ASSAY OF MAGNESIUM: CPT | Mod: ORL | Performed by: INTERNAL MEDICINE

## 2025-02-10 PROCEDURE — 84439 ASSAY OF FREE THYROXINE: CPT | Mod: ORL | Performed by: INTERNAL MEDICINE

## 2025-02-10 PROCEDURE — P9604 ONE-WAY ALLOW PRORATED TRIP: HCPCS | Mod: ORL | Performed by: INTERNAL MEDICINE

## 2025-02-10 NOTE — TELEPHONE ENCOUNTER
[facility] Geriatrics Lab Note     Provider: Emiliano Gage MD  Facility: [facility]  Facility Type:  [facility]    Allergies   Allergen Reactions    Iodinated Contrast Media Unknown     Other reaction(s): Unknown    Sulfa Antibiotics Hives and Rash     Other reaction(s): Unknown    PN: LW Reaction: Rash, Generalized    Ciprofloxacin Unknown       Labs Reviewed by provider: preliminary wound culture    Patient is currently on Doxycycline.         Verbal Order/Direction given by Provider: Nursing to update provider if continues to have purulent drainage past 2/16.    Provider giving Order:  MARGE Huynh    Verbal Order given to: Cherry Kan RN

## 2025-02-11 LAB
BACTERIA WND CULT: ABNORMAL

## (undated) RX ORDER — CLINDAMYCIN PHOSPHATE 900 MG/50ML
INJECTION, SOLUTION INTRAVENOUS
Status: DISPENSED
Start: 2020-02-10

## (undated) RX ORDER — LIDOCAINE HYDROCHLORIDE 10 MG/ML
INJECTION, SOLUTION EPIDURAL; INFILTRATION; INTRACAUDAL; PERINEURAL
Status: DISPENSED
Start: 2020-10-08

## (undated) RX ORDER — HEPARIN SODIUM 1000 [USP'U]/ML
INJECTION, SOLUTION INTRAVENOUS; SUBCUTANEOUS
Status: DISPENSED
Start: 2020-02-10

## (undated) RX ORDER — FENTANYL CITRATE 50 UG/ML
INJECTION, SOLUTION INTRAMUSCULAR; INTRAVENOUS
Status: DISPENSED
Start: 2020-10-08

## (undated) RX ORDER — LIDOCAINE HYDROCHLORIDE 10 MG/ML
INJECTION, SOLUTION EPIDURAL; INFILTRATION; INTRACAUDAL; PERINEURAL
Status: DISPENSED
Start: 2021-06-18

## (undated) RX ORDER — FENTANYL CITRATE 50 UG/ML
INJECTION, SOLUTION INTRAMUSCULAR; INTRAVENOUS
Status: DISPENSED
Start: 2021-06-17

## (undated) RX ORDER — FENTANYL CITRATE 50 UG/ML
INJECTION, SOLUTION INTRAMUSCULAR; INTRAVENOUS
Status: DISPENSED
Start: 2019-11-26

## (undated) RX ORDER — HEPARIN SODIUM 1000 [USP'U]/ML
INJECTION, SOLUTION INTRAVENOUS; SUBCUTANEOUS
Status: DISPENSED
Start: 2021-06-18

## (undated) RX ORDER — LIDOCAINE HYDROCHLORIDE 10 MG/ML
INJECTION, SOLUTION EPIDURAL; INFILTRATION; INTRACAUDAL; PERINEURAL
Status: DISPENSED
Start: 2022-02-22

## (undated) RX ORDER — SODIUM CHLORIDE 9 MG/ML
INJECTION, SOLUTION INTRAVENOUS
Status: DISPENSED
Start: 2020-10-08

## (undated) RX ORDER — CLINDAMYCIN PHOSPHATE 150 MG/ML
INJECTION, SOLUTION INTRAVENOUS
Status: DISPENSED
Start: 2021-06-17

## (undated) RX ORDER — LIDOCAINE HYDROCHLORIDE 10 MG/ML
INJECTION, SOLUTION EPIDURAL; INFILTRATION; INTRACAUDAL; PERINEURAL
Status: DISPENSED
Start: 2019-11-26

## (undated) RX ORDER — LIDOCAINE HYDROCHLORIDE 10 MG/ML
INJECTION, SOLUTION EPIDURAL; INFILTRATION; INTRACAUDAL; PERINEURAL
Status: DISPENSED
Start: 2021-06-17

## (undated) RX ORDER — SODIUM CHLORIDE 9 MG/ML
INJECTION, SOLUTION INTRAVENOUS
Status: DISPENSED
Start: 2020-02-10

## (undated) RX ORDER — LIDOCAINE HYDROCHLORIDE 10 MG/ML
INJECTION, SOLUTION EPIDURAL; INFILTRATION; INTRACAUDAL; PERINEURAL
Status: DISPENSED
Start: 2020-02-10

## (undated) RX ORDER — HEPARIN SODIUM 1000 [USP'U]/ML
INJECTION, SOLUTION INTRAVENOUS; SUBCUTANEOUS
Status: DISPENSED
Start: 2021-06-17

## (undated) RX ORDER — HEPARIN SODIUM 1000 [USP'U]/ML
INJECTION, SOLUTION INTRAVENOUS; SUBCUTANEOUS
Status: DISPENSED
Start: 2019-11-26

## (undated) RX ORDER — HEPARIN SODIUM 1000 [USP'U]/ML
INJECTION, SOLUTION INTRAVENOUS; SUBCUTANEOUS
Status: DISPENSED
Start: 2020-10-08

## (undated) RX ORDER — FENTANYL CITRATE 50 UG/ML
INJECTION, SOLUTION INTRAMUSCULAR; INTRAVENOUS
Status: DISPENSED
Start: 2021-06-18

## (undated) RX ORDER — CLINDAMYCIN PHOSPHATE 900 MG/50ML
INJECTION, SOLUTION INTRAVENOUS
Status: DISPENSED
Start: 2020-10-08

## (undated) RX ORDER — FENTANYL CITRATE 50 UG/ML
INJECTION, SOLUTION INTRAMUSCULAR; INTRAVENOUS
Status: DISPENSED
Start: 2020-02-10

## (undated) RX ORDER — CLINDAMYCIN PHOSPHATE 900 MG/50ML
INJECTION, SOLUTION INTRAVENOUS
Status: DISPENSED
Start: 2019-11-26

## (undated) RX ORDER — NITROFURANTOIN 25; 75 MG/1; MG/1
CAPSULE ORAL
Status: DISPENSED
Start: 2021-08-31

## (undated) RX ORDER — DIPHENHYDRAMINE HYDROCHLORIDE 50 MG/ML
INJECTION INTRAMUSCULAR; INTRAVENOUS
Status: DISPENSED
Start: 2020-10-08

## (undated) RX ORDER — SODIUM CHLORIDE 9 MG/ML
INJECTION, SOLUTION INTRAVENOUS
Status: DISPENSED
Start: 2019-11-26